# Patient Record
Sex: MALE | Race: BLACK OR AFRICAN AMERICAN | NOT HISPANIC OR LATINO | Employment: OTHER | URBAN - METROPOLITAN AREA
[De-identification: names, ages, dates, MRNs, and addresses within clinical notes are randomized per-mention and may not be internally consistent; named-entity substitution may affect disease eponyms.]

---

## 2017-01-14 ENCOUNTER — HOSPITAL ENCOUNTER (EMERGENCY)
Facility: HOSPITAL | Age: 57
Discharge: HOME | End: 2017-01-14
Attending: EMERGENCY MEDICINE
Payer: MEDICARE

## 2017-01-14 VITALS
DIASTOLIC BLOOD PRESSURE: 74 MMHG | RESPIRATION RATE: 16 BRPM | HEIGHT: 72 IN | OXYGEN SATURATION: 100 % | TEMPERATURE: 99 F | HEART RATE: 77 BPM | WEIGHT: 210 LBS | SYSTOLIC BLOOD PRESSURE: 128 MMHG | BODY MASS INDEX: 28.44 KG/M2

## 2017-01-14 DIAGNOSIS — D64.9 ANEMIA, UNSPECIFIED TYPE: ICD-10-CM

## 2017-01-14 DIAGNOSIS — T78.3XXA ANGIOEDEMA, INITIAL ENCOUNTER: Primary | ICD-10-CM

## 2017-01-14 DIAGNOSIS — M10.9 GOUT: ICD-10-CM

## 2017-01-14 DIAGNOSIS — R70.0 ESR RAISED: ICD-10-CM

## 2017-01-14 DIAGNOSIS — M00.9 SEPTIC ARTHRITIS: ICD-10-CM

## 2017-01-14 DIAGNOSIS — R79.82 ELEVATED C-REACTIVE PROTEIN (CRP): ICD-10-CM

## 2017-01-14 DIAGNOSIS — M1A.0610 CHRONIC GOUT OF RIGHT KNEE, UNSPECIFIED CAUSE: ICD-10-CM

## 2017-01-14 LAB
ALBUMIN SERPL BCP-MCNC: 3.4 G/DL
ALP SERPL-CCNC: 105 U/L
ALT SERPL W/O P-5'-P-CCNC: 46 U/L
ANION GAP SERPL CALC-SCNC: 13 MMOL/L
AST SERPL-CCNC: 56 U/L
BASOPHILS # BLD AUTO: 0.02 K/UL
BASOPHILS NFR BLD: 0.3 %
BILIRUB SERPL-MCNC: 0.5 MG/DL
BUN SERPL-MCNC: 26 MG/DL
CALCIUM SERPL-MCNC: 9.2 MG/DL
CHLORIDE SERPL-SCNC: 101 MMOL/L
CO2 SERPL-SCNC: 20 MMOL/L
CREAT SERPL-MCNC: 1.7 MG/DL
CRP SERPL-MCNC: 156 MG/L
DIFFERENTIAL METHOD: ABNORMAL
EOSINOPHIL # BLD AUTO: 0.1 K/UL
EOSINOPHIL NFR BLD: 0.7 %
ERYTHROCYTE [DISTWIDTH] IN BLOOD BY AUTOMATED COUNT: 14.1 %
ERYTHROCYTE [SEDIMENTATION RATE] IN BLOOD BY WESTERGREN METHOD: 143 MM/HR
EST. GFR  (AFRICAN AMERICAN): 51.7 ML/MIN/1.73 M^2
EST. GFR  (NON AFRICAN AMERICAN): 44.7 ML/MIN/1.73 M^2
GLUCOSE SERPL-MCNC: 90 MG/DL
HCT VFR BLD AUTO: 33.7 %
HGB BLD-MCNC: 11.7 G/DL
LYMPHOCYTES # BLD AUTO: 1.1 K/UL
LYMPHOCYTES NFR BLD: 16.1 %
MAGNESIUM SERPL-MCNC: 1.1 MG/DL
MCH RBC QN AUTO: 37.3 PG
MCHC RBC AUTO-ENTMCNC: 34.7 %
MCV RBC AUTO: 107 FL
MONOCYTES # BLD AUTO: 1.6 K/UL
MONOCYTES NFR BLD: 23.1 %
NEUTROPHILS # BLD AUTO: 4.1 K/UL
NEUTROPHILS NFR BLD: 59.5 %
PHOSPHATE SERPL-MCNC: 2.9 MG/DL
PLATELET # BLD AUTO: 286 K/UL
PMV BLD AUTO: 11.5 FL
POTASSIUM SERPL-SCNC: 3.5 MMOL/L
PROT SERPL-MCNC: 9.5 G/DL
RBC # BLD AUTO: 3.14 M/UL
SODIUM SERPL-SCNC: 134 MMOL/L
WBC # BLD AUTO: 6.84 K/UL

## 2017-01-14 PROCEDURE — 99285 EMERGENCY DEPT VISIT HI MDM: CPT | Mod: ,,, | Performed by: EMERGENCY MEDICINE

## 2017-01-14 PROCEDURE — 99284 EMERGENCY DEPT VISIT MOD MDM: CPT | Mod: 25

## 2017-01-14 PROCEDURE — 83735 ASSAY OF MAGNESIUM: CPT

## 2017-01-14 PROCEDURE — 85651 RBC SED RATE NONAUTOMATED: CPT

## 2017-01-14 PROCEDURE — 25000003 PHARM REV CODE 250: Performed by: EMERGENCY MEDICINE

## 2017-01-14 PROCEDURE — 63600175 PHARM REV CODE 636 W HCPCS: Performed by: EMERGENCY MEDICINE

## 2017-01-14 PROCEDURE — 84100 ASSAY OF PHOSPHORUS: CPT

## 2017-01-14 PROCEDURE — 85025 COMPLETE CBC W/AUTO DIFF WBC: CPT

## 2017-01-14 PROCEDURE — 96375 TX/PRO/DX INJ NEW DRUG ADDON: CPT

## 2017-01-14 PROCEDURE — 96372 THER/PROPH/DIAG INJ SC/IM: CPT

## 2017-01-14 PROCEDURE — 86140 C-REACTIVE PROTEIN: CPT

## 2017-01-14 PROCEDURE — 96374 THER/PROPH/DIAG INJ IV PUSH: CPT

## 2017-01-14 PROCEDURE — 80053 COMPREHEN METABOLIC PANEL: CPT

## 2017-01-14 RX ORDER — MAGNESIUM SULFATE HEPTAHYDRATE 40 MG/ML
2 INJECTION, SOLUTION INTRAVENOUS
Status: COMPLETED | OUTPATIENT
Start: 2017-01-14 | End: 2017-01-14

## 2017-01-14 RX ORDER — DIPHENHYDRAMINE HCL 25 MG
25 CAPSULE ORAL EVERY 4 HOURS PRN
Qty: 20 CAPSULE | Refills: 0 | Status: ON HOLD | COMMUNITY
Start: 2017-01-14 | End: 2018-01-16 | Stop reason: HOSPADM

## 2017-01-14 RX ORDER — DIPHENHYDRAMINE HYDROCHLORIDE 50 MG/ML
50 INJECTION INTRAMUSCULAR; INTRAVENOUS
Status: COMPLETED | OUTPATIENT
Start: 2017-01-14 | End: 2017-01-14

## 2017-01-14 RX ORDER — EPINEPHRINE 0.3 MG/.3ML
0.3 INJECTION SUBCUTANEOUS
Status: COMPLETED | OUTPATIENT
Start: 2017-01-14 | End: 2017-01-14

## 2017-01-14 RX ORDER — FAMOTIDINE 10 MG/ML
20 INJECTION INTRAVENOUS
Status: COMPLETED | OUTPATIENT
Start: 2017-01-14 | End: 2017-01-14

## 2017-01-14 RX ORDER — METHYLPREDNISOLONE SOD SUCC 125 MG
125 VIAL (EA) INJECTION
Status: COMPLETED | OUTPATIENT
Start: 2017-01-14 | End: 2017-01-14

## 2017-01-14 RX ADMIN — DIPHENHYDRAMINE HYDROCHLORIDE 50 MG: 50 INJECTION, SOLUTION INTRAMUSCULAR; INTRAVENOUS at 03:01

## 2017-01-14 RX ADMIN — FAMOTIDINE 20 MG: 10 INJECTION INTRAVENOUS at 01:01

## 2017-01-14 RX ADMIN — METHYLPREDNISOLONE SODIUM SUCCINATE 125 MG: 125 INJECTION, POWDER, FOR SOLUTION INTRAMUSCULAR; INTRAVENOUS at 01:01

## 2017-01-14 RX ADMIN — EPINEPHRINE 0.3 MG: 0.3 INJECTION INTRAMUSCULAR at 01:01

## 2017-01-14 RX ADMIN — MAGNESIUM SULFATE IN WATER 2 G: 40 INJECTION, SOLUTION INTRAVENOUS at 03:01

## 2017-01-14 NOTE — DISCHARGE INSTRUCTIONS
Angioedema  Angioedema (pronounced gk-yeb-o-edema) is a sudden appearance of swollen patches (edema) on the skin or mucous membranes. It most often involves the face, lips, mouth, tongue, back of throat, or vocal cords. It may also occur in other places, such as the arms or legs. A rash may also appear during the first 4 days of this illness.  Symptoms  There are different types of angioedema. Your symptoms will depend on what type of angioedema you have. Swelling and redness may be the main symptoms. Like allergic reactions, angioedema may include:  · Rash, hives, redness, welts, blisters  · Itching, burning, stinging, pain  · Dry, flaky, cracking, or scaly skin  · Swelling of the face, lips, or other parts of the body  More severe symptoms may include:  · Trouble swallowing, or feeling like your throat is closing  · Trouble breathing or wheezing  · Hoarse voice or trouble speaking  · Nausea, vomiting, diarrhea, or stomach cramps  · Feeling faint or lightheaded, rapid heart rate, or low blood pressure  Causes  Angioedema can be triggered by exposure to certain substances. Medical conditions involving the immune systems and certain infections may cause it. In rare cases, angioedema can be hereditary. Sometimes the cause may be very clear. However, it is often hard to find a cause. The most common causes include:  · Foods, such as shrimp, shellfish, peanuts, milk products, gluten, and eggs; also colorings, flavorings, and additives  · Insect bites or stings, from bees, mosquitos, fleas, or ticks  · Medicines, such as ACE inhibitors, penicillin, sulfa drugs, amoxicillin, aspirin, and ibuprofen  · Latex, which may be in gloves, clothes, toys, balloons, and some kinds of tape. People who are allergic to latex may have problems with foods such as bananas, avocados, kiwi, papaya, or chestnuts.  · Stress  · Heat, cold, or sunlight  The most common cause of angioedema is a reaction to a class of medicines called ACE  inhibitors. These are used to treat high blood pressure. ACE inhibitors include captopril, enalapril, and lisinopril. Tell your doctor if you have angioedema symptoms and are taking any of these medicines.  Angioedema may recur. It is important to watch for the earliest signs of this condition (see the list below). Contact your healthcare provider right away if swelling involves the face, mouth, or throat.  Home care  Rest quietly today. Avoid vigorous physical activity.  Medicines: The healthcare provider may prescribe medicines for itching, swelling, or pain. Follow the healthcare providers instructions when taking these medicines.  · Oral diphenhydramine is an antihistamine available without a prescription. Unless a prescription antihistamine was given, diphenhydramine may be used to reduce widespread itching. It may make you sleepy, so be careful using it when going to school, working, or driving. (Note: Do not use diphenhydramine if you have glaucoma or if you are a man who has trouble urinating due to an enlarged prostate.) Loratadine is an antihistamine that may cause less drowsiness.  · Do not use diphenhydramine cream on your skin. Some people can have an allergic reaction to this.  · Calamine lotion or oatmeal baths sometimes help with itching.  · You may use acetaminophen or ibuprofen for pain, unless another pain medicine was prescribed.  · If you were told that your angioedema was caused by a medicine you are taking, you must stop taking it. Ask your healthcare provider for a different one. In the future, advise medical staff that you are allergic to this medicine.  · If medicine was prescribed to treat angioedema (such as steroids or antihistamines), be sure you understand what the medicine is and how to take it. Then, take it as directed.  Follow-up care  Follow up with your healthcare provider, or as advised.  Call 911  Contact emergency services right away if any of these occur:  · Trouble  breathing or swallowing, or wheezing  · Hoarse voice or trouble speaking  · Chest pain  · Confusion  · Extreme drowsiness or trouble awakening  · Fainting or loss of consciousness  · Rapid heart rate  · Vomiting blood, or large amounts of blood in stool  · Seizure  When to seek medical attention  Call your healthcare provider right away if any of the following occur:  · Increase in swelling of the lips, mouth, or tongue  · Severe abdominal pain  © 7504-9206 [employer]. [address] [place] [address], [place] [address]. All rights reserved. This information is not intended as a substitute for professional medical care. Always follow your healthcare professional's instructions.          Gout    Gout or is an inflammation of a joint due to a build-up of gout crystals in the joint fluid. This occurs when there is an excess of uric acid (a normal waste product) in the body. Uric acid builds up in the body when the kidneys are unable to filter enough of it from the blood. This may occur with age. It is also associated with kidney disease. Gout occurs more often in persons with obesity, diabetes, hypertension, or high levels of fats in the blood. It may be run in families. Gout tends to come and go. A flare up of gout is called an attack. Drinking alcohol or eating certain foods (such as shellfish or foods with additives such as high-fructose corn syrup) may increase uric acid levels in the blood and cause a gout attack.  During a gout attack, the affected joint may become a hot, red, swollen and painful. If you have had one attack of gout, you are likely to have another. An attack of gout can be treated with medicine. If these attacks become frequent, a daily medicine may be prescribed to help the kidneys remove uric acid from the body.  Home care  During a gout attack:  · Rest painful joints. If gout affects the joints of your foot or leg, you may want to use crutches for the first few days to keep from bearing  weight on the affected joint.  · When sitting or lying down, raise the painful joint to a level higher than your heart.  · Apply an ice pack (ice cubes in a plastic bag wrapped in a thin towel) over the injured area for 20 minutes every 1-2 hours the first day for pain relief. Continue this 3-4 times a day for swelling and pain.  · Avoid alcohol and foods listed below (see Preventing attacks) during a gout attack. Drink extra fluid to help flush the uric acid through your kidneys.  · If you were prescribed a medication to treat gout, take it as your healthcare provider has instructed. Don't skip doses.  · Take anti-inflammatory medicine as directed.   · If pain medicines have been prescribed, take them exactly as directed.    Preventing attacks  · Minimize or avoid alcohol use. Excess alcohol intake can cause a gout attack.  · Limit these foods and beverages:  ¨ Organ meats, such as kidneys and liver  ¨ Certain seafoods (anchovies, sardines, shrimp, scallops, herring, mackerel)  ¨ Wild game, meat extracts and meat gravies  ¨ Foods and beverages sweetened with high-fructose corn syrup, such as sodas  · Eat a healthy diet including low-fat and nonfat dairy, whole grains, and vegetables.  · If you are overweight, talk to your healthcare provider about a weight reduction plan. Avoid fasting or extreme low calorie diets (less than 900 calories per day). This will increase uric acid levels in the body.  · If you have diabetes or high blood pressure, work with your doctor to manage these conditions.  · Protect the joint from injury. Trauma can trigger a gout attack.  Follow-up care  Follow up with your healthcare provider or as advised.   When to seek medical advice  Call your healthcare provider if you have any of the following:  · Fever over 100.4°F (38.ºC) with worsening joint pain  · Increasing redness around the joint  · Pain developing in another joint  · Repeated vomiting, abdominal pain, or blood in the vomit or  stool (black or red color)  © 1752-7452 [employer], [employer]. [address] [place] [address], [place] [address]. All rights reserved. This information is not intended as a substitute for professional medical care. Always follow your healthcare professional's instructions.

## 2017-01-14 NOTE — ED PROVIDER NOTES
Encounter Date: 1/14/2017    SCRIBE #1 NOTE: I, Sb Keita, am scribing for, and in the presence of,  Dr. Levy. I have scribed the following portions of the note - the Resident attestation. Other sections scribed: X-ray reading.       History     Chief Complaint   Patient presents with    Angioedema     lip swelling; denies current shortness of breath or throat swelling; states this has happened in the past for allergy to Lisinopril and he had to be intubated     Review of patient's allergies indicates:   Allergen Reactions    Lisinopril Swelling     Pt admitted with angioedema 2wks after taking lisinopril.      HPI Comments: 55 yo male presents for eval of upper lip swelling.  Pt's symptoms began at 1000.  He had similar episode approx 1 yr prior 2/2 ACEI angioedema that required intubation.  Pt reports that he took one 25 mg benadryl at home but has had no relief.  Denies any new foods or meds.  Denies any SOB, tongue swelling, drooling or voice change    Pt is also c/o chronic right knee pain with white discharge.  He has been treated for intractable gout and is currently on colchicine.  Chart check revealed that he had tap at that time and it demonstrated gout.  He denies any systemic symptoms but does endorse localized swelling and decreased ROM    Past Medical History   Diagnosis Date    Afib     Ankle fracture, left     Asthma     Gout, unspecified     Hypertension      No past medical history pertinent negatives.  Past Surgical History   Procedure Laterality Date    Ankle surgery       Family History   Problem Relation Age of Onset    Hypertension Father     Stroke Maternal Grandmother     Cataracts Maternal Grandfather     Stroke Maternal Grandfather     Cancer Mother      malignant polyp     Social History   Substance Use Topics    Smoking status: Never Smoker    Smokeless tobacco: Not on file    Alcohol use Yes      Comment: ocassional alcohol     Review of Systems   Constitutional:  Negative for chills, diaphoresis, fatigue and fever.   HENT: Positive for facial swelling. Negative for drooling, sore throat, tinnitus, trouble swallowing and voice change.    Eyes: Negative for pain and visual disturbance.   Respiratory: Negative for cough, shortness of breath and wheezing.    Cardiovascular: Negative for chest pain.   Gastrointestinal: Negative for abdominal pain, nausea and vomiting.   Genitourinary: Negative for dysuria and frequency.   Musculoskeletal: Negative for back pain.   Skin: Positive for wound (small circular wound to medial aspect of right knee). Negative for rash.   Neurological: Negative for dizziness, syncope, facial asymmetry, weakness and headaches.   Hematological: Does not bruise/bleed easily.       Physical Exam   Initial Vitals   BP Pulse Resp Temp SpO2   01/14/17 1154 01/14/17 1154 01/14/17 1154 01/14/17 1154 01/14/17 1154   128/75 110 18 98.9 °F (37.2 °C) 100 %     Physical Exam    Nursing note and vitals reviewed.  Constitutional: He appears well-developed and well-nourished. He is not diaphoretic. No distress.   HENT:   Head: Normocephalic and atraumatic.   Mouth/Throat: No oropharyngeal exudate.   Mild swelling of the upper lip noted  No oropharyngeal or tongue swelling    Eyes: Conjunctivae are normal. Pupils are equal, round, and reactive to light. No scleral icterus.   Neck: Normal range of motion. Neck supple. No tracheal deviation present.   Cardiovascular: Normal rate, regular rhythm and normal heart sounds.   No murmur heard.  Pulmonary/Chest: Breath sounds normal. No respiratory distress. He has no wheezes. He has no rhonchi. He has no rales.   Abdominal: Soft. Bowel sounds are normal. There is no tenderness.   Musculoskeletal:   Right knee -- mild swelling with white non purulent drainage from medial wound.  Minimal erythema and warmth.     Neurological: He is alert and oriented to person, place, and time. No cranial nerve deficit.   Skin: Skin is warm and dry.  No rash noted.         ED Course   Procedures  Labs Reviewed   CBC W/ AUTO DIFFERENTIAL - Abnormal; Notable for the following:        Result Value    RBC 3.14 (*)     Hemoglobin 11.7 (*)     Hematocrit 33.7 (*)      (*)     MCH 37.3 (*)     Mono # 1.6 (*)     Lymph% 16.1 (*)     Mono% 23.1 (*)     All other components within normal limits   COMPREHENSIVE METABOLIC PANEL - Abnormal; Notable for the following:     Sodium 134 (*)     CO2 20 (*)     BUN, Bld 26 (*)     Creatinine 1.7 (*)     Total Protein 9.5 (*)     Albumin 3.4 (*)     AST 56 (*)     ALT 46 (*)     eGFR if  51.7 (*)     eGFR if non  44.7 (*)     All other components within normal limits   MAGNESIUM - Abnormal; Notable for the following:     Magnesium 1.1 (*)     All other components within normal limits   C-REACTIVE PROTEIN - Abnormal; Notable for the following:     .0 (*)     All other components within normal limits   SEDIMENTATION RATE, MANUAL - Abnormal; Notable for the following:     Sed Rate 143 (*)     All other components within normal limits   PHOSPHORUS          X-Rays:   Independently Interpreted Readings:   Other Readings:  Right knee X-ray- Degenerative joint changes without evidence of dislocation and fracture.           APC / Resident Notes:   LAURA MDM    DDx includes but is not limited to: angioedema, anaphylaxis, contact dermatitis, gout, pseudogout septic arthritis, cellulitis, abscess.  A/P:  Pt is a 57 yo male with lip swelling and knee swelling.  Signs and symptoms consistent with angioedema and gout.  Will perform basic labs, esr, crp and XR as well as admin epi, famotidine and steroids.  Dispo pending  labs/imaging and reassessment.  Case Discussed with Dr. BRADLEY SANCHEZ  01/14/2017 1:11 PM      UPDATE  CBC with mild anemia  CMP with elevation of BUN and Cr that is at baseline.  Mild transaminitis noted as well  Mag low--replacing  Phos normal  CRP and ESR are elevated  consistent with chronic inflammation  XR no acute fx and min effusion.  Degenerative changes noted.   Continues to have lip swelling that is neither worsening nor improving.  Will redose benadryl.  Evelia Amaro Tomas SANCHEZ  01/14/2017 2:52 PM    Pt with significant improvement in lip swelling.  Symptoms have almost resolved.  Advised pt to continue benadryl and to return if he experiences any return of symptoms.  Expressed understanding and comfort with plan.  Will d/c.  Evelia Amaro Niviajonivincent SANCHEZ  01/14/2017 5:05 PM           Scribe Attestation:   Scribe #1: I performed the above scribed service and the documentation accurately describes the services I performed. I attest to the accuracy of the note.    Attending Attestation:   Physician Attestation Statement for Resident:  As the supervising MD   Physician Attestation Statement: I have personally seen and examined this patient.   I agree with the above history. -: 56 y.o. Man with hx of HTN and gout presents for evaluation of upper lip swelling and spontaneous drainage of his right knee.   As the supervising MD I agree with the above PE.    As the supervising MD I agree with the above treatment, course, plan, and disposition.  I have reviewed and agree with the residents interpretation of the following: lab data and x-rays.          Physician Attestation for Scribe:  Physician Attestation Statement for Scribe #1: I, Dr. Levy, reviewed documentation, as scribed by Sb Keita in my presence, and it is both accurate and complete.                 ED Course     Clinical Impression:   The primary encounter diagnosis was Angioedema, initial encounter. Diagnoses of Septic arthritis, Chronic gout of right knee, unspecified cause, Anemia, unspecified type, Elevated C-reactive protein (CRP), and ESR raised were also pertinent to this visit.    Disposition:   Disposition: Discharged  Condition: Stable       Evelia Marin MD  Resident  01/14/17 [id number]       Je GALDAMEZ  MD Cam  01/18/17 1388

## 2017-01-14 NOTE — ED AVS SNAPSHOT
[facility]  [address]  [facility] [address]               Michael Johnson Jr.   2017  1:01 PM   ED    Description:  Male : 1960   Department:  [facility]           Your Care was Coordinated By:     Provider Role From To    Je Levy MD Attending Provider 17 1342 --    Evelia Marin MD Resident 17 1303 --    Ailyn Cevallos MD Resident 17 1303 17 1304      Reason for Visit     Angioedema           Diagnoses this Visit        Comments    Angioedema, initial encounter    -  Primary     Septic arthritis         Chronic gout of right knee, unspecified cause         Anemia, unspecified type         Elevated C-reactive protein (CRP)         ESR raised           ED Disposition     None           To Do List           Follow-up Information     [facility]    Specialty:  Emergency Medicine    Why:  As needed, If symptoms worsen    Contact information:    [address]  [facility] [id number] [phone]        Follow up with Keo Engalnd MD. Schedule an appointment as soon as possible for a visit in 1 week.    Specialty:  Internal Medicine    Why:  follow up    Contact information:    [address]  [facility] [address]  [phone]        PURCHASE these Medications (No prescription required)        Start End    diphenhydrAMINE (BENADRYL) 25 mg capsule 2017     Sig - Route: Take 1 each (25 mg total) by mouth every 4 (four) hours as needed (lip swelling for 2 days). - Oral    Class: OTC      [facility]     [facility] -  Assistance  Registered nurses in the [facility] provide clinical advisement, health education, appointment booking, and other advisory services.  Call for this free service at [phone].             Medications           Message regarding Medications     Verify the changes and/or  additions to your medication regime listed below are the same as discussed with your clinician today.  If any of these changes or additions are incorrect, please notify your healthcare provider.        START taking these NEW medications        Refills    diphenhydrAMINE (BENADRYL) 25 mg capsule 0    Sig: Take 1 each (25 mg total) by mouth every 4 (four) hours as needed (lip swelling for 2 days).    Class: OTC    Route: Oral      These medications were administered today        Dose Freq    epinephrine (EPIPEN) 0.3 mg/0.3 mL pen injection 0.3 mg 0.3 mg ED 1 Time    Sig: Inject 0.3 mLs (0.3 mg total) into the muscle ED 1 Time.    Class: Normal    Route: Intramuscular    famotidine (PF) 20 mg/2 mL injection 20 mg 20 mg ED 1 Time    Sig: Inject 2 mLs (20 mg total) into the vein ED 1 Time.    Class: Normal    Route: Intravenous    methylPREDNISolone sodium succinate injection 125 mg 125 mg ED 1 Time    Sig: Inject 125 mg into the vein ED 1 Time.    Class: Normal    Route: Intravenous    diphenhydrAMINE injection 50 mg 50 mg ED 1 Time    Sig: Inject 1 mL (50 mg total) into the vein ED 1 Time.    Class: Normal    Route: Intravenous    magnesium sulfate 2g in water 50mL IVPB (premix) 2 g ED 1 Time    Sig: Inject 50 mLs (2 g total) into the vein ED 1 Time.    Class: Normal    Route: Intravenous           Verify that the below list of medications is an accurate representation of the medications you are currently taking.  If none reported, the list may be blank. If incorrect, please contact your healthcare provider. Carry this list with you in case of emergency.           Current Medications     allopurinol (ZYLOPRIM) 300 MG tablet Take 300 mg by mouth once daily.    aspirin (ECOTRIN) 81 MG EC tablet Take 81 mg by mouth once daily.    colchicine 0.6 mg tablet Take 1 tablet (0.6 mg total) by mouth once daily.    diphenhydrAMINE (BENADRYL) 25 mg capsule Take 1 each (25 mg total) by mouth every 4 (four) hours as needed (lip  swelling for 2 days).    gabapentin (NEURONTIN) 300 MG capsule 300mg tablet three times a day for 3 days.  Then increase to 2 tablets at night, 1 in AM and 1 in afternoon for 3 days.  Then increase to 2 tablets at night and 2 in morning, 1 tablet in afternoon for 3 days.  Then increase to 2 tablets three times a day (1800mg)    hydrochlorothiazide (HYDRODIURIL) 25 MG tablet Take 1 tablet (25 mg total) by mouth once daily.    magnesium 30 mg Tab Take by mouth.    magnesium sulfate 2g in water 50mL IVPB (premix) Inject 50 mLs (2 g total) into the vein ED 1 Time.    metoprolol tartrate (LOPRESSOR) 50 MG tablet Take 50 mg by mouth once daily.    nifedipine 30 MG ORAL TR24 (PROCARDIA-XL) 30 MG (OSM) 24 hr tablet Take 1 tablet (30 mg total) by mouth once daily.           Clinical Reference Information           Your Vitals Were     BP Pulse Temp Resp Height Weight    128/74 (BP Location: Right arm, Patient Position: Sitting, BP Method: Automatic) 77 98.9 °F (37.2 °C) (Oral) 16 6' (1.829 m) 95.3 kg (210 lb)    SpO2 BMI             100% 28.48 kg/m2         Allergies as of 1/14/2017        Reactions    Lisinopril Swelling    Pt admitted with angioedema 2wks after taking lisinopril.       Immunizations Administered on Date of Encounter - 1/14/2017     None      ED Micro, Lab, POCT     Start Ordered       Status Ordering Provider    01/14/17 1313 01/14/17 1313  CBC auto differential  STAT      Final result     01/14/17 1313 01/14/17 1313  Comprehensive metabolic panel  STAT      Final result     01/14/17 1313 01/14/17 1313  Magnesium  STAT      Final result     01/14/17 1313 01/14/17 1313  Phosphorus  STAT      Final result     01/14/17 1313 01/14/17 1313  C-reactive protein  STAT      Final result     01/14/17 1313 01/14/17 1313  Sedimentation rate, manual  STAT      Final result       ED Imaging Orders     Start Ordered       Status Ordering Provider    01/14/17 1313 01/14/17 1313  X-Ray Knee 3 View Right  1 time imaging       Final result         Discharge Instructions         Angioedema  Angioedema (pronounced if-pko-t-edema) is a sudden appearance of swollen patches (edema) on the skin or mucous membranes. It most often involves the face, lips, mouth, tongue, back of throat, or vocal cords. It may also occur in other places, such as the arms or legs. A rash may also appear during the first 4 days of this illness.  Symptoms  There are different types of angioedema. Your symptoms will depend on what type of angioedema you have. Swelling and redness may be the main symptoms. Like allergic reactions, angioedema may include:  · Rash, hives, redness, welts, blisters  · Itching, burning, stinging, pain  · Dry, flaky, cracking, or scaly skin  · Swelling of the face, lips, or other parts of the body  More severe symptoms may include:  · Trouble swallowing, or feeling like your throat is closing  · Trouble breathing or wheezing  · Hoarse voice or trouble speaking  · Nausea, vomiting, diarrhea, or stomach cramps  · Feeling faint or lightheaded, rapid heart rate, or low blood pressure  Causes  Angioedema can be triggered by exposure to certain substances. Medical conditions involving the immune systems and certain infections may cause it. In rare cases, angioedema can be hereditary. Sometimes the cause may be very clear. However, it is often hard to find a cause. The most common causes include:  · Foods, such as shrimp, shellfish, peanuts, milk products, gluten, and eggs; also colorings, flavorings, and additives  · Insect bites or stings, from bees, mosquitos, fleas, or ticks  · Medicines, such as ACE inhibitors, penicillin, sulfa drugs, amoxicillin, aspirin, and ibuprofen  · Latex, which may be in gloves, clothes, toys, balloons, and some kinds of tape. People who are allergic to latex may have problems with foods such as bananas, avocados, kiwi, papaya, or chestnuts.  · Stress  · Heat, cold, or sunlight  The most common cause of angioedema  is a reaction to a class of medicines called ACE inhibitors. These are used to treat high blood pressure. ACE inhibitors include captopril, enalapril, and lisinopril. Tell your doctor if you have angioedema symptoms and are taking any of these medicines.  Angioedema may recur. It is important to watch for the earliest signs of this condition (see the list below). Contact your healthcare provider right away if swelling involves the face, mouth, or throat.  Home care  Rest quietly today. Avoid vigorous physical activity.  Medicines: The healthcare provider may prescribe medicines for itching, swelling, or pain. Follow the healthcare providers instructions when taking these medicines.  · Oral diphenhydramine is an antihistamine available without a prescription. Unless a prescription antihistamine was given, diphenhydramine may be used to reduce widespread itching. It may make you sleepy, so be careful using it when going to school, working, or driving. (Note: Do not use diphenhydramine if you have glaucoma or if you are a man who has trouble urinating due to an enlarged prostate.) Loratadine is an antihistamine that may cause less drowsiness.  · Do not use diphenhydramine cream on your skin. Some people can have an allergic reaction to this.  · Calamine lotion or oatmeal baths sometimes help with itching.  · You may use acetaminophen or ibuprofen for pain, unless another pain medicine was prescribed.  · If you were told that your angioedema was caused by a medicine you are taking, you must stop taking it. Ask your healthcare provider for a different one. In the future, advise medical staff that you are allergic to this medicine.  · If medicine was prescribed to treat angioedema (such as steroids or antihistamines), be sure you understand what the medicine is and how to take it. Then, take it as directed.  Follow-up care  Follow up with your healthcare provider, or as advised.  Call 911  Contact emergency services  right away if any of these occur:  · Trouble breathing or swallowing, or wheezing  · Hoarse voice or trouble speaking  · Chest pain  · Confusion  · Extreme drowsiness or trouble awakening  · Fainting or loss of consciousness  · Rapid heart rate  · Vomiting blood, or large amounts of blood in stool  · Seizure  When to seek medical attention  Call your healthcare provider right away if any of the following occur:  · Increase in swelling of the lips, mouth, or tongue  · Severe abdominal pain  © 1866-9929 [employer]. [address] [place] [address]. All rights reserved. This information is not intended as a substitute for professional medical care. Always follow your healthcare professional's instructions.          Gout    Gout or is an inflammation of a joint due to a build-up of gout crystals in the joint fluid. This occurs when there is an excess of uric acid (a normal waste product) in the body. Uric acid builds up in the body when the kidneys are unable to filter enough of it from the blood. This may occur with age. It is also associated with kidney disease. Gout occurs more often in persons with obesity, diabetes, hypertension, or high levels of fats in the blood. It may be run in families. Gout tends to come and go. A flare up of gout is called an attack. Drinking alcohol or eating certain foods (such as shellfish or foods with additives such as high-fructose corn syrup) may increase uric acid levels in the blood and cause a gout attack.  During a gout attack, the affected joint may become a hot, red, swollen and painful. If you have had one attack of gout, you are likely to have another. An attack of gout can be treated with medicine. If these attacks become frequent, a daily medicine may be prescribed to help the kidneys remove uric acid from the body.  Home care  During a gout attack:  · Rest painful joints. If gout affects the joints of your foot or leg, you may want to use crutches  for the first few days to keep from bearing weight on the affected joint.  · When sitting or lying down, raise the painful joint to a level higher than your heart.  · Apply an ice pack (ice cubes in a plastic bag wrapped in a thin towel) over the injured area for 20 minutes every 1-2 hours the first day for pain relief. Continue this 3-4 times a day for swelling and pain.  · Avoid alcohol and foods listed below (see Preventing attacks) during a gout attack. Drink extra fluid to help flush the uric acid through your kidneys.  · If you were prescribed a medication to treat gout, take it as your healthcare provider has instructed. Don't skip doses.  · Take anti-inflammatory medicine as directed.   · If pain medicines have been prescribed, take them exactly as directed.    Preventing attacks  · Minimize or avoid alcohol use. Excess alcohol intake can cause a gout attack.  · Limit these foods and beverages:  ¨ Organ meats, such as kidneys and liver  ¨ Certain seafoods (anchovies, sardines, shrimp, scallops, herring, mackerel)  ¨ Wild game, meat extracts and meat gravies  ¨ Foods and beverages sweetened with high-fructose corn syrup, such as sodas  · Eat a healthy diet including low-fat and nonfat dairy, whole grains, and vegetables.  · If you are overweight, talk to your healthcare provider about a weight reduction plan. Avoid fasting or extreme low calorie diets (less than 900 calories per day). This will increase uric acid levels in the body.  · If you have diabetes or high blood pressure, work with your doctor to manage these conditions.  · Protect the joint from injury. Trauma can trigger a gout attack.  Follow-up care  Follow up with your healthcare provider or as advised.   When to seek medical advice  Call your healthcare provider if you have any of the following:  · Fever over 100.4°F (38.ºC) with worsening joint pain  · Increasing redness around the joint  · Pain developing in another joint  · Repeated vomiting,  abdominal pain, or blood in the vomit or stool (black or red color)  © 8975-0109 [employer]. [address] [place] [address], [place] [address]. All rights reserved. This information is not intended as a substitute for professional medical care. Always follow your healthcare professional's instructions.          [facility] Sign-Up     Activating your [facility] account is as easy as 1-2-3!     1) Visit [email or web address], select Sign Up Now, enter this activation code and your date of birth, then select Next.  [email or web address]  Expires: 2/28/2017  4:33 PM      2) Create a username and password to use when you visit [facility] in the future and select a security question in case you lose your password and select Next.    3) Enter your e-mail address and click Sign Up!    Additional Information  If you have questions, please e-mail [email or web address] or call [phone] to talk to our [facility] staff. Remember, [facility] is NOT to be used for urgent needs. For medical emergencies, dial 911.          [facility] complies with applicable Federal civil rights laws and does not discriminate on the basis of race, color, national origin, age, disability, or sex.        Language Assistance Services     ATTENTION: Language assistance services are available, free of charge. Please call [phone].      ATENCIÓN: Si habla español, tiene a dupree disposición servicios gratuitos de asistencia lingüística. Llame al [phone].     CHÚ Ý: N?u b?n nói Ti?ng Vi?t, có các d?ch v? h? tr? ngôn ng? mi?n phí dành cho b?n. G?i s? [phone].

## 2017-01-14 NOTE — ED NOTES
Pt in room resting in stretcher.  Lip swelling is down from admission. Pt denies itching in throat or difficulty breathing.  100% sats on monitor.  Spontaneous breathing.  Remains on monitor.  Bed rails up x2.  Family on bedside. No distress. Will continue to monitor.  Call bell in reach.

## 2017-03-15 ENCOUNTER — HOSPITAL ENCOUNTER (OUTPATIENT)
Facility: HOSPITAL | Age: 57
Discharge: HOME | DRG: 312 | End: 2017-03-17
Attending: EMERGENCY MEDICINE | Admitting: INTERNAL MEDICINE
Payer: MEDICARE

## 2017-03-15 DIAGNOSIS — M79.641 RIGHT HAND PAIN: ICD-10-CM

## 2017-03-15 DIAGNOSIS — M25.441 SWELLING OF JOINT OF RIGHT HAND: ICD-10-CM

## 2017-03-15 DIAGNOSIS — Z12.11 SCREEN FOR COLON CANCER: ICD-10-CM

## 2017-03-15 DIAGNOSIS — N18.9 ACUTE ON CHRONIC KIDNEY FAILURE: Chronic | ICD-10-CM

## 2017-03-15 DIAGNOSIS — R56.9 SEIZURE: ICD-10-CM

## 2017-03-15 DIAGNOSIS — T78.3XXS ANGIOEDEMA OF LIPS, SEQUELA: ICD-10-CM

## 2017-03-15 DIAGNOSIS — R79.89 ELEVATED TROPONIN: ICD-10-CM

## 2017-03-15 DIAGNOSIS — M10.9 GOUT, UNSPECIFIED CAUSE, UNSPECIFIED CHRONICITY, UNSPECIFIED SITE: ICD-10-CM

## 2017-03-15 DIAGNOSIS — R55 SYNCOPE, CARDIOGENIC: ICD-10-CM

## 2017-03-15 DIAGNOSIS — K21.9 GASTROESOPHAGEAL REFLUX DISEASE, ESOPHAGITIS PRESENCE NOT SPECIFIED: ICD-10-CM

## 2017-03-15 DIAGNOSIS — I48.0 PAF (PAROXYSMAL ATRIAL FIBRILLATION): ICD-10-CM

## 2017-03-15 DIAGNOSIS — E87.6 HYPOKALEMIA: ICD-10-CM

## 2017-03-15 DIAGNOSIS — E78.5 HYPERLIPIDEMIA, UNSPECIFIED HYPERLIPIDEMIA TYPE: ICD-10-CM

## 2017-03-15 DIAGNOSIS — I10 ESSENTIAL HYPERTENSION: ICD-10-CM

## 2017-03-15 DIAGNOSIS — E83.42 HYPOMAGNESEMIA: ICD-10-CM

## 2017-03-15 DIAGNOSIS — E86.0 DEHYDRATION: ICD-10-CM

## 2017-03-15 DIAGNOSIS — M10.9 GOUT, UNSPECIFIED: ICD-10-CM

## 2017-03-15 DIAGNOSIS — R79.89 LFTS ABNORMAL: ICD-10-CM

## 2017-03-15 DIAGNOSIS — I48.0 PAROXYSMAL ATRIAL FIBRILLATION: ICD-10-CM

## 2017-03-15 DIAGNOSIS — R07.9 CHEST PAIN, UNSPECIFIED TYPE: ICD-10-CM

## 2017-03-15 DIAGNOSIS — M10.9 GOUTY ARTHRITIS: Chronic | ICD-10-CM

## 2017-03-15 DIAGNOSIS — E87.6 ACUTE HYPOKALEMIA: Primary | ICD-10-CM

## 2017-03-15 DIAGNOSIS — N17.9 ACUTE ON CHRONIC KIDNEY FAILURE: Chronic | ICD-10-CM

## 2017-03-15 DIAGNOSIS — S82.892S ANKLE FRACTURE, LEFT, SEQUELA: ICD-10-CM

## 2017-03-15 LAB — POCT GLUCOSE: 113 MG/DL (ref 70–110)

## 2017-03-15 PROCEDURE — 82962 GLUCOSE BLOOD TEST: CPT

## 2017-03-15 PROCEDURE — 99285 EMERGENCY DEPT VISIT HI MDM: CPT | Mod: ,,, | Performed by: EMERGENCY MEDICINE

## 2017-03-15 PROCEDURE — 94761 N-INVAS EAR/PLS OXIMETRY MLT: CPT

## 2017-03-15 PROCEDURE — 99285 EMERGENCY DEPT VISIT HI MDM: CPT | Mod: 25

## 2017-03-15 NOTE — IP AVS SNAPSHOT
[facility]  [address]  [facility] [address]  Phone: [phone]           Patient Discharge Instructions     Our goal is to set you up for success. This packet includes information on your condition, medications, and your home care. It will help you to care for yourself so you don't get sicker and need to go back to the hospital.     Please ask your nurse if you have any questions.        There are many details to remember when preparing to leave the hospital. Here is what you will need to do:    1. Take your medicine. If you are prescribed medications, review your Medication List in the following pages. You may have new medications to  at the pharmacy and others that you'll need to stop taking. Review the instructions for how and when to take your medications. Talk with your doctor or nurses if you are unsure of what to do.     2. Go to your follow-up appointments. Specific follow-up information is listed in the following pages. Your may be contacted by a transition nurse or clinical provider about future appointments. Be sure we have all of the phone numbers to reach you, if needed. Please contact your provider's office if you are unable to make an appointment.     3. Watch for warning signs. Your doctor or nurse will give you detailed warning signs to watch for and when to call for assistance. These instructions may also include educational information about your condition. If you experience any of warning signs to your health, call your doctor.               [facility]  Unless otherwise directed by your provider, please contact [facility], our nurse care line that is available for 24/7 assistance.     [phone] (toll-free)    Registered nurses in the [facility] provide clinical advisement, health education, appointment booking, and other advisory services.                    ** Verify the list of medication(s) below is accurate and up  to date. Carry this with you in case of emergency. If your medications have changed, please notify your healthcare provider.             Medication List      START taking these medications        Additional Info                      magnesium oxide 400 mg tablet   Commonly known as:  MAG-OX   Refills:  0   Dose:  400 mg    Last time this was given:  400 mg on 3/17/2017  8:18 AM   Instructions:  Take 1 tablet (400 mg total) by mouth once daily.     Begin Date    AM    Noon    PM    Bedtime         CONTINUE taking these medications        Additional Info                      allopurinol 300 MG tablet   Commonly known as:  ZYLOPRIM   Refills:  0   Dose:  300 mg    Last time this was given:  300 mg on 3/17/2017  8:19 AM   Instructions:  Take 300 mg by mouth once daily.     Begin Date    AM    Noon    PM    Bedtime       aspirin 81 MG EC tablet   Commonly known as:  ECOTRIN   Refills:  0   Dose:  81 mg    Last time this was given:  81 mg on 3/17/2017  8:18 AM   Instructions:  Take 81 mg by mouth once daily.     Begin Date    AM    Noon    PM    Bedtime       colchicine 0.6 mg tablet   Quantity:  60 tablet   Refills:  3   Dose:  0.6 mg    Last time this was given:  0.6 mg on 3/17/2017  8:18 AM   Instructions:  Take 1 tablet (0.6 mg total) by mouth once daily.     Begin Date    AM    Noon    PM    Bedtime       diltiaZEM 180 MG Cdcr   Commonly known as:  DILACOR XR   Refills:  0   Dose:  180 mg    Instructions:  Take 180 mg by mouth once daily.     Begin Date    AM    Noon    PM    Bedtime       diphenhydrAMINE 25 mg capsule   Commonly known as:  BENADRYL   Quantity:  20 capsule   Refills:  0   Dose:  25 mg    Instructions:  Take 1 each (25 mg total) by mouth every 4 (four) hours as needed (lip swelling for 2 days).     Begin Date    AM    Noon    PM    Bedtime       ibuprofen 200 MG tablet   Commonly known as:  ADVIL,MOTRIN   Refills:  0   Dose:  200 mg    Instructions:  Take 200 mg by mouth every 6 (six) hours as needed  for Pain.     Begin Date    AM    Noon    PM    Bedtime       metoprolol succinate 50 MG 24 hr tablet   Commonly known as:  TOPROL-XL   Refills:  0   Dose:  50 mg    Instructions:  Take 50 mg by mouth once daily.     Begin Date    AM    Noon    PM    Bedtime       tramadol 50 mg tablet   Commonly known as:  ULTRAM   Refills:  0   Dose:  50 mg    Last time this was given:  50 mg on 3/16/2017 10:41 AM   Instructions:  Take 50 mg by mouth 3 (three) times daily as needed for Pain.     Begin Date    AM    Noon    PM    Bedtime            Where to Get Your Medications      You can get these medications from any pharmacy     You don't need a prescription for these medications     magnesium oxide 400 mg tablet                  Please bring to all follow up appointments:    1. A copy of your discharge instructions.  2. All medicines you are currently taking in their original bottles.  3. Identification and insurance card.    Please arrive 15 minutes ahead of scheduled appointment time.    Please call 24 hours in advance if you must reschedule your appointment and/or time.        Follow-up Information     Go to [facility].    Specialty:  Emergency Medicine    Why:  If symptoms worsen    Contact information:    [address]  [facility] [address] [phone]        Go to Your main doctor.    Why:  As needed for general health maintenance.        Follow up with [facility] In 4 weeks.    Specialty:  Cardiology    Why:  cardiogenic syncope, s/p holter    Contact information:    [address]  [facility] [address] [phone]    Additional information:    3rd floor        Discharge Instructions     Future Orders    Diet general     Questions:    Total calories:      Fat restriction, if any:      Protein restriction, if any:      Na restriction, if any:      Fluid restriction:      Additional restrictions:          Discharge Instructions         Discharge  Instructions for Hypokalemia  You have been diagnosed with hypokalemia. This means you have a low level of potassium in your blood. Potassium helps your nerve and muscle cells work as they should. These cells include the cells in your heart. A low level of potassium in the blood can cause serious problems, such as abnormal heart rhythms and even heart attack.  Diet changes  Eat more potassium-rich foods:  · Bananas  · Oranges and orange juice  · Tomatoes, tomato sauce, and tomato juice  · Leafy green vegetables, such as spinach, kale, salad greens, collards, and chard  · Melons (all kinds)  · Pomegranates  · Peas  · Beans  · Potatoes  · Sweet potatoes  · Avocados, including guacamole  · Vegetable juices, such as V8  · Fruit juices  · All nuts and seeds  · Fish, including tuna, halibut, salmon, cod, snapper, marcia, swordfish, and perch  · Milk, including fat-free, low-fat, whole, chocolate, and buttermilk  · Soy milk  Other home care  · Take a potassium supplement as directed by your healthcare provider.  · After strenuous exercise or any activity that causes you to sweat a lot, grab a beverage high in potassium. This includes chocolate milk, coconut water, orange juice, or low-sodium vegetable juices.  · Be sure to eat foods or drink fluids that contain potassium if you are having diarrhea or vomiting.  · Have your potassium levels checked regularly.  · Take all medicines exactly as directed.  · Tell your healthcare provider about all prescription and ove-the-counter medicines you are taking. This includes herbal products.  · Avoid foods that are high in salt. Avoid canned and prepared foods that are high in salt.  Follow-up  · Make a follow-up appointment as directed by our staff.  · Keep all follow-up appointments. Your healthcare provider needs to monitor your condition closely.     When to call your healthcare provider  Call your provider right away or go to the emergency room if you have any of the  following:  · Vomiting  · Fatigue  · Diarrhea  · Rapid, irregular heartbeat  · Shortness of breath  · Chest pain  · Muscle cramps, spasms, or twitching  · Weakness  · Paralysis   Date Last Reviewed: 6/20/2015 © 2000-2016 [employer]. [address] [place] [id number]. All rights reserved. This information is not intended as a substitute for professional medical care. Always follow your healthcare professional's instructions.            Primary Diagnosis     Your primary diagnosis was:  Fainting      Admission Information     Date & Time Provider Department CSN    3/15/2017 11:45 PM David Talbot MD [facility] [id number]      Care Providers     Provider Role Specialty Primary office phone    David Talbot MD Attending Provider Hospitalist [phone]    David Talbot MD Team Attending  Hospitalist [phone]      Your Vitals Were     BP Pulse Temp Resp Height Weight    109/74 (BP Location: Left arm, Patient Position: Lying, BP Method: Automatic) 76 99.2 °F (37.3 °C) (Oral) 18 6' (1.829 m) 95.3 kg (210 lb)    SpO2 BMI             99% 28.48 kg/m2         Recent Lab Values        8/26/2009 2/5/2013 5/10/2014                    10:50 AM  2:52 PM  9:00 AM         A1C 5.3 6.1 5.4                   Allergies as of 3/17/2017        Reactions    Lisinopril Swelling    Pt admitted with angioedema 2wks after taking lisinopril.       Advance Directives     An advance directive is a document which, in the event you are no longer able to make decisions for yourself, tells your healthcare team what kind of treatment you do or do not want to receive, or who you would like to make those decisions for you.  If you do not currently have an advance directive, [facility] encourages you to create one.  For more information call:  [phone]), [phone] ([phone]),  or log on to [email or web address].        Language Assistance Services      ATTENTION: Language assistance services are available, free of charge. Please call [phone].      ATENCIÓN: Si patti crowder, tiene a dupree disposición servicios gratuitos de asistencia lingüística. Llame al [phone].     CHÚ Ý: N?u b?n nói Ti?ng Vi?t, có các d?ch v? h? tr? ngôn ng? mi?n phí dành cho b?n. G?i s? [phone].        Chronic Kindey Disease Education             [facility] Sign-Up     Activating your [facility] account is as easy as 1-2-3!     1) Visit [email or web address], select Sign Up Now, enter this activation code and your date of birth, then select Next.  [email or web address]  Expires: 4/29/2017 11:32 PM      2) Create a username and password to use when you visit [facility] in the future and select a security question in case you lose your password and select Next.    3) Enter your e-mail address and click Sign Up!    Additional Information  If you have questions, please e-mail [email or web address] or call [phone] to talk to our [facility] staff. Remember, [facility] is NOT to be used for urgent needs. For medical emergencies, dial 911.          [facility] complies with applicable Federal civil rights laws and does not discriminate on the basis of race, color, national origin, age, disability, or sex.

## 2017-03-16 PROBLEM — K21.9 GERD (GASTROESOPHAGEAL REFLUX DISEASE): Status: ACTIVE | Noted: 2017-03-16

## 2017-03-16 PROBLEM — E83.42 HYPOMAGNESEMIA: Status: ACTIVE | Noted: 2017-03-16

## 2017-03-16 PROBLEM — R55 SYNCOPE, CARDIOGENIC: Status: ACTIVE | Noted: 2017-03-16

## 2017-03-16 PROBLEM — R56.9 SEIZURE: Status: ACTIVE | Noted: 2017-03-16

## 2017-03-16 PROBLEM — E87.6 ACUTE HYPOKALEMIA: Status: ACTIVE | Noted: 2017-03-16

## 2017-03-16 PROBLEM — R79.89 ELEVATED TROPONIN: Status: ACTIVE | Noted: 2017-03-16

## 2017-03-16 PROBLEM — K27.9 PUD (PEPTIC ULCER DISEASE): Status: ACTIVE | Noted: 2017-03-16

## 2017-03-16 LAB
ALBUMIN SERPL BCP-MCNC: 3.5 G/DL
ALP SERPL-CCNC: 99 U/L
ALT SERPL W/O P-5'-P-CCNC: 117 U/L
ANION GAP SERPL CALC-SCNC: 11 MMOL/L
ANION GAP SERPL CALC-SCNC: 12 MMOL/L
AST SERPL-CCNC: 212 U/L
BASOPHILS # BLD AUTO: 0.01 K/UL
BASOPHILS # BLD AUTO: 0.02 K/UL
BASOPHILS NFR BLD: 0.1 %
BASOPHILS NFR BLD: 0.3 %
BILIRUB SERPL-MCNC: 0.9 MG/DL
BNP SERPL-MCNC: 102 PG/ML
BUN SERPL-MCNC: 15 MG/DL
BUN SERPL-MCNC: 18 MG/DL (ref 6–30)
BUN SERPL-MCNC: 20 MG/DL
CALCIUM SERPL-MCNC: 7.1 MG/DL
CALCIUM SERPL-MCNC: 8.3 MG/DL
CHLORIDE SERPL-SCNC: 100 MMOL/L (ref 95–110)
CHLORIDE SERPL-SCNC: 107 MMOL/L
CHLORIDE SERPL-SCNC: 96 MMOL/L
CO2 SERPL-SCNC: 18 MMOL/L
CO2 SERPL-SCNC: 27 MMOL/L
CREAT SERPL-MCNC: 1 MG/DL
CREAT SERPL-MCNC: 1.1 MG/DL (ref 0.5–1.4)
CREAT SERPL-MCNC: 1.4 MG/DL
DIFFERENTIAL METHOD: ABNORMAL
DIFFERENTIAL METHOD: ABNORMAL
EOSINOPHIL # BLD AUTO: 0.1 K/UL
EOSINOPHIL # BLD AUTO: 0.2 K/UL
EOSINOPHIL NFR BLD: 1 %
EOSINOPHIL NFR BLD: 3 %
ERYTHROCYTE [DISTWIDTH] IN BLOOD BY AUTOMATED COUNT: 15.4 %
ERYTHROCYTE [DISTWIDTH] IN BLOOD BY AUTOMATED COUNT: 15.5 %
EST. GFR  (AFRICAN AMERICAN): >60 ML/MIN/1.73 M^2
EST. GFR  (AFRICAN AMERICAN): >60 ML/MIN/1.73 M^2
EST. GFR  (NON AFRICAN AMERICAN): 56.6 ML/MIN/1.73 M^2
EST. GFR  (NON AFRICAN AMERICAN): >60 ML/MIN/1.73 M^2
GLUCOSE SERPL-MCNC: 73 MG/DL
GLUCOSE SERPL-MCNC: 81 MG/DL (ref 70–110)
GLUCOSE SERPL-MCNC: 88 MG/DL
HCT VFR BLD AUTO: 26 %
HCT VFR BLD AUTO: 33.3 %
HCT VFR BLD CALC: 29 %PCV (ref 36–54)
HGB BLD-MCNC: 11.8 G/DL
HGB BLD-MCNC: 9.2 G/DL
INR PPP: 1
LYMPHOCYTES # BLD AUTO: 0.9 K/UL
LYMPHOCYTES # BLD AUTO: 1 K/UL
LYMPHOCYTES NFR BLD: 11 %
LYMPHOCYTES NFR BLD: 14.6 %
MAGNESIUM SERPL-MCNC: 0.9 MG/DL
MAGNESIUM SERPL-MCNC: 1.4 MG/DL
MAGNESIUM SERPL-MCNC: 4.3 MG/DL
MCH RBC QN AUTO: 37.1 PG
MCH RBC QN AUTO: 37.6 PG
MCHC RBC AUTO-ENTMCNC: 35.4 %
MCHC RBC AUTO-ENTMCNC: 35.4 %
MCV RBC AUTO: 105 FL
MCV RBC AUTO: 106 FL
MONOCYTES # BLD AUTO: 0.8 K/UL
MONOCYTES # BLD AUTO: 0.9 K/UL
MONOCYTES NFR BLD: 14.6 %
MONOCYTES NFR BLD: 9.7 %
NEUTROPHILS # BLD AUTO: 4.2 K/UL
NEUTROPHILS # BLD AUTO: 6.7 K/UL
NEUTROPHILS NFR BLD: 67 %
NEUTROPHILS NFR BLD: 77.9 %
PHOSPHATE SERPL-MCNC: 2 MG/DL
PLATELET # BLD AUTO: 106 K/UL
PLATELET # BLD AUTO: 115 K/UL
PMV BLD AUTO: 11.1 FL
PMV BLD AUTO: 11.2 FL
POC IONIZED CALCIUM: 0.87 MMOL/L (ref 1.06–1.42)
POC TCO2 (MEASURED): 21 MMOL/L (ref 23–29)
POTASSIUM BLD-SCNC: 2.6 MMOL/L (ref 3.5–5.1)
POTASSIUM SERPL-SCNC: 2.7 MMOL/L
POTASSIUM SERPL-SCNC: 3.1 MMOL/L
PROT SERPL-MCNC: 8.5 G/DL
PROTHROMBIN TIME: 11.1 SEC
RBC # BLD AUTO: 2.48 M/UL
RBC # BLD AUTO: 3.14 M/UL
SAMPLE: ABNORMAL
SODIUM BLD-SCNC: 137 MMOL/L (ref 136–145)
SODIUM SERPL-SCNC: 135 MMOL/L
SODIUM SERPL-SCNC: 136 MMOL/L
TROPONIN I SERPL DL<=0.01 NG/ML-MCNC: 0.02 NG/ML
TROPONIN I SERPL DL<=0.01 NG/ML-MCNC: 0.07 NG/ML
TROPONIN I SERPL DL<=0.01 NG/ML-MCNC: 0.09 NG/ML
TROPONIN I SERPL DL<=0.01 NG/ML-MCNC: 0.11 NG/ML
TSH SERPL DL<=0.005 MIU/L-ACNC: 1.34 UIU/ML
WBC # BLD AUTO: 6.3 K/UL
WBC # BLD AUTO: 8.66 K/UL

## 2017-03-16 PROCEDURE — 63600175 PHARM REV CODE 636 W HCPCS: Performed by: STUDENT IN AN ORGANIZED HEALTH CARE EDUCATION/TRAINING PROGRAM

## 2017-03-16 PROCEDURE — 25000003 PHARM REV CODE 250: Performed by: HOSPITALIST

## 2017-03-16 PROCEDURE — 94761 N-INVAS EAR/PLS OXIMETRY MLT: CPT

## 2017-03-16 PROCEDURE — 80053 COMPREHEN METABOLIC PANEL: CPT

## 2017-03-16 PROCEDURE — G0378 HOSPITAL OBSERVATION PER HR: HCPCS

## 2017-03-16 PROCEDURE — 96368 THER/DIAG CONCURRENT INF: CPT

## 2017-03-16 PROCEDURE — 96375 TX/PRO/DX INJ NEW DRUG ADDON: CPT

## 2017-03-16 PROCEDURE — 84100 ASSAY OF PHOSPHORUS: CPT

## 2017-03-16 PROCEDURE — 63600175 PHARM REV CODE 636 W HCPCS: Performed by: HOSPITALIST

## 2017-03-16 PROCEDURE — 96365 THER/PROPH/DIAG IV INF INIT: CPT

## 2017-03-16 PROCEDURE — 11000001 HC ACUTE MED/SURG PRIVATE ROOM

## 2017-03-16 PROCEDURE — 25000003 PHARM REV CODE 250: Performed by: STUDENT IN AN ORGANIZED HEALTH CARE EDUCATION/TRAINING PROGRAM

## 2017-03-16 PROCEDURE — 83735 ASSAY OF MAGNESIUM: CPT | Mod: 91

## 2017-03-16 PROCEDURE — 84484 ASSAY OF TROPONIN QUANT: CPT | Mod: 91

## 2017-03-16 PROCEDURE — 85025 COMPLETE CBC W/AUTO DIFF WBC: CPT | Mod: 91

## 2017-03-16 PROCEDURE — 85610 PROTHROMBIN TIME: CPT

## 2017-03-16 PROCEDURE — 83880 ASSAY OF NATRIURETIC PEPTIDE: CPT

## 2017-03-16 PROCEDURE — 96361 HYDRATE IV INFUSION ADD-ON: CPT

## 2017-03-16 PROCEDURE — 84484 ASSAY OF TROPONIN QUANT: CPT

## 2017-03-16 PROCEDURE — 99223 1ST HOSP IP/OBS HIGH 75: CPT | Mod: ,,, | Performed by: INTERNAL MEDICINE

## 2017-03-16 PROCEDURE — 93010 ELECTROCARDIOGRAM REPORT: CPT | Mod: ,,, | Performed by: INTERNAL MEDICINE

## 2017-03-16 PROCEDURE — 80048 BASIC METABOLIC PNL TOTAL CA: CPT

## 2017-03-16 PROCEDURE — 83735 ASSAY OF MAGNESIUM: CPT

## 2017-03-16 PROCEDURE — 99222 1ST HOSP IP/OBS MODERATE 55: CPT | Mod: ,,, | Performed by: INTERNAL MEDICINE

## 2017-03-16 PROCEDURE — 25000003 PHARM REV CODE 250: Performed by: EMERGENCY MEDICINE

## 2017-03-16 PROCEDURE — 93005 ELECTROCARDIOGRAM TRACING: CPT

## 2017-03-16 PROCEDURE — 36415 COLL VENOUS BLD VENIPUNCTURE: CPT

## 2017-03-16 PROCEDURE — 84443 ASSAY THYROID STIM HORMONE: CPT

## 2017-03-16 PROCEDURE — 96366 THER/PROPH/DIAG IV INF ADDON: CPT | Mod: 59

## 2017-03-16 RX ORDER — POTASSIUM CHLORIDE 20 MEQ/15ML
40 SOLUTION ORAL ONCE
Status: COMPLETED | OUTPATIENT
Start: 2017-03-16 | End: 2017-03-16

## 2017-03-16 RX ORDER — MAGNESIUM SULFATE HEPTAHYDRATE 40 MG/ML
2 INJECTION, SOLUTION INTRAVENOUS
Status: COMPLETED | OUTPATIENT
Start: 2017-03-16 | End: 2017-03-16

## 2017-03-16 RX ORDER — GABAPENTIN 300 MG/1
600 CAPSULE ORAL 3 TIMES DAILY
Status: DISCONTINUED | OUTPATIENT
Start: 2017-03-16 | End: 2017-03-17

## 2017-03-16 RX ORDER — ASPIRIN 81 MG/1
81 TABLET ORAL DAILY
Status: DISCONTINUED | OUTPATIENT
Start: 2017-03-16 | End: 2017-03-17 | Stop reason: HOSPADM

## 2017-03-16 RX ORDER — PANTOPRAZOLE SODIUM 40 MG/1
40 TABLET, DELAYED RELEASE ORAL DAILY
Status: DISCONTINUED | OUTPATIENT
Start: 2017-03-16 | End: 2017-03-17 | Stop reason: HOSPADM

## 2017-03-16 RX ORDER — METOPROLOL TARTRATE 50 MG/1
50 TABLET ORAL DAILY
Status: DISCONTINUED | OUTPATIENT
Start: 2017-03-16 | End: 2017-03-17 | Stop reason: HOSPADM

## 2017-03-16 RX ORDER — TRAMADOL HYDROCHLORIDE 50 MG/1
50 TABLET ORAL 3 TIMES DAILY PRN
Status: ON HOLD | COMMUNITY
End: 2018-01-16 | Stop reason: HOSPADM

## 2017-03-16 RX ORDER — AMOXICILLIN 250 MG
1 CAPSULE ORAL 2 TIMES DAILY
Status: DISCONTINUED | OUTPATIENT
Start: 2017-03-16 | End: 2017-03-17 | Stop reason: HOSPADM

## 2017-03-16 RX ORDER — DILTIAZEM HYDROCHLORIDE 180 MG/1
180 CAPSULE, COATED, EXTENDED RELEASE ORAL DAILY
Status: DISCONTINUED | OUTPATIENT
Start: 2017-03-16 | End: 2017-03-17 | Stop reason: HOSPADM

## 2017-03-16 RX ORDER — COLCHICINE 0.6 MG/1
0.6 TABLET, FILM COATED ORAL DAILY
Status: DISCONTINUED | OUTPATIENT
Start: 2017-03-16 | End: 2017-03-16

## 2017-03-16 RX ORDER — FAMOTIDINE 10 MG/ML
20 INJECTION INTRAVENOUS
Status: COMPLETED | OUTPATIENT
Start: 2017-03-16 | End: 2017-03-16

## 2017-03-16 RX ORDER — DILTIAZEM HYDROCHLORIDE 180 MG/1
180 CAPSULE, EXTENDED RELEASE ORAL DAILY
Status: ON HOLD | COMMUNITY
End: 2018-01-19 | Stop reason: HOSPADM

## 2017-03-16 RX ORDER — ACETAMINOPHEN 325 MG/1
650 TABLET ORAL EVERY 4 HOURS PRN
Status: DISCONTINUED | OUTPATIENT
Start: 2017-03-16 | End: 2017-03-17 | Stop reason: HOSPADM

## 2017-03-16 RX ORDER — POTASSIUM CHLORIDE 20 MEQ/15ML
40 SOLUTION ORAL ONCE
Status: DISCONTINUED | OUTPATIENT
Start: 2017-03-16 | End: 2017-03-16

## 2017-03-16 RX ORDER — TRAMADOL HYDROCHLORIDE 50 MG/1
50 TABLET ORAL ONCE
Status: COMPLETED | OUTPATIENT
Start: 2017-03-16 | End: 2017-03-16

## 2017-03-16 RX ORDER — METOPROLOL SUCCINATE 50 MG/1
50 TABLET, EXTENDED RELEASE ORAL DAILY
Status: ON HOLD | COMMUNITY
End: 2018-01-16 | Stop reason: HOSPADM

## 2017-03-16 RX ORDER — POTASSIUM CHLORIDE 7.45 MG/ML
10 INJECTION INTRAVENOUS
Status: DISCONTINUED | OUTPATIENT
Start: 2017-03-16 | End: 2017-03-16

## 2017-03-16 RX ORDER — IBUPROFEN 200 MG
200 TABLET ORAL EVERY 6 HOURS PRN
Status: ON HOLD | COMMUNITY
End: 2018-01-16 | Stop reason: HOSPADM

## 2017-03-16 RX ORDER — POTASSIUM CHLORIDE 7.45 MG/ML
10 INJECTION INTRAVENOUS ONCE
Status: DISCONTINUED | OUTPATIENT
Start: 2017-03-16 | End: 2017-03-16

## 2017-03-16 RX ORDER — ONDANSETRON 8 MG/1
8 TABLET, ORALLY DISINTEGRATING ORAL EVERY 8 HOURS PRN
Status: DISCONTINUED | OUTPATIENT
Start: 2017-03-16 | End: 2017-03-17 | Stop reason: HOSPADM

## 2017-03-16 RX ORDER — MAGNESIUM SULFATE HEPTAHYDRATE 40 MG/ML
2 INJECTION, SOLUTION INTRAVENOUS
Status: DISCONTINUED | OUTPATIENT
Start: 2017-03-16 | End: 2017-03-16

## 2017-03-16 RX ORDER — POTASSIUM CHLORIDE 7.45 MG/ML
10 INJECTION INTRAVENOUS ONCE
Status: COMPLETED | OUTPATIENT
Start: 2017-03-16 | End: 2017-03-16

## 2017-03-16 RX ORDER — MAGNESIUM SULFATE HEPTAHYDRATE 40 MG/ML
2 INJECTION, SOLUTION INTRAVENOUS ONCE
Status: DISCONTINUED | OUTPATIENT
Start: 2017-03-16 | End: 2017-03-16

## 2017-03-16 RX ORDER — COLCHICINE 0.6 MG/1
0.6 TABLET, FILM COATED ORAL DAILY
Status: DISCONTINUED | OUTPATIENT
Start: 2017-03-17 | End: 2017-03-17 | Stop reason: HOSPADM

## 2017-03-16 RX ORDER — COLCHICINE 0.6 MG/1
0.6 TABLET, FILM COATED ORAL DAILY PRN
Status: DISCONTINUED | OUTPATIENT
Start: 2017-03-16 | End: 2017-03-16

## 2017-03-16 RX ORDER — ONDANSETRON 2 MG/ML
4 INJECTION INTRAMUSCULAR; INTRAVENOUS EVERY 12 HOURS PRN
Status: DISCONTINUED | OUTPATIENT
Start: 2017-03-16 | End: 2017-03-16

## 2017-03-16 RX ORDER — SODIUM CHLORIDE 9 MG/ML
1000 INJECTION, SOLUTION INTRAVENOUS
Status: COMPLETED | OUTPATIENT
Start: 2017-03-16 | End: 2017-03-16

## 2017-03-16 RX ORDER — POTASSIUM CHLORIDE 20 MEQ/15ML
40 SOLUTION ORAL 2 TIMES DAILY
Status: COMPLETED | OUTPATIENT
Start: 2017-03-16 | End: 2017-03-17

## 2017-03-16 RX ORDER — TRAMADOL HYDROCHLORIDE 50 MG/1
50 TABLET ORAL EVERY 6 HOURS PRN
Status: DISCONTINUED | OUTPATIENT
Start: 2017-03-16 | End: 2017-03-16

## 2017-03-16 RX ORDER — ALLOPURINOL 100 MG/1
300 TABLET ORAL DAILY
Status: DISCONTINUED | OUTPATIENT
Start: 2017-03-16 | End: 2017-03-17 | Stop reason: HOSPADM

## 2017-03-16 RX ORDER — ENOXAPARIN SODIUM 100 MG/ML
40 INJECTION SUBCUTANEOUS EVERY 24 HOURS
Status: DISCONTINUED | OUTPATIENT
Start: 2017-03-16 | End: 2017-03-17 | Stop reason: HOSPADM

## 2017-03-16 RX ADMIN — MAGNESIUM SULFATE IN WATER 2 G: 40 INJECTION, SOLUTION INTRAVENOUS at 05:03

## 2017-03-16 RX ADMIN — POTASSIUM CHLORIDE 10 MEQ: 10 INJECTION, SOLUTION INTRAVENOUS at 08:03

## 2017-03-16 RX ADMIN — POTASSIUM CHLORIDE 40 MEQ: 20 SOLUTION ORAL at 08:03

## 2017-03-16 RX ADMIN — MAGNESIUM SULFATE IN WATER 2 G: 40 INJECTION, SOLUTION INTRAVENOUS at 08:03

## 2017-03-16 RX ADMIN — POTASSIUM CHLORIDE 40 MEQ: 20 SOLUTION ORAL at 07:03

## 2017-03-16 RX ADMIN — ENOXAPARIN SODIUM 40 MG: 100 INJECTION SUBCUTANEOUS at 05:03

## 2017-03-16 RX ADMIN — PANTOPRAZOLE SODIUM 40 MG: 40 TABLET, DELAYED RELEASE ORAL at 12:03

## 2017-03-16 RX ADMIN — MAGNESIUM SULFATE IN WATER 2 G: 40 INJECTION, SOLUTION INTRAVENOUS at 09:03

## 2017-03-16 RX ADMIN — STANDARDIZED SENNA CONCENTRATE AND DOCUSATE SODIUM 1 TABLET: 8.6; 5 TABLET, FILM COATED ORAL at 12:03

## 2017-03-16 RX ADMIN — STANDARDIZED SENNA CONCENTRATE AND DOCUSATE SODIUM 1 TABLET: 8.6; 5 TABLET, FILM COATED ORAL at 09:03

## 2017-03-16 RX ADMIN — GABAPENTIN 600 MG: 300 CAPSULE ORAL at 09:03

## 2017-03-16 RX ADMIN — SODIUM CHLORIDE 1000 ML: 0.9 INJECTION, SOLUTION INTRAVENOUS at 01:03

## 2017-03-16 RX ADMIN — POTASSIUM CHLORIDE 10 MEQ: 10 INJECTION, SOLUTION INTRAVENOUS at 12:03

## 2017-03-16 RX ADMIN — ASPIRIN 81 MG: 81 TABLET, COATED ORAL at 11:03

## 2017-03-16 RX ADMIN — DILTIAZEM HYDROCHLORIDE 180 MG: 180 CAPSULE, COATED, EXTENDED RELEASE ORAL at 11:03

## 2017-03-16 RX ADMIN — POTASSIUM CHLORIDE 10 MEQ: 10 INJECTION, SOLUTION INTRAVENOUS at 01:03

## 2017-03-16 RX ADMIN — TRAMADOL HYDROCHLORIDE 50 MG: 50 TABLET, FILM COATED ORAL at 10:03

## 2017-03-16 RX ADMIN — POTASSIUM CHLORIDE 10 MEQ: 10 INJECTION, SOLUTION INTRAVENOUS at 10:03

## 2017-03-16 RX ADMIN — MAGNESIUM SULFATE IN WATER 2 G: 40 INJECTION, SOLUTION INTRAVENOUS at 02:03

## 2017-03-16 RX ADMIN — METOPROLOL TARTRATE 50 MG: 50 TABLET, FILM COATED ORAL at 11:03

## 2017-03-16 RX ADMIN — GABAPENTIN 600 MG: 300 CAPSULE ORAL at 02:03

## 2017-03-16 RX ADMIN — POTASSIUM CHLORIDE 40 MEQ: 20 SOLUTION ORAL at 01:03

## 2017-03-16 RX ADMIN — FAMOTIDINE 20 MG: 10 INJECTION, SOLUTION INTRAVENOUS at 02:03

## 2017-03-16 NOTE — ED NOTES
IM5 paged for patient's continued request for pain medication. Team states they are aware and will put an order in soon.

## 2017-03-16 NOTE — PLAN OF CARE
03/16/17 1510   Discharge Assessment   Assessment Type Discharge Planning Assessment   Confirmed/corrected address and phone number on facesheet? Yes   Assessment information obtained from? Patient;Caregiver   Expected Length of Stay (days) 3   Communicated expected length of stay with patient/caregiver yes   Prior to hospitilization cognitive status: Alert/Oriented   Prior to hospitalization functional status: Assistive Equipment   Current cognitive status: Alert/Oriented   Current Functional Status: Assistive Equipment;Needs Assistance   Arrived From home or self-care   Lives With spouse   Able to Return to Prior Arrangements unable to determine at this time (comments)   Is patient able to care for self after discharge? Unable to determine at this time (comments)   How many people do you have in your home that can help with your care after discharge? 1   Who are your caregiver(s) and their phone number(s)? (Leeanne Johnson, spouse, [phone])   Patient's perception of discharge disposition home or selfcare   Readmission Within The Last 30 Days no previous admission in last 30 days   Patient currently being followed by outpatient case management? No   Patient currently receives home health services? No   Does the patient currently use HME? Yes   Patient currently receives private duty nursing? N/A   Patient currently receives any other outside agency services? No   Equipment Currently Used at Home bedside commode;cane, straight;walker, standard;wheelchair;crutches;raised toilet   Do you have any problems affording any of your prescribed medications? No   Is the patient taking medications as prescribed? yes   Do you have any financial concerns preventing you from receiving the healthcare you need? No   Does the patient have transportation to healthcare appointments? Yes   Transportation Available family or friend will provide   On Dialysis? No   Does the patient receive services at the Coumadin Clinic? No   Are  there any open cases? No   Discharge Plan A Home;Home Health   Discharge Plan B Home;Home with family   Patient/Family In Agreement With Plan yes     Provider Notinsystem      [employer] [address] [place], [place] - [address] [facility] AT [facility]  [address] [facility] [address]  Phone: [phone] Fax: [phone]    [employer] [address] [place], [place] [address] [facility] AT [facility]  [address] [facility] [address]  Phone: [phone] Fax: [phone]      Payor: [employer] MANAGED MEDICARE / Plan: [employer] MEDICARE HMO / Product Type: Capitation /

## 2017-03-16 NOTE — ED NOTES
I acknowledge care of this pt.Pt sitting up in stretcher with wife at bedside. Pt aware waiting for room to become available on admitting floor. Side rails up x 2 bed locked and in low position call light in reach. Pt on continuous cardiac monitor and pulse ox with blood pressure cycling thirty minutes. NAD. Respirations are unlabored and equal. Will continue to monitor

## 2017-03-16 NOTE — ASSESSMENT & PLAN NOTE
- (DX) Syncope 2/2 chronic atrial fibrillation w/ RVR vs Seizure vs Infection    1) Syncope (more likely) in setting of significant hypokalemia and hypomagnesemia        - EMT noted HR of 198 on arrival to scene       - Possible cerebral hypoperfusion 2/2 atrial fibrillation w/ RVR       - Atrial fibrillation rate-controlled with Diltiazem       - 2D echocardiogram to evaluate for structural heart disease       - Consulted EP for evaluation to rule-out cardiology etiologies other than atrial fibrillation       - F/U orthostatic testing    2) Seizure (less likely)       - History of witnessed seizure in Summer, 2016 including convulsions, mouth foaming, and tongue biting       - Witnessed event last night - fell from bed, LOC, mouth foaming, tongue biting but --without-- convulsions, bowel, and bladder incontinence       - Rapidly alert following regaining consciousness and asking questions       - This event does not bear strong resemblance to a seizure but seems more consistent with a cardiogenic etiology    3) Infection (less likely) - Less likely as does not meet SIRS criteria, no constitutional symptoms

## 2017-03-16 NOTE — ED TRIAGE NOTES
Patient presents to ED with c/c of witnessed seizure that took place at home. Seizure was witnessed seizure that took place at home. Wife states that patient was lying in the bed and began shaking followed by fall to the floor. Patient reports that he bit both sides of his tongue and is unsure if he hit his head during his fall. Patient's tongue is swollen and causes patient's speech to be slurred but still able to understand. Patient has an abrasion to his left wrist and denies pain else where, chest pain, SOB, numbness or tingling, and denies weakness at this time.

## 2017-03-16 NOTE — ASSESSMENT & PLAN NOTE
"- See problem "Syncope, cardiogenic"  - F/U EEG  - Consider consult to Neurology to evaluate for seizure  - Consider MRI brain  - Placed on seizure precautions  - Placed on aspiration precautions      "

## 2017-03-16 NOTE — ASSESSMENT & PLAN NOTE
- Pt affirmed heart burn  - Hx of Aspirin and NSAID use  - Avoid NSAID's  - Pantoprazole 40 mg DAILY  - If symptoms do not resolve, consult GI for evaluation

## 2017-03-16 NOTE — ASSESSMENT & PLAN NOTE
- Swelling over PIP 4th digit on right hand - possibly 2/2 to gout flare  - Continue Allopurinol  - Continue Colchicine

## 2017-03-16 NOTE — PHARMACY MED REC
"MedMined Medication Reconciliation  Template    Patient was admitted on 3/15/2017 for Syncope, cardiogenic.      Patient's prior to admission medication regimen was as follows:  Prescriptions Prior to Admission   Medication Sig Dispense Refill Last Dose    allopurinol (ZYLOPRIM) 300 MG tablet Take 300 mg by mouth once daily.       aspirin (ECOTRIN) 81 MG EC tablet Take 81 mg by mouth once daily.       colchicine 0.6 mg tablet Take 1 tablet (0.6 mg total) by mouth once daily. 60 tablet 3     diltiaZEM (DILACOR XR) 180 MG CDCR Take 180 mg by mouth once daily.   3/14/2017    diphenhydrAMINE (BENADRYL) 25 mg capsule Take 1 each (25 mg total) by mouth every 4 (four) hours as needed (lip swelling for 2 days). 20 capsule 0     ibuprofen (ADVIL,MOTRIN) 200 MG tablet Take 200 mg by mouth every 6 (six) hours as needed for Pain.       metoprolol succinate (TOPROL-XL) 50 MG 24 hr tablet Take 50 mg by mouth once daily.       tramadol (ULTRAM) 50 mg tablet Take 50 mg by mouth 3 (three) times daily as needed for Pain.            Please add appropriate    SmartPhrase below:      Admission Medication Reconciliation - Pharmacy Consult Note    The home medication history was taken by janett Geller.  Based on information gathered and subsequent review by the clinical pharmacist, the items below may need attention.     You may go to "Admission" then "Reconcile Home Medications" tabs to review and/or act upon these items. Based on information gathered and subsequent review by the clinical pharmacist, the items below may need attention.    Potentially problematic discrepancies with current MAR  o Patient IS NOT taking the following which was ordered upon admit  o Gabapentin        Please address this information as you see fit.  Feel free to contact us if you have any questions or require assistance.    Tevin Pappas PharmD  [id number]  "

## 2017-03-16 NOTE — SUBJECTIVE & OBJECTIVE
Interval History: Hypokalemia and Hypomagnesemia noted on admission - repleting.  Pt not in distress though requesting pain medication to address pain 2/2 tongue bite.  Plan is to evaluate for syncope vs seizure.      Review of Systems   Constitutional: Negative for chills and fever.   HENT: Positive for sore throat (pt says 2/2 tongue pain) and trouble swallowing (2/2 pain).    Eyes: Negative for visual disturbance.   Respiratory: Negative for cough, shortness of breath and wheezing.    Cardiovascular: Negative for chest pain, palpitations and leg swelling.   Gastrointestinal: Positive for abdominal pain (affirms heart burn) and vomiting (3 days ago). Negative for constipation, diarrhea and nausea.   Genitourinary: Negative for difficulty urinating, dysuria and hematuria.   Musculoskeletal: Positive for myalgias and neck stiffness.   Neurological: Positive for seizures and syncope (episode in 2007). Negative for dizziness, numbness (denies tingling) and headaches.   Psychiatric/Behavioral: Positive for sleep disturbance.     Objective:     Vital Signs (Most Recent):  Temp: 98.8 °F (37.1 °C) (03/16/17 0858)  Pulse: 86 (03/16/17 1031)  Resp: 20 (03/15/17 2326)  BP: 132/85 (03/16/17 1031)  SpO2: 98 % (03/16/17 1031) Vital Signs (24h Range):  Temp:  [98.6 °F (37 °C)-98.8 °F (37.1 °C)] 98.8 °F (37.1 °C)  Pulse:  [] 86  Resp:  [20] 20  SpO2:  [95 %-100 %] 98 %  BP: (128-166)/() 132/85     Weight: 95.3 kg (210 lb)  Body mass index is 28.48 kg/(m^2).  No intake or output data in the 24 hours ending 03/16/17 1113   Physical Exam   Constitutional: He is oriented to person, place, and time. He appears well-developed and well-nourished.   Eyes: Conjunctivae and EOM are normal. Pupils are equal, round, and reactive to light. Right eye exhibits no discharge. Left eye exhibits no discharge.   Neck: Neck supple. No thyromegaly present.   Cardiovascular: Regular rhythm.  Exam reveals no friction rub.    No murmur  heard.  Tachycardia   Pulmonary/Chest: Effort normal and breath sounds normal. No respiratory distress. He has no wheezes.   Abdominal: Soft. Bowel sounds are normal. There is no tenderness. There is no guarding.   Musculoskeletal: Normal range of motion. He exhibits no edema.   Right foot painful, difficult to plantarflex.  Right 4th PIP swollen.   Lymphadenopathy:     He has no cervical adenopathy (no supraclavicular lymphadenopathy).   Neurological: He is alert and oriented to person, place, and time. No cranial nerve deficit.   Pt's right cheek appears to rivera than the left check.   Skin: Skin is warm and dry.   Psychiatric: He has a normal mood and affect.   Vitals reviewed.      Significant Labs:   CBC:   Recent Labs  Lab 03/16/17  0018 03/16/17  0730   WBC 8.66  --    HGB 11.8*  --    HCT 33.3* 29*   *  --      CMP:   Recent Labs  Lab 03/16/17  0018   *   K 2.7*   CL 96   CO2 27   GLU 88   BUN 20   CREATININE 1.4   CALCIUM 8.3*   PROT 8.5*   ALBUMIN 3.5   BILITOT 0.9   ALKPHOS 99   *   *   ANIONGAP 12   EGFRNONAA 56.6*     Troponin:   Recent Labs  Lab 03/16/17  0018 03/16/17  0337 03/16/17  0855   TROPONINI 0.021 0.093* 0.107*       Significant Imaging: CT: I have reviewed all pertinent results/findings within the past 24 hours.  CXR: I have reviewed all pertinent results/findings within the past 24 hours.

## 2017-03-16 NOTE — ED NOTES
Pt informed awaiting room to become available on admitting floor. Pt offers no complaints at this time. Will continue to monitor

## 2017-03-16 NOTE — ASSESSMENT & PLAN NOTE
- Hx of atrial fibrillation with HR to 198 BPM at time of EMT arrival, suggestive of A-Fib with RVR  - More likely demand ischemia 2/2 A-Fib with RVR  - Do not believe this is ACS  - Troponin elevated: 0.068 <- 0.107 <- 0.093 <- 0.021  - Trending troponin Q6 hrs

## 2017-03-16 NOTE — ED PROVIDER NOTES
"Encounter Date: 3/15/2017    SCRIBE #1 NOTE: I, Regis Martínez, am scribing for, and in the presence of,  Dr. Rodriguez. I have scribed the following portions of the note - the EKG reading.       History     Chief Complaint   Patient presents with    Seizures     Pt presents to the ED via EMS from home. EMS reports witnessed seizure at home. EMS reports tachycardia with rate of 198 upon arrival, pt given 100cc NS with  following fluid. Pt reports cardizem use at home for HR control. Pt presents AAOx3.     Tachycardia     Review of patient's allergies indicates:   Allergen Reactions    Lisinopril Swelling     Pt admitted with angioedema 2wks after taking lisinopril.      HPI Comments: History provided by patient and wife, who witnessed the entire event.   57 y/o M presents after seizure activity that wife witnessed. Hx of seizure (summer of 2016), gout, a fib w/ "fast heart rate," HTN. Per wife, Mr. Johnson was sitting at his computer ~1 hr ago, in a well-lit room, when his whole body shook briefly, and he fell onto the floor on his left side. On the floor, he did not have any convulsive activities, but did foam at the mouth and bite his tongue, No b/b incontinence. When patient awoke, wife says he was immediately coherent, asking what happened. Per patient, all he remembers is using the computer, and then waking up on the ground. He does not recall any preceding symptoms prior to LOC: no chest pain/tightness/discomfort, SOB/changes in breathing, no nausea, vomit, lightheadedness, vertigo, vision changes, heat flash, changes in smell or hearing.   Per wife, his previous (and first) only witnessed seizure occurred a few months ago in the summer, during a day that Mr. Johnson had spent outside (walking a lot and having a BBQ). He fell to the ground, did have convulsions on the ground, and also mouth foaming and tongue biting. He was admitted to a hospital and treated; discharged without home medications or follow-up " with neurologist.        Past Medical History:   Diagnosis Date    Afib     Ankle fracture, left     Asthma     Gout, unspecified     Hypertension     Seizure 2016     Past Surgical History:   Procedure Laterality Date    ANKLE SURGERY       Family History   Problem Relation Age of Onset    Hypertension Father     Stroke Maternal Grandmother     Cataracts Maternal Grandfather     Stroke Maternal Grandfather     Cancer Mother      malignant polyp     Social History   Substance Use Topics    Smoking status: Never Smoker    Smokeless tobacco: None    Alcohol use Yes      Comment: ocassional alcohol     Review of Systems   Constitutional: Negative for chills, diaphoresis and fever.   HENT: Negative for rhinorrhea, sinus pressure, sore throat and trouble swallowing.    Eyes: Negative for photophobia, pain and visual disturbance.   Respiratory: Negative for cough and chest tightness.    Cardiovascular: Negative for chest pain and palpitations.   Gastrointestinal: Negative for abdominal pain, blood in stool, constipation, diarrhea, nausea and vomiting.   Genitourinary: Negative for dysuria, frequency and hematuria.   Musculoskeletal: Negative for back pain and neck stiffness.   Skin: Negative for rash and wound.   Neurological: Positive for seizures, syncope and weakness (baseline left leg, 2/2 congenital foot abnormality). Negative for dizziness, facial asymmetry, speech difficulty, light-headedness, numbness and headaches.   All other systems reviewed and are negative.      Physical Exam   Initial Vitals   BP Pulse Resp Temp SpO2   03/15/17 2326 03/15/17 2326 03/15/17 2326 03/15/17 2326 03/15/17 2326   128/80 114 20 98.6 °F (37 °C) 95 %     Physical Exam    Nursing note and vitals reviewed.  Constitutional: He appears well-developed and well-nourished. He is not diaphoretic. No distress.   HENT:   Head: Normocephalic and atraumatic.   Nose: Nose normal.   Mouth/Throat: Oropharynx is clear and moist.    Tongue edematous, left > right, small 3 mm hematoma right anterior tongue   Eyes: Conjunctivae and EOM are normal. Pupils are equal, round, and reactive to light.   Neck: Normal range of motion. Neck supple.   Cardiovascular: Normal heart sounds and intact distal pulses.   No murmur heard.  Pulses:       Dorsalis pedis pulses are 2+ on the right side, and 2+ on the left side.   Bedside cardiac monitoring showed sinus tachy during my evaluation.   Pulmonary/Chest: Breath sounds normal. No respiratory distress. He has no wheezes. He exhibits no tenderness.   Abdominal: Soft. Normal appearance and bowel sounds are normal. He exhibits no distension. There is no tenderness.   Musculoskeletal:   LLE grossly atrophied compared to right, and he cannot move LLE below knee.    Neurological: He is alert and oriented to person, place, and time. He has normal strength. No sensory deficit. He exhibits normal muscle tone.   Skin: Skin is warm and dry. No pallor.   BLLE with skin grossly dry and cracking. No signs of infection (e.g. Cellulitis).          ED Course   Procedures  Labs Reviewed   CBC W/ AUTO DIFFERENTIAL - Abnormal; Notable for the following:        Result Value    RBC 3.14 (*)     Hemoglobin 11.8 (*)     Hematocrit 33.3 (*)      (*)     MCH 37.6 (*)     RDW 15.4 (*)     Platelets 115 (*)     Gran% 77.9 (*)     Lymph% 11.0 (*)     All other components within normal limits    Narrative:     PLEASE REVIEW ORDER START TIME BEFORE MARKING SPECIMEN  COLLECTED.   COMPREHENSIVE METABOLIC PANEL - Abnormal; Notable for the following:     Sodium 135 (*)     Potassium 2.7 (*)     Calcium 8.3 (*)     Total Protein 8.5 (*)      (*)      (*)     eGFR if non  56.6 (*)     All other components within normal limits    Narrative:     PLEASE REVIEW ORDER START TIME BEFORE MARKING SPECIMEN  COLLECTED.   B-TYPE NATRIURETIC PEPTIDE - Abnormal; Notable for the following:      (*)     All other  components within normal limits    Narrative:     PLEASE REVIEW ORDER START TIME BEFORE MARKING SPECIMEN  COLLECTED.   TROPONIN I - Abnormal; Notable for the following:     Troponin I 0.093 (*)     All other components within normal limits    Narrative:     PLEASE REVIEW ORDER START TIME BEFORE MARKING SPECIMEN  COLLECTED.   MAGNESIUM - Abnormal; Notable for the following:     Magnesium 0.9 (*)     All other components within normal limits    Narrative:     PLEASE REVIEW ORDER START TIME BEFORE MARKING SPECIMEN  COLLECTED.  refugio MG order-[id number] per Carolin still 01:44  03/16/2017    MAGNESIUM - Abnormal; Notable for the following:     Magnesium 1.4 (*)     All other components within normal limits   TROPONIN I - Abnormal; Notable for the following:     Troponin I 0.107 (*)     All other components within normal limits   BASIC METABOLIC PANEL - Abnormal; Notable for the following:     Potassium 3.1 (*)     CO2 18 (*)     Calcium 7.1 (*)     All other components within normal limits   MAGNESIUM - Abnormal; Notable for the following:     Magnesium 4.3 (*)     All other components within normal limits   PHOSPHORUS - Abnormal; Notable for the following:     Phosphorus 2.0 (*)     All other components within normal limits   CBC W/ AUTO DIFFERENTIAL - Abnormal; Notable for the following:     RBC 2.48 (*)     Hemoglobin 9.2 (*)     Hematocrit 26.0 (*)      (*)     MCH 37.1 (*)     RDW 15.5 (*)     Platelets 106 (*)     Lymph # 0.9 (*)     Lymph% 14.6 (*)     All other components within normal limits   TROPONIN I - Abnormal; Notable for the following:     Troponin I 0.068 (*)     All other components within normal limits   POCT GLUCOSE - Abnormal; Notable for the following:     POCT Glucose 113 (*)     All other components within normal limits   ISTAT PROCEDURE - Abnormal; Notable for the following:     POC Potassium 2.6 (*)     POC TCO2 (MEASURED) 21 (*)     POC Ionized Calcium 0.87 (*)     POC Hematocrit 29 (*)      All other components within normal limits   PROTIME-INR    Narrative:     PLEASE REVIEW ORDER START TIME BEFORE MARKING SPECIMEN  COLLECTED.   TROPONIN I    Narrative:     PLEASE REVIEW ORDER START TIME BEFORE MARKING SPECIMEN  COLLECTED.   MAGNESIUM   TSH   CALCIUM, IONIZED     EKG Readings: (Independently Interpreted)   Sinus tachycardia at 115. T wave inversions inferiorly and laterally. No significant change from prior EKG done January 2015.           Medical Decision Making:   History:   Old Medical Records: I decided to obtain old medical records.  Initial Assessment:   55 y/o M p/w syncopal event that is unclear as to whether it was of cardiac or CNS (seizure) etiology. Hx seizure (summer 2016), a fib w/ likely RVR. No prolonged convulsions witnessed during this episodes, and it did not sound like he was post-ictal. Accucheck glucose 113. Initial EKG from EMS showed SVT, with abnormal ST pattern. Cardiac monitor in room showed sinus tachy during my evaluation. Repeat EKG showed sinus tachy with flattened T waves when compared with EKG from 2015.   Differential Diagnosis:   Cardiac dysrhythmia (e.g. 2/2 a fib or lyte imbalance), unprovoked seizure, nonconvulsive seizure, orthostasis , vasovagal syndrome.  Independently Interpreted Test(s):   I have ordered and independently interpreted EKG Reading(s) - see prior notes  Clinical Tests:   Lab Tests: Reviewed and Ordered  Radiological Study: Reviewed and Ordered  Medical Tests: Reviewed and Ordered  ED Management:  HO-I UPDATE  Pt asymptomatic in bed and comfortable. Labs and images reviewed. Significant K 2.7.   Will replete with potassium IV and PO. CXR and CT head both not concerning for acute changes that would contribute to the clinical picture. Pt is aware of the situation and his clinical course. Anticipate discharge.   -IVF bolus.   -order Mag level    Daisha Castillo M.D.  [facility] EMERGENCY MEDICINE PGY-1  1:35 AM 03/16/2017    HO-I UPDATE  Mag 0.9. Will  replete with mag sulfate 2g initially. Patient will likely stay in hospital obs vs inpatient. Dr. Rodriguez currently at bedside, educating the patient. Upon re-evaluation, patient states that he has been vomiting for the past 3 days, which may be the inciting event that led to the electrolyte abnormalities.    Daisha Castillo M.D.  [facility] EMERGENCY MEDICINE PGY-1  2:11 AM 03/16/2017    HO-I UPDATE  Patient comfortable in bed. Because of the need for IV magnesium sulfate, he will be admitted inpatient. Dr. Martinez will admit. Patient understands and is amenable to plan.    Daisha Castillo M.D.  [facility] EMERGENCY MEDICINE PGY-1  3:26 AM 03/16/2017              Scribe Attestation:   Scribe #1: I performed the above scribed service and the documentation accurately describes the services I performed. I attest to the accuracy of the note.    Attending Attestation:   Physician Attestation Statement for Resident:  As the supervising MD   Physician Attestation Statement: I have personally seen and examined this patient.   I agree with the above history. -: Brought in by EMS for an episode of syncope where pt became suddenly unresponsive while sitting at the computer.Pt reports feeling very SOB afterwards.  When EMS arrived they found him in a rapid heart rate, approx 200 bpm, that resovled on its own.  Currently pt feels better.   As the supervising MD I agree with the above PE.   -: Full strength throughout.  Lungs clear to auscultation.   As the supervising MD I agree with the above treatment, course, plan, and disposition.   -: History is concerning for possible cardiac dysrhythmia. Pt also had a reported seizure in the past, however today no real described post ictal period.  Labs signficant for low K, Low mag.  Will be admitted for continued monitoring and replacemment.          Physician Attestation for Scribe:  Physician Attestation Statement for Scribe #1: I, Dr. Rodriguez, reviewed documentation, as scribed by Regis Martínez in my  presence, and it is both accurate and complete.                 ED Course     Clinical Impression:   The primary encounter diagnosis was Acute hypokalemia. Diagnoses of Hypomagnesemia, Hypokalemia, Paroxysmal atrial fibrillation, Essential hypertension, Gout, unspecified cause, unspecified chronicity, unspecified site, Seizure, Syncope, cardiogenic, Gastroesophageal reflux disease, esophagitis presence not specified, Elevated troponin, Ankle fracture, left, sequela, Gout, unspecified, Screen for colon cancer, Chest pain, unspecified type, Dehydration, Acute on chronic kidney failure, Gouty arthritis, PAF (paroxysmal atrial fibrillation), LFTs abnormal, Right hand pain, Swelling of joint of right hand, Angioedema of lips, sequela, and Hyperlipidemia, unspecified hyperlipidemia type were also pertinent to this visit.          Daisha Castillo MD  Resident  03/16/17 1015       Macey Calvo MD  03/19/17 1000

## 2017-03-16 NOTE — CONSULTS
Electrophysiology Consult Note    3/16/2017    SUBJECTIVE  Michael Johnson Jr. is a 56 y.o. male. EP is consulted for - history of Atrial fibrillation    The patient has a history significant for HTN and h/o Afib. He was admitted last night for evaluation of seziure episode. He fell from bed, LOC, mouth foaming, tongue biting . Patient was post ictal for some time. This is the second episode of seizure in the last couple of months. When EMS saw him at his house he was very tachycardic at 190s. ( However no documented tele strips in chart) On arrival in ED he was in sinus tachycardia in 110-120s. Patient is now completely lucid denies any palpitations, Chest pain/ SOB before he lost consciousness. He reports intermittent palpitations lasting 5-10 minutes once or twice a month. He was diagnosed with Afib by a physician in [facility] - referred to Cardiology and put on AV faith blocking agents and ASA. Never had a DCCV.   Denies any h/o CAD, MI. Had a remote stress test several years ago that was negative. Had Angioedema for ACE inhibitor requiring mechanical ventilation.    OBJECTIVE  Current Facility-Administered Medications   Medication Dose Route Frequency Provider Last Rate Last Dose    acetaminophen tablet 650 mg  650 mg Oral Q4H PRN Kiesha Rowe MD        allopurinol tablet 300 mg  300 mg Oral Daily Kiesha Rowe MD        aspirin EC tablet 81 mg  81 mg Oral Daily Kiesha Rowe MD        colchicine capsule/tablet 0.6 mg  0.6 mg Oral Daily PRN Stanley Casas MD        diltiaZEM 24 hr capsule 180 mg  180 mg Oral Daily Kiesha Rowe MD        enoxaparin injection 40 mg  40 mg Subcutaneous Daily Adarsh Aedh Christian Barton MD        gabapentin capsule 600 mg  600 mg Oral TID Kiesha Rowe MD        metoprolol tartrate (LOPRESSOR) tablet 50 mg  50 mg Oral Daily Kiesha Rowe MD        ondansetron disintegrating tablet 8 mg  8 mg  Oral Q8H PRN Kiesha Rowe MD        pantoprazole EC tablet 40 mg  40 mg Oral Daily Stanley Casas MD        potassium chloride 10 mEq in 100 mL IVPB  10 mEq Intravenous Q1H Kiesha Rowe  mL/hr at 03/16/17 1045 10 mEq at 03/16/17 1045    senna-docusate 8.6-50 mg per tablet 1 tablet  1 tablet Oral BID Kiesha Rowe MD        tramadol tablet 50 mg  50 mg Oral Q6H PRN Kiesha Rowe MD         Current Outpatient Prescriptions   Medication Sig Dispense Refill    diltiaZEM (DILACOR XR) 180 MG CDCR Take 180 mg by mouth once daily.      allopurinol (ZYLOPRIM) 300 MG tablet Take 300 mg by mouth once daily.      aspirin (ECOTRIN) 81 MG EC tablet Take 81 mg by mouth once daily.      colchicine 0.6 mg tablet Take 1 tablet (0.6 mg total) by mouth once daily. 60 tablet 3    diphenhydrAMINE (BENADRYL) 25 mg capsule Take 1 each (25 mg total) by mouth every 4 (four) hours as needed (lip swelling for 2 days). 20 capsule 0    gabapentin (NEURONTIN) 300 MG capsule 300mg tablet three times a day for 3 days.  Then increase to 2 tablets at night, 1 in AM and 1 in afternoon for 3 days.  Then increase to 2 tablets at night and 2 in morning, 1 tablet in afternoon for 3 days.  Then increase to 2 tablets three times a day (1800mg) 90 capsule 11    hydrochlorothiazide (HYDRODIURIL) 25 MG tablet Take 1 tablet (25 mg total) by mouth once daily. 10 tablet 0    magnesium 30 mg Tab Take by mouth.      metoprolol tartrate (LOPRESSOR) 50 MG tablet Take 50 mg by mouth once daily.      nifedipine 30 MG ORAL TR24 (PROCARDIA-XL) 30 MG (OSM) 24 hr tablet Take 1 tablet (30 mg total) by mouth once daily. 30 tablet 11       Past Medical History:   Diagnosis Date    Afib     Ankle fracture, left     Asthma     Gout, unspecified     Hypertension     Seizure 2016       Past Surgical History:   Procedure Laterality Date    ANKLE SURGERY         Review of patient's allergies  indicates:   Allergen Reactions    Lisinopril Swelling     Pt admitted with angioedema 2wks after taking lisinopril.      Review of Systems   Constitutional: Negative for chills, diaphoresis and fever.   HENT: Negative for rhinorrhea, sinus pressure, sore throat and trouble swallowing.   Eyes: Negative for photophobia, pain and visual disturbance.   Respiratory: Negative for cough and chest tightness.   Cardiovascular: Negative for chest pain and palpitations.   Gastrointestinal: Negative for abdominal pain, blood in stool, constipation, diarrhea, nausea and vomiting.   Genitourinary: Negative for dysuria, frequency and hematuria.   Musculoskeletal: Negative for back pain and neck stiffness.   Skin: Negative for rash and wound.   Neurological: Positive for seizures, LOC and weakness (baseline left leg, 2/2 congenital foot abnormality).   Negative for dizziness, facial asymmetry, speech difficulty, light-headedness, numbness and headaches.   All other systems reviewed and are negative  Family History   Problem Relation Age of Onset    Hypertension Father     Stroke Maternal Grandmother     Cataracts Maternal Grandfather     Stroke Maternal Grandfather     Cancer Mother      malignant polyp       Social History     Social History    Marital status:      Spouse name: N/A    Number of children: N/A    Years of education: N/A     Social History Main Topics    Smoking status: Never Smoker    Smokeless tobacco: None    Alcohol use Yes      Comment: ocassional alcohol    Drug use: No    Sexual activity: Not Asked     Other Topics Concern    None     Social History Narrative       Vitals: BP (!) 139/97  Pulse 88  Temp 98.8 °F (37.1 °C) (Oral)   Resp 20  Wt 95.3 kg (210 lb)  SpO2 100%  BMI 28.48 kg/m2    Physical Exam  Nursing note and vitals reviewed.  Constitutional: He appears well-developed and well-nourished. He is not diaphoretic. No distress.   HENT:   Head: Normocephalic and atraumatic.    Nose: Nose normal.   Mouth/Throat: Oropharynx is clear and moist.   Tongue edematous, left > right, small hematoma right anterior tongue   Eyes: Conjunctivae and EOM are normal. Pupils are equal, round, and reactive to light.   Neck: Normal range of motion. Neck supple.   Cardiovascular: Normal heart sounds and intact distal pulses. No murmur heard.  Pulmonary/Chest: Breath sounds normal. No respiratory distress. He has no wheezes. He exhibits no tenderness.   Abdominal: Soft. Normal appearance and bowel sounds are normal. He exhibits no distension. There is no tenderness.   Musculoskeletal:   LLE grossly atrophied compared to right, and he cannot move LLE below knee.    Neurological: He is alert and oriented to person, place, and time. He has normal strength. No sensory deficit. He exhibits normal muscle tone.   Skin: Skin is warm and dry. No pallor.     Labs  CMP:  Recent Labs  Lab 03/16/17  0018   *   K 2.7*   CO2 27   CL 96   BUN 20   CREATININE 1.4   GLU 88   CALCIUM 8.3*   *   *   ALKPHOS 99   BILITOT 0.9   PROT 8.5*   ALBUMIN 3.5       CBC:  Recent Labs  Lab 03/16/17  0018 03/16/17  0730   WBC 8.66  --    HGB 11.8*  --    HCT 33.3* 29*   *  --    *  --      Coags:  Recent Labs  Lab 03/16/17  0018   INR 1.0     Cardiac markers:  Recent Labs  Lab 03/16/17  0018 03/16/17  0337 03/16/17  0855   TROPONINI 0.021 0.093* 0.107*        Pertinent Tests   TTE( 3/16/17  Pending    10/20/2014 TTE    CONCLUSIONS     1 - Normal left ventricular systolic function (EF 60-65%).     2 - Left ventricular diastolic dysfunction.     3 - Normal right ventricular systolic function .   CT Scan head- no intracranial bleed.    Telemetry  Sinus tachycardia 90-110s    EKG  NSR at 94/mt.   On review of all EKGs in EPIC none showing Afib was found. A mention of Afib in the H/p done by [facility] in 2014 mentions about Afib history.  ?  ASSESSMENT AND PLAN  56 y.o. male with h/o second episode of  seizure with LOC admitted for further evaluation.     Patient currently in NSR. EMS strip showing  is not available in patients chart. In the hospital HR in the /mt range.  Patient had a seizure episode( second in the past few months) with tongue bite and mouth foaming with a post ictal period.  Likely has led to the fall. Post seizure certainly had a adrenergic surge leading to tachycardia. Continue to monitor on telemetry until discharge. He is currently rate controlled on metoprolol/cardizem. 2 D Echo pending. [id number] score of 1 for HTN risk factor. Continue ASA and rate control for now. No indication for oral anticoagulation at this time.     Thank you for the consult.     Discussed with Cardiology Attending: Dr Escobar. His addendum to follow.    Jerson Willis MD  [phone]

## 2017-03-16 NOTE — ASSESSMENT & PLAN NOTE
- Pt affirmed significant vomiting 3 days ago (possible K loss)  - K: 2.7 on [03/16/17] admission  - Given 120 mEq  - F/U K lab at [03/16/17 - 14:00]

## 2017-03-16 NOTE — DISCHARGE INSTRUCTIONS
Discharge Instructions for Hypokalemia  You have been diagnosed with hypokalemia. This means you have a low level of potassium in your blood. Potassium helps your nerve and muscle cells work as they should. These cells include the cells in your heart. A low level of potassium in the blood can cause serious problems, such as abnormal heart rhythms and even heart attack.  Diet changes  Eat more potassium-rich foods:  · Bananas  · Oranges and orange juice  · Tomatoes, tomato sauce, and tomato juice  · Leafy green vegetables, such as spinach, kale, salad greens, collards, and chard  · Melons (all kinds)  · Pomegranates  · Peas  · Beans  · Potatoes  · Sweet potatoes  · Avocados, including guacamole  · Vegetable juices, such as V8  · Fruit juices  · All nuts and seeds  · Fish, including tuna, halibut, salmon, cod, snapper, marcia, swordfish, and perch  · Milk, including fat-free, low-fat, whole, chocolate, and buttermilk  · Soy milk  Other home care  · Take a potassium supplement as directed by your healthcare provider.  · After strenuous exercise or any activity that causes you to sweat a lot, grab a beverage high in potassium. This includes chocolate milk, coconut water, orange juice, or low-sodium vegetable juices.  · Be sure to eat foods or drink fluids that contain potassium if you are having diarrhea or vomiting.  · Have your potassium levels checked regularly.  · Take all medicines exactly as directed.  · Tell your healthcare provider about all prescription and ove-the-counter medicines you are taking. This includes herbal products.  · Avoid foods that are high in salt. Avoid canned and prepared foods that are high in salt.  Follow-up  · Make a follow-up appointment as directed by our staff.  · Keep all follow-up appointments. Your healthcare provider needs to monitor your condition closely.     When to call your healthcare provider  Call your provider right away or go to the emergency room if you have any of  the following:  · Vomiting  · Fatigue  · Diarrhea  · Rapid, irregular heartbeat  · Shortness of breath  · Chest pain  · Muscle cramps, spasms, or twitching  · Weakness  · Paralysis   Date Last Reviewed: 6/20/2015 © 2000-2016 [employer]. [address] [place] [id number]. All rights reserved. This information is not intended as a substitute for professional medical care. Always follow your healthcare professional's instructions.

## 2017-03-17 ENCOUNTER — CLINICAL SUPPORT (OUTPATIENT)
Dept: ELECTROPHYSIOLOGY | Facility: CLINIC | Age: 57
End: 2017-03-17
Payer: MEDICARE

## 2017-03-17 VITALS
HEART RATE: 77 BPM | RESPIRATION RATE: 18 BRPM | BODY MASS INDEX: 28.44 KG/M2 | OXYGEN SATURATION: 98 % | SYSTOLIC BLOOD PRESSURE: 127 MMHG | HEIGHT: 72 IN | DIASTOLIC BLOOD PRESSURE: 77 MMHG | WEIGHT: 210 LBS | TEMPERATURE: 97 F

## 2017-03-17 DIAGNOSIS — I48.0 PAF (PAROXYSMAL ATRIAL FIBRILLATION): Primary | ICD-10-CM

## 2017-03-17 DIAGNOSIS — R55 SYNCOPE, CARDIOGENIC: ICD-10-CM

## 2017-03-17 DIAGNOSIS — I48.91 ATRIAL FIBRILLATION: ICD-10-CM

## 2017-03-17 LAB
ANION GAP SERPL CALC-SCNC: 9 MMOL/L
BUN SERPL-MCNC: 12 MG/DL
CALCIUM SERPL-MCNC: 7.8 MG/DL
CHLORIDE SERPL-SCNC: 106 MMOL/L
CO2 SERPL-SCNC: 21 MMOL/L
CREAT SERPL-MCNC: 1.1 MG/DL
DIASTOLIC DYSFUNCTION: NO
EST. GFR  (AFRICAN AMERICAN): >60 ML/MIN/1.73 M^2
EST. GFR  (NON AFRICAN AMERICAN): >60 ML/MIN/1.73 M^2
ESTIMATED PA SYSTOLIC PRESSURE: 28
GLUCOSE SERPL-MCNC: 97 MG/DL
MAGNESIUM SERPL-MCNC: 2 MG/DL
MITRAL VALVE REGURGITATION: NORMAL
PHOSPHATE SERPL-MCNC: 1.8 MG/DL
POTASSIUM SERPL-SCNC: 3.7 MMOL/L
RETIRED EF AND QEF - SEE NOTES: 68 (ref 55–65)
SODIUM SERPL-SCNC: 136 MMOL/L
TRICUSPID VALVE REGURGITATION: NORMAL
TROPONIN I SERPL DL<=0.01 NG/ML-MCNC: 0.02 NG/ML

## 2017-03-17 PROCEDURE — 93306 TTE W/DOPPLER COMPLETE: CPT | Mod: 26,,, | Performed by: INTERNAL MEDICINE

## 2017-03-17 PROCEDURE — 97530 THERAPEUTIC ACTIVITIES: CPT

## 2017-03-17 PROCEDURE — 99239 HOSP IP/OBS DSCHRG MGMT >30: CPT | Mod: ,,, | Performed by: INTERNAL MEDICINE

## 2017-03-17 PROCEDURE — 36415 COLL VENOUS BLD VENIPUNCTURE: CPT

## 2017-03-17 PROCEDURE — 97116 GAIT TRAINING THERAPY: CPT

## 2017-03-17 PROCEDURE — 80048 BASIC METABOLIC PNL TOTAL CA: CPT

## 2017-03-17 PROCEDURE — 97162 PT EVAL MOD COMPLEX 30 MIN: CPT

## 2017-03-17 PROCEDURE — 84484 ASSAY OF TROPONIN QUANT: CPT

## 2017-03-17 PROCEDURE — 93268 ECG RECORD/REVIEW: CPT | Mod: S$GLB,,, | Performed by: INTERNAL MEDICINE

## 2017-03-17 PROCEDURE — 83735 ASSAY OF MAGNESIUM: CPT

## 2017-03-17 PROCEDURE — 25000003 PHARM REV CODE 250: Performed by: STUDENT IN AN ORGANIZED HEALTH CARE EDUCATION/TRAINING PROGRAM

## 2017-03-17 PROCEDURE — 93306 TTE W/DOPPLER COMPLETE: CPT

## 2017-03-17 PROCEDURE — 25000003 PHARM REV CODE 250: Performed by: HOSPITALIST

## 2017-03-17 PROCEDURE — G0378 HOSPITAL OBSERVATION PER HR: HCPCS

## 2017-03-17 PROCEDURE — 84100 ASSAY OF PHOSPHORUS: CPT

## 2017-03-17 RX ORDER — LANOLIN ALCOHOL/MO/W.PET/CERES
400 CREAM (GRAM) TOPICAL DAILY
Status: DISCONTINUED | OUTPATIENT
Start: 2017-03-17 | End: 2017-03-17 | Stop reason: HOSPADM

## 2017-03-17 RX ORDER — LANOLIN ALCOHOL/MO/W.PET/CERES
400 CREAM (GRAM) TOPICAL DAILY
Refills: 0 | Status: ON HOLD | COMMUNITY
Start: 2017-03-17 | End: 2018-01-16

## 2017-03-17 RX ADMIN — COLCHICINE 0.6 MG: 0.6 TABLET, FILM COATED ORAL at 08:03

## 2017-03-17 RX ADMIN — ALLOPURINOL 300 MG: 100 TABLET ORAL at 08:03

## 2017-03-17 RX ADMIN — GABAPENTIN 600 MG: 300 CAPSULE ORAL at 05:03

## 2017-03-17 RX ADMIN — POTASSIUM CHLORIDE 40 MEQ: 20 SOLUTION ORAL at 12:03

## 2017-03-17 RX ADMIN — METOPROLOL TARTRATE 50 MG: 50 TABLET, FILM COATED ORAL at 08:03

## 2017-03-17 RX ADMIN — PANTOPRAZOLE SODIUM 40 MG: 40 TABLET, DELAYED RELEASE ORAL at 08:03

## 2017-03-17 RX ADMIN — STANDARDIZED SENNA CONCENTRATE AND DOCUSATE SODIUM 1 TABLET: 8.6; 5 TABLET, FILM COATED ORAL at 08:03

## 2017-03-17 RX ADMIN — DILTIAZEM HYDROCHLORIDE 180 MG: 180 CAPSULE, COATED, EXTENDED RELEASE ORAL at 08:03

## 2017-03-17 RX ADMIN — MAGNESIUM OXIDE TAB 400 MG (241.3 MG ELEMENTAL MG) 400 MG: 400 (241.3 MG) TAB at 08:03

## 2017-03-17 RX ADMIN — ASPIRIN 81 MG: 81 TABLET, COATED ORAL at 08:03

## 2017-03-17 NOTE — PLAN OF CARE
Problem: Physical Therapy Goal  Goal: Physical Therapy Goal  Outcome: Outcome(s) achieved Date Met:  03/17/17  Pt being D/David to home, can benefit from OPPT. Samara Hernandez PT 3/17/17

## 2017-03-17 NOTE — H&P
"Please see note incorrectly labeled as "Progress Note" from 3/16, by Dr. Stanley Casas, for full H&P.   "

## 2017-03-17 NOTE — PLAN OF CARE
Problem: Fall Risk (Adult)  Goal: Identify Related Risk Factors and Signs and Symptoms  Related risk factors and signs and symptoms are identified upon initiation of Human Response Clinical Practice Guideline (CPG)   Outcome: Ongoing (interventions implemented as appropriate)  Patient is alert and oriented, vital signs are stable, with patient provided instruction in safety awareness and call light usage. No falls with precautions in effect. No s/s of pressure ulcers, with skin intact. Patient educated in safety awareness w/ pt assisted w/ use of RW. Patient instructed to call for help prior to mobility attempts. with mobility. Pain managed with PRN medications. Fluids encouraged. Will continue to monitor patient.

## 2017-03-17 NOTE — PLAN OF CARE
03/17/17 1446   Final Note   Assessment Type Final Discharge Note   Discharge Disposition Home   Discharge planning education complete? Yes   Hospital Follow Up  Appt(s) scheduled? Yes   Discharge plans and expectations educations in teach back method with documentation complete? Yes   Offered [facility] -- Bedside Delivery? n/a   Discharge/Hospital Encounter Summary to (non[facility]) PCP n/a   Referral to Outpatient Case Management complete? n/a   Referral to / orders for Home Health Complete? n/a   30 day supply of medicines given at discharge, if documented non-compliance / non-adherence? n/a   Any social issues identified prior to discharge? No   Did you assess the readiness or willingness of the family or caregiver to support self management of care? Yes     No future appointments.

## 2017-03-17 NOTE — PT/OT/SLP EVAL
Physical Therapy  Evaluation/D/C Summary    Michael Johnson Jr.   MRN: [id number]   Admitting Diagnosis: Syncope, cardiogenic    PT Received On: 03/17/17  PT Start Time: 1319     PT Stop Time: 1355    PT Total Time (min): 36 min     Moderate complexity eval  Pain  Weakness  Decreased ROM  12 steps to enter  CGA for transfers  SBA for gait  Limited gait distance  Billable Minutes:  Evaluation 10, Gait [id number] and Therapeutic Activity 10    Diagnosis: Syncope, cardiogenic  PT had witnessed seizure at home with fall    Past Medical History:   Diagnosis Date    Afib     Ankle fracture, left     Asthma     Gout, unspecified     Hypertension     Seizure 2016      Past Surgical History:   Procedure Laterality Date    ANKLE SURGERY     Referring physician: Jae  Date referred to PT: 3/17/17  General Precautions: Standard, fall  Orthopedic Precautions: N/A   Braces: N/A       Do you have any cultural, spiritual, Protestant conflicts, given your current situation?: none noted    Patient History:  Lives With: spouse, child(sulaiman), adult (wife who works and 20 yr old son)  Living Arrangements: house (12 steps to enter with 1 rail and elevator)  Home Accessibility: stairs to enter home  Home Layout: Bathroom on 2nd floor, Bedroom on 2nd floor  Number of Stairs to Enter Home: 12  Stair Railings at Home: outside, present on right side  Living Environment Comment: Pt states he has only been able to leave his house 2xs over the last year because he is in too much pain to go up/down steps and the driveway is too steep inclined for him to walk to the elevator. Pt states he has been working on getting an electric w/c but hasn't gotten it yet.   Equipment Currently Used at Home: raised toilet, rollator    Previous Level of Function:  Ambulation Skills: needs device (used rollator for mobility)  Transfer Skills: needs device  ADL Skills: needs device (pt states he has been taking a bath sitting next to the sink because he almost  fell in the shower)    Subjective:  Communicated with nurse prior to session.  Pt states he has only left his house 2xs over the last year due to pain when going up/down steps and then elevator being inaccessible  Patient goals: to be able to go to water PT    Pain Rating: 10/10 (pt states he has 10/10 pain at all times)   Location - Side: Left     Location: ankle  Pain Addressed: Pre-medicate for activity, Reposition  Pain Rating Post-Intervention: 10/10    Objective:   Patient found with: telemetry     Cognitive Exam:  Oriented to: Person, Place, Time and Situation    Follows Commands/attention: Follows multistep  commands  Communication: clear/fluent  Safety awareness/insight to disability: intact    Physical Exam:  Postural examination/scapula alignment: Rounded shoulder, Head forward and Abnormal trunk flexion    Sensation:   Intact  light/touch B LE    Upper Extremity Range of Motion:  Right Upper Extremity: WFL  Left Upper Extremity: WFL    Lower Extremity Range of Motion:  Right Lower Extremity: WFL  Left Lower Extremity: WFL except ankle fused at 90 deg     Lower Extremity Strength:  Right Lower Extremity: Deficits: hip flex 4-/5; knee flex/ext 4/5; ankle DF 4/5  Left Lower Extremity: Deficits: hip flex 4-/5; knee flex/ext 4-/5; ankle NT due to ankle fused     Functional Mobility:  Bed Mobility:  Supine to Sit:  (pt up in bathroom upon PT arrival)    Transfers:  Sit <> Stand Assistance: Contact Guard Assistance, Supervision (1 from commode with grab bar with supervision; 1 from elevated mat with supervision; 1 from bedside chair with CGA due to posterior instability)  Sit <> Stand Assistive Device: Rolling Walker    Gait:   Gait Distance: 50ft x 2 trials with seated rest period in between with flexed trunk, decreased step length, decreased knee flex in L LE, and at slow pace. Pt rested in standing leaning on the walker 1 time each gait trial.  Assistance 1: Stand by Assistance  Gait Assistive Device:  Rolling walker  Gait Deviation(s): decreased julianna, decreased step length, forward lean, decreased toe-to-floor clearance    Stairs:  Pt ascended/descend 4 stair(s) with No Assistive Device with right UE rail support with Contact Guard Assistance.     Balance:   Static Sit: GOOD-: Takes MODERATE challenges from all directions but inconsistently  Dynamic Sit: FAIR+: Maintains balance through MINIMAL excursions of active trunk motion  Static Stand: FAIR: Maintains without assist but unable to take challenges  Dynamic stand: FAIR+: Needs CLOSE SUPERVISION during gait and is able to right self with minor LOB    Therapeutic Activities and Exercises:  PT spoke with pt, MD and  concerning pt's needs upon D/C for therapy and equipment.    AM-PAC 6 CLICK MOBILITY  How much help from another person does this patient currently need?   1 = Unable, Total/Dependent Assistance  2 = A lot, Maximum/Moderate Assistance  3 = A little, Minimum/Contact Guard/Supervision  4 = None, Modified [place]/Independent    Turning over in bed (including adjusting bedclothes, sheets and blankets)?: 4  Sitting down on and standing up from a chair with arms (e.g., wheelchair, bedside commode, etc.): 3  Moving from lying on back to sitting on the side of the bed?: 4  Moving to and from a bed to a chair (including a wheelchair)?: 3  Need to walk in hospital room?: 3  Climbing 3-5 steps with a railing?: 3  Total Score: 20     AM-PAC Raw Score CMS G-Code Modifier Level of Impairment Assistance   6 % Total / Unable   7 - 9 CM 80 - 100% Maximal Assist   10 - 14 CL 60 - 80% Moderate Assist   15 - 19 CK 40 - 60% Moderate Assist   20 - 22 CJ 20 - 40% Minimal Assist   23 CI 1-20% SBA / CGA   24 CH 0% Independent/ Mod I     Patient left seated on the EOB with lunch in front of him with all lines intact, call button in reach and nurse notified.    Assessment:   Michael Johnson Jr. is a 56 y.o. male with a medical diagnosis of Syncope,  cardiogenic and presents with difficulty with functional mobility due to weakness, decreased ROM in L ankle, and pain. Pt being D/David to home.    Rehab identified problem list/impairments: Rehab identified problem list/impairments: weakness, impaired self care skills, impaired functional mobilty, gait instability, decreased lower extremity function, decreased coordination, pain, decreased ROM    Activity tolerance: Good    Discharge recommendations: Discharge Facility/Level Of Care Needs: outpatient PT (pt wants water PT)     Barriers to discharge: Barriers to Discharge: Inaccessible home environment, Decreased caregiver support (12 steps to enter and wife works during the day)    Equipment recommendations: Equipment Needed After Discharge:  (pt is working on getting an electric w/c to be able to enter and leave his home through the elevator)     GOALS: no goals set due to pt being D/David to home  Physical Therapy Goals     Not on file      PLAN:      (Pt being D/David to home)   Plan of Care reviewed with: patient    Samara GALVEZ David, PT  03/17/2017

## 2017-03-17 NOTE — PLAN OF CARE
CM in to see pt this am with pt sitting on side of bed dressing himself.  Pt on oxygen and CR monitor.  Pt states he had Echo done this am pending results.  Pt perceives to be discharged to home with spouse and no needs.  CM will continue to follow.

## 2017-03-17 NOTE — DISCHARGE SUMMARY
DISCHARGE SUMMARY  Hospital Medicine    Team: [facility]    Patient Name: Michael Johnson Jr.  YOB: 1960    Admit Date: 3/15/2017    Discharge Date: 03/17/2017    Discharge Attending Physician: David Talbot MD     Diagnoses:  Active Hospital Problems    Diagnosis  POA    *Syncope, cardiogenic [R55]  Unknown    Acute hypokalemia [E87.6]  Yes    Seizure [R56.9]  Unknown    Hypomagnesemia [E83.42]  Unknown    GERD (gastroesophageal reflux disease) [K21.9]  Unknown    Elevated troponin [R79.89]  Unknown    HTN (hypertension) [I10]  Yes    Gout [M10.9]  Yes    Atrial fibrillation [I48.91]  Yes    Hypokalemia [E87.6]  Yes      Resolved Hospital Problems    Diagnosis Date Resolved POA   No resolved problems to display.       Discharged Condition: admit problems have stabilized      HOSPITAL COURSE:    Initial Presentation:     Michael Johnson is a 57 yo  male with a PMH of seizure (last Summer, 2016), gout, atrial fibrillation, and HTN who presented to the ED following a witnessed syncopal episode on the evening of [03/15/17]. The event was witnessed by his wife, who noted that the patient fell from the bed, lost consciousness, foamed at the mouth, and bit his tongue. He did not, however, experience convulsions, and he did not have bowel or bladder incontinence, or evidence of post-ictal confusion. He regained consciousness minutes later, and was alert and oriented, and asking questions about what had happened. He denied palpitations, chest pain or pressure, SOB, dizziness, diaphoresis or anxiety preceding the event. He did endorse SOB for about 15 minutes following the episode. This admission was preceded by 24h of multiple episodes of vomiting approximately 3 days prior to arrival, and subsequent decreased PO intake 2/2 nausea.     Course of Principle Problem for Admission:    Upon arrival to the ED, the patient was found to have hypokalemia (2.7) and hypomagnesemia  (0.9). Repletion of potassium and magnesium was begun. ECG noted normal sinus rhythm, absence of ST depression/elevation, significant for depressed T-wave amplitude. CT head showed no intracranial process and CXR showed no acute process. Provisional diagnosis was syncope of cardiogenic etiology, in the setting of electrolyte abnormality likely d/t GI losses and poor nutritional intake. His lytes were trended and repleted as needed. He was monitored on telemetry w/o any episodes of arrhythmias noted. He remained stable overnight w/o repeat events. He was discharged home with Holter monitor and cardiology follow-up.     Review Of Systems  Constitutional: no fever or chills, negative for sweats and weight loss  HENT: no headaches/neck pain, congestion, nasal/ear discharge.  Eyes: no photophobia, eye/conjunctival redness/discharge, or changes in vision  Respiratory: no cough or shortness of breath, negative for hemoptysis, stridor and wheezing  Cardiovascular: no chest pain or palpitations, lower extremity edema, near-syncope and orthopnea  Gastrointestinal: no nausea or vomiting, no abdominal pain or change in bowel habits, negative for hematochezia, BRBPR, melena.  Genitourinary: no hematuria or dysuria, negative for frequency and urinary incontinence  Skin: no rashes, bumps, bruises, wounds.  Neurological: no seizures or tremors, negative for focal or unilateral weakness/paralysis  Behavioral/Psych: no auditory or visual hallucinations    Physical Exam   Constitutional: Patient is oriented to person, place, and time. Patient appears well-developed and well-nourished. No distress.   HENT: Normocephalic and atraumatic. Oropharynx is clear and moist. No oropharyngeal exudate. Conjunctivae and EOM are normal. Pupils are equal, round, and reactive to light. No scleral icterus.  Normal range of motion. Neck supple. No tracheal deviation present. No thyromegaly present.   Cardiovascular: Normal rate, regular rhythm, normal  heart sounds and intact distal pulses.  Exam reveals no gallop and no friction rub.  No murmur heard.  Pulmonary/Chest: Effort normal and breath sounds normal. No respiratory distress. Patient has no wheezes, or rales and exhibits no tenderness.   Abdominal: Soft. Bowel sounds are normal. There is no distension. There is no tenderness.   Lymphadenopathy:   Patient has no cervical adenopathy.   Neurological: Patient is alert and oriented to person, place, and time. Patient has normal reflexes. There is no cranial nerve deficit.   Skin: Skin is warm and dry. No rash noted. Patient is not diaphoretic. No erythema. No pallor.   Psychiatric: Patient has a normal mood and affect. Behavior is normal. Thought content normal.     Other Medical Problems Addressed in the Hospital:    Hypokalemia, Hypomagnesemia  Likely in the setting of poor nutritional intake and acute gastrointestinal illness. Patient reported multiple episodes of vomiting in the days preceding his admission.   - K: 2.7 on [03/16/17] admission, Mag .9  - Given K+ 200 mEq, Mg 7g over the course of his stay  - K improved to 3.7, Mag improved to 2 on morning of d/c     Atrial fibrillation  CHADsVASc score of 1, consistent with <.6% risk of thromboembolic/stroke event per year. Low-moderate risk; will resume anti-platelet therapy with ASA.   - Continue Aspirin  - Rate-controlled Diltiazem     HTN (hypertension)  - Continued Diltiazem    Gout  - Swelling over PIP 4th digit on right hand - possibly 2/2 to gout flare  - Continued Allopurinol  - Continued Colchicine     GERD (gastroesophageal reflux disease)  - Counseled to avoid NSAID's  - Pantoprazole 40 mg DAILY     Elevated troponin  Likely 2°/2 demand ischemia in the setting of AF w/ RVR (HR in 190s on arrival to ED). Do not suspect this is ACS at this time, as patient denied chest pain, exertional discomfort, h/o cardiac events, diaphoresis, etc. EKG not consistent with ischemic event.   - Troponin trended x3;  trended ?      CONSULTS: Cardiology    Other Pertinent Lab Findings:  Trended CBC, CMP (as below), trended Magnesium, Phos, Trop (see MR for full results)      Pertinent/Significant Diagnostic Studies:    CT Head w/o con 3/16  Findings:  The brain parenchyma appears normal for age with good corticomedullary differentiation.  There is no evidence of acute infarct, hemorrhage, or mass.  The ventricular system is normal in size.  No mass-effect or midline shift.  There are no abnormal extra-axial fluid collections.    LEFT maxillary antrum is opacified with thickening of the sinus walls suggesting chronic sinusitis. Mild mucosal thickening in the inferior RIGHT maxillary antrum. The remaining paranasal sinuses and mastoid air cells are clear.    The calvarium appears intact.  .    Special Treatments/Procedures: Discharged w/ Holter Monitor    Disposition:  Home       Future Scheduled Appointments:  No future appointments.    Follow-up Plans from This Hospitalization:  Follow-up Information     Go to [facility].    Specialty:  Emergency Medicine    Why:  If symptoms worsen    Contact information:    [address] [facility] [address] [phone]        Go to Your main doctor.    Why:  As needed for general health maintenance.        Follow up with [facility] In 4 weeks.    Specialty:  Cardiology    Why:  cardiogenic syncope, s/p holter    Contact information:    [address] [facility] [address] [phone]    Additional information:    3rd floor            Last CBC/BMP/HgbA1c (if applicable):  Recent Results (from the past 336 hour(s))   CBC with Automated Differential    Collection Time: 03/16/17  2:43 PM   Result Value Ref Range    WBC 6.30 3.90 - 12.70 K/uL    Hemoglobin 9.2 (L) 14.0 - 18.0 g/dL    Hematocrit 26.0 (L) 40.0 - 54.0 %    Platelets 106 (L) 150 - 350 K/uL   CBC auto differential    Collection Time: 03/16/17 12:18 AM   Result Value  Ref Range    WBC 8.66 3.90 - 12.70 K/uL    Hemoglobin 11.8 (L) 14.0 - 18.0 g/dL    Hematocrit 33.3 (L) 40.0 - 54.0 %    Platelets 115 (L) 150 - 350 K/uL     Recent Results (from the past 336 hour(s))   Basic Metabolic Panel (BMP)    Collection Time: 03/17/17  4:46 AM   Result Value Ref Range    Sodium 136 136 - 145 mmol/L    Potassium 3.7 3.5 - 5.1 mmol/L    Chloride 106 95 - 110 mmol/L    CO2 21 (L) 23 - 29 mmol/L    BUN, Bld 12 6 - 20 mg/dL    Creatinine 1.1 0.5 - 1.4 mg/dL    Calcium 7.8 (L) 8.7 - 10.5 mg/dL    Anion Gap 9 8 - 16 mmol/L   Basic metabolic panel    Collection Time: 03/16/17  2:43 PM   Result Value Ref Range    Sodium 136 136 - 145 mmol/L    Potassium 3.1 (L) 3.5 - 5.1 mmol/L    Chloride 107 95 - 110 mmol/L    CO2 18 (L) 23 - 29 mmol/L    BUN, Bld 15 6 - 20 mg/dL    Creatinine 1.0 0.5 - 1.4 mg/dL    Calcium 7.1 (L) 8.7 - 10.5 mg/dL    Anion Gap 11 8 - 16 mmol/L     Lab Results   Component Value Date    HGBA1C 5.4 05/10/2014       Discharge Medication List:     Medication List      START taking these medications          magnesium oxide 400 mg tablet   Commonly known as:  MAG-OX   Take 1 tablet (400 mg total) by mouth once daily.         CONTINUE taking these medications          allopurinol 300 MG tablet   Commonly known as:  ZYLOPRIM       aspirin 81 MG EC tablet   Commonly known as:  ECOTRIN       colchicine 0.6 mg tablet   Take 1 tablet (0.6 mg total) by mouth once daily.       diltiaZEM 180 MG Cdcr   Commonly known as:  DILACOR XR       diphenhydrAMINE 25 mg capsule   Commonly known as:  BENADRYL   Take 1 each (25 mg total) by mouth every 4 (four) hours as needed (lip swelling for 2 days).       ibuprofen 200 MG tablet   Commonly known as:  ADVIL,MOTRIN       metoprolol succinate 50 MG 24 hr tablet   Commonly known as:  TOPROL-XL       tramadol 50 mg tablet   Commonly known as:  ULTRAM            Where to Get Your Medications      You can get these medications from any pharmacy     You don't  need a prescription for these medications     magnesium oxide 400 mg tablet             Patient Instructions:    Discharge Procedure Orders  Ambulatory consult to Physical Therapy   Referral Priority: Routine Referral Type: Physical Medicine   Referral Reason: Specialty Services Required    Requested Specialty: Physical Therapy    Number of Visits Requested: 1      Diet general         Signing Physician:  Kiseha Rowe MD

## 2017-03-17 NOTE — PLAN OF CARE
Problem: Fall Risk (Adult)  Goal: Identify Related Risk Factors and Signs and Symptoms  Related risk factors and signs and symptoms are identified upon initiation of Human Response Clinical Practice Guideline (CPG)   Outcome: Ongoing (interventions implemented as appropriate)  Pt is progressing with plan of care. Frequent rounds made to assess pain and safety. Pt's pain controlled with pain medication ordered at this time. Bed locked and at lowest position. Side rails up x 2. Call light within reach. Will continue to monitor.

## 2018-01-08 ENCOUNTER — HOSPITAL ENCOUNTER (INPATIENT)
Facility: OTHER | Age: 58
LOS: 11 days | Discharge: HOME | DRG: 100 | End: 2018-01-21
Attending: EMERGENCY MEDICINE | Admitting: HOSPITALIST
Payer: MEDICARE

## 2018-01-08 DIAGNOSIS — R26.81 UNSTEADY GAIT: ICD-10-CM

## 2018-01-08 DIAGNOSIS — E78.5 HYPERLIPIDEMIA, UNSPECIFIED HYPERLIPIDEMIA TYPE: ICD-10-CM

## 2018-01-08 DIAGNOSIS — F10.20 ALCOHOLISM: ICD-10-CM

## 2018-01-08 DIAGNOSIS — R78.81 BACTEREMIA DUE TO ENTEROBACTER SPECIES: ICD-10-CM

## 2018-01-08 DIAGNOSIS — E83.42 HYPOMAGNESEMIA: ICD-10-CM

## 2018-01-08 DIAGNOSIS — D53.9 MACROCYTIC ANEMIA: ICD-10-CM

## 2018-01-08 DIAGNOSIS — N17.9 ACUTE RENAL FAILURE SUPERIMPOSED ON CHRONIC KIDNEY DISEASE, UNSPECIFIED CKD STAGE, UNSPECIFIED ACUTE RENAL FAILURE TYPE: Chronic | ICD-10-CM

## 2018-01-08 DIAGNOSIS — A41.50 GRAM NEGATIVE SEPSIS: ICD-10-CM

## 2018-01-08 DIAGNOSIS — G89.29 CHRONIC PAIN OF LEFT ANKLE: ICD-10-CM

## 2018-01-08 DIAGNOSIS — N17.9 ACUTE KIDNEY INJURY: ICD-10-CM

## 2018-01-08 DIAGNOSIS — R06.02 SOB (SHORTNESS OF BREATH) ON EXERTION: ICD-10-CM

## 2018-01-08 DIAGNOSIS — R56.9 SEIZURE: Primary | ICD-10-CM

## 2018-01-08 DIAGNOSIS — F10.10 ALCOHOL ABUSE: ICD-10-CM

## 2018-01-08 DIAGNOSIS — T79.6XXA TRAUMATIC RHABDOMYOLYSIS, INITIAL ENCOUNTER: ICD-10-CM

## 2018-01-08 DIAGNOSIS — N18.9 ACUTE RENAL FAILURE SUPERIMPOSED ON CHRONIC KIDNEY DISEASE, UNSPECIFIED CKD STAGE, UNSPECIFIED ACUTE RENAL FAILURE TYPE: Chronic | ICD-10-CM

## 2018-01-08 DIAGNOSIS — I10 ESSENTIAL HYPERTENSION: ICD-10-CM

## 2018-01-08 DIAGNOSIS — R79.89 TROPONIN LEVEL ELEVATED: ICD-10-CM

## 2018-01-08 DIAGNOSIS — K81.0 CHOLECYSTITIS, ACUTE: ICD-10-CM

## 2018-01-08 DIAGNOSIS — B96.89 BACTEREMIA DUE TO ENTEROBACTER SPECIES: ICD-10-CM

## 2018-01-08 DIAGNOSIS — M25.572 CHRONIC PAIN OF LEFT ANKLE: ICD-10-CM

## 2018-01-08 DIAGNOSIS — M10.9 GOUTY ARTHRITIS: Chronic | ICD-10-CM

## 2018-01-08 DIAGNOSIS — E87.6 HYPOKALEMIA: ICD-10-CM

## 2018-01-08 DIAGNOSIS — N18.30 CKD (CHRONIC KIDNEY DISEASE), STAGE III: ICD-10-CM

## 2018-01-08 LAB
ALBUMIN SERPL BCP-MCNC: 3.4 G/DL
ALP SERPL-CCNC: 70 U/L
ALT SERPL W/O P-5'-P-CCNC: 46 U/L
ANION GAP SERPL CALC-SCNC: 18 MMOL/L
AST SERPL-CCNC: 79 U/L
BASOPHILS # BLD AUTO: 0 K/UL
BASOPHILS NFR BLD: 0 %
BILIRUB SERPL-MCNC: 1.2 MG/DL
BUN SERPL-MCNC: 15 MG/DL
CALCIUM SERPL-MCNC: 8.4 MG/DL
CHLORIDE SERPL-SCNC: 96 MMOL/L
CO2 SERPL-SCNC: 22 MMOL/L
CREAT SERPL-MCNC: 1.6 MG/DL
DIFFERENTIAL METHOD: ABNORMAL
EOSINOPHIL # BLD AUTO: 0 K/UL
EOSINOPHIL NFR BLD: 0 %
ERYTHROCYTE [DISTWIDTH] IN BLOOD BY AUTOMATED COUNT: 14.6 %
EST. GFR  (AFRICAN AMERICAN): 55 ML/MIN/1.73 M^2
EST. GFR  (NON AFRICAN AMERICAN): 48 ML/MIN/1.73 M^2
GLUCOSE SERPL-MCNC: 84 MG/DL
HCT VFR BLD AUTO: 33.7 %
HGB BLD-MCNC: 11.6 G/DL
INR PPP: 0.9
LYMPHOCYTES # BLD AUTO: 0.7 K/UL
LYMPHOCYTES NFR BLD: 6.6 %
MCH RBC QN AUTO: 39.1 PG
MCHC RBC AUTO-ENTMCNC: 34.4 G/DL
MCV RBC AUTO: 114 FL
MONOCYTES # BLD AUTO: 0.7 K/UL
MONOCYTES NFR BLD: 6.2 %
NEUTROPHILS # BLD AUTO: 9.4 K/UL
NEUTROPHILS NFR BLD: 86.9 %
PLATELET # BLD AUTO: 113 K/UL
PMV BLD AUTO: 11.6 FL
POTASSIUM SERPL-SCNC: 2.4 MMOL/L
PROT SERPL-MCNC: 7.9 G/DL
PROTHROMBIN TIME: 10.5 SEC
RBC # BLD AUTO: 2.97 M/UL
SODIUM SERPL-SCNC: 136 MMOL/L
TROPONIN I SERPL DL<=0.01 NG/ML-MCNC: 0.01 NG/ML
WBC # BLD AUTO: 10.81 K/UL

## 2018-01-08 PROCEDURE — 99291 CRITICAL CARE FIRST HOUR: CPT | Mod: 25

## 2018-01-08 PROCEDURE — 96365 THER/PROPH/DIAG IV INF INIT: CPT

## 2018-01-08 PROCEDURE — G0378 HOSPITAL OBSERVATION PER HR: HCPCS

## 2018-01-08 PROCEDURE — 96368 THER/DIAG CONCURRENT INF: CPT

## 2018-01-08 PROCEDURE — 96375 TX/PRO/DX INJ NEW DRUG ADDON: CPT

## 2018-01-08 PROCEDURE — 85610 PROTHROMBIN TIME: CPT

## 2018-01-08 PROCEDURE — 84484 ASSAY OF TROPONIN QUANT: CPT

## 2018-01-08 PROCEDURE — 25000003 PHARM REV CODE 250: Performed by: EMERGENCY MEDICINE

## 2018-01-08 PROCEDURE — 93010 ELECTROCARDIOGRAM REPORT: CPT | Mod: ,,, | Performed by: INTERNAL MEDICINE

## 2018-01-08 PROCEDURE — 96367 TX/PROPH/DG ADDL SEQ IV INF: CPT

## 2018-01-08 PROCEDURE — 85025 COMPLETE CBC W/AUTO DIFF WBC: CPT

## 2018-01-08 PROCEDURE — 80053 COMPREHEN METABOLIC PANEL: CPT

## 2018-01-08 PROCEDURE — 93005 ELECTROCARDIOGRAM TRACING: CPT

## 2018-01-08 PROCEDURE — 11000001 HC ACUTE MED/SURG PRIVATE ROOM

## 2018-01-08 PROCEDURE — 96366 THER/PROPH/DIAG IV INF ADDON: CPT

## 2018-01-08 PROCEDURE — 63600175 PHARM REV CODE 636 W HCPCS: Performed by: EMERGENCY MEDICINE

## 2018-01-08 RX ORDER — GABAPENTIN 300 MG/1
300 CAPSULE ORAL 3 TIMES DAILY
COMMUNITY
End: 2020-07-06 | Stop reason: SDUPTHER

## 2018-01-08 RX ORDER — SODIUM CHLORIDE AND POTASSIUM CHLORIDE 150; 900 MG/100ML; MG/100ML
1000 INJECTION, SOLUTION INTRAVENOUS
Status: COMPLETED | OUTPATIENT
Start: 2018-01-08 | End: 2018-01-08

## 2018-01-08 RX ORDER — POTASSIUM CHLORIDE 20 MEQ/15ML
40 SOLUTION ORAL
Status: COMPLETED | OUTPATIENT
Start: 2018-01-08 | End: 2018-01-08

## 2018-01-08 RX ORDER — METOPROLOL SUCCINATE 50 MG/1
50 TABLET, EXTENDED RELEASE ORAL DAILY
Status: CANCELLED | OUTPATIENT
Start: 2018-01-09

## 2018-01-08 RX ORDER — POTASSIUM CHLORIDE 20 MEQ/1
60 TABLET, EXTENDED RELEASE ORAL
Status: ACTIVE | OUTPATIENT
Start: 2018-01-08 | End: 2018-01-09

## 2018-01-08 RX ORDER — ASPIRIN 325 MG
325 TABLET ORAL
Status: COMPLETED | OUTPATIENT
Start: 2018-01-08 | End: 2018-01-08

## 2018-01-08 RX ORDER — POTASSIUM CHLORIDE 1.5 G/1.58G
20 POWDER, FOR SOLUTION ORAL 4 TIMES DAILY
Status: ON HOLD | COMMUNITY
End: 2018-01-16 | Stop reason: HOSPADM

## 2018-01-08 RX ORDER — LEVETIRACETAM 5 MG/ML
500 INJECTION INTRAVASCULAR
Status: COMPLETED | OUTPATIENT
Start: 2018-01-08 | End: 2018-01-08

## 2018-01-08 RX ORDER — AMLODIPINE BESYLATE 10 MG/1
10 TABLET ORAL DAILY
Status: ON HOLD | COMMUNITY
End: 2018-01-16 | Stop reason: HOSPADM

## 2018-01-08 RX ORDER — CARVEDILOL 25 MG/1
25 TABLET ORAL 2 TIMES DAILY WITH MEALS
Status: ON HOLD | COMMUNITY
End: 2018-01-16

## 2018-01-08 RX ORDER — AMLODIPINE BESYLATE 5 MG/1
10 TABLET ORAL DAILY
Status: CANCELLED | OUTPATIENT
Start: 2018-01-09

## 2018-01-08 RX ORDER — POTASSIUM CHLORIDE 7.45 MG/ML
10 INJECTION INTRAVENOUS
Status: DISPENSED | OUTPATIENT
Start: 2018-01-08 | End: 2018-01-09

## 2018-01-08 RX ADMIN — POTASSIUM CHLORIDE 10 MEQ: 10 INJECTION, SOLUTION INTRAVENOUS at 09:01

## 2018-01-08 RX ADMIN — POTASSIUM CHLORIDE 10 MEQ: 10 INJECTION, SOLUTION INTRAVENOUS at 11:01

## 2018-01-08 RX ADMIN — POTASSIUM CHLORIDE AND SODIUM CHLORIDE 1000 ML: 900; 150 INJECTION, SOLUTION INTRAVENOUS at 09:01

## 2018-01-08 RX ADMIN — POTASSIUM CHLORIDE 40 MEQ: 40 SOLUTION ORAL at 08:01

## 2018-01-08 RX ADMIN — MAGNESIUM SULFATE HEPTAHYDRATE 1 G: 500 INJECTION, SOLUTION INTRAMUSCULAR; INTRAVENOUS at 08:01

## 2018-01-08 RX ADMIN — LORAZEPAM 2 MG: 2 INJECTION INTRAMUSCULAR; INTRAVENOUS at 10:01

## 2018-01-08 RX ADMIN — ASPIRIN 325 MG ORAL TABLET 325 MG: 325 PILL ORAL at 06:01

## 2018-01-08 RX ADMIN — LEVETIRACETAM 500 MG: 5 INJECTION INTRAVENOUS at 11:01

## 2018-01-09 ENCOUNTER — TELEPHONE (OUTPATIENT)
Dept: INTERNAL MEDICINE | Facility: CLINIC | Age: 58
End: 2018-01-09

## 2018-01-09 PROBLEM — R26.81 UNSTEADY GAIT: Status: ACTIVE | Noted: 2018-01-09

## 2018-01-09 PROBLEM — M62.82 RHABDOMYOLYSIS: Status: ACTIVE | Noted: 2018-01-09

## 2018-01-09 PROBLEM — R79.89 TROPONIN LEVEL ELEVATED: Status: ACTIVE | Noted: 2018-01-09

## 2018-01-09 PROBLEM — D53.9 MACROCYTIC ANEMIA: Status: ACTIVE | Noted: 2018-01-09

## 2018-01-09 PROBLEM — F10.10 ALCOHOL ABUSE: Status: ACTIVE | Noted: 2018-01-09

## 2018-01-09 LAB
ALBUMIN SERPL BCP-MCNC: 3.2 G/DL
ALP SERPL-CCNC: 63 U/L
ALT SERPL W/O P-5'-P-CCNC: 44 U/L
AMPHET+METHAMPHET UR QL: NEGATIVE
ANION GAP SERPL CALC-SCNC: 10 MMOL/L
ANION GAP SERPL CALC-SCNC: 2 MMOL/L
ANION GAP SERPL CALC-SCNC: 6 MMOL/L
AST SERPL-CCNC: 94 U/L
BARBITURATES UR QL SCN>200 NG/ML: NEGATIVE
BASOPHILS # BLD AUTO: 0 K/UL
BASOPHILS NFR BLD: 0 %
BENZODIAZ UR QL SCN>200 NG/ML: NEGATIVE
BILIRUB SERPL-MCNC: 1.1 MG/DL
BILIRUB UR QL STRIP: ABNORMAL
BUN SERPL-MCNC: 11 MG/DL
BUN SERPL-MCNC: 15 MG/DL
BUN SERPL-MCNC: 16 MG/DL
BZE UR QL SCN: NEGATIVE
CALCIUM SERPL-MCNC: 4.8 MG/DL
CALCIUM SERPL-MCNC: 8 MG/DL
CALCIUM SERPL-MCNC: 8 MG/DL
CANNABINOIDS UR QL SCN: NEGATIVE
CHLORIDE SERPL-SCNC: 102 MMOL/L
CHLORIDE SERPL-SCNC: 119 MMOL/L
CHLORIDE SERPL-SCNC: 98 MMOL/L
CK MB SERPL-MCNC: 3.4 NG/ML
CK MB SERPL-RTO: 0.1 %
CK SERPL-CCNC: 5149 U/L
CLARITY UR: CLEAR
CO2 SERPL-SCNC: 19 MMOL/L
CO2 SERPL-SCNC: 23 MMOL/L
CO2 SERPL-SCNC: 25 MMOL/L
COLOR UR: YELLOW
CREAT SERPL-MCNC: 0.8 MG/DL
CREAT SERPL-MCNC: 1.4 MG/DL
CREAT SERPL-MCNC: 1.5 MG/DL
CREAT UR-MCNC: 224.7 MG/DL
DIFFERENTIAL METHOD: ABNORMAL
EOSINOPHIL # BLD AUTO: 0 K/UL
EOSINOPHIL NFR BLD: 0.1 %
ERYTHROCYTE [DISTWIDTH] IN BLOOD BY AUTOMATED COUNT: 14.5 %
EST. GFR  (AFRICAN AMERICAN): 60 ML/MIN/1.73 M^2
EST. GFR  (AFRICAN AMERICAN): >60 ML/MIN/1.73 M^2
EST. GFR  (AFRICAN AMERICAN): >60 ML/MIN/1.73 M^2
EST. GFR  (NON AFRICAN AMERICAN): 52 ML/MIN/1.73 M^2
EST. GFR  (NON AFRICAN AMERICAN): 56 ML/MIN/1.73 M^2
EST. GFR  (NON AFRICAN AMERICAN): >60 ML/MIN/1.73 M^2
ESTIMATED AVG GLUCOSE: 74 MG/DL
ETHANOL UR-MCNC: <10 MG/DL
FERRITIN SERPL-MCNC: 1834 NG/ML
FOLATE SERPL-MCNC: 4.6 NG/ML
GLUCOSE SERPL-MCNC: 109 MG/DL
GLUCOSE SERPL-MCNC: 60 MG/DL
GLUCOSE SERPL-MCNC: 80 MG/DL
GLUCOSE UR QL STRIP: NEGATIVE
HBA1C MFR BLD HPLC: 4.2 %
HCT VFR BLD AUTO: 31.3 %
HGB BLD-MCNC: 10.7 G/DL
HGB UR QL STRIP: NEGATIVE
IRON SERPL-MCNC: 40 UG/DL
KETONES UR QL STRIP: ABNORMAL
LEUKOCYTE ESTERASE UR QL STRIP: NEGATIVE
LYMPHOCYTES # BLD AUTO: 1 K/UL
LYMPHOCYTES NFR BLD: 11 %
MAGNESIUM SERPL-MCNC: 0.9 MG/DL
MAGNESIUM SERPL-MCNC: 1.1 MG/DL
MAGNESIUM SERPL-MCNC: 2 MG/DL
MCH RBC QN AUTO: 38.2 PG
MCHC RBC AUTO-ENTMCNC: 34.2 G/DL
MCV RBC AUTO: 112 FL
METHADONE UR QL SCN>300 NG/ML: NEGATIVE
MONOCYTES # BLD AUTO: 0.7 K/UL
MONOCYTES NFR BLD: 8.4 %
NEUTROPHILS # BLD AUTO: 7 K/UL
NEUTROPHILS NFR BLD: 80.2 %
NITRITE UR QL STRIP: NEGATIVE
OPIATES UR QL SCN: NEGATIVE
PCP UR QL SCN>25 NG/ML: NEGATIVE
PH UR STRIP: 7 [PH] (ref 5–8)
PHOSPHATE SERPL-MCNC: 1.2 MG/DL
PHOSPHATE SERPL-MCNC: 1.6 MG/DL
PHOSPHATE SERPL-MCNC: <1 MG/DL
PLATELET # BLD AUTO: 97 K/UL
PMV BLD AUTO: 11.1 FL
POTASSIUM SERPL-SCNC: 2.3 MMOL/L
POTASSIUM SERPL-SCNC: 2.9 MMOL/L
POTASSIUM SERPL-SCNC: 4.9 MMOL/L
PROT SERPL-MCNC: 7.3 G/DL
PROT UR QL STRIP: ABNORMAL
RBC # BLD AUTO: 2.8 M/UL
SATURATED IRON: 15 %
SODIUM SERPL-SCNC: 131 MMOL/L
SODIUM SERPL-SCNC: 133 MMOL/L
SODIUM SERPL-SCNC: 140 MMOL/L
SP GR UR STRIP: 1.01 (ref 1–1.03)
TOTAL IRON BINDING CAPACITY: 259 UG/DL
TOXICOLOGY INFORMATION: NORMAL
TRANSFERRIN SERPL-MCNC: 175 MG/DL
TROPONIN I SERPL DL<=0.01 NG/ML-MCNC: 0.03 NG/ML
TROPONIN I SERPL DL<=0.01 NG/ML-MCNC: 0.03 NG/ML
TROPONIN I SERPL DL<=0.01 NG/ML-MCNC: 0.08 NG/ML
TROPONIN I SERPL DL<=0.01 NG/ML-MCNC: 0.08 NG/ML
TSH SERPL DL<=0.005 MIU/L-ACNC: 1.31 UIU/ML
URN SPEC COLLECT METH UR: ABNORMAL
UROBILINOGEN UR STRIP-ACNC: ABNORMAL EU/DL
VIT B12 SERPL-MCNC: 522 PG/ML
WBC # BLD AUTO: 8.7 K/UL

## 2018-01-09 PROCEDURE — 36415 COLL VENOUS BLD VENIPUNCTURE: CPT

## 2018-01-09 PROCEDURE — 83036 HEMOGLOBIN GLYCOSYLATED A1C: CPT

## 2018-01-09 PROCEDURE — 82728 ASSAY OF FERRITIN: CPT

## 2018-01-09 PROCEDURE — G8988 SELF CARE GOAL STATUS: HCPCS | Mod: CJ

## 2018-01-09 PROCEDURE — 84484 ASSAY OF TROPONIN QUANT: CPT | Mod: 91

## 2018-01-09 PROCEDURE — 80053 COMPREHEN METABOLIC PANEL: CPT

## 2018-01-09 PROCEDURE — G0378 HOSPITAL OBSERVATION PER HR: HCPCS

## 2018-01-09 PROCEDURE — 80048 BASIC METABOLIC PNL TOTAL CA: CPT

## 2018-01-09 PROCEDURE — 63600175 PHARM REV CODE 636 W HCPCS: Performed by: HOSPITALIST

## 2018-01-09 PROCEDURE — 63600175 PHARM REV CODE 636 W HCPCS: Performed by: NURSE PRACTITIONER

## 2018-01-09 PROCEDURE — 83735 ASSAY OF MAGNESIUM: CPT | Mod: 91

## 2018-01-09 PROCEDURE — 25000003 PHARM REV CODE 250: Performed by: HOSPITALIST

## 2018-01-09 PROCEDURE — 84443 ASSAY THYROID STIM HORMONE: CPT

## 2018-01-09 PROCEDURE — 85025 COMPLETE CBC W/AUTO DIFF WBC: CPT

## 2018-01-09 PROCEDURE — 97530 THERAPEUTIC ACTIVITIES: CPT

## 2018-01-09 PROCEDURE — 80307 DRUG TEST PRSMV CHEM ANLYZR: CPT

## 2018-01-09 PROCEDURE — 82607 VITAMIN B-12: CPT

## 2018-01-09 PROCEDURE — G8979 MOBILITY GOAL STATUS: HCPCS | Mod: CJ

## 2018-01-09 PROCEDURE — G8978 MOBILITY CURRENT STATUS: HCPCS | Mod: CL

## 2018-01-09 PROCEDURE — 83540 ASSAY OF IRON: CPT

## 2018-01-09 PROCEDURE — 81003 URINALYSIS AUTO W/O SCOPE: CPT

## 2018-01-09 PROCEDURE — 25000003 PHARM REV CODE 250: Performed by: NURSE PRACTITIONER

## 2018-01-09 PROCEDURE — 11000001 HC ACUTE MED/SURG PRIVATE ROOM

## 2018-01-09 PROCEDURE — 82553 CREATINE MB FRACTION: CPT

## 2018-01-09 PROCEDURE — G8987 SELF CARE CURRENT STATUS: HCPCS | Mod: CK

## 2018-01-09 PROCEDURE — 97165 OT EVAL LOW COMPLEX 30 MIN: CPT

## 2018-01-09 PROCEDURE — 99219 PR INITIAL OBSERVATION CARE,LEVL II: CPT | Mod: ,,, | Performed by: PSYCHIATRY & NEUROLOGY

## 2018-01-09 PROCEDURE — 97161 PT EVAL LOW COMPLEX 20 MIN: CPT

## 2018-01-09 PROCEDURE — 82746 ASSAY OF FOLIC ACID SERUM: CPT

## 2018-01-09 PROCEDURE — 84100 ASSAY OF PHOSPHORUS: CPT

## 2018-01-09 PROCEDURE — 99223 1ST HOSP IP/OBS HIGH 75: CPT | Mod: AI,GC,, | Performed by: HOSPITALIST

## 2018-01-09 RX ORDER — ENOXAPARIN SODIUM 100 MG/ML
40 INJECTION SUBCUTANEOUS EVERY 24 HOURS
Status: DISCONTINUED | OUTPATIENT
Start: 2018-01-09 | End: 2018-01-21 | Stop reason: HOSPADM

## 2018-01-09 RX ORDER — SODIUM,POTASSIUM PHOSPHATES 280-250MG
2 POWDER IN PACKET (EA) ORAL
Status: DISCONTINUED | OUTPATIENT
Start: 2018-01-09 | End: 2018-01-09

## 2018-01-09 RX ORDER — LANOLIN ALCOHOL/MO/W.PET/CERES
400 CREAM (GRAM) TOPICAL DAILY
Status: DISCONTINUED | OUTPATIENT
Start: 2018-01-09 | End: 2018-01-16

## 2018-01-09 RX ORDER — GABAPENTIN 300 MG/1
300 CAPSULE ORAL 3 TIMES DAILY
Status: DISCONTINUED | OUTPATIENT
Start: 2018-01-09 | End: 2018-01-21 | Stop reason: HOSPADM

## 2018-01-09 RX ORDER — ASPIRIN 81 MG/1
81 TABLET ORAL DAILY
Status: DISCONTINUED | OUTPATIENT
Start: 2018-01-09 | End: 2018-01-21 | Stop reason: HOSPADM

## 2018-01-09 RX ORDER — ONDANSETRON 2 MG/ML
4 INJECTION INTRAMUSCULAR; INTRAVENOUS EVERY 8 HOURS PRN
Status: DISCONTINUED | OUTPATIENT
Start: 2018-01-09 | End: 2018-01-21 | Stop reason: HOSPADM

## 2018-01-09 RX ORDER — ACETAMINOPHEN 325 MG/1
650 TABLET ORAL EVERY 4 HOURS PRN
Status: DISCONTINUED | OUTPATIENT
Start: 2018-01-09 | End: 2018-01-21 | Stop reason: HOSPADM

## 2018-01-09 RX ORDER — ALLOPURINOL 300 MG/1
300 TABLET ORAL DAILY
Status: DISCONTINUED | OUTPATIENT
Start: 2018-01-09 | End: 2018-01-21 | Stop reason: HOSPADM

## 2018-01-09 RX ORDER — SODIUM,POTASSIUM PHOSPHATES 280-250MG
1 POWDER IN PACKET (EA) ORAL
Status: DISPENSED | OUTPATIENT
Start: 2018-01-09 | End: 2018-01-10

## 2018-01-09 RX ORDER — DILTIAZEM HYDROCHLORIDE 180 MG/1
180 CAPSULE, COATED, EXTENDED RELEASE ORAL DAILY
Status: DISCONTINUED | OUTPATIENT
Start: 2018-01-09 | End: 2018-01-19

## 2018-01-09 RX ORDER — POTASSIUM CHLORIDE 20 MEQ/15ML
60 SOLUTION ORAL ONCE
Status: COMPLETED | OUTPATIENT
Start: 2018-01-09 | End: 2018-01-09

## 2018-01-09 RX ORDER — CARVEDILOL 12.5 MG/1
25 TABLET ORAL 2 TIMES DAILY WITH MEALS
Status: DISCONTINUED | OUTPATIENT
Start: 2018-01-09 | End: 2018-01-12

## 2018-01-09 RX ORDER — SODIUM CHLORIDE 0.9 % (FLUSH) 0.9 %
5 SYRINGE (ML) INJECTION
Status: DISCONTINUED | OUTPATIENT
Start: 2018-01-09 | End: 2018-01-21 | Stop reason: HOSPADM

## 2018-01-09 RX ORDER — POTASSIUM CHLORIDE 7.45 MG/ML
10 INJECTION INTRAVENOUS
Status: COMPLETED | OUTPATIENT
Start: 2018-01-09 | End: 2018-01-09

## 2018-01-09 RX ORDER — POTASSIUM CHLORIDE 20 MEQ/1
40 TABLET, EXTENDED RELEASE ORAL DAILY
Status: DISCONTINUED | OUTPATIENT
Start: 2018-01-09 | End: 2018-01-16

## 2018-01-09 RX ORDER — CHLORDIAZEPOXIDE HYDROCHLORIDE 25 MG/1
25 CAPSULE, GELATIN COATED ORAL 3 TIMES DAILY
Status: DISCONTINUED | OUTPATIENT
Start: 2018-01-09 | End: 2018-01-10

## 2018-01-09 RX ORDER — SODIUM CHLORIDE 9 MG/ML
INJECTION, SOLUTION INTRAVENOUS CONTINUOUS
Status: DISCONTINUED | OUTPATIENT
Start: 2018-01-09 | End: 2018-01-09

## 2018-01-09 RX ORDER — LEVETIRACETAM 5 MG/ML
500 INJECTION INTRAVASCULAR EVERY 12 HOURS
Status: DISCONTINUED | OUTPATIENT
Start: 2018-01-09 | End: 2018-01-11

## 2018-01-09 RX ORDER — SODIUM CHLORIDE 9 MG/ML
INJECTION, SOLUTION INTRAVENOUS CONTINUOUS
Status: ACTIVE | OUTPATIENT
Start: 2018-01-09 | End: 2018-01-10

## 2018-01-09 RX ADMIN — POTASSIUM & SODIUM PHOSPHATES POWDER PACK 280-160-250 MG 1 PACKET: 280-160-250 PACK at 09:01

## 2018-01-09 RX ADMIN — ENOXAPARIN SODIUM 40 MG: 100 INJECTION SUBCUTANEOUS at 04:01

## 2018-01-09 RX ADMIN — POTASSIUM CHLORIDE 60 MEQ: 40 SOLUTION ORAL at 08:01

## 2018-01-09 RX ADMIN — POTASSIUM & SODIUM PHOSPHATES POWDER PACK 280-160-250 MG 2 PACKET: 280-160-250 PACK at 01:01

## 2018-01-09 RX ADMIN — ASPIRIN 81 MG: 81 TABLET, COATED ORAL at 08:01

## 2018-01-09 RX ADMIN — POTASSIUM CHLORIDE 10 MEQ: 10 INJECTION, SOLUTION INTRAVENOUS at 05:01

## 2018-01-09 RX ADMIN — POTASSIUM CHLORIDE 40 MEQ: 1500 TABLET, EXTENDED RELEASE ORAL at 08:01

## 2018-01-09 RX ADMIN — MAGNESIUM SULFATE HEPTAHYDRATE 3 G: 500 INJECTION, SOLUTION INTRAMUSCULAR; INTRAVENOUS at 09:01

## 2018-01-09 RX ADMIN — POTASSIUM & SODIUM PHOSPHATES POWDER PACK 280-160-250 MG 2 PACKET: 280-160-250 PACK at 04:01

## 2018-01-09 RX ADMIN — ACETAMINOPHEN 650 MG: 325 TABLET ORAL at 11:01

## 2018-01-09 RX ADMIN — ALLOPURINOL 300 MG: 300 TABLET ORAL at 08:01

## 2018-01-09 RX ADMIN — SODIUM CHLORIDE: 0.9 INJECTION, SOLUTION INTRAVENOUS at 05:01

## 2018-01-09 RX ADMIN — CHLORDIAZEPOXIDE HYDROCHLORIDE 25 MG: 25 CAPSULE ORAL at 09:01

## 2018-01-09 RX ADMIN — CARVEDILOL 25 MG: 12.5 TABLET, FILM COATED ORAL at 04:01

## 2018-01-09 RX ADMIN — GABAPENTIN 300 MG: 300 CAPSULE ORAL at 01:01

## 2018-01-09 RX ADMIN — Medication 400 MG: at 08:01

## 2018-01-09 RX ADMIN — SODIUM CHLORIDE: 0.9 INJECTION, SOLUTION INTRAVENOUS at 10:01

## 2018-01-09 RX ADMIN — CHLORDIAZEPOXIDE HYDROCHLORIDE 25 MG: 25 CAPSULE ORAL at 05:01

## 2018-01-09 RX ADMIN — SODIUM CHLORIDE 15 MMOL: 900 INJECTION, SOLUTION INTRAVENOUS at 01:01

## 2018-01-09 RX ADMIN — GABAPENTIN 300 MG: 300 CAPSULE ORAL at 05:01

## 2018-01-09 RX ADMIN — LEVETIRACETAM 500 MG: 5 INJECTION INTRAVENOUS at 08:01

## 2018-01-09 RX ADMIN — CARVEDILOL 25 MG: 12.5 TABLET, FILM COATED ORAL at 08:01

## 2018-01-09 RX ADMIN — CHLORDIAZEPOXIDE HYDROCHLORIDE 25 MG: 25 CAPSULE ORAL at 01:01

## 2018-01-09 RX ADMIN — LEVETIRACETAM 500 MG: 5 INJECTION INTRAVENOUS at 09:01

## 2018-01-09 RX ADMIN — DILTIAZEM HYDROCHLORIDE 180 MG: 180 CAPSULE, COATED, EXTENDED RELEASE ORAL at 08:01

## 2018-01-09 RX ADMIN — GABAPENTIN 300 MG: 300 CAPSULE ORAL at 09:01

## 2018-01-09 RX ADMIN — THIAMINE HYDROCHLORIDE 100 MG: 100 INJECTION, SOLUTION INTRAMUSCULAR; INTRAVENOUS at 09:01

## 2018-01-09 RX ADMIN — SODIUM CHLORIDE: 0.9 INJECTION, SOLUTION INTRAVENOUS at 07:01

## 2018-01-09 RX ADMIN — POTASSIUM CHLORIDE 10 MEQ: 10 INJECTION, SOLUTION INTRAVENOUS at 06:01

## 2018-01-09 RX ADMIN — ONDANSETRON 4 MG: 2 INJECTION INTRAMUSCULAR; INTRAVENOUS at 05:01

## 2018-01-09 NOTE — PLAN OF CARE
[facility] [phone] to inquire about patient's PCP. [facility] staff informed writer patient's PCP is Dr. Zunilda Faulkner; he missed an appointment scheduled for today 1/9/18. Writer to call clinic closer to D/C to arrange follow-up appointment.

## 2018-01-09 NOTE — PT/OT/SLP EVAL
"Physical Therapy Evaluation and treatment    Patient Name:  Michael Johnson Jr.   MRN:  [id number]    Recommendations:     Discharge Recommendations:  home with home health, home health PT, home health OT   Discharge Equipment Recommendations: walker, rolling, shower chair   Barriers to discharge: Inaccessible home and Decreased caregiver support    Assessment:     Michael Johnson Jr. is a 57 y.o. male admitted with a medical diagnosis of Seizure.  He presents with the following impairments/functional limitations:  pain, impaired sensation, impaired functional mobilty, gait instability, impaired balance, decreased lower extremity function, decreased ROM, impaired coordination, impaired cognition .  Pt requiring min to mod a for functional mobility but feel related to recent meds as he did not require that much assist with nursing this am prior to medication.Pt with hx of spastic equinus on L from birth -multiple surgeries with last one 2 years ago.  Since then very limited amb, constant pain in ankles.  Needs therapy to return to PLOF to decrease burden of care.      Rehab Prognosis:  good; patient would benefit from acute skilled PT services to address these deficits and reach maximum level of function.      Recent Surgery: * No surgery found *      Plan:     During this hospitalization, patient to be seen 6 x/week to address the above listed problems via gait training, therapeutic activities, therapeutic exercises  · Plan of Care Expires:  02/08/18   Plan of Care Reviewed with: patient    Subjective     Communicated with nursing prior to session.  Patient found in bed with eyes closed but opens eyes to name upon entry to room,  agreeable to evaluation.      Chief Complaint: ankles-sore, pain. Sore throat after vomiting.  Patient comments/goals: states when he vomited feels like he "stripped my throat".  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  Kept having surgeries to bring my " "foot up and up.  Last surgery didn't work and they apologized to me.  They took out my calf muscle.    "I want to be able to walk to the bathroom by myself"  Pain/Comfort:  Pain Rating 1: 6/10 (6 on L and 4 on R)  Location - Side 1: Bilateral (L>R)  Location - Orientation 1: generalized  Location 1: ankle  Pain Rating Post-Intervention 1: 8/10 (8/10 on L and 6/10 on R)    Patients cultural, spiritual, Jainism conflicts given the current situation: no    Living Environment:  Pt lives with wife and step son in 2 story house.  5 MARCIAL with R HR.  Has 1st floor bed and bath, son stays upstairs.  Has shower stall-used to have SC but "threw it away".   Prior to admission, patients level of function:  Pt reports he is mod I with rollator but doesn't walk far.  Gets up to BSC and to chair to eat by himself.  States he eats most meals in his room(wife brings to him) has been to kitchen , other part of house a couple of weeks ago but "wife has a lot of stuff".  Pt states he went to clinic 2 weeks ago with rollator by himself with clinic bus for  transportation. There is no family to confirm PLOF.  Patient has the following equipment: bedside commode, rollator.  DME owned (not currently used): crutches.  Upon discharge, patient will have assistance from -limited.  Pt states wife works so he is home alone during day.  Son who lives at home is upstairs-doesn't work but pt states he does not come downstairs to check on him or assist him. .    Objective:     Patient found with: blood pressure cuff, pulse ox (continuous), telemetry     General Precautions: Standard, fall, seizure   Orthopedic Precautions:N/A   Braces: N/A     Exams:  · Cognitive Exam:  Patient is oriented to Person, Place and Time-month only, states it is Mon and then gives year as 1980 then 2007 and follows one step commands   · Gross Motor Coordination:  Delay in timing in BLE L>R  · Sensation: impaired in B feet  · Skin Integrity/Edema:  Atrophy of calf muscle " on L noted   · UE rom and strength-see OT eval.   · RLE ROM: Deficits: lacks terminal knee ext and limited ankle -no DF only to neutral.  · RLE Strength: WFL  · LLE ROM: Deficits: lacks terminal knee ext and has ~ 5 degrees of passive ROM in ankle  · LLE Strength: WFL except no active motion in ankle    Functional Mobility:  · Bed Mobility:     · Supine to Sit: moderate assistance  · Sit to Supine: contact guard assistance  · Transfers:     · Sit to Stand:  moderate assistance and from elevated bed height  with rolling walker  · Gait: sidesteps to HOB, pt c/o pain in B ankle.  pt in stooped posture    AM-PAC 6 CLICK MOBILITY  Total Score:11       Therapeutic Activities and Exercises:   PT eval.  assist to EOB.  Sat at EOB and LE ROM ex.  Stood and sidesteps only.  Repositioned back in bed     Patient left HOB elevated with all lines intact, call button in reach and nurse notified.    GOALS:    Physical Therapy Goals        Problem: Physical Therapy Goal    Goal Priority Disciplines Outcome Goal Variances Interventions   Physical Therapy Goal     PT/OT, PT Ongoing (interventions implemented as appropriate)     Description:  Goals to be met by: 18     Patient will increase functional independence with mobility by performin. Supine to sit with SBA  2. Sit to supine with Stand-by Assistance  3. Sit to stand transfer with contact guard Assistance  4. Gait  x 25' feet with Contact Guard Assistance using Rolling Walker.   5. Ascend/descend 5 stair with right Handrails Minimal Assistance using crutch.                       History:     Past Medical History:   Diagnosis Date    Afib     Ankle fracture, left     Asthma     Gout, unspecified     Hypertension     Seizure        Past Surgical History:   Procedure Laterality Date    ANKLE SURGERY         Clinical Decision Making:     History  Co-morbidities and personal factors that may impact the plan of care Examination  Body Structures and Functions,  activity limitations and participation restrictions that may impact the plan of care Clinical Presentation   Decision Making/ Complexity Score   Co-morbidities:   [] Time since onset of injury / illness / exacerbation  [] Status of current condition  []Patient's cognitive status and safety concerns    [] Multiple Medical Problems (see med hx)  Personal Factors:   [] Patient's age  [] Prior Level of function   [] Patient's home situation (environment and family support)  [] Patient's level of motivation  [] Expected progression of patient      HISTORY:(criteria)    [] 33726 - no personal factors/history    [] 89737 - has 1-2 personal factor/comorbidity     [] 63747 - has >3 personal factor/comorbidity     Body Regions:  [] Objective examination findings  [] Head     []  Neck  [] Trunk   [] Upper Extremity  [] Lower Extremity    Body Systems:  [] For communication ability, affect, cognition, language, and learning style: the assessment of the ability to make needs known, consciousness, orientation (person, place, and time), expected emotional /behavioral responses, and learning preferences (eg, learning barriers, education  needs)  [] For the neuromuscular system: a general assessment of gross coordinated movement (eg, balance, gait, locomotion, transfers, and transitions) and motor function  (motor control and motor learning)  [] For the musculoskeletal system: the assessment of gross symmetry, gross range of motion, gross strength, height, and weight  [] For the integumentary system: the assessment of pliability(texture), presence of scar formation, skin color, and skin integrity  [] For cardiovascular/pulmonary system: the assessment of heart rate, respiratory rate, blood pressure, and edema     Activity limitations:    [] Patient's cognitive status and saf ety concerns          [] Status of current condition      [] Weight bearing restriction  [] Cardiopulmunary Restriction    Participation Restrictions:   []  Goals and goal agreement with the patient     [] Rehab potential (prognosis) and probable outcome      Examination of Body System: (criteria)    [] 29233 - addressing 1-2 elements    [] 79447 - addressing a total of 3 or more elements     [] 54968 -  Addressing a total of 4 or more elements         Clinical Presentation: (criteria)  Choose one     On examination of body system using standardized tests and measures patient presents with (CHOOSE ONE) elements from any of the following: body structures and functions, activity limitations, and/or participation restrictions.  Leading to a clinical presentation that is considered (CHOOSE ONE)                              Clinical Decision Making  (Eval Complexity):  Choose One     Time Tracking:     PT Received On: 01/09/18  PT Start Time: 1155     PT Stop Time: 1235  PT Total Time (min): 40 min     Billable Minutes: Evaluation 15 and Therapeutic Activity 25      Nuria Patrick, PT  01/09/2018

## 2018-01-09 NOTE — ED TRIAGE NOTES
Pt reports to ED via [facility] after witnessed seizure that lasted approx 4 min. Wife at bedside reports history of 1 seizure in past few months. When EMS arrived on scene pt was postictal and combative and in SVT with . No rhythm change after 6mg and 12mg of adenosine. Pt converted to Afib with  after 10 mg Cardizem. Dried blood noted to chin, with lac and bruising noted to tongue after biting down during seizure. No incontinence issues. Family and pt also reports PMH of etoh abuse, approx 1/2 gallon vodka/day, last drink x 4 days ago. Admits vomiting yesterday and feeling dehydrated. Pt requesting water, reports very thirsty. Currently c/o nausea, wooziness, and headache. Upon arrival to ED pt in Afib, normal rate. Pt denies chest pain, SOB, fevers, chills, diarrhea. AAO x 3.

## 2018-01-09 NOTE — SUBJECTIVE & OBJECTIVE
Interval History:  Pt new to me.  Feels week, but better.  States LEs are unequal size at baseline.  States had seizure in the past.     Review of Systems   Constitutional: Positive for fatigue. Negative for appetite change and fever.   HENT: Positive for sore throat.         From vomiting, no blood   Respiratory: Negative for cough and shortness of breath.    Cardiovascular: Negative for chest pain and palpitations.   Gastrointestinal: Negative for nausea and vomiting.   Skin: Negative for color change and rash.   Neurological: Negative for dizziness and weakness.   Psychiatric/Behavioral: Negative for agitation and confusion.     Objective:     Vital Signs (Most Recent):  Temp: 100.2 °F (37.9 °C) (01/09/18 0727)  Pulse: 92 (01/09/18 0715)  Resp: (!) 21 (01/09/18 0715)  BP: 134/79 (01/09/18 0715)  SpO2: 100 % (01/09/18 0715) Vital Signs (24h Range):  Temp:  [98.8 °F (37.1 °C)-100.2 °F (37.9 °C)] 100.2 °F (37.9 °C)  Pulse:  [] 92  Resp:  [17-28] 21  SpO2:  [90 %-100 %] 100 %  BP: (113-156)/(67-96) 134/79     Weight: 90.7 kg (200 lb)  Body mass index is 26.39 kg/m².    Intake/Output Summary (Last 24 hours) at 01/09/18 0832  Last data filed at 01/08/18 2336   Gross per 24 hour   Intake              250 ml   Output                0 ml   Net              250 ml      Physical Exam   Constitutional: He is oriented to person, place, and time. He appears well-developed and well-nourished.   HENT:   Head: Normocephalic and atraumatic.   Eyes: Conjunctivae are normal. Pupils are equal, round, and reactive to light.   Neck: Normal range of motion. Neck supple.   Cardiovascular: Normal rate and regular rhythm.    Pulmonary/Chest: Effort normal and breath sounds normal.   Abdominal: Soft.   Musculoskeletal:   - LLE smaller diameter than RLE.     Neurological: He is alert and oriented to person, place, and time.   Skin: Skin is warm and dry.       Significant Labs:   CBC:   Recent Labs  Lab 01/08/18  1840 01/09/18  0604    WBC 10.81 8.70   HGB 11.6* 10.7*   HCT 33.7* 31.3*   * 97*     CMP:   Recent Labs  Lab 01/08/18  1840 01/09/18  0604    133*   K 2.4* 2.9*   CL 96 98   CO2 22* 25   GLU 84 80   BUN 15 15   CREATININE 1.6* 1.4   CALCIUM 8.4* 8.0*   PROT 7.9 7.3   ALBUMIN 3.4* 3.2*   BILITOT 1.2* 1.1*   ALKPHOS 70 63   AST 79* 94*   ALT 46* 44   ANIONGAP 18* 10   EGFRNONAA 48* 56*     Cardiac Markers: No results for input(s): CKMB, MYOGLOBIN, BNP, TROPISTAT in the last 48 hours.    Significant Imaging: I have reviewed all pertinent imaging results/findings within the past 24 hours.   Imaging Results          CT Head Without Contrast (Final result)  Result time 01/08/18 19:42:01    Final result by Ancelmo Irby MD (01/08/18 19:42:01)                 Impression:        No acute intracranial abnormalities.    Sinus disease.      Electronically signed by: ANCELMO IRBY MD  Date:     01/08/18  Time:    19:42              Narrative:    History:     Comparison: March 16, 2017    Technique:    Axial images of the brain were obtained at 5-mm intervals from the skull base to the vertex without the administration of contrast.    Findings:    The brain parenchyma appears normal for age with good corticomedullary differentiation.  There is no evidence of acute infarct, hemorrhage, or mass.  The ventricular system is normal in size.  No mass-effect or midline shift.  There are no abnormal extra-axial fluid collections.      Sinus disease unchanged from prior.  The mastoid air cells are clear.      The calvarium appears intact.  .                             X-Ray Chest AP Portable (Final result)  Result time 01/08/18 19:06:43    Final result by Gilberto Wallace MD (01/08/18 19:06:43)                 Impression:        No detrimental change or radiographic acute intrathoracic process seen on this single view.      Electronically signed by: GILBERTO WALLACE MD, MD  Date:     01/08/18  Time:    19:06              Narrative:     COMPARISON: Chest radiograph 3/16/17    FINDINGS: AP portable upright view of the chest. Monitoring leads overlie the chest. Patient somewhat rotated. Nonspecific elevation of the right hemidiaphragm. No large consolidation or new focal opacity. No large pleural effusion or pneumothorax.  Cardiomediastinal silhouette appears stable without evidence of failure.  No acute osseous process seen.   PA and lateral views can be obtained.

## 2018-01-09 NOTE — PT/OT/SLP EVAL
"Occupational Therapy   Evaluation    Name: Michael Johnson Jr.  MRN: [id number]  Admitting Diagnosis:  Seizure      Recommendations:     Discharge Recommendations: home with home health, home health PT, home health OT  Discharge Equipment Recommendations:  walker, rolling, shower chair  Barriers to discharge:  Decreased caregiver support (wife works, so alone during the day)    History:     Occupational Profile:  Living Environment: Pt reports living with his wife and step-son in 2-story house with x5 MARCIAL and bilateral HR. Pt does have elevator access into the house in the back, however typically uses front staircase. Pt's master bed/bath, kitchen and living room are all located on the first floor, therefore rarely needs to access second floor of his house. Pt has walk-in shower with no DME. Pt has raised toilet in the restroom and typically uses BSC at night.   Previous level of function: Mostly independent with self-care, however does require (A) with donning shoes and socks. Pt furniture walks within the house and uses rollator for community distances   Roles and Routines: mostly at home- enjoys reading and watching TV  Equipment Owned:  bedside commode, raised toilet, rollator  Assistance upon Discharge: limited- wife works during the day, so pt will be alone during the day     Past Medical History:   Diagnosis Date    Afib     Ankle fracture, left     Asthma     Gout, unspecified     Hypertension     Seizure 2016       Past Surgical History:   Procedure Laterality Date    ANKLE SURGERY         Subjective     Chief Complaint: "My ankle is really hurting me today, especially when I put weight on it."  Patient/Family stated goals: to get stronger   Communicated with: RN prior to session.  Pain/Comfort:  · Pain Rating 1: 9/10  · Location - Side 1: Left  · Location - Orientation 1: generalized  · Location 1: ankle  · Pain Addressed 1: Reposition, Distraction  · Pain Rating Post-Intervention 1: 10/10 (with " weightbearing through (L) LE)    Objective:     Patient found with: blood pressure cuff, peripheral IV, pulse ox (continuous), telemetry    General Precautions: Standard, fall, seizure   Orthopedic Precautions:N/A   Braces: N/A     Occupational Performance:    Bed Mobility:    · Patient completed Scooting/Bridging with minimum assistance  · Patient completed Supine to Sit with minimum assistance    Functional Mobility/Transfers:  · Patient completed Sit <> Stand Transfer with moderate assistance  with  no assistive device    · Pt took x4-5 side steps towards HOB using RW with min-mod (A) for dynamic standing balance     Activities of Daily Living:  · UB Dressing: moderate assistance to pull gown across his backside   · LB Dressing: moderate assistance for standing balance while simulating pulling pants over hips    Cognitive/Visual Perceptual:  Cognitive/Psychosocial Skills:     -       Oriented to: Person, Place, Time and Situation   -       Follows Commands/attention:Follows one-step commands  -       Communication: receptive aphasia  -       Safety awareness/insight to disability: intact   -       Mood/Affect/Coping skills/emotional control: Appropriate to situation  Visual/Perceptual:      -Intact    Physical Exam:  Balance:    -       sitting SBA, static and dynamic standing min-mod (A)  Postural examination/scapula alignment:    -       Rounded shoulders  Skin integrity: Visible skin intact  Edema:  None noted  Sensation: -       Intact  Motor Planning:    -       WFL  Upper Extremity Range of Motion:     -       Right Upper Extremity: WFL  -       Left Upper Extremity: WFL except slightly limited shoulder flexion and elbow extension   Upper Extremity Strength:    -       Right Upper Extremity: WFL  -       Left Upper Extremity: WFL    Patient left seated at EOB with all lines intact, call button in reach, RN notified and casemanager present    [facility] 6 Click:  [facility] Total Score: 15    Treatment &  "Education:  Pt educated on OT role and POC. Discussed need for frequent foot checks secondary to reports of N/T in bilateral feet. Pt with increased difficulty secondary to limited forward trunk flexion. Discussed need for wife to complete at least weekly skin checks and assist with thorough cleaning. Also educated pt on proper footwear. Pt prefers to wear flip flops because they are easier for him to don/doff independently.   Education:    Assessment:     Michael Johnson Jr. is a 57 y.o. male with a medical diagnosis of Seizure.  He presents with the following performance deficits: impaired sensation, weakness, impaired self care skills, impaired functional mobilty, gait instability, impaired balance, decreased lower extremity function, decreased upper extremity function, pain, decreased ROM, impaired coordination, impaired cognition. Pt is currently requiring min (A) for bed mobility and mod (A) for functional transfers. Pt required mod (A) for ADLs while seated EOB. Pt with decreased AROM in (L) LE and (L) UE, however this is his baseline from childhood. Recommending HH OT/PT to follow up regarding home safety assessment and to further address stated deficits to maximize pt's return to PLOF. Pt might also benefit from Life Alert since he's alone during the day as well.     Rehab Prognosis:  Good ; patient would benefit from acute skilled OT services to address these deficits and reach maximum level of function.         Clinical Decision Makin.  OT Low:  "Pt evaluation falls under low complexity for evaluation coding due to performance deficits noted in 1-3 areas as stated above and 0 co-morbities affecting current functional status. Data obtained from problem focused assessments. No modifications or assistance was required for completion of evaluation. Only brief occupational profile and history review completed."     Plan:     Patient to be seen 5 x/week to address the above listed problems via " self-care/home management, therapeutic activities, therapeutic exercises  · Plan of Care Expires: 02/08/18  · Plan of Care Reviewed with: patient    This Plan of care has been discussed with the patient who was involved in its development and understands and is in agreement with the identified goals and treatment plan    GOALS:    Occupational Therapy Goals        Problem: Occupational Therapy Goal    Goal Priority Disciplines Outcome Interventions   Occupational Therapy Goal     OT, PT/OT Ongoing (interventions implemented as appropriate)    Description:  Goals to be met by: 2/8/2018     Patient will increase functional independence with ADLs by performing:    UE Dressing with Minimal Assistance.  LE Dressing with Minimal Assistance.  Grooming while standing at sink with Contact Guard Assistance.  Toileting from bedside commode with Minimal Assistance for hygiene and clothing management.   Supine to sit with Stand-by Assistance.  Stand pivot transfers with Minimal Assistance.  Toilet transfer to bedside commode with Minimal Assistance.                      Time Tracking:     OT Date of Treatment: 01/09/18  OT Start Time: 1436  OT Stop Time: 1505  OT Total Time (min): 29 min    Billable Minutes:Evaluation 29    Delmi Conteh OTR/L  1/9/2018

## 2018-01-09 NOTE — ASSESSMENT & PLAN NOTE
Patient had witnessed seizure and subsequently had another in ER.  Believe due to alcohol withdrawal.     Consult Neuro  Ativan for seizure  Keppra 500 BID

## 2018-01-09 NOTE — PLAN OF CARE
"DC PLANNING:    Writer met with patient at bedside to discuss plan of care. Patient reports not drinking daily, but reports binge drinking (3-10 drinks about every 4-5 days). Patient does admit to "getting sick" 3-4 days after not drinking. Patient was offered resources for substance abuse, however, patient stated "I've detoxed on my own. 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    Patient reports being diagnosed with Epilepsy at 9 y/o and was prescribed medication at that time, "I never told anyone about it because I never had any seizures until now". Writer encouraged patient to discuss medical history with MD team and PCP as well. Patient cannot pronounce his PCP's name but calls her "Dr. Mauro" and he sees her at [facility] at the [facility].     Patient reports four achilles tendon surgeries by Dr. Sykes (2005, 2006, 2007, and 2008). Patient denied having any sort of physical therapy or Home Health. Writer would recommend PT/OT eval during this hospitalization if MD agrees.      Patient reports he no longer drives but his wife drives his truck as he is no longer able to work. 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     No needs expressed, CM to follow closely and remain supportive.      01/09/18 1537   Discharge Assessment   Assessment Type Discharge Planning Assessment   Confirmed/corrected address and phone number on facesheet? Yes   Assessment information obtained from? Patient   Prior to hospitilization cognitive status: Alert/Oriented   Prior to hospitalization functional status: Independent;Assistive Equipment   Current cognitive status: Alert/Oriented   Current Functional Status: Independent;Assistive Equipment   Lives With spouse;child(sulaiman), adult   Able to Return to Prior Arrangements yes   Is patient able to care for self after discharge? Yes   Who are your caregiver(s) and their phone number(s)? Leeanne Johnson, spouse, " [phone]   Patient currently being followed by outpatient case management? No   Patient currently receives any other outside agency services? No   Equipment Currently Used at Home 3-in-1 commode;cane, quad;walker, rolling   Do you have any problems affording any of your prescribed medications? No   Is the patient taking medications as prescribed? yes   Does the patient have transportation home? Yes   Transportation Available family or friend will provide;car   Discharge Plan A Home Health   Discharge Plan B Home Health   Patient/Family In Agreement With Plan yes

## 2018-01-09 NOTE — PROGRESS NOTES
"[facility] Hospital Medicine  Progress Note    Patient Name: Michael Johnson Jr.  MRN: [id number]  Patient Class: OP- Observation   Admission Date: 1/8/2018  Length of Stay: 0 days  Attending Physician: Latrell Upton MD  Primary Care Provider: Primary Doctor No        Subjective:     Principal Problem:Seizure    HPI:  This is a 57 y.o. Male with a history of seizure who presents via EMS with complaint of seizure that occurred prior to arrival. Patient was outside sitting down when seizure witnessed by wife and son. Wife reports generalized "full body" seizure last lasted approximately four minutes. After seizure patient was unresponsive. When paramedics arrived patient had a heart rate of 220 bpm en route. Paramedics gave 6 mg and 12 mg of andesine were given without changes. Diltiazem was given and heart rate decreased to 109 bpm. Wife states the patient has been dehydrated for about three days. Patient states he drinks a pint of vodka a day. Patient states he has stopped drinking, and his last drink was four days ago. Patient states a history of seizure two months. Patient was seen at [facility] for outpatient EEG. Wife reports EEG was normal. Patient denies any fever, chills, nausea, vomiting, numbness, weakness, or confusion.    Hospital Course:  No notes on file    Interval History:  Pt new to me.  Feels week, but better.  States LEs are unequal size at baseline.  States had seizure in the past.     Review of Systems   Constitutional: Positive for fatigue. Negative for appetite change and fever.   HENT: Positive for sore throat.         From vomiting, no blood   Respiratory: Negative for cough and shortness of breath.    Cardiovascular: Negative for chest pain and palpitations.   Gastrointestinal: Negative for nausea and vomiting.   Skin: Negative for color change and rash.   Neurological: Negative for dizziness and weakness.   Psychiatric/Behavioral: Negative for agitation and confusion. "     Objective:     Vital Signs (Most Recent):  Temp: 100.2 °F (37.9 °C) (01/09/18 0727)  Pulse: 92 (01/09/18 0715)  Resp: (!) 21 (01/09/18 0715)  BP: 134/79 (01/09/18 0715)  SpO2: 100 % (01/09/18 0715) Vital Signs (24h Range):  Temp:  [98.8 °F (37.1 °C)-100.2 °F (37.9 °C)] 100.2 °F (37.9 °C)  Pulse:  [] 92  Resp:  [17-28] 21  SpO2:  [90 %-100 %] 100 %  BP: (113-156)/(67-96) 134/79     Weight: 90.7 kg (200 lb)  Body mass index is 26.39 kg/m².    Intake/Output Summary (Last 24 hours) at 01/09/18 0832  Last data filed at 01/08/18 2336   Gross per 24 hour   Intake              250 ml   Output                0 ml   Net              250 ml      Physical Exam   Constitutional: He is oriented to person, place, and time. He appears well-developed and well-nourished.   HENT:   Head: Normocephalic and atraumatic.   Eyes: Conjunctivae are normal. Pupils are equal, round, and reactive to light.   Neck: Normal range of motion. Neck supple.   Cardiovascular: Normal rate and regular rhythm.    Pulmonary/Chest: Effort normal and breath sounds normal.   Abdominal: Soft.   Musculoskeletal:   - LLE smaller diameter than RLE.     Neurological: He is alert and oriented to person, place, and time.   Skin: Skin is warm and dry.       Significant Labs:   CBC:   Recent Labs  Lab 01/08/18 1840 01/09/18  0604   WBC 10.81 8.70   HGB 11.6* 10.7*   HCT 33.7* 31.3*   * 97*     CMP:   Recent Labs  Lab 01/08/18 1840 01/09/18  0604    133*   K 2.4* 2.9*   CL 96 98   CO2 22* 25   GLU 84 80   BUN 15 15   CREATININE 1.6* 1.4   CALCIUM 8.4* 8.0*   PROT 7.9 7.3   ALBUMIN 3.4* 3.2*   BILITOT 1.2* 1.1*   ALKPHOS 70 63   AST 79* 94*   ALT 46* 44   ANIONGAP 18* 10   EGFRNONAA 48* 56*     Cardiac Markers: No results for input(s): CKMB, MYOGLOBIN, BNP, TROPISTAT in the last 48 hours.    Significant Imaging: I have reviewed all pertinent imaging results/findings within the past 24 hours.   Imaging Results          CT Head Without Contrast  (Final result)  Result time 01/08/18 19:42:01    Final result by Ancelmo Irby MD (01/08/18 19:42:01)                 Impression:        No acute intracranial abnormalities.    Sinus disease.      Electronically signed by: ANCELMO IRBY MD  Date:     01/08/18  Time:    19:42              Narrative:    History:     Comparison: March 16, 2017    Technique:    Axial images of the brain were obtained at 5-mm intervals from the skull base to the vertex without the administration of contrast.    Findings:    The brain parenchyma appears normal for age with good corticomedullary differentiation.  There is no evidence of acute infarct, hemorrhage, or mass.  The ventricular system is normal in size.  No mass-effect or midline shift.  There are no abnormal extra-axial fluid collections.      Sinus disease unchanged from prior.  The mastoid air cells are clear.      The calvarium appears intact.  .                             X-Ray Chest AP Portable (Final result)  Result time 01/08/18 19:06:43    Final result by Gilberto Wallace MD (01/08/18 19:06:43)                 Impression:        No detrimental change or radiographic acute intrathoracic process seen on this single view.      Electronically signed by: GILBERTO WALLACE MD, MD  Date:     01/08/18  Time:    19:06              Narrative:    COMPARISON: Chest radiograph 3/16/17    FINDINGS: AP portable upright view of the chest. Monitoring leads overlie the chest. Patient somewhat rotated. Nonspecific elevation of the right hemidiaphragm. No large consolidation or new focal opacity. No large pleural effusion or pneumothorax.  Cardiomediastinal silhouette appears stable without evidence of failure.  No acute osseous process seen.   PA and lateral views can be obtained.                            Assessment/Plan:      * Seizure    - Patient had witnessed seizure and subsequently had another in ER.  Believe due to alcohol withdrawal.   - Consult Neuro to evaluate need for any  anticonvulsant.  - Ativan for seizure  - Keppra 500 BID started, unclear if will need.  - On Librium 25 q 8 for now.  - Monitor.          Unsteady gait    - PT OT          Alcohol abuse    - Counseled to cease            Troponin level elevated    - Noted in past   - Check CKs  - Cardiology evaluation.            Macrocytic anemia    - Fe, B-12, folate            Hypomagnesemia    - Likely due to EtOH abuse.  - Monitor and replace electrolytes PRN.             Hyperlipemia    - Lipid Panel pending            Hypokalemia    - Replace PRN.             Acute on chronic kidney failure    - Improving,  - Monitor.          Hypertension    - Coreg, cardizem continued  - Monitor.              VTE Risk Mitigation         Ordered     Place sequential compression device  Until discontinued      01/09/18 0006     Medium Risk of VTE  Once      01/09/18 0006     Place sequential compression device  Until discontinued      01/09/18 0006     Place MIKA hose  Until discontinued      01/09/18 0006     enoxaparin injection 40 mg  Daily     Route:  Subcutaneous        01/09/18 0005              Latrell Upton MD  Department of Hospital Medicine   [facility]

## 2018-01-09 NOTE — SUBJECTIVE & OBJECTIVE
Past Medical History:   Diagnosis Date    Afib     Ankle fracture, left     Asthma     Gout, unspecified     Hypertension     Seizure 2016       Past Surgical History:   Procedure Laterality Date    ANKLE SURGERY         Review of patient's allergies indicates:   Allergen Reactions    Lisinopril Swelling     Pt admitted with angioedema 2wks after taking lisinopril.        Current Neurological Medications: Keppra      No current facility-administered medications on file prior to encounter.      Current Outpatient Prescriptions on File Prior to Encounter   Medication Sig    allopurinol (ZYLOPRIM) 300 MG tablet Take 300 mg by mouth once daily.    aspirin (ECOTRIN) 81 MG EC tablet Take 81 mg by mouth once daily.    diltiaZEM (DILACOR XR) 180 MG CDCR Take 180 mg by mouth once daily.    metoprolol succinate (TOPROL-XL) 50 MG 24 hr tablet Take 50 mg by mouth once daily.    colchicine 0.6 mg tablet Take 1 tablet (0.6 mg total) by mouth once daily.    diphenhydrAMINE (BENADRYL) 25 mg capsule Take 1 each (25 mg total) by mouth every 4 (four) hours as needed (lip swelling for 2 days).    ibuprofen (ADVIL,MOTRIN) 200 MG tablet Take 200 mg by mouth every 6 (six) hours as needed for Pain.    magnesium oxide (MAG-OX) 400 mg tablet Take 1 tablet (400 mg total) by mouth once daily.    tramadol (ULTRAM) 50 mg tablet Take 50 mg by mouth 3 (three) times daily as needed for Pain.     Family History     Problem Relation (Age of Onset)    Cancer Mother    Cataracts Maternal Grandfather    Hypertension Father    Stroke Maternal Grandmother, Maternal Grandfather        Social History Main Topics    Smoking status: Never Smoker    Smokeless tobacco: Not on file    Alcohol use Yes      Comment: ocassional alcohol    Drug use: No    Sexual activity: Not on file     Review of Systems   Constitutional: Positive for fatigue. Negative for appetite change and fever.   HENT: Positive for sore throat.         From vomiting, no  blood   Respiratory: Negative for cough and shortness of breath.    Cardiovascular: Negative for chest pain and palpitations.   Gastrointestinal: Negative for nausea and vomiting.   Skin: Negative for color change and rash.   Neurological: Negative for dizziness and weakness.   Psychiatric/Behavioral: Negative for agitation and confusion.     Objective:     Vital Signs (Most Recent):  Temp: 99.5 °F (37.5 °C) (01/09/18 1510)  Pulse: 80 (01/09/18 1605)  Resp: 19 (01/09/18 1605)  BP: 111/75 (01/09/18 1605)  SpO2: 96 % (01/09/18 1605) Vital Signs (24h Range):  Temp:  [98.8 °F (37.1 °C)-100.2 °F (37.9 °C)] 99.5 °F (37.5 °C)  Pulse:  [] 80  Resp:  [15-31] 19  SpO2:  [90 %-100 %] 96 %  BP: ()/(47-96) 111/75     Weight: 90.7 kg (200 lb)  Body mass index is 26.39 kg/m².    Physical Exam   Constitutional: He appears well-developed and well-nourished.   HENT:   Head: Normocephalic and atraumatic.   Right Ear: Hearing normal.   Left Ear: Hearing normal.   Eyes:   Fundus examination shows sharp disc margins.  Pupils are equal and reactive with EOM being full without nystagmus   Neck: Normal range of motion. Neck supple. Carotid bruit is not present.   Neurological: He is alert. He has normal strength and normal reflexes. He displays no atrophy (No pronator drift noted) and no tremor (no abnormal involuntary movements or tremors noted). No cranial nerve deficit (No facial asymmetry noted with facial movements and sensory exam being normal/symmetrical.  Corneals/gag reflexes normal.  Tongue & palate movements normal.  Hearing unimpaired.  Shoulder shrug was norm) or sensory deficit (Primary sensations are symmetrical). He exhibits normal muscle tone. He displays a negative Romberg sign. Coordination and gait normal. He displays no Babinski's sign on the right side. He displays no Babinski's sign on the left side.   Mental status examination: Patient is fully oriented and able to give an adequate history.  Recall of  recent and past information is good.  Immediate recall is normal.  Attention span and concentration was normal.  Judgment and insight is normal.  Language functions are intact with no evidence of aphasia or dysarthria.  Comprehension is unimpaired.  Affect is appropriate, mood was even.  No thought disorder is noted.   Vitals reviewed.      NEUROLOGICAL EXAMINATION:     MOTOR EXAM     Strength   Strength 5/5 throughout.       Significant Labs: All pertinent lab results from the past 24 hours have been reviewed.    Significant Imaging: I have reviewed all pertinent imaging results/findings within the past 24 hours.

## 2018-01-09 NOTE — HPI
Mr. Johnson is a 57 year old man who presented via EMS after having a seizure.  He was outside sitting down when the seizure was witnessed by his wife and son. His wife described a generalized tonic-clonic seizure that lasted about 4 minutes followed by a post-ictal state.  When paramedics arrived patient had a heart rate of 220 bpm en route. Paramedics gave 6 mg and 12 mg of adenosine without improvement.  His heart rate finally responded to diltiazem with a decrease in his HR to 109.  Patient's wife reported patient normally drinks a pint of vodka/day, but his last drink was 4 days previously.  He developed seizures about 2 months ago.  He did have an outpatient EEG recently, but it was normal.

## 2018-01-09 NOTE — H&P
"[facility] Hospital Medicine  History & Physical    Patient Name: Michael Johnson Jr.  MRN: [id number]  Admission Date: 1/8/2018  Attending Physician: Latrell Upton MD   Primary Care Provider: Primary Doctor No         Patient information was obtained from patient, past medical records and ER records.     Subjective:     Principal Problem:Seizure    Chief Complaint:   Chief Complaint   Patient presents with    seizure     EMS called to scene for witnessed seizure with no history of seizure; upon arrival by EMS pt was postictal and combative in SVT with ; pt given 6 and 12 of adenosine with no rhythm change, cardizem 10mg given with HR change to 109 Afib; PMH of Afib; pt also daily drinker, approx 1/2 gallon vodka/day, stopped drinking 3 days ago per wife        HPI: This is a 57 y.o. Male with a history of seizure who presents via EMS with complaint of seizure that occurred prior to arrival. Patient was outside sitting down when seizure witnessed by wife and son. Wife reports generalized "full body" seizure last lasted approximately four minutes. After seizure patient was unresponsive. When paramedics arrived patient had a heart rate of 220 bpm en route. Paramedics gave 6 mg and 12 mg of andesine were given without changes. Diltiazem was given and heart rate decreased to 109 bpm. Wife states the patient has been dehydrated for about three days. Patient states he drinks a pint of vodka a day. Patient states he has stopped drinking, and his last drink was four days ago. Patient states a history of seizure two months. Patient was seen at [facility] for outpatient EEG. Wife reports EEG was normal. Patient denies any fever, chills, nausea, vomiting, numbness, weakness, or confusion.    Past Medical History:   Diagnosis Date    Afib     Ankle fracture, left     Asthma     Gout, unspecified     Hypertension     Seizure 2016       Past Surgical History:   Procedure Laterality Date    ANKLE " SURGERY         Review of patient's allergies indicates:   Allergen Reactions    Lisinopril Swelling     Pt admitted with angioedema 2wks after taking lisinopril.        No current facility-administered medications on file prior to encounter.      Current Outpatient Prescriptions on File Prior to Encounter   Medication Sig    allopurinol (ZYLOPRIM) 300 MG tablet Take 300 mg by mouth once daily.    aspirin (ECOTRIN) 81 MG EC tablet Take 81 mg by mouth once daily.    diltiaZEM (DILACOR XR) 180 MG CDCR Take 180 mg by mouth once daily.    metoprolol succinate (TOPROL-XL) 50 MG 24 hr tablet Take 50 mg by mouth once daily.    colchicine 0.6 mg tablet Take 1 tablet (0.6 mg total) by mouth once daily.    diphenhydrAMINE (BENADRYL) 25 mg capsule Take 1 each (25 mg total) by mouth every 4 (four) hours as needed (lip swelling for 2 days).    ibuprofen (ADVIL,MOTRIN) 200 MG tablet Take 200 mg by mouth every 6 (six) hours as needed for Pain.    magnesium oxide (MAG-OX) 400 mg tablet Take 1 tablet (400 mg total) by mouth once daily.    tramadol (ULTRAM) 50 mg tablet Take 50 mg by mouth 3 (three) times daily as needed for Pain.     Family History     Problem Relation (Age of Onset)    Cancer Mother    Cataracts Maternal Grandfather    Hypertension Father    Stroke Maternal Grandmother, Maternal Grandfather        Social History Main Topics    Smoking status: Never Smoker    Smokeless tobacco: Not on file    Alcohol use Yes      Comment: ocassional alcohol    Drug use: No    Sexual activity: Not on file     Review of Systems   Unable to perform ROS: Mental status change     Objective:     Vital Signs (Most Recent):  Temp: 99.1 °F (37.3 °C) (01/08/18 1808)  Pulse: 110 (01/08/18 2326)  Resp: 19 (01/08/18 2326)  BP: 130/73 (01/08/18 2326)  SpO2: (!) 94 % (01/08/18 2326) Vital Signs (24h Range):  Temp:  [99.1 °F (37.3 °C)] 99.1 °F (37.3 °C)  Pulse:  [107-114] 110  Resp:  [19-20] 19  SpO2:  [90 %-98 %] 94 %  BP:  (113-145)/(73-91) 130/73     Weight: 90.7 kg (200 lb)  Body mass index is 26.39 kg/m².    Physical Exam   Constitutional: He is oriented to person, place, and time. He appears well-developed and well-nourished.   HENT:   Head: Normocephalic.   Eyes: Conjunctivae are normal.   Neck: Normal range of motion. Neck supple.   Cardiovascular: Regular rhythm, normal heart sounds and intact distal pulses.  Tachycardia present.    Pulses:       Radial pulses are 1+ on the right side, and 1+ on the left side.        Dorsalis pedis pulses are 1+ on the right side, and 1+ on the left side.   Pulmonary/Chest: Effort normal. Tachypnea noted. He has rales in the right lower field and the left lower field.   Abdominal: Soft. Bowel sounds are normal. He exhibits distension. There is no tenderness.   Musculoskeletal: Normal range of motion.   Neurological: He is alert and oriented to person, place, and time.   Skin: Skin is warm and dry.   Psychiatric: He is slowed. He is noncommunicative.           Significant Labs:   CBC:   Recent Labs  Lab 01/08/18  1840   WBC 10.81   HGB 11.6*   HCT 33.7*   *     CMP:   Recent Labs  Lab 01/08/18  1840      K 2.4*   CL 96   CO2 22*   GLU 84   BUN 15   CREATININE 1.6*   CALCIUM 8.4*   PROT 7.9   ALBUMIN 3.4*   BILITOT 1.2*   ALKPHOS 70   AST 79*   ALT 46*   ANIONGAP 18*   EGFRNONAA 48*       Significant Imaging: I have reviewed all pertinent imaging results/findings within the past 24 hours.    Assessment/Plan:     * Seizure    Patient had witnessed seizure and subsequently had another in ER.  Believe due to alcohol withdrawal.     Consult Neuro  Ativan for seizure  Keppra 500 BID          Hypomagnesemia    History of low magnesium. 1g given in ER    Magnesium level in AM  Takes daily 400mg. Will continue          Hyperlipemia    Lipid Panel pending  Monitor          Hypokalemia    K- 2.4. Home dose is 20 meq four times daily    40 meq ordered daily  CMP daily and supplement as  necessary          Acute on chronic kidney failure    Creatinine- 1.6. Baseline- 1-1.8    Hydrate  CMP in AM          Hypertension    Normotensive    Coreg, cardizem continued              VTE Risk Mitigation         Ordered     Place sequential compression device  Until discontinued      01/09/18 0006     Medium Risk of VTE  Once      01/09/18 0006     Place sequential compression device  Until discontinued      01/09/18 0006     Place MIKA hose  Until discontinued      01/09/18 0006     enoxaparin injection 40 mg  Daily     Route:  Subcutaneous        01/09/18 0005             Will Ponce NP  Department of Hospital Medicine   [facility]

## 2018-01-09 NOTE — HPI
This is a 57-year-old male presented to the emergency room yesterday after having had a seizure prior to arrival.  Subsequently he had another generalized seizure that was witnessed.  The seizure was described as generalized shaking with loss of consciousness.  The patient has had a history of prior seizures and had an EEG done in the past that was normal.  The patient also has history of alcohol abuse and was reported that he drinks a pint of vodka a day.  He apparently had stopped drinking about four days ago.  His last seizure was 2 months ago.  He is started on Keppra.  A CT scan of the brain done in the emergency room was unremarkable.

## 2018-01-09 NOTE — ASSESSMENT & PLAN NOTE
History of low magnesium. 1g given in ER    Magnesium level in AM  Takes daily 400mg. Will continue

## 2018-01-09 NOTE — PLAN OF CARE
Problem: Occupational Therapy Goal  Goal: Occupational Therapy Goal  Goals to be met by: 2/8/2018     Patient will increase functional independence with ADLs by performing:    UE Dressing with Minimal Assistance.  LE Dressing with Minimal Assistance.  Grooming while standing at sink with Contact Guard Assistance.  Toileting from bedside commode with Minimal Assistance for hygiene and clothing management.   Supine to sit with Stand-by Assistance.  Stand pivot transfers with Minimal Assistance.  Toilet transfer to bedside commode with Minimal Assistance.    Outcome: Ongoing (interventions implemented as appropriate)  OT eval completed and goals set.     Delmi Conteh, OTR/L  1/9/2018

## 2018-01-09 NOTE — ED NOTES
Pt actively vomiting after receiving medications per MAR. MD aware. IV potassium order to be placed.

## 2018-01-09 NOTE — PLAN OF CARE
Problem: Patient Care Overview  Goal: Plan of Care Review  Outcome: Ongoing (interventions implemented as appropriate)  Pt rested well throughout day.  Becoming more alert as day progressed. Able to ambulate to BSC with walker. x2 BMs.  Electrolyte replacement continued. IVFs at 150 ml/hr. Tolerating PO. T max 100.2. VSS.

## 2018-01-09 NOTE — ASSESSMENT & PLAN NOTE
K- 2.4. Home dose is 20 meq four times daily    40 meq ordered daily  CMP daily and supplement as necessary

## 2018-01-09 NOTE — ED PROVIDER NOTES
"Encounter Date: 1/8/2018    SCRIBE #1 NOTE: I, Ainsley Carlin, am scribing for, and in the presence of, Dr. Cárdenas.       History     Chief Complaint   Patient presents with    seizure     EMS called to scene for witnessed seizure with no history of seizure; upon arrival by EMS pt was postictal and combative in SVT with ; pt given 6 and 12 of adenosine with no rhythm change, cardizem 10mg given with HR change to 109 Afib; PMH of Afib; pt also daily drinker, approx 1/2 gallon vodka/day, stopped drinking 3 days ago per wife     Time seen by provider: 6:16 PM    This is a 57 y.o. Male with a history of seizure who presents via EMS with complaint of seizure that occurred prior to arrival. Patient was outside sitting down when seizure witnessed by wife and son. Wife reports generalized "full body" seizure last lasted approximately four minutes. After seizure patient was unresponsive. When paramedics arrived patient had a heart rate of 220 bpm en route. Paramedics gave 6 mg and 12 mg of andesine were given without changes. Diltiazem was given and heart rate decreased to 109 bpm. Wife states the patient has been dehydrated for about three days. Patient states he drinks a pint of vodka a day. Patient states he has stopped drinking, and his last drink was four days ago. Patient states a history of seizure two months. Patient was seen at [facility] for outpatient EEG. Wife reports EEG was normal. Patient denies any fever, chills, nausea, vomiting, numbness, weakness, or confusion.      The history is provided by the patient, the spouse and the EMS personnel.     Review of patient's allergies indicates:   Allergen Reactions    Lisinopril Swelling     Pt admitted with angioedema 2wks after taking lisinopril.      Past Medical History:   Diagnosis Date    Afib     Ankle fracture, left     Asthma     Gout, unspecified     Hypertension     Seizure 2016     Past Surgical History:   Procedure Laterality Date    ANKLE " SURGERY       Family History   Problem Relation Age of Onset    Hypertension Father     Stroke Maternal Grandmother     Cataracts Maternal Grandfather     Stroke Maternal Grandfather     Cancer Mother      malignant polyp     Social History   Substance Use Topics    Smoking status: Never Smoker    Smokeless tobacco: Not on file    Alcohol use Yes      Comment: ocassional alcohol     Review of Systems   Constitutional: Negative for fever.   HENT: Negative for sore throat.    Respiratory: Negative for shortness of breath.    Cardiovascular: Negative for chest pain.   Gastrointestinal: Negative for nausea and vomiting.   Genitourinary: Negative for dysuria.   Musculoskeletal: Negative for back pain.   Skin: Positive for wound (right knee). Negative for rash.   Neurological: Positive for seizures. Negative for weakness and numbness.   Hematological: Does not bruise/bleed easily.   Psychiatric/Behavioral: Negative for confusion.       Physical Exam     Initial Vitals [01/08/18 1808]   BP Pulse Resp Temp SpO2   115/75 107 -- 99.1 °F (37.3 °C) 98 %      MAP       88.33         Physical Exam    Nursing note and vitals reviewed.  Constitutional: Vital signs are normal. He appears well-developed and well-nourished. No distress.   HENT:   Head: Normocephalic and atraumatic.   Mouth/Throat: Oropharynx is clear and moist.   Eyes: Conjunctivae and EOM are normal. Pupils are equal, round, and reactive to light.   Neck: Normal range of motion. Neck supple.   Cardiovascular: Normal rate, regular rhythm and normal heart sounds. Exam reveals no gallop and no friction rub.    No murmur heard.  Abdominal: Soft. Bowel sounds are normal. There is no tenderness. There is no rebound and no guarding.   Neurological: He is alert and oriented to person, place, and time. He has normal strength and normal reflexes. He displays normal reflexes. No cranial nerve deficit or sensory deficit. He displays a negative Romberg sign.   No focal  neurological deficits.    Skin: Skin is warm and dry. No rash noted. No pallor.   Right knee over patella swollen and non tender with wound on medial aspect with spontaneous bloody discharge (for two months)         ED Course   Critical Care  Date/Time: 1/8/2018 8:28 PM  Performed by: ARCENIO CORLEY.  Authorized by: ARCENIO CORLEY.   Direct patient critical care time: 10 minutes  Additional history critical care time: 10 minutes  Ordering / reviewing critical care time: 5 minutes  Documentation critical care time: 5 minutes  Consult with family critical care time: 5 minutes  Total critical care time (exclusive of procedural time) : 35 minutes        Labs Reviewed   CBC W/ AUTO DIFFERENTIAL - Abnormal; Notable for the following:        Result Value    RBC 2.97 (*)     Hemoglobin 11.6 (*)     Hematocrit 33.7 (*)      (*)     MCH 39.1 (*)     RDW 14.6 (*)     Platelets 113 (*)     Gran # 9.4 (*)     Lymph # 0.7 (*)     Gran% 86.9 (*)     Lymph% 6.6 (*)     All other components within normal limits   COMPREHENSIVE METABOLIC PANEL - Abnormal; Notable for the following:     Potassium 2.4 (*)     CO2 22 (*)     Creatinine 1.6 (*)     Calcium 8.4 (*)     Albumin 3.4 (*)     Total Bilirubin 1.2 (*)     AST 79 (*)     ALT 46 (*)     Anion Gap 18 (*)     eGFR if  55 (*)     eGFR if non  48 (*)     All other components within normal limits    Narrative:     K critical result(s) called and verbal readback obtained from Lizeth Newsome RN, 01/08/2018 19:38   PROTIME-INR   TROPONIN I     EKG Readings: (Independently Interpreted)   Sinus tachycardia at a rate of 107 bpm with occasional. Premature atrial complexes. Normal intervals. Narrow QRS. No ST/T wave abnormalities. Compared to March 16, 2017 shows no significant change.      Imaging Results          CT Head Without Contrast (Final result)  Result time 01/08/18 19:42:01    Final result by Ancelmo Irby MD (01/08/18  19:42:01)                 Impression:        No acute intracranial abnormalities.    Sinus disease.      Electronically signed by: MYLES ROTH MD  Date:     01/08/18  Time:    19:42              Narrative:    History:     Comparison: March 16, 2017    Technique:    Axial images of the brain were obtained at 5-mm intervals from the skull base to the vertex without the administration of contrast.    Findings:    The brain parenchyma appears normal for age with good corticomedullary differentiation.  There is no evidence of acute infarct, hemorrhage, or mass.  The ventricular system is normal in size.  No mass-effect or midline shift.  There are no abnormal extra-axial fluid collections.      Sinus disease unchanged from prior.  The mastoid air cells are clear.      The calvarium appears intact.  .                             X-Ray Chest AP Portable (Final result)  Result time 01/08/18 19:06:43    Final result by Gilberto Wallace MD (01/08/18 19:06:43)                 Impression:        No detrimental change or radiographic acute intrathoracic process seen on this single view.      Electronically signed by: GILBERTO WALLACE MD, MD  Date:     01/08/18  Time:    19:06              Narrative:    COMPARISON: Chest radiograph 3/16/17    FINDINGS: AP portable upright view of the chest. Monitoring leads overlie the chest. Patient somewhat rotated. Nonspecific elevation of the right hemidiaphragm. No large consolidation or new focal opacity. No large pleural effusion or pneumothorax.  Cardiomediastinal silhouette appears stable without evidence of failure.  No acute osseous process seen.   PA and lateral views can be obtained.                              X-Rays:   Independently Interpreted Readings:   Chest X-Ray: Normal heart. Normal lungs. No bony abnormalities.      Medical Decision Making:   Independently Interpreted Test(s):   I have ordered and independently interpreted X-rays - see prior notes.  I have ordered and  independently interpreted EKG Reading(s) - see prior notes  Clinical Tests:   Lab Tests: Ordered and Reviewed  Radiological Study: Ordered and Reviewed  Medical Tests: Ordered and Reviewed  ED Management:  57-year-old male presents with seizure.  Differential would include alcohol withdrawal, hyponatremia, seizure disorder, secondary to cardiac instability due to A. fib with RVR.  We'll get labs, EKG as well as CT of the brain.  We'll observe.  Currently the patient needs no intervention at this time.    Labs reviewed.  Patient has a potassium of 2.4.  Creatinine is 1.6 compared to his baseline of 1.0.  He is remained stable other than being slightly tachycardic.  CT brain is negative.  Patient has been 4 days since his last drink.  Concerned that this seizure was secondary to his alcohol withdrawal.  This along with the electrolyte dyscrasias I do feel the patient needs to be admitted for further evaluation and management.    10:54 PM was called into the patient's room who was having a tonic-clonic seizure.  2 mg of Ativan given.  I spoke with Hospital medicine and we'll upgrade the patient to the ICU.              Attending Attestation:           Physician Attestation for Scribe:  Physician Attestation Statement for Scribe #1: I, Dr. Kaiser, reviewed documentation, as scribed by Ainsley Carlin in my presence, and it is both accurate and complete.                 ED Course      Clinical Impression:     1. Seizure    2. Alcoholism    3. Acute kidney injury    4. Hypokalemia                               Catalino Cárdenas, DO  01/08/18 0478       Catalino Cárdenas, DO  01/08/18 6526

## 2018-01-09 NOTE — SUBJECTIVE & OBJECTIVE
Past Medical History:   Diagnosis Date    Afib     Ankle fracture, left     Asthma     Gout, unspecified     Hypertension     Seizure 2016       Past Surgical History:   Procedure Laterality Date    ANKLE SURGERY         Review of patient's allergies indicates:   Allergen Reactions    Lisinopril Swelling     Pt admitted with angioedema 2wks after taking lisinopril.        No current facility-administered medications on file prior to encounter.      Current Outpatient Prescriptions on File Prior to Encounter   Medication Sig    allopurinol (ZYLOPRIM) 300 MG tablet Take 300 mg by mouth once daily.    aspirin (ECOTRIN) 81 MG EC tablet Take 81 mg by mouth once daily.    diltiaZEM (DILACOR XR) 180 MG CDCR Take 180 mg by mouth once daily.    metoprolol succinate (TOPROL-XL) 50 MG 24 hr tablet Take 50 mg by mouth once daily.    colchicine 0.6 mg tablet Take 1 tablet (0.6 mg total) by mouth once daily.    diphenhydrAMINE (BENADRYL) 25 mg capsule Take 1 each (25 mg total) by mouth every 4 (four) hours as needed (lip swelling for 2 days).    ibuprofen (ADVIL,MOTRIN) 200 MG tablet Take 200 mg by mouth every 6 (six) hours as needed for Pain.    magnesium oxide (MAG-OX) 400 mg tablet Take 1 tablet (400 mg total) by mouth once daily.    tramadol (ULTRAM) 50 mg tablet Take 50 mg by mouth 3 (three) times daily as needed for Pain.     Family History     Problem Relation (Age of Onset)    Cancer Mother    Cataracts Maternal Grandfather    Hypertension Father    Stroke Maternal Grandmother, Maternal Grandfather        Social History Main Topics    Smoking status: Never Smoker    Smokeless tobacco: Not on file    Alcohol use Yes      Comment: ocassional alcohol    Drug use: No    Sexual activity: Not on file     Review of Systems   Unable to perform ROS: Mental status change     Objective:     Vital Signs (Most Recent):  Temp: 99.1 °F (37.3 °C) (01/08/18 1808)  Pulse: 110 (01/08/18 2326)  Resp: 19 (01/08/18  2326)  BP: 130/73 (01/08/18 2326)  SpO2: (!) 94 % (01/08/18 2326) Vital Signs (24h Range):  Temp:  [99.1 °F (37.3 °C)] 99.1 °F (37.3 °C)  Pulse:  [107-114] 110  Resp:  [19-20] 19  SpO2:  [90 %-98 %] 94 %  BP: (113-145)/(73-91) 130/73     Weight: 90.7 kg (200 lb)  Body mass index is 26.39 kg/m².    Physical Exam   Constitutional: He is oriented to person, place, and time. He appears well-developed and well-nourished.   HENT:   Head: Normocephalic.   Eyes: Conjunctivae are normal.   Neck: Normal range of motion. Neck supple.   Cardiovascular: Regular rhythm, normal heart sounds and intact distal pulses.  Tachycardia present.    Pulses:       Radial pulses are 1+ on the right side, and 1+ on the left side.        Dorsalis pedis pulses are 1+ on the right side, and 1+ on the left side.   Pulmonary/Chest: Effort normal. Tachypnea noted. He has rales in the right lower field and the left lower field.   Abdominal: Soft. Bowel sounds are normal. He exhibits distension. There is no tenderness.   Musculoskeletal: Normal range of motion.   Neurological: He is alert and oriented to person, place, and time.   Skin: Skin is warm and dry.   Psychiatric: He is slowed. He is noncommunicative.           Significant Labs:   CBC:   Recent Labs  Lab 01/08/18  1840   WBC 10.81   HGB 11.6*   HCT 33.7*   *     CMP:   Recent Labs  Lab 01/08/18  1840      K 2.4*   CL 96   CO2 22*   GLU 84   BUN 15   CREATININE 1.6*   CALCIUM 8.4*   PROT 7.9   ALBUMIN 3.4*   BILITOT 1.2*   ALKPHOS 70   AST 79*   ALT 46*   ANIONGAP 18*   EGFRNONAA 48*       Significant Imaging: I have reviewed all pertinent imaging results/findings within the past 24 hours.

## 2018-01-09 NOTE — ASSESSMENT & PLAN NOTE
The patient has had multiple seizures in the past and on this admission when he had 2 seizures, one at home and one in the emergency room.      This may be related to alcohol withdrawal however in view of multiple seizures would recommend continuation of Keppra 500 mg twice a day.  Discussed with patient regarding getting into a substance abuse/alcohol abuse program and has continued alcohol abuse reports high risk for seizures.  He is also advised that he should not be driving until he is seizure-free for over 6 months.  Will not repeat his EEG which was was done in the past

## 2018-01-09 NOTE — ED NOTES
Received report at bedside.   Pt is resting in stretcher. Connected to cardiac monitoring, NIBP cuff and pulse ox. In no acute distress.  Will continue to monitor.

## 2018-01-09 NOTE — ED NOTES
Dr. Cárdenas with pt in [facility] for initial assessment. Pt currently calm and cooperative

## 2018-01-09 NOTE — TELEPHONE ENCOUNTER
----- Message from Cristy Fountain sent at 1/9/2018  9:19 AM CST -----  Contact: [facility]  x_  1st Request  _  2nd Request  _  3rd Request    Who: Stevie    Why: Robb MURPHY is requesting a in room consult for pt.. Pt is experiencing seizures..  Pt is in ICU bed #4... Please advise..    What Number to Call Back: [phone]    When to Expect a call back: (Before the end of the day)   -- if call after 3:00 call back will be tomorrow.

## 2018-01-09 NOTE — HOSPITAL COURSE
Patient is now awake and responsive and in no distress.  He only reports soreness in muscles but is otherwise neurologically stable with no tremulousness or evidence of withdrawal syndrome.  He has had no further seizures.

## 2018-01-09 NOTE — PLAN OF CARE
1430-Writer called patient's wife Leeanne [phone] in regards to [employer] card. The last insurance card on file is patient's Medicare Card (Red, White, and Blue card) from 2014. Patient sees a PCP at [facility], however, waiting on return call from wife or copy of current insurance card to clarify PCP.

## 2018-01-09 NOTE — CONSULTS
[facility]  Neurology  Consult Note    Patient Name: Michael Johnson Jr.  MRN: [id number]  Admission Date: 1/8/2018  Hospital Length of Stay: 0 days  Code Status: Full Code   Attending Provider: Latrell Upton MD   Consulting Provider: Kartik Veloz MD  Primary Care Physician: Zunilda Bolden NP  Principal Problem:Seizure    Consults   Subjective:     Chief Complaint:  Seizures       HPI:   This is a 57-year-old male presented to the emergency room yesterday after having had a seizure prior to arrival.  Subsequently he had another generalized seizure that was witnessed.  The seizure was described as generalized shaking with loss of consciousness.  The patient has had a history of prior seizures and had an EEG done in the past that was normal.  The patient also has history of alcohol abuse and was reported that he drinks a pint of vodka a day.  He apparently had stopped drinking about four days ago.  His last seizure was 2 months ago.  He is started on Keppra.  A CT scan of the brain done in the emergency room was unremarkable.     Past Medical History:   Diagnosis Date    Afib     Ankle fracture, left     Asthma     Gout, unspecified     Hypertension     Seizure 2016       Past Surgical History:   Procedure Laterality Date    ANKLE SURGERY         Review of patient's allergies indicates:   Allergen Reactions    Lisinopril Swelling     Pt admitted with angioedema 2wks after taking lisinopril.        Current Neurological Medications: Keppra      No current facility-administered medications on file prior to encounter.      Current Outpatient Prescriptions on File Prior to Encounter   Medication Sig    allopurinol (ZYLOPRIM) 300 MG tablet Take 300 mg by mouth once daily.    aspirin (ECOTRIN) 81 MG EC tablet Take 81 mg by mouth once daily.    diltiaZEM (DILACOR XR) 180 MG CDCR Take 180 mg by mouth once daily.    metoprolol succinate (TOPROL-XL) 50 MG 24 hr tablet Take 50 mg by mouth  once daily.    colchicine 0.6 mg tablet Take 1 tablet (0.6 mg total) by mouth once daily.    diphenhydrAMINE (BENADRYL) 25 mg capsule Take 1 each (25 mg total) by mouth every 4 (four) hours as needed (lip swelling for 2 days).    ibuprofen (ADVIL,MOTRIN) 200 MG tablet Take 200 mg by mouth every 6 (six) hours as needed for Pain.    magnesium oxide (MAG-OX) 400 mg tablet Take 1 tablet (400 mg total) by mouth once daily.    tramadol (ULTRAM) 50 mg tablet Take 50 mg by mouth 3 (three) times daily as needed for Pain.     Family History     Problem Relation (Age of Onset)    Cancer Mother    Cataracts Maternal Grandfather    Hypertension Father    Stroke Maternal Grandmother, Maternal Grandfather        Social History Main Topics    Smoking status: Never Smoker    Smokeless tobacco: Not on file    Alcohol use Yes      Comment: ocassional alcohol    Drug use: No    Sexual activity: Not on file     Review of Systems   Constitutional: Positive for fatigue. Negative for appetite change and fever.   HENT: Positive for sore throat.         From vomiting, no blood   Respiratory: Negative for cough and shortness of breath.    Cardiovascular: Negative for chest pain and palpitations.   Gastrointestinal: Negative for nausea and vomiting.   Skin: Negative for color change and rash.   Neurological: Negative for dizziness and weakness.   Psychiatric/Behavioral: Negative for agitation and confusion.     Objective:     Vital Signs (Most Recent):  Temp: 99.5 °F (37.5 °C) (01/09/18 1510)  Pulse: 80 (01/09/18 1605)  Resp: 19 (01/09/18 1605)  BP: 111/75 (01/09/18 1605)  SpO2: 96 % (01/09/18 1605) Vital Signs (24h Range):  Temp:  [98.8 °F (37.1 °C)-100.2 °F (37.9 °C)] 99.5 °F (37.5 °C)  Pulse:  [] 80  Resp:  [15-31] 19  SpO2:  [90 %-100 %] 96 %  BP: ()/(47-96) 111/75     Weight: 90.7 kg (200 lb)  Body mass index is 26.39 kg/m².    Physical Exam   Constitutional: He appears well-developed and well-nourished.   HENT:    Head: Normocephalic and atraumatic.   Right Ear: Hearing normal.   Left Ear: Hearing normal.   Eyes:   Fundus examination shows sharp disc margins.  Pupils are equal and reactive with EOM being full without nystagmus   Neck: Normal range of motion. Neck supple. Carotid bruit is not present.   Neurological: He is alert. He has normal strength and normal reflexes. He displays no atrophy (No pronator drift noted) and no tremor (no abnormal involuntary movements or tremors noted). No cranial nerve deficit (No facial asymmetry noted with facial movements and sensory exam being normal/symmetrical.  Corneals/gag reflexes normal.  Tongue & palate movements normal.  Hearing unimpaired.  Shoulder shrug was norm) or sensory deficit (Primary sensations are symmetrical). He exhibits normal muscle tone. He displays a negative Romberg sign. Coordination and gait normal. He displays no Babinski's sign on the right side. He displays no Babinski's sign on the left side.   Mental status examination: Patient is fully oriented and able to give an adequate history.  Recall of recent and past information is good.  Immediate recall is normal.  Attention span and concentration was normal.  Judgment and insight is normal.  Language functions are intact with no evidence of aphasia or dysarthria.  Comprehension is unimpaired.  Affect is appropriate, mood was even.  No thought disorder is noted.   Vitals reviewed.      NEUROLOGICAL EXAMINATION:     MOTOR EXAM     Strength   Strength 5/5 throughout.       Significant Labs: All pertinent lab results from the past 24 hours have been reviewed.    Significant Imaging: I have reviewed all pertinent imaging results/findings within the past 24 hours.    Assessment and Plan:     * Seizure    The patient has had multiple seizures in the past and on this admission when he had 2 seizures, one at home and one in the emergency room.      This may be related to alcohol withdrawal however in view of multiple  seizures would recommend continuation of Keppra 500 mg twice a day.  Discussed with patient regarding getting into a substance abuse/alcohol abuse program and has continued alcohol abuse reports high risk for seizures.  He is also advised that he should not be driving until he is seizure-free for over 6 months.  Will not repeat his EEG which was was done in the past            VTE Risk Mitigation         Ordered     Place sequential compression device  Until discontinued      01/09/18 0006     Medium Risk of VTE  Once      01/09/18 0006     Place sequential compression device  Until discontinued      01/09/18 0006     Place MIKA hose  Until discontinued      01/09/18 0006     enoxaparin injection 40 mg  Daily     Route:  Subcutaneous        01/09/18 0005          Thank you for your consult. I will follow-up with patient. Please contact us if you have any additional questions.    Kartik Veloz MD  Neurology  [facility]

## 2018-01-09 NOTE — ASSESSMENT & PLAN NOTE
- Patient had witnessed seizure and subsequently had another in ER.  Believe due to alcohol withdrawal.   - Consult Neuro to evaluate need for any anticonvulsant.  - Ativan for seizure  - Keppra 500 BID started, unclear if will need.  - On Librium 25 q 8 for now.  - Monitor.

## 2018-01-09 NOTE — PLAN OF CARE
Problem: Physical Therapy Goal  Goal: Physical Therapy Goal  Goals to be met by: 18     Patient will increase functional independence with mobility by performin. Supine to sit with SBA  2. Sit to supine with Stand-by Assistance  3. Sit to stand transfer with contact guard Assistance  4. Gait  x 25' feet with Contact Guard Assistance using Rolling Walker.   5. Ascend/descend 5 stair with right Handrails Minimal Assistance using crutch.     Outcome: Ongoing (interventions implemented as appropriate)  PT eval.  Per nursing pt got up to toilet this am with nursing.  No assist to get in and out of bed or assist to stand from bed and only min a from low toilet.  Pt was then given multiple meds including meds for Seizures and ETOH withdrawal.  Pt awakes with therapy but currently requiring mod a for EOB and to stand EOB.  Pt with hx of spastic equinus on L from birth -multiple surgeries with last one 2 years ago.  Since then very limited amb, constant pain in ankles. Need therapy to return to [facility];  Home with   DME:  [facility] and [facility]

## 2018-01-10 LAB
ALBUMIN SERPL BCP-MCNC: 2.8 G/DL
ALP SERPL-CCNC: 65 U/L
ALT SERPL W/O P-5'-P-CCNC: 40 U/L
ANION GAP SERPL CALC-SCNC: 9 MMOL/L
AST SERPL-CCNC: 87 U/L
BASOPHILS # BLD AUTO: 0.01 K/UL
BASOPHILS NFR BLD: 0.1 %
BILIRUB SERPL-MCNC: 0.6 MG/DL
BUN SERPL-MCNC: 16 MG/DL
CALCIUM SERPL-MCNC: 7.5 MG/DL
CHLORIDE SERPL-SCNC: 105 MMOL/L
CHOLEST SERPL-MCNC: 144 MG/DL
CHOLEST/HDLC SERPL: 2.3 {RATIO}
CK SERPL-CCNC: 5093 U/L
CO2 SERPL-SCNC: 22 MMOL/L
CREAT SERPL-MCNC: 1.3 MG/DL
DIFFERENTIAL METHOD: ABNORMAL
EOSINOPHIL # BLD AUTO: 0.1 K/UL
EOSINOPHIL NFR BLD: 0.7 %
ERYTHROCYTE [DISTWIDTH] IN BLOOD BY AUTOMATED COUNT: 14.7 %
EST. GFR  (AFRICAN AMERICAN): >60 ML/MIN/1.73 M^2
EST. GFR  (NON AFRICAN AMERICAN): >60 ML/MIN/1.73 M^2
GLUCOSE SERPL-MCNC: 81 MG/DL
HCT VFR BLD AUTO: 26.8 %
HDLC SERPL-MCNC: 62 MG/DL
HDLC SERPL: 43.1 %
HGB BLD-MCNC: 9.1 G/DL
LDLC SERPL CALC-MCNC: 63.6 MG/DL
LYMPHOCYTES # BLD AUTO: 0.8 K/UL
LYMPHOCYTES NFR BLD: 11.3 %
MAGNESIUM SERPL-MCNC: 1.5 MG/DL
MCH RBC QN AUTO: 39.1 PG
MCHC RBC AUTO-ENTMCNC: 34 G/DL
MCV RBC AUTO: 115 FL
MONOCYTES # BLD AUTO: 1 K/UL
MONOCYTES NFR BLD: 13.2 %
NEUTROPHILS # BLD AUTO: 5.3 K/UL
NEUTROPHILS NFR BLD: 74 %
NONHDLC SERPL-MCNC: 82 MG/DL
PHOSPHATE SERPL-MCNC: 3.1 MG/DL
PLATELET # BLD AUTO: 80 K/UL
PMV BLD AUTO: 12 FL
POTASSIUM SERPL-SCNC: 3.2 MMOL/L
PROT SERPL-MCNC: 6.5 G/DL
RBC # BLD AUTO: 2.33 M/UL
SODIUM SERPL-SCNC: 136 MMOL/L
TRIGL SERPL-MCNC: 92 MG/DL
WBC # BLD AUTO: 7.2 K/UL

## 2018-01-10 PROCEDURE — 11000001 HC ACUTE MED/SURG PRIVATE ROOM

## 2018-01-10 PROCEDURE — 85025 COMPLETE CBC W/AUTO DIFF WBC: CPT

## 2018-01-10 PROCEDURE — 82550 ASSAY OF CK (CPK): CPT

## 2018-01-10 PROCEDURE — 63600175 PHARM REV CODE 636 W HCPCS: Performed by: NURSE PRACTITIONER

## 2018-01-10 PROCEDURE — 97110 THERAPEUTIC EXERCISES: CPT

## 2018-01-10 PROCEDURE — 99232 SBSQ HOSP IP/OBS MODERATE 35: CPT | Mod: ,,, | Performed by: HOSPITALIST

## 2018-01-10 PROCEDURE — 63600175 PHARM REV CODE 636 W HCPCS: Performed by: HOSPITALIST

## 2018-01-10 PROCEDURE — 25000003 PHARM REV CODE 250: Performed by: HOSPITALIST

## 2018-01-10 PROCEDURE — 25000003 PHARM REV CODE 250: Performed by: NURSE PRACTITIONER

## 2018-01-10 PROCEDURE — 80061 LIPID PANEL: CPT

## 2018-01-10 PROCEDURE — 97535 SELF CARE MNGMENT TRAINING: CPT

## 2018-01-10 PROCEDURE — 97530 THERAPEUTIC ACTIVITIES: CPT

## 2018-01-10 PROCEDURE — 84100 ASSAY OF PHOSPHORUS: CPT

## 2018-01-10 PROCEDURE — 80053 COMPREHEN METABOLIC PANEL: CPT

## 2018-01-10 PROCEDURE — 36415 COLL VENOUS BLD VENIPUNCTURE: CPT

## 2018-01-10 PROCEDURE — 83735 ASSAY OF MAGNESIUM: CPT

## 2018-01-10 RX ORDER — MAGNESIUM SULFATE HEPTAHYDRATE 40 MG/ML
2 INJECTION, SOLUTION INTRAVENOUS ONCE
Status: COMPLETED | OUTPATIENT
Start: 2018-01-10 | End: 2018-01-10

## 2018-01-10 RX ORDER — TRAMADOL HYDROCHLORIDE 50 MG/1
50 TABLET ORAL EVERY 6 HOURS PRN
Status: DISCONTINUED | OUTPATIENT
Start: 2018-01-10 | End: 2018-01-21 | Stop reason: HOSPADM

## 2018-01-10 RX ORDER — MAGNESIUM SULFATE HEPTAHYDRATE 40 MG/ML
2 INJECTION, SOLUTION INTRAVENOUS ONCE
Status: DISCONTINUED | OUTPATIENT
Start: 2018-01-10 | End: 2018-01-10

## 2018-01-10 RX ORDER — FOLIC ACID 1 MG/1
1 TABLET ORAL DAILY
Status: DISCONTINUED | OUTPATIENT
Start: 2018-01-10 | End: 2018-01-21 | Stop reason: HOSPADM

## 2018-01-10 RX ORDER — CHLORDIAZEPOXIDE HYDROCHLORIDE 25 MG/1
25 CAPSULE, GELATIN COATED ORAL 4 TIMES DAILY PRN
Status: DISCONTINUED | OUTPATIENT
Start: 2018-01-10 | End: 2018-01-17

## 2018-01-10 RX ORDER — SODIUM CHLORIDE 9 MG/ML
INJECTION, SOLUTION INTRAVENOUS CONTINUOUS
Status: ACTIVE | OUTPATIENT
Start: 2018-01-10 | End: 2018-01-11

## 2018-01-10 RX ADMIN — TRAMADOL HYDROCHLORIDE 50 MG: 50 TABLET, COATED ORAL at 03:01

## 2018-01-10 RX ADMIN — Medication 400 MG: at 08:01

## 2018-01-10 RX ADMIN — CHLORDIAZEPOXIDE HYDROCHLORIDE 25 MG: 25 CAPSULE ORAL at 05:01

## 2018-01-10 RX ADMIN — DILTIAZEM HYDROCHLORIDE 180 MG: 180 CAPSULE, COATED, EXTENDED RELEASE ORAL at 08:01

## 2018-01-10 RX ADMIN — ALLOPURINOL 300 MG: 300 TABLET ORAL at 08:01

## 2018-01-10 RX ADMIN — GABAPENTIN 300 MG: 300 CAPSULE ORAL at 03:01

## 2018-01-10 RX ADMIN — ONDANSETRON 4 MG: 2 INJECTION INTRAMUSCULAR; INTRAVENOUS at 08:01

## 2018-01-10 RX ADMIN — CARVEDILOL 25 MG: 12.5 TABLET, FILM COATED ORAL at 04:01

## 2018-01-10 RX ADMIN — ACETAMINOPHEN 650 MG: 325 TABLET ORAL at 09:01

## 2018-01-10 RX ADMIN — CARVEDILOL 25 MG: 12.5 TABLET, FILM COATED ORAL at 08:01

## 2018-01-10 RX ADMIN — ENOXAPARIN SODIUM 40 MG: 100 INJECTION SUBCUTANEOUS at 04:01

## 2018-01-10 RX ADMIN — POTASSIUM CHLORIDE 40 MEQ: 1500 TABLET, EXTENDED RELEASE ORAL at 08:01

## 2018-01-10 RX ADMIN — POTASSIUM & SODIUM PHOSPHATES POWDER PACK 280-160-250 MG 1 PACKET: 280-160-250 PACK at 06:01

## 2018-01-10 RX ADMIN — SODIUM CHLORIDE: 0.9 INJECTION, SOLUTION INTRAVENOUS at 01:01

## 2018-01-10 RX ADMIN — SODIUM CHLORIDE: 0.9 INJECTION, SOLUTION INTRAVENOUS at 09:01

## 2018-01-10 RX ADMIN — FOLIC ACID 1 MG: 1 TABLET ORAL at 08:01

## 2018-01-10 RX ADMIN — POTASSIUM & SODIUM PHOSPHATES POWDER PACK 280-160-250 MG 1 PACKET: 280-160-250 PACK at 11:01

## 2018-01-10 RX ADMIN — GABAPENTIN 300 MG: 300 CAPSULE ORAL at 09:01

## 2018-01-10 RX ADMIN — THIAMINE HYDROCHLORIDE 100 MG: 100 INJECTION, SOLUTION INTRAMUSCULAR; INTRAVENOUS at 08:01

## 2018-01-10 RX ADMIN — LEVETIRACETAM 500 MG: 5 INJECTION INTRAVENOUS at 08:01

## 2018-01-10 RX ADMIN — SODIUM CHLORIDE: 0.9 INJECTION, SOLUTION INTRAVENOUS at 08:01

## 2018-01-10 RX ADMIN — TRAMADOL HYDROCHLORIDE 50 MG: 50 TABLET, COATED ORAL at 08:01

## 2018-01-10 RX ADMIN — MAGNESIUM SULFATE HEPTAHYDRATE 2 G: 40 INJECTION, SOLUTION INTRAVENOUS at 07:01

## 2018-01-10 RX ADMIN — LEVETIRACETAM 500 MG: 5 INJECTION INTRAVENOUS at 09:01

## 2018-01-10 RX ADMIN — GABAPENTIN 300 MG: 300 CAPSULE ORAL at 05:01

## 2018-01-10 RX ADMIN — ASPIRIN 81 MG: 81 TABLET, COATED ORAL at 08:01

## 2018-01-10 RX ADMIN — SODIUM CHLORIDE: 0.9 INJECTION, SOLUTION INTRAVENOUS at 03:01

## 2018-01-10 NOTE — ASSESSMENT & PLAN NOTE
- Patient had witnessed seizure and subsequently had another in ER.  Believe due to alcohol withdrawal.   - Librium 25 q 8 to PRN.  - Neurology noted:  The patient has had multiple seizures in the past and on this admission when he had 2 seizures, one at home and one in the emergency room.     This may be related to alcohol withdrawal however in view of multiple seizures would recommend continuation of Keppra 500 mg twice a day.  Discussed with patient regarding getting into a substance abuse/alcohol abuse program and has continued alcohol abuse reports high risk for seizures.  He is also advised that he should not be driving until he is seizure-free for over 6 months.  Will not repeat his EEG which was was done in the past  - Emphasized no driving to patient as well as neurology follow up and he expressed understanding.   - Refer to Dr. Andres  - Monitor.

## 2018-01-10 NOTE — CONSULTS
[facility]  Cardiology  Consult Note    Patient Name: Michael Johnson Jr.  MRN: [id number]  Admission Date: 1/8/2018  Hospital Length of Stay: 0 days  Code Status: Full Code   Attending Provider: Javier Iqbal MD   Consulting Provider: Camila Mosher MD  Primary Care Physician: Zunilda Bolden NP  Principal Problem:Seizure    Patient information was obtained from patient, past medical records and ER records.     Inpatient consult to Cardiology  Consult performed by: CAMILA MOSHER  Consult ordered by: JAVIER IQBAL        Subjective:     Chief Complaint:  Seizure.     HPI:     56 yo man with hypertension. He presents after recurrent seizures. He has a mild rise in troponin to 0.1.      Past Medical History:   Diagnosis Date    Afib     Ankle fracture, left     Asthma     Gout, unspecified     Hypertension     Seizure 2016       Past Surgical History:   Procedure Laterality Date    ANKLE SURGERY         Review of patient's allergies indicates:   Allergen Reactions    Lisinopril Swelling     Pt admitted with angioedema 2wks after taking lisinopril.        No current facility-administered medications on file prior to encounter.      Current Outpatient Prescriptions on File Prior to Encounter   Medication Sig    allopurinol (ZYLOPRIM) 300 MG tablet Take 300 mg by mouth once daily.    aspirin (ECOTRIN) 81 MG EC tablet Take 81 mg by mouth once daily.    diltiaZEM (DILACOR XR) 180 MG CDCR Take 180 mg by mouth once daily.    metoprolol succinate (TOPROL-XL) 50 MG 24 hr tablet Take 50 mg by mouth once daily.    colchicine 0.6 mg tablet Take 1 tablet (0.6 mg total) by mouth once daily.    diphenhydrAMINE (BENADRYL) 25 mg capsule Take 1 each (25 mg total) by mouth every 4 (four) hours as needed (lip swelling for 2 days).    ibuprofen (ADVIL,MOTRIN) 200 MG tablet Take 200 mg by mouth every 6 (six) hours as needed for Pain.    magnesium oxide (MAG-OX) 400 mg tablet Take 1 tablet (400 mg  total) by mouth once daily.    tramadol (ULTRAM) 50 mg tablet Take 50 mg by mouth 3 (three) times daily as needed for Pain.     Family History     Problem Relation (Age of Onset)    Cancer Mother    Cataracts Maternal Grandfather    Hypertension Father    Stroke Maternal Grandmother, Maternal Grandfather        Social History Main Topics    Smoking status: Never Smoker    Smokeless tobacco: Not on file    Alcohol use Yes      Comment: ocassional alcohol    Drug use: No    Sexual activity: Not on file     Review of Systems   Constitution: Negative for chills, fever, weakness and malaise/fatigue.   HENT: Negative for nosebleeds.    Eyes: Negative for double vision, vision loss in left eye and vision loss in right eye.   Cardiovascular: Negative for chest pain, claudication, dyspnea on exertion, irregular heartbeat, leg swelling, near-syncope, orthopnea, palpitations, paroxysmal nocturnal dyspnea and syncope.   Respiratory: Negative for cough, hemoptysis, shortness of breath and wheezing.    Endocrine: Negative for cold intolerance and heat intolerance.   Hematologic/Lymphatic: Negative for bleeding problem. Does not bruise/bleed easily.   Skin: Negative for color change and rash.   Musculoskeletal: Positive for joint pain. Negative for back pain, falls, muscle weakness and myalgias. Gout: left foot.   Gastrointestinal: Negative for heartburn, hematemesis, hematochezia, hemorrhoids, jaundice, melena, nausea and vomiting.   Genitourinary: Negative for dysuria and hematuria.   Neurological: Positive for seizures. Negative for dizziness, focal weakness, headaches, light-headedness, loss of balance, numbness and vertigo.   Psychiatric/Behavioral: Negative for altered mental status, depression and memory loss. The patient is not nervous/anxious.    Allergic/Immunologic: Negative for hives and persistent infections.     Objective:     Vital Signs (Most Recent):  Temp: 99.5 °F (37.5 °C) (01/09/18 1510)  Pulse: 90  (01/09/18 1740)  Resp: 20 (01/09/18 1740)  BP: 115/74 (01/09/18 1740)  SpO2: 95 % (01/09/18 1740) Vital Signs (24h Range):  Temp:  [98.8 °F (37.1 °C)-100.2 °F (37.9 °C)] 99.5 °F (37.5 °C)  Pulse:  [] 90  Resp:  [15-31] 20  SpO2:  [90 %-100 %] 95 %  BP: ()/(47-96) 115/74     Weight: 90.7 kg (200 lb)  Body mass index is 26.39 kg/m².    SpO2: 95 %  O2 Device (Oxygen Therapy): room air      Intake/Output Summary (Last 24 hours) at 01/09/18 1822  Last data filed at 01/09/18 1600   Gross per 24 hour   Intake           2947.5 ml   Output              360 ml   Net           2587.5 ml       Lines/Drains/Airways     Peripheral Intravenous Line                 Peripheral IV - Single Lumen 01/08/18 Left Forearm 1 day         Peripheral IV - Single Lumen 01/08/18 2325 Right Hand less than 1 day                Physical Exam   Constitutional: He is oriented to person, place, and time. He appears well-developed and well-nourished.  Non-toxic appearance. No distress.   HENT:   Head: Normocephalic and atraumatic.   Nose: Nose normal.   Eyes: Right eye exhibits no discharge. Left eye exhibits no discharge. Right conjunctiva is not injected. Left conjunctiva is not injected. Right pupil is round. Left pupil is round. Pupils are equal.   Neck: Neck supple. No JVD present. Carotid bruit is not present. No thyromegaly present.   Cardiovascular: Normal rate, regular rhythm, S1 normal and S2 normal.   No extrasystoles are present. PMI is not displaced.  Exam reveals gallop and S4.    No murmur heard.  Pulses:       Radial pulses are 2+ on the right side, and 2+ on the left side.        Femoral pulses are 2+ on the right side, and 2+ on the left side.       Dorsalis pedis pulses are 2+ on the right side, and 2+ on the left side.        Posterior tibial pulses are 2+ on the right side, and 2+ on the left side.   Pulmonary/Chest: Effort normal and breath sounds normal.   Abdominal: Soft. Normal appearance. There is no  hepatosplenomegaly. There is no tenderness.   Musculoskeletal:        Right ankle: He exhibits no swelling, no ecchymosis and no deformity.        Left ankle: He exhibits no swelling, no ecchymosis and no deformity.   Lymphadenopathy:        Head (right side): No submandibular adenopathy present.        Head (left side): No submandibular adenopathy present.     He has no cervical adenopathy.   Neurological: He is alert and oriented to person, place, and time. He is not disoriented. No cranial nerve deficit.   Skin: Skin is warm, dry and intact. No rash noted. He is not diaphoretic. No cyanosis. Nails show no clubbing.   Psychiatric: He has a normal mood and affect. His speech is normal and behavior is normal. Judgment and thought content normal. Cognition and memory are normal.     Current Medications:     allopurinol  300 mg Oral Daily    aspirin  81 mg Oral Daily    carvedilol  25 mg Oral BID WM    chlordiazepoxide  25 mg Oral TID    diltiaZEM  180 mg Oral Daily    enoxaparin  40 mg Subcutaneous Daily    gabapentin  300 mg Oral TID    levetiracetam IVPB  500 mg Intravenous Q12H    magnesium oxide  400 mg Oral Daily    potassium chloride  40 mEq Oral Daily    potassium, sodium phosphates  1 packet Oral QID (AC & HS)    thiamine (VITAMIN B1) IVPB  100 mg Intravenous Daily     Current Laboratory Results:    Recent Results (from the past 24 hour(s))   Protime-INR    Collection Time: 01/08/18  6:40 PM   Result Value Ref Range    Prothrombin Time 10.5 9.0 - 12.5 sec    INR 0.9 0.8 - 1.2   Troponin I    Collection Time: 01/08/18  6:40 PM   Result Value Ref Range    Troponin I 0.008 0.000 - 0.026 ng/mL   CBC auto differential    Collection Time: 01/08/18  6:40 PM   Result Value Ref Range    WBC 10.81 3.90 - 12.70 K/uL    RBC 2.97 (L) 4.60 - 6.20 M/uL    Hemoglobin 11.6 (L) 14.0 - 18.0 g/dL    Hematocrit 33.7 (L) 40.0 - 54.0 %     (H) 82 - 98 fL    MCH 39.1 (H) 27.0 - 31.0 pg    MCHC 34.4 32.0 - 36.0  g/dL    RDW 14.6 (H) 11.5 - 14.5 %    Platelets 113 (L) 150 - 350 K/uL    MPV 11.6 9.2 - 12.9 fL    Gran # 9.4 (H) 1.8 - 7.7 K/uL    Lymph # 0.7 (L) 1.0 - 4.8 K/uL    Mono # 0.7 0.3 - 1.0 K/uL    Eos # 0.0 0.0 - 0.5 K/uL    Baso # 0.00 0.00 - 0.20 K/uL    Gran% 86.9 (H) 38.0 - 73.0 %    Lymph% 6.6 (L) 18.0 - 48.0 %    Mono% 6.2 4.0 - 15.0 %    Eosinophil% 0.0 0.0 - 8.0 %    Basophil% 0.0 0.0 - 1.9 %    Differential Method Automated    Comprehensive metabolic panel    Collection Time: 01/08/18  6:40 PM   Result Value Ref Range    Sodium 136 136 - 145 mmol/L    Potassium 2.4 (LL) 3.5 - 5.1 mmol/L    Chloride 96 95 - 110 mmol/L    CO2 22 (L) 23 - 29 mmol/L    Glucose 84 70 - 110 mg/dL    BUN, Bld 15 6 - 20 mg/dL    Creatinine 1.6 (H) 0.5 - 1.4 mg/dL    Calcium 8.4 (L) 8.7 - 10.5 mg/dL    Total Protein 7.9 6.0 - 8.4 g/dL    Albumin 3.4 (L) 3.5 - 5.2 g/dL    Total Bilirubin 1.2 (H) 0.1 - 1.0 mg/dL    Alkaline Phosphatase 70 55 - 135 U/L    AST 79 (H) 10 - 40 U/L    ALT 46 (H) 10 - 44 U/L    Anion Gap 18 (H) 8 - 16 mmol/L    eGFR if African American 55 (A) >60 mL/min/1.73 m^2    eGFR if non African American 48 (A) >60 mL/min/1.73 m^2   Troponin I    Collection Time: 01/09/18 12:50 AM   Result Value Ref Range    Troponin I 0.082 (H) 0.000 - 0.026 ng/mL   Hemoglobin A1c    Collection Time: 01/09/18  2:08 AM   Result Value Ref Range    Hemoglobin A1C 4.2 4.0 - 5.6 %    Estimated Avg Glucose 74 68 - 131 mg/dL   Ferritin    Collection Time: 01/09/18  6:01 AM   Result Value Ref Range    Ferritin 1,834 (H) 20.0 - 300.0 ng/mL   Iron and TIBC    Collection Time: 01/09/18  6:01 AM   Result Value Ref Range    Iron 40 (L) 45 - 160 ug/dL    Transferrin 175 (L) 200 - 375 mg/dL    TIBC 259 250 - 450 ug/dL    Saturated Iron 15 (L) 20 - 50 %   TSH    Collection Time: 01/09/18  6:01 AM   Result Value Ref Range    TSH 1.308 0.400 - 4.000 uIU/mL   Comprehensive Metabolic Panel (CMP)    Collection Time: 01/09/18  6:04 AM   Result Value Ref  Range    Sodium 133 (L) 136 - 145 mmol/L    Potassium 2.9 (L) 3.5 - 5.1 mmol/L    Chloride 98 95 - 110 mmol/L    CO2 25 23 - 29 mmol/L    Glucose 80 70 - 110 mg/dL    BUN, Bld 15 6 - 20 mg/dL    Creatinine 1.4 0.5 - 1.4 mg/dL    Calcium 8.0 (L) 8.7 - 10.5 mg/dL    Total Protein 7.3 6.0 - 8.4 g/dL    Albumin 3.2 (L) 3.5 - 5.2 g/dL    Total Bilirubin 1.1 (H) 0.1 - 1.0 mg/dL    Alkaline Phosphatase 63 55 - 135 U/L    AST 94 (H) 10 - 40 U/L    ALT 44 10 - 44 U/L    Anion Gap 10 8 - 16 mmol/L    eGFR if African American >60 >60 mL/min/1.73 m^2    eGFR if non African American 56 (A) >60 mL/min/1.73 m^2   Magnesium    Collection Time: 01/09/18  6:04 AM   Result Value Ref Range    Magnesium 1.1 (L) 1.6 - 2.6 mg/dL   Phosphorus    Collection Time: 01/09/18  6:04 AM   Result Value Ref Range    Phosphorus 1.2 (L) 2.7 - 4.5 mg/dL   CBC with Automated Differential    Collection Time: 01/09/18  6:04 AM   Result Value Ref Range    WBC 8.70 3.90 - 12.70 K/uL    RBC 2.80 (L) 4.60 - 6.20 M/uL    Hemoglobin 10.7 (L) 14.0 - 18.0 g/dL    Hematocrit 31.3 (L) 40.0 - 54.0 %     (H) 82 - 98 fL    MCH 38.2 (H) 27.0 - 31.0 pg    MCHC 34.2 32.0 - 36.0 g/dL    RDW 14.5 11.5 - 14.5 %    Platelets 97 (L) 150 - 350 K/uL    MPV 11.1 9.2 - 12.9 fL    Gran # 7.0 1.8 - 7.7 K/uL    Lymph # 1.0 1.0 - 4.8 K/uL    Mono # 0.7 0.3 - 1.0 K/uL    Eos # 0.0 0.0 - 0.5 K/uL    Baso # 0.00 0.00 - 0.20 K/uL    Gran% 80.2 (H) 38.0 - 73.0 %    Lymph% 11.0 (L) 18.0 - 48.0 %    Mono% 8.4 4.0 - 15.0 %    Eosinophil% 0.1 0.0 - 8.0 %    Basophil% 0.0 0.0 - 1.9 %    Differential Method Automated    Troponin I    Collection Time: 01/09/18  6:04 AM   Result Value Ref Range    Troponin I 0.080 (H) 0.000 - 0.026 ng/mL   CK-MB    Collection Time: 01/09/18  6:04 AM   Result Value Ref Range    CPK 5149 (H) 20 - 200 U/L    CPK MB 3.4 0.1 - 6.5 ng/mL    MB% 0.1 0.0 - 5.0 %   Folate    Collection Time: 01/09/18  8:28 AM   Result Value Ref Range    Folate 4.6 4.0 - 24.0 ng/mL    Vitamin B12    Collection Time: 01/09/18  8:28 AM   Result Value Ref Range    Vitamin B-12 522 210 - 950 pg/mL   Urinalysis    Collection Time: 01/09/18  8:51 AM   Result Value Ref Range    Specimen UA Urine, Clean Catch     Color, UA Yellow Yellow, Straw, Sophia    Appearance, UA Clear Clear    pH, UA 7.0 5.0 - 8.0    Specific Gravity, UA 1.015 1.005 - 1.030    Protein, UA Trace (A) Negative    Glucose, UA Negative Negative    Ketones, UA 1+ (A) Negative    Bilirubin (UA) 1+ (A) Negative    Occult Blood UA Negative Negative    Nitrite, UA Negative Negative    Urobilinogen, UA 2.0-3.0 (A) <2.0 EU/dL    Leukocytes, UA Negative Negative   Toxicology screen, urine    Collection Time: 01/09/18  8:51 AM   Result Value Ref Range    Alcohol, Urine <10 <10 mg/dL    Benzodiazepines Negative     Methadone metabolites Negative     Cocaine (Metab.) Negative     Opiate Scrn, Ur Negative     Barbiturate Screen, Ur Negative     Amphetamine Screen, Ur Negative     THC Negative     Phencyclidine Negative     Creatinine, Random Ur 224.7 23.0 - 375.0 mg/dL    Toxicology Information SEE COMMENT    Troponin I    Collection Time: 01/09/18 12:28 PM   Result Value Ref Range    Troponin I 0.031 (H) 0.000 - 0.026 ng/mL   Magnesium    Collection Time: 01/09/18 12:28 PM   Result Value Ref Range    Magnesium 0.9 (LL) 1.6 - 2.6 mg/dL   Phosphorus    Collection Time: 01/09/18 12:28 PM   Result Value Ref Range    Phosphorus <1.0 (LL) 2.7 - 4.5 mg/dL   Basic metabolic panel    Collection Time: 01/09/18 12:28 PM   Result Value Ref Range    Sodium 140 136 - 145 mmol/L    Potassium 2.3 (LL) 3.5 - 5.1 mmol/L    Chloride 119 (H) 95 - 110 mmol/L    CO2 19 (L) 23 - 29 mmol/L    Glucose 60 (L) 70 - 110 mg/dL    BUN, Bld 11 6 - 20 mg/dL    Creatinine 0.8 0.5 - 1.4 mg/dL    Calcium 4.8 (LL) 8.7 - 10.5 mg/dL    Anion Gap 2 (L) 8 - 16 mmol/L    eGFR if African American >60 >60 mL/min/1.73 m^2    eGFR if non African American >60 >60 mL/min/1.73 m^2   Magnesium     Collection Time: 01/09/18  2:16 PM   Result Value Ref Range    Magnesium 2.0 1.6 - 2.6 mg/dL   Phosphorus    Collection Time: 01/09/18  2:16 PM   Result Value Ref Range    Phosphorus 1.6 (L) 2.7 - 4.5 mg/dL   Basic metabolic panel    Collection Time: 01/09/18  2:16 PM   Result Value Ref Range    Sodium 131 (L) 136 - 145 mmol/L    Potassium 4.9 3.5 - 5.1 mmol/L    Chloride 102 95 - 110 mmol/L    CO2 23 23 - 29 mmol/L    Glucose 109 70 - 110 mg/dL    BUN, Bld 16 6 - 20 mg/dL    Creatinine 1.5 (H) 0.5 - 1.4 mg/dL    Calcium 8.0 (L) 8.7 - 10.5 mg/dL    Anion Gap 6 (L) 8 - 16 mmol/L    eGFR if African American 60 >60 mL/min/1.73 m^2    eGFR if non African American 52 (A) >60 mL/min/1.73 m^2     Current Imaging Results:    Imaging Results          CT Head Without Contrast (Final result)  Result time 01/08/18 19:42:01    Final result by Ancelmo Irby MD (01/08/18 19:42:01)                 Impression:        No acute intracranial abnormalities.    Sinus disease.      Electronically signed by: ANCELMO IRBY MD  Date:     01/08/18  Time:    19:42              Narrative:    History:     Comparison: March 16, 2017    Technique:    Axial images of the brain were obtained at 5-mm intervals from the skull base to the vertex without the administration of contrast.    Findings:    The brain parenchyma appears normal for age with good corticomedullary differentiation.  There is no evidence of acute infarct, hemorrhage, or mass.  The ventricular system is normal in size.  No mass-effect or midline shift.  There are no abnormal extra-axial fluid collections.      Sinus disease unchanged from prior.  The mastoid air cells are clear.      The calvarium appears intact.  .                             X-Ray Chest AP Portable (Final result)  Result time 01/08/18 19:06:43    Final result by Fred Wallace MD (01/08/18 19:06:43)                 Impression:        No detrimental change or radiographic acute intrathoracic process seen  on this single view.      Electronically signed by: GILBERTO WAKEFIELD MD, MD  Date:     01/08/18  Time:    19:06              Narrative:    COMPARISON: Chest radiograph 3/16/17    FINDINGS: AP portable upright view of the chest. Monitoring leads overlie the chest. Patient somewhat rotated. Nonspecific elevation of the right hemidiaphragm. No large consolidation or new focal opacity. No large pleural effusion or pneumothorax.  Cardiomediastinal silhouette appears stable without evidence of failure.  No acute osseous process seen.   PA and lateral views can be obtained.                            ECG: NSR. Minor nonspecific ST T wave changes.    Assessment and Plan:     Active Diagnoses:    Diagnosis Date Noted POA    PRINCIPAL PROBLEM:  Seizure [R56.9] 03/16/2017 Yes    Macrocytic anemia [D53.9] 01/09/2018 Yes    Troponin level elevated [R74.8] 01/09/2018 Yes    Alcohol abuse [F10.10] 01/09/2018 Yes    Unsteady gait [R26.81] 01/09/2018 Yes    Rhabdomyolysis [M62.82] 01/09/2018 Yes    Acute kidney injury [N17.9]  Unknown    Alcoholism [F10.20]  Unknown    Hypomagnesemia [E83.42] 03/16/2017 Yes    Acute on chronic kidney failure [N17.9, N18.9] 10/20/2014 Yes     Chronic    Hyperlipidemia [E78.5] 10/20/2014 Yes    Hypokalemia [E87.6] 10/20/2014 Yes    Hypertension [I10] 02/05/2013 Yes      Problems Resolved During this Admission:    Diagnosis Date Noted Date Resolved POA     Assessment and Plan:    1.Troponin Rise   Most certainly due to demand/supply issues.   Would be reasonable to do Stress test as outpatient.   May need to be Regadenoson MPI as difficulty walking due to pain in foot.    VTE Risk Mitigation         Ordered     Place sequential compression device  Until discontinued      01/09/18 0006     Medium Risk of VTE  Once      01/09/18 0006     Place sequential compression device  Until discontinued      01/09/18 0006     Place MIKA hose  Until discontinued      01/09/18 0006     enoxaparin injection 40  mg  Daily     Route:  Subcutaneous        01/09/18 0005          Thank you for your consult. I will sign off. Please contact us if you have any additional questions.    Camila Chavez MD  Cardiology   [facility]

## 2018-01-10 NOTE — NURSING TRANSFER
Nursing Transfer Note      1/10/2018     Transfer To: [id number]    Transfer via wheelchair    Transfer with cardiac monitoring    Transported by RN     Chart send with patient: Yes    Notified: spouse

## 2018-01-10 NOTE — SUBJECTIVE & OBJECTIVE
Interval History: Doing okay.  Has some of his chronic foot pain.       Review of Systems   Constitutional: Positive for fatigue. Negative for appetite change and fever.   Respiratory: Negative for cough and shortness of breath.    Cardiovascular: Negative for chest pain and palpitations.   Gastrointestinal: Negative for nausea and vomiting.   Skin: Negative for color change and rash.   Neurological: Negative for dizziness and weakness.   Psychiatric/Behavioral: Negative for agitation and confusion.     Objective:     Vital Signs (Most Recent):  Temp: 98.7 °F (37.1 °C) (01/10/18 0725)  Pulse: 90 (01/10/18 0700)  Resp: 20 (01/10/18 0700)  BP: 110/70 (01/10/18 0700)  SpO2: 98 % (01/10/18 0700) Vital Signs (24h Range):  Temp:  [97.8 °F (36.6 °C)-99.8 °F (37.7 °C)] 98.7 °F (37.1 °C)  Pulse:  [78-96] 90  Resp:  [15-31] 20  SpO2:  [91 %-100 %] 98 %  BP: ()/(47-87) 110/70     Weight: 90.7 kg (200 lb)  Body mass index is 26.39 kg/m².    Intake/Output Summary (Last 24 hours) at 01/10/18 0807  Last data filed at 01/10/18 0600   Gross per 24 hour   Intake           5077.5 ml   Output              360 ml   Net           4717.5 ml      Physical Exam   Constitutional: He is oriented to person, place, and time. He appears well-developed and well-nourished.   HENT:   Head: Normocephalic and atraumatic.   Eyes: Conjunctivae are normal. Pupils are equal, round, and reactive to light.   Neck: Normal range of motion. Neck supple.   Cardiovascular: Normal rate and regular rhythm.    Pulmonary/Chest: Effort normal and breath sounds normal.   Abdominal: Soft.   Musculoskeletal:   - LLE smaller diameter than RLE.     Neurological: He is alert and oriented to person, place, and time.   Skin: Skin is warm and dry.       Significant Labs:   CBC:   Recent Labs  Lab 01/08/18  1840 01/09/18  0604 01/10/18  0341   WBC 10.81 8.70 7.20   HGB 11.6* 10.7* 9.1*   HCT 33.7* 31.3* 26.8*   * 97* 80*     CMP:   Recent Labs  Lab 01/08/18  6896  01/09/18  0604 01/09/18  1228 01/09/18  1416 01/10/18  0341    133* 140 131* 136   K 2.4* 2.9* 2.3* 4.9 3.2*   CL 96 98 119* 102 105   CO2 22* 25 19* 23 22*   GLU 84 80 60* 109 81   BUN 15 15 11 16 16   CREATININE 1.6* 1.4 0.8 1.5* 1.3   CALCIUM 8.4* 8.0* 4.8* 8.0* 7.5*   PROT 7.9 7.3  --   --  6.5   ALBUMIN 3.4* 3.2*  --   --  2.8*   BILITOT 1.2* 1.1*  --   --  0.6   ALKPHOS 70 63  --   --  65   AST 79* 94*  --   --  87*   ALT 46* 44  --   --  40   ANIONGAP 18* 10 2* 6* 9   EGFRNONAA 48* 56* >60 52* >60     Cardiac Markers: No results for input(s): CKMB, MYOGLOBIN, BNP, TROPISTAT in the last 48 hours.    Significant Imaging: I have reviewed all pertinent imaging results/findings within the past 24 hours.   Imaging Results          CT Head Without Contrast (Final result)  Result time 01/08/18 19:42:01    Final result by Ancelmo Irby MD (01/08/18 19:42:01)                 Impression:        No acute intracranial abnormalities.    Sinus disease.      Electronically signed by: ANCELMO IRBY MD  Date:     01/08/18  Time:    19:42              Narrative:    History:     Comparison: March 16, 2017    Technique:    Axial images of the brain were obtained at 5-mm intervals from the skull base to the vertex without the administration of contrast.    Findings:    The brain parenchyma appears normal for age with good corticomedullary differentiation.  There is no evidence of acute infarct, hemorrhage, or mass.  The ventricular system is normal in size.  No mass-effect or midline shift.  There are no abnormal extra-axial fluid collections.      Sinus disease unchanged from prior.  The mastoid air cells are clear.      The calvarium appears intact.  .                             X-Ray Chest AP Portable (Final result)  Result time 01/08/18 19:06:43    Final result by Fred Wallace MD (01/08/18 19:06:43)                 Impression:        No detrimental change or radiographic acute intrathoracic process seen on this  single view.      Electronically signed by: GILBERTO WAKEFIELD MD, MD  Date:     01/08/18  Time:    19:06              Narrative:    COMPARISON: Chest radiograph 3/16/17    FINDINGS: AP portable upright view of the chest. Monitoring leads overlie the chest. Patient somewhat rotated. Nonspecific elevation of the right hemidiaphragm. No large consolidation or new focal opacity. No large pleural effusion or pneumothorax.  Cardiomediastinal silhouette appears stable without evidence of failure.  No acute osseous process seen.   PA and lateral views can be obtained.

## 2018-01-10 NOTE — PLAN OF CARE
Problem: Physical Therapy Goal  Goal: Physical Therapy Goal  Goals to be met by: 18     Patient will increase functional independence with mobility by performin. Supine to sit with SBA  2. Sit to supine with Stand-by Assistance  3. Sit to stand transfer with contact guard Assistance  4. Gait  x 25' feet with Contact Guard Assistance using Rolling Walker.   5. Ascend/descend 5 stair with right Handrails Minimal Assistance using crutch.      Outcome: Ongoing (interventions implemented as appropriate)  Pt transferred from ICU to room 346.  Found at EOB.  Required mod a to stand with RW and pt unable to amb 2* severe pain in ankles and feet.  Concerned that pt is going to be bedbound when he gets home as his wife works during the day and it is doubtful that wife will be able to provide as much assist as pt requires .  Discussed SNF versus home health  REC;  Pt wants to go home with HH, at this point given functional level PT recommends SNF

## 2018-01-10 NOTE — PLAN OF CARE
Patient arrived from ICU with RN. REYNALDO, no complaints of pain at this time. Urinal and call light within reach. NS @150 to RUE. Family at bedside.

## 2018-01-10 NOTE — PLAN OF CARE
Problem: Occupational Therapy Goal  Goal: Occupational Therapy Goal  Goals to be met by: 2/8/2018     Patient will increase functional independence with ADLs by performing:    UE Dressing with Minimal Assistance.  LE Dressing with Minimal Assistance.  Grooming while standing at sink with Contact Guard Assistance.  Toileting from bedside commode with Minimal Assistance for hygiene and clothing management.   Supine to sit with Stand-by Assistance.  Stand pivot transfers with Minimal Assistance. (MET with RW use 1/10/18)  Toilet transfer to bedside commode with Minimal Assistance.     Outcome: Ongoing (interventions implemented as appropriate)  The patient continues to be limited by bilateral foot pain.  He was Min A supine>sit EOB, Mod A sit>stand, Min A bed>chair transfer with RW (assist for balance and RW placement).  He performed toilet hygiene Min A standing with RW, Supervision G/H (wash hands seated in chair), Total A UBD (due to bilateral IV placement in use) and performed UE exercise during the session.  AMANDA Hughes 1/10/2018

## 2018-01-10 NOTE — PLAN OF CARE
"1605-Writer received call from GARRISON Sims stating patient's wife brought patient's [employer] card. GARRISON Sims requested admit to come receive updated insurance card. Per GARRISON Sims, "someone from admit named Ruthie came up here for it". Spoke to PAOLO Escobar to follow up on insurance card being scanned into Epic for updated and accurate info.      Writer to obtain Home Health choices in network with [employer] and request DME for home.   "

## 2018-01-10 NOTE — PLAN OF CARE
Problem: Patient Care Overview  Goal: Plan of Care Review  Outcome: Ongoing (interventions implemented as appropriate)  Patient had an uneventful night.  Complained of pain to BLE x1.  Tylenol given.  Full relief attained.  BP low (80s/50) when asleep, otherwise VSS.  Bed bath given.  Monitoring continues.

## 2018-01-10 NOTE — PT/OT/SLP PROGRESS
"Physical Therapy Treatment    Patient Name:  Michael Johnson Jr.   MRN:  [id number]    Recommendations:     Discharge Recommendations:   (recommend SNF-pt wants to go home with HH)   Discharge Equipment Recommendations: walker, rolling, shower chair   Barriers to discharge: Decreased caregiver support    Assessment:     Michael Johnson Jr. is a 57 y.o. male admitted with a medical diagnosis of Seizure.  He presents with the following impairments/functional limitations:  pain, weakness, impaired endurance, impaired self care skills, impaired functional mobilty, gait instability, impaired balance, impaired cognition, decreased coordination, decreased lower extremity function, decreased safety awareness, decreased upper extremity function Concerned that pt is going to be bedbound when he gets home as his wife works during the day and it is doubtful that wife will be able to provide as much assist as pt requires . Discussed SNF versus home health but pt wants to go home     Rehab Prognosis:  good; patient would benefit from acute skilled PT services to address these deficits and reach maximum level of function.      Recent Surgery: * No surgery found *      Plan:     During this hospitalization, patient to be seen 6 x/week to address the above listed problems via gait training, therapeutic activities, therapeutic exercises  · Plan of Care Expires:  02/08/18   Plan of Care Reviewed with: patient    Subjective     Communicated with nursing prior to session.  Patient found sitting at EOB upon PT entry to room, agreeable to treatment.  Just arrived from ICU    Chief Complaint: B ankle pain  Patient comments/goals: states he doesn't let his wife help him cause he doesn't want to hurt her.  When discussed SNF versus HH pt stated "thats something to think about.  dont think I could do that though.  I really want to go home"  Pain/Comfort:  · Pain Rating 1: 9/10  · Location - Side 1: Bilateral  · Location - Orientation 1: " generalized  · Location 1: foot  · Pain Addressed 1: Distraction, Reposition, Cessation of Activity, Nurse notified  · Pain Rating Post-Intervention 1: 9/10    Patients cultural, spiritual, Adventism conflicts given the current situation: none reported    Objective:     Patient found with: peripheral IV     General Precautions: Standard, fall, seizure   Orthopedic Precautions:N/A   Braces: N/A     Functional Mobility:  · Bed Mobility: found at EOB, wants to stay in sitting EOB  · Transfers:     · Sit to Stand:  moderate assistance with rolling walker  · Gait: 2 sidesteps only.  Pt unable to amb 2* pain.  Stooped posture-unable to get into full upright posture  · Balance: poor with RW      AM-PAC 6 CLICK MOBILITY  Turning over in bed (including adjusting bedclothes, sheets and blankets)?: 3  Sitting down on and standing up from a chair with arms (e.g., wheelchair, bedside commode, etc.): 2  Moving from lying on back to sitting on the side of the bed?: 2  Moving to and from a bed to a chair (including a wheelchair)?: 1  Need to walk in hospital room?: 1  Climbing 3-5 steps with a railing?: 1  Total Score: 10       Therapeutic Activities and Exercises:   LE ex in sitting   Hip flex  LAQ.    Instructed in supine ex to perform once gets back to bed.  Discussed SNF versus home health.      Patient left left sitting at EOB with all lines intact, call button in reach and nursing notified..    GOALS:    Physical Therapy Goals        Problem: Physical Therapy Goal    Goal Priority Disciplines Outcome Goal Variances Interventions   Physical Therapy Goal     PT/OT, PT Ongoing (interventions implemented as appropriate)     Description:  Goals to be met by: 18     Patient will increase functional independence with mobility by performin. Supine to sit with SBA  2. Sit to supine with Stand-by Assistance  3. Sit to stand transfer with contact guard Assistance  4. Gait  x 25' feet with Contact Guard Assistance using  Rolling Walker.   5. Ascend/descend 5 stair with right Handrails Minimal Assistance using crutch.                       Time Tracking:     PT Received On: 01/10/18  PT Start Time: 1405     PT Stop Time: 1446  PT Total Time (min): 41 min     Billable Minutes: Therapeutic Activity 32 and Therapeutic Exercise 9    Treatment Type: Treatment  PT/PTA: PT           Nuria Patrick, PT  01/10/2018

## 2018-01-10 NOTE — PROGRESS NOTES
"[facility] Hospital Medicine  Progress Note    Patient Name: Michael Johnson Jr.  MRN: [id number]  Patient Class: IP- Inpatient   Admission Date: 1/8/2018  Length of Stay: 0 days  Attending Physician: Latrell Upton MD  Primary Care Provider: Zunilda Bolden NP        Subjective:     Principal Problem:Seizure    HPI:  This is a 57 y.o. Male with a history of seizure who presents via EMS with complaint of seizure that occurred prior to arrival. Patient was outside sitting down when seizure witnessed by wife and son. Wife reports generalized "full body" seizure last lasted approximately four minutes. After seizure patient was unresponsive. When paramedics arrived patient had a heart rate of 220 bpm en route. Paramedics gave 6 mg and 12 mg of andesine were given without changes. Diltiazem was given and heart rate decreased to 109 bpm. Wife states the patient has been dehydrated for about three days. Patient states he drinks a pint of vodka a day. Patient states he has stopped drinking, and his last drink was four days ago. Patient states a history of seizure two months. Patient was seen at [facility] for outpatient EEG. Wife reports EEG was normal. Patient denies any fever, chills, nausea, vomiting, numbness, weakness, or confusion.    Hospital Course:  No notes on file    Interval History: Doing okay.  Has some of his chronic foot pain.       Review of Systems   Constitutional: Positive for fatigue. Negative for appetite change and fever.   Respiratory: Negative for cough and shortness of breath.    Cardiovascular: Negative for chest pain and palpitations.   Gastrointestinal: Negative for nausea and vomiting.   Skin: Negative for color change and rash.   Neurological: Negative for dizziness and weakness.   Psychiatric/Behavioral: Negative for agitation and confusion.     Objective:     Vital Signs (Most Recent):  Temp: 98.7 °F (37.1 °C) (01/10/18 0725)  Pulse: 90 (01/10/18 0700)  Resp: 20 (01/10/18 " 0700)  BP: 110/70 (01/10/18 0700)  SpO2: 98 % (01/10/18 0700) Vital Signs (24h Range):  Temp:  [97.8 °F (36.6 °C)-99.8 °F (37.7 °C)] 98.7 °F (37.1 °C)  Pulse:  [78-96] 90  Resp:  [15-31] 20  SpO2:  [91 %-100 %] 98 %  BP: ()/(47-87) 110/70     Weight: 90.7 kg (200 lb)  Body mass index is 26.39 kg/m².    Intake/Output Summary (Last 24 hours) at 01/10/18 0807  Last data filed at 01/10/18 0600   Gross per 24 hour   Intake           5077.5 ml   Output              360 ml   Net           4717.5 ml      Physical Exam   Constitutional: He is oriented to person, place, and time. He appears well-developed and well-nourished.   HENT:   Head: Normocephalic and atraumatic.   Eyes: Conjunctivae are normal. Pupils are equal, round, and reactive to light.   Neck: Normal range of motion. Neck supple.   Cardiovascular: Normal rate and regular rhythm.    Pulmonary/Chest: Effort normal and breath sounds normal.   Abdominal: Soft.   Musculoskeletal:   - LLE smaller diameter than RLE.     Neurological: He is alert and oriented to person, place, and time.   Skin: Skin is warm and dry.       Significant Labs:   CBC:   Recent Labs  Lab 01/08/18  1840 01/09/18  0604 01/10/18  0341   WBC 10.81 8.70 7.20   HGB 11.6* 10.7* 9.1*   HCT 33.7* 31.3* 26.8*   * 97* 80*     CMP:   Recent Labs  Lab 01/08/18  1840 01/09/18  0604 01/09/18  1228 01/09/18  1416 01/10/18  0341    133* 140 131* 136   K 2.4* 2.9* 2.3* 4.9 3.2*   CL 96 98 119* 102 105   CO2 22* 25 19* 23 22*   GLU 84 80 60* 109 81   BUN 15 15 11 16 16   CREATININE 1.6* 1.4 0.8 1.5* 1.3   CALCIUM 8.4* 8.0* 4.8* 8.0* 7.5*   PROT 7.9 7.3  --   --  6.5   ALBUMIN 3.4* 3.2*  --   --  2.8*   BILITOT 1.2* 1.1*  --   --  0.6   ALKPHOS 70 63  --   --  65   AST 79* 94*  --   --  87*   ALT 46* 44  --   --  40   ANIONGAP 18* 10 2* 6* 9   EGFRNONAA 48* 56* >60 52* >60     Cardiac Markers: No results for input(s): CKMB, MYOGLOBIN, BNP, TROPISTAT in the last 48 hours.    Significant  Imaging: I have reviewed all pertinent imaging results/findings within the past 24 hours.   Imaging Results          CT Head Without Contrast (Final result)  Result time 01/08/18 19:42:01    Final result by Ancelmo Irby MD (01/08/18 19:42:01)                 Impression:        No acute intracranial abnormalities.    Sinus disease.      Electronically signed by: ANCELMO IRBY MD  Date:     01/08/18  Time:    19:42              Narrative:    History:     Comparison: March 16, 2017    Technique:    Axial images of the brain were obtained at 5-mm intervals from the skull base to the vertex without the administration of contrast.    Findings:    The brain parenchyma appears normal for age with good corticomedullary differentiation.  There is no evidence of acute infarct, hemorrhage, or mass.  The ventricular system is normal in size.  No mass-effect or midline shift.  There are no abnormal extra-axial fluid collections.      Sinus disease unchanged from prior.  The mastoid air cells are clear.      The calvarium appears intact.  .                             X-Ray Chest AP Portable (Final result)  Result time 01/08/18 19:06:43    Final result by Gilberto Wallace MD (01/08/18 19:06:43)                 Impression:        No detrimental change or radiographic acute intrathoracic process seen on this single view.      Electronically signed by: GILBERTO WALLACE MD, MD  Date:     01/08/18  Time:    19:06              Narrative:    COMPARISON: Chest radiograph 3/16/17    FINDINGS: AP portable upright view of the chest. Monitoring leads overlie the chest. Patient somewhat rotated. Nonspecific elevation of the right hemidiaphragm. No large consolidation or new focal opacity. No large pleural effusion or pneumothorax.  Cardiomediastinal silhouette appears stable without evidence of failure.  No acute osseous process seen.   PA and lateral views can be obtained.                            Assessment/Plan:      * Seizure    -  Patient had witnessed seizure and subsequently had another in ER.  Believe due to alcohol withdrawal.   - Librium 25 q 8 to PRN.  - Neurology noted:  The patient has had multiple seizures in the past and on this admission when he had 2 seizures, one at home and one in the emergency room.     This may be related to alcohol withdrawal however in view of multiple seizures would recommend continuation of Keppra 500 mg twice a day.  Discussed with patient regarding getting into a substance abuse/alcohol abuse program and has continued alcohol abuse reports high risk for seizures.  He is also advised that he should not be driving until he is seizure-free for over 6 months.  Will not repeat his EEG which was was done in the past  - Emphasized no driving to patient as well as neurology follow up and he expressed understanding.   - Refer to Dr. Andres  - Monitor.          Rhabdomyolysis    - CPK 5149 > pending  - Monitor            Unsteady gait    - PT OT          Alcohol abuse    - Counseled to cease  - Thiamine 100 mg daily            Troponin level elevated    - Noted in past   - Check CKs  - Cardiology evaluation.            Macrocytic anemia    - Folate borderline low.   - Supplement.             Hypomagnesemia    - Likely due to EtOH abuse.  - Monitor and replace electrolytes PRN.             Hyperlipidemia    - Lipid Panel pending            Hypokalemia    - Replace PRN.             Acute on chronic kidney failure    - Improving,  - Monitor.          Hypertension    - Coreg, cardizem continued  - Monitor.              VTE Risk Mitigation         Ordered     Place sequential compression device  Until discontinued      01/09/18 0006     Medium Risk of VTE  Once      01/09/18 0006     Place sequential compression device  Until discontinued      01/09/18 0006     Place MIKA hose  Until discontinued      01/09/18 0006     enoxaparin injection 40 mg  Daily     Route:  Subcutaneous        01/09/18 Helio VALDEZ  MD Won  Department of Hospital Medicine   [facility]

## 2018-01-10 NOTE — PT/OT/SLP PROGRESS
Occupational Therapy   Treatment    Name: Michael Johnson Jr.  MRM: [id number]  Admitting Diagnosis:  Seizure       Recommendations:     Discharge Recommendations: home, home health OT, home health PT  Discharge Equipment Recommendations:  walker, rolling, shower chair  Barriers to discharge:  Decreased caregiver support    Subjective     Communicated with: nurse and patient prior to session.  He was found supine in bed agreeable to OTC treatment  Pain/Comfort:  · Pain Rating 1: 8/10 (supine in bed and in chair at beginning and end of session)  · Location - Side 1: Bilateral  · Location - Orientation 1: generalized  · Location 1: foot  · Pain Addressed 1: Reposition, Cessation of Activity  · Pain Rating Post-Intervention 1: 10/10 (wnen standing and walking)    Objective:     Patient found with: blood pressure cuff, telemetry, peripheral IV, pulse ox (continuous)    General Precautions: Standard, fall, seizure   Orthopedic Precautions:N/A   Braces: N/A     Occupational Performance:    Bed Mobility:    · Min A supine>sit EOB (HOB raised)     Functional Mobility/Transfers:  · Mod A sit>stand/Min A stand>sit with RW  · Min A bed>chair transfer with RW (assist for RW placement and balance)    Activities of Daily Living:  · MI self-feeding (drank water seated in chair)  · Supervision G/H (wash hands after toile ting) seated in chair  · Min A toilet hygiene (soiled bed prior to standing, pt stood with RW, legs against chair with occasional assist for LE balance)  · Total A UBD (don hospital gown as cammie)-extra assist needed due to bilateral IV sites in use    Patient left up in chair with all lines intact, call button in reach and nurse notified    AM PAC 6 Click:  AM PAC Total Score: 16    Treatment & Education:  UE yellow Theraband exercise (10 reps shoulder planes of movement BUE) done seated in chair  Education:    Assessment:     Michael Johnson Jr. is a 57 y.o. male with a medical diagnosis of Seizure.  He presents with   Performance deficits affecting function are weakness, impaired endurance, impaired self care skills, impaired functional mobilty, gait instability, impaired balance, decreased lower extremity function, pain, decreased ROM, decreased upper extremity function.effected performance during the session.    He was Min A supine>sit EOB, Mod A sit>stand, Min A bed>chair transfer with RW (assist for balance and RW placement).  He performed toilet hygiene Min A standing with RW, Supervision G/H (wash hands seated in chair), Total A UBD (due to bilateral IV placement in use) and performed UE exercise during the session.   Continuation of OT treatment is needed to maximize function while in the hospital.     Rehab Prognosis:  good; patient would benefit from acute skilled OT services to address these deficits and reach maximum level of function.       Plan:     Patient to be seen 5 x/week to address the above listed problems via self-care/home management, therapeutic activities, therapeutic exercises  · Plan of Care Expires: 02/08/18  · Plan of Care Reviewed with: patient    This Plan of care has been discussed with the patient who was involved in its development and understands and is in agreement with the identified goals and treatment plan    GOALS:    Occupational Therapy Goals        Problem: Occupational Therapy Goal    Goal Priority Disciplines Outcome Interventions   Occupational Therapy Goal     OT, PT/OT Ongoing (interventions implemented as appropriate)    Description:  Goals to be met by: 2/8/2018     Patient will increase functional independence with ADLs by performing:    UE Dressing with Minimal Assistance.  LE Dressing with Minimal Assistance.  Grooming while standing at sink with Contact Guard Assistance.  Toileting from bedside commode with Minimal Assistance for hygiene and clothing management.   Supine to sit with Stand-by Assistance.  Stand pivot transfers with Minimal Assistance. (MET with RW use  1/10/18)  Toilet transfer to bedside commode with Minimal Assistance.                       Time Tracking:     OT Date of Treatment: 01/10/18  OT Start Time: 1034  OT Stop Time: 1120  OT Total Time (min): 46 min    Billable Minutes:Self Care/Home Management 16'  Therapeutic Activity 16  Therapeutic Exercise 14    AMANDA Hughes  1/10/2018

## 2018-01-11 LAB
ALBUMIN SERPL BCP-MCNC: 2.6 G/DL
ALBUMIN SERPL BCP-MCNC: 2.8 G/DL
ALP SERPL-CCNC: 79 U/L
ALP SERPL-CCNC: 80 U/L
ALT SERPL W/O P-5'-P-CCNC: 46 U/L
ALT SERPL W/O P-5'-P-CCNC: 50 U/L
ANION GAP SERPL CALC-SCNC: 7 MMOL/L
ANION GAP SERPL CALC-SCNC: 7 MMOL/L
AST SERPL-CCNC: 112 U/L
AST SERPL-CCNC: 98 U/L
BASOPHILS # BLD AUTO: 0.01 K/UL
BASOPHILS # BLD AUTO: 0.01 K/UL
BASOPHILS NFR BLD: 0.1 %
BASOPHILS NFR BLD: 0.2 %
BILIRUB SERPL-MCNC: 0.7 MG/DL
BILIRUB SERPL-MCNC: 1.1 MG/DL
BUN SERPL-MCNC: 12 MG/DL
BUN SERPL-MCNC: 9 MG/DL
CALCIUM SERPL-MCNC: 7.2 MG/DL
CALCIUM SERPL-MCNC: 7.5 MG/DL
CHLORIDE SERPL-SCNC: 103 MMOL/L
CHLORIDE SERPL-SCNC: 104 MMOL/L
CK SERPL-CCNC: 4352 U/L
CO2 SERPL-SCNC: 18 MMOL/L
CO2 SERPL-SCNC: 19 MMOL/L
CREAT SERPL-MCNC: 1.1 MG/DL
CREAT SERPL-MCNC: 1.1 MG/DL
DIFFERENTIAL METHOD: ABNORMAL
DIFFERENTIAL METHOD: ABNORMAL
EOSINOPHIL # BLD AUTO: 0 K/UL
EOSINOPHIL # BLD AUTO: 0.1 K/UL
EOSINOPHIL NFR BLD: 0 %
EOSINOPHIL NFR BLD: 0.8 %
ERYTHROCYTE [DISTWIDTH] IN BLOOD BY AUTOMATED COUNT: 14.4 %
ERYTHROCYTE [DISTWIDTH] IN BLOOD BY AUTOMATED COUNT: 14.5 %
EST. GFR  (AFRICAN AMERICAN): >60 ML/MIN/1.73 M^2
EST. GFR  (AFRICAN AMERICAN): >60 ML/MIN/1.73 M^2
EST. GFR  (NON AFRICAN AMERICAN): >60 ML/MIN/1.73 M^2
EST. GFR  (NON AFRICAN AMERICAN): >60 ML/MIN/1.73 M^2
FLUAV AG SPEC QL IA: NEGATIVE
FLUBV AG SPEC QL IA: NEGATIVE
GLUCOSE SERPL-MCNC: 118 MG/DL
GLUCOSE SERPL-MCNC: 94 MG/DL
HCT VFR BLD AUTO: 25.7 %
HCT VFR BLD AUTO: 27.3 %
HGB BLD-MCNC: 8.7 G/DL
HGB BLD-MCNC: 9.3 G/DL
LYMPHOCYTES # BLD AUTO: 0.3 K/UL
LYMPHOCYTES # BLD AUTO: 0.6 K/UL
LYMPHOCYTES NFR BLD: 3.8 %
LYMPHOCYTES NFR BLD: 9.8 %
MAGNESIUM SERPL-MCNC: 1 MG/DL
MAGNESIUM SERPL-MCNC: 1.5 MG/DL
MCH RBC QN AUTO: 38.8 PG
MCH RBC QN AUTO: 38.9 PG
MCHC RBC AUTO-ENTMCNC: 33.9 G/DL
MCHC RBC AUTO-ENTMCNC: 34.1 G/DL
MCV RBC AUTO: 114 FL
MCV RBC AUTO: 115 FL
MONOCYTES # BLD AUTO: 0.9 K/UL
MONOCYTES # BLD AUTO: 0.9 K/UL
MONOCYTES NFR BLD: 11.6 %
MONOCYTES NFR BLD: 13.4 %
NEUTROPHILS # BLD AUTO: 4.9 K/UL
NEUTROPHILS # BLD AUTO: 6.1 K/UL
NEUTROPHILS NFR BLD: 75.8 %
NEUTROPHILS NFR BLD: 84.1 %
PHOSPHATE SERPL-MCNC: 1.3 MG/DL
PHOSPHATE SERPL-MCNC: 2.6 MG/DL
PLATELET # BLD AUTO: 82 K/UL
PLATELET # BLD AUTO: 99 K/UL
PMV BLD AUTO: 11.1 FL
PMV BLD AUTO: 11.6 FL
POTASSIUM SERPL-SCNC: 3.6 MMOL/L
POTASSIUM SERPL-SCNC: 3.9 MMOL/L
PROT SERPL-MCNC: 6 G/DL
PROT SERPL-MCNC: 6.8 G/DL
RBC # BLD AUTO: 2.24 M/UL
RBC # BLD AUTO: 2.39 M/UL
SODIUM SERPL-SCNC: 128 MMOL/L
SODIUM SERPL-SCNC: 130 MMOL/L
SPECIMEN SOURCE: NORMAL
WBC # BLD AUTO: 6.43 K/UL
WBC # BLD AUTO: 7.31 K/UL

## 2018-01-11 PROCEDURE — 99900035 HC TECH TIME PER 15 MIN (STAT)

## 2018-01-11 PROCEDURE — 63600175 PHARM REV CODE 636 W HCPCS: Performed by: HOSPITALIST

## 2018-01-11 PROCEDURE — 63600175 PHARM REV CODE 636 W HCPCS: Performed by: NURSE PRACTITIONER

## 2018-01-11 PROCEDURE — 84100 ASSAY OF PHOSPHORUS: CPT | Mod: 91

## 2018-01-11 PROCEDURE — 25000003 PHARM REV CODE 250: Performed by: NURSE PRACTITIONER

## 2018-01-11 PROCEDURE — 82550 ASSAY OF CK (CPK): CPT

## 2018-01-11 PROCEDURE — 94761 N-INVAS EAR/PLS OXIMETRY MLT: CPT

## 2018-01-11 PROCEDURE — 99232 SBSQ HOSP IP/OBS MODERATE 35: CPT | Mod: ,,, | Performed by: HOSPITALIST

## 2018-01-11 PROCEDURE — 11000001 HC ACUTE MED/SURG PRIVATE ROOM

## 2018-01-11 PROCEDURE — 83735 ASSAY OF MAGNESIUM: CPT | Mod: 91

## 2018-01-11 PROCEDURE — 87186 SC STD MICRODIL/AGAR DIL: CPT

## 2018-01-11 PROCEDURE — 36415 COLL VENOUS BLD VENIPUNCTURE: CPT

## 2018-01-11 PROCEDURE — 87040 BLOOD CULTURE FOR BACTERIA: CPT | Mod: 59

## 2018-01-11 PROCEDURE — 25000003 PHARM REV CODE 250: Performed by: HOSPITALIST

## 2018-01-11 PROCEDURE — 87400 INFLUENZA A/B EACH AG IA: CPT

## 2018-01-11 PROCEDURE — 94799 UNLISTED PULMONARY SVC/PX: CPT

## 2018-01-11 PROCEDURE — 80053 COMPREHEN METABOLIC PANEL: CPT | Mod: 91

## 2018-01-11 PROCEDURE — 85025 COMPLETE CBC W/AUTO DIFF WBC: CPT | Mod: 91

## 2018-01-11 PROCEDURE — 97535 SELF CARE MNGMENT TRAINING: CPT

## 2018-01-11 PROCEDURE — 87077 CULTURE AEROBIC IDENTIFY: CPT | Mod: 59

## 2018-01-11 RX ORDER — SODIUM,POTASSIUM PHOSPHATES 280-250MG
1 POWDER IN PACKET (EA) ORAL
Status: COMPLETED | OUTPATIENT
Start: 2018-01-11 | End: 2018-01-11

## 2018-01-11 RX ORDER — LEVETIRACETAM 500 MG/1
500 TABLET ORAL 2 TIMES DAILY
Status: DISCONTINUED | OUTPATIENT
Start: 2018-01-11 | End: 2018-01-21 | Stop reason: HOSPADM

## 2018-01-11 RX ADMIN — Medication 400 MG: at 10:01

## 2018-01-11 RX ADMIN — POTASSIUM & SODIUM PHOSPHATES POWDER PACK 280-160-250 MG 1 PACKET: 280-160-250 PACK at 05:01

## 2018-01-11 RX ADMIN — POTASSIUM & SODIUM PHOSPHATES POWDER PACK 280-160-250 MG 1 PACKET: 280-160-250 PACK at 12:01

## 2018-01-11 RX ADMIN — LEVETIRACETAM 500 MG: 5 INJECTION INTRAVENOUS at 10:01

## 2018-01-11 RX ADMIN — GABAPENTIN 300 MG: 300 CAPSULE ORAL at 05:01

## 2018-01-11 RX ADMIN — SODIUM CHLORIDE: 0.9 INJECTION, SOLUTION INTRAVENOUS at 05:01

## 2018-01-11 RX ADMIN — FOLIC ACID 1 MG: 1 TABLET ORAL at 10:01

## 2018-01-11 RX ADMIN — THIAMINE HYDROCHLORIDE 100 MG: 100 INJECTION, SOLUTION INTRAMUSCULAR; INTRAVENOUS at 11:01

## 2018-01-11 RX ADMIN — GABAPENTIN 300 MG: 300 CAPSULE ORAL at 09:01

## 2018-01-11 RX ADMIN — LEVETIRACETAM 500 MG: 500 TABLET ORAL at 08:01

## 2018-01-11 RX ADMIN — GABAPENTIN 300 MG: 300 CAPSULE ORAL at 02:01

## 2018-01-11 RX ADMIN — CARVEDILOL 25 MG: 12.5 TABLET, FILM COATED ORAL at 10:01

## 2018-01-11 RX ADMIN — POTASSIUM CHLORIDE 40 MEQ: 1500 TABLET, EXTENDED RELEASE ORAL at 10:01

## 2018-01-11 RX ADMIN — CHLORDIAZEPOXIDE HYDROCHLORIDE 25 MG: 25 CAPSULE ORAL at 10:01

## 2018-01-11 RX ADMIN — ALLOPURINOL 300 MG: 300 TABLET ORAL at 10:01

## 2018-01-11 RX ADMIN — MAGNESIUM SULFATE HEPTAHYDRATE 3 G: 500 INJECTION, SOLUTION INTRAMUSCULAR; INTRAVENOUS at 11:01

## 2018-01-11 RX ADMIN — ASPIRIN 81 MG: 81 TABLET, COATED ORAL at 10:01

## 2018-01-11 RX ADMIN — ACETAMINOPHEN 650 MG: 325 TABLET ORAL at 03:01

## 2018-01-11 RX ADMIN — DILTIAZEM HYDROCHLORIDE 180 MG: 180 CAPSULE, COATED, EXTENDED RELEASE ORAL at 10:01

## 2018-01-11 RX ADMIN — POTASSIUM & SODIUM PHOSPHATES POWDER PACK 280-160-250 MG 1 PACKET: 280-160-250 PACK at 08:01

## 2018-01-11 RX ADMIN — ENOXAPARIN SODIUM 40 MG: 100 INJECTION SUBCUTANEOUS at 04:01

## 2018-01-11 NOTE — SUBJECTIVE & OBJECTIVE
Interval History:   Doing okay.  Discussed no driving 6 months with wife in room.     Review of Systems   Constitutional: Negative for appetite change, fatigue and fever.   Respiratory: Negative for cough and shortness of breath.    Cardiovascular: Negative for chest pain and palpitations.   Gastrointestinal: Negative for nausea and vomiting.   Skin: Negative for color change and rash.   Neurological: Negative for dizziness and weakness.   Psychiatric/Behavioral: Negative for agitation and confusion.     Objective:     Vital Signs (Most Recent):  Temp: 100.3 °F (37.9 °C) (01/11/18 1004)  Pulse: (!) 114 (01/11/18 1100)  Resp: (!) 28 (01/11/18 1004)  BP: 121/74 (01/11/18 1004)  SpO2: (!) 92 % (01/11/18 1004) Vital Signs (24h Range):  Temp:  [98.4 °F (36.9 °C)-100.3 °F (37.9 °C)] 100.3 °F (37.9 °C)  Pulse:  [] 114  Resp:  [18-28] 28  SpO2:  [92 %-99 %] 92 %  BP: ()/(61-80) 121/74     Weight: 90.7 kg (200 lb)  Body mass index is 26.39 kg/m².    Intake/Output Summary (Last 24 hours) at 01/11/18 1148  Last data filed at 01/11/18 0711   Gross per 24 hour   Intake             3825 ml   Output             1350 ml   Net             2475 ml      Physical Exam   Constitutional: He is oriented to person, place, and time. He appears well-developed and well-nourished.   HENT:   Head: Normocephalic and atraumatic.   Eyes: Conjunctivae are normal. Pupils are equal, round, and reactive to light.   Neck: Normal range of motion. Neck supple.   Cardiovascular: Normal rate and regular rhythm.    Pulmonary/Chest: Effort normal and breath sounds normal.   Abdominal: Soft.   Musculoskeletal:   - LLE smaller diameter than RLE.     Neurological: He is alert and oriented to person, place, and time.   Skin: Skin is warm and dry.       Significant Labs:   BMP:   Recent Labs  Lab 01/11/18  0419   GLU 94   *   K 3.6      CO2 19*   BUN 12   CREATININE 1.1   CALCIUM 7.2*   MG 1.0*     CBC:   Recent Labs  Lab 01/10/18  0343  01/11/18  0419   WBC 7.20 6.43   HGB 9.1* 8.7*   HCT 26.8* 25.7*   PLT 80* 82*     Cardiac Markers: No results for input(s): CKMB, MYOGLOBIN, BNP, TROPISTAT in the last 48 hours.  Magnesium:   Recent Labs  Lab 01/09/18  1416 01/10/18  0341 01/11/18  0419   MG 2.0 1.5* 1.0*       Significant Imaging: I have reviewed all pertinent imaging results/findings within the past 24 hours.

## 2018-01-11 NOTE — PT/OT/SLP PROGRESS
"Occupational Therapy   Treatment    Name: Michael Johnson Jr.  MRN: [id number]  Admitting Diagnosis:  Seizure       Recommendations:     Discharge Recommendations: nursing facility, skilled (pt wanting to go home with HH)  Discharge Equipment Recommendations:  walker, rolling, shower chair  Barriers to discharge:  Decreased caregiver support    Subjective     Communicated with: RN prior to session.  Pt reports, "I'm tired and cold."    Pain/Comfort:  · Pain Rating 1: 0/10  · Pain Rating Post-Intervention 1: 0/10    Objective:     Patient found with: peripheral IV, telemetry    General Precautions: Standard, fall, seizure   Orthopedic Precautions:N/A   Braces: N/A     Occupational Performance:    Bed Mobility:    · Patient completed Scooting/Bridging with maximal assistance  · Patient completed Supine to Sit with maximal assistance     Functional Mobility/Transfers:  · Patient completed Sit <> Stand Transfer with moderate assistance  with  no assistive device   · Patient completed Bed <> Chair Transfer using Stand Pivot technique with moderate assistance with no assistive device    Activities of Daily Living:  · Feeding:  minimum assistance for cutting food  · Grooming: minimum assistance to brush teeth and wash face from seated position in bedside chair  · Bathing: moderate assistance pt able to bathe 5/10 body parts- required (A) for buttocks, bilateral feet and thoroughness of trunk  mod (A) standing balance a  · UB Dressing: total assistance Pt with B UE peripheral IV  · LB Dressing: total assistance to doff/don jany socks    Patient left up in chair with all lines intact, call button in reach, RN notified and wife present    [facility] 6 Click:  [facility] Total Score: 14    Treatment & Education:  Pt educated on importance of OOB mobility- initially hesitant because he was cold and tired.     Discussed D/C recs for SNF vs HH secondary to pt's increased caregiver burdens. Wife and pt receptive, however will need time to " discuss.   Education:    Assessment:     Michael Johnson Jr. is a 57 y.o. male with a medical diagnosis of Seizure. Pt is continuing to require mod (A) for sit>stand transfers and dificulty progressing his feet throughout a simple stand-pivot. Pt required increased (A) with his ADLs as well- mostly min-mod (A). Wife present therefore discussed pt's increased caregiver burden and concerns regarding their family's ability to accommodate. At this time, changing pt's D/C recs for SNF vs HH. Pt more open to idea, however will need to be reinforced. Performance deficits affecting function are weakness, impaired endurance, impaired self care skills, impaired functional mobilty, gait instability, impaired balance, impaired cognition, decreased upper extremity function, decreased lower extremity function, decreased safety awareness, abnormal tone, decreased ROM, impaired skin, edema, impaired cardiopulmonary response to activity.      Rehab Prognosis:  Fair+ ; patient would benefit from acute skilled OT services to address these deficits and reach maximum level of function.       Plan:     Patient to be seen 5 x/week to address the above listed problems via self-care/home management, therapeutic activities, therapeutic exercises  · Plan of Care Expires: 02/08/18  · Plan of Care Reviewed with: patient    This Plan of care has been discussed with the patient who was involved in its development and understands and is in agreement with the identified goals and treatment plan    GOALS:    Occupational Therapy Goals        Problem: Occupational Therapy Goal    Goal Priority Disciplines Outcome Interventions   Occupational Therapy Goal     OT, PT/OT Ongoing (interventions implemented as appropriate)    Description:  Goals to be met by: 2/8/2018     Patient will increase functional independence with ADLs by performing:    UE Dressing with Minimal Assistance.  LE Dressing with Minimal Assistance.  Grooming while standing at sink with  Contact Guard Assistance.  Toileting from bedside commode with Minimal Assistance for hygiene and clothing management.   Supine to sit with Stand-by Assistance.  Stand pivot transfers with Minimal Assistance. (MET with RW use 1/10/18)  Toilet transfer to bedside commode with Minimal Assistance.                       Time Tracking:     OT Date of Treatment: 01/11/18  OT Start Time: 1348  OT Stop Time: 1427  OT Total Time (min): 39 min    Billable Minutes:Self Care/Home Management 39    Delmi Conteh OTR/L  1/11/2018

## 2018-01-11 NOTE — PROGRESS NOTES
"[facility] Hospital Medicine  Progress Note    Patient Name: Michael Johnson Jr.  MRN: [id number]  Patient Class: IP- Inpatient   Admission Date: 1/8/2018  Length of Stay: 1 days  Attending Physician: Latrell Upton MD  Primary Care Provider: Zunilda Bolden NP        Subjective:     Principal Problem:Seizure    HPI:  This is a 57 y.o. Male with a history of seizure who presents via EMS with complaint of seizure that occurred prior to arrival. Patient was outside sitting down when seizure witnessed by wife and son. Wife reports generalized "full body" seizure last lasted approximately four minutes. After seizure patient was unresponsive. When paramedics arrived patient had a heart rate of 220 bpm en route. Paramedics gave 6 mg and 12 mg of andesine were given without changes. Diltiazem was given and heart rate decreased to 109 bpm. Wife states the patient has been dehydrated for about three days. Patient states he drinks a pint of vodka a day. Patient states he has stopped drinking, and his last drink was four days ago. Patient states a history of seizure two months. Patient was seen at [facility] for outpatient EEG. Wife reports EEG was normal. Patient denies any fever, chills, nausea, vomiting, numbness, weakness, or confusion.    Hospital Course:  No notes on file    Interval History:   Doing okay.  Discussed no driving 6 months with wife in room.     Review of Systems   Constitutional: Negative for appetite change, fatigue and fever.   Respiratory: Negative for cough and shortness of breath.    Cardiovascular: Negative for chest pain and palpitations.   Gastrointestinal: Negative for nausea and vomiting.   Skin: Negative for color change and rash.   Neurological: Negative for dizziness and weakness.   Psychiatric/Behavioral: Negative for agitation and confusion.     Objective:     Vital Signs (Most Recent):  Temp: 100.3 °F (37.9 °C) (01/11/18 1004)  Pulse: (!) 114 (01/11/18 1100)  Resp: (!) 28 " (01/11/18 1004)  BP: 121/74 (01/11/18 1004)  SpO2: (!) 92 % (01/11/18 1004) Vital Signs (24h Range):  Temp:  [98.4 °F (36.9 °C)-100.3 °F (37.9 °C)] 100.3 °F (37.9 °C)  Pulse:  [] 114  Resp:  [18-28] 28  SpO2:  [92 %-99 %] 92 %  BP: ()/(61-80) 121/74     Weight: 90.7 kg (200 lb)  Body mass index is 26.39 kg/m².    Intake/Output Summary (Last 24 hours) at 01/11/18 1148  Last data filed at 01/11/18 0711   Gross per 24 hour   Intake             3825 ml   Output             1350 ml   Net             2475 ml      Physical Exam   Constitutional: He is oriented to person, place, and time. He appears well-developed and well-nourished.   HENT:   Head: Normocephalic and atraumatic.   Eyes: Conjunctivae are normal. Pupils are equal, round, and reactive to light.   Neck: Normal range of motion. Neck supple.   Cardiovascular: Normal rate and regular rhythm.    Pulmonary/Chest: Effort normal and breath sounds normal.   Abdominal: Soft.   Musculoskeletal:   - LLE smaller diameter than RLE.     Neurological: He is alert and oriented to person, place, and time.   Skin: Skin is warm and dry.       Significant Labs:   BMP:   Recent Labs  Lab 01/11/18  0419   GLU 94   *   K 3.6      CO2 19*   BUN 12   CREATININE 1.1   CALCIUM 7.2*   MG 1.0*     CBC:   Recent Labs  Lab 01/10/18  0341 01/11/18  0419   WBC 7.20 6.43   HGB 9.1* 8.7*   HCT 26.8* 25.7*   PLT 80* 82*     Cardiac Markers: No results for input(s): CKMB, MYOGLOBIN, BNP, TROPISTAT in the last 48 hours.  Magnesium:   Recent Labs  Lab 01/09/18  1416 01/10/18  0341 01/11/18  0419   MG 2.0 1.5* 1.0*       Significant Imaging: I have reviewed all pertinent imaging results/findings within the past 24 hours.       Assessment/Plan:      * Seizure    - Patient had witnessed seizure and subsequently had another in ER.  Believe due to alcohol withdrawal.   - Librium 25 q 8 to PRN.  - Neurology noted:  The patient has had multiple seizures in the past and on this  admission when he had 2 seizures, one at home and one in the emergency room.     This may be related to alcohol withdrawal however in view of multiple seizures would recommend continuation of Keppra 500 mg twice a day.  Discussed with patient regarding getting into a substance abuse/alcohol abuse program and has continued alcohol abuse reports high risk for seizures.  He is also advised that he should not be driving until he is seizure-free for over 6 months.  Will not repeat his EEG which was was done in the past  - Emphasized no driving to patient as well as neurology follow up and he expressed understanding.   - Refer to Dr. Andres  - Monitor.          Rhabdomyolysis    - CPK 5149 > 4352  - Monitor            Unsteady gait    - PT OT          Alcohol abuse    - Counseled to cease  - Thiamine 100 mg daily            Troponin level elevated    - Noted in past   - Cardiology noted:  Troponin Rise              Most certainly due to demand/supply issues.              Would be reasonable to do Stress test as outpatient.              May need to be Regadenoson MPI as difficulty walking due to pain in foot.            Macrocytic anemia    - Folate borderline low.   - Supplement.             Hypomagnesemia    - Likely due to EtOH abuse.  - Monitor and replace electrolytes PRN.             Hyperlipidemia    - Lipid Panel pending            Hypokalemia    - Replace PRN.             Acute on chronic kidney failure    - Improving,  - Monitor.          Hypertension    - Coreg, cardizem continued  - Monitor.              VTE Risk Mitigation         Ordered     Place sequential compression device  Until discontinued      01/09/18 0006     Medium Risk of VTE  Once      01/09/18 0006     Place sequential compression device  Until discontinued      01/09/18 0006     Place MIKA hose  Until discontinued      01/09/18 0006     enoxaparin injection 40 mg  Daily     Route:  Subcutaneous        01/09/18 0005              Latrell Upton,  MD  Department of Hospital Medicine   [facility]

## 2018-01-11 NOTE — PLAN OF CARE
Problem: Occupational Therapy Goal  Goal: Occupational Therapy Goal  Goals to be met by: 2/8/2018     Patient will increase functional independence with ADLs by performing:    UE Dressing with Minimal Assistance.  LE Dressing with Minimal Assistance.  Grooming while standing at sink with Contact Guard Assistance.  Toileting from bedside commode with Minimal Assistance for hygiene and clothing management.   Supine to sit with Stand-by Assistance.  Stand pivot transfers with Minimal Assistance. (MET with RW use 1/10/18)  Toilet transfer to bedside commode with Minimal Assistance.      Outcome: Ongoing (interventions implemented as appropriate)  Pt is making steady progress towards his OT goals. Goals remain appropriate at this time.     Delmi Conteh, OTR/L  1/11/2018

## 2018-01-11 NOTE — NURSING
No significant events overnight. Remains free from fall, injury, and skin breakdown. Voiding via urinal located at the bedside. IV fluids maintained. VSS on RA throughout the night. Tele maintained; all alarms active and audible. Neuro checks intact. TEDs/SCDs in place. Plan of care reviewed with patient and all questions answered. Bed low, locked w/ bed alarm on. Call light within reach. Purposeful rounding performed. Resting comfortably in bed, family at the bedside, other complaints at this time.

## 2018-01-11 NOTE — ASSESSMENT & PLAN NOTE
- Noted in past   - Cardiology noted:  Troponin Rise              Most certainly due to demand/supply issues.              Would be reasonable to do Stress test as outpatient.              May need to be Regadenoson MPI as difficulty walking due to pain in foot.

## 2018-01-11 NOTE — PT/OT/SLP PROGRESS
Physical Therapy      Patient Name:  Michael Johnson Jr.   MRN:  [id number]    Patient not seen today secondary to pt finishing up with OT at this time, and needing to eat lunch (2:20pm). Will follow-up later if time permits.    Hilda Reinoso, PTA

## 2018-01-12 PROBLEM — R50.9 FEVER: Status: ACTIVE | Noted: 2018-01-12

## 2018-01-12 PROBLEM — R78.81 GRAM-NEGATIVE BACTEREMIA: Status: ACTIVE | Noted: 2018-01-12

## 2018-01-12 LAB
ALBUMIN SERPL BCP-MCNC: 2.5 G/DL
ALP SERPL-CCNC: 65 U/L
ALT SERPL W/O P-5'-P-CCNC: 49 U/L
ANION GAP SERPL CALC-SCNC: 8 MMOL/L
AST SERPL-CCNC: 119 U/L
BACTERIA #/AREA URNS HPF: NORMAL /HPF
BASOPHILS # BLD AUTO: 0.01 K/UL
BASOPHILS NFR BLD: 0.1 %
BILIRUB SERPL-MCNC: 0.8 MG/DL
BILIRUB UR QL STRIP: NEGATIVE
BUN SERPL-MCNC: 15 MG/DL
CALCIUM SERPL-MCNC: 7.5 MG/DL
CHLORIDE SERPL-SCNC: 106 MMOL/L
CK SERPL-CCNC: 5339 U/L
CLARITY UR: CLEAR
CO2 SERPL-SCNC: 19 MMOL/L
COLOR UR: YELLOW
CREAT SERPL-MCNC: 1.3 MG/DL
DIFFERENTIAL METHOD: ABNORMAL
EOSINOPHIL # BLD AUTO: 0 K/UL
EOSINOPHIL NFR BLD: 0.5 %
ERYTHROCYTE [DISTWIDTH] IN BLOOD BY AUTOMATED COUNT: 14.4 %
EST. GFR  (AFRICAN AMERICAN): >60 ML/MIN/1.73 M^2
EST. GFR  (NON AFRICAN AMERICAN): >60 ML/MIN/1.73 M^2
GLUCOSE SERPL-MCNC: 98 MG/DL
GLUCOSE UR QL STRIP: NEGATIVE
HCT VFR BLD AUTO: 24.8 %
HGB BLD-MCNC: 8.4 G/DL
HGB UR QL STRIP: ABNORMAL
HYALINE CASTS #/AREA URNS LPF: 0 /LPF
KETONES UR QL STRIP: NEGATIVE
LEUKOCYTE ESTERASE UR QL STRIP: NEGATIVE
LYMPHOCYTES # BLD AUTO: 0.8 K/UL
LYMPHOCYTES NFR BLD: 9.7 %
MAGNESIUM SERPL-MCNC: 1.5 MG/DL
MCH RBC QN AUTO: 38.7 PG
MCHC RBC AUTO-ENTMCNC: 33.9 G/DL
MCV RBC AUTO: 114 FL
MICROSCOPIC COMMENT: NORMAL
MONOCYTES # BLD AUTO: 1.8 K/UL
MONOCYTES NFR BLD: 22.8 %
NEUTROPHILS # BLD AUTO: 5.3 K/UL
NEUTROPHILS NFR BLD: 66.9 %
NITRITE UR QL STRIP: NEGATIVE
PH UR STRIP: 7 [PH] (ref 5–8)
PHOSPHATE SERPL-MCNC: 3 MG/DL
PLATELET # BLD AUTO: 105 K/UL
PMV BLD AUTO: 10.7 FL
POTASSIUM SERPL-SCNC: 3.6 MMOL/L
PROT SERPL-MCNC: 6.1 G/DL
PROT UR QL STRIP: ABNORMAL
RBC # BLD AUTO: 2.17 M/UL
RBC #/AREA URNS HPF: 2 /HPF (ref 0–4)
SODIUM SERPL-SCNC: 133 MMOL/L
SP GR UR STRIP: 1.01 (ref 1–1.03)
URN SPEC COLLECT METH UR: ABNORMAL
UROBILINOGEN UR STRIP-ACNC: ABNORMAL EU/DL
WBC # BLD AUTO: 7.95 K/UL
WBC #/AREA URNS HPF: 0 /HPF (ref 0–5)

## 2018-01-12 PROCEDURE — 85025 COMPLETE CBC W/AUTO DIFF WBC: CPT

## 2018-01-12 PROCEDURE — 80053 COMPREHEN METABOLIC PANEL: CPT

## 2018-01-12 PROCEDURE — 25000003 PHARM REV CODE 250: Performed by: NURSE PRACTITIONER

## 2018-01-12 PROCEDURE — 63600175 PHARM REV CODE 636 W HCPCS: Performed by: NURSE PRACTITIONER

## 2018-01-12 PROCEDURE — 11000001 HC ACUTE MED/SURG PRIVATE ROOM

## 2018-01-12 PROCEDURE — 94640 AIRWAY INHALATION TREATMENT: CPT

## 2018-01-12 PROCEDURE — 25000003 PHARM REV CODE 250: Performed by: PHYSICIAN ASSISTANT

## 2018-01-12 PROCEDURE — 25000003 PHARM REV CODE 250: Performed by: HOSPITALIST

## 2018-01-12 PROCEDURE — 83735 ASSAY OF MAGNESIUM: CPT

## 2018-01-12 PROCEDURE — 36415 COLL VENOUS BLD VENIPUNCTURE: CPT

## 2018-01-12 PROCEDURE — 97530 THERAPEUTIC ACTIVITIES: CPT

## 2018-01-12 PROCEDURE — 87086 URINE CULTURE/COLONY COUNT: CPT

## 2018-01-12 PROCEDURE — 25000242 PHARM REV CODE 250 ALT 637 W/ HCPCS: Performed by: HOSPITALIST

## 2018-01-12 PROCEDURE — 99900035 HC TECH TIME PER 15 MIN (STAT)

## 2018-01-12 PROCEDURE — 94761 N-INVAS EAR/PLS OXIMETRY MLT: CPT

## 2018-01-12 PROCEDURE — 63600175 PHARM REV CODE 636 W HCPCS: Performed by: HOSPITALIST

## 2018-01-12 PROCEDURE — 82550 ASSAY OF CK (CPK): CPT

## 2018-01-12 PROCEDURE — 86038 ANTINUCLEAR ANTIBODIES: CPT

## 2018-01-12 PROCEDURE — 84100 ASSAY OF PHOSPHORUS: CPT

## 2018-01-12 PROCEDURE — 81000 URINALYSIS NONAUTO W/SCOPE: CPT

## 2018-01-12 RX ORDER — SODIUM CHLORIDE 9 MG/ML
INJECTION, SOLUTION INTRAVENOUS CONTINUOUS
Status: ACTIVE | OUTPATIENT
Start: 2018-01-12 | End: 2018-01-13

## 2018-01-12 RX ORDER — FUROSEMIDE 10 MG/ML
20 INJECTION INTRAMUSCULAR; INTRAVENOUS ONCE
Status: DISCONTINUED | OUTPATIENT
Start: 2018-01-12 | End: 2018-01-12

## 2018-01-12 RX ORDER — CARVEDILOL 3.12 MG/1
3.12 TABLET ORAL 2 TIMES DAILY WITH MEALS
Status: DISCONTINUED | OUTPATIENT
Start: 2018-01-13 | End: 2018-01-21 | Stop reason: HOSPADM

## 2018-01-12 RX ORDER — CARVEDILOL 3.12 MG/1
6.25 TABLET ORAL 2 TIMES DAILY WITH MEALS
Status: DISCONTINUED | OUTPATIENT
Start: 2018-01-12 | End: 2018-01-12

## 2018-01-12 RX ORDER — OSELTAMIVIR PHOSPHATE 75 MG/1
75 CAPSULE ORAL 2 TIMES DAILY
Status: DISCONTINUED | OUTPATIENT
Start: 2018-01-12 | End: 2018-01-13

## 2018-01-12 RX ORDER — ALBUTEROL SULFATE 0.83 MG/ML
2.5 SOLUTION RESPIRATORY (INHALATION) EVERY 4 HOURS PRN
Status: DISCONTINUED | OUTPATIENT
Start: 2018-01-12 | End: 2018-01-21 | Stop reason: HOSPADM

## 2018-01-12 RX ORDER — ACETAMINOPHEN 325 MG/1
325 TABLET ORAL ONCE
Status: COMPLETED | OUTPATIENT
Start: 2018-01-12 | End: 2018-01-12

## 2018-01-12 RX ADMIN — ACETAMINOPHEN 650 MG: 325 TABLET ORAL at 07:01

## 2018-01-12 RX ADMIN — ACETAMINOPHEN 650 MG: 325 TABLET ORAL at 09:01

## 2018-01-12 RX ADMIN — LEVETIRACETAM 500 MG: 500 TABLET ORAL at 08:01

## 2018-01-12 RX ADMIN — GABAPENTIN 300 MG: 300 CAPSULE ORAL at 05:01

## 2018-01-12 RX ADMIN — POTASSIUM CHLORIDE 40 MEQ: 1500 TABLET, EXTENDED RELEASE ORAL at 09:01

## 2018-01-12 RX ADMIN — PIPERACILLIN AND TAZOBACTAM 4.5 G: 4; .5 INJECTION, POWDER, FOR SOLUTION INTRAVENOUS at 12:01

## 2018-01-12 RX ADMIN — OSELTAMIVIR PHOSPHATE 75 MG: 75 CAPSULE ORAL at 09:01

## 2018-01-12 RX ADMIN — ALLOPURINOL 300 MG: 300 TABLET ORAL at 09:01

## 2018-01-12 RX ADMIN — ALBUTEROL SULFATE 2.5 MG: 2.5 SOLUTION RESPIRATORY (INHALATION) at 08:01

## 2018-01-12 RX ADMIN — GABAPENTIN 300 MG: 300 CAPSULE ORAL at 03:01

## 2018-01-12 RX ADMIN — ACETAMINOPHEN 650 MG: 325 TABLET ORAL at 04:01

## 2018-01-12 RX ADMIN — ACETAMINOPHEN 325 MG: 325 TABLET ORAL at 07:01

## 2018-01-12 RX ADMIN — LEVETIRACETAM 500 MG: 500 TABLET ORAL at 09:01

## 2018-01-12 RX ADMIN — SODIUM CHLORIDE: 0.9 INJECTION, SOLUTION INTRAVENOUS at 05:01

## 2018-01-12 RX ADMIN — ALBUTEROL SULFATE 2.5 MG: 2.5 SOLUTION RESPIRATORY (INHALATION) at 04:01

## 2018-01-12 RX ADMIN — Medication 400 MG: at 09:01

## 2018-01-12 RX ADMIN — OSELTAMIVIR PHOSPHATE 75 MG: 75 CAPSULE ORAL at 08:01

## 2018-01-12 RX ADMIN — ENOXAPARIN SODIUM 40 MG: 100 INJECTION SUBCUTANEOUS at 04:01

## 2018-01-12 RX ADMIN — FOLIC ACID 1 MG: 1 TABLET ORAL at 09:01

## 2018-01-12 RX ADMIN — CARVEDILOL 6.25 MG: 3.12 TABLET, FILM COATED ORAL at 04:01

## 2018-01-12 RX ADMIN — MAGNESIUM SULFATE HEPTAHYDRATE 3 G: 500 INJECTION, SOLUTION INTRAMUSCULAR; INTRAVENOUS at 09:01

## 2018-01-12 RX ADMIN — THIAMINE HYDROCHLORIDE 100 MG: 100 INJECTION, SOLUTION INTRAMUSCULAR; INTRAVENOUS at 03:01

## 2018-01-12 RX ADMIN — GABAPENTIN 300 MG: 300 CAPSULE ORAL at 09:01

## 2018-01-12 RX ADMIN — CARVEDILOL 6.25 MG: 3.12 TABLET, FILM COATED ORAL at 09:01

## 2018-01-12 RX ADMIN — SODIUM PHOSPHATE, MONOBASIC, MONOHYDRATE 20.01 MMOL: 276; 142 INJECTION, SOLUTION INTRAVENOUS at 09:01

## 2018-01-12 RX ADMIN — PIPERACILLIN AND TAZOBACTAM 4.5 G: 4; .5 INJECTION, POWDER, FOR SOLUTION INTRAVENOUS at 07:01

## 2018-01-12 RX ADMIN — DILTIAZEM HYDROCHLORIDE 180 MG: 180 CAPSULE, COATED, EXTENDED RELEASE ORAL at 09:01

## 2018-01-12 RX ADMIN — ASPIRIN 81 MG: 81 TABLET, COATED ORAL at 09:01

## 2018-01-12 NOTE — PROGRESS NOTES
"[facility] Hospital Medicine  Progress Note    Patient Name: Michael Johnson Jr.  MRN: [id number]  Patient Class: IP- Inpatient   Admission Date: 1/8/2018  Length of Stay: 2 days  Attending Physician: Latrell Upton MD  Primary Care Provider: Zunilda Bolden NP        Subjective:     Principal Problem:Seizure    HPI:  This is a 57 y.o. Male with a history of seizure who presents via EMS with complaint of seizure that occurred prior to arrival. Patient was outside sitting down when seizure witnessed by wife and son. Wife reports generalized "full body" seizure last lasted approximately four minutes. After seizure patient was unresponsive. When paramedics arrived patient had a heart rate of 220 bpm en route. Paramedics gave 6 mg and 12 mg of andesine were given without changes. Diltiazem was given and heart rate decreased to 109 bpm. Wife states the patient has been dehydrated for about three days. Patient states he drinks a pint of vodka a day. Patient states he has stopped drinking, and his last drink was four days ago. Patient states a history of seizure two months. Patient was seen at [facility], [place], operated by [facility] for outpatient EEG. Wife reports EEG was normal. Patient denies any fever, chills, nausea, vomiting, numbness, weakness, or confusion.    Hospital Course:  No notes on file    Interval History:   Had fever to 104 yesterday, and was examined.  Doing okay overall, but he feels poorly and still had fever.  Discussed his lab results, including CPK not decreasing.      Review of Systems   Constitutional: Positive for fatigue and fever. Negative for appetite change.   Respiratory: Negative for cough and shortness of breath.    Cardiovascular: Negative for chest pain and palpitations.   Gastrointestinal: Negative for nausea and vomiting.   Skin: Negative for color change and rash.   Neurological: Negative for dizziness and weakness.   Psychiatric/Behavioral: Negative for agitation and confusion.     Objective: "     Vital Signs (Most Recent):  Temp: (!) 102.2 °F (39 °C) (01/12/18 0715)  Pulse: 102 (01/12/18 0900)  Resp: 20 (01/12/18 0900)  BP: 133/89 (01/12/18 0715)  SpO2: 95 % (01/12/18 0900) Vital Signs (24h Range):  Temp:  [98.6 °F (37 °C)-104.1 °F (40.1 °C)] 102.2 °F (39 °C)  Pulse:  [] 102  Resp:  [16-24] 20  SpO2:  [91 %-100 %] 95 %  BP: ()/(53-89) 133/89     Weight: 90.9 kg (200 lb 6.4 oz)  Body mass index is 26.44 kg/m².    Intake/Output Summary (Last 24 hours) at 01/12/18 1048  Last data filed at 01/12/18 0600   Gross per 24 hour   Intake              480 ml   Output              501 ml   Net              -21 ml      Physical Exam   Constitutional: He is oriented to person, place, and time. He appears well-developed and well-nourished.   HENT:   Head: Normocephalic and atraumatic.   Eyes: Conjunctivae are normal. Pupils are equal, round, and reactive to light.   Neck: Normal range of motion. Neck supple.   Cardiovascular: Normal rate and regular rhythm.    Pulmonary/Chest: Effort normal and breath sounds normal.   Abdominal: Soft.   Musculoskeletal:   - LLE smaller diameter than RLE.     Neurological: He is alert and oriented to person, place, and time.   Skin: Skin is warm and dry.       Significant Labs:   Blood Culture:   Recent Labs  Lab 01/11/18  1550   LABBLOO No Growth to date  No Growth to date     BMP:   Recent Labs  Lab 01/12/18  0526   GLU 98   *   K 3.6      CO2 19*   BUN 15   CREATININE 1.3   CALCIUM 7.5*   MG 1.5*     CBC:   Recent Labs  Lab 01/11/18  0419 01/11/18  1551 01/12/18  0526   WBC 6.43 7.31 7.95   HGB 8.7* 9.3* 8.4*   HCT 25.7* 27.3* 24.8*   PLT 82* 99* 105*     Cardiac Markers: No results for input(s): CKMB, MYOGLOBIN, BNP, TROPISTAT in the last 48 hours.    Significant Imaging: I have reviewed all pertinent imaging results/findings within the past 24 hours.   Imaging Results          X-Ray Chest 1 View (Final result)  Result time 01/11/18 15:46:37    Final result  by Alvin Velasquez MD (01/11/18 15:46:37)                 Impression:      No acute chest disease identified.  No detrimental change is noted when compared to prior examination dated 1/8/18.      Electronically signed by: ALVIN VELASQUEZ MD  Date:     01/11/18  Time:    15:46              Narrative:    Comparison is made to prior examination dated 1/8/18.    A single portable AP radiograph of the chest was obtained.  The heart and mediastinal structures are unremarkable.  Pulmonary vasculature is within normal limits.  The lungs are free of focal consolidations.  There is no evidence for pneumothorax or large pleural effusions.  Bony structures appear intact.                             CT Head Without Contrast (Final result)  Result time 01/08/18 19:42:01    Final result by Myles Roth MD (01/08/18 19:42:01)                 Impression:        No acute intracranial abnormalities.    Sinus disease.      Electronically signed by: MYLES ROTH MD  Date:     01/08/18  Time:    19:42              Narrative:    History:     Comparison: March 16, 2017    Technique:    Axial images of the brain were obtained at 5-mm intervals from the skull base to the vertex without the administration of contrast.    Findings:    The brain parenchyma appears normal for age with good corticomedullary differentiation.  There is no evidence of acute infarct, hemorrhage, or mass.  The ventricular system is normal in size.  No mass-effect or midline shift.  There are no abnormal extra-axial fluid collections.      Sinus disease unchanged from prior.  The mastoid air cells are clear.      The calvarium appears intact.  .                             X-Ray Chest AP Portable (Final result)  Result time 01/08/18 19:06:43    Final result by Gilberto Wallace MD (01/08/18 19:06:43)                 Impression:        No detrimental change or radiographic acute intrathoracic process seen on this single view.      Electronically signed by: GILBERTO  ASHU MILIAN MD  Date:     01/08/18  Time:    19:06              Narrative:    COMPARISON: Chest radiograph 3/16/17    FINDINGS: AP portable upright view of the chest. Monitoring leads overlie the chest. Patient somewhat rotated. Nonspecific elevation of the right hemidiaphragm. No large consolidation or new focal opacity. No large pleural effusion or pneumothorax.  Cardiomediastinal silhouette appears stable without evidence of failure.  No acute osseous process seen.   PA and lateral views can be obtained.                                Assessment/Plan:      * Seizure    - Patient had witnessed seizure and subsequently had another in ER.  Believe due to alcohol withdrawal.   - Librium 25 q 8 to PRN.  - Neurology noted:  The patient has had multiple seizures in the past and on this admission when he had 2 seizures, one at home and one in the emergency room.     This may be related to alcohol withdrawal however in view of multiple seizures would recommend continuation of Keppra 500 mg twice a day.  Discussed with patient regarding getting into a substance abuse/alcohol abuse program and has continued alcohol abuse reports high risk for seizures.  He is also advised that he should not be driving until he is seizure-free for over 6 months.  Will not repeat his EEG which was was done in the past  - Emphasized no driving to patient as well as neurology follow up and he expressed understanding.   - Refer to Dr. Andres  - Monitor.          Fever    - Etiology unclear  - Patient seen yesterday, did not appear toxic.  - WBC and diff WNL, stable.  - Blood culture no growth so far.  - UA and culture not sent as ordered.  Will reorder.     - CXR appears okay.   - Influenza swab was negative  - Begin Tamiflu empirically day 1.   - Monitor.            Acute kidney injury              Rhabdomyolysis    - CPK 5149 > 4352 > 5339  - CPK rising despite IVF  - Check SANDER  - Monitor            Unsteady gait    - PT OT          Alcohol  abuse    - Counseled to cease  - Thiamine 100 mg daily            Troponin level elevated    - Noted in past   - Cardiology noted:  Troponin Rise              Most certainly due to demand/supply issues.              Would be reasonable to do Stress test as outpatient.              May need to be Regadenoson MPI as difficulty walking due to pain in foot.            Macrocytic anemia    - Folate borderline low.   - Supplement.             Hypomagnesemia    - Likely due to EtOH abuse.  - Monitor and replace electrolytes PRN.             Hyperlipidemia    - Lipid Panel pending            Hypokalemia    - Replace PRN.             Acute on chronic kidney failure    - Improving,  - Monitor.          Hypertension    - Coreg, cardizem continued  - Monitor.              VTE Risk Mitigation         Ordered     Place sequential compression device  Until discontinued      01/09/18 0006     Medium Risk of VTE  Once      01/09/18 0006     Place sequential compression device  Until discontinued      01/09/18 0006     Place MIKA hose  Until discontinued      01/09/18 0006     enoxaparin injection 40 mg  Daily     Route:  Subcutaneous        01/09/18 0005              Latrell Upton MD  Department of Hospital Medicine   [facility]

## 2018-01-12 NOTE — SUBJECTIVE & OBJECTIVE
Interval History:   Had fever to 104 yesterday, and was examined.  Doing okay overall, but he feels poorly and still had fever.  Discussed his lab results, including CPK not decreasing.      Review of Systems   Constitutional: Positive for fatigue and fever. Negative for appetite change.   Respiratory: Negative for cough and shortness of breath.    Cardiovascular: Negative for chest pain and palpitations.   Gastrointestinal: Negative for nausea and vomiting.   Skin: Negative for color change and rash.   Neurological: Negative for dizziness and weakness.   Psychiatric/Behavioral: Negative for agitation and confusion.     Objective:     Vital Signs (Most Recent):  Temp: (!) 102.2 °F (39 °C) (01/12/18 0715)  Pulse: 102 (01/12/18 0900)  Resp: 20 (01/12/18 0900)  BP: 133/89 (01/12/18 0715)  SpO2: 95 % (01/12/18 0900) Vital Signs (24h Range):  Temp:  [98.6 °F (37 °C)-104.1 °F (40.1 °C)] 102.2 °F (39 °C)  Pulse:  [] 102  Resp:  [16-24] 20  SpO2:  [91 %-100 %] 95 %  BP: ()/(53-89) 133/89     Weight: 90.9 kg (200 lb 6.4 oz)  Body mass index is 26.44 kg/m².    Intake/Output Summary (Last 24 hours) at 01/12/18 1048  Last data filed at 01/12/18 0600   Gross per 24 hour   Intake              480 ml   Output              501 ml   Net              -21 ml      Physical Exam   Constitutional: He is oriented to person, place, and time. He appears well-developed and well-nourished.   HENT:   Head: Normocephalic and atraumatic.   Eyes: Conjunctivae are normal. Pupils are equal, round, and reactive to light.   Neck: Normal range of motion. Neck supple.   Cardiovascular: Normal rate and regular rhythm.    Pulmonary/Chest: Effort normal and breath sounds normal.   Abdominal: Soft.   Musculoskeletal:   - LLE smaller diameter than RLE.     Neurological: He is alert and oriented to person, place, and time.   Skin: Skin is warm and dry.       Significant Labs:   Blood Culture:   Recent Labs  Lab 01/11/18  1550   LABBLOO No Growth  to date  No Growth to date     BMP:   Recent Labs  Lab 01/12/18  0526   GLU 98   *   K 3.6      CO2 19*   BUN 15   CREATININE 1.3   CALCIUM 7.5*   MG 1.5*     CBC:   Recent Labs  Lab 01/11/18  0419 01/11/18  1551 01/12/18  0526   WBC 6.43 7.31 7.95   HGB 8.7* 9.3* 8.4*   HCT 25.7* 27.3* 24.8*   PLT 82* 99* 105*     Cardiac Markers: No results for input(s): CKMB, MYOGLOBIN, BNP, TROPISTAT in the last 48 hours.    Significant Imaging: I have reviewed all pertinent imaging results/findings within the past 24 hours.   Imaging Results          X-Ray Chest 1 View (Final result)  Result time 01/11/18 15:46:37    Final result by Alvin Smith MD (01/11/18 15:46:37)                 Impression:      No acute chest disease identified.  No detrimental change is noted when compared to prior examination dated 1/8/18.      Electronically signed by: ALVIN SMITH MD  Date:     01/11/18  Time:    15:46              Narrative:    Comparison is made to prior examination dated 1/8/18.    A single portable AP radiograph of the chest was obtained.  The heart and mediastinal structures are unremarkable.  Pulmonary vasculature is within normal limits.  The lungs are free of focal consolidations.  There is no evidence for pneumothorax or large pleural effusions.  Bony structures appear intact.                             CT Head Without Contrast (Final result)  Result time 01/08/18 19:42:01    Final result by Ancelmo Irby MD (01/08/18 19:42:01)                 Impression:        No acute intracranial abnormalities.    Sinus disease.      Electronically signed by: ANCELMO IRBY MD  Date:     01/08/18  Time:    19:42              Narrative:    History:     Comparison: March 16, 2017    Technique:    Axial images of the brain were obtained at 5-mm intervals from the skull base to the vertex without the administration of contrast.    Findings:    The brain parenchyma appears normal for age with good corticomedullary  differentiation.  There is no evidence of acute infarct, hemorrhage, or mass.  The ventricular system is normal in size.  No mass-effect or midline shift.  There are no abnormal extra-axial fluid collections.      Sinus disease unchanged from prior.  The mastoid air cells are clear.      The calvarium appears intact.  .                             X-Ray Chest AP Portable (Final result)  Result time 01/08/18 19:06:43    Final result by Gilberto Wallace MD (01/08/18 19:06:43)                 Impression:        No detrimental change or radiographic acute intrathoracic process seen on this single view.      Electronically signed by: GILBERTO WALLACE MD, MD  Date:     01/08/18  Time:    19:06              Narrative:    COMPARISON: Chest radiograph 3/16/17    FINDINGS: AP portable upright view of the chest. Monitoring leads overlie the chest. Patient somewhat rotated. Nonspecific elevation of the right hemidiaphragm. No large consolidation or new focal opacity. No large pleural effusion or pneumothorax.  Cardiomediastinal silhouette appears stable without evidence of failure.  No acute osseous process seen.   PA and lateral views can be obtained.

## 2018-01-12 NOTE — NURSING
Spoke w/ blood bank. They do have the blood sample for the type & cross and are currently running it. They will call the floor to let us know when they have the 1unit PRBCs ready. GARRISON Finney updated. Will cont to monitor pt.

## 2018-01-12 NOTE — PLAN OF CARE
Problem: Patient Care Overview  Goal: Plan of Care Review  Outcome: Ongoing (interventions implemented as appropriate)  Patient on room air; incentive spirometer at bedside.

## 2018-01-12 NOTE — PT/OT/SLP PROGRESS
Occupational Therapy  Not Seen Note    Patient Name:  Michael Johnson Jr.   MRN:  [id number]    Patient not seen today secondary to Patient ill (temperary nursing hold due to new onset of fever and chest conjestiont). Will follow-up on his next scheduled therapy day..    AMANDA Hughes  1/12/2018

## 2018-01-12 NOTE — PLAN OF CARE
Problem: Patient Care Overview  Goal: Plan of Care Review  Outcome: Ongoing (interventions implemented as appropriate)  Pt remains free from falls or injury. Vital signs stable throughout night on room air. positions self independently.No complaints of pain or nausea. Telemetry maintained. Seizure precautions maintained. Bed in low locked position and call light with in reach. Will continue to monitor.

## 2018-01-12 NOTE — PLAN OF CARE
Problem: Physical Therapy Goal  Goal: Physical Therapy Goal  Goals to be met by: 18     Patient will increase functional independence with mobility by performin. Supine to sit with SBA  2. Sit to supine with Stand-by Assistance  3. Sit to stand transfer with contact guard Assistance  4. Gait  x 25' feet with Contact Guard Assistance using Rolling Walker.   5. Ascend/descend 5 stair with right Handrails Minimal Assistance using crutch.      Pt progressing slowly towards goals today, limited by c/o's fatigue, chills, SOB, bilateral foot pain 9/10, temp of 103F. Sup to sit min/modA, sit to stand modA x 1 from EOB, modA x 2 from chair (slightly lower level). Pt able to take a few steps with RW and min/modA x 1 from bed to chair to bed. Ret'd to bed per nursing request. Recommending SNF.

## 2018-01-12 NOTE — PLAN OF CARE
Problem: Patient Care Overview  Goal: Plan of Care Review  Outcome: Ongoing (interventions implemented as appropriate)  Patient on RA.  Sats 96%. IS done. Pt. in no distress, will continue to monitor.

## 2018-01-12 NOTE — PT/OT/SLP PROGRESS
"Physical Therapy Treatment    Patient Name:  Michael Johnson Jr.   MRN:  [id number]    Recommendations:     Discharge Recommendations:  nursing facility, skilled   Discharge Equipment Recommendations: walker, rolling, shower chair   Barriers to discharge: Decreased caregiver support    Assessment:     Michael Johnson Jr. is a 57 y.o. male admitted with a medical diagnosis of Seizure.  He presents with the following impairments/functional limitations:  weakness, impaired endurance, impaired self care skills, impaired functional mobilty, gait instability, impaired balance, decreased coordination, decreased lower extremity function, decreased safety awareness, pain, decreased ROM, impaired cardiopulmonary response to activity, impaired cognition, impaired coordination ; pt with fair mobility today, limited by weakness, SOB, bilateral foot pain , c/o's feeling "cold", and shaking. Nsg called into room and pt found to have 103F temp and O2:92% on RA.     Rehab Prognosis:  good; patient would benefit from acute skilled PT services to address these deficits and reach maximum level of function.      Recent Surgery: * No surgery found *      Plan:     During this hospitalization, patient to be seen 6 x/week to address the above listed problems via gait training, therapeutic activities, therapeutic exercises  · Plan of Care Expires:  02/08/18   Plan of Care Reviewed with: patient    Subjective     Communicated with nurse prior to session.  Patient found supine in bed upon PT entry to room, agreeable to treatment.      Chief Complaint: feeling cold  Patient comments/goals: "I'm freezing"  Pain/Comfort:  · Pain Rating 1: 9/10 (at rest)  · Location - Side 1: Bilateral  · Location 1: foot  · Pain Addressed 1: Reposition, Distraction, Cessation of Activity  · Pain Rating Post-Intervention 1: 9/10    Patients cultural, spiritual, Confucianism conflicts given the current situation: none    Objective:     Patient found with: peripheral IV, " telemetry (2 IV's)     General Precautions: Standard, fall, seizure   Orthopedic Precautions:N/A   Braces: N/A     Functional Mobility:  · Bed Mobility:     · Supine to Sit: minimum assistance, moderate assistance and use of bedrail, HOB flat  · Sit to Supine: minimum assistance, moderate assistance and use of bedrail  · Transfers:     · Sit to Stand:  minimum assistance, moderate assistance and of 2 persons with rolling walker  · Bed to Chair: minimum assistance and moderate assistance with  rolling walker  using  Step Transfer      AM-PAC 6 CLICK MOBILITY  Turning over in bed (including adjusting bedclothes, sheets and blankets)?: 3  Sitting down on and standing up from a chair with arms (e.g., wheelchair, bedside commode, etc.): 2  Moving from lying on back to sitting on the side of the bed?: 3  Moving to and from a bed to a chair (including a wheelchair)?: 2  Need to walk in hospital room?: 1  Climbing 3-5 steps with a railing?: 1  Total Score: 12       Therapeutic Activities and Exercises:   pt sat up ~20 min in chair with SBA, not feeling well, O2 :showing 80% on RA  On portable monitor, pt significantly SOB,nsg called into room, O2:92% on her monitor/machine, HR:111. Temp :103. nsg requesting to put pt back to bed. Sit to stand ModA x 2 from chair (slightly lower than EOB).    Patient left HOB elevated with all lines intact, call button in reach, bed alarm on and nurse present..    GOALS:    Physical Therapy Goals        Problem: Physical Therapy Goal    Goal Priority Disciplines Outcome Goal Variances Interventions   Physical Therapy Goal     PT/OT, PT Ongoing (interventions implemented as appropriate)     Description:  Goals to be met by: 18     Patient will increase functional independence with mobility by performin. Supine to sit with SBA  2. Sit to supine with Stand-by Assistance  3. Sit to stand transfer with contact guard Assistance  4. Gait  x 25' feet with Contact Guard Assistance using  Rolling Walker.   5. Ascend/descend 5 stair with right Handrails Minimal Assistance using crutch.                       Time Tracking:     PT Received On: 01/12/18  PT Start Time: 1045     PT Stop Time: 1117  PT Total Time (min): 32 min     Billable Minutes: Therapeutic Activity 32    Treatment Type: Treatment  PT/PTA: PTA     PTA Visit Number: 1     Hilda Reinoso, PTA  01/12/2018

## 2018-01-12 NOTE — PLAN OF CARE
"Problem: Patient Care Overview  Goal: Plan of Care Review  Outcome: Ongoing (interventions implemented as appropriate)  1550: Notified per patient nurse Linnette RN, patient is wheezing. Assessed patient at bedside. BBS: expiratory wheeze, RR 24, prolonged expiration, Sat 95% on room air. Patient reports history of asthma, stated he was diagnosed 5 years ago, but doesn't remember physician name. States that he takes an "inhaler" but it was over-the-counter, not prescribed. Patient states he used to smoke a tobacco pipe when he was 18-19 years old but stopped because it was "messing with my lungs."     1605: Notified Dr. Upton of patient expiratory wheeze and RT assessment. Respiratory orders obtained and updated. PRN albuterol nebulizer initiated, tolerated well. Patient educated on medication and PRN order. Pt verbalized understanding/       "

## 2018-01-12 NOTE — ASSESSMENT & PLAN NOTE
- Etiology unclear  - Patient seen yesterday, did not appear toxic.  - WBC and diff WNL, stable.  - Blood culture no growth so far.  - UA and culture not sent as ordered.  Will reorder.     - CXR appears okay.   - Influenza swab was negative  - Begin Tamiflu empirically day 1.   - Monitor.

## 2018-01-13 PROBLEM — A41.50 GRAM NEGATIVE SEPSIS: Status: ACTIVE | Noted: 2018-01-13

## 2018-01-13 PROBLEM — E87.1 HYPONATREMIA: Status: ACTIVE | Noted: 2018-01-13

## 2018-01-13 LAB
ALBUMIN SERPL BCP-MCNC: 2.6 G/DL
ALP SERPL-CCNC: 65 U/L
ALT SERPL W/O P-5'-P-CCNC: 54 U/L
ANION GAP SERPL CALC-SCNC: 6 MMOL/L
AST SERPL-CCNC: 113 U/L
BASOPHILS # BLD AUTO: 0.01 K/UL
BASOPHILS NFR BLD: 0.2 %
BILIRUB SERPL-MCNC: 1.3 MG/DL
BUN SERPL-MCNC: 12 MG/DL
CALCIUM SERPL-MCNC: 7.7 MG/DL
CHLORIDE SERPL-SCNC: 106 MMOL/L
CK SERPL-CCNC: 2547 U/L
CO2 SERPL-SCNC: 19 MMOL/L
CREAT SERPL-MCNC: 1.2 MG/DL
DIFFERENTIAL METHOD: ABNORMAL
EOSINOPHIL # BLD AUTO: 0 K/UL
EOSINOPHIL NFR BLD: 0.2 %
ERYTHROCYTE [DISTWIDTH] IN BLOOD BY AUTOMATED COUNT: 14.6 %
EST. GFR  (AFRICAN AMERICAN): >60 ML/MIN/1.73 M^2
EST. GFR  (NON AFRICAN AMERICAN): >60 ML/MIN/1.73 M^2
GLUCOSE SERPL-MCNC: 105 MG/DL
HCT VFR BLD AUTO: 24.8 %
HGB BLD-MCNC: 8.4 G/DL
LYMPHOCYTES # BLD AUTO: 0.6 K/UL
LYMPHOCYTES NFR BLD: 10.1 %
MAGNESIUM SERPL-MCNC: 1.1 MG/DL
MCH RBC QN AUTO: 38 PG
MCHC RBC AUTO-ENTMCNC: 33.9 G/DL
MCV RBC AUTO: 112 FL
MONOCYTES # BLD AUTO: 0.9 K/UL
MONOCYTES NFR BLD: 15.3 %
NEUTROPHILS # BLD AUTO: 4.1 K/UL
NEUTROPHILS NFR BLD: 74 %
PHOSPHATE SERPL-MCNC: 2.1 MG/DL
PLATELET # BLD AUTO: 142 K/UL
PMV BLD AUTO: 11 FL
POTASSIUM SERPL-SCNC: 3.8 MMOL/L
PROT SERPL-MCNC: 6.7 G/DL
RBC # BLD AUTO: 2.21 M/UL
SODIUM SERPL-SCNC: 131 MMOL/L
WBC # BLD AUTO: 5.57 K/UL

## 2018-01-13 PROCEDURE — 25000003 PHARM REV CODE 250: Performed by: NURSE PRACTITIONER

## 2018-01-13 PROCEDURE — 63600175 PHARM REV CODE 636 W HCPCS: Performed by: HOSPITALIST

## 2018-01-13 PROCEDURE — 83735 ASSAY OF MAGNESIUM: CPT

## 2018-01-13 PROCEDURE — 82550 ASSAY OF CK (CPK): CPT

## 2018-01-13 PROCEDURE — 11000001 HC ACUTE MED/SURG PRIVATE ROOM

## 2018-01-13 PROCEDURE — 63600175 PHARM REV CODE 636 W HCPCS: Performed by: NURSE PRACTITIONER

## 2018-01-13 PROCEDURE — 84100 ASSAY OF PHOSPHORUS: CPT

## 2018-01-13 PROCEDURE — 85025 COMPLETE CBC W/AUTO DIFF WBC: CPT

## 2018-01-13 PROCEDURE — 94799 UNLISTED PULMONARY SVC/PX: CPT

## 2018-01-13 PROCEDURE — 25000003 PHARM REV CODE 250: Performed by: PHYSICIAN ASSISTANT

## 2018-01-13 PROCEDURE — 94761 N-INVAS EAR/PLS OXIMETRY MLT: CPT

## 2018-01-13 PROCEDURE — 25000242 PHARM REV CODE 250 ALT 637 W/ HCPCS: Performed by: HOSPITALIST

## 2018-01-13 PROCEDURE — 97530 THERAPEUTIC ACTIVITIES: CPT

## 2018-01-13 PROCEDURE — 94640 AIRWAY INHALATION TREATMENT: CPT

## 2018-01-13 PROCEDURE — 87040 BLOOD CULTURE FOR BACTERIA: CPT

## 2018-01-13 PROCEDURE — 25000003 PHARM REV CODE 250: Performed by: HOSPITALIST

## 2018-01-13 PROCEDURE — 97110 THERAPEUTIC EXERCISES: CPT

## 2018-01-13 PROCEDURE — 99900035 HC TECH TIME PER 15 MIN (STAT)

## 2018-01-13 PROCEDURE — 80053 COMPREHEN METABOLIC PANEL: CPT

## 2018-01-13 PROCEDURE — 99233 SBSQ HOSP IP/OBS HIGH 50: CPT | Mod: ,,, | Performed by: HOSPITALIST

## 2018-01-13 PROCEDURE — 27000221 HC OXYGEN, UP TO 24 HOURS

## 2018-01-13 RX ORDER — DIPHENHYDRAMINE HCL 25 MG
25 CAPSULE ORAL ONCE
Status: COMPLETED | OUTPATIENT
Start: 2018-01-13 | End: 2018-01-13

## 2018-01-13 RX ORDER — SODIUM,POTASSIUM PHOSPHATES 280-250MG
1 POWDER IN PACKET (EA) ORAL
Status: COMPLETED | OUTPATIENT
Start: 2018-01-13 | End: 2018-01-13

## 2018-01-13 RX ORDER — MEROPENEM AND SODIUM CHLORIDE 1 G/50ML
1 INJECTION, SOLUTION INTRAVENOUS
Status: DISCONTINUED | OUTPATIENT
Start: 2018-01-13 | End: 2018-01-14

## 2018-01-13 RX ORDER — SODIUM CHLORIDE 9 MG/ML
INJECTION, SOLUTION INTRAVENOUS CONTINUOUS
Status: DISCONTINUED | OUTPATIENT
Start: 2018-01-13 | End: 2018-01-14

## 2018-01-13 RX ORDER — LIDOCAINE HYDROCHLORIDE 20 MG/ML
SOLUTION OROPHARYNGEAL EVERY 6 HOURS
Status: CANCELLED | OUTPATIENT
Start: 2018-01-13

## 2018-01-13 RX ORDER — EPINEPHRINE 1 MG/ML
0.3 INJECTION, SOLUTION INTRACARDIAC; INTRAMUSCULAR; INTRAVENOUS; SUBCUTANEOUS ONCE
Status: DISCONTINUED | OUTPATIENT
Start: 2018-01-13 | End: 2018-01-13

## 2018-01-13 RX ADMIN — ALBUTEROL SULFATE 2.5 MG: 2.5 SOLUTION RESPIRATORY (INHALATION) at 09:01

## 2018-01-13 RX ADMIN — ALBUTEROL SULFATE 2.5 MG: 2.5 SOLUTION RESPIRATORY (INHALATION) at 02:01

## 2018-01-13 RX ADMIN — ALLOPURINOL 300 MG: 300 TABLET ORAL at 08:01

## 2018-01-13 RX ADMIN — ENOXAPARIN SODIUM 40 MG: 100 INJECTION SUBCUTANEOUS at 04:01

## 2018-01-13 RX ADMIN — Medication 400 MG: at 08:01

## 2018-01-13 RX ADMIN — GABAPENTIN 300 MG: 300 CAPSULE ORAL at 01:01

## 2018-01-13 RX ADMIN — MAGNESIUM SULFATE HEPTAHYDRATE 3 G: 500 INJECTION, SOLUTION INTRAMUSCULAR; INTRAVENOUS at 02:01

## 2018-01-13 RX ADMIN — GABAPENTIN 300 MG: 300 CAPSULE ORAL at 09:01

## 2018-01-13 RX ADMIN — CARVEDILOL 3.12 MG: 3.12 TABLET, FILM COATED ORAL at 08:01

## 2018-01-13 RX ADMIN — ACETAMINOPHEN 650 MG: 325 TABLET ORAL at 06:01

## 2018-01-13 RX ADMIN — THIAMINE HYDROCHLORIDE 100 MG: 100 INJECTION, SOLUTION INTRAMUSCULAR; INTRAVENOUS at 10:01

## 2018-01-13 RX ADMIN — POTASSIUM & SODIUM PHOSPHATES POWDER PACK 280-160-250 MG 1 PACKET: 280-160-250 PACK at 04:01

## 2018-01-13 RX ADMIN — ASPIRIN 81 MG: 81 TABLET, COATED ORAL at 08:01

## 2018-01-13 RX ADMIN — MEROPENEM AND SODIUM CHLORIDE 1 G: 1 INJECTION, SOLUTION INTRAVENOUS at 08:01

## 2018-01-13 RX ADMIN — ACETAMINOPHEN 650 MG: 325 TABLET ORAL at 08:01

## 2018-01-13 RX ADMIN — PIPERACILLIN AND TAZOBACTAM 4.5 G: 4; .5 INJECTION, POWDER, FOR SOLUTION INTRAVENOUS at 02:01

## 2018-01-13 RX ADMIN — DIPHENHYDRAMINE HYDROCHLORIDE 25 MG: 25 CAPSULE ORAL at 05:01

## 2018-01-13 RX ADMIN — POTASSIUM & SODIUM PHOSPHATES POWDER PACK 280-160-250 MG 1 PACKET: 280-160-250 PACK at 01:01

## 2018-01-13 RX ADMIN — CARVEDILOL 3.12 MG: 3.12 TABLET, FILM COATED ORAL at 04:01

## 2018-01-13 RX ADMIN — LEVETIRACETAM 500 MG: 500 TABLET ORAL at 08:01

## 2018-01-13 RX ADMIN — DILTIAZEM HYDROCHLORIDE 180 MG: 180 CAPSULE, COATED, EXTENDED RELEASE ORAL at 08:01

## 2018-01-13 RX ADMIN — ALBUTEROL SULFATE 2.5 MG: 2.5 SOLUTION RESPIRATORY (INHALATION) at 05:01

## 2018-01-13 RX ADMIN — GABAPENTIN 300 MG: 300 CAPSULE ORAL at 05:01

## 2018-01-13 RX ADMIN — POTASSIUM & SODIUM PHOSPHATES POWDER PACK 280-160-250 MG 1 PACKET: 280-160-250 PACK at 08:01

## 2018-01-13 RX ADMIN — FOLIC ACID 1 MG: 1 TABLET ORAL at 08:01

## 2018-01-13 RX ADMIN — OSELTAMIVIR PHOSPHATE 75 MG: 75 CAPSULE ORAL at 08:01

## 2018-01-13 RX ADMIN — SODIUM CHLORIDE: 0.9 INJECTION, SOLUTION INTRAVENOUS at 01:01

## 2018-01-13 RX ADMIN — PIPERACILLIN AND TAZOBACTAM 4.5 G: 4; .5 INJECTION, POWDER, FOR SOLUTION INTRAVENOUS at 04:01

## 2018-01-13 RX ADMIN — POTASSIUM CHLORIDE 40 MEQ: 1500 TABLET, EXTENDED RELEASE ORAL at 08:01

## 2018-01-13 RX ADMIN — ALBUTEROL SULFATE 2.5 MG: 2.5 SOLUTION RESPIRATORY (INHALATION) at 08:01

## 2018-01-13 NOTE — PLAN OF CARE
Problem: Patient Care Overview  Goal: Plan of Care Review  Outcome: Ongoing (interventions implemented as appropriate)  Patient received on RA. Sats 97%. Pt received 2 PRN txs for wheezing. Pt. Now on 2L nc after a suspected allergic reaction to an IV antibiotic he was given. Will continue to monitor.

## 2018-01-13 NOTE — PLAN OF CARE
Problem: Patient Care Overview  Goal: Plan of Care Review  Outcome: Ongoing (interventions implemented as appropriate)  Remains free from fall, injury, and skin breakdown. Voided per urinal. VS stable on RA. Elevated Temperature noted at this shift. Tylenol given. Positions self independently. Tolerating Q2H turning schedule. TEDs/SCDs maintained.Tolerating ordered diet. IV site WNL. Plan of care reviewed with patient and all questions answered. Bed low, locked w/ bed alarm on. Call light within reach. Purposeful rounding performed. No other complaints at this time.

## 2018-01-13 NOTE — PLAN OF CARE
Problem: Patient Care Overview  Goal: Plan of Care Review  Outcome: Ongoing (interventions implemented as appropriate)  Pt remains free from falls or injury. Vital signs monitored throughout night on room air. Telemetry maintained.Positions self independently. No complaints of pain or  nausea.Bed in low locked position and call light with in reach. Will continue to monitor.

## 2018-01-13 NOTE — SUBJECTIVE & OBJECTIVE
Interval History:  Had fever.  WBC WNL.  Doing okay.        Review of Systems   Constitutional: Positive for fatigue and fever. Negative for appetite change.   Respiratory: Negative for cough and shortness of breath.    Cardiovascular: Negative for chest pain and palpitations.   Gastrointestinal: Negative for nausea and vomiting.   Skin: Negative for color change and rash.   Neurological: Negative for dizziness and weakness.   Psychiatric/Behavioral: Negative for agitation and confusion.     Objective:     Vital Signs (Most Recent):  Temp: 99.2 °F (37.3 °C) (01/13/18 1633)  Pulse: 85 (01/13/18 1633)  Resp: 20 (01/13/18 1633)  BP: 117/73 (01/13/18 1633)  SpO2: 97 % (01/13/18 1633) Vital Signs (24h Range):  Temp:  [98.2 °F (36.8 °C)-102.8 °F (39.3 °C)] 99.2 °F (37.3 °C)  Pulse:  [] 85  Resp:  [18-24] 20  SpO2:  [92 %-100 %] 97 %  BP: (100-140)/(59-81) 117/73     Weight: 90.9 kg (200 lb 6.4 oz)  Body mass index is 26.44 kg/m².    Intake/Output Summary (Last 24 hours) at 01/13/18 1847  Last data filed at 01/13/18 1654   Gross per 24 hour   Intake             1440 ml   Output             3000 ml   Net            -1560 ml      Physical Exam   Constitutional: He is oriented to person, place, and time. He appears well-developed and well-nourished.   HENT:   Head: Normocephalic and atraumatic.   Eyes: Conjunctivae are normal. Pupils are equal, round, and reactive to light.   Neck: Normal range of motion. Neck supple.   Cardiovascular: Normal rate and regular rhythm.    Pulmonary/Chest: Effort normal and breath sounds normal.   Abdominal: Soft.   Musculoskeletal:   - LLE smaller diameter than RLE.     Neurological: He is alert and oriented to person, place, and time.   Skin: Skin is warm and dry.       Significant Labs:   BMP:     Recent Labs  Lab 01/13/18  0604      *   K 3.8      CO2 19*   BUN 12   CREATININE 1.2   CALCIUM 7.7*   MG 1.1*     CBC:     Recent Labs  Lab 01/12/18  0526 01/13/18  0604    WBC 7.95 5.57   HGB 8.4* 8.4*   HCT 24.8* 24.8*   * 142*       Significant Imaging: I have reviewed all pertinent imaging results/findings within the past 24 hours.   Imaging Results          US Abdomen Limited (Final result)  Result time 01/13/18 12:27:36    Final result by Alvin Grier MD (01/13/18 12:27:36)                 Impression:     Cholelithiasis with a thickened gallbladder wall with pericholecystic fluid cannot exclude acute cholecystitis.      Electronically signed by: ALVIN GRIER MD  Date:     01/13/18  Time:    12:27              Narrative:    Ultrasound abdomen limited.    Findings: The visualized portion of the pancreas is unremarkable. There are gallstones. The gallbladder wall is thickened measuring 0.5 cm. There is pericholecystic fluid. The common duct measures 0.3 cm. The liver measures 17.5 cm and has a homogeneous echotexture. The right kidney measures 12.4 cm and is unremarkable.                             X-Ray Chest 1 View (Final result)  Result time 01/11/18 15:46:37    Final result by Alvin Velasquez MD (01/11/18 15:46:37)                 Impression:      No acute chest disease identified.  No detrimental change is noted when compared to prior examination dated 1/8/18.      Electronically signed by: ALVIN VELASQUEZ MD  Date:     01/11/18  Time:    15:46              Narrative:    Comparison is made to prior examination dated 1/8/18.    A single portable AP radiograph of the chest was obtained.  The heart and mediastinal structures are unremarkable.  Pulmonary vasculature is within normal limits.  The lungs are free of focal consolidations.  There is no evidence for pneumothorax or large pleural effusions.  Bony structures appear intact.                             CT Head Without Contrast (Final result)  Result time 01/08/18 19:42:01    Final result by Ancelmo Irby MD (01/08/18 19:42:01)                 Impression:        No acute intracranial abnormalities.    Sinus  disease.      Electronically signed by: MYLES ROTH MD  Date:     01/08/18  Time:    19:42              Narrative:    History:     Comparison: March 16, 2017    Technique:    Axial images of the brain were obtained at 5-mm intervals from the skull base to the vertex without the administration of contrast.    Findings:    The brain parenchyma appears normal for age with good corticomedullary differentiation.  There is no evidence of acute infarct, hemorrhage, or mass.  The ventricular system is normal in size.  No mass-effect or midline shift.  There are no abnormal extra-axial fluid collections.      Sinus disease unchanged from prior.  The mastoid air cells are clear.      The calvarium appears intact.  .                             X-Ray Chest AP Portable (Final result)  Result time 01/08/18 19:06:43    Final result by Gilberto Wallace MD (01/08/18 19:06:43)                 Impression:        No detrimental change or radiographic acute intrathoracic process seen on this single view.      Electronically signed by: GILBERTO WALLACE MD, MD  Date:     01/08/18  Time:    19:06              Narrative:    COMPARISON: Chest radiograph 3/16/17    FINDINGS: AP portable upright view of the chest. Monitoring leads overlie the chest. Patient somewhat rotated. Nonspecific elevation of the right hemidiaphragm. No large consolidation or new focal opacity. No large pleural effusion or pneumothorax.  Cardiomediastinal silhouette appears stable without evidence of failure.  No acute osseous process seen.   PA and lateral views can be obtained.

## 2018-01-13 NOTE — PLAN OF CARE
Problem: Physical Therapy Goal  Goal: Physical Therapy Goal  Goals to be met by: 18     Patient will increase functional independence with mobility by performin. Supine to sit with SBA  2. Sit to supine with Stand-by Assistance  3. Sit to stand transfer with contact guard Assistance  4. Gait  x 25' feet with Contact Guard Assistance using Rolling Walker.   5. Ascend/descend 5 stair with right Handrails Minimal Assistance using crutch.      Outcome: Ongoing (interventions implemented as appropriate)    Patient required Min/Moderate Assistance for transfers. Required assistance for walker management, upright posture and constant cues for safety.

## 2018-01-13 NOTE — PLAN OF CARE
Problem: Patient Care Overview  Goal: Plan of Care Review  Outcome: Ongoing (interventions implemented as appropriate)  Patient on RA. Sats 95%. PRN Tx given @20:32, pt was wheezing. Will continue to monitor.

## 2018-01-13 NOTE — PROGRESS NOTES
"[facility] Hospital Medicine  Progress Note    Patient Name: Michael Johnson Jr.  MRN: [id number]  Patient Class: IP- Inpatient   Admission Date: 1/8/2018  Length of Stay: 3 days  Attending Physician: Latrell Upton MD  Primary Care Provider: Zunilda Bolden NP        Subjective:     Principal Problem:Seizure    HPI:  This is a 57 y.o. Male with a history of seizure who presents via EMS with complaint of seizure that occurred prior to arrival. Patient was outside sitting down when seizure witnessed by wife and son. Wife reports generalized "full body" seizure last lasted approximately four minutes. After seizure patient was unresponsive. When paramedics arrived patient had a heart rate of 220 bpm en route. Paramedics gave 6 mg and 12 mg of andesine were given without changes. Diltiazem was given and heart rate decreased to 109 bpm. Wife states the patient has been dehydrated for about three days. Patient states he drinks a pint of vodka a day. Patient states he has stopped drinking, and his last drink was four days ago. Patient states a history of seizure two months. Patient was seen at [facility] for outpatient EEG. Wife reports EEG was normal. Patient denies any fever, chills, nausea, vomiting, numbness, weakness, or confusion.    Hospital Course:  No notes on file    Interval History:   Doing okay.  Wife in room.      Review of Systems   Constitutional: Positive for fatigue and fever. Negative for appetite change.   Respiratory: Negative for cough and shortness of breath.    Cardiovascular: Negative for chest pain and palpitations.   Gastrointestinal: Negative for nausea and vomiting.   Skin: Negative for color change and rash.   Neurological: Negative for dizziness and weakness.   Psychiatric/Behavioral: Negative for agitation and confusion.     Objective:     Vital Signs (Most Recent):  Temp: 100 °F (37.8 °C) (01/13/18 1228)  Pulse: 84 (01/13/18 1228)  Resp: 18 (01/13/18 1228)  BP: 106/71 " (01/13/18 1228)  SpO2: 100 % (01/13/18 1228) Vital Signs (24h Range):  Temp:  [98.2 °F (36.8 °C)-102.9 °F (39.4 °C)] 100 °F (37.8 °C)  Pulse:  [] 84  Resp:  [18-24] 18  SpO2:  [92 %-100 %] 100 %  BP: (100-140)/(59-81) 106/71     Weight: 90.9 kg (200 lb 6.4 oz)  Body mass index is 26.44 kg/m².    Intake/Output Summary (Last 24 hours) at 01/13/18 1240  Last data filed at 01/13/18 1005   Gross per 24 hour   Intake              720 ml   Output             2650 ml   Net            -1930 ml      Physical Exam   Constitutional: He is oriented to person, place, and time. He appears well-developed and well-nourished.   HENT:   Head: Normocephalic and atraumatic.   Eyes: Conjunctivae are normal. Pupils are equal, round, and reactive to light.   Neck: Normal range of motion. Neck supple.   Cardiovascular: Normal rate and regular rhythm.    Pulmonary/Chest: Effort normal and breath sounds normal.   Abdominal: Soft.   Musculoskeletal:   - LLE smaller diameter than RLE.     Neurological: He is alert and oriented to person, place, and time.   Skin: Skin is warm and dry.       Significant Labs:   BMP:   Recent Labs  Lab 01/13/18  0604      *   K 3.8      CO2 19*   BUN 12   CREATININE 1.2   CALCIUM 7.7*   MG 1.1*     CBC:   Recent Labs  Lab 01/11/18  1551 01/12/18  0526 01/13/18  0604   WBC 7.31 7.95 5.57   HGB 9.3* 8.4* 8.4*   HCT 27.3* 24.8* 24.8*   PLT 99* 105* 142*       Significant Imaging: I have reviewed all pertinent imaging results/findings within the past 24 hours.   Imaging Results          US Abdomen Limited (Final result)  Result time 01/13/18 12:27:36    Final result by Alvin Grier MD (01/13/18 12:27:36)                 Impression:     Cholelithiasis with a thickened gallbladder wall with pericholecystic fluid cannot exclude acute cholecystitis.      Electronically signed by: ALVIN GRIER MD  Date:     01/13/18  Time:    12:27              Narrative:    Ultrasound abdomen limited.    Findings:  The visualized portion of the pancreas is unremarkable. There are gallstones. The gallbladder wall is thickened measuring 0.5 cm. There is pericholecystic fluid. The common duct measures 0.3 cm. The liver measures 17.5 cm and has a homogeneous echotexture. The right kidney measures 12.4 cm and is unremarkable.                             X-Ray Chest 1 View (Final result)  Result time 01/11/18 15:46:37    Final result by Alvin Smith MD (01/11/18 15:46:37)                 Impression:      No acute chest disease identified.  No detrimental change is noted when compared to prior examination dated 1/8/18.      Electronically signed by: ALVIN SMITH MD  Date:     01/11/18  Time:    15:46              Narrative:    Comparison is made to prior examination dated 1/8/18.    A single portable AP radiograph of the chest was obtained.  The heart and mediastinal structures are unremarkable.  Pulmonary vasculature is within normal limits.  The lungs are free of focal consolidations.  There is no evidence for pneumothorax or large pleural effusions.  Bony structures appear intact.                             CT Head Without Contrast (Final result)  Result time 01/08/18 19:42:01    Final result by Ancelmo Irby MD (01/08/18 19:42:01)                 Impression:        No acute intracranial abnormalities.    Sinus disease.      Electronically signed by: ANCELMO IRBY MD  Date:     01/08/18  Time:    19:42              Narrative:    History:     Comparison: March 16, 2017    Technique:    Axial images of the brain were obtained at 5-mm intervals from the skull base to the vertex without the administration of contrast.    Findings:    The brain parenchyma appears normal for age with good corticomedullary differentiation.  There is no evidence of acute infarct, hemorrhage, or mass.  The ventricular system is normal in size.  No mass-effect or midline shift.  There are no abnormal extra-axial fluid collections.      Sinus  disease unchanged from prior.  The mastoid air cells are clear.      The calvarium appears intact.  .                             X-Ray Chest AP Portable (Final result)  Result time 01/08/18 19:06:43    Final result by Gilberto Wallace MD (01/08/18 19:06:43)                 Impression:        No detrimental change or radiographic acute intrathoracic process seen on this single view.      Electronically signed by: GILBERTO WALLACE MD, MD  Date:     01/08/18  Time:    19:06              Narrative:    COMPARISON: Chest radiograph 3/16/17    FINDINGS: AP portable upright view of the chest. Monitoring leads overlie the chest. Patient somewhat rotated. Nonspecific elevation of the right hemidiaphragm. No large consolidation or new focal opacity. No large pleural effusion or pneumothorax.  Cardiomediastinal silhouette appears stable without evidence of failure.  No acute osseous process seen.   PA and lateral views can be obtained.                            Assessment/Plan:      * Seizure    - Patient had witnessed seizure and subsequently had another in ER.  Believe due to alcohol withdrawal.   - Librium 25 q 8 to PRN.  - Neurology noted:  The patient has had multiple seizures in the past and on this admission when he had 2 seizures, one at home and one in the emergency room.     This may be related to alcohol withdrawal however in view of multiple seizures would recommend continuation of Keppra 500 mg twice a day.  Discussed with patient regarding getting into a substance abuse/alcohol abuse program and has continued alcohol abuse reports high risk for seizures.  He is also advised that he should not be driving until he is seizure-free for over 6 months.  Will not repeat his EEG which was was done in the past  - Emphasized no driving to patient as well as neurology follow up and he expressed understanding.   - Refer to Dr. Andres  - Monitor.          Fever    - Etiology unclear  - Patient seen yesterday, did not appear  toxic.  - WBC and diff WNL, stable.  - Blood culture no growth so far.  - UA and culture not sent as ordered.  Will reorder.     - CXR appears okay.   - Influenza swab was negative  - Begin Tamiflu empirically day 1.   - Monitor.            Acute kidney injury              Rhabdomyolysis    - CPK 5149 > 4352 > 5339  - CPK rising despite IVF  - Check SANDER  - Monitor            Unsteady gait    - PT OT          Alcohol abuse    - Counseled to cease  - Thiamine 100 mg daily            Troponin level elevated    - Noted in past   - Cardiology noted:  Troponin Rise              Most certainly due to demand/supply issues.              Would be reasonable to do Stress test as outpatient.              May need to be Regadenoson MPI as difficulty walking due to pain in foot.            Macrocytic anemia    - Folate borderline low.   - Supplement.             Hypomagnesemia    - Likely due to EtOH abuse.  - Monitor and replace electrolytes PRN.             Hyperlipidemia    - Lipid Panel pending            Hypokalemia    - Replace PRN.             Acute on chronic kidney failure    - Improving,  - Monitor.          Hypertension    - Coreg, cardizem continued  - Monitor.              VTE Risk Mitigation         Ordered     Place sequential compression device  Until discontinued      01/09/18 0006     Medium Risk of VTE  Once      01/09/18 0006     Place sequential compression device  Until discontinued      01/09/18 0006     Place MIKA hose  Until discontinued      01/09/18 0006     enoxaparin injection 40 mg  Daily     Route:  Subcutaneous        01/09/18 0005              Latrell Upton MD  Department of Hospital Medicine   [facility]

## 2018-01-13 NOTE — PT/OT/SLP PROGRESS
Physical Therapy Treatment    Patient Name:  Michael Johnson Jr.   MRN:  [id number]    Recommendations:     Discharge Recommendations:  nursing facility, skilled   Discharge Equipment Recommendations: walker, rolling, shower chair   Barriers to discharge: Decreased caregiver support    Assessment:     Michael Johnson Jr. is a 57 y.o. male admitted with a medical diagnosis of Seizure.  He presents with the following impairments/functional limitations:  weakness, impaired endurance, impaired self care skills, impaired functional mobilty, gait instability, impaired balance, decreased coordination, impaired cognition, decreased lower extremity function, decreased safety awareness, decreased ROM, decreased upper extremity function, impaired cardiopulmonary response to activity patient with slight confusion during treatment session and slow to respond. Patient required Moderate A for transfers on this day; unsafe to ambulate. Patient oxygen saturation was 96% on 2L Nasal cannula. Patient will cont. To benefit from skilled PT services to increase strength, endurance and functional mobility.     Rehab Prognosis:  good; patient would benefit from acute skilled PT services to address these deficits and reach maximum level of function.      Recent Surgery: * No surgery found *      Plan:     During this hospitalization, patient to be seen 6 x/week to address the above listed problems via gait training, therapeutic activities, therapeutic exercises  · Plan of Care Expires:  02/08/18   Plan of Care Reviewed with: spouse, patient    Subjective     Communicated with nurse prior to session.  Patient found supine in bed with PCT and wife present  upon PT entry to room, agreeable to treatment.      Chief Complaint: patient had not complaints   Patient comments/goals: none stated   Pain/Comfort:  · Pain Rating 1: 0/10  · Pain Rating Post-Intervention 1: 0/10    Patients cultural, spiritual, Anabaptism conflicts given the current  situation: none    Objective:     Patient found with: peripheral IV, telemetry     General Precautions: Standard, fall, seizure   Orthopedic Precautions:N/A   Braces: N/A     Functional Mobility:  · Roll right with moderate assistance with use of bedrail   · Roll left with moderate assistance with use of bedrail  · Supine to sit with moderate assistance for bilateral LE management and CGA at trunk for support. Patient required verbal cues and increase time to perform task  · Sit to stand from bed with Rolling walker with Moderate A hand placement and elevate hips  · SPT from bed to bedside chair with rolling walker with Min/moderate A walker management, foot placement severe flexed hips required tactile cues for upright posture.       AM-PAC 6 CLICK MOBILITY  Turning over in bed (including adjusting bedclothes, sheets and blankets)?: 3  Sitting down on and standing up from a chair with arms (e.g., wheelchair, bedside commode, etc.): 2  Moving from lying on back to sitting on the side of the bed?: 3  Moving to and from a bed to a chair (including a wheelchair)?: 2  Need to walk in hospital room?: 1  Climbing 3-5 steps with a railing?: 1  Total Score: 12       Therapeutic Activities and Exercises:  Patient performed AP, LAQ, Hip Flexion, Hip Abd/Add X 15 reps AAROM B LE  **noted that patient had red, swelling and complained of pain in left wrist. Notified nurse.     Patient left up in chair with all lines intact, call button in reach, nurse notified and wife  present..    GOALS:    Physical Therapy Goals        Problem: Physical Therapy Goal    Goal Priority Disciplines Outcome Goal Variances Interventions   Physical Therapy Goal     PT/OT, PT Ongoing (interventions implemented as appropriate)     Description:  Goals to be met by: 18     Patient will increase functional independence with mobility by performin. Supine to sit with SBA  2. Sit to supine with Stand-by Assistance  3. Sit to stand transfer with  contact guard Assistance  4. Gait  x 25' feet with Contact Guard Assistance using Rolling Walker.   5. Ascend/descend 5 stair with right Handrails Minimal Assistance using crutch.                       Time Tracking:     PT Received On: 01/13/18  PT Start Time: 0906     PT Stop Time: 0930  PT Total Time (min): 24 min     Billable Minutes: Therapeutic Activity 24    Treatment Type: Treatment  PT/PTA: PTA     PTA Visit Number: 2     Amy Muñoz, MATT  01/13/2018

## 2018-01-13 NOTE — ASSESSMENT & PLAN NOTE
- Etiology unclear  - Patient seen yesterday, did not appear toxic.  - WBC and diff WNL, stable.  - However, Blood culture G-r.  - UA appears okay.     - CXR appears okay.   - Influenza swab was negative  - D/C Tamiflu    - Monitor.

## 2018-01-13 NOTE — PLAN OF CARE
Received call from RN as patient had developed tongue swelling and SOB beginning approx. 30 minutes after initiating dose of vancomycin. Vancomycin held. IV Benadryl administered given concern for allergic reaction. Oxygen applied at 2L via NC. Will continue to monitor patient.

## 2018-01-13 NOTE — PLAN OF CARE
Problem: Patient Care Overview  Goal: Plan of Care Review  Outcome: Ongoing (interventions implemented as appropriate)  Patient on oxygen; prn treatment given at 0920 due to shortness of breath after OT/PT activity. Incentive spirometer at bedside.

## 2018-01-14 PROBLEM — R06.02 SOB (SHORTNESS OF BREATH): Status: ACTIVE | Noted: 2018-01-14

## 2018-01-14 LAB
ALBUMIN SERPL BCP-MCNC: 2.4 G/DL
ALP SERPL-CCNC: 83 U/L
ALT SERPL W/O P-5'-P-CCNC: 56 U/L
ANION GAP SERPL CALC-SCNC: 6 MMOL/L
AST SERPL-CCNC: 95 U/L
BACTERIA BLD CULT: NORMAL
BACTERIA UR CULT: NO GROWTH
BASOPHILS # BLD AUTO: 0.02 K/UL
BASOPHILS NFR BLD: 0.3 %
BILIRUB SERPL-MCNC: 0.6 MG/DL
BUN SERPL-MCNC: 12 MG/DL
CALCIUM SERPL-MCNC: 8.1 MG/DL
CHLORIDE SERPL-SCNC: 108 MMOL/L
CK SERPL-CCNC: 1115 U/L
CO2 SERPL-SCNC: 21 MMOL/L
CREAT SERPL-MCNC: 1.1 MG/DL
DIFFERENTIAL METHOD: ABNORMAL
EOSINOPHIL # BLD AUTO: 0 K/UL
EOSINOPHIL NFR BLD: 0.7 %
ERYTHROCYTE [DISTWIDTH] IN BLOOD BY AUTOMATED COUNT: 15.3 %
EST. GFR  (AFRICAN AMERICAN): >60 ML/MIN/1.73 M^2
EST. GFR  (NON AFRICAN AMERICAN): >60 ML/MIN/1.73 M^2
GLUCOSE SERPL-MCNC: 105 MG/DL
HCT VFR BLD AUTO: 23.9 %
HGB BLD-MCNC: 8.1 G/DL
LYMPHOCYTES # BLD AUTO: 0.9 K/UL
LYMPHOCYTES NFR BLD: 16.2 %
MAGNESIUM SERPL-MCNC: 1.4 MG/DL
MCH RBC QN AUTO: 38.4 PG
MCHC RBC AUTO-ENTMCNC: 33.9 G/DL
MCV RBC AUTO: 113 FL
MONOCYTES # BLD AUTO: 1.3 K/UL
MONOCYTES NFR BLD: 23.4 %
NEUTROPHILS # BLD AUTO: 3.4 K/UL
NEUTROPHILS NFR BLD: 59.4 %
PHOSPHATE SERPL-MCNC: 2.9 MG/DL
PLATELET # BLD AUTO: 203 K/UL
PLATELET BLD QL SMEAR: ABNORMAL
PMV BLD AUTO: 10.8 FL
POTASSIUM SERPL-SCNC: 3.7 MMOL/L
PROT SERPL-MCNC: 6.6 G/DL
RBC # BLD AUTO: 2.11 M/UL
SODIUM SERPL-SCNC: 135 MMOL/L
WBC # BLD AUTO: 5.73 K/UL

## 2018-01-14 PROCEDURE — 63600175 PHARM REV CODE 636 W HCPCS: Performed by: HOSPITALIST

## 2018-01-14 PROCEDURE — 63600175 PHARM REV CODE 636 W HCPCS: Performed by: NURSE PRACTITIONER

## 2018-01-14 PROCEDURE — 25000003 PHARM REV CODE 250: Performed by: PHYSICIAN ASSISTANT

## 2018-01-14 PROCEDURE — 94761 N-INVAS EAR/PLS OXIMETRY MLT: CPT

## 2018-01-14 PROCEDURE — 94640 AIRWAY INHALATION TREATMENT: CPT

## 2018-01-14 PROCEDURE — 11000001 HC ACUTE MED/SURG PRIVATE ROOM

## 2018-01-14 PROCEDURE — 63600175 PHARM REV CODE 636 W HCPCS

## 2018-01-14 PROCEDURE — 80053 COMPREHEN METABOLIC PANEL: CPT

## 2018-01-14 PROCEDURE — 84100 ASSAY OF PHOSPHORUS: CPT

## 2018-01-14 PROCEDURE — 25000242 PHARM REV CODE 250 ALT 637 W/ HCPCS: Performed by: INTERNAL MEDICINE

## 2018-01-14 PROCEDURE — 82550 ASSAY OF CK (CPK): CPT

## 2018-01-14 PROCEDURE — 85025 COMPLETE CBC W/AUTO DIFF WBC: CPT

## 2018-01-14 PROCEDURE — 63600175 PHARM REV CODE 636 W HCPCS: Performed by: INTERNAL MEDICINE

## 2018-01-14 PROCEDURE — 25000003 PHARM REV CODE 250: Performed by: NURSE PRACTITIONER

## 2018-01-14 PROCEDURE — 99233 SBSQ HOSP IP/OBS HIGH 50: CPT | Mod: ,,, | Performed by: INTERNAL MEDICINE

## 2018-01-14 PROCEDURE — 83735 ASSAY OF MAGNESIUM: CPT

## 2018-01-14 PROCEDURE — 25000003 PHARM REV CODE 250: Performed by: HOSPITALIST

## 2018-01-14 PROCEDURE — 36415 COLL VENOUS BLD VENIPUNCTURE: CPT

## 2018-01-14 RX ORDER — IPRATROPIUM BROMIDE AND ALBUTEROL SULFATE 2.5; .5 MG/3ML; MG/3ML
3 SOLUTION RESPIRATORY (INHALATION)
Status: DISCONTINUED | OUTPATIENT
Start: 2018-01-14 | End: 2018-01-21

## 2018-01-14 RX ORDER — CALCIUM CARBONATE 200(500)MG
1000 TABLET,CHEWABLE ORAL ONCE
Status: COMPLETED | OUTPATIENT
Start: 2018-01-14 | End: 2018-01-14

## 2018-01-14 RX ORDER — CIPROFLOXACIN 2 MG/ML
400 INJECTION, SOLUTION INTRAVENOUS
Status: DISCONTINUED | OUTPATIENT
Start: 2018-01-14 | End: 2018-01-17

## 2018-01-14 RX ADMIN — CARVEDILOL 3.12 MG: 3.12 TABLET, FILM COATED ORAL at 05:01

## 2018-01-14 RX ADMIN — MEROPENEM AND SODIUM CHLORIDE 1 G: 1 INJECTION, SOLUTION INTRAVENOUS at 12:01

## 2018-01-14 RX ADMIN — ASPIRIN 81 MG: 81 TABLET, COATED ORAL at 08:01

## 2018-01-14 RX ADMIN — GABAPENTIN 300 MG: 300 CAPSULE ORAL at 01:01

## 2018-01-14 RX ADMIN — GABAPENTIN 300 MG: 300 CAPSULE ORAL at 06:01

## 2018-01-14 RX ADMIN — Medication 400 MG: at 08:01

## 2018-01-14 RX ADMIN — CALCIUM CARBONATE 1000 MG: 500 TABLET, CHEWABLE ORAL at 04:01

## 2018-01-14 RX ADMIN — DILTIAZEM HYDROCHLORIDE 180 MG: 180 CAPSULE, COATED, EXTENDED RELEASE ORAL at 08:01

## 2018-01-14 RX ADMIN — ALLOPURINOL 300 MG: 300 TABLET ORAL at 08:01

## 2018-01-14 RX ADMIN — LEVETIRACETAM 500 MG: 500 TABLET ORAL at 08:01

## 2018-01-14 RX ADMIN — MEROPENEM AND SODIUM CHLORIDE 1 G: 1 INJECTION, SOLUTION INTRAVENOUS at 03:01

## 2018-01-14 RX ADMIN — FOLIC ACID 1 MG: 1 TABLET ORAL at 08:01

## 2018-01-14 RX ADMIN — ENOXAPARIN SODIUM 40 MG: 100 INJECTION SUBCUTANEOUS at 05:01

## 2018-01-14 RX ADMIN — TRAMADOL HYDROCHLORIDE 50 MG: 50 TABLET, COATED ORAL at 12:01

## 2018-01-14 RX ADMIN — IPRATROPIUM BROMIDE AND ALBUTEROL SULFATE 3 ML: .5; 3 SOLUTION RESPIRATORY (INHALATION) at 04:01

## 2018-01-14 RX ADMIN — CARVEDILOL 3.12 MG: 3.12 TABLET, FILM COATED ORAL at 08:01

## 2018-01-14 RX ADMIN — GABAPENTIN 300 MG: 300 CAPSULE ORAL at 09:01

## 2018-01-14 RX ADMIN — IPRATROPIUM BROMIDE AND ALBUTEROL SULFATE 3 ML: .5; 3 SOLUTION RESPIRATORY (INHALATION) at 08:01

## 2018-01-14 RX ADMIN — POTASSIUM CHLORIDE 40 MEQ: 1500 TABLET, EXTENDED RELEASE ORAL at 08:01

## 2018-01-14 RX ADMIN — THIAMINE HYDROCHLORIDE 100 MG: 100 INJECTION, SOLUTION INTRAMUSCULAR; INTRAVENOUS at 08:01

## 2018-01-14 RX ADMIN — CIPROFLOXACIN 400 MG: 2 INJECTION, SOLUTION INTRAVENOUS at 09:01

## 2018-01-14 RX ADMIN — SODIUM CHLORIDE: 0.9 INJECTION, SOLUTION INTRAVENOUS at 03:01

## 2018-01-14 RX ADMIN — LEVETIRACETAM 500 MG: 500 TABLET ORAL at 09:01

## 2018-01-14 NOTE — ASSESSMENT & PLAN NOTE
- Zosyn day 2  - Blood culture growing ENTEROBACTER CLOACAE COMPLEX and KLEBSIELLA OXYTOCA.    - Repeat blood cultures pending.  - Etiology unclear.  UA and CXR appeared okay.   - Consult ID

## 2018-01-14 NOTE — PLAN OF CARE
Problem: Patient Care Overview  Goal: Plan of Care Review  Outcome: Ongoing (interventions implemented as appropriate)  Pt on RA. Sats 94%. No distress noted. PRN tx given tolerated well. IS done with good effort. Will continue to monitor.

## 2018-01-14 NOTE — ASSESSMENT & PLAN NOTE
- CPK 5149 > 4352 > 5339 > 4957   - CPK was rising despite IVF, but good improvement today.   - Check SANDER  - Monitor

## 2018-01-14 NOTE — PLAN OF CARE
Problem: Patient Care Overview  Goal: Plan of Care Review  Outcome: Ongoing (interventions implemented as appropriate)  AAOX4 (2L O2 NC). VSS. Pt denies pain at this time.Pt has been eating adequately throughout shift. Pt voiding per urinal. Treated with IV Abx. Tele maintained; alarms on and audible.Fall and seizure precautions maintained. Abdominal US done this shift. Pt repositions self independently. Purposeful hourly rounding. Pt has call light in reach, side rails up X2, bed in low position, TEDs/SCDs and nonskid socks on. Pt lying in bed in no distress with spouse at the bedside.Pt free of trauma, falls, injury and skin breakdown

## 2018-01-14 NOTE — PROGRESS NOTES
"[facility] Hospital Medicine  Progress Note    Patient Name: Michael Johnson Jr.  MRN: [id number]  Patient Class: IP- Inpatient   Admission Date: 1/8/2018  Length of Stay: 3 days  Attending Physician: Latrell Upton MD  Primary Care Provider: Zunilda Bolden NP        Subjective:     Principal Problem:Seizure    HPI:  This is a 57 y.o. Male with a history of seizure who presents via EMS with complaint of seizure that occurred prior to arrival. Patient was outside sitting down when seizure witnessed by wife and son. Wife reports generalized "full body" seizure last lasted approximately four minutes. After seizure patient was unresponsive. When paramedics arrived patient had a heart rate of 220 bpm en route. Paramedics gave 6 mg and 12 mg of andesine were given without changes. Diltiazem was given and heart rate decreased to 109 bpm. Wife states the patient has been dehydrated for about three days. Patient states he drinks a pint of vodka a day. Patient states he has stopped drinking, and his last drink was four days ago. Patient states a history of seizure two months. Patient was seen at [facility] for outpatient EEG. Wife reports EEG was normal. Patient denies any fever, chills, nausea, vomiting, numbness, weakness, or confusion.    Hospital Course:  No notes on file    Interval History:  Had fever.  WBC WNL.  Doing okay.        Review of Systems   Constitutional: Positive for fatigue and fever. Negative for appetite change.   Respiratory: Negative for cough and shortness of breath.    Cardiovascular: Negative for chest pain and palpitations.   Gastrointestinal: Negative for nausea and vomiting.   Skin: Negative for color change and rash.   Neurological: Negative for dizziness and weakness.   Psychiatric/Behavioral: Negative for agitation and confusion.     Objective:     Vital Signs (Most Recent):  Temp: 99.2 °F (37.3 °C) (01/13/18 1633)  Pulse: 85 (01/13/18 1633)  Resp: 20 (01/13/18 1633)  BP: " 117/73 (01/13/18 1633)  SpO2: 97 % (01/13/18 1633) Vital Signs (24h Range):  Temp:  [98.2 °F (36.8 °C)-102.8 °F (39.3 °C)] 99.2 °F (37.3 °C)  Pulse:  [] 85  Resp:  [18-24] 20  SpO2:  [92 %-100 %] 97 %  BP: (100-140)/(59-81) 117/73     Weight: 90.9 kg (200 lb 6.4 oz)  Body mass index is 26.44 kg/m².    Intake/Output Summary (Last 24 hours) at 01/13/18 1847  Last data filed at 01/13/18 1654   Gross per 24 hour   Intake             1440 ml   Output             3000 ml   Net            -1560 ml      Physical Exam   Constitutional: He is oriented to person, place, and time. He appears well-developed and well-nourished.   HENT:   Head: Normocephalic and atraumatic.   Eyes: Conjunctivae are normal. Pupils are equal, round, and reactive to light.   Neck: Normal range of motion. Neck supple.   Cardiovascular: Normal rate and regular rhythm.    Pulmonary/Chest: Effort normal and breath sounds normal.   Abdominal: Soft.   Musculoskeletal:   - LLE smaller diameter than RLE.     Neurological: He is alert and oriented to person, place, and time.   Skin: Skin is warm and dry.       Significant Labs:   BMP:     Recent Labs  Lab 01/13/18  0604      *   K 3.8      CO2 19*   BUN 12   CREATININE 1.2   CALCIUM 7.7*   MG 1.1*     CBC:     Recent Labs  Lab 01/12/18  0526 01/13/18  0604   WBC 7.95 5.57   HGB 8.4* 8.4*   HCT 24.8* 24.8*   * 142*       Significant Imaging: I have reviewed all pertinent imaging results/findings within the past 24 hours.   Imaging Results          US Abdomen Limited (Final result)  Result time 01/13/18 12:27:36    Final result by Alvin Grier MD (01/13/18 12:27:36)                 Impression:     Cholelithiasis with a thickened gallbladder wall with pericholecystic fluid cannot exclude acute cholecystitis.      Electronically signed by: ALVIN GRIER MD  Date:     01/13/18  Time:    12:27              Narrative:    Ultrasound abdomen limited.    Findings: The visualized portion  of the pancreas is unremarkable. There are gallstones. The gallbladder wall is thickened measuring 0.5 cm. There is pericholecystic fluid. The common duct measures 0.3 cm. The liver measures 17.5 cm and has a homogeneous echotexture. The right kidney measures 12.4 cm and is unremarkable.                             X-Ray Chest 1 View (Final result)  Result time 01/11/18 15:46:37    Final result by Alvin Smith MD (01/11/18 15:46:37)                 Impression:      No acute chest disease identified.  No detrimental change is noted when compared to prior examination dated 1/8/18.      Electronically signed by: ALVIN SMITH MD  Date:     01/11/18  Time:    15:46              Narrative:    Comparison is made to prior examination dated 1/8/18.    A single portable AP radiograph of the chest was obtained.  The heart and mediastinal structures are unremarkable.  Pulmonary vasculature is within normal limits.  The lungs are free of focal consolidations.  There is no evidence for pneumothorax or large pleural effusions.  Bony structures appear intact.                             CT Head Without Contrast (Final result)  Result time 01/08/18 19:42:01    Final result by Ancelmo Irby MD (01/08/18 19:42:01)                 Impression:        No acute intracranial abnormalities.    Sinus disease.      Electronically signed by: ANCELMO IRBY MD  Date:     01/08/18  Time:    19:42              Narrative:    History:     Comparison: March 16, 2017    Technique:    Axial images of the brain were obtained at 5-mm intervals from the skull base to the vertex without the administration of contrast.    Findings:    The brain parenchyma appears normal for age with good corticomedullary differentiation.  There is no evidence of acute infarct, hemorrhage, or mass.  The ventricular system is normal in size.  No mass-effect or midline shift.  There are no abnormal extra-axial fluid collections.      Sinus disease unchanged from  prior.  The mastoid air cells are clear.      The calvarium appears intact.  .                             X-Ray Chest AP Portable (Final result)  Result time 01/08/18 19:06:43    Final result by Gilberto Wallace MD (01/08/18 19:06:43)                 Impression:        No detrimental change or radiographic acute intrathoracic process seen on this single view.      Electronically signed by: GILBERTO WALLACE MD, MD  Date:     01/08/18  Time:    19:06              Narrative:    COMPARISON: Chest radiograph 3/16/17    FINDINGS: AP portable upright view of the chest. Monitoring leads overlie the chest. Patient somewhat rotated. Nonspecific elevation of the right hemidiaphragm. No large consolidation or new focal opacity. No large pleural effusion or pneumothorax.  Cardiomediastinal silhouette appears stable without evidence of failure.  No acute osseous process seen.   PA and lateral views can be obtained.                            Assessment/Plan:      * Seizure    - Patient had witnessed seizure and subsequently had another in ER.  Believe due to alcohol withdrawal.   - Librium 25 q 8 to PRN.  - Neurology noted:  The patient has had multiple seizures in the past and on this admission when he had 2 seizures, one at home and one in the emergency room.     This may be related to alcohol withdrawal however in view of multiple seizures would recommend continuation of Keppra 500 mg twice a day.  Discussed with patient regarding getting into a substance abuse/alcohol abuse program and has continued alcohol abuse reports high risk for seizures.  He is also advised that he should not be driving until he is seizure-free for over 6 months.  Will not repeat his EEG which was was done in the past  - Emphasized no driving to patient as well as neurology follow up and he expressed understanding.   - Refer to Dr. Andres  - Monitor.          Gram negative sepsis    - Zosyn day 2  - Blood culture growing ENTEROBACTER CLOACAE COMPLEX and  KLEBSIELLA OXYTOCA.    - Repeat blood cultures pending.  - Etiology unclear.  UA and CXR appeared okay.   - Consult ID        Gram-negative bacteremia              Fever    - Etiology unclear  - Patient seen yesterday, did not appear toxic.  - WBC and diff WNL, stable.  - However, Blood culture G-r.  - UA appears okay.     - CXR appears okay.   - Influenza swab was negative  - D/C Tamiflu    - Monitor.            Acute kidney injury              Rhabdomyolysis    - CPK 5149 > 4352 > 5339 > 2547   - CPK was rising despite IVF, but good improvement today.   - Check SANDER  - Monitor            Unsteady gait    - PT OT          Alcohol abuse    - Counseled to cease  - Thiamine 100 mg daily            Troponin level elevated    - Noted in past   - Cardiology noted:  Troponin Rise              Most certainly due to demand/supply issues.              Would be reasonable to do Stress test as outpatient.              May need to be Regadenoson MPI as difficulty walking due to pain in foot.            Macrocytic anemia    - Folate borderline low.   - Supplement.             Hypomagnesemia    - Likely due to EtOH abuse.  - Monitor and replace electrolytes PRN.             Hyperlipidemia    - Lipid profile okay.             Hypokalemia    - Replace PRN.             Acute on chronic kidney failure    - Improving,  - Monitor.          Hypertension    - Coreg, cardizem continued  - Monitor.              VTE Risk Mitigation         Ordered     Place sequential compression device  Until discontinued      01/09/18 0006     Medium Risk of VTE  Once      01/09/18 0006     Place sequential compression device  Until discontinued      01/09/18 0006     Place MIKA hose  Until discontinued      01/09/18 0006     enoxaparin injection 40 mg  Daily     Route:  Subcutaneous        01/09/18 0005              Latrell Upton MD  Department of Hospital Medicine   [facility]

## 2018-01-15 PROBLEM — K81.9 CHOLECYSTITIS: Status: ACTIVE | Noted: 2018-01-15

## 2018-01-15 LAB
ALBUMIN SERPL BCP-MCNC: 2.5 G/DL
ALP SERPL-CCNC: 82 U/L
ALT SERPL W/O P-5'-P-CCNC: 51 U/L
ANA SER QL IF: NORMAL
ANION GAP SERPL CALC-SCNC: 8 MMOL/L
ANISOCYTOSIS BLD QL SMEAR: SLIGHT
AORTIC VALVE REGURGITATION: ABNORMAL
AST SERPL-CCNC: 77 U/L
BASOPHILS # BLD AUTO: 0.01 K/UL
BASOPHILS NFR BLD: 0.2 %
BILIRUB SERPL-MCNC: 0.6 MG/DL
BUN SERPL-MCNC: 10 MG/DL
CALCIUM SERPL-MCNC: 8.7 MG/DL
CHLORIDE SERPL-SCNC: 106 MMOL/L
CK SERPL-CCNC: 561 U/L
CO2 SERPL-SCNC: 19 MMOL/L
CREAT SERPL-MCNC: 1 MG/DL
DIASTOLIC DYSFUNCTION: NO
DIFFERENTIAL METHOD: ABNORMAL
EOSINOPHIL # BLD AUTO: 0 K/UL
EOSINOPHIL NFR BLD: 0 %
ERYTHROCYTE [DISTWIDTH] IN BLOOD BY AUTOMATED COUNT: 15.3 %
EST. GFR  (AFRICAN AMERICAN): >60 ML/MIN/1.73 M^2
EST. GFR  (NON AFRICAN AMERICAN): >60 ML/MIN/1.73 M^2
ESTIMATED PA SYSTOLIC PRESSURE: 59.55
GIANT PLATELETS BLD QL SMEAR: PRESENT
GLOBAL PERICARDIAL EFFUSION: ABNORMAL
GLUCOSE SERPL-MCNC: 101 MG/DL
HCT VFR BLD AUTO: 24.2 %
HGB BLD-MCNC: 8.1 G/DL
HYPOCHROMIA BLD QL SMEAR: ABNORMAL
LYMPHOCYTES # BLD AUTO: 1 K/UL
LYMPHOCYTES NFR BLD: 17.8 %
MAGNESIUM SERPL-MCNC: 1.1 MG/DL
MCH RBC QN AUTO: 37.7 PG
MCHC RBC AUTO-ENTMCNC: 33.5 G/DL
MCV RBC AUTO: 113 FL
MONOCYTES # BLD AUTO: 1.3 K/UL
MONOCYTES NFR BLD: 23.2 %
NEUTROPHILS # BLD AUTO: 2.1 K/UL
NEUTROPHILS NFR BLD: 37.3 %
PHOSPHATE SERPL-MCNC: 3.8 MG/DL
PLATELET # BLD AUTO: 247 K/UL
PLATELET BLD QL SMEAR: ABNORMAL
PMV BLD AUTO: 11 FL
POTASSIUM SERPL-SCNC: 4.2 MMOL/L
PROT SERPL-MCNC: 6.6 G/DL
RBC # BLD AUTO: 2.15 M/UL
RETIRED EF AND QEF - SEE NOTES: 60 (ref 55–65)
SODIUM SERPL-SCNC: 133 MMOL/L
TRICUSPID VALVE REGURGITATION: ABNORMAL
WBC # BLD AUTO: 5.73 K/UL
WBC TOXIC VACUOLES BLD QL SMEAR: PRESENT

## 2018-01-15 PROCEDURE — 97110 THERAPEUTIC EXERCISES: CPT

## 2018-01-15 PROCEDURE — 25000003 PHARM REV CODE 250: Performed by: HOSPITALIST

## 2018-01-15 PROCEDURE — 99223 1ST HOSP IP/OBS HIGH 75: CPT | Mod: ,,, | Performed by: INTERNAL MEDICINE

## 2018-01-15 PROCEDURE — 84100 ASSAY OF PHOSPHORUS: CPT

## 2018-01-15 PROCEDURE — 83735 ASSAY OF MAGNESIUM: CPT

## 2018-01-15 PROCEDURE — 11000001 HC ACUTE MED/SURG PRIVATE ROOM

## 2018-01-15 PROCEDURE — 36415 COLL VENOUS BLD VENIPUNCTURE: CPT

## 2018-01-15 PROCEDURE — 63600175 PHARM REV CODE 636 W HCPCS: Performed by: HOSPITALIST

## 2018-01-15 PROCEDURE — 63600175 PHARM REV CODE 636 W HCPCS: Performed by: INTERNAL MEDICINE

## 2018-01-15 PROCEDURE — 85027 COMPLETE CBC AUTOMATED: CPT

## 2018-01-15 PROCEDURE — 97530 THERAPEUTIC ACTIVITIES: CPT

## 2018-01-15 PROCEDURE — 97535 SELF CARE MNGMENT TRAINING: CPT

## 2018-01-15 PROCEDURE — 80053 COMPREHEN METABOLIC PANEL: CPT

## 2018-01-15 PROCEDURE — 85007 BL SMEAR W/DIFF WBC COUNT: CPT

## 2018-01-15 PROCEDURE — 94761 N-INVAS EAR/PLS OXIMETRY MLT: CPT

## 2018-01-15 PROCEDURE — 99232 SBSQ HOSP IP/OBS MODERATE 35: CPT | Mod: ,,, | Performed by: INTERNAL MEDICINE

## 2018-01-15 PROCEDURE — 85025 COMPLETE CBC W/AUTO DIFF WBC: CPT

## 2018-01-15 PROCEDURE — 94640 AIRWAY INHALATION TREATMENT: CPT

## 2018-01-15 PROCEDURE — 82550 ASSAY OF CK (CPK): CPT

## 2018-01-15 PROCEDURE — 63600175 PHARM REV CODE 636 W HCPCS: Performed by: NURSE PRACTITIONER

## 2018-01-15 PROCEDURE — 25000242 PHARM REV CODE 250 ALT 637 W/ HCPCS: Performed by: INTERNAL MEDICINE

## 2018-01-15 PROCEDURE — 93306 TTE W/DOPPLER COMPLETE: CPT

## 2018-01-15 PROCEDURE — 25000003 PHARM REV CODE 250: Performed by: NURSE PRACTITIONER

## 2018-01-15 PROCEDURE — 63600175 PHARM REV CODE 636 W HCPCS

## 2018-01-15 RX ORDER — MAGNESIUM SULFATE HEPTAHYDRATE 40 MG/ML
2 INJECTION, SOLUTION INTRAVENOUS
Status: COMPLETED | OUTPATIENT
Start: 2018-01-15 | End: 2018-01-15

## 2018-01-15 RX ADMIN — FOLIC ACID 1 MG: 1 TABLET ORAL at 08:01

## 2018-01-15 RX ADMIN — GABAPENTIN 300 MG: 300 CAPSULE ORAL at 05:01

## 2018-01-15 RX ADMIN — CIPROFLOXACIN 400 MG: 2 INJECTION, SOLUTION INTRAVENOUS at 09:01

## 2018-01-15 RX ADMIN — THIAMINE HYDROCHLORIDE 100 MG: 100 INJECTION, SOLUTION INTRAMUSCULAR; INTRAVENOUS at 08:01

## 2018-01-15 RX ADMIN — CIPROFLOXACIN 400 MG: 2 INJECTION, SOLUTION INTRAVENOUS at 07:01

## 2018-01-15 RX ADMIN — LEVETIRACETAM 500 MG: 500 TABLET ORAL at 09:01

## 2018-01-15 RX ADMIN — ALLOPURINOL 300 MG: 300 TABLET ORAL at 08:01

## 2018-01-15 RX ADMIN — CARVEDILOL 3.12 MG: 3.12 TABLET, FILM COATED ORAL at 04:01

## 2018-01-15 RX ADMIN — ENOXAPARIN SODIUM 40 MG: 100 INJECTION SUBCUTANEOUS at 04:01

## 2018-01-15 RX ADMIN — MAGNESIUM SULFATE IN WATER 2 G: 40 INJECTION, SOLUTION INTRAVENOUS at 10:01

## 2018-01-15 RX ADMIN — LEVETIRACETAM 500 MG: 500 TABLET ORAL at 08:01

## 2018-01-15 RX ADMIN — MAGNESIUM SULFATE IN WATER 2 G: 40 INJECTION, SOLUTION INTRAVENOUS at 12:01

## 2018-01-15 RX ADMIN — POTASSIUM CHLORIDE 40 MEQ: 1500 TABLET, EXTENDED RELEASE ORAL at 08:01

## 2018-01-15 RX ADMIN — Medication 400 MG: at 08:01

## 2018-01-15 RX ADMIN — IPRATROPIUM BROMIDE AND ALBUTEROL SULFATE 3 ML: .5; 3 SOLUTION RESPIRATORY (INHALATION) at 02:01

## 2018-01-15 RX ADMIN — ASPIRIN 81 MG: 81 TABLET, COATED ORAL at 08:01

## 2018-01-15 RX ADMIN — CARVEDILOL 3.12 MG: 3.12 TABLET, FILM COATED ORAL at 07:01

## 2018-01-15 RX ADMIN — GABAPENTIN 300 MG: 300 CAPSULE ORAL at 09:01

## 2018-01-15 RX ADMIN — GABAPENTIN 300 MG: 300 CAPSULE ORAL at 03:01

## 2018-01-15 RX ADMIN — IPRATROPIUM BROMIDE AND ALBUTEROL SULFATE 3 ML: .5; 3 SOLUTION RESPIRATORY (INHALATION) at 08:01

## 2018-01-15 RX ADMIN — IPRATROPIUM BROMIDE AND ALBUTEROL SULFATE 3 ML: .5; 3 SOLUTION RESPIRATORY (INHALATION) at 07:01

## 2018-01-15 RX ADMIN — DILTIAZEM HYDROCHLORIDE 180 MG: 180 CAPSULE, COATED, EXTENDED RELEASE ORAL at 08:01

## 2018-01-15 NOTE — HPI
56 yo male with history of alcohol use who presents with seizure. Patient admitted on 1/8 after seizure - wife stated that patient had stopped drinking 4 days earlier. Patient had fever on 1/11, blood cultures grew E. Cloacae and K.oxytoca. Placed on Zosyn empirically, now on Cipro. Fever and WBC improved, patient had no associated symptoms. Ultrasound of gallbladder showed cholecystitis.

## 2018-01-15 NOTE — SUBJECTIVE & OBJECTIVE
Past Medical History:   Diagnosis Date    Afib     Ankle fracture, left     Asthma     Gout, unspecified     Hypertension     Seizure 2016       Past Surgical History:   Procedure Laterality Date    ANKLE SURGERY         Review of patient's allergies indicates:   Allergen Reactions    Lisinopril Swelling     Pt admitted with angioedema 2wks after taking lisinopril.        Medications:  Prescriptions Prior to Admission   Medication Sig    allopurinol (ZYLOPRIM) 300 MG tablet Take 300 mg by mouth once daily.    aspirin (ECOTRIN) 81 MG EC tablet Take 81 mg by mouth once daily.    diltiaZEM (DILACOR XR) 180 MG CDCR Take 180 mg by mouth once daily.    metoprolol succinate (TOPROL-XL) 50 MG 24 hr tablet Take 50 mg by mouth once daily.    amLODIPine (NORVASC) 10 MG tablet Take 10 mg by mouth once daily.    carvedilol (COREG) 25 MG tablet Take 25 mg by mouth 2 (two) times daily with meals.    colchicine 0.6 mg tablet Take 1 tablet (0.6 mg total) by mouth once daily.    diphenhydrAMINE (BENADRYL) 25 mg capsule Take 1 each (25 mg total) by mouth every 4 (four) hours as needed (lip swelling for 2 days).    gabapentin (NEURONTIN) 300 MG capsule Take 300 mg by mouth 3 (three) times daily.    ibuprofen (ADVIL,MOTRIN) 200 MG tablet Take 200 mg by mouth every 6 (six) hours as needed for Pain.    magnesium oxide (MAG-OX) 400 mg tablet Take 1 tablet (400 mg total) by mouth once daily.    potassium chloride (KLOR-CON) 20 mEq Pack Take 20 mEq by mouth 4 (four) times daily.    tramadol (ULTRAM) 50 mg tablet Take 50 mg by mouth 3 (three) times daily as needed for Pain.     Antibiotics     Start     Stop Route Frequency Ordered    01/14/18 1930  ciprofloxacin (CIPRO)400mg/200ml D5W IVPB 400 mg      -- IV Every 12 hours (non-standard times) 01/14/18 1822        Antifungals     None        Antivirals     None             There is no immunization history on file for this patient.    Family History     Problem Relation  (Age of Onset)    Cancer Mother    Cataracts Maternal Grandfather    Hypertension Father    Stroke Maternal Grandmother, Maternal Grandfather        Social History     Social History    Marital status:      Spouse name: N/A    Number of children: N/A    Years of education: N/A     Social History Main Topics    Smoking status: Never Smoker    Smokeless tobacco: None    Alcohol use Yes      Comment: ocassional alcohol    Drug use: No    Sexual activity: Not Asked     Other Topics Concern    None     Social History Narrative    None     Review of Systems   Constitutional: Negative.    HENT: Negative.    Respiratory: Negative for cough and shortness of breath.    Cardiovascular: Negative.    Gastrointestinal: Negative for abdominal pain, diarrhea, nausea and vomiting.   Neurological: Negative.    All other systems reviewed and are negative.    Objective:     Vital Signs (Most Recent):  Temp: 98.3 °F (36.8 °C) (01/15/18 1027)  Pulse: 74 (01/15/18 1200)  Resp: 18 (01/15/18 1027)  BP: (!) 133/90 (01/15/18 1027)  SpO2: 100 % (01/15/18 1027) Vital Signs (24h Range):  Temp:  [98.3 °F (36.8 °C)-99.6 °F (37.6 °C)] 98.3 °F (36.8 °C)  Pulse:  [74-88] 74  Resp:  [18-20] 18  SpO2:  [96 %-100 %] 100 %  BP: (113-133)/(69-90) 133/90     Weight: 90.9 kg (200 lb 6.4 oz)  Body mass index is 26.44 kg/m².    Estimated Creatinine Clearance: 94.3 mL/min (based on SCr of 1 mg/dL).    Physical Exam   Constitutional: He is oriented to person, place, and time. He appears well-developed and well-nourished.   HENT:   Head: Normocephalic and atraumatic.   Eyes: Conjunctivae and EOM are normal. Pupils are equal, round, and reactive to light. No scleral icterus.   Cardiovascular: Normal rate, regular rhythm and normal heart sounds.    Pulmonary/Chest: Effort normal and breath sounds normal.   Abdominal: Soft. Bowel sounds are normal. He exhibits no distension. There is no tenderness. There is no guarding.   Musculoskeletal: Normal  range of motion. He exhibits no edema.   Neurological: He is alert and oriented to person, place, and time.   Skin: Skin is warm and dry.   Nursing note and vitals reviewed.      Significant Labs:   Blood Culture:   Recent Labs  Lab 01/11/18  1550 01/13/18  0603 01/13/18  0604   LABBLOO Gram stain aer bottle: Gram negative rods   Gram stain rochelle bottle: Gram negative rods   Results called to and read back by:Linnette Chapman RN 01/12/2018  10:53  ENTEROBACTER CLOACAE COMPLEXFor susceptibility see order # [id number]  KLEBSIELLA OXYTOCAFor susceptibility see order # [id number]  Gram stain aer bottle: Gram negative rods   Results called to and read back by:Linnette Chapman RN 01/12/2018  10:52  Gram stain rochelle bottle: Gram negative rods   Positive results previously called 01/12/2018  ENTEROBACTER CLOACAE COMPLEX  KLEBSIELLA OXYTOCA No Growth to date  No Growth to date  No Growth to date No Growth to date  No Growth to date  No Growth to date     CBC:   Recent Labs  Lab 01/14/18  0521 01/15/18  0423   WBC 5.73 5.73   HGB 8.1* 8.1*   HCT 23.9* 24.2*    247     CMP:   Recent Labs  Lab 01/14/18  0521 01/15/18  0423   * 133*   K 3.7 4.2    106   CO2 21* 19*    101   BUN 12 10   CREATININE 1.1 1.0   CALCIUM 8.1* 8.7   PROT 6.6 6.6   ALBUMIN 2.4* 2.5*   BILITOT 0.6 0.6   ALKPHOS 83 82   AST 95* 77*   ALT 56* 51*   ANIONGAP 6* 8   EGFRNONAA >60 >60       Significant Imaging: CT: I have reviewed all pertinent results/findings within the past 24 hours:  The brain parenchyma appears normal for age with good corticomedullary differentiation.  There is no evidence of acute infarct, hemorrhage, or mass.  The ventricular system is normal in size.  No mass-effect or midline shift.  There are no abnormal extra-axial fluid collections.    U/S: I have reviewed all pertinent results/findings within the past 24 hours:  Cholelithiasis with a thickened gallbladder wall with pericholecystic fluid cannot exclude  acute cholecystitis.

## 2018-01-15 NOTE — PROGRESS NOTES
"[facility] Hospital Medicine  Progress Note    Patient Name: Michael Johnson Jr.  MRN: [id number]  Patient Class: IP- Inpatient   Admission Date: 1/8/2018  Length of Stay: 4 days  Attending Physician: Latrell Upton MD  Primary Care Provider: Zunilda Bolden NP        Subjective:     Principal Problem:Seizure    HPI:  This is a 57 y.o. Male with a history of seizure who presents via EMS with complaint of seizure that occurred prior to arrival. Patient was outside sitting down when seizure witnessed by wife and son. Wife reports generalized "full body" seizure last lasted approximately four minutes. After seizure patient was unresponsive. When paramedics arrived patient had a heart rate of 220 bpm en route. Paramedics gave 6 mg and 12 mg of andesine were given without changes. Diltiazem was given and heart rate decreased to 109 bpm. Wife states the patient has been dehydrated for about three days. Patient states he drinks a pint of vodka a day. Patient states he has stopped drinking, and his last drink was four days ago. Patient states a history of seizure two months. Patient was seen at [facility] for outpatient EEG. Wife reports EEG was normal. Patient denies any fever, chills, nausea, vomiting, numbness, weakness, or confusion.    Hospital Course:  No notes on file    Interval History:  Had fever.  WBC WNL.  Doing okay.        Review of Systems   Constitutional: Positive for fever. Negative for appetite change and fatigue.   Respiratory: Positive for wheezing. Negative for cough and shortness of breath.    Cardiovascular: Negative for chest pain and palpitations.   Gastrointestinal: Negative for nausea and vomiting.   Genitourinary: Negative for dysuria and hematuria.   Musculoskeletal:        Foot pain (chronic)   Skin: Negative for color change and rash.   Neurological: Negative for dizziness and weakness.   Psychiatric/Behavioral: Negative for agitation and confusion.     Objective:     Vital " Signs (Most Recent):  Temp: 99.6 °F (37.6 °C) (01/14/18 1546)  Pulse: 84 (01/14/18 1624)  Resp: 18 (01/14/18 1624)  BP: 113/69 (01/14/18 1546)  SpO2: 96 % (01/14/18 1624) Vital Signs (24h Range):  Temp:  [98.7 °F (37.1 °C)-100.7 °F (38.2 °C)] 99.6 °F (37.6 °C)  Pulse:  [83-94] 84  Resp:  [16-18] 18  SpO2:  [94 %-99 %] 96 %  BP: ()/(62-78) 113/69     Weight: 90.9 kg (200 lb 6.4 oz)  Body mass index is 26.44 kg/m².    Intake/Output Summary (Last 24 hours) at 01/14/18 1813  Last data filed at 01/14/18 1300   Gross per 24 hour   Intake              240 ml   Output             1950 ml   Net            -1710 ml      Physical Exam   Constitutional: He is oriented to person, place, and time. He appears well-developed and well-nourished.   HENT:   Head: Normocephalic and atraumatic.   Eyes: Conjunctivae are normal. Pupils are equal, round, and reactive to light.   Neck: Normal range of motion. Neck supple.   Cardiovascular: Normal rate and regular rhythm.    Pulmonary/Chest: Effort normal. He has wheezes.   Abdominal: Soft.   Musculoskeletal: He exhibits no edema or tenderness.   - LLE smaller diameter than RLE.     Neurological: He is alert and oriented to person, place, and time.   Skin: Skin is warm and dry.       Significant Labs:   BMP:     Recent Labs  Lab 01/14/18  0521      *   K 3.7      CO2 21*   BUN 12   CREATININE 1.1   CALCIUM 8.1*   MG 1.4*     CBC:     Recent Labs  Lab 01/13/18  0604 01/14/18  0521   WBC 5.57 5.73   HGB 8.4* 8.1*   HCT 24.8* 23.9*   * 203       Significant Imaging: I have reviewed all pertinent imaging results/findings within the past 24 hours.   Imaging Results          X-Ray Chest 1 View (Final result)  Result time 01/14/18 15:17:32    Final result by Alvin Grier MD (01/14/18 15:17:32)                 Impression:     As above.      Electronically signed by: ALVIN GRIER MD  Date:     01/14/18  Time:    15:17              Narrative:    Chest one  view.    Findings: The lungs are clear. There is no pneumothorax or pleural fluid. The cardiac silhouette is not enlarged. The osseous structures demonstrate mild degenerative change.                             US Abdomen Limited (Final result)  Result time 01/13/18 12:27:36    Final result by Alvin Grier MD (01/13/18 12:27:36)                 Impression:     Cholelithiasis with a thickened gallbladder wall with pericholecystic fluid cannot exclude acute cholecystitis.      Electronically signed by: ALVIN GRIER MD  Date:     01/13/18  Time:    12:27              Narrative:    Ultrasound abdomen limited.    Findings: The visualized portion of the pancreas is unremarkable. There are gallstones. The gallbladder wall is thickened measuring 0.5 cm. There is pericholecystic fluid. The common duct measures 0.3 cm. The liver measures 17.5 cm and has a homogeneous echotexture. The right kidney measures 12.4 cm and is unremarkable.                             X-Ray Chest 1 View (Final result)  Result time 01/11/18 15:46:37    Final result by Alvin Velasquez MD (01/11/18 15:46:37)                 Impression:      No acute chest disease identified.  No detrimental change is noted when compared to prior examination dated 1/8/18.      Electronically signed by: ALVIN VELASQUEZ MD  Date:     01/11/18  Time:    15:46              Narrative:    Comparison is made to prior examination dated 1/8/18.    A single portable AP radiograph of the chest was obtained.  The heart and mediastinal structures are unremarkable.  Pulmonary vasculature is within normal limits.  The lungs are free of focal consolidations.  There is no evidence for pneumothorax or large pleural effusions.  Bony structures appear intact.                             CT Head Without Contrast (Final result)  Result time 01/08/18 19:42:01    Final result by Ancelmo Irby MD (01/08/18 19:42:01)                 Impression:        No acute intracranial  abnormalities.    Sinus disease.      Electronically signed by: MYLES ROTH MD  Date:     01/08/18  Time:    19:42              Narrative:    History:     Comparison: March 16, 2017    Technique:    Axial images of the brain were obtained at 5-mm intervals from the skull base to the vertex without the administration of contrast.    Findings:    The brain parenchyma appears normal for age with good corticomedullary differentiation.  There is no evidence of acute infarct, hemorrhage, or mass.  The ventricular system is normal in size.  No mass-effect or midline shift.  There are no abnormal extra-axial fluid collections.      Sinus disease unchanged from prior.  The mastoid air cells are clear.      The calvarium appears intact.  .                             X-Ray Chest AP Portable (Final result)  Result time 01/08/18 19:06:43    Final result by Gilberto Wallace MD (01/08/18 19:06:43)                 Impression:        No detrimental change or radiographic acute intrathoracic process seen on this single view.      Electronically signed by: GILBERTO WALLACE MD, MD  Date:     01/08/18  Time:    19:06              Narrative:    COMPARISON: Chest radiograph 3/16/17    FINDINGS: AP portable upright view of the chest. Monitoring leads overlie the chest. Patient somewhat rotated. Nonspecific elevation of the right hemidiaphragm. No large consolidation or new focal opacity. No large pleural effusion or pneumothorax.  Cardiomediastinal silhouette appears stable without evidence of failure.  No acute osseous process seen.   PA and lateral views can be obtained.                            Assessment/Plan:      * Seizure    - Patient had witnessed seizure and subsequently had another in ER.  Believe due to alcohol withdrawal.   - Librium 25 q 8 to PRN.  - Neurology noted:  The patient has had multiple seizures in the past and on this admission when he had 2 seizures, one at home and one in the emergency room.     This may be  related to alcohol withdrawal however in view of multiple seizures would recommend continuation of Keppra 500 mg twice a day.  Discussed with patient regarding getting into a substance abuse/alcohol abuse program and has continued alcohol abuse reports high risk for seizures.  He is also advised that he should not be driving until he is seizure-free for over 6 months.  Will not repeat his EEG which was was done in the past  - Emphasized no driving to patient as well as neurology follow up and he expressed understanding.   - Refer to Dr. Andres  - Monitor.          Gram negative sepsis    - Zosyn day 2  - Blood culture growing ENTEROBACTER CLOACAE COMPLEX and KLEBSIELLA OXYTOCA.    - Repeat blood cultures pending.  - Etiology unclear.  UA and CXR appeared okay.   - Consult ID        Gram-negative bacteremia              Fever    - Etiology unclear  - Patient does not appear toxic.  - WBC and diff WNL, stable.  - However, Blood culture with klebsiella and enterobacter  - discussed with ID who recommended meropenem, will discuss with them about seizure risk in this patient although seizures were due to etoh withdrawal and likely not an issue going forward  - both sensitive to cipro, will consider transitioning to cipro  - UA appears okay.     - CXR appears okay.   - Influenza swab was negative  - D/C Tamiflu    - Monitor.            Acute kidney injury              Rhabdomyolysis    - CPK 5149 > 4352 > 5339 > 2547 > 1100  - CPK was rising despite IVF, but good improvement today.   - Check SANDER  - Monitor            Unsteady gait    - PT OT          Alcohol abuse    - Counseled to cease  - Thiamine 100 mg daily            Troponin level elevated    - Noted in past   - Cardiology noted:  Troponin Rise              Most certainly due to demand/supply issues.              Would be reasonable to do Stress test as outpatient.              May need to be Regadenoson MPI as difficulty walking due to pain in foot.             Macrocytic anemia    - Folate borderline low.   - Supplement.             Hypomagnesemia    - Likely due to EtOH abuse.  - Monitor and replace electrolytes PRN.             LFTs abnormal    Likely 2/2 long h/o etoh abuse          Hyperlipidemia    - Lipid profile okay.             Hypokalemia    - Replace PRN.             Acute on chronic kidney failure    - Improving,  - Monitor.          Hypertension    - Coreg, cardizem continued  - Monitor.              VTE Risk Mitigation         Ordered     Place sequential compression device  Until discontinued      01/09/18 0006     Medium Risk of VTE  Once      01/09/18 0006     Place sequential compression device  Until discontinued      01/09/18 0006     Place MIKA hose  Until discontinued      01/09/18 0006     enoxaparin injection 40 mg  Daily     Route:  Subcutaneous        01/09/18 0005              Monika Simmons MD  Department of Hospital Medicine   [facility]

## 2018-01-15 NOTE — SUBJECTIVE & OBJECTIVE
Interval History:  Pt has no complaints today.  Initially unwilling to go to SNF and was planning on home health but was convinced by therapist as he can not stand without help and his wife works during the day.      Review of Systems   Constitutional: Negative for appetite change, fatigue and fever.   Respiratory: Negative for cough, shortness of breath and wheezing.    Cardiovascular: Negative for chest pain and palpitations.   Gastrointestinal: Negative for nausea and vomiting.   Genitourinary: Negative for dysuria and hematuria.   Musculoskeletal:        Foot pain (chronic)   Skin: Negative for color change and rash.   Neurological: Negative for dizziness and weakness.   Psychiatric/Behavioral: Negative for agitation and confusion.     Objective:     Vital Signs (Most Recent):  Temp: 98.3 °F (36.8 °C) (01/15/18 1027)  Pulse: 74 (01/15/18 1431)  Resp: 18 (01/15/18 1431)  BP: (!) 133/90 (01/15/18 1027)  SpO2: 100 % (01/15/18 1027) Vital Signs (24h Range):  Temp:  [98.3 °F (36.8 °C)-99.6 °F (37.6 °C)] 98.3 °F (36.8 °C)  Pulse:  [74-88] 74  Resp:  [18-20] 18  SpO2:  [96 %-100 %] 100 %  BP: (113-133)/(69-90) 133/90     Weight: 90.9 kg (200 lb 6.4 oz)  Body mass index is 26.44 kg/m².    Intake/Output Summary (Last 24 hours) at 01/15/18 1522  Last data filed at 01/15/18 1100   Gross per 24 hour   Intake             2120 ml   Output             2750 ml   Net             -630 ml      Physical Exam   Constitutional: He is oriented to person, place, and time. He appears well-developed and well-nourished.   HENT:   Head: Normocephalic and atraumatic.   Eyes: Conjunctivae are normal. Pupils are equal, round, and reactive to light.   Neck: Normal range of motion. Neck supple.   Cardiovascular: Normal rate and regular rhythm.    Pulmonary/Chest: Effort normal. He has no wheezes.   Abdominal: Soft. He exhibits no distension. There is no tenderness. There is no rebound and no guarding.   Musculoskeletal: He exhibits no edema or  tenderness.   - LLE smaller diameter than RLE.     Neurological: He is alert and oriented to person, place, and time.   Skin: Skin is warm and dry.       Significant Labs:   BMP:     Recent Labs  Lab 01/15/18  0423      *   K 4.2      CO2 19*   BUN 10   CREATININE 1.0   CALCIUM 8.7   MG 1.1*     CBC:     Recent Labs  Lab 01/14/18  0521 01/15/18  0423   WBC 5.73 5.73   HGB 8.1* 8.1*   HCT 23.9* 24.2*    247       Significant Imaging: I have reviewed all pertinent imaging results/findings within the past 24 hours.   Imaging Results          X-Ray Chest 1 View (Final result)  Result time 01/14/18 15:17:32    Final result by Alvin Grier MD (01/14/18 15:17:32)                 Impression:     As above.      Electronically signed by: ALVIN GRIER MD  Date:     01/14/18  Time:    15:17              Narrative:    Chest one view.    Findings: The lungs are clear. There is no pneumothorax or pleural fluid. The cardiac silhouette is not enlarged. The osseous structures demonstrate mild degenerative change.                             US Abdomen Limited (Final result)  Result time 01/13/18 12:27:36    Final result by Alvin Grier MD (01/13/18 12:27:36)                 Impression:     Cholelithiasis with a thickened gallbladder wall with pericholecystic fluid cannot exclude acute cholecystitis.      Electronically signed by: ALVIN GRIER MD  Date:     01/13/18  Time:    12:27              Narrative:    Ultrasound abdomen limited.    Findings: The visualized portion of the pancreas is unremarkable. There are gallstones. The gallbladder wall is thickened measuring 0.5 cm. There is pericholecystic fluid. The common duct measures 0.3 cm. The liver measures 17.5 cm and has a homogeneous echotexture. The right kidney measures 12.4 cm and is unremarkable.                             X-Ray Chest 1 View (Final result)  Result time 01/11/18 15:46:37    Final result by Alvin Smith MD (01/11/18 15:46:37)                  Impression:      No acute chest disease identified.  No detrimental change is noted when compared to prior examination dated 1/8/18.      Electronically signed by: KENNY VELASQUEZ MD  Date:     01/11/18  Time:    15:46              Narrative:    Comparison is made to prior examination dated 1/8/18.    A single portable AP radiograph of the chest was obtained.  The heart and mediastinal structures are unremarkable.  Pulmonary vasculature is within normal limits.  The lungs are free of focal consolidations.  There is no evidence for pneumothorax or large pleural effusions.  Bony structures appear intact.                             CT Head Without Contrast (Final result)  Result time 01/08/18 19:42:01    Final result by Myles Roth MD (01/08/18 19:42:01)                 Impression:        No acute intracranial abnormalities.    Sinus disease.      Electronically signed by: MYLES ROTH MD  Date:     01/08/18  Time:    19:42              Narrative:    History:     Comparison: March 16, 2017    Technique:    Axial images of the brain were obtained at 5-mm intervals from the skull base to the vertex without the administration of contrast.    Findings:    The brain parenchyma appears normal for age with good corticomedullary differentiation.  There is no evidence of acute infarct, hemorrhage, or mass.  The ventricular system is normal in size.  No mass-effect or midline shift.  There are no abnormal extra-axial fluid collections.      Sinus disease unchanged from prior.  The mastoid air cells are clear.      The calvarium appears intact.  .                             X-Ray Chest AP Portable (Final result)  Result time 01/08/18 19:06:43    Final result by Gilberto Wakefield MD (01/08/18 19:06:43)                 Impression:        No detrimental change or radiographic acute intrathoracic process seen on this single view.      Electronically signed by: GILBERTO WAKEFIELD MD, MD  Date:      01/08/18  Time:    19:06              Narrative:    COMPARISON: Chest radiograph 3/16/17    FINDINGS: AP portable upright view of the chest. Monitoring leads overlie the chest. Patient somewhat rotated. Nonspecific elevation of the right hemidiaphragm. No large consolidation or new focal opacity. No large pleural effusion or pneumothorax.  Cardiomediastinal silhouette appears stable without evidence of failure.  No acute osseous process seen.   PA and lateral views can be obtained.

## 2018-01-15 NOTE — SUBJECTIVE & OBJECTIVE
Interval History:  Had fever.  WBC WNL.  Doing okay.        Review of Systems   Constitutional: Positive for fever. Negative for appetite change and fatigue.   Respiratory: Positive for wheezing. Negative for cough and shortness of breath.    Cardiovascular: Negative for chest pain and palpitations.   Gastrointestinal: Negative for nausea and vomiting.   Genitourinary: Negative for dysuria and hematuria.   Musculoskeletal:        Foot pain (chronic)   Skin: Negative for color change and rash.   Neurological: Negative for dizziness and weakness.   Psychiatric/Behavioral: Negative for agitation and confusion.     Objective:     Vital Signs (Most Recent):  Temp: 99.6 °F (37.6 °C) (01/14/18 1546)  Pulse: 84 (01/14/18 1624)  Resp: 18 (01/14/18 1624)  BP: 113/69 (01/14/18 1546)  SpO2: 96 % (01/14/18 1624) Vital Signs (24h Range):  Temp:  [98.7 °F (37.1 °C)-100.7 °F (38.2 °C)] 99.6 °F (37.6 °C)  Pulse:  [83-94] 84  Resp:  [16-18] 18  SpO2:  [94 %-99 %] 96 %  BP: ()/(62-78) 113/69     Weight: 90.9 kg (200 lb 6.4 oz)  Body mass index is 26.44 kg/m².    Intake/Output Summary (Last 24 hours) at 01/14/18 1813  Last data filed at 01/14/18 1300   Gross per 24 hour   Intake              240 ml   Output             1950 ml   Net            -1710 ml      Physical Exam   Constitutional: He is oriented to person, place, and time. He appears well-developed and well-nourished.   HENT:   Head: Normocephalic and atraumatic.   Eyes: Conjunctivae are normal. Pupils are equal, round, and reactive to light.   Neck: Normal range of motion. Neck supple.   Cardiovascular: Normal rate and regular rhythm.    Pulmonary/Chest: Effort normal. He has wheezes.   Abdominal: Soft.   Musculoskeletal: He exhibits no edema or tenderness.   - LLE smaller diameter than RLE.     Neurological: He is alert and oriented to person, place, and time.   Skin: Skin is warm and dry.       Significant Labs:   BMP:     Recent Labs  Lab 01/14/18  0521      NA  135*   K 3.7      CO2 21*   BUN 12   CREATININE 1.1   CALCIUM 8.1*   MG 1.4*     CBC:     Recent Labs  Lab 01/13/18  0604 01/14/18  0521   WBC 5.57 5.73   HGB 8.4* 8.1*   HCT 24.8* 23.9*   * 203       Significant Imaging: I have reviewed all pertinent imaging results/findings within the past 24 hours.   Imaging Results          X-Ray Chest 1 View (Final result)  Result time 01/14/18 15:17:32    Final result by Alvin Grier MD (01/14/18 15:17:32)                 Impression:     As above.      Electronically signed by: ALVIN GRIER MD  Date:     01/14/18  Time:    15:17              Narrative:    Chest one view.    Findings: The lungs are clear. There is no pneumothorax or pleural fluid. The cardiac silhouette is not enlarged. The osseous structures demonstrate mild degenerative change.                             US Abdomen Limited (Final result)  Result time 01/13/18 12:27:36    Final result by Alvin Grier MD (01/13/18 12:27:36)                 Impression:     Cholelithiasis with a thickened gallbladder wall with pericholecystic fluid cannot exclude acute cholecystitis.      Electronically signed by: ALVIN GRIER MD  Date:     01/13/18  Time:    12:27              Narrative:    Ultrasound abdomen limited.    Findings: The visualized portion of the pancreas is unremarkable. There are gallstones. The gallbladder wall is thickened measuring 0.5 cm. There is pericholecystic fluid. The common duct measures 0.3 cm. The liver measures 17.5 cm and has a homogeneous echotexture. The right kidney measures 12.4 cm and is unremarkable.                             X-Ray Chest 1 View (Final result)  Result time 01/11/18 15:46:37    Final result by Alvin Smith MD (01/11/18 15:46:37)                 Impression:      No acute chest disease identified.  No detrimental change is noted when compared to prior examination dated 1/8/18.      Electronically signed by: ALVIN SMITH MD  Date:     01/11/18  Time:    15:46               Narrative:    Comparison is made to prior examination dated 1/8/18.    A single portable AP radiograph of the chest was obtained.  The heart and mediastinal structures are unremarkable.  Pulmonary vasculature is within normal limits.  The lungs are free of focal consolidations.  There is no evidence for pneumothorax or large pleural effusions.  Bony structures appear intact.                             CT Head Without Contrast (Final result)  Result time 01/08/18 19:42:01    Final result by Ancelmo Irby MD (01/08/18 19:42:01)                 Impression:        No acute intracranial abnormalities.    Sinus disease.      Electronically signed by: ANCELMO IRBY MD  Date:     01/08/18  Time:    19:42              Narrative:    History:     Comparison: March 16, 2017    Technique:    Axial images of the brain were obtained at 5-mm intervals from the skull base to the vertex without the administration of contrast.    Findings:    The brain parenchyma appears normal for age with good corticomedullary differentiation.  There is no evidence of acute infarct, hemorrhage, or mass.  The ventricular system is normal in size.  No mass-effect or midline shift.  There are no abnormal extra-axial fluid collections.      Sinus disease unchanged from prior.  The mastoid air cells are clear.      The calvarium appears intact.  .                             X-Ray Chest AP Portable (Final result)  Result time 01/08/18 19:06:43    Final result by Gilberto Wallace MD (01/08/18 19:06:43)                 Impression:        No detrimental change or radiographic acute intrathoracic process seen on this single view.      Electronically signed by: GILBERTO WALLACE MD, MD  Date:     01/08/18  Time:    19:06              Narrative:    COMPARISON: Chest radiograph 3/16/17    FINDINGS: AP portable upright view of the chest. Monitoring leads overlie the chest. Patient somewhat rotated. Nonspecific elevation of the right hemidiaphragm.  No large consolidation or new focal opacity. No large pleural effusion or pneumothorax.  Cardiomediastinal silhouette appears stable without evidence of failure.  No acute osseous process seen.   PA and lateral views can be obtained.

## 2018-01-15 NOTE — ASSESSMENT & PLAN NOTE
No longer septic. Discussed cholecystectomy with patient - would be better to occur when infection is controlled. All subsequent blood cultures are sterile. Continue oral Cipro - discharge patient with 2 weeks of oral Cipro.

## 2018-01-15 NOTE — ASSESSMENT & PLAN NOTE
- Blood culture growing ENTEROBACTER CLOACAE COMPLEX and KLEBSIELLA OXYTOCA.    - Repeat blood cultures 1/13 no growth.  WBC WNL, afebrile.   - Etiology likely gallbladder as US shows some thickening of wall but pt asymptomatic   - Will need to follow up with surgery for possible cholecystectomy   - Continue cipro  - ID following  - echo pending

## 2018-01-15 NOTE — PLAN OF CARE
Problem: Patient Care Overview  Goal: Plan of Care Review  Outcome: Ongoing (interventions implemented as appropriate)  AAOX4. VSS. Pt denies pain at this time.Pt has been eating adequately throughout shift. Pt voiding per urinal. Treated with IV Abx. Tele maintained; alarms on and audible.Fall and seizure precautions maintained. Pt encouraged to drink more water throughout shift.  Pt repositions self independently. Purposeful hourly rounding. Pt has call light in reach, side rails up X2, bed in low position, TEDs/SCDs and nonskid socks on. Pt lying in bed in no distress with spouse at the bedside.Pt free of trauma, falls, injury and skin breakdown

## 2018-01-15 NOTE — ASSESSMENT & PLAN NOTE
- CPK 5149 > 4352 > 5339 > 2547 > 1100  - CPK was rising despite IVF, but good improvement today.   - Check SANDER  - Monitor

## 2018-01-15 NOTE — CONSULTS
[facility]  Infectious Disease  Consult Note    Patient Name: Michael Johnson Jr.  MRN: [id number]  Admission Date: 1/8/2018  Hospital Length of Stay: 5 days  Attending Physician: Javier Iqbal MD  Primary Care Provider: Zunilda Bolden NP     Isolation Status: No active isolations    Patient information was obtained from patient, past medical records and ER records.      Inpatient consult to Infectious Diseases  Consult performed by: KAITLIN RODRIGUEZ  Consult ordered by: JAVIER IQBAL        Assessment/Plan:     Cholecystitis    Treat with oral cipro 500 mg PO BID for at least 2 weeks. Follow up as outpatient. Will need surgery to remove GB as outpatient.        Gram negative sepsis    No longer septic. Discussed cholecystectomy with patient - would be better to occur when infection is controlled. All subsequent blood cultures are sterile. Continue oral Cipro - discharge patient with 2 weeks of oral Cipro.        Fever    Most likely from cholecystitis. Asymptomatic. Tolerating oral medications. Continue oral Cipro for now.            Thank you for your consult. I will follow-up with patient. Please contact us if you have any additional questions.    Kaitlin Rodriguez MD  Infectious Disease  [facility]    Subjective:     Principal Problem: Seizure    HPI: 56 yo male with history of alcohol use who presents with seizure. Patient admitted on 1/8 after seizure - wife stated that patient had stopped drinking 4 days earlier. Patient had fever on 1/11, blood cultures grew E. Cloacae and K.oxytoca. Placed on Zosyn empirically, now on Cipro. Fever and WBC improved, patient had no associated symptoms. Ultrasound of gallbladder showed cholecystitis.    Past Medical History:   Diagnosis Date    Afib     Ankle fracture, left     Asthma     Gout, unspecified     Hypertension     Seizure 2016       Past Surgical History:   Procedure Laterality Date    ANKLE SURGERY         Review  of patient's allergies indicates:   Allergen Reactions    Lisinopril Swelling     Pt admitted with angioedema 2wks after taking lisinopril.        Medications:  Prescriptions Prior to Admission   Medication Sig    allopurinol (ZYLOPRIM) 300 MG tablet Take 300 mg by mouth once daily.    aspirin (ECOTRIN) 81 MG EC tablet Take 81 mg by mouth once daily.    diltiaZEM (DILACOR XR) 180 MG CDCR Take 180 mg by mouth once daily.    metoprolol succinate (TOPROL-XL) 50 MG 24 hr tablet Take 50 mg by mouth once daily.    amLODIPine (NORVASC) 10 MG tablet Take 10 mg by mouth once daily.    carvedilol (COREG) 25 MG tablet Take 25 mg by mouth 2 (two) times daily with meals.    colchicine 0.6 mg tablet Take 1 tablet (0.6 mg total) by mouth once daily.    diphenhydrAMINE (BENADRYL) 25 mg capsule Take 1 each (25 mg total) by mouth every 4 (four) hours as needed (lip swelling for 2 days).    gabapentin (NEURONTIN) 300 MG capsule Take 300 mg by mouth 3 (three) times daily.    ibuprofen (ADVIL,MOTRIN) 200 MG tablet Take 200 mg by mouth every 6 (six) hours as needed for Pain.    magnesium oxide (MAG-OX) 400 mg tablet Take 1 tablet (400 mg total) by mouth once daily.    potassium chloride (KLOR-CON) 20 mEq Pack Take 20 mEq by mouth 4 (four) times daily.    tramadol (ULTRAM) 50 mg tablet Take 50 mg by mouth 3 (three) times daily as needed for Pain.     Antibiotics     Start     Stop Route Frequency Ordered    01/14/18 1930  ciprofloxacin (CIPRO)400mg/200ml D5W IVPB 400 mg      -- IV Every 12 hours (non-standard times) 01/14/18 1822        Antifungals     None        Antivirals     None             There is no immunization history on file for this patient.    Family History     Problem Relation (Age of Onset)    Cancer Mother    Cataracts Maternal Grandfather    Hypertension Father    Stroke Maternal Grandmother, Maternal Grandfather        Social History     Social History    Marital status:      Spouse name: N/A     Number of children: N/A    Years of education: N/A     Social History Main Topics    Smoking status: Never Smoker    Smokeless tobacco: None    Alcohol use Yes      Comment: ocassional alcohol    Drug use: No    Sexual activity: Not Asked     Other Topics Concern    None     Social History Narrative    None     Review of Systems   Constitutional: Negative.    HENT: Negative.    Respiratory: Negative for cough and shortness of breath.    Cardiovascular: Negative.    Gastrointestinal: Negative for abdominal pain, diarrhea, nausea and vomiting.   Neurological: Negative.    All other systems reviewed and are negative.    Objective:     Vital Signs (Most Recent):  Temp: 98.3 °F (36.8 °C) (01/15/18 1027)  Pulse: 74 (01/15/18 1200)  Resp: 18 (01/15/18 1027)  BP: (!) 133/90 (01/15/18 1027)  SpO2: 100 % (01/15/18 1027) Vital Signs (24h Range):  Temp:  [98.3 °F (36.8 °C)-99.6 °F (37.6 °C)] 98.3 °F (36.8 °C)  Pulse:  [74-88] 74  Resp:  [18-20] 18  SpO2:  [96 %-100 %] 100 %  BP: (113-133)/(69-90) 133/90     Weight: 90.9 kg (200 lb 6.4 oz)  Body mass index is 26.44 kg/m².    Estimated Creatinine Clearance: 94.3 mL/min (based on SCr of 1 mg/dL).    Physical Exam   Constitutional: He is oriented to person, place, and time. He appears well-developed and well-nourished.   HENT:   Head: Normocephalic and atraumatic.   Eyes: Conjunctivae and EOM are normal. Pupils are equal, round, and reactive to light. No scleral icterus.   Cardiovascular: Normal rate, regular rhythm and normal heart sounds.    Pulmonary/Chest: Effort normal and breath sounds normal.   Abdominal: Soft. Bowel sounds are normal. He exhibits no distension. There is no tenderness. There is no guarding.   Musculoskeletal: Normal range of motion. He exhibits no edema.   Neurological: He is alert and oriented to person, place, and time.   Skin: Skin is warm and dry.   Nursing note and vitals reviewed.      Significant Labs:   Blood Culture:   Recent Labs  Lab  01/11/18  1550 01/13/18  0603 01/13/18  0604   LABBLOO Gram stain aer bottle: Gram negative rods   Gram stain rochelle bottle: Gram negative rods   Results called to and read back by:Linnette Chapman RN 01/12/2018  10:53  ENTEROBACTER CLOACAE COMPLEXFor susceptibility see order # [id number]  KLEBSIELLA OXYTOCAFor susceptibility see order # [id number]  Gram stain aer bottle: Gram negative rods   Results called to and read back by:Linnette Chapman RN 01/12/2018  10:52  Gram stain rochelle bottle: Gram negative rods   Positive results previously called 01/12/2018  ENTEROBACTER CLOACAE COMPLEX  KLEBSIELLA OXYTOCA No Growth to date  No Growth to date  No Growth to date No Growth to date  No Growth to date  No Growth to date     CBC:   Recent Labs  Lab 01/14/18  0521 01/15/18  0423   WBC 5.73 5.73   HGB 8.1* 8.1*   HCT 23.9* 24.2*    247     CMP:   Recent Labs  Lab 01/14/18  0521 01/15/18  0423   * 133*   K 3.7 4.2    106   CO2 21* 19*    101   BUN 12 10   CREATININE 1.1 1.0   CALCIUM 8.1* 8.7   PROT 6.6 6.6   ALBUMIN 2.4* 2.5*   BILITOT 0.6 0.6   ALKPHOS 83 82   AST 95* 77*   ALT 56* 51*   ANIONGAP 6* 8   EGFRNONAA >60 >60       Significant Imaging: CT: I have reviewed all pertinent results/findings within the past 24 hours:  The brain parenchyma appears normal for age with good corticomedullary differentiation.  There is no evidence of acute infarct, hemorrhage, or mass.  The ventricular system is normal in size.  No mass-effect or midline shift.  There are no abnormal extra-axial fluid collections.    U/S: I have reviewed all pertinent results/findings within the past 24 hours:  Cholelithiasis with a thickened gallbladder wall with pericholecystic fluid cannot exclude acute cholecystitis.

## 2018-01-15 NOTE — ASSESSMENT & PLAN NOTE
- CPK 5149 > 4352 > 5339 > 2547 > 1100 > 500  - CPK was rising despite IVF, but good improvement today.   - Likely 2/2 seizures    - Monitor

## 2018-01-15 NOTE — ASSESSMENT & PLAN NOTE
Most likely from cholecystitis. Asymptomatic. Tolerating oral medications. Continue oral Cipro for now.

## 2018-01-15 NOTE — PROGRESS NOTES
Pt received on RA with adequate saturation. Treatments given and tolerated well. No changes made. Will continue to monitor.

## 2018-01-15 NOTE — PLAN OF CARE
Problem: Physical Therapy Goal  Goal: Physical Therapy Goal  Goals to be met by: 18     Patient will increase functional independence with mobility by performin. Supine to sit with SBA  2. Sit to supine with Stand-by Assistance  3. Sit to stand transfer with contact guard Assistance  4. Gait  x 25' feet with Contact Guard Assistance using Rolling Walker.   5. Ascend/descend 5 stair with right Handrails Minimal Assistance using crutch.      Pt progressing towards goals today, sit to stand with RW and min/modA, amb'd with RW and min.A ~15' (w/c follow for safety). Pt motivated in tx session today, wife present, recommend cont PT in SNF.

## 2018-01-15 NOTE — PLAN OF CARE
Problem: Patient Care Overview  Goal: Plan of Care Review  Outcome: Ongoing (interventions implemented as appropriate)  Plan of care reviewed with pt. and all concerns addressed. NAD noted at this time. Denies any cp or sob. Resp even and unlabored. Incentive spirometer encouraged while awake. VSS, afebrile. Denies N/V. AAO x 4. PPP jany. SCD's remain in place. Denies any pain. Urinal with in reach. Remains free from injury. Safety precautions remain in place. Call light in reach. Will continue to monitor.

## 2018-01-15 NOTE — PLAN OF CARE
1344-Writer met with patient and wife to discuss SNF vs. Home Health. Wife Leeanne Johnson [phone] informed writer patient changed his mind over the weekend about D/C with Home Health and would like to pursue SNF options. Wife presented the following options:  1) [facility]  2) [facility]  3) [facility]  4) [facility]    Referrals sent via [facility].

## 2018-01-15 NOTE — ASSESSMENT & PLAN NOTE
Treat with oral cipro 500 mg PO BID for at least 2 weeks. Follow up as outpatient. Will need surgery to remove GB as outpatient.

## 2018-01-15 NOTE — PLAN OF CARE
Problem: Occupational Therapy Goal  Goal: Occupational Therapy Goal  Goals to be met by: 2/8/2018     Patient will increase functional independence with ADLs by performing:    UE Dressing with Minimal Assistance.  LE Dressing with Minimal Assistance.  Grooming while standing at sink with Contact Guard Assistance. -MET 1/15/18  *REVISED G/H standing at sink with SBA  Toileting from bedside commode with Minimal Assistance for hygiene and clothing management.   Supine to sit with Stand-by Assistance.  Stand pivot transfers with Minimal Assistance. (MET with RW use 1/10/18)  Toilet transfer to bedside commode with Minimal Assistance.      Outcome: Ongoing (interventions implemented as appropriate)  Pt is making steady progress towards his OT goals. Goals remain appropriate at this time.     Delmi Conteh, OTR/L  1/15/2018

## 2018-01-15 NOTE — PT/OT/SLP PROGRESS
Physical Therapy Treatment    Patient Name:  Michael Johnson Jr.   MRN:  [id number]    Recommendations:     Discharge Recommendations:  nursing facility, skilled   Discharge Equipment Recommendations: shower chair, walker, rolling   Barriers to discharge: Decreased caregiver support    Assessment:     Michael Johnson Jr. is a 57 y.o. male admitted with a medical diagnosis of Seizure.  He presents with the following impairments/functional limitations:  weakness, impaired endurance, impaired self care skills, impaired functional mobilty, gait instability, impaired balance, decreased upper extremity function, decreased lower extremity function, pain, decreased safety awareness, decreased coordination, decreased ROM ; pt with improved mobility today, dec assistance for sit to stand act's, increased standing tolerance noted, able to take steps today, though needs min.A for balance. Pt with more motivation in tx session and reports he wants to get better. Stated the only reason he was spending so much time in bed at home was because he didn't want to fall.    Rehab Prognosis:  good; patient would benefit from acute skilled PT services to address these deficits and reach maximum level of function.      Recent Surgery: * No surgery found *      Plan:     During this hospitalization, patient to be seen 6 x/week to address the above listed problems via gait training, therapeutic activities, therapeutic exercises  · Plan of Care Expires:  02/08/18   Plan of Care Reviewed with: patient, spouse    Subjective     Communicated with nurse prior to session.  Patient found sitting up in recliner chair, wife present, upon PT entry to room, agreeable to treatment.      Chief Complaint: some L sided pain, though did not rate, appeared to be mild/moderate from facial expressions.  Patient comments/goals: Pt agreeable to going to a facility to continue therapy, wife as well.    Pain/Comfort:  · Pain Rating 1:  (pt did not rate )  · Location -  Side 1: Left  · Location - Orientation 1: lateral  · Location 1: flank  · Pain Addressed 1: Reposition, Distraction    Patients cultural, spiritual, [employer]ology conflicts given the current situation: none    Objective:     Patient found with: peripheral IV, telemetry     General Precautions: Standard, fall, seizure   Orthopedic Precautions:N/A   Braces: N/A     Functional Mobility:  · Transfers:     · Sit to Stand:  minimum assistance and moderate assistance with rolling walker      AM-PAC 6 CLICK MOBILITY  Turning over in bed (including adjusting bedclothes, sheets and blankets)?: 3  Sitting down on and standing up from a chair with arms (e.g., wheelchair, bedside commode, etc.): 2  Moving from lying on back to sitting on the side of the bed?: 3  Moving to and from a bed to a chair (including a wheelchair)?: 2  Need to walk in hospital room?: 2  Climbing 3-5 steps with a railing?: 1  Total Score: 13       Therapeutic Activities and Exercises:   perf'd seated LE ex's of AP's (as able, pt with limited ROM in ankles 2/2 previous surgeries), hip flex, LAQ's x 10 ea.   Worked on sit to stand x 3 trials from chair with RW and min/modA, wt shifting act's and marching in place with RW and Caron.     Patient left up in chair with all lines intact, call button in reach, nurse notified and wife present..    GOALS:    Physical Therapy Goals        Problem: Physical Therapy Goal    Goal Priority Disciplines Outcome Goal Variances Interventions   Physical Therapy Goal     PT/OT, PT Ongoing (interventions implemented as appropriate)     Description:  Goals to be met by: 18     Patient will increase functional independence with mobility by performin. Supine to sit with SBA  2. Sit to supine with Stand-by Assistance  3. Sit to stand transfer with contact guard Assistance  4. Gait  x 25' feet with Contact Guard Assistance using Rolling Walker.   5. Ascend/descend 5 stair with right Handrails Minimal Assistance using  crutch.                       Time Tracking:     PT Received On: 01/15/18  PT Start Time: 1109     PT Stop Time: 1154  PT Total Time (min): 45 min     Billable Minutes: Therapeutic Activity 30 and Therapeutic Exercise 15    Treatment Type: Treatment  PT/PTA: PTA     PTA Visit Number: 3     Hilda Reinoso, PTA  01/15/2018   ret'd @[id number]  OT present and assisting with w/c follow  Pt sit to stand min.A, amb'd with RW and Caron ~15'. Left sitting up with OT for tx.  Hilda Reinoso, PTA  1/15/2018

## 2018-01-15 NOTE — PLAN OF CARE
Problem: Patient Care Overview  Goal: Plan of Care Review  Outcome: Ongoing (interventions implemented as appropriate)  Patient tolerating treatments well. No apparent signs of respiratory distress. Will continue to monitor and assess

## 2018-01-15 NOTE — PROGRESS NOTES
"[facility] Hospital Medicine  Progress Note    Patient Name: Michael Johnson Jr.  MRN: [id number]  Patient Class: IP- Inpatient   Admission Date: 1/8/2018  Length of Stay: 5 days  Attending Physician: Latrell Upton MD  Primary Care Provider: Zunilda Bolden NP        Subjective:     Principal Problem:Seizure    HPI:  This is a 57 y.o. Male with a history of seizure who presents via EMS with complaint of seizure that occurred prior to arrival. Patient was outside sitting down when seizure witnessed by wife and son. Wife reports generalized "full body" seizure last lasted approximately four minutes. After seizure patient was unresponsive. When paramedics arrived patient had a heart rate of 220 bpm en route. Paramedics gave 6 mg and 12 mg of andesine were given without changes. Diltiazem was given and heart rate decreased to 109 bpm. Wife states the patient has been dehydrated for about three days. Patient states he drinks a pint of vodka a day. Patient states he has stopped drinking, and his last drink was four days ago. Patient states a history of seizure two months. Patient was seen at [facility] for outpatient EEG. Wife reports EEG was normal. Patient denies any fever, chills, nausea, vomiting, numbness, weakness, or confusion.    Hospital Course:  No notes on file    Interval History:  Pt has no complaints today.  Initially unwilling to go to SNF and was planning on home health but was convinced by therapist as he can not stand without help and his wife works during the day.      Review of Systems   Constitutional: Negative for appetite change, fatigue and fever.   Respiratory: Negative for cough, shortness of breath and wheezing.    Cardiovascular: Negative for chest pain and palpitations.   Gastrointestinal: Negative for nausea and vomiting.   Genitourinary: Negative for dysuria and hematuria.   Musculoskeletal:        Foot pain (chronic)   Skin: Negative for color change and rash. "   Neurological: Negative for dizziness and weakness.   Psychiatric/Behavioral: Negative for agitation and confusion.     Objective:     Vital Signs (Most Recent):  Temp: 98.3 °F (36.8 °C) (01/15/18 1027)  Pulse: 74 (01/15/18 1431)  Resp: 18 (01/15/18 1431)  BP: (!) 133/90 (01/15/18 1027)  SpO2: 100 % (01/15/18 1027) Vital Signs (24h Range):  Temp:  [98.3 °F (36.8 °C)-99.6 °F (37.6 °C)] 98.3 °F (36.8 °C)  Pulse:  [74-88] 74  Resp:  [18-20] 18  SpO2:  [96 %-100 %] 100 %  BP: (113-133)/(69-90) 133/90     Weight: 90.9 kg (200 lb 6.4 oz)  Body mass index is 26.44 kg/m².    Intake/Output Summary (Last 24 hours) at 01/15/18 1522  Last data filed at 01/15/18 1100   Gross per 24 hour   Intake             2120 ml   Output             2750 ml   Net             -630 ml      Physical Exam   Constitutional: He is oriented to person, place, and time. He appears well-developed and well-nourished.   HENT:   Head: Normocephalic and atraumatic.   Eyes: Conjunctivae are normal. Pupils are equal, round, and reactive to light.   Neck: Normal range of motion. Neck supple.   Cardiovascular: Normal rate and regular rhythm.    Pulmonary/Chest: Effort normal. He has no wheezes.   Abdominal: Soft. He exhibits no distension. There is no tenderness. There is no rebound and no guarding.   Musculoskeletal: He exhibits no edema or tenderness.   - LLE smaller diameter than RLE.     Neurological: He is alert and oriented to person, place, and time.   Skin: Skin is warm and dry.       Significant Labs:   BMP:     Recent Labs  Lab 01/15/18  0423      *   K 4.2      CO2 19*   BUN 10   CREATININE 1.0   CALCIUM 8.7   MG 1.1*     CBC:     Recent Labs  Lab 01/14/18  0521 01/15/18  0423   WBC 5.73 5.73   HGB 8.1* 8.1*   HCT 23.9* 24.2*    247       Significant Imaging: I have reviewed all pertinent imaging results/findings within the past 24 hours.   Imaging Results          X-Ray Chest 1 View (Final result)  Result time 01/14/18  15:17:32    Final result by Alvin Grier MD (01/14/18 15:17:32)                 Impression:     As above.      Electronically signed by: ALVIN GRIER MD  Date:     01/14/18  Time:    15:17              Narrative:    Chest one view.    Findings: The lungs are clear. There is no pneumothorax or pleural fluid. The cardiac silhouette is not enlarged. The osseous structures demonstrate mild degenerative change.                             US Abdomen Limited (Final result)  Result time 01/13/18 12:27:36    Final result by Alvin Grier MD (01/13/18 12:27:36)                 Impression:     Cholelithiasis with a thickened gallbladder wall with pericholecystic fluid cannot exclude acute cholecystitis.      Electronically signed by: ALVIN GRIER MD  Date:     01/13/18  Time:    12:27              Narrative:    Ultrasound abdomen limited.    Findings: The visualized portion of the pancreas is unremarkable. There are gallstones. The gallbladder wall is thickened measuring 0.5 cm. There is pericholecystic fluid. The common duct measures 0.3 cm. The liver measures 17.5 cm and has a homogeneous echotexture. The right kidney measures 12.4 cm and is unremarkable.                             X-Ray Chest 1 View (Final result)  Result time 01/11/18 15:46:37    Final result by Alvin Smith MD (01/11/18 15:46:37)                 Impression:      No acute chest disease identified.  No detrimental change is noted when compared to prior examination dated 1/8/18.      Electronically signed by: ALVIN SMITH MD  Date:     01/11/18  Time:    15:46              Narrative:    Comparison is made to prior examination dated 1/8/18.    A single portable AP radiograph of the chest was obtained.  The heart and mediastinal structures are unremarkable.  Pulmonary vasculature is within normal limits.  The lungs are free of focal consolidations.  There is no evidence for pneumothorax or large pleural effusions.  Bony structures appear intact.                              CT Head Without Contrast (Final result)  Result time 01/08/18 19:42:01    Final result by Ancelmo Irby MD (01/08/18 19:42:01)                 Impression:        No acute intracranial abnormalities.    Sinus disease.      Electronically signed by: ANCELMO IRBY MD  Date:     01/08/18  Time:    19:42              Narrative:    History:     Comparison: March 16, 2017    Technique:    Axial images of the brain were obtained at 5-mm intervals from the skull base to the vertex without the administration of contrast.    Findings:    The brain parenchyma appears normal for age with good corticomedullary differentiation.  There is no evidence of acute infarct, hemorrhage, or mass.  The ventricular system is normal in size.  No mass-effect or midline shift.  There are no abnormal extra-axial fluid collections.      Sinus disease unchanged from prior.  The mastoid air cells are clear.      The calvarium appears intact.  .                             X-Ray Chest AP Portable (Final result)  Result time 01/08/18 19:06:43    Final result by Gilberto Wallace MD (01/08/18 19:06:43)                 Impression:        No detrimental change or radiographic acute intrathoracic process seen on this single view.      Electronically signed by: GILBERTO WALLACE MD, MD  Date:     01/08/18  Time:    19:06              Narrative:    COMPARISON: Chest radiograph 3/16/17    FINDINGS: AP portable upright view of the chest. Monitoring leads overlie the chest. Patient somewhat rotated. Nonspecific elevation of the right hemidiaphragm. No large consolidation or new focal opacity. No large pleural effusion or pneumothorax.  Cardiomediastinal silhouette appears stable without evidence of failure.  No acute osseous process seen.   PA and lateral views can be obtained.                            Assessment/Plan:      * Seizure    - Patient had witnessed seizure and subsequently had another in ER.  Believe due to alcohol  withdrawal.   - Librium 25 q 8 to PRN.  - Neurology noted:  The patient has had multiple seizures in the past and on this admission when he had 2 seizures, one at home and one in the emergency room.     This may be related to alcohol withdrawal however in view of multiple seizures would recommend continuation of Keppra 500 mg twice a day.  Discussed with patient regarding getting into a substance abuse/alcohol abuse program and has continued alcohol abuse reports high risk for seizures.  He is also advised that he should not be driving until he is seizure-free for over 6 months.  Will not repeat his EEG which was was done in the past  - Emphasized no driving to patient as well as neurology follow up and he expressed understanding.   - Refer to Dr. Andres  - Monitor.          Cholecystitis    See above        SOB (shortness of breath)    Pt reports SOB and wheezing on exertion  Echo pending  Duo nebs while awake          Gram negative sepsis    - Blood culture growing ENTEROBACTER CLOACAE COMPLEX and KLEBSIELLA OXYTOCA.    - Repeat blood cultures 1/13 no growth.  WBC WNL, afebrile.   - Etiology likely gallbladder as US shows some thickening of wall but pt asymptomatic   - Will need to follow up with surgery for possible cholecystectomy   - Continue cipro  - ID following  - echo pending        Gram-negative bacteremia              Fever    - Etiology unclear  - Patient does not appear toxic.  - WBC and diff WNL, stable.  - However, Blood culture with klebsiella and enterobacter  - discussed with ID who recommended meropenem, will discuss with them about seizure risk in this patient although seizures were due to etoh withdrawal and likely not an issue going forward  - both sensitive to cipro, will consider transitioning to cipro  - UA appears okay.     - CXR appears okay.   - Influenza swab was negative  - D/C Tamiflu    - Monitor.            Acute kidney injury              Rhabdomyolysis    - CPK 5149 > 4352 > 5339  > 2547 > 1100 > 500  - CPK was rising despite IVF, but good improvement today.   - Likely 2/2 seizures    - Monitor            Unsteady gait    - PT OT  - initially refusing SNF but now willing          Alcohol abuse    - Counseled to cease  - Thiamine 100 mg daily            Troponin level elevated    - Noted in past   - Cardiology noted:  Troponin Rise              Most certainly due to demand/supply issues.              Would be reasonable to do Stress test as outpatient.              May need to be Regadenoson MPI as difficulty walking due to pain in foot.            Macrocytic anemia    - Folate borderline low.   - Supplement.             Hypomagnesemia    - Likely due to EtOH abuse.  - Monitor and replace electrolytes PRN.             LFTs abnormal    Likely 2/2 long h/o etoh abuse          Hyperlipidemia    - Lipid profile okay.             Hypokalemia    - Replace PRN.             Acute on chronic kidney failure    - Improving, creatinine normal  - Monitor.          Hypertension    - Coreg, cardizem continued  - Monitor.              VTE Risk Mitigation         Ordered     Place sequential compression device  Until discontinued      01/09/18 0006     Medium Risk of VTE  Once      01/09/18 0006     Place sequential compression device  Until discontinued      01/09/18 0006     Place MIKA hose  Until discontinued      01/09/18 0006     enoxaparin injection 40 mg  Daily     Route:  Subcutaneous        01/09/18 0005              Monika Simmons MD  Department of Hospital Medicine   [facility]

## 2018-01-15 NOTE — PT/OT/SLP PROGRESS
"Occupational Therapy  Partial co-Treatment with PTA    Name: Michael Johnson Jr.  MRN: [id number]  Admitting Diagnosis:  Seizure       Recommendations:     Discharge Recommendations: nursing facility, skilled  Discharge Equipment Recommendations:  walker, rolling, shower chair  Barriers to discharge:  Decreased caregiver support    Subjective     Communicated with: RN prior to session.  Pt reports, "I'm motivated to get better. I would like to go to a skilled facility before I head home."    Pain/Comfort:  · Pain Rating 1: 9/10  · Location - Side 1: Bilateral  · Location - Orientation 1: generalized  · Location 1: foot  · Pain Addressed 1: Reposition, Distraction  · Pain Rating Post-Intervention 1: 8/10    Objective:     Patient found with: peripheral IV, telemetry    General Precautions: Standard, fall, seizure   Orthopedic Precautions:N/A   Braces: N/A     Occupational Performance:    Bed Mobility:    · Pt already UIC upon arrival     Functional Mobility/Transfers:  · Patient completed Sit <> Stand Transfer with minimum assistance  with  rolling walker x3 from bedside chair   · Pt ambulated house-hold distance in prep for G/H in bathroom using RW with min (A) x1 and close chair follow x1     Activities of Daily Living:  · Grooming: contact guard assistance standing at sink to complete oral care and face washing    Patient left up in chair with all lines intact, call button in reach and RN notified and wife present    [facility] 6 Click:  [facility] Total Score: 14    Treatment & Education:  Discussed OT POC and goals. Pt reports being highly motivated for return to PLOF. Discussed continued D/C recs for SNF which pt and his wife are very receptive to at this time.   Education:    Assessment:     Michael Johnson Jr. is a 57 y.o. male with a medical diagnosis of Seizure. Pt is making good progress towards his OT goals. Noted improvements in pt's functional transfers, functional mobility and G/H this date. Pt does continue to c/o " of significant pain in jany feet, however improved standing tolerance during G/H routine. Performance deficits affecting function are weakness, impaired endurance, impaired self care skills, impaired functional mobilty, gait instability, impaired balance, decreased upper extremity function, decreased lower extremity function, decreased safety awareness, pain, decreased ROM, edema, impaired skin, abnormal tone. Feel that pt would continue to benefit from SNF to maximize his return to PLOF. Pt is motivated to get better and receptive to SNF in order to accomplish his therapy goals.      Rehab Prognosis:  Good ; patient would benefit from acute skilled OT services to address these deficits and reach maximum level of function.       Plan:     Patient to be seen 5 x/week to address the above listed problems via self-care/home management, therapeutic activities, therapeutic exercises  · Plan of Care Expires: 02/08/18  · Plan of Care Reviewed with: patient, spouse    This Plan of care has been discussed with the patient who was involved in its development and understands and is in agreement with the identified goals and treatment plan    GOALS:    Occupational Therapy Goals        Problem: Occupational Therapy Goal    Goal Priority Disciplines Outcome Interventions   Occupational Therapy Goal     OT, PT/OT Ongoing (interventions implemented as appropriate)    Description:  Goals to be met by: 2/8/2018     Patient will increase functional independence with ADLs by performing:    UE Dressing with Minimal Assistance.  LE Dressing with Minimal Assistance.  Grooming while standing at sink with Contact Guard Assistance. -MET 1/15/18  *REVISED G/H standing at sink with SBA  Toileting from bedside commode with Minimal Assistance for hygiene and clothing management.   Supine to sit with Stand-by Assistance.  Stand pivot transfers with Minimal Assistance. (MET with RW use 1/10/18)  Toilet transfer to bedside commode with Minimal  Assistance.                        Time Tracking:     OT Date of Treatment: 01/15/18  OT Start Time: 1315  OT Stop Time: 1345  OT Total Time (min): 30 min    Billable Minutes:Self Care/Home Management 20  Therapeutic Activity 10    SHIRLENE Moseley/DENNIS  1/15/2018

## 2018-01-15 NOTE — ASSESSMENT & PLAN NOTE
- Etiology unclear  - Patient does not appear toxic.  - WBC and diff WNL, stable.  - However, Blood culture with klebsiella and enterobacter  - discussed with ID who recommended meropenem, will discuss with them about seizure risk in this patient although seizures were due to etoh withdrawal and likely not an issue going forward  - both sensitive to cipro, will consider transitioning to cipro  - UA appears okay.     - CXR appears okay.   - Influenza swab was negative  - D/C Tamiflu    - Monitor.

## 2018-01-16 PROBLEM — M62.82 RHABDOMYOLYSIS: Status: RESOLVED | Noted: 2018-01-09 | Resolved: 2018-01-16

## 2018-01-16 PROBLEM — E87.1 HYPONATREMIA: Status: RESOLVED | Noted: 2018-01-13 | Resolved: 2018-01-16

## 2018-01-16 LAB
ALBUMIN SERPL BCP-MCNC: 2.7 G/DL
ALP SERPL-CCNC: 106 U/L
ALT SERPL W/O P-5'-P-CCNC: 47 U/L
ANION GAP SERPL CALC-SCNC: 10 MMOL/L
AST SERPL-CCNC: 52 U/L
BASOPHILS # BLD AUTO: 0.02 K/UL
BASOPHILS NFR BLD: 0.3 %
BILIRUB SERPL-MCNC: 0.6 MG/DL
BUN SERPL-MCNC: 10 MG/DL
CALCIUM SERPL-MCNC: 9.1 MG/DL
CHLORIDE SERPL-SCNC: 102 MMOL/L
CO2 SERPL-SCNC: 22 MMOL/L
CREAT SERPL-MCNC: 1.1 MG/DL
DIFFERENTIAL METHOD: ABNORMAL
EOSINOPHIL # BLD AUTO: 0.1 K/UL
EOSINOPHIL NFR BLD: 1.4 %
ERYTHROCYTE [DISTWIDTH] IN BLOOD BY AUTOMATED COUNT: 15.3 %
EST. GFR  (AFRICAN AMERICAN): >60 ML/MIN/1.73 M^2
EST. GFR  (NON AFRICAN AMERICAN): >60 ML/MIN/1.73 M^2
GLUCOSE SERPL-MCNC: 103 MG/DL
HCT VFR BLD AUTO: 25.8 %
HGB BLD-MCNC: 8.7 G/DL
LYMPHOCYTES # BLD AUTO: 1.2 K/UL
LYMPHOCYTES NFR BLD: 19.3 %
MAGNESIUM SERPL-MCNC: 1.3 MG/DL
MCH RBC QN AUTO: 38 PG
MCHC RBC AUTO-ENTMCNC: 33.7 G/DL
MCV RBC AUTO: 113 FL
MONOCYTES # BLD AUTO: 1.2 K/UL
MONOCYTES NFR BLD: 18.5 %
NEUTROPHILS # BLD AUTO: 3.9 K/UL
NEUTROPHILS NFR BLD: 60.2 %
PHOSPHATE SERPL-MCNC: 4.7 MG/DL
PLATELET # BLD AUTO: 333 K/UL
PMV BLD AUTO: 10.2 FL
POTASSIUM SERPL-SCNC: 4.4 MMOL/L
PROT SERPL-MCNC: 7 G/DL
RBC # BLD AUTO: 2.29 M/UL
SODIUM SERPL-SCNC: 134 MMOL/L
WBC # BLD AUTO: 6.44 K/UL

## 2018-01-16 PROCEDURE — 97530 THERAPEUTIC ACTIVITIES: CPT

## 2018-01-16 PROCEDURE — 25000003 PHARM REV CODE 250: Performed by: HOSPITALIST

## 2018-01-16 PROCEDURE — 80053 COMPREHEN METABOLIC PANEL: CPT

## 2018-01-16 PROCEDURE — 11000001 HC ACUTE MED/SURG PRIVATE ROOM

## 2018-01-16 PROCEDURE — 99233 SBSQ HOSP IP/OBS HIGH 50: CPT | Mod: ,,, | Performed by: HOSPITALIST

## 2018-01-16 PROCEDURE — 25000242 PHARM REV CODE 250 ALT 637 W/ HCPCS: Performed by: INTERNAL MEDICINE

## 2018-01-16 PROCEDURE — 85025 COMPLETE CBC W/AUTO DIFF WBC: CPT

## 2018-01-16 PROCEDURE — 94799 UNLISTED PULMONARY SVC/PX: CPT

## 2018-01-16 PROCEDURE — 97110 THERAPEUTIC EXERCISES: CPT

## 2018-01-16 PROCEDURE — 63600175 PHARM REV CODE 636 W HCPCS: Performed by: NURSE PRACTITIONER

## 2018-01-16 PROCEDURE — 25000003 PHARM REV CODE 250: Performed by: NURSE PRACTITIONER

## 2018-01-16 PROCEDURE — 94640 AIRWAY INHALATION TREATMENT: CPT

## 2018-01-16 PROCEDURE — 63600175 PHARM REV CODE 636 W HCPCS: Performed by: INTERNAL MEDICINE

## 2018-01-16 PROCEDURE — 63600175 PHARM REV CODE 636 W HCPCS: Performed by: HOSPITALIST

## 2018-01-16 PROCEDURE — 99900035 HC TECH TIME PER 15 MIN (STAT)

## 2018-01-16 PROCEDURE — 97116 GAIT TRAINING THERAPY: CPT

## 2018-01-16 PROCEDURE — 84100 ASSAY OF PHOSPHORUS: CPT

## 2018-01-16 PROCEDURE — 83735 ASSAY OF MAGNESIUM: CPT

## 2018-01-16 PROCEDURE — 36415 COLL VENOUS BLD VENIPUNCTURE: CPT

## 2018-01-16 RX ORDER — ACETAMINOPHEN 325 MG/1
650 TABLET ORAL EVERY 4 HOURS PRN
Refills: 0 | COMMUNITY
Start: 2018-01-16 | End: 2018-01-21 | Stop reason: HOSPADM

## 2018-01-16 RX ORDER — LEVETIRACETAM 500 MG/1
500 TABLET ORAL 2 TIMES DAILY
Qty: 60 TABLET | Refills: 11
Start: 2018-01-16 | End: 2018-01-21

## 2018-01-16 RX ORDER — LANOLIN ALCOHOL/MO/W.PET/CERES
100 CREAM (GRAM) TOPICAL DAILY
Status: DISCONTINUED | OUTPATIENT
Start: 2018-01-16 | End: 2018-01-21 | Stop reason: HOSPADM

## 2018-01-16 RX ORDER — LANOLIN ALCOHOL/MO/W.PET/CERES
800 CREAM (GRAM) TOPICAL 2 TIMES DAILY
Status: DISCONTINUED | OUTPATIENT
Start: 2018-01-16 | End: 2018-01-21 | Stop reason: HOSPADM

## 2018-01-16 RX ORDER — LANOLIN ALCOHOL/MO/W.PET/CERES
100 CREAM (GRAM) TOPICAL DAILY
COMMUNITY
Start: 2018-01-16 | End: 2018-01-21 | Stop reason: HOSPADM

## 2018-01-16 RX ORDER — FOLIC ACID 1 MG/1
1 TABLET ORAL DAILY
Refills: 0
Start: 2018-01-17 | End: 2018-01-21 | Stop reason: HOSPADM

## 2018-01-16 RX ORDER — CARVEDILOL 3.12 MG/1
3.12 TABLET ORAL 2 TIMES DAILY WITH MEALS
Start: 2018-01-16 | End: 2018-01-21 | Stop reason: HOSPADM

## 2018-01-16 RX ORDER — LANOLIN ALCOHOL/MO/W.PET/CERES
400 CREAM (GRAM) TOPICAL 2 TIMES DAILY
Refills: 0
Start: 2018-01-16 | End: 2018-01-21 | Stop reason: HOSPADM

## 2018-01-16 RX ORDER — IPRATROPIUM BROMIDE AND ALBUTEROL SULFATE 2.5; .5 MG/3ML; MG/3ML
3 SOLUTION RESPIRATORY (INHALATION) EVERY 4 HOURS PRN
Qty: 1 BOX | Refills: 0
Start: 2018-01-16 | End: 2018-01-21 | Stop reason: HOSPADM

## 2018-01-16 RX ADMIN — Medication 400 MG: at 08:01

## 2018-01-16 RX ADMIN — FOLIC ACID 1 MG: 1 TABLET ORAL at 08:01

## 2018-01-16 RX ADMIN — ENOXAPARIN SODIUM 40 MG: 100 INJECTION SUBCUTANEOUS at 06:01

## 2018-01-16 RX ADMIN — LEVETIRACETAM 500 MG: 500 TABLET ORAL at 08:01

## 2018-01-16 RX ADMIN — GABAPENTIN 300 MG: 300 CAPSULE ORAL at 09:01

## 2018-01-16 RX ADMIN — THIAMINE HYDROCHLORIDE 100 MG: 100 INJECTION, SOLUTION INTRAMUSCULAR; INTRAVENOUS at 09:01

## 2018-01-16 RX ADMIN — GABAPENTIN 300 MG: 300 CAPSULE ORAL at 03:01

## 2018-01-16 RX ADMIN — IPRATROPIUM BROMIDE AND ALBUTEROL SULFATE 3 ML: .5; 3 SOLUTION RESPIRATORY (INHALATION) at 02:01

## 2018-01-16 RX ADMIN — DILTIAZEM HYDROCHLORIDE 180 MG: 180 CAPSULE, COATED, EXTENDED RELEASE ORAL at 08:01

## 2018-01-16 RX ADMIN — ALLOPURINOL 300 MG: 300 TABLET ORAL at 08:01

## 2018-01-16 RX ADMIN — CARVEDILOL 3.12 MG: 3.12 TABLET, FILM COATED ORAL at 06:01

## 2018-01-16 RX ADMIN — CIPROFLOXACIN 400 MG: 2 INJECTION, SOLUTION INTRAVENOUS at 08:01

## 2018-01-16 RX ADMIN — CARVEDILOL 3.12 MG: 3.12 TABLET, FILM COATED ORAL at 08:01

## 2018-01-16 RX ADMIN — IPRATROPIUM BROMIDE AND ALBUTEROL SULFATE 3 ML: .5; 3 SOLUTION RESPIRATORY (INHALATION) at 08:01

## 2018-01-16 RX ADMIN — MAGNESIUM SULFATE HEPTAHYDRATE 3 G: 500 INJECTION, SOLUTION INTRAMUSCULAR; INTRAVENOUS at 03:01

## 2018-01-16 RX ADMIN — Medication 100 MG: at 03:01

## 2018-01-16 RX ADMIN — GABAPENTIN 300 MG: 300 CAPSULE ORAL at 06:01

## 2018-01-16 RX ADMIN — Medication 800 MG: at 09:01

## 2018-01-16 RX ADMIN — CIPROFLOXACIN 400 MG: 2 INJECTION, SOLUTION INTRAVENOUS at 06:01

## 2018-01-16 RX ADMIN — ASPIRIN 81 MG: 81 TABLET, COATED ORAL at 08:01

## 2018-01-16 RX ADMIN — POTASSIUM CHLORIDE 40 MEQ: 1500 TABLET, EXTENDED RELEASE ORAL at 08:01

## 2018-01-16 NOTE — PT/OT/SLP PROGRESS
Occupational Therapy   Treatment    Name: Michael Johnson Jr.  MRN: [id number]  Admitting Diagnosis:  Seizure       Recommendations:     Discharge Recommendations: nursing facility, skilled  Discharge Equipment Recommendations:  shower chair, walker, rolling  Barriers to discharge:  Decreased caregiver support    Subjective     Communicated with: RN prior to session.  Pain/Comfort:  · Pain Rating 1: 9/10  · Location - Side 1: Left  · Location 1: foot  · Pain Addressed 1: Distraction, Cessation of Activity  · Pain Rating Post-Intervention 1: 9/10    Objective:     Patient found with: peripheral IV    General Precautions: Standard, fall   Orthopedic Precautions:N/A   Braces: N/A     Occupational Performance:    Bed Mobility:    · Patient completed Supine to Sit with stand by assistance     Functional Mobility/Transfers:  · Patient completed Sit <> Stand Transfer with stand by assistance  with  rolling walker     Activities of Daily Living:  · Grooming: contact guard assistance simulated, standing at sink Pt reports he performed self-care earlier this date in bathroom and used BSC over toilet to have BM.    Patient left up in chair with all lines intact and RN notified    [facility] 6 Click:  [facility] Total Score: 20    Treatment & Education:  OT POC, discharge recommendations, importance of sitting UI    Pt performed 10 reps of BUE therex in all available planes of motion including elbow flexion/extension, shoulder flexion/adduction, shoulder shrugs/rolls, chest press and tricep extension. Pt performed therex seated in chair.  Education:    Assessment:     Michael Johnson Jr. is a 57 y.o. male with a medical diagnosis of Seizure.  He presents with improved performance in functional mobility this date. Pt continues to c/o foot pain, mostly L foot, but tolerated ambulation to and from bathroom fairly. Good tolerance for BUE therex. Performance deficits affecting function are weakness, impaired endurance, impaired self care skills,  gait instability, pain.  Pt making good progress towards goals and will continue to benefit from OT services. Recommend further IP therapy due to pt is mostly home alone during the day as his wife works.    Rehab Prognosis:  good; patient would benefit from acute skilled OT services to address these deficits and reach maximum level of function.       Plan:     Patient to be seen 5 x/week to address the above listed problems via self-care/home management, therapeutic activities, therapeutic exercises  · Plan of Care Expires: 02/08/18  · Plan of Care Reviewed with: patient    This Plan of care has been discussed with the patient who was involved in its development and understands and is in agreement with the identified goals and treatment plan    GOALS:    Occupational Therapy Goals        Problem: Occupational Therapy Goal    Goal Priority Disciplines Outcome Interventions   Occupational Therapy Goal     OT, PT/OT Ongoing (interventions implemented as appropriate)    Description:  Goals to be met by: 2/8/2018     Patient will increase functional independence with ADLs by performing:    UE Dressing with Minimal Assistance.  LE Dressing with Minimal Assistance.  Grooming while standing at sink with Contact Guard Assistance. -MET 1/15/18  *REVISED G/H standing at sink with SBA  Toileting from bedside commode with Minimal Assistance for hygiene and clothing management.   Supine to sit with Stand-by Assistance.  Stand pivot transfers with Minimal Assistance. (MET with RW use 1/10/18)  Toilet transfer to bedside commode with Minimal Assistance.                        Time Tracking:     OT Date of Treatment: 01/16/18  OT Start Time: 1315  OT Stop Time: 1340  OT Total Time (min): 25 min    Billable Minutes:Therapeutic Activity 15  Therapeutic Exercise 10    AMANDA Bradshaw  1/16/2018

## 2018-01-16 NOTE — PLAN OF CARE
Problem: Physical Therapy Goal  Goal: Physical Therapy Goal  Goals to be met by: 18     Patient will increase functional independence with mobility by performin. Supine to sit with SBA MET 18  2. Sit to supine with Stand-by Assistance   3. Sit to stand transfer with contact guard Assistance  4. Gait  x 25' feet with Contact Guard Assistance using Rolling Walker.  MET 18  5. Ascend/descend 5 stair with right Handrails Minimal Assistance using crutch.      Outcome: Ongoing (interventions implemented as appropriate)    Patient increase with gait distance and required decrease assistance with mobility.

## 2018-01-16 NOTE — ASSESSMENT & PLAN NOTE
- On multiple oral meds as outpatient, including duplicates - now on low dose beta blocker and diltiazem.  - BP low normal.

## 2018-01-16 NOTE — PLAN OF CARE
Discharge Planning:  Patient admitted on 1-8-18  LOS- day 8  Chart reviewed  Care plan discussed  Discussed care plan with treatment team  Discussed care plan with the attending Dr Skinner  Mr Johnson is new to: Dr Skinner and myself    First day with his care plan  Current dispo - to an accepting [employer] medicare based SNF   Case management to follow  Consults following are: case mgt, PT, OT, inf disease, cardiology and neurology

## 2018-01-16 NOTE — PLAN OF CARE
[facility]     Department of Hospital Medicine     [address] [place] [address]     [phone] after hours  [phone] fax       NURSING HOME ORDERS    01/19/2018    Admit to Nursing Home:    Skilled Bed                                                  Diagnoses:  Active Hospital Problems    Diagnosis  POA    *Seizure [R56.9]  Yes    Cholecystitis [K81.9]  Yes    Gram negative sepsis [A41.50]  Yes    Essential hypertension [I10]  Yes    Macrocytic anemia [D53.9]  Yes    Alcoholism [F10.20]  Yes    Hypomagnesemia [E83.42]  Yes      Resolved Hospital Problems    Diagnosis Date Resolved POA    Hyponatremia [E87.1] 01/16/2018 Yes    Rhabdomyolysis [M62.82] 01/16/2018 Yes    Acute kidney injury [N17.9] 01/16/2018 Yes    Hypokalemia [E87.6] 01/16/2018 Yes       Allergies:  Review of patient's allergies indicates:   Allergen Reactions    Lisinopril Swelling     Pt admitted with angioedema 2wks after taking lisinopril.        Vitals:       Every shift (Skilled Nursing patients)    Diet: Regular    Activities:  As tolerated.  Ambulate with walker and assistance.    LABS:  BMP and magnesium level twice weekly     Nursing Precautions:     - Fall precautions per nursing home protocol   - Seizure precaution per [facility] protocol   - Decubitus precautions:        -  for positioning   - Pressure reducing foam mattress   - Turn patient every two hours. Use wedge pillows to anchor patient    CONSULTS:      Physical Therapy to evaluate and treat     Occupational Therapy to evaluate and treat     Nutrition to evaluate and recommend diet      Medications: Discontinue all previous medication orders, if any. See new list below.   Michael Johnson Jr.   Home Medication Instructions GLENDA:[id number]    Printed on:01/19/18 0919   Medication Information                      acetaminophen (TYLENOL) 325 MG tablet  Take 2 tablets (650 mg total) by mouth every 4  (four) hours as needed.             albuterol-ipratropium 2.5mg-0.5mg/3mL (DUO-NEB) 0.5 mg-3 mg(2.5 mg base)/3 mL nebulizer solution  Take 3 mLs by nebulization every 4 (four) hours as needed for Wheezing. Rescue             allopurinol (ZYLOPRIM) 300 MG tablet  Take 300 mg by mouth once daily.             aspirin (ECOTRIN) 81 MG EC tablet  Take 81 mg by mouth once daily.             carvedilol (COREG) 3.125 MG tablet  Take 1 tablet (3.125 mg total) by mouth 2 (two) times daily with meals.             ciprofloxacin HCl (CIPRO) 500 MG tablet  Take 1 tablet (500 mg total) by mouth every 12 (twelve) hours through 1/31/2018.             diltiaZEM (CARDIZEM CD) 120 MG Cp24  Take 1 capsule (120 mg total) by mouth once daily.             folic acid (FOLVITE) 1 MG tablet  Take 1 tablet (1 mg total) by mouth once daily.             gabapentin (NEURONTIN) 300 MG capsule  Take 300 mg by mouth 3 (three) times daily.             levETIRAcetam (KEPPRA) 500 MG Tab  Take 1 tablet (500 mg total) by mouth 2 (two) times daily.             magnesium oxide (MAG-OX) 400 mg tablet  Take 1 tablet (400 mg total) by mouth 2 (two) times daily.             thiamine 100 MG tablet  Take 1 tablet (100 mg total) by mouth once daily.               _________________________________  Jessica Redman MD  01/19/2018

## 2018-01-16 NOTE — ASSESSMENT & PLAN NOTE
- Blood cultures grew Enterobacter cloacae and Klebsiella oxytoca.    - Repeat blood cultures 1/13 no growth.  WBC normal and patient afebrile.   - Etiology likely gallbladder as U/S shows some thickening of wall but pt asymptomatic   - Will need to follow up with surgery for possible cholecystectomy   - Continue cipro for now  - ID following

## 2018-01-16 NOTE — PROGRESS NOTES
"[facility] Hospital Medicine  Progress Note    Patient Name: Michael Johnson Jr.  MRN: [id number]  Patient Class: IP- Inpatient   Admission Date: 1/8/2018  Length of Stay: 6 days  Attending Physician: Jessica Skinner MD  Primary Care Provider: Zunilda Bolden NP        Subjective:     Principal Problem:Seizure    HPI:  This is a 57 y.o. Male with a history of seizure who presents via EMS with complaint of seizure that occurred prior to arrival. Patient was outside sitting down when seizure witnessed by wife and son. Wife reports generalized "full body" seizure last lasted approximately four minutes. After seizure patient was unresponsive. When paramedics arrived patient had a heart rate of 220 bpm en route. Paramedics gave 6 mg and 12 mg of andesine were given without changes. Diltiazem was given and heart rate decreased to 109 bpm. Wife states the patient has been dehydrated for about three days. Patient states he drinks a pint of vodka a day. Patient states he has stopped drinking, and his last drink was four days ago. Patient states a history of seizure two months. Patient was seen at [facility] for outpatient EEG. Wife reports EEG was normal. Patient denies any fever, chills, nausea, vomiting, numbness, weakness, or confusion.    Hospital Course:  Patient was found to have Enterococcus and Klebsiella in blood culture, and repeat cultures were negative.  ID was consulted to follow.  Source of positive cultures was thought likely to be from the gallbladder, and patient will need surgical follow up.  2D echo showed pulmonary hypertension with PA pressure estimated to be 60 but with normal LVEF and no diastolic dysfunction.    Interval History:  Patient seen during therapy.  He was able to walk about 25' with the therapist.  He reports severe pain in his left foot/ankle.    Review of Systems   Constitutional: Negative for chills and fever.   Respiratory: Negative for cough and shortness of " breath.    Cardiovascular: Negative for chest pain and palpitations.     Objective:     Vital Signs (Most Recent):  Temp: 98.7 °F (37.1 °C) (01/16/18 1204)  Pulse: 89 (01/16/18 1204)  Resp: 18 (01/16/18 1204)  BP: 119/75 (01/16/18 1204)  SpO2: 97 % (01/16/18 1204) Vital Signs (24h Range):  Temp:  [98.4 °F (36.9 °C)-99 °F (37.2 °C)] 98.7 °F (37.1 °C)  Pulse:  [70-89] 89  Resp:  [18-20] 18  SpO2:  [96 %-99 %] 97 %  BP: (119-136)/(75-96) 119/75     Weight: 90.9 kg (200 lb 6.4 oz)  Body mass index is 26.44 kg/m².    Intake/Output Summary (Last 24 hours) at 01/16/18 1400  Last data filed at 01/16/18 0336   Gross per 24 hour   Intake             1300 ml   Output             2725 ml   Net            -1425 ml      Physical Exam   Constitutional: He is oriented to person, place, and time. He appears well-developed and well-nourished.   HENT:   Head: Normocephalic.   Eyes: Conjunctivae are normal. Pupils are equal, round, and reactive to light.   Neck: Neck supple. No thyromegaly present.   Cardiovascular: Normal rate, regular rhythm, normal heart sounds and intact distal pulses.  Exam reveals no gallop and no friction rub.    No murmur heard.  Pulmonary/Chest: Effort normal and breath sounds normal.   Abdominal: Soft. Bowel sounds are normal. He exhibits no distension. There is no tenderness.   Musculoskeletal: Normal range of motion. He exhibits deformity. He exhibits no edema.   Left leg smaller than the right.   Lymphadenopathy:     He has no cervical adenopathy.   Neurological: He is alert and oriented to person, place, and time.   Strength equal and symmetric   Skin: Skin is warm and dry. No rash noted.   Psychiatric: He has a normal mood and affect. His behavior is normal. Thought content normal.       Significant Labs: All pertinent labs within the past 24 hours have been reviewed.    Significant Imaging: I have reviewed all pertinent imaging results/findings within the past 24 hours.    Assessment/Plan:      *  Seizure    - Patient had witnessed seizure and subsequently had another in ER.  Believe due to alcohol withdrawal.   - Now on Librium as needed.  - Neurology recommended continuing Keppra twice daily and substance abuse treatment.          Cholecystitis    See above        Gram negative sepsis    - Blood cultures grew Enterobacter cloacae and Klebsiella oxytoca.    - Repeat blood cultures 1/13 no growth.  WBC normal and patient afebrile.   - Etiology likely gallbladder as U/S shows some thickening of wall but pt asymptomatic   - Will need to follow up with surgery for possible cholecystectomy   - Continue cipro for now  - ID following          Essential hypertension    - On multiple oral meds as outpatient, including duplicates - now on low dose beta blocker and diltiazem.  - BP low normal.          Alcoholism    - As above - alcohol abuse treatment recommended as outpatient.                    Macrocytic anemia    - Expect this is due to alcohol abuse  - Continue folic acid supplement.        Hypomagnesemia    - Likely due to EtOH abuse.  - Continue IV and oral replacement.         VTE Risk Mitigation         Ordered     Medium Risk of VTE  Once      01/09/18 0006     enoxaparin injection 40 mg  Daily     Route:  Subcutaneous        01/09/18 0005              Jesscia Redman MD  Department of Hospital Medicine   [facility]

## 2018-01-16 NOTE — PLAN OF CARE
Problem: Patient Care Overview  Goal: Plan of Care Review  Pt currently on RA  . Pt in no distress at this time. Sats  98 %. Will continue to monitor.

## 2018-01-16 NOTE — PLAN OF CARE
01/16/18 1412   Discharge Reassessment   Assessment Type Discharge Planning Reassessment   Provided patient/caregiver education on the expected discharge date and the discharge plan Yes   Do you have any problems affording any of your prescribed medications? No   Discharge Plan A Skilled Nursing Facility   Discharge Plan B Home Health   Patient choice form signed by patient/caregiver N/A   Can the patient answer the patient profile reliably? Yes, cognitively intact   How does the patient rate their overall health at the present time? Fair   Describe the patient's ability to walk at the present time. Minor restrictions or changes   How often would a person be available to care for the patient? Often   Number of comorbid conditions (as recorded on the chart) Two   During the past month, has the patient often been bothered by feeling down, depressed or hopeless? No   During the past month, has the patient often been bothered by little interest or pleasure in doing things? No

## 2018-01-16 NOTE — SUBJECTIVE & OBJECTIVE
Interval History:  Patient seen during therapy.  He was able to walk about 25' with the therapist.  He reports severe pain in his left foot/ankle.    Review of Systems   Constitutional: Negative for chills and fever.   Respiratory: Negative for cough and shortness of breath.    Cardiovascular: Negative for chest pain and palpitations.     Objective:     Vital Signs (Most Recent):  Temp: 98.7 °F (37.1 °C) (01/16/18 1204)  Pulse: 89 (01/16/18 1204)  Resp: 18 (01/16/18 1204)  BP: 119/75 (01/16/18 1204)  SpO2: 97 % (01/16/18 1204) Vital Signs (24h Range):  Temp:  [98.4 °F (36.9 °C)-99 °F (37.2 °C)] 98.7 °F (37.1 °C)  Pulse:  [70-89] 89  Resp:  [18-20] 18  SpO2:  [96 %-99 %] 97 %  BP: (119-136)/(75-96) 119/75     Weight: 90.9 kg (200 lb 6.4 oz)  Body mass index is 26.44 kg/m².    Intake/Output Summary (Last 24 hours) at 01/16/18 1400  Last data filed at 01/16/18 0336   Gross per 24 hour   Intake             1300 ml   Output             2725 ml   Net            -1425 ml      Physical Exam   Constitutional: He is oriented to person, place, and time. He appears well-developed and well-nourished.   HENT:   Head: Normocephalic.   Eyes: Conjunctivae are normal. Pupils are equal, round, and reactive to light.   Neck: Neck supple. No thyromegaly present.   Cardiovascular: Normal rate, regular rhythm, normal heart sounds and intact distal pulses.  Exam reveals no gallop and no friction rub.    No murmur heard.  Pulmonary/Chest: Effort normal and breath sounds normal.   Abdominal: Soft. Bowel sounds are normal. He exhibits no distension. There is no tenderness.   Musculoskeletal: Normal range of motion. He exhibits deformity. He exhibits no edema.   Left leg smaller than the right.   Lymphadenopathy:     He has no cervical adenopathy.   Neurological: He is alert and oriented to person, place, and time.   Strength equal and symmetric   Skin: Skin is warm and dry. No rash noted.   Psychiatric: He has a normal mood and affect. His  behavior is normal. Thought content normal.       Significant Labs: All pertinent labs within the past 24 hours have been reviewed.    Significant Imaging: I have reviewed all pertinent imaging results/findings within the past 24 hours.

## 2018-01-16 NOTE — HOSPITAL COURSE
Patient was found to have Enterococcus and Klebsiella in blood cultures on 1/11.  Both organisms were sensitive to ciprofloxacin, and he was started on Cipro.  Repeat cultures were negative.  ID was consulted to follow.  The source of positive cultures was thought to be from the gallbladder, and patient will need surgical follow up after the infection is fully treated with 2 weeks of outpatient oral ciprofloxacin.      Patient was treated for alcohol withdrawal with Librium while he was here, and he was no longer requiring it at discharge.  His last drink was nearly 2 weeks ago.  He was seen by a neurologist while here who recommended he continue on Keppra for seizure prevention as he will have a low threshold for further seizures.  Outpatient alcohol rehab is also recommended, although patient believes he will be able to continue to abstain on his own without help.  We had extensive discussions regarding this and he will continue to be followed at [facility] when outpatient to have further evaluation.    2D echo showed pulmonary hypertension with PA pressure estimated to be 60 but with normal LVEF and no diastolic dysfunction.  His BP was quite low while here and many of his medicines were discontinued.  He was seen by the cardiologist, who felt the mild troponin I elevation was due to demand/supply issues.  He recommended an outpatient stress test in the future.  He should be seen by his PCP after discharge to have his blood pressure evaluated.    Patient was evaluated by PT and OT, and initially their recommendation was for the patient to complete a course of therapy in SNF.  He was able to walk >200 feet by 1/20 however, and he was discharged home with a recommendation for outpatient PT.  In addition the physical therapist recommended he be seen by an orthotics specialist for rocker-bottom shoes due to his foot problems.

## 2018-01-16 NOTE — PLAN OF CARE
Problem: Patient Care Overview  Goal: Plan of Care Review  Outcome: Ongoing (interventions implemented as appropriate)  Pt in no distress on Ra, tx given and tolerated well. Will continue to monitor.

## 2018-01-16 NOTE — PLAN OF CARE
Problem: Physical Therapy Goal  Goal: Physical Therapy Goal  Goals to be met by: 18     Patient will increase functional independence with mobility by performin. Supine to sit with SBA MET 18  2. Sit to supine with Stand-by Assistance   3. Sit to stand transfer with contact guard Assistance  4. Gait  x 25' feet with Contact Guard Assistance using Rolling Walker.   5. Ascend/descend 5 stair with right Handrails Minimal Assistance using crutch.      Outcome: Ongoing (interventions implemented as appropriate)    Patient increase with gait distance and required decrease assistance for functional mobility.

## 2018-01-16 NOTE — ASSESSMENT & PLAN NOTE
- Patient had witnessed seizure and subsequently had another in ER.  Believe due to alcohol withdrawal.   - Now on Librium as needed.  - Neurology recommended continuing Keppra twice daily and substance abuse treatment.

## 2018-01-16 NOTE — PT/OT/SLP PROGRESS
Physical Therapy Treatment    Patient Name:  Michael Johnson Jr.   MRN:  [id number]    Recommendations:     Discharge Recommendations:  nursing facility, skilled   Discharge Equipment Recommendations: walker, rolling, shower chair   Barriers to discharge: Inaccessible home    Assessment:     Michael Johnson Jr. is a 57 y.o. male admitted with a medical diagnosis of Seizure.  He presents with the following impairments/functional limitations:  weakness, impaired endurance, impaired self care skills, impaired functional mobilty, gait instability, decreased lower extremity function patient was able to ambulate on this day and required decrease assistance with functional mobility. Patient was highly motivated and will cont. To benefit from skilled PT services to increase strength, endurance and functional mobility.     Rehab Prognosis:  good; patient would benefit from acute skilled PT services to address these deficits and reach maximum level of function.      Recent Surgery: * No surgery found *      Plan:     During this hospitalization, patient to be seen 6 x/week to address the above listed problems via gait training, therapeutic activities, therapeutic exercises  · Plan of Care Expires:  02/08/18   Plan of Care Reviewed with: patient    Subjective     Communicated with nurse prior to session.  Patient found  upon PT entry to room, agreeable to treatment.      Chief Complaint: patient complained of pain in foot  Patient comments/goals: Patient reported I want to start moving  Pain/Comfort:  · Pain Rating 1:  (patient complained of left foot pain )    Patients cultural, spiritual, Voodoo conflicts given the current situation: none    Objective:     Patient found with: peripheral IV, telemetry     General Precautions: Standard, fall, seizure   Orthopedic Precautions:N/A   Braces: N/A     Functional Mobility:  · Sit to supine with SBA HOB elevated with use of hand rail   · Sit to stand from bed, from bedside chair X 3  trials with Rolling walker with CGA/Min A verbal cues for hand placement  · Stand to sit onto bedside chair with rolling walker with CGA verbal cues for safety  · Patient gait trained 26 feet, 18 feet, 10 feet with rolling walker with CGA for safety verbal cues to stay inside walker. Patient demo forward flex trunk, decrease step length, slow julianna. Followed with bedside chair for safety and required seated rest breaks.   SPT from bedside chair to toilet in bathroom with RW with CGA for safety     AM-PAC 6 CLICK MOBILITY  Turning over in bed (including adjusting bedclothes, sheets and blankets)?: 3  Sitting down on and standing up from a chair with arms (e.g., wheelchair, bedside commode, etc.): 3  Moving from lying on back to sitting on the side of the bed?: 3  Moving to and from a bed to a chair (including a wheelchair)?: 3  Need to walk in hospital room?: 3  Climbing 3-5 steps with a railing?: 2  Total Score: 17       Patient left up in bedside commode in bathroom with all lines intact, call button in reach and nurse notified..    GOALS:    Physical Therapy Goals        Problem: Physical Therapy Goal    Goal Priority Disciplines Outcome Goal Variances Interventions   Physical Therapy Goal     PT/OT, PT Ongoing (interventions implemented as appropriate)     Description:  Goals to be met by: 18     Patient will increase functional independence with mobility by performin. Supine to sit with SBA MET 18  2. Sit to supine with Stand-by Assistance   3. Sit to stand transfer with contact guard Assistance  4. Gait  x 25' feet with Contact Guard Assistance using Rolling Walker.  MET 18  5. Ascend/descend 5 stair with right Handrails Minimal Assistance using crutch.                        Time Tracking:     PT Received On: 18  PT Start Time: 1003     PT Stop Time: 1041  PT Total Time (min): 38 min     Billable Minutes: Gait Training 23 and Therapeutic Activity 15    Treatment Type:  Treatment  PT/PTA: PTA     PTA Visit Number: 4     Amy Muñoz, PTA  01/16/2018

## 2018-01-16 NOTE — PLAN OF CARE
Problem: Occupational Therapy Goal  Goal: Occupational Therapy Goal  Goals to be met by: 2/8/2018     Patient will increase functional independence with ADLs by performing:    UE Dressing with Minimal Assistance.  LE Dressing with Minimal Assistance.  Grooming while standing at sink with Contact Guard Assistance. -MET 1/15/18  *REVISED G/H standing at sink with SBA  Toileting from bedside commode with Minimal Assistance for hygiene and clothing management.   Supine to sit with Stand-by Assistance.  Stand pivot transfers with Minimal Assistance. (MET with RW use 1/10/18)  Toilet transfer to bedside commode with Minimal Assistance.       Outcome: Ongoing (interventions implemented as appropriate)    Pt making good progress towards goals. Good tolerance for ambulation to bathroom and BUE therex seated in chair. Recommend SNF as pt is home alone during the day while wife works.

## 2018-01-16 NOTE — PLAN OF CARE
Progress Note     Patient Name: Michael Johnson Jr.  MRN: [id number]  Patient Class: IP- Inpatient     Admission Date: 1/8/2018  Length of Stay: 6 days  Attending Physician: Jessica Skinner MD  Primary Care Provider: Zunilda Bolden NP     Discharge Planning:  Chart reviewed  Care plan discussed  Discussed care plan with treatment team  Discussed care plan with the attending Dr Skinner  Mr Johnson is new to: Dr Skinner and myself    First day with his care plan  Current dispo - to an accepting [employer] medicare based SNF   Case management to follow  Consults following are: case mgt, PT, OT, inf disease, cardiology and neurology    SNF packets shared locally   [employer] completed  Await acceptance   Mr Johnson is aware of the plan and he is in agreement

## 2018-01-16 NOTE — PLAN OF CARE
Problem: Fall Risk (Adult)  Goal: Absence of Falls  Patient will demonstrate the desired outcomes by discharge/transition of care.   Outcome: Ongoing (interventions implemented as appropriate)  Patient has remained free of seizure-like activity so far. No problems with alcohol withdrawal noted. Patient has remained in bed during this shift; turned and repositioned q2h; ambulates using walker during the day.

## 2018-01-17 PROBLEM — A41.50 GRAM NEGATIVE SEPSIS: Status: RESOLVED | Noted: 2018-01-13 | Resolved: 2018-01-17

## 2018-01-17 PROBLEM — R78.81 BACTEREMIA DUE TO ENTEROBACTER SPECIES: Status: ACTIVE | Noted: 2018-01-17

## 2018-01-17 PROBLEM — K81.9 CHOLECYSTITIS: Status: RESOLVED | Noted: 2018-01-15 | Resolved: 2018-01-17

## 2018-01-17 PROBLEM — D69.6 THROMBOCYTOPENIA: Status: ACTIVE | Noted: 2018-01-17

## 2018-01-17 PROBLEM — K81.0 CHOLECYSTITIS, ACUTE: Status: ACTIVE | Noted: 2018-01-17

## 2018-01-17 PROBLEM — B96.89 BACTEREMIA DUE TO ENTEROBACTER SPECIES: Status: ACTIVE | Noted: 2018-01-17

## 2018-01-17 PROBLEM — R78.81 GRAM-NEGATIVE BACTEREMIA: Status: ACTIVE | Noted: 2018-01-17

## 2018-01-17 PROBLEM — K81.0 CHOLECYSTITIS, ACUTE: Status: RESOLVED | Noted: 2018-01-17 | Resolved: 2018-01-17

## 2018-01-17 PROBLEM — N18.30 CKD (CHRONIC KIDNEY DISEASE), STAGE III: Status: ACTIVE | Noted: 2018-01-17

## 2018-01-17 LAB
ALBUMIN SERPL BCP-MCNC: 3 G/DL
ALP SERPL-CCNC: 124 U/L
ALT SERPL W/O P-5'-P-CCNC: 41 U/L
ANION GAP SERPL CALC-SCNC: 9 MMOL/L
AST SERPL-CCNC: 38 U/L
BASOPHILS # BLD AUTO: 0.03 K/UL
BASOPHILS NFR BLD: 0.4 %
BILIRUB SERPL-MCNC: 0.6 MG/DL
BUN SERPL-MCNC: 14 MG/DL
CALCIUM SERPL-MCNC: 9.2 MG/DL
CHLORIDE SERPL-SCNC: 100 MMOL/L
CO2 SERPL-SCNC: 24 MMOL/L
CREAT SERPL-MCNC: 1.3 MG/DL
DIFFERENTIAL METHOD: ABNORMAL
EOSINOPHIL # BLD AUTO: 0.2 K/UL
EOSINOPHIL NFR BLD: 2.8 %
ERYTHROCYTE [DISTWIDTH] IN BLOOD BY AUTOMATED COUNT: 15.1 %
EST. GFR  (AFRICAN AMERICAN): >60 ML/MIN/1.73 M^2
EST. GFR  (NON AFRICAN AMERICAN): >60 ML/MIN/1.73 M^2
GLUCOSE SERPL-MCNC: 110 MG/DL
HCT VFR BLD AUTO: 28.8 %
HGB BLD-MCNC: 9.5 G/DL
LYMPHOCYTES # BLD AUTO: 1.4 K/UL
LYMPHOCYTES NFR BLD: 18 %
MAGNESIUM SERPL-MCNC: 1.5 MG/DL
MCH RBC QN AUTO: 37.8 PG
MCHC RBC AUTO-ENTMCNC: 33 G/DL
MCV RBC AUTO: 115 FL
MONOCYTES # BLD AUTO: 1 K/UL
MONOCYTES NFR BLD: 12.8 %
NEUTROPHILS # BLD AUTO: 5.2 K/UL
NEUTROPHILS NFR BLD: 65.2 %
PHOSPHATE SERPL-MCNC: 3.3 MG/DL
PLATELET # BLD AUTO: 415 K/UL
PMV BLD AUTO: 10.2 FL
POTASSIUM SERPL-SCNC: 4.5 MMOL/L
PROT SERPL-MCNC: 7.7 G/DL
RBC # BLD AUTO: 2.51 M/UL
SODIUM SERPL-SCNC: 133 MMOL/L
WBC # BLD AUTO: 7.94 K/UL

## 2018-01-17 PROCEDURE — 99232 SBSQ HOSP IP/OBS MODERATE 35: CPT | Mod: ,,, | Performed by: INTERNAL MEDICINE

## 2018-01-17 PROCEDURE — 36415 COLL VENOUS BLD VENIPUNCTURE: CPT

## 2018-01-17 PROCEDURE — 11000001 HC ACUTE MED/SURG PRIVATE ROOM

## 2018-01-17 PROCEDURE — 63600175 PHARM REV CODE 636 W HCPCS: Performed by: NURSE PRACTITIONER

## 2018-01-17 PROCEDURE — 25000003 PHARM REV CODE 250: Performed by: NURSE PRACTITIONER

## 2018-01-17 PROCEDURE — 97116 GAIT TRAINING THERAPY: CPT

## 2018-01-17 PROCEDURE — 80053 COMPREHEN METABOLIC PANEL: CPT

## 2018-01-17 PROCEDURE — 94640 AIRWAY INHALATION TREATMENT: CPT

## 2018-01-17 PROCEDURE — 99233 SBSQ HOSP IP/OBS HIGH 50: CPT | Mod: ,,, | Performed by: HOSPITALIST

## 2018-01-17 PROCEDURE — 94761 N-INVAS EAR/PLS OXIMETRY MLT: CPT

## 2018-01-17 PROCEDURE — 25000003 PHARM REV CODE 250: Performed by: HOSPITALIST

## 2018-01-17 PROCEDURE — 94799 UNLISTED PULMONARY SVC/PX: CPT

## 2018-01-17 PROCEDURE — 25000242 PHARM REV CODE 250 ALT 637 W/ HCPCS: Performed by: INTERNAL MEDICINE

## 2018-01-17 PROCEDURE — 83735 ASSAY OF MAGNESIUM: CPT

## 2018-01-17 PROCEDURE — 85025 COMPLETE CBC W/AUTO DIFF WBC: CPT

## 2018-01-17 PROCEDURE — 84100 ASSAY OF PHOSPHORUS: CPT

## 2018-01-17 PROCEDURE — 63600175 PHARM REV CODE 636 W HCPCS: Performed by: INTERNAL MEDICINE

## 2018-01-17 PROCEDURE — 97535 SELF CARE MNGMENT TRAINING: CPT

## 2018-01-17 RX ORDER — CIPROFLOXACIN 500 MG/1
500 TABLET ORAL EVERY 12 HOURS
Status: DISCONTINUED | OUTPATIENT
Start: 2018-01-17 | End: 2018-01-21 | Stop reason: HOSPADM

## 2018-01-17 RX ORDER — CIPROFLOXACIN 500 MG/1
500 TABLET ORAL EVERY 12 HOURS
Qty: 28 TABLET | Refills: 0
Start: 2018-01-17 | End: 2018-01-21

## 2018-01-17 RX ADMIN — IPRATROPIUM BROMIDE AND ALBUTEROL SULFATE 3 ML: .5; 3 SOLUTION RESPIRATORY (INHALATION) at 01:01

## 2018-01-17 RX ADMIN — GABAPENTIN 300 MG: 300 CAPSULE ORAL at 03:01

## 2018-01-17 RX ADMIN — IPRATROPIUM BROMIDE AND ALBUTEROL SULFATE 3 ML: .5; 3 SOLUTION RESPIRATORY (INHALATION) at 07:01

## 2018-01-17 RX ADMIN — CARVEDILOL 3.12 MG: 3.12 TABLET, FILM COATED ORAL at 04:01

## 2018-01-17 RX ADMIN — GABAPENTIN 300 MG: 300 CAPSULE ORAL at 09:01

## 2018-01-17 RX ADMIN — CARVEDILOL 3.12 MG: 3.12 TABLET, FILM COATED ORAL at 08:01

## 2018-01-17 RX ADMIN — CIPROFLOXACIN 400 MG: 2 INJECTION, SOLUTION INTRAVENOUS at 07:01

## 2018-01-17 RX ADMIN — FOLIC ACID 1 MG: 1 TABLET ORAL at 08:01

## 2018-01-17 RX ADMIN — ALLOPURINOL 300 MG: 300 TABLET ORAL at 08:01

## 2018-01-17 RX ADMIN — LEVETIRACETAM 500 MG: 500 TABLET ORAL at 09:01

## 2018-01-17 RX ADMIN — GABAPENTIN 300 MG: 300 CAPSULE ORAL at 06:01

## 2018-01-17 RX ADMIN — Medication 100 MG: at 08:01

## 2018-01-17 RX ADMIN — ASPIRIN 81 MG: 81 TABLET, COATED ORAL at 08:01

## 2018-01-17 RX ADMIN — DILTIAZEM HYDROCHLORIDE 180 MG: 180 CAPSULE, COATED, EXTENDED RELEASE ORAL at 08:01

## 2018-01-17 RX ADMIN — Medication 800 MG: at 09:01

## 2018-01-17 RX ADMIN — CIPROFLOXACIN 500 MG: 500 TABLET, FILM COATED ORAL at 09:01

## 2018-01-17 RX ADMIN — ENOXAPARIN SODIUM 40 MG: 100 INJECTION SUBCUTANEOUS at 04:01

## 2018-01-17 RX ADMIN — LEVETIRACETAM 500 MG: 500 TABLET ORAL at 08:01

## 2018-01-17 RX ADMIN — Medication 800 MG: at 08:01

## 2018-01-17 NOTE — PLAN OF CARE
Problem: Patient Care Overview  Goal: Plan of Care Review  Outcome: Ongoing (interventions implemented as appropriate)  Patient has remained free of seizure-like activity so far. No problems with alcohol withdrawal noted. Patient has remained in bed during this shift after getting up from his chair at 7pm; turned and repositioned q2h; ambulates using walker during the day.

## 2018-01-17 NOTE — PT/OT/SLP PROGRESS
"Occupational Therapy   Treatment    Name: Michael Johnson Jr.  MRN: [id number]  Admitting Diagnosis:  Seizure       Recommendations:     Discharge Recommendations: nursing facility, skilled  Discharge Equipment Recommendations:  shower chair, walker, rolling  Barriers to discharge:  Decreased caregiver support    Subjective     Communicated with: RN prior to session.  Pt reports, "I need to make this quick, I really can't stand for much longer."    Pain/Comfort:  · Pain Rating 1: 8/10  · Location - Side 1: Left  · Location - Orientation 1: lateral  · Location 1: abdomen  · Pain Addressed 1: Reposition, Distraction  · Pain Rating Post-Intervention 1: 8/10    Objective:     Patient found with: peripheral IV    General Precautions: Standard, fall   Orthopedic Precautions:N/A   Braces: N/A     Occupational Performance:    Bed Mobility:    · Patient completed Scooting/Bridging with stand by assistance using bed features  · Patient completed Supine to Sit with stand by assistance     Functional Mobility/Transfers:  · Patient completed Sit <> Stand Transfer with stand by assistance  with  rolling walker   · Patient completed Bed <> Chair Transfer using Stand Pivot technique with contact guard assistance with rolling walker   · Pt ambulated house-hold distance using RW with CGA and close chair follow from EOB to sink within restroom.     Activities of Daily Living:  · Grooming: stand by assistance to wash face and brush teeth standing at sink  · Bathing: moderate assistance pt required (A) with margarito-area for thoroughness, bilateral lower BLE and bilateral feet   · UB Dressing: minimum assistance incidental (A) to don clean gown  · LB Dressing: maximum assistance- pt able to doff (R) and (L) sock; required (A) to don bilateral socks and slip-on shoes    Patient left up in chair with all lines intact, call button in reach and RN notified    [facility] 6 Click:  AMPA Total Score: 18    Treatment & Education:  Pt and wife educated on " proper footwear- pt with preference for Crocs. Discussed more supportive footwear like tennis shoes.     Discussed ongoing recommendations for SNF. Pt and wife very receptive.   Education:    Assessment:     Michael Johnson Jr. is a 57 y.o. male with a medical diagnosis of Seizure. Pt is continuing to make steady progress towards his OT goals. Performance deficits affecting function are weakness, impaired endurance, impaired self care skills, impaired functional mobilty, gait instability, impaired balance, decreased lower extremity function, decreased upper extremity function, decreased safety awareness, pain, decreased ROM, impaired skin. Feel that pt remains appropriate for acute OT services and SNF upon D/C. Pt will be home alone during the day and is currently not back to his baseline regarding functional mobility and ADLs.     Rehab Prognosis:  Good; patient would benefit from acute skilled OT services to address these deficits and reach maximum level of function.       Plan:     Patient to be seen 5 x/week to address the above listed problems via self-care/home management, therapeutic activities, therapeutic exercises  · Plan of Care Expires: 02/08/18  · Plan of Care Reviewed with: patient, significant other    This Plan of care has been discussed with the patient who was involved in its development and understands and is in agreement with the identified goals and treatment plan    GOALS:    Occupational Therapy Goals        Problem: Occupational Therapy Goal    Goal Priority Disciplines Outcome Interventions   Occupational Therapy Goal     OT, PT/OT Ongoing (interventions implemented as appropriate)    Description:  Goals to be met by: 2/8/2018     Patient will increase functional independence with ADLs by performing:    UE Dressing with Minimal Assistance.  LE Dressing with Minimal Assistance.  Grooming while standing at sink with Contact Guard Assistance. -MET 1/15/18  *REVISED G/H standing at sink with  SBA  Toileting from bedside commode with Minimal Assistance for hygiene and clothing management.   Supine to sit with Stand-by Assistance.  Stand pivot transfers with Minimal Assistance. (MET with RW use 1/10/18)  Toilet transfer to bedside commode with Minimal Assistance.                        Time Tracking:     OT Date of Treatment: 01/17/18  OT Start Time: 1107  OT Stop Time: 1143  OT Total Time (min): 36 min    Billable Minutes:Self Care/Home Management 36    Delmi Conteh OTR/L  1/17/2018

## 2018-01-17 NOTE — PLAN OF CARE
Problem: Occupational Therapy Goal  Goal: Occupational Therapy Goal  Goals to be met by: 2/8/2018     Patient will increase functional independence with ADLs by performing:    UE Dressing with Minimal Assistance.  LE Dressing with Minimal Assistance.  Grooming while standing at sink with Contact Guard Assistance. -MET 1/15/18  *REVISED G/H standing at sink with SBA  Toileting from bedside commode with Minimal Assistance for hygiene and clothing management.   Supine to sit with Stand-by Assistance.  Stand pivot transfers with Minimal Assistance. (MET with RW use 1/10/18)  Toilet transfer to bedside commode with Minimal Assistance.       Outcome: Ongoing (interventions implemented as appropriate)  Pt is making steady progress towards his OT goals. Goals remain appropriate at this time.     Delmi Conteh, OTR/L  1/17/2018

## 2018-01-17 NOTE — PLAN OF CARE
Problem: Patient Care Overview  Goal: Plan of Care Review  Outcome: Ongoing (interventions implemented as appropriate)  Pt on RA. Sats 95%. No distress noted. Aerosol tx tolerated well. IS done with good effort. Will continue to monitor.

## 2018-01-17 NOTE — PT/OT/SLP PROGRESS
"Physical Therapy Treatment    Patient Name:  Michael Johnson Jr.   MRN:  [id number]    Recommendations:     Discharge Recommendations:  nursing facility, skilled   Discharge Equipment Recommendations: walker, rolling, shower chair   Barriers to discharge: Decreased caregiver support    Assessment:     Michael Johnson Jr. is a 57 y.o. male admitted with a medical diagnosis of Seizure.  He presents with the following impairments/functional limitations:  weakness, impaired endurance, impaired functional mobilty, impaired self care skills, gait instability, impaired balance, impaired cognition, decreased lower extremity function, pain, decreased ROM, decreased safety awareness, decreased upper extremity function, decreased coordination ; pt with fair mobiity today, limited by L foot pain , poor forward flexed posture.    Rehab Prognosis:  good; patient would benefit from acute skilled PT services to address these deficits and reach maximum level of function.      Recent Surgery: * No surgery found *      Plan:     During this hospitalization, patient to be seen 6 x/week to address the above listed problems via gait training, therapeutic activities, therapeutic exercises  · Plan of Care Expires:  02/08/18   Plan of Care Reviewed with: patient, significant other    Subjective     Communicated with nurse prior to session.  Patient found supine in bed upon PT entry to room, agreeable to treatment.      Chief Complaint: L foot and side pain  Patient comments/goals: "If I didn't have pain, I could probably stand up better".    Pain/Comfort:  · Pain Rating 1: 9/10  · Location - Side 1: Left  · Location - Orientation 1: generalized  · Location 1: foot  · Pain Addressed 1: Reposition, Distraction  · Pain Rating Post-Intervention 1: 9/10  · Pain Rating 2: 8/10  · Location - Side 2: Left  · Location - Orientation 2: generalized  · Location 2: flank  · Pain Addressed 2: Reposition, Distraction  · Pain Rating Post-Intervention 2: " 8/10    Patients cultural, spiritual, Lutheran conflicts given the current situation: none noted    Objective:     Patient found with: peripheral IV     General Precautions: Standard, fall, seizure   Orthopedic Precautions:N/A   Braces: N/A     Functional Mobility:  · Bed Mobility:     · Supine to Sit: minimum assistance and use of bedrail  · Transfers:     · Sit to Stand:  minimum assistance and from EOB with rolling walker  · Gait: amb'd 35' with RW and CGA/min.A      AM-PAC 6 CLICK MOBILITY  Turning over in bed (including adjusting bedclothes, sheets and blankets)?: 3  Sitting down on and standing up from a chair with arms (e.g., wheelchair, bedside commode, etc.): 3  Moving from lying on back to sitting on the side of the bed?: 3  Moving to and from a bed to a chair (including a wheelchair)?: 3  Need to walk in hospital room?: 3  Climbing 3-5 steps with a railing?: 2  Total Score: 17       Therapeutic Activities and Exercises:   pt perf'd seated LE ex's of LAQ's and hip flex x 10 ea.    Patient left up in chair with all lines intact, call button in reach, nurse notified and SO present..    GOALS:    Physical Therapy Goals        Problem: Physical Therapy Goal    Goal Priority Disciplines Outcome Goal Variances Interventions   Physical Therapy Goal     PT/OT, PT Ongoing (interventions implemented as appropriate)     Description:  Goals to be met by: 18     Patient will increase functional independence with mobility by performin. Supine to sit with SBA MET 18  2. Sit to supine with Stand-by Assistance   3. Sit to stand transfer with contact guard Assistance  4. Gait  x 25' feet with Contact Guard Assistance using Rolling Walker.  MET 18  5. Ascend/descend 5 stair with right Handrails Minimal Assistance using crutch.                        Time Tracking:     PT Received On: 18  PT Start Time: 171     PT Stop Time: 1730  PT Total Time (min): 14 min     Billable Minutes: Gait Training  14    Treatment Type: Treatment  PT/PTA: PTA     PTA Visit Number: 5     Hilda Reinoso, PTA  01/17/2018

## 2018-01-17 NOTE — PLAN OF CARE
Problem: Physical Therapy Goal  Goal: Physical Therapy Goal  Goals to be met by: 18     Patient will increase functional independence with mobility by performin. Supine to sit with SBA MET 18  2. Sit to supine with Stand-by Assistance   3. Sit to stand transfer with contact guard Assistance  4. Gait  x 25' feet with Contact Guard Assistance using Rolling Walker.  MET 18  5. Ascend/descend 5 stair with right Handrails Minimal Assistance using crutch.       Pt progressing towards goals, sup to sit with min.A and use of bedrail, sit to stand with RW and min.A, amb'd 35' with RW and CGA/min.A. Recommend SNF.

## 2018-01-17 NOTE — PLAN OF CARE
Progress Note     Patient Name: Michael Johnson Jr.  MRN: [id number]  Patient Class: IP- Inpatient     Admission Date: 1/8/2018  Length of Stay: 9 days  Attending Physician: Jessica Skinner MD  Primary Care Provider: Zunilda Bolden NP    Discharge Planning:  Chart reviewed  Care plan discussed  Discussed care plan with treatment team  Discussed care plan with the attending Dr Skinner  Mr Johnson is in agreement with SNF (rehab to home)  [facility]  [facility] - accepted  Consults following are: case mgt, PT, OT, inf disease, cardiology and neurology

## 2018-01-18 LAB
ALBUMIN SERPL BCP-MCNC: 3 G/DL
ALP SERPL-CCNC: 119 U/L
ALT SERPL W/O P-5'-P-CCNC: 36 U/L
ANION GAP SERPL CALC-SCNC: 9 MMOL/L
AST SERPL-CCNC: 41 U/L
BACTERIA BLD CULT: NORMAL
BACTERIA BLD CULT: NORMAL
BASOPHILS # BLD AUTO: 0.02 K/UL
BASOPHILS NFR BLD: 0.3 %
BILIRUB SERPL-MCNC: 0.5 MG/DL
BUN SERPL-MCNC: 15 MG/DL
CALCIUM SERPL-MCNC: 9.3 MG/DL
CHLORIDE SERPL-SCNC: 103 MMOL/L
CO2 SERPL-SCNC: 23 MMOL/L
CREAT SERPL-MCNC: 1.3 MG/DL
DIFFERENTIAL METHOD: ABNORMAL
EOSINOPHIL # BLD AUTO: 0.3 K/UL
EOSINOPHIL NFR BLD: 3.3 %
ERYTHROCYTE [DISTWIDTH] IN BLOOD BY AUTOMATED COUNT: 15 %
EST. GFR  (AFRICAN AMERICAN): >60 ML/MIN/1.73 M^2
EST. GFR  (NON AFRICAN AMERICAN): >60 ML/MIN/1.73 M^2
GLUCOSE SERPL-MCNC: 113 MG/DL
HCT VFR BLD AUTO: 27.6 %
HGB BLD-MCNC: 9.3 G/DL
LYMPHOCYTES # BLD AUTO: 1.8 K/UL
LYMPHOCYTES NFR BLD: 22.5 %
MAGNESIUM SERPL-MCNC: 1.4 MG/DL
MCH RBC QN AUTO: 38.6 PG
MCHC RBC AUTO-ENTMCNC: 33.7 G/DL
MCV RBC AUTO: 115 FL
MONOCYTES # BLD AUTO: 1.3 K/UL
MONOCYTES NFR BLD: 16.6 %
NEUTROPHILS # BLD AUTO: 4.4 K/UL
NEUTROPHILS NFR BLD: 56 %
PHOSPHATE SERPL-MCNC: 3.7 MG/DL
PLATELET # BLD AUTO: 460 K/UL
PMV BLD AUTO: 9.8 FL
POTASSIUM SERPL-SCNC: 5 MMOL/L
PROT SERPL-MCNC: 7.9 G/DL
RBC # BLD AUTO: 2.41 M/UL
SODIUM SERPL-SCNC: 135 MMOL/L
WBC # BLD AUTO: 7.79 K/UL

## 2018-01-18 PROCEDURE — 94761 N-INVAS EAR/PLS OXIMETRY MLT: CPT

## 2018-01-18 PROCEDURE — 25000003 PHARM REV CODE 250: Performed by: HOSPITALIST

## 2018-01-18 PROCEDURE — 94640 AIRWAY INHALATION TREATMENT: CPT

## 2018-01-18 PROCEDURE — 97530 THERAPEUTIC ACTIVITIES: CPT

## 2018-01-18 PROCEDURE — 97535 SELF CARE MNGMENT TRAINING: CPT

## 2018-01-18 PROCEDURE — 36415 COLL VENOUS BLD VENIPUNCTURE: CPT

## 2018-01-18 PROCEDURE — 84100 ASSAY OF PHOSPHORUS: CPT

## 2018-01-18 PROCEDURE — 99900035 HC TECH TIME PER 15 MIN (STAT)

## 2018-01-18 PROCEDURE — 97116 GAIT TRAINING THERAPY: CPT

## 2018-01-18 PROCEDURE — 25000242 PHARM REV CODE 250 ALT 637 W/ HCPCS: Performed by: INTERNAL MEDICINE

## 2018-01-18 PROCEDURE — 63600175 PHARM REV CODE 636 W HCPCS: Performed by: NURSE PRACTITIONER

## 2018-01-18 PROCEDURE — 63600175 PHARM REV CODE 636 W HCPCS: Performed by: HOSPITALIST

## 2018-01-18 PROCEDURE — 86580 TB INTRADERMAL TEST: CPT | Performed by: HOSPITALIST

## 2018-01-18 PROCEDURE — 25000003 PHARM REV CODE 250: Performed by: NURSE PRACTITIONER

## 2018-01-18 PROCEDURE — 11000001 HC ACUTE MED/SURG PRIVATE ROOM

## 2018-01-18 PROCEDURE — 83735 ASSAY OF MAGNESIUM: CPT

## 2018-01-18 PROCEDURE — 85025 COMPLETE CBC W/AUTO DIFF WBC: CPT

## 2018-01-18 PROCEDURE — 97110 THERAPEUTIC EXERCISES: CPT

## 2018-01-18 PROCEDURE — 80053 COMPREHEN METABOLIC PANEL: CPT

## 2018-01-18 PROCEDURE — 99233 SBSQ HOSP IP/OBS HIGH 50: CPT | Mod: ,,, | Performed by: HOSPITALIST

## 2018-01-18 RX ADMIN — GABAPENTIN 300 MG: 300 CAPSULE ORAL at 04:01

## 2018-01-18 RX ADMIN — ALLOPURINOL 300 MG: 300 TABLET ORAL at 08:01

## 2018-01-18 RX ADMIN — Medication 100 MG: at 08:01

## 2018-01-18 RX ADMIN — LEVETIRACETAM 500 MG: 500 TABLET ORAL at 08:01

## 2018-01-18 RX ADMIN — Medication 800 MG: at 08:01

## 2018-01-18 RX ADMIN — CARVEDILOL 3.12 MG: 3.12 TABLET, FILM COATED ORAL at 08:01

## 2018-01-18 RX ADMIN — IPRATROPIUM BROMIDE AND ALBUTEROL SULFATE 3 ML: .5; 3 SOLUTION RESPIRATORY (INHALATION) at 08:01

## 2018-01-18 RX ADMIN — GABAPENTIN 300 MG: 300 CAPSULE ORAL at 02:01

## 2018-01-18 RX ADMIN — DILTIAZEM HYDROCHLORIDE 180 MG: 180 CAPSULE, COATED, EXTENDED RELEASE ORAL at 08:01

## 2018-01-18 RX ADMIN — CIPROFLOXACIN 500 MG: 500 TABLET, FILM COATED ORAL at 08:01

## 2018-01-18 RX ADMIN — IPRATROPIUM BROMIDE AND ALBUTEROL SULFATE 3 ML: .5; 3 SOLUTION RESPIRATORY (INHALATION) at 07:01

## 2018-01-18 RX ADMIN — TUBERCULIN PURIFIED PROTEIN DERIVATIVE 5 UNITS: 5 INJECTION INTRADERMAL at 02:01

## 2018-01-18 RX ADMIN — ASPIRIN 81 MG: 81 TABLET, COATED ORAL at 08:01

## 2018-01-18 RX ADMIN — FOLIC ACID 1 MG: 1 TABLET ORAL at 08:01

## 2018-01-18 RX ADMIN — ENOXAPARIN SODIUM 40 MG: 100 INJECTION SUBCUTANEOUS at 05:01

## 2018-01-18 RX ADMIN — CARVEDILOL 3.12 MG: 3.12 TABLET, FILM COATED ORAL at 05:01

## 2018-01-18 RX ADMIN — GABAPENTIN 300 MG: 300 CAPSULE ORAL at 08:01

## 2018-01-18 NOTE — PLAN OF CARE
Problem: Patient Care Overview  Goal: Plan of Care Review  Outcome: Ongoing (interventions implemented as appropriate)  Rx tolerated well, chest seems clear.

## 2018-01-18 NOTE — PLAN OF CARE
Problem: Occupational Therapy Goal  Goal: Occupational Therapy Goal  Goals to be met by: 2/8/2018     Patient will increase functional independence with ADLs by performing:    UE Dressing with Minimal Assistance.  LE Dressing with Minimal Assistance.  Grooming while standing at sink with Contact Guard Assistance. -MET 1/15/18  *REVISED G/H standing at sink with SBA  Toileting from bedside commode with Minimal Assistance for hygiene and clothing management.   Supine to sit with Stand-by Assistance.  Stand pivot transfers with Minimal Assistance. (MET with RW use 1/10/18)  Toilet transfer to bedside commode with Minimal Assistance.-goal met 1/18/2018       Outcome: Ongoing (interventions implemented as appropriate)  Pt. Met 1 goal today.  Making progress. Needs SNF. Continue with OT POC.

## 2018-01-18 NOTE — PROGRESS NOTES
[facility] Hospital Medicine  Progress Note    Patient Name: Michael Johnson Jr.  MRN: [id number]  Patient Class: IP- Inpatient   Admission Date: 1/8/2018  Length of Stay: 8 days  Attending Physician: Jessica Skinner MD  Primary Care Provider: Zunilda Bolden NP        Subjective:     Principal Problem:Seizure    HPI:  Mr. Johnson is a 57 year old man who presented via EMS after having a seizure.  He was outside sitting down when the seizure was witnessed by his wife and son. His wife described a generalized tonic-clonic seizure that lasted about 4 minutes followed by a post-ictal state.  When paramedics arrived patient had a heart rate of 220 bpm en route. Paramedics gave 6 mg and 12 mg of adenosine without improvement.  His heart rate finally responded to diltiazem with a decrease in his HR to 109.  Patient's wife reported patient normally drinks a pint of vodka/day, but his last drink was 4 days previously.  He developed seizures about 2 months ago.  He did have an outpatient EEG recently, but it was normal.    Hospital Course:  Patient was found to have Enterococcus and Klebsiella in blood cultures on 1/11.  Both organisms were sensitive to ciprofloxacin, and he was started on Cipro.  Repeat cultures were negative.  ID was consulted to follow.  The source of positive cultures was thought to be from the gallbladder, and patient will need surgical follow up after the infection is fully treated with 2 weeks of outpatient oral ciprofloxacin.      Patient was treated for alcohol withdrawal with Librium while he was here, and he was no longer requiring it at discharge.  His last drink was nearly 2 weeks ago.  He was seen by a neurologist while here who recommended he continue on Keppra for seizure prevention as he will have a low threshold for further seizures.  Outpatient alcohol rehab is also recommended, although patient believes he will be able to continue to abstain on his own without  help.  We had extensive discussions regarding this and he will continue to be followed at [facility] when outpatient to have further evaluation.    2D echo showed pulmonary hypertension with PA pressure estimated to be 60 but with normal LVEF and no diastolic dysfunction.  He was seen by the cardiologist, who felt the mild troponin I elevation was due to demand/supply issues.  He recommended an outpatient stress test in the future.         Interval History:  No complaints.  We discussed the possibility of outpatient alcohol rehab and he was not particularly receptive, felt he could quit drinking without any problem.    Review of Systems   Constitutional: Negative for chills and fever.   Respiratory: Negative for cough and shortness of breath.    Cardiovascular: Negative for chest pain and palpitations.     Objective:     Vital Signs (Most Recent):  Temp: 98.5 °F (36.9 °C) (01/18/18 1126)  Pulse: 97 (01/18/18 1126)  Resp: 18 (01/18/18 1126)  BP: 91/61 (01/18/18 1126)  SpO2: 98 % (01/18/18 1126) Vital Signs (24h Range):  Temp:  [98.2 °F (36.8 °C)-99.4 °F (37.4 °C)] 98.5 °F (36.9 °C)  Pulse:  [77-97] 97  Resp:  [16-20] 18  SpO2:  [95 %-99 %] 98 %  BP: ()/(59-76) 91/61     Weight: 90.9 kg (200 lb 6.4 oz)  Body mass index is 26.44 kg/m².    Intake/Output Summary (Last 24 hours) at 01/18/18 1635  Last data filed at 01/18/18 0500   Gross per 24 hour   Intake              120 ml   Output             1560 ml   Net            -1440 ml      Physical Exam   Constitutional: He is oriented to person, place, and time. He appears well-developed and well-nourished.   HENT:   Head: Normocephalic.   Eyes: Conjunctivae are normal. Pupils are equal, round, and reactive to light.   Neck: Neck supple. No thyromegaly present.   Cardiovascular: Normal rate, regular rhythm, normal heart sounds and intact distal pulses.  Exam reveals no gallop and no friction rub.    No murmur heard.  Pulmonary/Chest: Effort normal and breath sounds  normal.   Abdominal: Soft. Bowel sounds are normal. He exhibits no distension. There is no tenderness.   Musculoskeletal: Normal range of motion. He exhibits deformity. He exhibits no edema.   Left leg smaller than the right.   Lymphadenopathy:     He has no cervical adenopathy.   Neurological: He is alert and oriented to person, place, and time.   Strength equal and symmetric   Skin: Skin is warm and dry. No rash noted.   Psychiatric: He has a normal mood and affect. His behavior is normal. Thought content normal.       Significant Labs: All pertinent labs within the past 24 hours have been reviewed.    Significant Imaging: I have reviewed all pertinent imaging results/findings within the past 24 hours.    Assessment/Plan:      * Seizure    - Patient had witnessed seizure and subsequently had another in ER.  Believe due to alcohol withdrawal.   - OK to discontinue Librium  - Neurology recommended continuing Keppra twice daily and substance abuse treatment.          CKD (chronic kidney disease), stage III              Cholecystitis    See above        Essential hypertension    - On multiple oral meds as outpatient, including duplicates - now on low dose beta blocker and diltiazem.  - BP low normal.          Alcoholism    - As above - alcohol abuse treatment recommended as outpatient.          Troponin level elevated    - Noted in past   - Cardiology noted:  Troponin Rise              Most certainly due to demand/supply issues.              Would be reasonable to do Stress test as outpatient.              May need to be Regadenoson MPI as difficulty walking due to pain in foot.            Macrocytic anemia    - Expect this is due to alcohol abuse  - Continue folic acid supplement.        Hypomagnesemia    - Likely due to EtOH abuse.  - Continue IV and oral replacement as needed - much improved          Hyperlipidemia    - Lipid profile okay.               VTE Risk Mitigation         Ordered     Medium Risk of VTE   Once      01/09/18 0006     enoxaparin injection 40 mg  Daily     Route:  Subcutaneous        01/09/18 0005              Jessica Redman MD  Department of Hospital Medicine   [facility]

## 2018-01-18 NOTE — PROGRESS NOTES
[facility]  Infectious Disease  Progress Note    Patient Name: Michael Johnson Jr.  MRN: [id number]  Admission Date: 1/8/2018  Length of Stay: 7 days  Attending Physician: Jessica Skinner MD  Primary Care Provider: Zunilda Bolden NP    Isolation Status: No active isolations  Assessment/Plan:      Gram-negative bacteremia    Treat with oral Cipro, as above.        Bacteremia due to Enterobacter species    Treat with oral Cipro, as above.        Cholecystitis    Treat with oral cipro 500 mg PO BID for at least 2 weeks. Follow up as outpatient. Will need surgery to remove GB as outpatient.            Anticipated Disposition: Home with oral Cipro for 2 weeks.    Thank you for your consult. I will sign off. Please contact us if you have any additional questions.    Mohit Rodriguez MD  Infectious Disease  [facility]    Subjective:     Principal Problem:Seizure    HPI: 58 yo male with history of alcohol use who presents with seizure. Patient admitted on 1/8 after seizure - wife stated that patient had stopped drinking 4 days earlier. Patient had fever on 1/11, blood cultures grew E. Cloacae and K.oxytoca. Placed on Zosyn empirically, now on Cipro. Fever and WBC improved, patient had no associated symptoms. Ultrasound of gallbladder showed cholecystitis.  Interval History: Doing well with therapy. No RUQ pain or fever.    Review of Systems   Constitutional: Negative.    HENT: Negative.    Gastrointestinal: Negative.    Genitourinary: Negative.    All other systems reviewed and are negative.    Objective:     Vital Signs (Most Recent):  Temp: 98.2 °F (36.8 °C) (01/17/18 1634)  Pulse: 88 (01/17/18 1634)  Resp: 16 (01/17/18 1634)  BP: 120/80 (01/17/18 1634)  SpO2: 97 % (01/17/18 1634) Vital Signs (24h Range):  Temp:  [97.7 °F (36.5 °C)-99.6 °F (37.6 °C)] 98.2 °F (36.8 °C)  Pulse:  [80-93] 88  Resp:  [15-20] 16  SpO2:  [94 %-99 %] 97 %  BP: (102-121)/(62-85) 120/80     Weight: 90.9  kg (200 lb 6.4 oz)  Body mass index is 26.44 kg/m².    Estimated Creatinine Clearance: 72.6 mL/min (based on SCr of 1.3 mg/dL).    Physical Exam   Constitutional: He is oriented to person, place, and time. He appears well-developed and well-nourished.   HENT:   Head: Normocephalic and atraumatic.   Eyes: Conjunctivae and EOM are normal. Pupils are equal, round, and reactive to light. No scleral icterus.   Cardiovascular: Normal rate, regular rhythm and normal heart sounds.    Pulmonary/Chest: Effort normal and breath sounds normal.   Abdominal: Soft. Bowel sounds are normal. He exhibits no distension. There is no tenderness. There is no guarding.   Musculoskeletal: Normal range of motion. He exhibits no edema.   Neurological: He is alert and oriented to person, place, and time.   Skin: Skin is warm and dry.   Nursing note and vitals reviewed.      Significant Labs:   Blood Culture:   Recent Labs  Lab 01/11/18  1550 01/13/18  0603 01/13/18  0604   LABBLOO Gram stain aer bottle: Gram negative rods   Gram stain rochelle bottle: Gram negative rods   Results called to and read back by:Linnette Chapman RN 01/12/2018  10:53  ENTEROBACTER CLOACAE COMPLEXFor susceptibility see order # [id number]  KLEBSIELLA OXYTOCAFor susceptibility see order # [id number]  Gram stain aer bottle: Gram negative rods   Results called to and read back by:Linnette Chapman RN 01/12/2018  10:52  Gram stain rochelle bottle: Gram negative rods   Positive results previously called 01/12/2018  ENTEROBACTER CLOACAE COMPLEX  KLEBSIELLA OXYTOCA No Growth to date  No Growth to date  No Growth to date  No Growth to date  No Growth to date No Growth to date  No Growth to date  No Growth to date  No Growth to date  No Growth to date       Significant Imaging: None

## 2018-01-18 NOTE — NURSING
Pt voiding without difficulty to urinal, tolerating diet well, up with walker, wife at bedside, able to make needs known; no needs at this time.      Bed in low locked position, call light within reach.  Will continue to monitor.

## 2018-01-18 NOTE — SUBJECTIVE & OBJECTIVE
Interval History:  No new complaints.  Has been participating with therapy and doing well.      Review of Systems   Constitutional: Negative for chills and fever.   Respiratory: Negative for cough and shortness of breath.    Cardiovascular: Negative for chest pain and palpitations.     Objective:     Vital Signs (Most Recent):  Temp: 98.2 °F (36.8 °C) (01/17/18 1634)  Pulse: 88 (01/17/18 1634)  Resp: 16 (01/17/18 1634)  BP: 120/80 (01/17/18 1634)  SpO2: 97 % (01/17/18 1634) Vital Signs (24h Range):  Temp:  [97.7 °F (36.5 °C)-99.6 °F (37.6 °C)] 98.2 °F (36.8 °C)  Pulse:  [80-93] 88  Resp:  [15-20] 16  SpO2:  [94 %-99 %] 97 %  BP: (102-121)/(62-85) 120/80     Weight: 90.9 kg (200 lb 6.4 oz)  Body mass index is 26.44 kg/m².    Intake/Output Summary (Last 24 hours) at 01/17/18 1816  Last data filed at 01/17/18 1400   Gross per 24 hour   Intake              800 ml   Output             2400 ml   Net            -1600 ml      Physical Exam   Constitutional: He is oriented to person, place, and time. He appears well-developed and well-nourished.   HENT:   Head: Normocephalic.   Eyes: Conjunctivae are normal. Pupils are equal, round, and reactive to light.   Neck: Neck supple. No thyromegaly present.   Cardiovascular: Normal rate, regular rhythm, normal heart sounds and intact distal pulses.  Exam reveals no gallop and no friction rub.    No murmur heard.  Pulmonary/Chest: Effort normal and breath sounds normal.   Abdominal: Soft. Bowel sounds are normal. He exhibits no distension. There is no tenderness.   Musculoskeletal: Normal range of motion. He exhibits deformity. He exhibits no edema.   Left leg smaller than the right.   Lymphadenopathy:     He has no cervical adenopathy.   Neurological: He is alert and oriented to person, place, and time.   Strength equal and symmetric   Skin: Skin is warm and dry. No rash noted.   Psychiatric: He has a normal mood and affect. His behavior is normal. Thought content normal.        Significant Labs: All pertinent labs within the past 24 hours have been reviewed.    Significant Imaging: I have reviewed all pertinent imaging results/findings within the past 24 hours.

## 2018-01-18 NOTE — PLAN OF CARE
Problem: Patient Care Overview  Goal: Plan of Care Review  Outcome: Ongoing (interventions implemented as appropriate)  Patient has remained free of seizure-like activity so far. No problems with alcohol withdrawal noted. Patient has remained in bed during this shift after getting up from his chair at 7pm; turned and repositioned q2h; ambulates using walker during the day. Afebrile; taking Cipro PO for infection.

## 2018-01-18 NOTE — PT/OT/SLP PROGRESS
Physical Therapy Treatment    Patient Name:  Michael Johnson Jr.   MRN:  [id number]    Recommendations:     Discharge Recommendations:  nursing facility, skilled   Discharge Equipment Recommendations: walker, rolling   Barriers to discharge: None    Assessment:     Michael Johnson Jr. is a 57 y.o. male admitted with a medical diagnosis of Seizure.  He presents with the following impairments/functional limitations:  weakness, impaired endurance, impaired functional mobilty, gait instability, impaired balance, decreased lower extremity function, abnormal tone, decreased coordination, pain, impaired fine motor.  Good participation and progress in therapy.  Anticipate transfer to SNF today.     Rehab Prognosis:  excellent; patient would benefit from acute skilled PT services to address these deficits and reach maximum level of function.      Recent Surgery: * No surgery found *      Plan:     During this hospitalization, patient to be seen 6 x/week to address the above listed problems via gait training, therapeutic activities, therapeutic exercises  · Plan of Care Expires:  02/08/18   Plan of Care Reviewed with: patient, spouse    Subjective     Communicated with patient and patient prior to session.  Patient found in bed supine upon PT entry to room, agreeable to treatment.      Chief Complaint: L rib pain  Patient comments/goals: feeling better hoping to go to rehab  Pain/Comfort:  · Pain Rating 1: 5/10  · Location - Side 1: Left  · Location - Orientation 1: generalized  · Location 1: foot  · Pain Addressed 1: Pre-medicate for activity, Distraction  · Pain Rating Post-Intervention 1: 9/10 (pain increased with amb)  · Pain Rating 2: 6/10  · Location - Side 2: Left  · Location - Orientation 2: generalized  · Location 2: rib(s) (reports bruised ribs from fall)  · Pain Addressed 2: Pre-medicate for activity, Distraction  · Pain Rating Post-Intervention 2: 8/10    Patients cultural, spiritual, Islam conflicts given the  current situation: none mentioned    Objective:     Patient found with: peripheral IV     General Precautions: Standard, fall, seizure   Orthopedic Precautions:N/A   Braces: N/A     Functional Mobility:  · Bed Mobility:     · Supine to Sit: stand by assistance  · Transfers:     · Sit to Stand:  contact guard assistance with rolling walker  · Gait: amb 65 ' and 30' with RW CGA anterior lean and c/o L foor pain  · Balance: fair standing dynamic balance  · Stairs:  Pt ascended/descended 4 stair(s) with No Assistive Device with right handrail with Contact Guard Assistance.       AM-PAC 6 CLICK MOBILITY  Turning over in bed (including adjusting bedclothes, sheets and blankets)?: 3  Sitting down on and standing up from a chair with arms (e.g., wheelchair, bedside commode, etc.): 3  Moving from lying on back to sitting on the side of the bed?: 3  Moving to and from a bed to a chair (including a wheelchair)?: 3  Need to walk in hospital room?: 3  Climbing 3-5 steps with a railing?: 3  Total Score: 18       Therapeutic Activities and Exercises:   in  chair: B slr x 15; QS x 20; GS x 10    Patient left up in chair with all lines intact, call button in reach, nurse notified and wife present..    GOALS:    Physical Therapy Goals        Problem: Physical Therapy Goal    Goal Priority Disciplines Outcome Goal Variances Interventions   Physical Therapy Goal     PT/OT, PT Ongoing (interventions implemented as appropriate)     Description:  Goals to be met by: 18     Patient will increase functional independence with mobility by performin. Supine to sit mod I  2. Sit to supine mod I   3. Sit to stand transfer mod I  4. Gait  x 150' feet with Contact Guard Assistance using Rolling Walker.    5. Ascend/descend 5 stair with right Handrails mod I.    NEW GOALS from 18    Patient will increase functional independence with mobility by performin. Supine to sit with SBA MET 18  2. Sit to supine with Stand-by  Assistance   3. Sit to stand transfer with contact guard Assistance  4. Gait  x 25' feet with Contact Guard Assistance using Rolling Walker.  MET 1/16/18  5. Ascend/descend 5 stair with right Handrails Minimal Assistance using crutch.                               Time Tracking:     PT Received On: 01/18/18  PT Start Time: 0956     PT Stop Time: 1034  PT Total Time (min): 38 min     Billable Minutes: Gait Training 15, Therapeutic Activity 10 and Therapeutic Exercise 15    Treatment Type: Treatment  PT/PTA: PT     PTA Visit Number: 5     Joshua Bianchi, PT  01/18/2018

## 2018-01-18 NOTE — PT/OT/SLP PROGRESS
Occupational Therapy   Treatment    Name: Michael Johnson Jr.  MRN: [id number]  Admitting Diagnosis:  Seizure       Recommendations:     Discharge Recommendations: nursing facility, skilled  Discharge Equipment Recommendations:  walker, rolling  Barriers to discharge:  Decreased caregiver support    Subjective     Communicated with: nurse prior to session.  Pain/Comfort:  · Pain Rating 1: 5/10  · Location - Side 1: Left  · Location - Orientation 1: generalized  · Location 1: ankle  · Pain Addressed 1: Pre-medicate for activity, Distraction  · Pain Rating Post-Intervention 1: 5/10    Objective:     Patient found with: bed alarm    General Precautions: Standard, fall, seizure   Orthopedic Precautions:N/A   Braces: N/A     Occupational Performance:    Bed Mobility:    · Patient completed Supine to Sit with stand by assistance  · Patient completed Sit to Supine with stand by assistance     Functional Mobility/Transfers:  · Patient completed Sit <> Stand Transfer with stand by assistance  with  rolling walker   · Patient completed Bed <> Chair Transfer using Step Transfer technique with stand by assistance with rolling walker  · Patient completed Toilet Transfer Step Transfer technique with stand by assistance with  bedside commode and rolling walker-1st trial CGA; 2nd trial SBA    Activities of Daily Living:  · Grooming: contact guard assistance standing for hand hygiene only 2/2 LE fatigue  · LB Dressing: minimum assistance socks after instructed pt to setup socks with cuff then anchor on great toe 2/2 limited reach to R foot needed min assist; SBA L foot with tech    Patient left HOB elevated with all lines intact, call button in reach, bed alarm on and nurse notified    AMPAC 6 Click:  AMPAC Total Score: 18    Treatment & Education:  Push up ex from BS chair CGA for hip/knee alignment 5 reps x 2 sets with increased effort only cleared minimally cleared buttocks from seat    Education:    Assessment:     Michael Johnson   is a 57 y.o. male with a medical diagnosis of Seizure.  He presents with Pt. Met 1 goal today.  Making progress. Needs SNF. Continue with OT POC.  Performance deficits affecting function are weakness, impaired functional mobilty, impaired balance, decreased lower extremity function, decreased safety awareness, pain, gait instability, impaired self care skills, impaired endurance, decreased coordination.      Rehab Prognosis:  Good; patient would benefit from acute skilled OT services to address these deficits and reach maximum level of function.       Plan:     Patient to be seen 5 x/week to address the above listed problems via self-care/home management, therapeutic activities, therapeutic exercises  · Plan of Care Expires: 01/08/18  · Plan of Care Reviewed with: parent    This Plan of care has been discussed with the patient who was involved in its development and understands and is in agreement with the identified goals and treatment plan    GOALS:    Occupational Therapy Goals        Problem: Occupational Therapy Goal    Goal Priority Disciplines Outcome Interventions   Occupational Therapy Goal     OT, PT/OT Ongoing (interventions implemented as appropriate)    Description:  Goals to be met by: 2/8/2018     Patient will increase functional independence with ADLs by performing:    UE Dressing with Minimal Assistance.  LE Dressing with Minimal Assistance.  Grooming while standing at sink with Contact Guard Assistance. -MET 1/15/18  *REVISED G/H standing at sink with SBA  Toileting from bedside commode with Minimal Assistance for hygiene and clothing management.   Supine to sit with Stand-by Assistance.  Stand pivot transfers with Minimal Assistance. (MET with RW use 1/10/18)  Toilet transfer to bedside commode with Minimal Assistance.-goal met 1/18/2018                         Time Tracking:     OT Date of Treatment: 01/18/18  OT Start Time: 1415  OT Stop Time: 1449  OT Total Time (min): 34 min    Billable  Minutes:Self Care/Home Management 10  Therapeutic Activity 24  Total Time 34    Vida Bansal OT  1/18/2018

## 2018-01-18 NOTE — SUBJECTIVE & OBJECTIVE
Interval History:  No complaints.  We discussed the possibility of outpatient alcohol rehab and he was not particularly receptive, felt he could quit drinking without any problem.    Review of Systems   Constitutional: Negative for chills and fever.   Respiratory: Negative for cough and shortness of breath.    Cardiovascular: Negative for chest pain and palpitations.     Objective:     Vital Signs (Most Recent):  Temp: 98.5 °F (36.9 °C) (01/18/18 1126)  Pulse: 97 (01/18/18 1126)  Resp: 18 (01/18/18 1126)  BP: 91/61 (01/18/18 1126)  SpO2: 98 % (01/18/18 1126) Vital Signs (24h Range):  Temp:  [98.2 °F (36.8 °C)-99.4 °F (37.4 °C)] 98.5 °F (36.9 °C)  Pulse:  [77-97] 97  Resp:  [16-20] 18  SpO2:  [95 %-99 %] 98 %  BP: ()/(59-76) 91/61     Weight: 90.9 kg (200 lb 6.4 oz)  Body mass index is 26.44 kg/m².    Intake/Output Summary (Last 24 hours) at 01/18/18 1635  Last data filed at 01/18/18 0500   Gross per 24 hour   Intake              120 ml   Output             1560 ml   Net            -1440 ml      Physical Exam   Constitutional: He is oriented to person, place, and time. He appears well-developed and well-nourished.   HENT:   Head: Normocephalic.   Eyes: Conjunctivae are normal. Pupils are equal, round, and reactive to light.   Neck: Neck supple. No thyromegaly present.   Cardiovascular: Normal rate, regular rhythm, normal heart sounds and intact distal pulses.  Exam reveals no gallop and no friction rub.    No murmur heard.  Pulmonary/Chest: Effort normal and breath sounds normal.   Abdominal: Soft. Bowel sounds are normal. He exhibits no distension. There is no tenderness.   Musculoskeletal: Normal range of motion. He exhibits deformity. He exhibits no edema.   Left leg smaller than the right.   Lymphadenopathy:     He has no cervical adenopathy.   Neurological: He is alert and oriented to person, place, and time.   Strength equal and symmetric   Skin: Skin is warm and dry. No rash noted.   Psychiatric: He has  a normal mood and affect. His behavior is normal. Thought content normal.       Significant Labs: All pertinent labs within the past 24 hours have been reviewed.    Significant Imaging: I have reviewed all pertinent imaging results/findings within the past 24 hours.

## 2018-01-18 NOTE — PLAN OF CARE
Problem: Physical Therapy Goal  Goal: Physical Therapy Goal  Goals to be met by: 18     Patient will increase functional independence with mobility by performin. Supine to sit mod I  2. Sit to supine mod I   3. Sit to stand transfer mod I  4. Gait  x 150' feet with Contact Guard Assistance using Rolling Walker.    5. Ascend/descend 5 stair with right Handrails mod I.    NEW GOALS from 18    Patient will increase functional independence with mobility by performin. Supine to sit with SBA MET 18  2. Sit to supine with Stand-by Assistance   3. Sit to stand transfer with contact guard Assistance  4. Gait  x 25' feet with Contact Guard Assistance using Rolling Walker.  MET 18  5. Ascend/descend 5 stair with right Handrails Minimal Assistance using crutch.             -    Comments: Pt with good participation and progress today.  Anticipate transfer to SNF today.

## 2018-01-18 NOTE — SUBJECTIVE & OBJECTIVE
Interval History: Doing well with therapy. No RUQ pain or fever.    Review of Systems   Constitutional: Negative.    HENT: Negative.    Gastrointestinal: Negative.    Genitourinary: Negative.    All other systems reviewed and are negative.    Objective:     Vital Signs (Most Recent):  Temp: 98.2 °F (36.8 °C) (01/17/18 1634)  Pulse: 88 (01/17/18 1634)  Resp: 16 (01/17/18 1634)  BP: 120/80 (01/17/18 1634)  SpO2: 97 % (01/17/18 1634) Vital Signs (24h Range):  Temp:  [97.7 °F (36.5 °C)-99.6 °F (37.6 °C)] 98.2 °F (36.8 °C)  Pulse:  [80-93] 88  Resp:  [15-20] 16  SpO2:  [94 %-99 %] 97 %  BP: (102-121)/(62-85) 120/80     Weight: 90.9 kg (200 lb 6.4 oz)  Body mass index is 26.44 kg/m².    Estimated Creatinine Clearance: 72.6 mL/min (based on SCr of 1.3 mg/dL).    Physical Exam   Constitutional: He is oriented to person, place, and time. He appears well-developed and well-nourished.   HENT:   Head: Normocephalic and atraumatic.   Eyes: Conjunctivae and EOM are normal. Pupils are equal, round, and reactive to light. No scleral icterus.   Cardiovascular: Normal rate, regular rhythm and normal heart sounds.    Pulmonary/Chest: Effort normal and breath sounds normal.   Abdominal: Soft. Bowel sounds are normal. He exhibits no distension. There is no tenderness. There is no guarding.   Musculoskeletal: Normal range of motion. He exhibits no edema.   Neurological: He is alert and oriented to person, place, and time.   Skin: Skin is warm and dry.   Nursing note and vitals reviewed.      Significant Labs:   Blood Culture:   Recent Labs  Lab 01/11/18  1550 01/13/18  0603 01/13/18  0604   LABBLOO Gram stain aer bottle: Gram negative rods   Gram stain rochelle bottle: Gram negative rods   Results called to and read back by:Linnette Chapman RN 01/12/2018  10:53  ENTEROBACTER CLOACAE COMPLEXFor susceptibility see order # [id number]  KLEBSIELLA OXYTOCAFor susceptibility see order # [id number]  Gram stain aer bottle: Gram negative rods    Results called to and read back by:Linnette Chapman RN 01/12/2018  10:52  Gram stain rochelle bottle: Gram negative rods   Positive results previously called 01/12/2018  ENTEROBACTER CLOACAE COMPLEX  KLEBSIELLA OXYTOCA No Growth to date  No Growth to date  No Growth to date  No Growth to date  No Growth to date No Growth to date  No Growth to date  No Growth to date  No Growth to date  No Growth to date       Significant Imaging: None

## 2018-01-18 NOTE — ASSESSMENT & PLAN NOTE
- Patient had witnessed seizure and subsequently had another in ER.  Believe due to alcohol withdrawal.   - OK to discontinue Librium  - Neurology recommended continuing Keppra twice daily and substance abuse treatment.

## 2018-01-18 NOTE — PROGRESS NOTES
[facility] Hospital Medicine  Progress Note    Patient Name: Michael Johnson Jr.  MRN: [id number]  Patient Class: IP- Inpatient   Admission Date: 1/8/2018  Length of Stay: 7 days  Attending Physician: Jessica Skinner MD  Primary Care Provider: Zunilda Bolden NP        Subjective:     Principal Problem:Seizure    HPI:  Mr. Johnson is a 57 year old man who presented via EMS after having a seizure.  He was outside sitting down when the seizure was witnessed by his wife and son. His wife described a generalized tonic-clonic seizure that lasted about 4 minutes followed by a post-ictal state.  When paramedics arrived patient had a heart rate of 220 bpm en route. Paramedics gave 6 mg and 12 mg of adenosine without improvement.  His heart rate finally responded to diltiazem with a decrease in his HR to 109.  Patient's wife reported patient normally drinks a pint of vodka/day, but his last drink was 4 days previously.  He developed seizures about 2 months ago.  He did have an outpatient EEG recently, but it was normal.    Hospital Course:  Patient was found to have Enterococcus and Klebsiella in blood cultures on 1/11.  Both organisms were sensitive to ciprofloxacin, and he was started on Cipro.  Repeat cultures were negative.  ID was consulted to follow.  The source of positive cultures was thought to be from the gallbladder, and patient will need surgical follow up after the infection is fully treated with 2 weeks of outpatient oral ciprofloxacin.      Patient was treated for alcohol withdrawal with Librium while he was here, and he was no longer requiring it at discharge.      2D echo showed pulmonary hypertension with PA pressure estimated to be 60 but with normal LVEF and no diastolic dysfunction.    Interval History:  No new complaints.  Has been participating with therapy and doing well.      Review of Systems   Constitutional: Negative for chills and fever.   Respiratory: Negative for cough  and shortness of breath.    Cardiovascular: Negative for chest pain and palpitations.     Objective:     Vital Signs (Most Recent):  Temp: 98.2 °F (36.8 °C) (01/17/18 1634)  Pulse: 88 (01/17/18 1634)  Resp: 16 (01/17/18 1634)  BP: 120/80 (01/17/18 1634)  SpO2: 97 % (01/17/18 1634) Vital Signs (24h Range):  Temp:  [97.7 °F (36.5 °C)-99.6 °F (37.6 °C)] 98.2 °F (36.8 °C)  Pulse:  [80-93] 88  Resp:  [15-20] 16  SpO2:  [94 %-99 %] 97 %  BP: (102-121)/(62-85) 120/80     Weight: 90.9 kg (200 lb 6.4 oz)  Body mass index is 26.44 kg/m².    Intake/Output Summary (Last 24 hours) at 01/17/18 1816  Last data filed at 01/17/18 1400   Gross per 24 hour   Intake              800 ml   Output             2400 ml   Net            -1600 ml      Physical Exam   Constitutional: He is oriented to person, place, and time. He appears well-developed and well-nourished.   HENT:   Head: Normocephalic.   Eyes: Conjunctivae are normal. Pupils are equal, round, and reactive to light.   Neck: Neck supple. No thyromegaly present.   Cardiovascular: Normal rate, regular rhythm, normal heart sounds and intact distal pulses.  Exam reveals no gallop and no friction rub.    No murmur heard.  Pulmonary/Chest: Effort normal and breath sounds normal.   Abdominal: Soft. Bowel sounds are normal. He exhibits no distension. There is no tenderness.   Musculoskeletal: Normal range of motion. He exhibits deformity. He exhibits no edema.   Left leg smaller than the right.   Lymphadenopathy:     He has no cervical adenopathy.   Neurological: He is alert and oriented to person, place, and time.   Strength equal and symmetric   Skin: Skin is warm and dry. No rash noted.   Psychiatric: He has a normal mood and affect. His behavior is normal. Thought content normal.       Significant Labs: All pertinent labs within the past 24 hours have been reviewed.    Significant Imaging: I have reviewed all pertinent imaging results/findings within the past 24  hours.    Assessment/Plan:      * Seizure    - Patient had witnessed seizure and subsequently had another in ER.  Believe due to alcohol withdrawal.   - OK to discontinue Librium  - Neurology recommended continuing Keppra twice daily and substance abuse treatment.          Cholecystitis, acute    - Cholecystitis with bacteremia (Klebsiella and Enterobacter), both sensitive to Cipro  - 2 weeks oral Cipro on discharge  - Follow up with surgery after treatment for cholecystectomy.          CKD (chronic kidney disease), stage III              Essential hypertension    - On multiple oral meds as outpatient, including duplicates - now on low dose beta blocker and diltiazem.  - BP low normal.          Alcoholism    - As above - alcohol abuse treatment recommended as outpatient.          Troponin level elevated    - Noted in past   - Cardiology noted:  Troponin Rise              Most certainly due to demand/supply issues.              Would be reasonable to do Stress test as outpatient.              May need to be Regadenoson MPI as difficulty walking due to pain in foot.            Macrocytic anemia    - Expect this is due to alcohol abuse  - Continue folic acid supplement.        Hypomagnesemia    - Likely due to EtOH abuse.  - Continue IV and oral replacement as needed - much improved          Hyperlipidemia    - Lipid profile okay.               VTE Risk Mitigation         Ordered     Medium Risk of VTE  Once      01/09/18 0006     enoxaparin injection 40 mg  Daily     Route:  Subcutaneous        01/09/18 0005              Jessica Redman MD  Department of Hospital Medicine   [facility]

## 2018-01-18 NOTE — ASSESSMENT & PLAN NOTE
- Cholecystitis with bacteremia (Klebsiella and Enterobacter), both sensitive to Cipro  - 2 weeks oral Cipro on discharge  - Follow up with surgery after treatment for cholecystectomy.

## 2018-01-18 NOTE — PROGRESS NOTES
Pt received on RA;SPO2 97%. AM treatment was given. Pt refused 1400 treatment. No changes made. Will continue to monitor.

## 2018-01-19 LAB
ALBUMIN SERPL BCP-MCNC: 2.9 G/DL
ALP SERPL-CCNC: 112 U/L
ALT SERPL W/O P-5'-P-CCNC: 31 U/L
ANION GAP SERPL CALC-SCNC: 10 MMOL/L
AST SERPL-CCNC: 31 U/L
BASOPHILS # BLD AUTO: 0.04 K/UL
BASOPHILS NFR BLD: 0.5 %
BILIRUB SERPL-MCNC: 0.5 MG/DL
BUN SERPL-MCNC: 18 MG/DL
CALCIUM SERPL-MCNC: 9.8 MG/DL
CHLORIDE SERPL-SCNC: 101 MMOL/L
CO2 SERPL-SCNC: 22 MMOL/L
CREAT SERPL-MCNC: 1.3 MG/DL
DIFFERENTIAL METHOD: ABNORMAL
EOSINOPHIL # BLD AUTO: 0.3 K/UL
EOSINOPHIL NFR BLD: 3 %
ERYTHROCYTE [DISTWIDTH] IN BLOOD BY AUTOMATED COUNT: 15.1 %
EST. GFR  (AFRICAN AMERICAN): >60 ML/MIN/1.73 M^2
EST. GFR  (NON AFRICAN AMERICAN): >60 ML/MIN/1.73 M^2
GLUCOSE SERPL-MCNC: 110 MG/DL
HCT VFR BLD AUTO: 28.3 %
HGB BLD-MCNC: 9.3 G/DL
LYMPHOCYTES # BLD AUTO: 1.9 K/UL
LYMPHOCYTES NFR BLD: 23 %
MAGNESIUM SERPL-MCNC: 1.6 MG/DL
MCH RBC QN AUTO: 37.8 PG
MCHC RBC AUTO-ENTMCNC: 32.9 G/DL
MCV RBC AUTO: 115 FL
MONOCYTES # BLD AUTO: 1.4 K/UL
MONOCYTES NFR BLD: 16.9 %
NEUTROPHILS # BLD AUTO: 4.5 K/UL
NEUTROPHILS NFR BLD: 54.7 %
PHOSPHATE SERPL-MCNC: 4.6 MG/DL
PLATELET # BLD AUTO: 547 K/UL
PMV BLD AUTO: 10.2 FL
POTASSIUM SERPL-SCNC: 4.8 MMOL/L
PROT SERPL-MCNC: 8 G/DL
RBC # BLD AUTO: 2.46 M/UL
SODIUM SERPL-SCNC: 133 MMOL/L
WBC # BLD AUTO: 8.22 K/UL

## 2018-01-19 PROCEDURE — 97116 GAIT TRAINING THERAPY: CPT

## 2018-01-19 PROCEDURE — 84100 ASSAY OF PHOSPHORUS: CPT

## 2018-01-19 PROCEDURE — 94761 N-INVAS EAR/PLS OXIMETRY MLT: CPT

## 2018-01-19 PROCEDURE — 36415 COLL VENOUS BLD VENIPUNCTURE: CPT

## 2018-01-19 PROCEDURE — 94799 UNLISTED PULMONARY SVC/PX: CPT

## 2018-01-19 PROCEDURE — 25000003 PHARM REV CODE 250: Performed by: HOSPITALIST

## 2018-01-19 PROCEDURE — 97535 SELF CARE MNGMENT TRAINING: CPT

## 2018-01-19 PROCEDURE — 11000001 HC ACUTE MED/SURG PRIVATE ROOM

## 2018-01-19 PROCEDURE — 99239 HOSP IP/OBS DSCHRG MGMT >30: CPT | Mod: ,,, | Performed by: HOSPITALIST

## 2018-01-19 PROCEDURE — 85025 COMPLETE CBC W/AUTO DIFF WBC: CPT

## 2018-01-19 PROCEDURE — 99900035 HC TECH TIME PER 15 MIN (STAT)

## 2018-01-19 PROCEDURE — 83735 ASSAY OF MAGNESIUM: CPT

## 2018-01-19 PROCEDURE — 80053 COMPREHEN METABOLIC PANEL: CPT

## 2018-01-19 PROCEDURE — 94640 AIRWAY INHALATION TREATMENT: CPT

## 2018-01-19 PROCEDURE — 25000242 PHARM REV CODE 250 ALT 637 W/ HCPCS: Performed by: INTERNAL MEDICINE

## 2018-01-19 PROCEDURE — 25000003 PHARM REV CODE 250: Performed by: NURSE PRACTITIONER

## 2018-01-19 PROCEDURE — 63600175 PHARM REV CODE 636 W HCPCS: Performed by: NURSE PRACTITIONER

## 2018-01-19 RX ORDER — DILTIAZEM HYDROCHLORIDE 120 MG/1
120 CAPSULE, COATED, EXTENDED RELEASE ORAL DAILY
Qty: 30 CAPSULE | Refills: 11
Start: 2018-01-20 | End: 2018-01-21 | Stop reason: HOSPADM

## 2018-01-19 RX ORDER — DILTIAZEM HYDROCHLORIDE 120 MG/1
120 CAPSULE, COATED, EXTENDED RELEASE ORAL DAILY
Status: DISCONTINUED | OUTPATIENT
Start: 2018-01-20 | End: 2018-01-21 | Stop reason: HOSPADM

## 2018-01-19 RX ADMIN — CIPROFLOXACIN 500 MG: 500 TABLET, FILM COATED ORAL at 09:01

## 2018-01-19 RX ADMIN — ENOXAPARIN SODIUM 40 MG: 100 INJECTION SUBCUTANEOUS at 04:01

## 2018-01-19 RX ADMIN — Medication 100 MG: at 09:01

## 2018-01-19 RX ADMIN — LEVETIRACETAM 500 MG: 500 TABLET ORAL at 08:01

## 2018-01-19 RX ADMIN — GABAPENTIN 300 MG: 300 CAPSULE ORAL at 09:01

## 2018-01-19 RX ADMIN — CARVEDILOL 3.12 MG: 3.12 TABLET, FILM COATED ORAL at 08:01

## 2018-01-19 RX ADMIN — CIPROFLOXACIN 500 MG: 500 TABLET, FILM COATED ORAL at 08:01

## 2018-01-19 RX ADMIN — IPRATROPIUM BROMIDE AND ALBUTEROL SULFATE 3 ML: .5; 3 SOLUTION RESPIRATORY (INHALATION) at 12:01

## 2018-01-19 RX ADMIN — Medication 800 MG: at 08:01

## 2018-01-19 RX ADMIN — FOLIC ACID 1 MG: 1 TABLET ORAL at 08:01

## 2018-01-19 RX ADMIN — ALLOPURINOL 300 MG: 300 TABLET ORAL at 08:01

## 2018-01-19 RX ADMIN — GABAPENTIN 300 MG: 300 CAPSULE ORAL at 03:01

## 2018-01-19 RX ADMIN — CARVEDILOL 3.12 MG: 3.12 TABLET, FILM COATED ORAL at 04:01

## 2018-01-19 RX ADMIN — IPRATROPIUM BROMIDE AND ALBUTEROL SULFATE 3 ML: .5; 3 SOLUTION RESPIRATORY (INHALATION) at 07:01

## 2018-01-19 RX ADMIN — IPRATROPIUM BROMIDE AND ALBUTEROL SULFATE 3 ML: .5; 3 SOLUTION RESPIRATORY (INHALATION) at 08:01

## 2018-01-19 RX ADMIN — Medication 800 MG: at 09:01

## 2018-01-19 RX ADMIN — GABAPENTIN 300 MG: 300 CAPSULE ORAL at 06:01

## 2018-01-19 RX ADMIN — DILTIAZEM HYDROCHLORIDE 180 MG: 180 CAPSULE, COATED, EXTENDED RELEASE ORAL at 08:01

## 2018-01-19 RX ADMIN — LEVETIRACETAM 500 MG: 500 TABLET ORAL at 09:01

## 2018-01-19 RX ADMIN — ASPIRIN 81 MG: 81 TABLET, COATED ORAL at 09:01

## 2018-01-19 NOTE — PLAN OF CARE
Progress Note     Patient Name: Michael Johnson Jr.  MRN: [id number]  Patient Class: IP- Inpatient     Admission Date: 1/8/2018  Length of Stay: 10 days  Attending Physician: Jessica Skinner MD  Primary Care Provider: Zunilda Bolden NP     Discharge Planning:  Chart reviewed  Care plan discussed  Discussed care plan with treatment team  Discussed care plan with the attending Dr Skinner  Mr Johnson is in agreement with [facility]-accepted today  Await [employer] authorization and spouse to complete consents  Anticpate transfer tomorrow (friday 1-19-18)

## 2018-01-19 NOTE — PLAN OF CARE
Problem: Patient Care Overview  Goal: Plan of Care Review  Outcome: Ongoing (interventions implemented as appropriate)  Patient on RA. Sats 98%. Tx given. Pt. in no distress, will continue to monitor.

## 2018-01-19 NOTE — PT/OT/SLP PROGRESS
Physical Therapy Treatment    Patient Name:  Michael Johnson Jr.   MRN:  [id number]    Recommendations:     Discharge Recommendations:  nursing facility, skilled   Discharge Equipment Recommendations: walker, rolling   Barriers to discharge: None    Assessment:     Michael Johnson Jr. is a 57 y.o. male admitted with a medical diagnosis of Seizure.  He presents with the following impairments/functional limitations:  weakness, impaired endurance, impaired sensation, impaired balance, gait instability, pain, impaired functional mobilty, decreased lower extremity function .  Good progress in therapy.  Pt limited by L ankle pain.    Rehab Prognosis:  excellent; patient would benefit from acute skilled PT services to address these deficits and reach maximum level of function.      Recent Surgery: * No surgery found *      Plan:     During this hospitalization, patient to be seen 6 x/week to address the above listed problems via gait training, therapeutic activities, therapeutic exercises  · Plan of Care Expires:  02/08/18   Plan of Care Reviewed with: patient    Subjective     Communicated with patient and wife prior to session.  Patient found in bed supine upon PT entry to room, agreeable to treatment.      Chief Complaint: L ankle pain  Patient comments/goals: feeling better  Pain/Comfort:  · Pain Rating 1: 6/10  · Location - Side 1: Left  · Pain Addressed 1: Pre-medicate for activity, Distraction, Cessation of Activity  · Pain Rating Post-Intervention 1: 10/10  · Pain Rating 2: 5/10  · Location - Side 2: Left  · Location - Orientation 2: generalized  · Location 2: rib(s)  · Pain Addressed 2: Pre-medicate for activity, Distraction  · Pain Rating Post-Intervention 2: 7/10    Patients cultural, spiritual, Mosque conflicts given the current situation: no    Objective:     Patient found with: peripheral IV     General Precautions: Standard, fall, seizure   Orthopedic Precautions:N/A   Braces: N/A     Functional  Mobility:  · Bed Mobility:     · Supine to Sit: supervision  · Transfers:     · Sit to Stand:  supervision with rolling walker  · Gait: amb 100' x 2 with RW and supervision  · Balance: good standing dynamic balance  · Stairs:  Pt ascended/descended 5 stair(s) with No Assistive Device with left handrail with Supervision or Set-up Assistance.       AM-PAC 6 CLICK MOBILITY  Turning over in bed (including adjusting bedclothes, sheets and blankets)?: 4  Sitting down on and standing up from a chair with arms (e.g., wheelchair, bedside commode, etc.): 4  Moving from lying on back to sitting on the side of the bed?: 4  Moving to and from a bed to a chair (including a wheelchair)?: 3  Need to walk in hospital room?: 3  Climbing 3-5 steps with a railing?: 3  Total Score: 21       Patient left supine with all lines intact, call button in reach, bed alarm on, nurse notified and wife present..    GOALS:    Physical Therapy Goals        Problem: Physical Therapy Goal    Goal Priority Disciplines Outcome Goal Variances Interventions   Physical Therapy Goal     PT/OT, PT Ongoing (interventions implemented as appropriate)     Description:  Goals to be met by: 18     Patient will increase functional independence with mobility by performin. Supine to sit mod I  2. Sit to supine mod I   3. Sit to stand transfer mod I  4. Gait  x 150' feet with Contact Guard Assistance using Rolling Walker.    5. Ascend/descend 5 stair with right Handrails mod I.    NEW GOALS from 18    Patient will increase functional independence with mobility by performin. Supine to sit with SBA MET 18  2. Sit to supine with Stand-by Assistance   3. Sit to stand transfer with contact guard Assistance  4. Gait  x 25' feet with Contact Guard Assistance using Rolling Walker.  MET 18  5. Ascend/descend 5 stair with right Handrails Minimal Assistance using crutch.                               Time Tracking:     PT Received On:  01/19/18  PT Start Time: 1633     PT Stop Time: 1653  PT Total Time (min): 20 min     Billable Minutes: Gait Training 20    Treatment Type: Treatment  PT/PTA: PT     PTA Visit Number: 5     Joshua Bianchi PT  01/19/2018

## 2018-01-19 NOTE — PLAN OF CARE
01/18/18 1941   Discharge Reassessment   Assessment Type Discharge Planning Reassessment   Provided patient/caregiver education on the expected discharge date and the discharge plan Yes   Do you have any problems affording any of your prescribed medications? No   Discharge Plan A Skilled Nursing Facility   Patient choice form signed by patient/caregiver N/A   Can the patient answer the patient profile reliably? Yes, cognitively intact   How does the patient rate their overall health at the present time? Fair   Describe the patient's ability to walk at the present time. Minor restrictions or changes   How often would a person be available to care for the patient? Often   Number of comorbid conditions (as recorded on the chart) Two   During the past month, has the patient often been bothered by feeling down, depressed or hopeless? No   During the past month, has the patient often been bothered by little interest or pleasure in doing things? No

## 2018-01-19 NOTE — PLAN OF CARE
Problem: Physical Therapy Goal  Goal: Physical Therapy Goal  Goals to be met by: 18     Patient will increase functional independence with mobility by performin. Supine to sit mod I  2. Sit to supine mod I   3. Sit to stand transfer mod I  4. Gait  x 150' feet with Contact Guard Assistance using Rolling Walker.    5. Ascend/descend 5 stair with right Handrails mod I.    NEW GOALS from 18    Patient will increase functional independence with mobility by performin. Supine to sit with SBA MET 18  2. Sit to supine with Stand-by Assistance   3. Sit to stand transfer with contact guard Assistance  4. Gait  x 25' feet with Contact Guard Assistance using Rolling Walker.  MET 18  5. Ascend/descend 5 stair with right Handrails Minimal Assistance using crutch.              -    Comments: Good progress in therapy.

## 2018-01-19 NOTE — PLAN OF CARE
Attn: TEAM   DC PLANNING     [facility]# [id number]  WORKING ON DC PLAN BACK [facility] TODAY      REP @ [facility] # [phone] - LEFT VOICEMAIL PENDING CALL BACK---   PENDING AUTH PER [employer] MEDICARE INSURANCE FOR FINAL [facility]  APPROVAL     Gaby Garcia RN  Case management 1/19/20182:10 PM  # [phone] (FAX) [phone]     01/19/18 1410   Discharge Assessment   Assessment Type Discharge Planning Reassessment

## 2018-01-19 NOTE — PLAN OF CARE
Problem: Patient Care Overview  Goal: Plan of Care Review  Outcome: Ongoing (interventions implemented as appropriate)  Pt on RA with adequate saturations;aerosol tx given tolerated well.

## 2018-01-19 NOTE — PLAN OF CARE
Problem: Occupational Therapy Goal  Goal: Occupational Therapy Goal  Goals to be met by: 2/8/2018     Patient will increase functional independence with ADLs by performing:    UE Dressing with Minimal Assistance.(MET 1/18/18)  LE Dressing with Minimal Assistance. (MET 1/18/18)  Grooming while standing at sink with Contact Guard Assistance. -MET 1/15/18  *REVISED G/H standing at sink with SBA (MET 1/18/18)  Toileting from bedside commode with Minimal Assistance for hygiene and clothing management.   Supine to sit with Stand-by Assistance.  Stand pivot transfers with Minimal Assistance. (MET with RW use 1/10/18)  Toilet transfer to bedside commode with Minimal Assistance.-goal met 1/18/2018  Complete 4 sequential self-care tasks without a rest break.        Outcome: Ongoing (interventions implemented as appropriate)  He continues to be limited by Left trunk and Left foot pain.  He was Supervision for bed mobility, SBA with RW, Supervision G/H standing and sink and Supervision UBD/LBD during the session.  If SNF placement is not approved by Monday then discharge home with Home Health PT/OT or Out-Pt therapy if there is transportation.   AMANDA Hughes 1/19/2018

## 2018-01-19 NOTE — PT/OT/SLP PROGRESS
Occupational Therapy   Treatment    Name: Michael Johnson Jr.  MRN: [id number]  Admitting Diagnosis:  Seizure       Recommendations:     Discharge Recommendations: nursing facility, skilled  Discharge Equipment Recommendations:  walker, rolling  Barriers to discharge:  Decreased caregiver support    Subjective     Communicated with: patient prior to session.  He was found supine in bed agreeable to OT treatment.  Pain/Comfort:  · Pain Rating 1: 6/10  · Location - Side 1: Left  · Location - Orientation 1: generalized  · Location 1: ankle (left side of trunk)  · Pain Addressed 1: Pre-medicate for activity, Distraction, Cessation of Activity  · Pain Rating Post-Intervention 1: 8/10    Objective:     Patient found with: peripheral IV    General Precautions: Standard, fall, seizure   Orthopedic Precautions:N/A   Braces: N/A     Occupational Performance:    Bed Mobility:    · Supervision supine>sit EOB     Functional Mobility/Transfers:  · Supervision sit><stand with RW  · Supervision chair transfer with RW    Activities of Daily Living:  · Supervision G/H (brush teeth, wash face and hands) standing at sink with RW  · Supervision UBD (don hospital gown as robe) seating EOB  · Supervision LBD (doff/don socks) sitting EOB    Patient left up in chair with all lines intact, call button in reach and nurse notified    [facility] 6 Click:  [facility] Total Score: 19    Treatment & Education:  RW safety with standing ALS  Education:    Assessment:     Michael Johnson Jr. is a 57 y.o. male with a medical diagnosis of Seizure.  He presents with Performance deficits affecting function are weakness, impaired endurance, impaired self care skills, impaired sensation, gait instability, impaired balance, impaired functional mobilty, decreased lower extremity function, decreased safety awareness, decreased ROM, pain.effected performance during the session.  Continuation of OT treatment is needed to maximize function while in the hospital.     Rehab  Prognosis:  good; patient would benefit from acute skilled OT services to address these deficits and reach maximum level of function.       Plan:     Patient to be seen 5 x/week to address the above listed problems via self-care/home management, therapeutic activities, therapeutic exercises  · Plan of Care Expires: 02/08/18  · Plan of Care Reviewed with: patient    This Plan of care has been discussed with the patient who was involved in its development and understands and is in agreement with the identified goals and treatment plan    GOALS:    Occupational Therapy Goals        Problem: Occupational Therapy Goal    Goal Priority Disciplines Outcome Interventions   Occupational Therapy Goal     OT, PT/OT Ongoing (interventions implemented as appropriate)    Description:  Goals to be met by: 2/8/2018     Patient will increase functional independence with ADLs by performing:    UE Dressing with Minimal Assistance.(MET 1/18/18)  LE Dressing with Minimal Assistance. (MET 1/18/18)  Grooming while standing at sink with Contact Guard Assistance. -MET 1/15/18  *REVISED G/H standing at sink with SBA (MET 1/18/18)  Toileting from bedside commode with Minimal Assistance for hygiene and clothing management.   Supine to sit with Stand-by Assistance.  Stand pivot transfers with Minimal Assistance. (MET with RW use 1/10/18)  Toilet transfer to bedside commode with Minimal Assistance.-goal met 1/18/2018  Complete 4 sequential self-care tasks without a rest break.                          Time Tracking:     OT Date of Treatment: 01/19/18  OT Start Time: 1302  OT Stop Time: 1340  OT Total Time (min): 38 min    Billable Minutes:Self Care/Home Management 38    AMANDA Hughes  1/19/2018

## 2018-01-20 LAB
ALBUMIN SERPL BCP-MCNC: 2.9 G/DL
ALP SERPL-CCNC: 122 U/L
ALT SERPL W/O P-5'-P-CCNC: 28 U/L
ANION GAP SERPL CALC-SCNC: 11 MMOL/L
AST SERPL-CCNC: 30 U/L
BASOPHILS # BLD AUTO: 0.06 K/UL
BASOPHILS NFR BLD: 0.6 %
BILIRUB SERPL-MCNC: 0.3 MG/DL
BUN SERPL-MCNC: 20 MG/DL
CALCIUM SERPL-MCNC: 9.3 MG/DL
CHLORIDE SERPL-SCNC: 102 MMOL/L
CO2 SERPL-SCNC: 22 MMOL/L
CREAT SERPL-MCNC: 1.5 MG/DL
DIFFERENTIAL METHOD: ABNORMAL
EOSINOPHIL # BLD AUTO: 0.2 K/UL
EOSINOPHIL NFR BLD: 2.4 %
ERYTHROCYTE [DISTWIDTH] IN BLOOD BY AUTOMATED COUNT: 14.9 %
EST. GFR  (AFRICAN AMERICAN): 60 ML/MIN/1.73 M^2
EST. GFR  (NON AFRICAN AMERICAN): 52 ML/MIN/1.73 M^2
GLUCOSE SERPL-MCNC: 126 MG/DL
HCT VFR BLD AUTO: 26.9 %
HGB BLD-MCNC: 8.7 G/DL
LYMPHOCYTES # BLD AUTO: 2 K/UL
LYMPHOCYTES NFR BLD: 21.5 %
MAGNESIUM SERPL-MCNC: 1.6 MG/DL
MCH RBC QN AUTO: 37.2 PG
MCHC RBC AUTO-ENTMCNC: 32.3 G/DL
MCV RBC AUTO: 115 FL
MONOCYTES # BLD AUTO: 1.6 K/UL
MONOCYTES NFR BLD: 17 %
NEUTROPHILS # BLD AUTO: 5.3 K/UL
NEUTROPHILS NFR BLD: 56.8 %
PHOSPHATE SERPL-MCNC: 3.7 MG/DL
PLATELET # BLD AUTO: 563 K/UL
PMV BLD AUTO: 10.1 FL
POTASSIUM SERPL-SCNC: 4.4 MMOL/L
PROT SERPL-MCNC: 7.8 G/DL
RBC # BLD AUTO: 2.34 M/UL
SODIUM SERPL-SCNC: 135 MMOL/L
TB INDURATION 48 - 72 HR READ: 0 MM
WBC # BLD AUTO: 9.35 K/UL

## 2018-01-20 PROCEDURE — 83735 ASSAY OF MAGNESIUM: CPT

## 2018-01-20 PROCEDURE — 99900035 HC TECH TIME PER 15 MIN (STAT)

## 2018-01-20 PROCEDURE — 25000003 PHARM REV CODE 250: Performed by: NURSE PRACTITIONER

## 2018-01-20 PROCEDURE — 94799 UNLISTED PULMONARY SVC/PX: CPT

## 2018-01-20 PROCEDURE — 25000242 PHARM REV CODE 250 ALT 637 W/ HCPCS: Performed by: INTERNAL MEDICINE

## 2018-01-20 PROCEDURE — 25000242 PHARM REV CODE 250 ALT 637 W/ HCPCS: Performed by: HOSPITALIST

## 2018-01-20 PROCEDURE — 25000003 PHARM REV CODE 250: Performed by: HOSPITALIST

## 2018-01-20 PROCEDURE — 97116 GAIT TRAINING THERAPY: CPT

## 2018-01-20 PROCEDURE — 84100 ASSAY OF PHOSPHORUS: CPT

## 2018-01-20 PROCEDURE — 97530 THERAPEUTIC ACTIVITIES: CPT

## 2018-01-20 PROCEDURE — 11000001 HC ACUTE MED/SURG PRIVATE ROOM

## 2018-01-20 PROCEDURE — 85025 COMPLETE CBC W/AUTO DIFF WBC: CPT

## 2018-01-20 PROCEDURE — 94640 AIRWAY INHALATION TREATMENT: CPT

## 2018-01-20 PROCEDURE — 80053 COMPREHEN METABOLIC PANEL: CPT

## 2018-01-20 PROCEDURE — 36415 COLL VENOUS BLD VENIPUNCTURE: CPT

## 2018-01-20 PROCEDURE — 99232 SBSQ HOSP IP/OBS MODERATE 35: CPT | Mod: ,,, | Performed by: HOSPITALIST

## 2018-01-20 PROCEDURE — 63600175 PHARM REV CODE 636 W HCPCS: Performed by: NURSE PRACTITIONER

## 2018-01-20 RX ADMIN — CIPROFLOXACIN 500 MG: 500 TABLET, FILM COATED ORAL at 09:01

## 2018-01-20 RX ADMIN — CIPROFLOXACIN 500 MG: 500 TABLET, FILM COATED ORAL at 08:01

## 2018-01-20 RX ADMIN — IPRATROPIUM BROMIDE AND ALBUTEROL SULFATE 3 ML: .5; 3 SOLUTION RESPIRATORY (INHALATION) at 07:01

## 2018-01-20 RX ADMIN — IPRATROPIUM BROMIDE AND ALBUTEROL SULFATE 3 ML: .5; 3 SOLUTION RESPIRATORY (INHALATION) at 08:01

## 2018-01-20 RX ADMIN — CARVEDILOL 3.12 MG: 3.12 TABLET, FILM COATED ORAL at 08:01

## 2018-01-20 RX ADMIN — LEVETIRACETAM 500 MG: 500 TABLET ORAL at 08:01

## 2018-01-20 RX ADMIN — LEVETIRACETAM 500 MG: 500 TABLET ORAL at 09:01

## 2018-01-20 RX ADMIN — Medication 100 MG: at 08:01

## 2018-01-20 RX ADMIN — CARVEDILOL 3.12 MG: 3.12 TABLET, FILM COATED ORAL at 04:01

## 2018-01-20 RX ADMIN — Medication 800 MG: at 09:01

## 2018-01-20 RX ADMIN — ENOXAPARIN SODIUM 40 MG: 100 INJECTION SUBCUTANEOUS at 04:01

## 2018-01-20 RX ADMIN — IPRATROPIUM BROMIDE AND ALBUTEROL SULFATE 3 ML: .5; 3 SOLUTION RESPIRATORY (INHALATION) at 01:01

## 2018-01-20 RX ADMIN — FOLIC ACID 1 MG: 1 TABLET ORAL at 08:01

## 2018-01-20 RX ADMIN — DILTIAZEM HYDROCHLORIDE 120 MG: 120 CAPSULE, COATED, EXTENDED RELEASE ORAL at 08:01

## 2018-01-20 RX ADMIN — GABAPENTIN 300 MG: 300 CAPSULE ORAL at 09:01

## 2018-01-20 RX ADMIN — GABAPENTIN 300 MG: 300 CAPSULE ORAL at 01:01

## 2018-01-20 RX ADMIN — ASPIRIN 81 MG: 81 TABLET, COATED ORAL at 08:01

## 2018-01-20 RX ADMIN — ALLOPURINOL 300 MG: 300 TABLET ORAL at 08:01

## 2018-01-20 RX ADMIN — GABAPENTIN 300 MG: 300 CAPSULE ORAL at 05:01

## 2018-01-20 RX ADMIN — Medication 800 MG: at 08:01

## 2018-01-20 NOTE — NURSING
"C/o "itching" over arms/back. No rash noted. Lotion applied by wife. Instructed to notify nurse further itching.  "

## 2018-01-20 NOTE — PLAN OF CARE
Problem: Patient Care Overview  Goal: Plan of Care Review  Plan of care reviewed c pt today. Pt up with physical therapy and tolerating activity  c improvement. Continues to have weakness and  lightheadedness occasionally but states he feels steadier on feet today. Reinforced safety precautions. Pt voiding continent in urinal. Alert and oriented today. Had Bm. Denies pain. Dr Skinner here to see pt. Spouse at bedside.

## 2018-01-20 NOTE — PT/OT/SLP PROGRESS
"Physical Therapy Treatment    Patient Name:  Michael Johnson Jr.   MRN:  [id number]    Recommendations:     Discharge Recommendations:  nursing facility, skilled   Discharge Equipment Recommendations: walker, rolling   Barriers to discharge: Inaccessible home    Assessment:     Michael Johnson Jr. is a 57 y.o. male admitted with a medical diagnosis of Seizure.  He presents with the following impairments/functional limitations:  weakness, impaired endurance, impaired functional mobilty, gait instability, decreased lower extremity function, decreased ROM patient tolerated treatment session fairly well and increase with gait distance. Patient still presents with decrease endurance and strength. Patient would benefit from skilled PT services to increase strength, endurance and functional mobility.     Rehab Prognosis:  good; patient would benefit from acute skilled PT services to address these deficits and reach maximum level of function.      Recent Surgery: * No surgery found *      Plan:     During this hospitalization, patient to be seen 6 x/week to address the above listed problems via gait training, therapeutic activities, therapeutic exercises  · Plan of Care Expires:  02/08/18   Plan of Care Reviewed with: patient    Subjective     Communicated with nurse prior to session.  Patient found seated in bedside chair  upon PT entry to room, agreeable to treatment.      Chief Complaint: patient complained of slight pain in left foot   Patient comments/goals: patient stated " I want to keep walking and get stronger"   Pain/Comfort:  · Pain Rating 1: 2/10  · Location - Side 1: Left  · Location - Orientation 1: generalized  · Location 1: ankle  · Pain Addressed 1: Distraction, Cessation of Activity, Pre-medicate for activity  · Pain Rating Post-Intervention 1: 0/10    Patients cultural, spiritual, Sabianism conflicts given the current situation: no    Objective:     Patient found with: peripheral IV     General Precautions: " Standard, fall, seizure   Orthopedic Precautions:N/A   Braces: N/A     Functional Mobility:  · Sit to stand from bedside chair with rolling walker with SBA for safety.  · Stand to sit onto bedside chair with Rolling walker with SBA verbal cues for hand placement and control descent.   · Patient gait trained 100 feet X 2 trials with rolling walker with CGA for safety patient demo decrease step length, forward flex trunk, decrease clearance of bilateral feet. Patient required tactile cues for upright posture and one seated rest break.       AM-PAC 6 CLICK MOBILITY  Turning over in bed (including adjusting bedclothes, sheets and blankets)?: 4  Sitting down on and standing up from a chair with arms (e.g., wheelchair, bedside commode, etc.): 3  Moving from lying on back to sitting on the side of the bed?: 4  Moving to and from a bed to a chair (including a wheelchair)?: 3  Need to walk in hospital room?: 3  Climbing 3-5 steps with a railing?: 3  Total Score: 20       Therapeutic Activities and Exercises:  Ascend/descend 4 steps with R HR with CGA patient is wearing crocks which are slipping off of patient and very unsafe.     Patient left up in chair with all lines intact, call button in reach, nurse notified and patient  present..    GOALS:    Physical Therapy Goals        Problem: Physical Therapy Goal    Goal Priority Disciplines Outcome Goal Variances Interventions   Physical Therapy Goal     PT/OT, PT Ongoing (interventions implemented as appropriate)     Description:  Goals to be met by: 18     Patient will increase functional independence with mobility by performin. Supine to sit mod I  2. Sit to supine mod I   3. Sit to stand transfer mod I  4. Gait  x 150' feet with Contact Guard Assistance using Rolling Walker.    5. Ascend/descend 5 stair with right Handrails mod I.    NEW GOALS from 18    Patient will increase functional independence with mobility by performin. Supine to sit with SBA  MET 1/16/18  2. Sit to supine with Stand-by Assistance   3. Sit to stand transfer with contact guard Assistance  4. Gait  x 25' feet with Contact Guard Assistance using Rolling Walker.  MET 1/16/18  5. Ascend/descend 5 stair with right Handrails Minimal Assistance using crutch.                               Time Tracking:     PT Received On: 01/20/18  PT Start Time: 1330     PT Stop Time: 1410  PT Total Time (min): 40 min     Billable Minutes: Gait Training 15 and Therapeutic Activity 25    Treatment Type: Treatment  PT/PTA: PTA     PTA Visit Number: 1     Amy Muñoz, PTA  01/20/2018

## 2018-01-20 NOTE — PLAN OF CARE
Problem: Patient Care Overview  Goal: Plan of Care Review  Outcome: Ongoing (interventions implemented as appropriate)  Slept well. bp 97/52 this am; temp 98.8. Voiding clear yellow urine. C/o right wrist pain. No further c/o of itching.

## 2018-01-20 NOTE — PLAN OF CARE
Problem: Physical Therapy Goal  Goal: Physical Therapy Goal  Goals to be met by: 18     Patient will increase functional independence with mobility by performin. Supine to sit mod I  2. Sit to supine mod I   3. Sit to stand transfer mod I  4. Gait  x 150' feet with Contact Guard Assistance using Rolling Walker.    5. Ascend/descend 5 stair with right Handrails mod I.    NEW GOALS from 18    Patient will increase functional independence with mobility by performin. Supine to sit with SBA MET 18  2. Sit to supine with Stand-by Assistance   3. Sit to stand transfer with contact guard Assistance  4. Gait  x 25' feet with Contact Guard Assistance using Rolling Walker.  MET 18  5. Ascend/descend 5 stair with right Handrails Minimal Assistance using crutch.              Outcome: Ongoing (interventions implemented as appropriate)    Patient was highly motivated and participated well

## 2018-01-20 NOTE — SUBJECTIVE & OBJECTIVE
Interval History:  Doing well, waiting to hear from [employer].  Participating in PT.  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    Review of Systems   Constitutional: Negative for chills and fever.   Respiratory: Negative for cough and shortness of breath.    Cardiovascular: Negative for chest pain and palpitations.   Musculoskeletal: Positive for gait problem.        Right foot pain.     Objective:     Vital Signs (Most Recent):  Temp: 98.2 °F (36.8 °C) (01/20/18 1312)  Pulse: 94 (01/20/18 1352)  Resp: 18 (01/20/18 1352)  BP: (!) 101/56 (01/20/18 1312)  SpO2: 100 % (01/20/18 1312) Vital Signs (24h Range):  Temp:  [98 °F (36.7 °C)-99.6 °F (37.6 °C)] 98.2 °F (36.8 °C)  Pulse:  [] 94  Resp:  [18-22] 18  SpO2:  [95 %-100 %] 100 %  BP: ()/(52-70) 101/56     Weight: 90.9 kg (200 lb 6.4 oz)  Body mass index is 26.44 kg/m².    Intake/Output Summary (Last 24 hours) at 01/20/18 1520  Last data filed at 01/20/18 0800   Gross per 24 hour   Intake                0 ml   Output              750 ml   Net             -750 ml      Physical Exam   Constitutional: He is oriented to person, place, and time. He appears well-developed and well-nourished.   HENT:   Head: Normocephalic.   Eyes: Conjunctivae are normal. Pupils are equal, round, and reactive to light.   Neck: Neck supple. No thyromegaly present.   Cardiovascular: Normal rate, regular rhythm, normal heart sounds and intact distal pulses.  Exam reveals no gallop and no friction rub.    No murmur heard.  Pulmonary/Chest: Effort normal and breath sounds normal.   Abdominal: Soft. Bowel sounds are normal. He exhibits no distension. There is no tenderness.   Musculoskeletal: Normal range of motion. He exhibits deformity. He exhibits no edema.   Left leg smaller than the right.   Lymphadenopathy:     He has no cervical adenopathy.   Neurological: He is alert and oriented to person, place, and time.   Strength equal and symmetric   Skin: Skin is warm and dry. No  rash noted.   Psychiatric: He has a normal mood and affect. His behavior is normal. Thought content normal.       Significant Labs: All pertinent labs within the past 24 hours have been reviewed.    Significant Imaging: I have reviewed all pertinent imaging results/findings within the past 24 hours.

## 2018-01-20 NOTE — PROGRESS NOTES
[facility] Hospital Medicine  Progress Note    Patient Name: Michael Johnson Jr.  MRN: [id number]  Patient Class: IP- Inpatient   Admission Date: 1/8/2018  Length of Stay: 10 days  Attending Physician: Jessica Skinner MD  Primary Care Provider: Zunilda Bolden NP        Subjective:     Principal Problem:Seizure    HPI:  Mr. Johnson is a 57 year old man who presented via EMS after having a seizure.  He was outside sitting down when the seizure was witnessed by his wife and son. His wife described a generalized tonic-clonic seizure that lasted about 4 minutes followed by a post-ictal state.  When paramedics arrived patient had a heart rate of 220 bpm en route. Paramedics gave 6 mg and 12 mg of adenosine without improvement.  His heart rate finally responded to diltiazem with a decrease in his HR to 109.  Patient's wife reported patient normally drinks a pint of vodka/day, but his last drink was 4 days previously.  He developed seizures about 2 months ago.  He did have an outpatient EEG recently, but it was normal.    Hospital Course:  Patient was found to have Enterococcus and Klebsiella in blood cultures on 1/11.  Both organisms were sensitive to ciprofloxacin, and he was started on Cipro.  Repeat cultures were negative.  ID was consulted to follow.  The source of positive cultures was thought to be from the gallbladder, and patient will need surgical follow up after the infection is fully treated with 2 weeks of outpatient oral ciprofloxacin.      Patient was treated for alcohol withdrawal with Librium while he was here, and he was no longer requiring it at discharge.  His last drink was nearly 2 weeks ago.  He was seen by a neurologist while here who recommended he continue on Keppra for seizure prevention as he will have a low threshold for further seizures.  Outpatient alcohol rehab is also recommended, although patient believes he will be able to continue to abstain on his own without  help.  We had extensive discussions regarding this and he will continue to be followed at [facility] when outpatient to have further evaluation.    2D echo showed pulmonary hypertension with PA pressure estimated to be 60 but with normal LVEF and no diastolic dysfunction.  He was seen by the cardiologist, who felt the mild troponin I elevation was due to demand/supply issues.  He recommended an outpatient stress test in the future.         Interval History:  Doing well, waiting to hear from [employer].  Participating in PT.  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    Review of Systems   Constitutional: Negative for chills and fever.   Respiratory: Negative for cough and shortness of breath.    Cardiovascular: Negative for chest pain and palpitations.   Musculoskeletal: Positive for gait problem.        Right foot pain.     Objective:     Vital Signs (Most Recent):  Temp: 98.2 °F (36.8 °C) (01/20/18 1312)  Pulse: 94 (01/20/18 1352)  Resp: 18 (01/20/18 1352)  BP: (!) 101/56 (01/20/18 1312)  SpO2: 100 % (01/20/18 1312) Vital Signs (24h Range):  Temp:  [98 °F (36.7 °C)-99.6 °F (37.6 °C)] 98.2 °F (36.8 °C)  Pulse:  [] 94  Resp:  [18-22] 18  SpO2:  [95 %-100 %] 100 %  BP: ()/(52-70) 101/56     Weight: 90.9 kg (200 lb 6.4 oz)  Body mass index is 26.44 kg/m².    Intake/Output Summary (Last 24 hours) at 01/20/18 1520  Last data filed at 01/20/18 0800   Gross per 24 hour   Intake                0 ml   Output              750 ml   Net             -750 ml      Physical Exam   Constitutional: He is oriented to person, place, and time. He appears well-developed and well-nourished.   HENT:   Head: Normocephalic.   Eyes: Conjunctivae are normal. Pupils are equal, round, and reactive to light.   Neck: Neck supple. No thyromegaly present.   Cardiovascular: Normal rate, regular rhythm, normal heart sounds and intact distal pulses.  Exam reveals no gallop and no friction rub.    No murmur heard.  Pulmonary/Chest: Effort  normal and breath sounds normal.   Abdominal: Soft. Bowel sounds are normal. He exhibits no distension. There is no tenderness.   Musculoskeletal: Normal range of motion. He exhibits deformity. He exhibits no edema.   Left leg smaller than the right.   Lymphadenopathy:     He has no cervical adenopathy.   Neurological: He is alert and oriented to person, place, and time.   Strength equal and symmetric   Skin: Skin is warm and dry. No rash noted.   Psychiatric: He has a normal mood and affect. His behavior is normal. Thought content normal.       Significant Labs: All pertinent labs within the past 24 hours have been reviewed.    Significant Imaging: I have reviewed all pertinent imaging results/findings within the past 24 hours.    Assessment/Plan:      * Seizure    - Patient had witnessed seizure and subsequently had another in ER.  Believe due to alcohol withdrawal.   - OK to discontinue Librium  - Neurology recommended continuing Keppra twice daily and substance abuse treatment.          CKD (chronic kidney disease), stage III              Cholecystitis    See above        Essential hypertension    - On multiple oral meds as outpatient, including duplicates - now on low dose beta blocker and diltiazem.  - BP low normal.          Alcoholism    - As above - alcohol abuse treatment recommended as outpatient.          Troponin level elevated    - Noted in past   - Cardiology noted:  Troponin Rise              Most certainly due to demand/supply issues.              Would be reasonable to do Stress test as outpatient.              May need to be Regadenoson MPI as difficulty walking due to pain in foot.             VTE Risk Mitigation         Ordered     Medium Risk of VTE  Once      01/09/18 0006     enoxaparin injection 40 mg  Daily     Route:  Subcutaneous        01/09/18 0005              Jessica Redman MD  Department of Hospital Medicine   [facility]

## 2018-01-21 VITALS
WEIGHT: 200.38 LBS | BODY MASS INDEX: 26.56 KG/M2 | HEIGHT: 73 IN | RESPIRATION RATE: 18 BRPM | OXYGEN SATURATION: 98 % | DIASTOLIC BLOOD PRESSURE: 71 MMHG | TEMPERATURE: 98 F | SYSTOLIC BLOOD PRESSURE: 105 MMHG | HEART RATE: 87 BPM

## 2018-01-21 LAB
ALBUMIN SERPL BCP-MCNC: 2.8 G/DL
ALP SERPL-CCNC: 128 U/L
ALT SERPL W/O P-5'-P-CCNC: 25 U/L
ANION GAP SERPL CALC-SCNC: 10 MMOL/L
AST SERPL-CCNC: 25 U/L
BASOPHILS # BLD AUTO: 0.03 K/UL
BASOPHILS NFR BLD: 0.3 %
BILIRUB SERPL-MCNC: 0.3 MG/DL
BUN SERPL-MCNC: 20 MG/DL
CALCIUM SERPL-MCNC: 8.9 MG/DL
CHLORIDE SERPL-SCNC: 104 MMOL/L
CO2 SERPL-SCNC: 20 MMOL/L
CREAT SERPL-MCNC: 1.4 MG/DL
DIFFERENTIAL METHOD: ABNORMAL
EOSINOPHIL # BLD AUTO: 0.2 K/UL
EOSINOPHIL NFR BLD: 2 %
ERYTHROCYTE [DISTWIDTH] IN BLOOD BY AUTOMATED COUNT: 15 %
EST. GFR  (AFRICAN AMERICAN): >60 ML/MIN/1.73 M^2
EST. GFR  (NON AFRICAN AMERICAN): 56 ML/MIN/1.73 M^2
GLUCOSE SERPL-MCNC: 125 MG/DL
HCT VFR BLD AUTO: 26.6 %
HGB BLD-MCNC: 8.8 G/DL
LYMPHOCYTES # BLD AUTO: 2 K/UL
LYMPHOCYTES NFR BLD: 21.8 %
MAGNESIUM SERPL-MCNC: 1.6 MG/DL
MCH RBC QN AUTO: 37.8 PG
MCHC RBC AUTO-ENTMCNC: 33.1 G/DL
MCV RBC AUTO: 114 FL
MONOCYTES # BLD AUTO: 1.2 K/UL
MONOCYTES NFR BLD: 13.7 %
NEUTROPHILS # BLD AUTO: 5.4 K/UL
NEUTROPHILS NFR BLD: 60.5 %
PHOSPHATE SERPL-MCNC: 3.5 MG/DL
PLATELET # BLD AUTO: 526 K/UL
PMV BLD AUTO: 9.7 FL
POTASSIUM SERPL-SCNC: 4.6 MMOL/L
PROT SERPL-MCNC: 7.5 G/DL
RBC # BLD AUTO: 2.33 M/UL
SODIUM SERPL-SCNC: 134 MMOL/L
WBC # BLD AUTO: 8.96 K/UL

## 2018-01-21 PROCEDURE — 80053 COMPREHEN METABOLIC PANEL: CPT

## 2018-01-21 PROCEDURE — 25000003 PHARM REV CODE 250: Performed by: HOSPITALIST

## 2018-01-21 PROCEDURE — 84100 ASSAY OF PHOSPHORUS: CPT

## 2018-01-21 PROCEDURE — 25000003 PHARM REV CODE 250: Performed by: NURSE PRACTITIONER

## 2018-01-21 PROCEDURE — 94640 AIRWAY INHALATION TREATMENT: CPT

## 2018-01-21 PROCEDURE — 36415 COLL VENOUS BLD VENIPUNCTURE: CPT

## 2018-01-21 PROCEDURE — 83735 ASSAY OF MAGNESIUM: CPT

## 2018-01-21 PROCEDURE — 85025 COMPLETE CBC W/AUTO DIFF WBC: CPT

## 2018-01-21 PROCEDURE — 25000242 PHARM REV CODE 250 ALT 637 W/ HCPCS: Performed by: HOSPITALIST

## 2018-01-21 PROCEDURE — 99239 HOSP IP/OBS DSCHRG MGMT >30: CPT | Mod: ,,, | Performed by: HOSPITALIST

## 2018-01-21 RX ORDER — CIPROFLOXACIN 500 MG/1
500 TABLET ORAL EVERY 12 HOURS
Qty: 14 TABLET | Refills: 0 | Status: SHIPPED | OUTPATIENT
Start: 2018-01-21 | End: 2018-01-28

## 2018-01-21 RX ORDER — LEVETIRACETAM 500 MG/1
500 TABLET ORAL 2 TIMES DAILY
Qty: 60 TABLET | Refills: 3 | Status: SHIPPED | OUTPATIENT
Start: 2018-01-21 | End: 2019-04-02

## 2018-01-21 RX ORDER — METOPROLOL SUCCINATE 25 MG/1
25 TABLET, EXTENDED RELEASE ORAL DAILY
Qty: 30 TABLET | Refills: 3 | Status: ON HOLD | OUTPATIENT
Start: 2018-01-21 | End: 2019-04-04 | Stop reason: HOSPADM

## 2018-01-21 RX ADMIN — ASPIRIN 81 MG: 81 TABLET, COATED ORAL at 09:01

## 2018-01-21 RX ADMIN — Medication 100 MG: at 09:01

## 2018-01-21 RX ADMIN — CIPROFLOXACIN 500 MG: 500 TABLET, FILM COATED ORAL at 09:01

## 2018-01-21 RX ADMIN — CARVEDILOL 3.12 MG: 3.12 TABLET, FILM COATED ORAL at 09:01

## 2018-01-21 RX ADMIN — FOLIC ACID 1 MG: 1 TABLET ORAL at 09:01

## 2018-01-21 RX ADMIN — LEVETIRACETAM 500 MG: 500 TABLET ORAL at 09:01

## 2018-01-21 RX ADMIN — ALLOPURINOL 300 MG: 300 TABLET ORAL at 09:01

## 2018-01-21 RX ADMIN — IPRATROPIUM BROMIDE AND ALBUTEROL SULFATE 3 ML: .5; 3 SOLUTION RESPIRATORY (INHALATION) at 07:01

## 2018-01-21 RX ADMIN — GABAPENTIN 300 MG: 300 CAPSULE ORAL at 05:01

## 2018-01-21 RX ADMIN — DILTIAZEM HYDROCHLORIDE 120 MG: 120 CAPSULE, COATED, EXTENDED RELEASE ORAL at 09:01

## 2018-01-21 RX ADMIN — Medication 800 MG: at 09:01

## 2018-01-21 NOTE — PLAN OF CARE
Problem: Patient Care Overview  Goal: Plan of Care Review  Outcome: Outcome(s) achieved Date Met: 01/21/18  VU of DC instructions--paperwork passed & explained, scripts called to pharm per MD.  IV removed w/ cath tip intact, WNL. To be DCd home w/ family-- will be escorted downstairs via  transport team once dressed, ready & ride arrives. VSS on RA and afebrile. Tolerating ordered diet. Free from falls, injury, or skin breakdown this hospital admission.

## 2018-01-21 NOTE — PLAN OF CARE
Discharge planner met with Dr. Benjamin Re: discharge plans. Per MD, patient and spouse refused home health services at the time of discharge, as patient no longer meets SNF admitting criterion. Patient provided with the list of meetings for alcoholic anonymous of the [place] and surrounding areas. Patient and spouse express words of gratefulness for the information.

## 2018-01-21 NOTE — PLAN OF CARE
Problem: Patient Care Overview  Goal: Plan of Care Review  Outcome: Ongoing (interventions implemented as appropriate)  Received pt on RA; no distress noted. Tolerated tx well; will cont to monitor

## 2018-01-21 NOTE — PLAN OF CARE
Problem: Patient Care Overview  Goal: Plan of Care Review  Outcome: Ongoing (interventions implemented as appropriate)  Patient wants treatments PRN.  Left note for doctor.  Will continue to monitor.

## 2018-01-21 NOTE — PLAN OF CARE
Problem: Fall Risk (Adult)  Intervention: Safety Precautions  Pt remains free from falls. Vitals were stable throughout the night on room air. Positions self independently. No complaints of pain or nausea. Bed in low position and call light within reach. Will continue to monitor.

## 2018-01-21 NOTE — PROGRESS NOTES
Physical Therapy Discharge Summary    Name: Michael Johnson Jr.  MRN: [id number]   Principal Problem: Seizure     Patient Discharged from acute Physical Therapy on 18.  Please refer to prior PT noted date on 18 for functional status.     Assessment:     Patient appropriate for care in another setting.    Objective:     GOALS:    Physical Therapy Goals        Problem: Physical Therapy Goal    Goal Priority Disciplines Outcome Goal Variances Interventions   Physical Therapy Goal     PT/OT, PT Ongoing (interventions implemented as appropriate)     Description:  Goals to be met by: 18     Patient will increase functional independence with mobility by performin. Supine to sit mod I  2. Sit to supine mod I   3. Sit to stand transfer mod I  4. Gait  x 150' feet with Contact Guard Assistance using Rolling Walker.    5. Ascend/descend 5 stair with right Handrails mod I.    NEW GOALS from 18    Patient will increase functional independence with mobility by performin. Supine to sit with SBA MET 18  2. Sit to supine with Stand-by Assistance   3. Sit to stand transfer with contact guard Assistance  4. Gait  x 25' feet with Contact Guard Assistance using Rolling Walker.  MET 18  5. Ascend/descend 5 stair with right Handrails Minimal Assistance using crutch.                               Reasons for Discontinuation of Therapy Services  Transfer to alternate level of care.      Plan:     Patient Discharged to: recommended SNF pt d/c home and refused HH .    Joshua Bianchi, PT  2018

## 2018-01-22 NOTE — DISCHARGE SUMMARY
[facility] Hospital Medicine  Discharge Summary      Patient Name: Michael Johnson Jr.  MRN: [id number]  Admission Date: 1/8/2018  Hospital Length of Stay: 11 days  Discharge Date and Time: 1/21/2018  1:15 PM  Attending Physician: No att. providers found   Discharging Provider: Jessica Redman MD  Primary Care Provider: Zunilda Bolden NP      HPI:   Mr. Johnson is a 57 year old man who presented via EMS after having a seizure.  He was outside sitting down when the seizure was witnessed by his wife and son. His wife described a generalized tonic-clonic seizure that lasted about 4 minutes followed by a post-ictal state.  When paramedics arrived patient had a heart rate of 220 bpm en route. Paramedics gave 6 mg and 12 mg of adenosine without improvement.  His heart rate finally responded to diltiazem with a decrease in his HR to 109.  Patient's wife reported patient normally drinks a pint of vodka/day, but his last drink was 4 days previously.  He developed seizures about 2 months ago.  He did have an outpatient EEG recently, but it was normal.          Hospital Course:   Patient was found to have Enterococcus and Klebsiella in blood cultures on 1/11.  Both organisms were sensitive to ciprofloxacin, and he was started on Cipro.  Repeat cultures were negative.  ID was consulted to follow.  The source of positive cultures was thought to be from the gallbladder, and patient will need surgical follow up after the infection is fully treated with 2 weeks of outpatient oral ciprofloxacin.      Patient was treated for alcohol withdrawal with Librium while he was here, and he was no longer requiring it at discharge.  His last drink was nearly 2 weeks ago.  He was seen by a neurologist while here who recommended he continue on Keppra for seizure prevention as he will have a low threshold for further seizures.  Outpatient alcohol rehab is also recommended, although patient believes he will be able to  continue to abstain on his own without help.  We had extensive discussions regarding this and he will continue to be followed at [facility] when outpatient to have further evaluation.    2D echo showed pulmonary hypertension with PA pressure estimated to be 60 but with normal LVEF and no diastolic dysfunction.  His BP was quite low while here and many of his medicines were discontinued.  He was seen by the cardiologist, who felt the mild troponin I elevation was due to demand/supply issues.  He recommended an outpatient stress test in the future.  He should be seen by his PCP after discharge to have his blood pressure evaluated.    Patient was evaluated by PT and OT, and initially their recommendation was for the patient to complete a course of therapy in SNF.  He was able to walk >200 feet by 1/20 however, and he was discharged home with a recommendation for outpatient PT.  In addition the physical therapist recommended he be seen by an orthotics specialist for rocker-bottom shoes due to his foot problems.        Consults:   Consults         Status Ordering Provider     Inpatient consult to Cardiology  Once     Provider:  Camila Chavez MD    Completed JAVIER IQBAL     Inpatient consult to Infectious Diseases  Once     Provider:  Je Guerin MD    Completed JAVIER IQBAL     Inpatient consult to SNF  Once     Provider:  (Not yet assigned)    Completed JANUSZ ONEAL     Inpatient consult to Social Work/Case Management  Once     Provider:  (Not yet assigned)    Completed JAVIER IQBAL            Final Active Diagnoses:    Diagnosis Date Noted POA    PRINCIPAL PROBLEM:  Seizure [R56.9] 03/16/2017 Yes    Alcohol abuse [F10.10]  Yes    CKD (chronic kidney disease), stage III [N18.3] 01/17/2018 Yes    Thrombocytopenia [D69.6] 01/17/2018 Yes    Bacteremia due to Enterobacter species [R78.81, B96.89] 01/17/2018 Yes    Gram-negative bacteremia [R78.81] 01/17/2018 Yes    Cholecystitis [K81.9] 01/15/2018 Yes     Essential hypertension [I10]  Yes    Macrocytic anemia [D53.9] 01/09/2018 Yes    Alcoholism [F10.20]  Yes    Hypomagnesemia [E83.42] 03/16/2017 Yes      Problems Resolved During this Admission:    Diagnosis Date Noted Date Resolved POA    Gram negative sepsis [A41.50] 01/13/2018 01/17/2018 Yes    Cholecystitis, acute [K81.0] 01/17/2018 01/17/2018 Yes    Hyponatremia [E87.1] 01/13/2018 01/16/2018 Yes    Rhabdomyolysis [M62.82] 01/09/2018 01/16/2018 Yes    Acute kidney injury [N17.9]  01/16/2018 Yes    Hypokalemia [E87.6] 10/20/2014 01/16/2018 Yes       Discharged Condition: stable    Disposition: Home or Self Care    Follow Up:  Follow-up Information     Kartik Veloz MD.    Specialty:  Neurology  Why:  Follow up for the next available appointment  Contact information:  [address]  [address]  [facility] [id number] [phone]             Zunilda Bolden NP.    Specialty:  Urgent Care  Why:  Follow up in 1-2 weeks  Contact information:  [address] [facility] [id number] [phone]             Joshua Singh Jr, MD.    Specialties:  Vascular Surgery, General Surgery  Why:  Follow up in 2-3 weeks to be evaluated for cholecystectomy  Contact information:  [address]\[address]  [address]  [facility] [id number] [phone]             Call [email or web address].    Contact information:  [employer] 7 DAYS A WEEK / 365 DAYS A YEAR  HOTLINE: [phone]  OFFICE: [phone]                 Patient Instructions:     Ambulatory Referral to Physical/Occupational Therapy   Referral Priority: Routine Referral Type: Physical Medicine   Referral Reason: Specialty Services Required    Requested Specialty: Physical Therapy    Number of Visits Requested: 1      Diet Adult Regular     Diet Adult Regular     Activity as tolerated     Activity as tolerated          Medications:  Reconciled Home Medications:   Discharge Medication List as of 1/21/2018 12:30 PM      CONTINUE  these medications which have CHANGED    Details   ciprofloxacin HCl (CIPRO) 500 MG tablet Take 1 tablet (500 mg total) by mouth every 12 (twelve) hours., Starting Sun 1/21/2018, Until Sun 1/28/2018, Normal      levETIRAcetam (KEPPRA) 500 MG Tab Take 1 tablet (500 mg total) by mouth 2 (two) times daily., Starting Sun 1/21/2018, Until Mon 1/21/2019, Normal      metoprolol succinate (TOPROL-XL) 25 MG 24 hr tablet Take 1 tablet (25 mg total) by mouth once daily., Starting Sun 1/21/2018, Until Mon 1/21/2019, Normal         CONTINUE these medications which have NOT CHANGED    Details   allopurinol (ZYLOPRIM) 300 MG tablet Take 300 mg by mouth once daily., Until Discontinued, Historical Med      aspirin (ECOTRIN) 81 MG EC tablet Take 81 mg by mouth once daily., Until Discontinued, Historical Med      gabapentin (NEURONTIN) 300 MG capsule Take 300 mg by mouth 3 (three) times daily., Historical Med         STOP taking these medications       diltiaZEM (DILACOR XR) 180 MG CDCR Comments:   Reason for Stopping:         acetaminophen (TYLENOL) 325 MG tablet Comments:   Reason for Stopping:         albuterol-ipratropium 2.5mg-0.5mg/3mL (DUO-NEB) 0.5 mg-3 mg(2.5 mg base)/3 mL nebulizer solution Comments:   Reason for Stopping:         amLODIPine (NORVASC) 10 MG tablet Comments:   Reason for Stopping:         carvedilol (COREG) 3.125 MG tablet Comments:   Reason for Stopping:         colchicine 0.6 mg tablet Comments:   Reason for Stopping:         diltiaZEM (CARDIZEM CD) 120 MG Cp24 Comments:   Reason for Stopping:         diphenhydrAMINE (BENADRYL) 25 mg capsule Comments:   Reason for Stopping:         folic acid (FOLVITE) 1 MG tablet Comments:   Reason for Stopping:         ibuprofen (ADVIL,MOTRIN) 200 MG tablet Comments:   Reason for Stopping:         magnesium oxide (MAG-OX) 400 mg tablet Comments:   Reason for Stopping:         potassium chloride (KLOR-CON) 20 mEq Pack Comments:   Reason for Stopping:         thiamine 100 MG  tablet Comments:   Reason for Stopping:         tramadol (ULTRAM) 50 mg tablet Comments:   Reason for Stopping:                 Time spent on the discharge of patient: >30 minutes  Patient was seen and examined on the date of discharge and determined to be suitable for discharge.         Jessica Redman MD  Department of Hospital Medicine  [facility]

## 2018-01-22 NOTE — PT/OT/SLP DISCHARGE
Occupational Therapy Discharge Summary    Michael Johnson Jr.  MRN: [id number]   Principal Problem: Seizure      Patient Discharged from acute Occupational Therapy on 1/21/2018.  Please refer to prior OT note dated 1/19/2018 for functional status.    Assessment:      Goals partially met.    Objective:     GOALS:    Occupational Therapy Goals        Problem: Occupational Therapy Goal    Goal Priority Disciplines Outcome Interventions   Occupational Therapy Goal     OT, PT/OT Ongoing (interventions implemented as appropriate)    Description:  Goals to be met by: 2/8/2018     Patient will increase functional independence with ADLs by performing:    UE Dressing with Minimal Assistance.(MET 1/18/18)  LE Dressing with Minimal Assistance. (MET 1/18/18)  Grooming while standing at sink with Contact Guard Assistance. -MET 1/15/18  *REVISED G/H standing at sink with SBA (MET 1/18/18)  Toileting from bedside commode with Minimal Assistance for hygiene and clothing management.   Supine to sit with Stand-by Assistance.  Stand pivot transfers with Minimal Assistance. (MET with RW use 1/10/18)  Toilet transfer to bedside commode with Minimal Assistance.-goal met 1/18/2018  Complete 4 sequential self-care tasks without a rest break.                          Reasons for Discontinuation of Therapy Services  Transfer to alternate level of care.      Plan:     Patient Discharged to: SNF recommended, however pt denied. CM offered HH therapy, however pt and wife refusing. Pt discharged home with family support.    Delmi Conteh, OTR/L  1/22/2018

## 2018-02-19 DIAGNOSIS — K82.9 GALLBLADDER DISEASE: Primary | ICD-10-CM

## 2018-02-27 ENCOUNTER — HOSPITAL ENCOUNTER (OUTPATIENT)
Dept: RADIOLOGY | Facility: OTHER | Age: 58
Discharge: HOME | End: 2018-02-27
Attending: SPECIALIST
Payer: MEDICARE

## 2018-02-27 DIAGNOSIS — K82.9 GALLBLADDER DISEASE: ICD-10-CM

## 2018-02-27 PROCEDURE — A9537 TC99M MEBROFENIN: HCPCS

## 2018-02-27 PROCEDURE — 78227 HEPATOBIL SYST IMAGE W/DRUG: CPT | Mod: 26,,, | Performed by: RADIOLOGY

## 2018-03-04 ENCOUNTER — NURSE TRIAGE (OUTPATIENT)
Dept: ADMINISTRATIVE | Facility: CLINIC | Age: 58
End: 2018-03-04

## 2018-03-04 NOTE — TELEPHONE ENCOUNTER
Caller inquiring about test results for 2/27. Informed him that I would send a message to the provider's office for when the office reopens tomorrow. Please contact at [phone]. Instructed to call back with any additional problems and/or concerns and he verbalized understanding.     Reason for Disposition   [1] Caller requesting NON-URGENT health information AND [2] PCP's office is the best resource    Protocols used: ST INFORMATION ONLY CALL-A-AH

## 2018-03-20 ENCOUNTER — HOSPITAL ENCOUNTER (OUTPATIENT)
Dept: PREADMISSION TESTING | Facility: OTHER | Age: 58
Discharge: HOME | End: 2018-03-20
Attending: SPECIALIST
Payer: MEDICARE

## 2018-03-20 ENCOUNTER — ANESTHESIA EVENT (OUTPATIENT)
Dept: SURGERY | Facility: OTHER | Age: 58
End: 2018-03-20
Payer: MEDICARE

## 2018-03-20 VITALS
DIASTOLIC BLOOD PRESSURE: 74 MMHG | HEIGHT: 72 IN | WEIGHT: 230 LBS | SYSTOLIC BLOOD PRESSURE: 113 MMHG | HEART RATE: 97 BPM | OXYGEN SATURATION: 96 % | TEMPERATURE: 99 F | BODY MASS INDEX: 31.15 KG/M2

## 2018-03-20 DIAGNOSIS — I48.91 ATRIAL FIBRILLATION: ICD-10-CM

## 2018-03-20 LAB
ANION GAP SERPL CALC-SCNC: 13 MMOL/L
BASOPHILS # BLD AUTO: 0.01 K/UL
BASOPHILS NFR BLD: 0.1 %
BUN SERPL-MCNC: 17 MG/DL
CALCIUM SERPL-MCNC: 9.7 MG/DL
CHLORIDE SERPL-SCNC: 98 MMOL/L
CO2 SERPL-SCNC: 26 MMOL/L
CREAT SERPL-MCNC: 1.2 MG/DL
DIFFERENTIAL METHOD: ABNORMAL
EOSINOPHIL # BLD AUTO: 0 K/UL
EOSINOPHIL NFR BLD: 0.4 %
ERYTHROCYTE [DISTWIDTH] IN BLOOD BY AUTOMATED COUNT: 17.1 %
EST. GFR  (AFRICAN AMERICAN): >60 ML/MIN/1.73 M^2
EST. GFR  (NON AFRICAN AMERICAN): >60 ML/MIN/1.73 M^2
GLUCOSE SERPL-MCNC: 103 MG/DL
HCT VFR BLD AUTO: 30.6 %
HGB BLD-MCNC: 10.1 G/DL
LYMPHOCYTES # BLD AUTO: 1.7 K/UL
LYMPHOCYTES NFR BLD: 18.2 %
MCH RBC QN AUTO: 34.7 PG
MCHC RBC AUTO-ENTMCNC: 33 G/DL
MCV RBC AUTO: 105 FL
MONOCYTES # BLD AUTO: 1.1 K/UL
MONOCYTES NFR BLD: 11.7 %
NEUTROPHILS # BLD AUTO: 6.6 K/UL
NEUTROPHILS NFR BLD: 69.3 %
PLATELET # BLD AUTO: 257 K/UL
PMV BLD AUTO: 11.2 FL
POTASSIUM SERPL-SCNC: 3.1 MMOL/L
RBC # BLD AUTO: 2.91 M/UL
SODIUM SERPL-SCNC: 137 MMOL/L
WBC # BLD AUTO: 9.5 K/UL

## 2018-03-20 PROCEDURE — 85025 COMPLETE CBC W/AUTO DIFF WBC: CPT

## 2018-03-20 PROCEDURE — 80048 BASIC METABOLIC PNL TOTAL CA: CPT

## 2018-03-20 PROCEDURE — 93005 ELECTROCARDIOGRAM TRACING: CPT

## 2018-03-20 PROCEDURE — 36415 COLL VENOUS BLD VENIPUNCTURE: CPT

## 2018-03-20 PROCEDURE — 93010 ELECTROCARDIOGRAM REPORT: CPT | Mod: ,,, | Performed by: INTERNAL MEDICINE

## 2018-03-20 RX ORDER — CARVEDILOL 25 MG/1
25 TABLET ORAL 2 TIMES DAILY
COMMUNITY
End: 2019-04-02

## 2018-03-20 RX ORDER — POTASSIUM CHLORIDE 1.5 G/1.58G
20 POWDER, FOR SOLUTION ORAL 2 TIMES DAILY
Status: ON HOLD | COMMUNITY
End: 2019-12-02 | Stop reason: SDUPTHER

## 2018-03-20 RX ORDER — MAGNESIUM 250 MG
1 TABLET ORAL DAILY
Status: ON HOLD | COMMUNITY
End: 2019-12-02 | Stop reason: SDUPTHER

## 2018-03-20 RX ORDER — TRAMADOL HYDROCHLORIDE 50 MG/1
50 TABLET ORAL EVERY 6 HOURS PRN
Status: ON HOLD | COMMUNITY
End: 2019-04-04 | Stop reason: HOSPADM

## 2018-03-20 RX ORDER — HYDROCODONE BITARTRATE AND ACETAMINOPHEN 5; 325 MG/1; MG/1
1 TABLET ORAL EVERY 6 HOURS PRN
Status: ON HOLD | COMMUNITY
End: 2019-12-02 | Stop reason: HOSPADM

## 2018-03-20 RX ORDER — MELOXICAM 7.5 MG/1
7.5 TABLET ORAL DAILY
Status: ON HOLD | COMMUNITY
End: 2019-04-04 | Stop reason: HOSPADM

## 2018-03-20 RX ORDER — TRAZODONE HYDROCHLORIDE 50 MG/1
50 TABLET ORAL NIGHTLY
Status: ON HOLD | COMMUNITY
End: 2019-04-04 | Stop reason: HOSPADM

## 2018-03-20 RX ORDER — SODIUM CHLORIDE, SODIUM LACTATE, POTASSIUM CHLORIDE, CALCIUM CHLORIDE 600; 310; 30; 20 MG/100ML; MG/100ML; MG/100ML; MG/100ML
INJECTION, SOLUTION INTRAVENOUS CONTINUOUS
Status: CANCELLED | OUTPATIENT
Start: 2018-03-20

## 2018-03-20 RX ORDER — FERROUS SULFATE 325(65) MG
325 TABLET, DELAYED RELEASE (ENTERIC COATED) ORAL DAILY
Status: ON HOLD | COMMUNITY
End: 2019-12-02 | Stop reason: SDUPTHER

## 2018-03-20 RX ORDER — FAMOTIDINE 20 MG/1
20 TABLET, FILM COATED ORAL
Status: CANCELLED | OUTPATIENT
Start: 2018-03-20 | End: 2018-03-20

## 2018-03-20 RX ORDER — DILTIAZEM HYDROCHLORIDE 180 MG/1
180 CAPSULE, EXTENDED RELEASE ORAL DAILY
Status: ON HOLD | COMMUNITY
End: 2019-12-02 | Stop reason: HOSPADM

## 2018-03-20 RX ORDER — ERGOCALCIFEROL 1.25 MG/1
50000 CAPSULE ORAL
COMMUNITY
End: 2020-07-06

## 2018-03-20 RX ORDER — COLCHICINE 0.6 MG/1
0.6 TABLET ORAL DAILY
Status: ON HOLD | COMMUNITY
End: 2019-04-04 | Stop reason: HOSPADM

## 2018-03-20 RX ORDER — AMLODIPINE BESYLATE 10 MG/1
10 TABLET ORAL DAILY
COMMUNITY
End: 2019-04-02

## 2018-03-20 RX ORDER — PREGABALIN 75 MG/1
150 CAPSULE ORAL
Status: ACTIVE | OUTPATIENT
Start: 2018-03-20 | End: 2018-03-20

## 2018-03-20 RX ORDER — DICLOFENAC SODIUM 75 MG/1
75 TABLET, DELAYED RELEASE ORAL
Status: ON HOLD | COMMUNITY
End: 2019-04-04 | Stop reason: HOSPADM

## 2018-03-20 NOTE — ANESTHESIA PREPROCEDURE EVALUATION
03/20/2018  Michael Johnson Jr. is a 57 y.o., male.    Anesthesia Evaluation    I have reviewed the Patient Summary Reports.    I have reviewed the Nursing Notes.   I have reviewed the Medications.     Review of Systems  Anesthesia Hx:  No problems with previous Anesthesia  Denies Family Hx of Anesthesia complications.   Denies Personal Hx of Anesthesia complications.   Social:  Non-Smoker, Alcohol Use ETOH abuse--detoxed in [facility] in January of this year.  Internist's office reports patient seeks narcotics which patient not given by them.   Hematology/Oncology:     Oncology Normal    -- Anemia:   Cardiovascular:   Hypertension Dysrhythmias atrial fibrillation Paroxysmal atrial fibrillation for years   Pulmonary:   Asthma asymptomatic No attack for years.    Renal/:   Chronic Renal Disease (Recent renal insuffciency, since normalized)    Hepatic/GI:   GERD    Musculoskeletal:  Musculoskeletal Normal    Neurological:   Seizures Last seizure [place] in January placed on Keppra. Some years before    Spastic L arm   Endocrine:  Endocrine Normal        Physical Exam  General:  Well nourished    Airway/Jaw/Neck:  Airway Findings: Mouth Opening: Normal Tongue: Normal  General Airway Assessment: Adult  Mallampati: II  TM Distance: Normal, at least 6 cm         Dental:  Dental Findings:              Anesthesia Plan  Type of Anesthesia, risks & benefits discussed:  Anesthesia Type:  general  Patient's Preference:   Intra-op Monitoring Plan: standard ASA monitors  Intra-op Monitoring Plan Comments:   Post Op Pain Control Plan: per primary service following discharge from PACU  Post Op Pain Control Plan Comments:   Induction:    Beta Blocker:         Informed Consent: Patient understands risks and agrees with Anesthesia plan.  Questions answered. Anesthesia consent signed with patient.  ASA Score: 3     Day of  Surgery Review of History & Physical:    H&P update referred to the surgeon.     Anesthesia Plan Notes:   CONCLUSIONS     1 - Concentric remodeling.     2 - Normal left ventricular systolic function (EF 60-65%).     3 - No wall motion abnormalities.     4 - Pulmonary hypertension. The estimated PA systolic pressure is 60 mmHg.    Addendum(LKR): labs in epic, HCT 30.6, K+ 3.1  Will have pt contact PCP for increase K dose, repeat day of surgery    Day of surgery update: K 4.4        Ready For Surgery From Anesthesia Perspective.

## 2018-03-20 NOTE — DISCHARGE INSTRUCTIONS
PRE-ADMIT TESTING -  [phone]    [address]  [facility]          Your surgery has been scheduled at [facility]. We are pleased to have the opportunity to serve you. For Further Information please call [phone].    On the day of surgery please report to the Information Desk on the 1st floor.    · CONTACT YOUR PHYSICIAN'S OFFICE THE DAY PRIOR TO YOUR SURGERY TO OBTAIN YOUR ARRIVAL TIME.     · The evening before surgery do not eat anything after 9 p.m. ( this includes hard candy, chewing gum and mints).  You may only have GATORADE, POWERADE AND WATER  from 9 p.m. until you leave your home.   DO NOT DRINK ANY LIQUIDS ON THE WAY TO THE HOSPITAL.      SPECIAL MEDICATION INSTRUCTIONS: TAKE medications checked off by the Anesthesiologist on your Medication List.    Angiogram Patients: Take medications as instructed by your physician, including aspirin.     Surgery Patients:    If you take ASPIRIN - Your PHYSICIAN/SURGEON will need to inform you IF/OR when you need to stop taking aspirin prior to your surgery.     Do Not take any medications containing IBUPROFEN.  Do Not Wear any make-up or dark nail polish   (especially eye make-up) to surgery. If you come to surgery with makeup on you will be required to remove the makeup or nail polish.    Do not shave your surgical area at least 5 days prior to your surgery. The surgical prep will be performed at the hospital according to Infection Control regulations.    Leave all valuables at home.   Do Not wear any jewelry or watches, including any metal in body piercings.  Contact Lens must be removed before surgery. Either do not wear the contact lens or bring a case and solution for storage.  Please bring a container for eyeglasses or dentures as required.  Bring any paperwork your physician has provided, such as consent forms,  history and physicals, doctor's orders, etc.   Bring comfortable clothes that are loose fitting to wear upon  discharge. Take into consideration the type of surgery being performed.  Maintain your diet as advised per your physician the day prior to surgery.      Adequate rest the night before surgery is advised.   Park in the Parking lot behind the hospital or in the [facility] across the street from the parking lot. Parking is complimentary.  If you will be discharged the same day as your procedure, please arrange for a responsible adult to drive you home or to accompany you if traveling by taxi.   YOU WILL NOT BE PERMITTED TO DRIVE OR TO LEAVE THE HOSPITAL ALONE AFTER SURGERY.   It is strongly recommended that you arrange for someone to remain with you for the first 24 hrs following your surgery.       Thank you for your cooperation.  The Staff of [facility].        Bathing Instructions                                                                 Please shower the evening before and morning of your procedure with    ANTIBACTERIAL SOAP. ( DIAL, etc )  Concentrate on the surgical area   for at least 3 minutes and rinse completely. Dry off as usual.   Do not use any deodorant, powder, body lotions, perfume, after shave or    cologne.

## 2018-03-21 NOTE — PRE ADMISSION SCREENING
"Lab reported to Dr. Mckeon. Potassium ordered morning of surgery.  Patient contacted and states he ran out of his potassium medication "some time ago".  Patient's Primary Provider, Zunilda Bolden NP called to inform and request refill for patient as soon as possible.  Request, labs and med list faxed to Primary Care Office.  "

## 2018-03-22 NOTE — PRE ADMISSION SCREENING
Phoned patient and informed that I spoke with Zunilda Bolden NP and she will call in refill for potassium.  Patient instructed to obtain and start taking as soon as possible.  Spoke with Ruthie in Dr. Singh's office to inform lab result and report from PCP office that patient is not compliant with meds or appts.  PCP also reports patient has Etoh and narcotic requesting issues with their office.

## 2018-03-27 ENCOUNTER — NURSE TRIAGE (OUTPATIENT)
Dept: ADMINISTRATIVE | Facility: CLINIC | Age: 58
End: 2018-03-27

## 2018-03-28 ENCOUNTER — HOSPITAL ENCOUNTER (OUTPATIENT)
Dept: PREADMISSION TESTING | Facility: OTHER | Age: 58
Discharge: HOME | End: 2018-03-28
Attending: SPECIALIST
Payer: MEDICARE

## 2018-03-28 DIAGNOSIS — E87.6 HYPOKALEMIA: ICD-10-CM

## 2018-03-28 LAB — POTASSIUM SERPL-SCNC: 4.4 MMOL/L

## 2018-03-28 PROCEDURE — 84132 ASSAY OF SERUM POTASSIUM: CPT

## 2018-03-28 PROCEDURE — 36415 COLL VENOUS BLD VENIPUNCTURE: CPT

## 2018-03-29 ENCOUNTER — ANESTHESIA (OUTPATIENT)
Dept: SURGERY | Facility: OTHER | Age: 58
End: 2018-03-29
Payer: MEDICARE

## 2018-03-29 ENCOUNTER — HOSPITAL ENCOUNTER (OUTPATIENT)
Facility: OTHER | Age: 58
Discharge: HOME | End: 2018-03-29
Attending: SPECIALIST | Admitting: SPECIALIST
Payer: MEDICARE

## 2018-03-29 VITALS
HEART RATE: 70 BPM | DIASTOLIC BLOOD PRESSURE: 69 MMHG | RESPIRATION RATE: 18 BRPM | BODY MASS INDEX: 31.15 KG/M2 | TEMPERATURE: 99 F | WEIGHT: 230 LBS | OXYGEN SATURATION: 98 % | SYSTOLIC BLOOD PRESSURE: 115 MMHG | HEIGHT: 72 IN

## 2018-03-29 DIAGNOSIS — K82.9 DISEASE OF GALLBLADDER: ICD-10-CM

## 2018-03-29 DIAGNOSIS — E87.6 HYPOKALEMIA: Primary | ICD-10-CM

## 2018-03-29 PROCEDURE — 25000003 PHARM REV CODE 250: Performed by: SPECIALIST

## 2018-03-29 PROCEDURE — 37000008 HC ANESTHESIA 1ST 15 MINUTES: Performed by: SPECIALIST

## 2018-03-29 PROCEDURE — 25000003 PHARM REV CODE 250: Performed by: NURSE ANESTHETIST, CERTIFIED REGISTERED

## 2018-03-29 PROCEDURE — 25000003 PHARM REV CODE 250: Performed by: ANESTHESIOLOGY

## 2018-03-29 PROCEDURE — 36000709 HC OR TIME LEV III EA ADD 15 MIN: Performed by: SPECIALIST

## 2018-03-29 PROCEDURE — 63600175 PHARM REV CODE 636 W HCPCS: Performed by: ANESTHESIOLOGY

## 2018-03-29 PROCEDURE — 37000009 HC ANESTHESIA EA ADD 15 MINS: Performed by: SPECIALIST

## 2018-03-29 PROCEDURE — 71000039 HC RECOVERY, EACH ADD'L HOUR: Performed by: SPECIALIST

## 2018-03-29 PROCEDURE — 88304 TISSUE EXAM BY PATHOLOGIST: CPT | Mod: 26,,, | Performed by: PATHOLOGY

## 2018-03-29 PROCEDURE — 63600175 PHARM REV CODE 636 W HCPCS: Performed by: NURSE ANESTHETIST, CERTIFIED REGISTERED

## 2018-03-29 PROCEDURE — 71000033 HC RECOVERY, INTIAL HOUR: Performed by: SPECIALIST

## 2018-03-29 PROCEDURE — 71000015 HC POSTOP RECOV 1ST HR: Performed by: SPECIALIST

## 2018-03-29 PROCEDURE — C1729 CATH, DRAINAGE: HCPCS | Performed by: SPECIALIST

## 2018-03-29 PROCEDURE — 88304 TISSUE EXAM BY PATHOLOGIST: CPT | Performed by: PATHOLOGY

## 2018-03-29 PROCEDURE — 27201423 OPTIME MED/SURG SUP & DEVICES STERILE SUPPLY: Performed by: SPECIALIST

## 2018-03-29 PROCEDURE — 71000016 HC POSTOP RECOV ADDL HR: Performed by: SPECIALIST

## 2018-03-29 PROCEDURE — 36000708 HC OR TIME LEV III 1ST 15 MIN: Performed by: SPECIALIST

## 2018-03-29 PROCEDURE — 51798 US URINE CAPACITY MEASURE: CPT | Performed by: SPECIALIST

## 2018-03-29 RX ORDER — HYDROMORPHONE HYDROCHLORIDE 2 MG/ML
0.4 INJECTION, SOLUTION INTRAMUSCULAR; INTRAVENOUS; SUBCUTANEOUS EVERY 5 MIN PRN
Status: DISCONTINUED | OUTPATIENT
Start: 2018-03-29 | End: 2018-03-29 | Stop reason: HOSPADM

## 2018-03-29 RX ORDER — FENTANYL CITRATE 50 UG/ML
25 INJECTION, SOLUTION INTRAMUSCULAR; INTRAVENOUS EVERY 5 MIN PRN
Status: COMPLETED | OUTPATIENT
Start: 2018-03-29 | End: 2018-03-29

## 2018-03-29 RX ORDER — METOPROLOL SUCCINATE 25 MG/1
25 TABLET, EXTENDED RELEASE ORAL ONCE
Status: COMPLETED | OUTPATIENT
Start: 2018-03-29 | End: 2018-03-29

## 2018-03-29 RX ORDER — ROCURONIUM BROMIDE 10 MG/ML
INJECTION, SOLUTION INTRAVENOUS
Status: DISCONTINUED | OUTPATIENT
Start: 2018-03-29 | End: 2018-03-29

## 2018-03-29 RX ORDER — FENTANYL CITRATE 50 UG/ML
INJECTION, SOLUTION INTRAMUSCULAR; INTRAVENOUS
Status: DISCONTINUED | OUTPATIENT
Start: 2018-03-29 | End: 2018-03-29

## 2018-03-29 RX ORDER — BUPIVACAINE HCL/EPINEPHRINE 0.25-.0005
VIAL (ML) INJECTION
Status: DISCONTINUED | OUTPATIENT
Start: 2018-03-29 | End: 2018-03-29 | Stop reason: HOSPADM

## 2018-03-29 RX ORDER — ONDANSETRON 8 MG/1
8 TABLET, ORALLY DISINTEGRATING ORAL EVERY 8 HOURS PRN
Status: DISCONTINUED | OUTPATIENT
Start: 2018-03-29 | End: 2018-03-29 | Stop reason: HOSPADM

## 2018-03-29 RX ORDER — ONDANSETRON 2 MG/ML
INJECTION INTRAMUSCULAR; INTRAVENOUS
Status: DISCONTINUED | OUTPATIENT
Start: 2018-03-29 | End: 2018-03-29

## 2018-03-29 RX ORDER — CEFAZOLIN SODIUM 1 G/3ML
2 INJECTION, POWDER, FOR SOLUTION INTRAMUSCULAR; INTRAVENOUS
Status: DISCONTINUED | OUTPATIENT
Start: 2018-03-29 | End: 2018-03-29 | Stop reason: HOSPADM

## 2018-03-29 RX ORDER — RAMELTEON 8 MG/1
8 TABLET ORAL NIGHTLY PRN
Status: DISCONTINUED | OUTPATIENT
Start: 2018-03-29 | End: 2018-03-29 | Stop reason: HOSPADM

## 2018-03-29 RX ORDER — DIPHENHYDRAMINE HYDROCHLORIDE 50 MG/ML
12.5 INJECTION INTRAMUSCULAR; INTRAVENOUS EVERY 30 MIN PRN
Status: DISCONTINUED | OUTPATIENT
Start: 2018-03-29 | End: 2018-03-29 | Stop reason: HOSPADM

## 2018-03-29 RX ORDER — DIPHENHYDRAMINE HYDROCHLORIDE 50 MG/ML
25 INJECTION INTRAMUSCULAR; INTRAVENOUS EVERY 4 HOURS PRN
Status: DISCONTINUED | OUTPATIENT
Start: 2018-03-29 | End: 2018-03-29 | Stop reason: HOSPADM

## 2018-03-29 RX ORDER — GLYCOPYRROLATE 0.2 MG/ML
INJECTION INTRAMUSCULAR; INTRAVENOUS
Status: DISCONTINUED | OUTPATIENT
Start: 2018-03-29 | End: 2018-03-29

## 2018-03-29 RX ORDER — ACETAMINOPHEN 10 MG/ML
INJECTION, SOLUTION INTRAVENOUS
Status: DISCONTINUED | OUTPATIENT
Start: 2018-03-29 | End: 2018-03-29

## 2018-03-29 RX ORDER — LIDOCAINE HCL/PF 100 MG/5ML
SYRINGE (ML) INTRAVENOUS
Status: DISCONTINUED | OUTPATIENT
Start: 2018-03-29 | End: 2018-03-29

## 2018-03-29 RX ORDER — HYDROCODONE BITARTRATE AND ACETAMINOPHEN 10; 325 MG/1; MG/1
1 TABLET ORAL EVERY 4 HOURS PRN
Status: DISCONTINUED | OUTPATIENT
Start: 2018-03-29 | End: 2018-03-29 | Stop reason: HOSPADM

## 2018-03-29 RX ORDER — ACETAMINOPHEN 325 MG/1
650 TABLET ORAL EVERY 8 HOURS PRN
Status: DISCONTINUED | OUTPATIENT
Start: 2018-03-29 | End: 2018-03-29 | Stop reason: HOSPADM

## 2018-03-29 RX ORDER — OXYCODONE HYDROCHLORIDE 5 MG/1
5 TABLET ORAL
Status: DISCONTINUED | OUTPATIENT
Start: 2018-03-29 | End: 2018-03-29 | Stop reason: HOSPADM

## 2018-03-29 RX ORDER — SODIUM CHLORIDE, SODIUM LACTATE, POTASSIUM CHLORIDE, CALCIUM CHLORIDE 600; 310; 30; 20 MG/100ML; MG/100ML; MG/100ML; MG/100ML
INJECTION, SOLUTION INTRAVENOUS CONTINUOUS
Status: DISCONTINUED | OUTPATIENT
Start: 2018-03-29 | End: 2018-03-29 | Stop reason: HOSPADM

## 2018-03-29 RX ORDER — DEXAMETHASONE SODIUM PHOSPHATE 4 MG/ML
INJECTION, SOLUTION INTRA-ARTICULAR; INTRALESIONAL; INTRAMUSCULAR; INTRAVENOUS; SOFT TISSUE
Status: DISCONTINUED | OUTPATIENT
Start: 2018-03-29 | End: 2018-03-29

## 2018-03-29 RX ORDER — HYDROCODONE BITARTRATE AND ACETAMINOPHEN 5; 325 MG/1; MG/1
1 TABLET ORAL EVERY 4 HOURS PRN
Status: DISCONTINUED | OUTPATIENT
Start: 2018-03-29 | End: 2018-03-29 | Stop reason: HOSPADM

## 2018-03-29 RX ORDER — SODIUM CHLORIDE 0.9 % (FLUSH) 0.9 %
3 SYRINGE (ML) INJECTION EVERY 8 HOURS
Status: DISCONTINUED | OUTPATIENT
Start: 2018-03-29 | End: 2018-03-29 | Stop reason: HOSPADM

## 2018-03-29 RX ORDER — PROPOFOL 10 MG/ML
VIAL (ML) INTRAVENOUS
Status: DISCONTINUED | OUTPATIENT
Start: 2018-03-29 | End: 2018-03-29

## 2018-03-29 RX ORDER — ONDANSETRON 2 MG/ML
4 INJECTION INTRAMUSCULAR; INTRAVENOUS EVERY 12 HOURS PRN
Status: DISCONTINUED | OUTPATIENT
Start: 2018-03-29 | End: 2018-03-29 | Stop reason: HOSPADM

## 2018-03-29 RX ORDER — ONDANSETRON 2 MG/ML
4 INJECTION INTRAMUSCULAR; INTRAVENOUS DAILY PRN
Status: DISCONTINUED | OUTPATIENT
Start: 2018-03-29 | End: 2018-03-29 | Stop reason: HOSPADM

## 2018-03-29 RX ORDER — HYDROCODONE BITARTRATE AND ACETAMINOPHEN 10; 325 MG/1; MG/1
1 TABLET ORAL EVERY 4 HOURS PRN
Qty: 40 TABLET | Refills: 0 | Status: ON HOLD | OUTPATIENT
Start: 2018-03-29 | End: 2019-04-04 | Stop reason: HOSPADM

## 2018-03-29 RX ORDER — MEPERIDINE HYDROCHLORIDE 50 MG/ML
12.5 INJECTION INTRAMUSCULAR; INTRAVENOUS; SUBCUTANEOUS ONCE AS NEEDED
Status: DISCONTINUED | OUTPATIENT
Start: 2018-03-29 | End: 2018-03-29 | Stop reason: HOSPADM

## 2018-03-29 RX ORDER — SODIUM CHLORIDE 0.9 % (FLUSH) 0.9 %
3 SYRINGE (ML) INJECTION
Status: DISCONTINUED | OUTPATIENT
Start: 2018-03-29 | End: 2018-03-29 | Stop reason: HOSPADM

## 2018-03-29 RX ORDER — MIDAZOLAM HYDROCHLORIDE 1 MG/ML
INJECTION INTRAMUSCULAR; INTRAVENOUS
Status: DISCONTINUED | OUTPATIENT
Start: 2018-03-29 | End: 2018-03-29

## 2018-03-29 RX ORDER — NEOSTIGMINE METHYLSULFATE 1 MG/ML
INJECTION, SOLUTION INTRAVENOUS
Status: DISCONTINUED | OUTPATIENT
Start: 2018-03-29 | End: 2018-03-29

## 2018-03-29 RX ORDER — FAMOTIDINE 20 MG/1
20 TABLET, FILM COATED ORAL
Status: COMPLETED | OUTPATIENT
Start: 2018-03-29 | End: 2018-03-29

## 2018-03-29 RX ADMIN — SODIUM CHLORIDE, SODIUM LACTATE, POTASSIUM CHLORIDE, AND CALCIUM CHLORIDE: 600; 310; 30; 20 INJECTION, SOLUTION INTRAVENOUS at 08:03

## 2018-03-29 RX ADMIN — METOPROLOL SUCCINATE 25 MG: 25 TABLET, EXTENDED RELEASE ORAL at 07:03

## 2018-03-29 RX ADMIN — ONDANSETRON 4 MG: 2 INJECTION INTRAMUSCULAR; INTRAVENOUS at 09:03

## 2018-03-29 RX ADMIN — FENTANYL CITRATE 25 MCG: 50 INJECTION INTRAMUSCULAR; INTRAVENOUS at 10:03

## 2018-03-29 RX ADMIN — HYDROMORPHONE HYDROCHLORIDE 0.4 MG: 2 INJECTION INTRAMUSCULAR; INTRAVENOUS; SUBCUTANEOUS at 11:03

## 2018-03-29 RX ADMIN — ROCURONIUM BROMIDE 40 MG: 10 INJECTION, SOLUTION INTRAVENOUS at 08:03

## 2018-03-29 RX ADMIN — FENTANYL CITRATE 100 MCG: 50 INJECTION, SOLUTION INTRAMUSCULAR; INTRAVENOUS at 08:03

## 2018-03-29 RX ADMIN — HYDROMORPHONE HYDROCHLORIDE 0.4 MG: 2 INJECTION INTRAMUSCULAR; INTRAVENOUS; SUBCUTANEOUS at 10:03

## 2018-03-29 RX ADMIN — DEXAMETHASONE SODIUM PHOSPHATE 8 MG: 4 INJECTION, SOLUTION INTRAMUSCULAR; INTRAVENOUS at 09:03

## 2018-03-29 RX ADMIN — Medication 15 MG: at 09:03

## 2018-03-29 RX ADMIN — GLYCOPYRROLATE 0.8 MG: 0.2 INJECTION, SOLUTION INTRAMUSCULAR; INTRAVENOUS at 09:03

## 2018-03-29 RX ADMIN — OXYCODONE HYDROCHLORIDE 5 MG: 5 TABLET ORAL at 10:03

## 2018-03-29 RX ADMIN — LIDOCAINE HYDROCHLORIDE 100 MG: 20 INJECTION, SOLUTION INTRAVENOUS at 08:03

## 2018-03-29 RX ADMIN — GLYCOPYRROLATE 0.2 MG: 0.2 INJECTION, SOLUTION INTRAMUSCULAR; INTRAVENOUS at 09:03

## 2018-03-29 RX ADMIN — CARBOXYMETHYLCELLULOSE SODIUM 2 DROP: 2.5 SOLUTION/ DROPS OPHTHALMIC at 08:03

## 2018-03-29 RX ADMIN — PROPOFOL 200 MG: 10 INJECTION, EMULSION INTRAVENOUS at 08:03

## 2018-03-29 RX ADMIN — FAMOTIDINE 20 MG: 20 TABLET, FILM COATED ORAL at 07:03

## 2018-03-29 RX ADMIN — ACETAMINOPHEN 1000 MG: 10 INJECTION, SOLUTION INTRAVENOUS at 09:03

## 2018-03-29 RX ADMIN — NEOSTIGMINE METHYLSULFATE 5 MG: 1 INJECTION INTRAVENOUS at 09:03

## 2018-03-29 RX ADMIN — MIDAZOLAM HYDROCHLORIDE 2 MG: 1 INJECTION, SOLUTION INTRAMUSCULAR; INTRAVENOUS at 08:03

## 2018-03-29 NOTE — OR NURSING
Notified dr Singh unable to void after several attempts. Bladder scanned with approx 770 cc remaining. Stated to insert franco cath and to call Monday to return to have franco removed.

## 2018-03-29 NOTE — PLAN OF CARE
Michael Johnson Jr. has met all discharge criteria from Phase II. Vital Signs are stable, ambulating  without difficulty.Pain is now under control and tolerable for the pt. Pain score 4 at this time.  Discharge instructions given, patient verbalized understanding. Discharged from facility via wheelchair in stable condition.

## 2018-03-29 NOTE — BRIEF OP NOTE
[facility]  Brief Operative Note     SUMMARY     Surgery Date: 3/29/2018     Surgeon(s) and Role:     * Joshua Singh Jr., MD - Primary    Assisting Surgeon: None    Pre-op Diagnosis:  Disorder of gallbladder [K82.9]    Post-op Diagnosis:  Post-Op Diagnosis Codes:     * Disorder of gallbladder [K82.9]    Procedure(s) (LRB):  CHOLECYSTECTOMY-LAPAROSCOPIC (N/A)    Anesthesia: General    Description of the findings of the procedure: chronic cholecystitis    Findings/Key Components: above    Estimated Blood Loss:50  Specimens:   Specimen (12h ago through future)    Start     Ordered    03/29/18 0935  Specimen to Pathology - Surgery  Once     Comments:  1/ Gallbladder      03/29/18 0934          Discharge Note    SUMMARY     Admit Date: 3/29/2018    Discharge Date and Time:  03/29/2018 10:14 AM    Hospital Course (synopsis of major diagnoses, care, treatment, and services provided during the course of the hospital stay): benign     Final Diagnosis: Post-Op Diagnosis Codes:     * Disorder of gallbladder [K82.9]    Disposition: Home or Self Care    Follow Up/Patient Instructions:     Medications:  Reconciled Home Medications:      Medication List      CHANGE how you take these medications    * hydrocodone-acetaminophen 5-325mg 5-325 mg per tablet  Commonly known as:  NORCO  What changed:  Another medication with the same name was added. Make sure you understand how and when to take each.     * hydrocodone-acetaminophen 10-325mg  mg Tab  Commonly known as:  NORCO  Take 1 tablet by mouth every 4 (four) hours as needed for Pain.  What changed:  You were already taking a medication with the same name, and this prescription was added. Make sure you understand how and when to take each.     metoprolol succinate 25 MG 24 hr tablet  Commonly known as:  TOPROL-XL  Take 1 tablet (25 mg total) by mouth once daily.  What changed:  when to take this        * This list has 2 medication(s) that are the same as  other medications prescribed for you. Read the directions carefully, and ask your doctor or other care provider to review them with you.            CONTINUE taking these medications    allopurinol 300 MG tablet  Commonly known as:  ZYLOPRIM     amLODIPine 10 MG tablet  Commonly known as:  NORVASC     aspirin 81 MG EC tablet  Commonly known as:  ECOTRIN     carvedilol 25 MG tablet  Commonly known as:  COREG     colchicine 0.6 mg tablet     diclofenac 75 MG EC tablet  Commonly known as:  VOLTAREN     diltiaZEM 180 MG Cs24  Commonly known as:  TIAZAC     ferrous sulfate 325 (65 FE) MG EC tablet     gabapentin 300 MG capsule  Commonly known as:  NEURONTIN     levETIRAcetam 500 MG Tab  Commonly known as:  KEPPRA  Take 1 tablet (500 mg total) by mouth 2 (two) times daily.     magnesium 250 mg Tab     meloxicam 7.5 MG tablet  Commonly known as:  MOBIC     potassium chloride 20 mEq Pack  Commonly known as:  KLOR-CON     traMADol 50 mg tablet  Commonly known as:  ULTRAM     traZODone 50 MG tablet  Commonly known as:  DESYREL     VITAMIN D2 50,000 unit Cap  Generic drug:  ergocalciferol           Where to Get Your Medications      You can get these medications from any pharmacy    Bring a paper prescription for each of these medications  · hydrocodone-acetaminophen 10-325mg  mg Tab         Discharge Procedure Orders  Diet general     Activity as tolerated     Call MD for:  persistent nausea and vomiting     Call MD for:  severe uncontrolled pain     Call MD for:  redness, tenderness, or signs of infection (pain, swelling, redness, odor or green/yellow discharge around incision site)     Lifting restrictions   Order Comments: 20 Lbs     Wound care routine (specify)   Order Comments: Wound care routine: leave steri strips intact  May shower

## 2018-03-29 NOTE — TRANSFER OF CARE
Anesthesia Transfer of Care Note    Patient: Michael Johnson Jr.    Procedure(s) Performed: Procedure(s) (LRB):  CHOLECYSTECTOMY-LAPAROSCOPIC (N/A)    Patient location: PACU    Anesthesia Type: general    Transport from OR: Transported from OR on 2-3 L/min O2 by NC with adequate spontaneous ventilation    Post pain: adequate analgesia    Post assessment: no apparent anesthetic complications    Post vital signs: stable    Level of consciousness: awake, alert and oriented    Nausea/Vomiting: no nausea/vomiting    Complications: none    Transfer of care protocol was followed      Last vitals:   Visit Vitals  /88 (BP Location: Left arm, Patient Position: Sitting)   Pulse 84   Temp 37.1 °C (98.7 °F) (Oral)   Resp 16   Ht 6' (1.829 m)   Wt 104.3 kg (230 lb)   SpO2 100%   BMI 31.19 kg/m²

## 2018-03-29 NOTE — DISCHARGE INSTRUCTIONS
Anesthesia: After Your Surgery  Youve just had surgery. During surgery, you received medication called anesthesia to keep you comfortable and pain-free. After surgery, you may experience some pain or nausea. This is common. Here are some tips for feeling better and recovering after surgery.    Going home  Your doctor or nurse will show you how to take care of yourself when you go home. He or she will also answer your questions. Have an adult family member or friend drive you home. For the first 24 hours after your surgery:  · Do not drive or use heavy equipment.  · Do not make important decisions or sign legal documents.  · Avoid alcohol.  · Have someone stay with you, if needed. He or she can watch for problems and help keep you safe.  Be sure to keep all follow-up appointments with your doctor. And rest after your procedure for as long as your doctor tells you to.    Coping with pain  If you have pain after surgery, pain medication will help you feel better. Take it as directed, before pain becomes severe. Also, ask your doctor or pharmacist about other ways to control pain, such as with heat, ice, and relaxation. And follow any other instructions your surgeon or nurse gives you.    Tips for taking pain medication  To get the best relief possible, remember these points:  · Pain medications can upset your stomach. Taking them with a little food may help.  · Most pain relievers taken by mouth need at least 20 to 30 minutes to take effect.  · Taking medication on a schedule can help you remember to take it. Try to time your medication so that you can take it before beginning an activity, such as dressing, walking, or sitting down for dinner.  · Constipation is a common side effect of pain medications. Contact your doctor before taking any medications like laxatives or stool softeners to help relieve constipation. Also ask about any dietary restrictions, because drinking lots of fluids and eating foods like fruits  and vegetables that are high in fiber can also help. Remember, dont take laxatives unless your surgeon has prescribed them.  · Mixing alcohol and pain medication can cause dizziness and slow your breathing. It can even be fatal. Dont drink alcohol while taking pain medication.  · Pain medication can slow your reflexes. Dont drive or operate machinery while taking pain medication.  If your health care provider tells you to take acetaminophen to help relieve your pain, ask him or her how much you are supposed to take each day. (Acetaminophen is the generic name for Tylenol and other brand-name pain relievers.) Acetaminophen or other pain relievers may interact with your prescription medicines or other over-the-counter (OTC) drugs. Some prescription medications contain acetaminophen along with other active ingredients. Using both prescription and OTC acetaminophen for pain can cause you to overdose. The FDA recommends that you read the labels on your OTC medications carefully. This will help you to clearly understand the list of active ingredients, dosing instructions, and any warnings. It may also help you avoid taking too much acetaminophen. If you have questions or don't understand the information, ask your pharmacist or health care provider to explain it to you before you take the OTC medication.    Managing nausea  Some people have an upset stomach after surgery. This is often due to anesthesia, pain, pain medications, or the stress of surgery. The following tips will help you manage nausea and get good nutrition as you recover. If you were on a special diet before surgery, ask your doctor if you should follow it during recovery. These tips may help:  · Dont push yourself to eat. Your body will tell you when to eat and how much.  · Start off with clear liquids and soup. They are easier to digest.  · Progress to semi-solid foods (mashed potatoes, applesauce, and gelatin) as you feel ready.  · Slowly move to  solid foods. Dont eat fatty, rich, or spicy foods at first.  · Dont force yourself to have three large meals a day. Instead, eat smaller amounts more often.  · Take pain medications with a small amount of solid food, such as crackers or toast to avoid nausea.      Call your surgeon if  · You still have pain an hour after taking medication (it may not be strong enough).  · You feel too sleepy, dizzy, or groggy (medication may be too strong).  · You have side effects like nausea, vomiting, or skin changes (rash, itching, or hives).   © 2603-5365 [employer]. [address] [place] [address], [place] [address]. All rights reserved. This information is not intended as a substitute for professional medical care. Always follow your healthcare professional's instructions.    Follow any other instructions given per Dr. Singh!!!!!      Discharge Instructions for Laparoscopic Cholecystectomy  You have had a procedure known as a laparoscopic cholecystectomy. A laparoscopic cholecystectomy is a procedure to remove your gallbladder. People who have this procedure usually recover more quickly and have less pain than with open gallbladder surgery (called open cholecystectomy). Many surgeons recommend a low-fat diet, avoiding fried food in particular, for the first month after surgery.   You can live a full and healthy life without your gallbladder. This includes eating the foods and doing the things you enjoyed before your gallbladder problems started.  Home care  Recommendations for home care include the following:   · Ask someone to drive you to your appointments for the next 3 days. Dont drive until you are no longer taking pain medicine and are able to step on the brake pedal without hesitation.   · Wash the skin around your incision daily with mild soap and water. It's OK to shower the day after your surgery.  · Eat your regular diet. It is wise to stay away from rich, greasy, or spicy food for a few  days.  · Remember, it takes at least 1 week for you to get most of your strength and energy back.  · Make an office visit to talk to your healthcare provider if the following symptoms dont go away within a week after your surgery:  ¨ Fatigue  ¨ Pain around the incision  ¨ Diarrhea or constipation  ¨ Loss of appetite     When to call your healthcare provider  Call your healthcare provider immediately if you have any of the following:  · Yellowing of your eyes or skin (jaundice)  · Chills  · Fever of 100.4°F (38.0°C) or higher, or as directed by your healthcare provider   · Redness, swelling, increasing pain, pus, or a foul smell at the incision site  · Dark or rust-colored urine  · Stool that is laurence-colored or light in color instead of brown  · Increasing belly pain  · Rectal bleeding  · Leg swelling or shortness of breath   Date Last Reviewed: 7/1/2016  © 5589-5537 [employer]. [address] [place] [address]. All rights reserved. This information is not intended as a substitute for professional medical care. Always follow your healthcare professional's instructions.        Salas Catheter Care    A Salas catheter is a rubber tube that is placed through the urethra (opening where urine comes out) and into the bladder. This helps drain urine from the bladder. There is a small balloon on the end of the tube that is inflated after insertion. This keeps the catheter from sliding out of the bladder.  A Salas catheter is used to treat urinary retention (unable to pass urine). It is also used when there is incontinence (loss of bladder control).  Home care  · Finish taking any prescribed antibiotic even if you are feeling better before then.  · It is important to keep bacteria from getting into the collection bag. Do not disconnect the catheter from the collection bag.  · Use a leg band to secure the drainage tube, so it does not pull on the catheter. Drain the collection bag when it becomes full  using the drain spout at the bottom of the bag.  · Do not try to pull or remove your catheter. This will injure your urethra. It must be removed by your healthcare provider or nurse.  Follow-up care  Follow up with your healthcare provider as advised for repeat urine testing and catheter removal or replacement.  When to seek medical advice  Call your healthcare provider right away if any of these occur:  · Fever of 100.4ºF (38ºC) or higher, or as directed by your healthcare provider  · Bladder pain or fullness  · Abdominal swelling, nausea or vomiting, or back pain  · Blood or urine leakage around the catheter  · Bloody urine coming from the catheter (if a new symptom)  · Catheter falls out  · Catheter stops draining for 6 hours  · Weakness, dizziness, or fainting  Date Last Reviewed: 10/1/2016  © 0800-9314 [employer]. [address] [place] [address], [place] [address]. All rights reserved. This information is not intended as a substitute for professional medical care. Always follow your healthcare professional's instructions.

## 2018-03-29 NOTE — ANESTHESIA POSTPROCEDURE EVALUATION
Anesthesia Post Evaluation    Patient: Michael Johnson Jr.    Procedure(s) Performed: Procedure(s) (LRB):  CHOLECYSTECTOMY-LAPAROSCOPIC (N/A)    Final Anesthesia Type: general  Patient location during evaluation: PACU  Patient participation: Yes- Able to Participate  Level of consciousness: awake and alert  Post-procedure vital signs: reviewed and stable  Pain management: adequate  Airway patency: patent  PONV status at discharge: No PONV  Anesthetic complications: no      Cardiovascular status: blood pressure returned to baseline  Respiratory status: unassisted  Hydration status: euvolemic  Follow-up not needed.        Visit Vitals  /86   Pulse 75   Temp 36.8 °C (98.3 °F) (Oral)   Resp 16   Ht 6' (1.829 m)   Wt 104.3 kg (230 lb)   SpO2 (!) 92%   BMI 31.19 kg/m²       Pain/Ladonna Score: Pain Assessment Performed: Yes (3/29/2018  9:57 AM)  Presence of Pain: complains of pain/discomfort (3/29/2018 10:27 AM)  Pain Rating Prior to Med Admin: 6 (3/29/2018 10:56 AM)  Pain Rating Post Med Admin: 6 (3/29/2018 10:34 AM)  Ladonna Score: 9 (3/29/2018 10:27 AM)

## 2018-03-29 NOTE — INTERVAL H&P NOTE
The patient has been examined and the H&P has been reviewed:    I concur with the findings and no changes have occurred since H&P was written.              Active Hospital Problems    Diagnosis  POA    Disease of gallbladder [K82.9]  Yes      Resolved Hospital Problems    Diagnosis Date Resolved POA   No resolved problems to display.

## 2018-04-02 NOTE — OP NOTE
DATE OF PROCEDURE:  03/29/2018    PREOPERATIVE DIAGNOSIS:  Symptomatic gallbladder disease.    POSTOPERATIVE DIAGNOSIS:  Symptomatic gallbladder disease.    SURGEON:  Joshua Singh M.D.    ESTIMATED BLOOD LOSS:  100 mL.    PROCEDURE IN DETAIL:  The patient was brought to the Operating Room, placed in   supine position.  The abdomen was prepped and draped in sterile fashion.  A   small incision was made in the left upper quadrant.  A Veress needle inserted   and pneumoperitoneum achieved.  A 5-mm trocar inserted in the right upper   quadrant, in the midclavicular line.  Under direct observation, 2 additional   5-mm trocars were inserted, one in the anterior axillary line and the other in   the upper midline.  Under direct observation, a 10-mm trocar was inserted   approximately 4 cm above the umbilicus in the midline.  Gallbladder visualized,   seemed to be chronically inflamed.  Using blunt dissection, adhesions to the   gallbladder were taken down, exposing the cystic artery and duct.  These were   doubly clamped proximally then transected.  Then using electrocautery Bovie, the   gallbladder was removed from the liver bed.  Under direct observation using an   EndoCatch, the gallbladder removed through the periumbilical port.  Peritoneal   cavity irrigated and inspected.  The pneumoperitoneum released.  The fascia of   the umbilicus closed with 0 Vicryl, the skin with running 4-0 Monocryl.  The   patient tolerated the procedure well and left the Operating Room in good   condition.      TAL/IN  dd: 03/29/2018 10:16:33 (CDT)  td: 03/29/2018 15:59:37 (CDT)  Doc ID   #[id number]  Job ID #[id number]    CC:

## 2019-01-15 NOTE — PHYSICIAN QUERY
"PT Name: Michael Johnson Jr.  MR #: [id number]     Physician Query Form - Documentation Clarification      CDS: Melly Edmond RN, CCDS       Contact information: [email or web address]    This form is a permanent document in the medical record.     Query Date: January 17, 2018    By submitting this query, we are merely seeking further clarification of documentation. Please utilize your independent clinical judgment when addressing the question(s) below.    The Medical record reflects the following:    Supporting Clinical Findings Location in Medical Record     Cholecystitis    Cholelithiasis with a thickened gallbladder wall with pericholecystic fluid cannot exclude acute cholecystitis.   1/16-PN    1/13-US Abdomen     Gram negative sepsis  - Blood cultures grew Enterobacter cloacae and Klebsiella oxytoca.    - Repeat blood cultures 1/13 no growth.  WBC normal and patient afebrile.   - Etiology likely gallbladder as U/S shows some thickening of wall but pt asymptomatic   - Will need to follow up with surgery for possible cholecystectomy    1/1-PN                                                                       Doctor, Please specify diagnosis or diagnoses associated with above clinical findings.    Please clarify "Acuity" of Cholecystitis.    Provider Use Only     [ x  ] Cholelithiasis with Acute Cholecystitis     [   ] Cholelithiasis with Chronic Cholecystitis     [   ] Other clarification (please specify)__________________                                                                                                          [   ] Clinically undetermined            "
PT Name: Michael Johnson Jr.  MR #: [id number]     Physician Query Form - Documentation Clarification      CDS: Melly Edmond RN, CCDS       Contact information: [email or web address]    This form is a permanent document in the medical record.     Query Date: January 17, 2018    By submitting this query, we are merely seeking further clarification of documentation. Please utilize your independent clinical judgment when addressing the question(s) below.    The Medical record reflects the following:    Supporting Clinical Findings Location in Medical Record     Acute on chronic kidney failure  Creatinine- 1.6. Baseline- 1-1.8     1/9-H&P   Acute on chronic kidney failure  - Improving,  - Monitor.    GFR=55, Cr=1.6, BUN=15  GFR=>60, Cr=1.1, BUN=10 1/10, 1/11-Hospital Medicine PN's      1/8-Labs  1/16-Labs                                                                          Doctor, Please specify diagnosis or diagnoses associated with above clinical findings.    Please clarify the diagnosis of Chronic Kidney Failure.    Provider Use Only      [x  ] Chronic Kidney Disease (CKD) (please specify stage below)       Stage Description  eGFR (mL/min)   [  ]     I Slight kidney damage with normal or increased filtration 90+   [  ]     II Mildly reduced kidney function 60-89   [ x ]     III Moderately reduced kidney function 30-59     [  ] Chronic Kidney Failure Ruled Out    [  ] Other clarification (please specify)_________________________                                                                                                         [  ] Clinically undetermined            
PT Name: Michael Johnson Jr.  MR #: [id number]     Physician Query Form - Documentation Clarification      CDS: Melly Edmond RN, CCDS       Contact information: [email or web address]    This form is a permanent document in the medical record.     Query Date: January 17, 2018    By submitting this query, we are merely seeking further clarification of documentation. Please utilize your independent clinical judgment when addressing the question(s) below.    The Medical record reflects the following:    Supporting Clinical Findings Location in Medical Record   Seizure-Believe due to alcohol withdrawal    Platelets=113, 97, 80, 82, 99, 105, 142 1/9-H&P    1/8 to 1/13-Labs     folic acid tablet 1 mg    Route: Oral  Frequency: Daily  Start: 01/10/18 0915      1/10-MAR                                                                            Doctor, Please specify diagnosis or diagnoses associated with above clinical findings.    Provider Use Only     [  x ] Thrombocytopenia     [   ] Other dx (plese specify)___________________________                                                                                                          [   ] Clinically undetermined            
PT Name: Michael Johnson Jr.  MR #: [id number]     Physician Query Form - Diagnosis Clarification      CDS: Melly Edmond RN, CCDS       Contact information: [email or web address]    This form is a permanent document in the medical record.     Query Date: January 19, 2018    By submitting this query, we are merely seeking further clarification of documentation.  Please utilize your independent clinical judgment when addressing the question(s) below.     The medical record contains the following:      Findings Supporting Clinical Information Location in Medical Record   Sepsis Gram negative sepsis  Gram-negative bacteremia    Gram negative sepsis-No longer septic.  Fever-Most likely from cholecystitis. Asymptomatic. Tolerating oral medications. Continue oral Cipro for now.    Bacteremia due to Enterobacter species  Treat with oral Cipro       Patient was found to have Enterococcus and Klebsiella in blood cultures on 1/11.  Both organisms were sensitive to ciprofloxacin, and he was started on Cipro.  Repeat cultures were negative.  ID was consulted to follow.  The source of positive cultures was thought to be from the gallbladder, and patient will need surgical follow up after the infection is fully treated with 2 weeks of outpatient oral ciprofloxacin.      Temp=104.1, NH=344, RR=24 1/12-PN  1/12-PN    1/15-ID Consult        1/17-ID PN      1/18-PN              1/11-Flowsheet     Please clarify if the ____Sepsis____ diagnosis has been:    [  ] Ruled In  [ x ] Ruled In, Now Resolved  [  ] Ruled Out  [  ] Clinically undetermined  [  ] Other/Clarification of findings (please specify)_______________________________    Please document in your progress notes daily for the duration of treatment, until resolved, and include in your discharge summary.                                                                                
Detail Level: Simple
Detail Level: Detailed

## 2019-03-07 ENCOUNTER — TELEPHONE (OUTPATIENT)
Dept: INTERNAL MEDICINE | Facility: CLINIC | Age: 59
End: 2019-03-07

## 2019-03-07 NOTE — TELEPHONE ENCOUNTER
Pt is calling wanting a electric wheel chair , claims he is unable to leave his  House do to his ankle  pain snice he was a child  , he is having issues with his wife   And his primary care dr at [facility] will no longer answer his message , he thinks because he has missed so many appointment due him not having transportation ,   I explained  We need to se him before I can get him his request , if he is having  Palpations are unable to get someone to help and feed  he may want to call his pcp at this time and send some kind of home care or go to the emergency room   At this time I can not help him

## 2019-03-07 NOTE — TELEPHONE ENCOUNTER
----- Message from Yaquelin Velázquez sent at 3/7/2019  1:36 PM CST -----  Contact: PT  Manuel Blanco,     This is not a hospital medicine patient.  Patient was last discharged by hospital medicine at [facility] in January 2018.  Yaquelin Hanley  ----- Message -----  From: Giovana Frank LPN  Sent: 3/7/2019   1:29 PM  To: Selena        ----- Message -----  From: Chinyere Carlin  Sent: 3/7/2019   1:14 PM  To: Zaire Esquivel Staff    Regarding PT future appointment..     Ankle issues extending to whole left leg   PT thinks he's going to start having issues with the control of left leg  PT heart races when barely doing moving  PT wants a wheel chair because he's stuck in his bed all day and practically immobile    After Clar[facility] PT was approved for disability   PT also has an evelator installed in home for wheel chair capability      Callback: [phone]

## 2019-04-02 ENCOUNTER — HOSPITAL ENCOUNTER (OUTPATIENT)
Facility: OTHER | Age: 59
Discharge: HOME | DRG: 603 | End: 2019-04-04
Attending: EMERGENCY MEDICINE | Admitting: EMERGENCY MEDICINE
Payer: MEDICARE

## 2019-04-02 DIAGNOSIS — S82.892S CLOSED FRACTURE OF LEFT ANKLE, SEQUELA: ICD-10-CM

## 2019-04-02 DIAGNOSIS — F10.930 ALCOHOL WITHDRAWAL SYNDROME WITHOUT COMPLICATION: ICD-10-CM

## 2019-04-02 DIAGNOSIS — R26.81 GAIT INSTABILITY: ICD-10-CM

## 2019-04-02 DIAGNOSIS — R11.2 NAUSEA AND VOMITING, INTRACTABILITY OF VOMITING NOT SPECIFIED, UNSPECIFIED VOMITING TYPE: ICD-10-CM

## 2019-04-02 DIAGNOSIS — L03.115 CELLULITIS OF RIGHT KNEE: Primary | ICD-10-CM

## 2019-04-02 PROBLEM — E87.6 HYPOKALEMIA: Status: ACTIVE | Noted: 2019-04-02

## 2019-04-02 LAB
ALBUMIN SERPL BCP-MCNC: 3.5 G/DL (ref 3.5–5.2)
ALP SERPL-CCNC: 81 U/L (ref 55–135)
ALT SERPL W/O P-5'-P-CCNC: 12 U/L (ref 10–44)
ANION GAP SERPL CALC-SCNC: 16 MMOL/L (ref 8–16)
AST SERPL-CCNC: 31 U/L (ref 10–40)
BASOPHILS # BLD AUTO: 0.03 K/UL (ref 0–0.2)
BASOPHILS NFR BLD: 0.4 % (ref 0–1.9)
BILIRUB SERPL-MCNC: 0.7 MG/DL (ref 0.1–1)
BUN SERPL-MCNC: 9 MG/DL (ref 6–20)
CALCIUM SERPL-MCNC: 8.4 MG/DL (ref 8.7–10.5)
CHLORIDE SERPL-SCNC: 108 MMOL/L (ref 95–110)
CO2 SERPL-SCNC: 21 MMOL/L (ref 23–29)
CREAT SERPL-MCNC: 1 MG/DL (ref 0.5–1.4)
CRP SERPL-MCNC: 13.2 MG/L (ref 0–8.2)
DIFFERENTIAL METHOD: ABNORMAL
EOSINOPHIL # BLD AUTO: 0 K/UL (ref 0–0.5)
EOSINOPHIL NFR BLD: 0.3 % (ref 0–8)
ERYTHROCYTE [DISTWIDTH] IN BLOOD BY AUTOMATED COUNT: 18.6 % (ref 11.5–14.5)
ERYTHROCYTE [SEDIMENTATION RATE] IN BLOOD: 90 MM/HR (ref 0–10)
EST. GFR  (AFRICAN AMERICAN): >60 ML/MIN/1.73 M^2
EST. GFR  (NON AFRICAN AMERICAN): >60 ML/MIN/1.73 M^2
ETHANOL SERPL-MCNC: 197 MG/DL
FERRITIN SERPL-MCNC: 811 NG/ML (ref 20–300)
GLUCOSE SERPL-MCNC: 90 MG/DL (ref 70–110)
HCT VFR BLD AUTO: 29.2 % (ref 40–54)
HGB BLD-MCNC: 9.7 G/DL (ref 14–18)
IRON SERPL-MCNC: 223 UG/DL (ref 45–160)
LACTATE SERPL-SCNC: 1.2 MMOL/L (ref 0.5–2.2)
LACTATE SERPL-SCNC: 5 MMOL/L (ref 0.5–2.2)
LYMPHOCYTES # BLD AUTO: 1.9 K/UL (ref 1–4.8)
LYMPHOCYTES NFR BLD: 24.3 % (ref 18–48)
MAGNESIUM SERPL-MCNC: 1 MG/DL (ref 1.6–2.6)
MCH RBC QN AUTO: 36.5 PG (ref 27–31)
MCHC RBC AUTO-ENTMCNC: 33.2 G/DL (ref 32–36)
MCV RBC AUTO: 110 FL (ref 82–98)
MONOCYTES # BLD AUTO: 0.5 K/UL (ref 0.3–1)
MONOCYTES NFR BLD: 6.8 % (ref 4–15)
NEUTROPHILS # BLD AUTO: 5.3 K/UL (ref 1.8–7.7)
NEUTROPHILS NFR BLD: 67.8 % (ref 38–73)
PLATELET # BLD AUTO: 273 K/UL (ref 150–350)
PMV BLD AUTO: 9.5 FL (ref 9.2–12.9)
POTASSIUM SERPL-SCNC: 3.4 MMOL/L (ref 3.5–5.1)
PROCALCITONIN SERPL IA-MCNC: 0.03 NG/ML
PROT SERPL-MCNC: 8.8 G/DL (ref 6–8.4)
RBC # BLD AUTO: 2.66 M/UL (ref 4.6–6.2)
SATURATED IRON: 80 % (ref 20–50)
SODIUM SERPL-SCNC: 145 MMOL/L (ref 136–145)
TOTAL IRON BINDING CAPACITY: 280 UG/DL (ref 250–450)
TRANSFERRIN SERPL-MCNC: 189 MG/DL (ref 200–375)
WBC # BLD AUTO: 7.83 K/UL (ref 3.9–12.7)

## 2019-04-02 PROCEDURE — 83605 ASSAY OF LACTIC ACID: CPT

## 2019-04-02 PROCEDURE — G0378 HOSPITAL OBSERVATION PER HR: HCPCS

## 2019-04-02 PROCEDURE — 25000003 PHARM REV CODE 250: Performed by: EMERGENCY MEDICINE

## 2019-04-02 PROCEDURE — 82728 ASSAY OF FERRITIN: CPT

## 2019-04-02 PROCEDURE — 36415 COLL VENOUS BLD VENIPUNCTURE: CPT

## 2019-04-02 PROCEDURE — 99285 EMERGENCY DEPT VISIT HI MDM: CPT | Mod: 25

## 2019-04-02 PROCEDURE — 85025 COMPLETE CBC W/AUTO DIFF WBC: CPT

## 2019-04-02 PROCEDURE — 63600175 PHARM REV CODE 636 W HCPCS: Performed by: EMERGENCY MEDICINE

## 2019-04-02 PROCEDURE — 80320 DRUG SCREEN QUANTALCOHOLS: CPT

## 2019-04-02 PROCEDURE — 94761 N-INVAS EAR/PLS OXIMETRY MLT: CPT

## 2019-04-02 PROCEDURE — 83540 ASSAY OF IRON: CPT

## 2019-04-02 PROCEDURE — 96376 TX/PRO/DX INJ SAME DRUG ADON: CPT | Mod: 59

## 2019-04-02 PROCEDURE — 99223 1ST HOSP IP/OBS HIGH 75: CPT | Mod: ,,, | Performed by: PHYSICIAN ASSISTANT

## 2019-04-02 PROCEDURE — S0077 INJECTION, CLINDAMYCIN PHOSP: HCPCS | Performed by: EMERGENCY MEDICINE

## 2019-04-02 PROCEDURE — S0077 INJECTION, CLINDAMYCIN PHOSP: HCPCS | Performed by: PHYSICIAN ASSISTANT

## 2019-04-02 PROCEDURE — 99223 PR INITIAL HOSPITAL CARE,LEVL III: ICD-10-PCS | Mod: ,,, | Performed by: PHYSICIAN ASSISTANT

## 2019-04-02 PROCEDURE — 85651 RBC SED RATE NONAUTOMATED: CPT

## 2019-04-02 PROCEDURE — 25500020 PHARM REV CODE 255: Performed by: EMERGENCY MEDICINE

## 2019-04-02 PROCEDURE — 63600175 PHARM REV CODE 636 W HCPCS: Performed by: PHYSICIAN ASSISTANT

## 2019-04-02 PROCEDURE — 25000003 PHARM REV CODE 250: Performed by: PHYSICIAN ASSISTANT

## 2019-04-02 PROCEDURE — 11000001 HC ACUTE MED/SURG PRIVATE ROOM

## 2019-04-02 PROCEDURE — 96365 THER/PROPH/DIAG IV INF INIT: CPT | Mod: 59

## 2019-04-02 PROCEDURE — 83735 ASSAY OF MAGNESIUM: CPT

## 2019-04-02 PROCEDURE — 96361 HYDRATE IV INFUSION ADD-ON: CPT

## 2019-04-02 PROCEDURE — 86140 C-REACTIVE PROTEIN: CPT

## 2019-04-02 PROCEDURE — 84145 PROCALCITONIN (PCT): CPT

## 2019-04-02 PROCEDURE — 83605 ASSAY OF LACTIC ACID: CPT | Mod: 91

## 2019-04-02 PROCEDURE — 96375 TX/PRO/DX INJ NEW DRUG ADDON: CPT

## 2019-04-02 PROCEDURE — 80053 COMPREHEN METABOLIC PANEL: CPT

## 2019-04-02 RX ORDER — AMOXICILLIN 250 MG
1 CAPSULE ORAL 2 TIMES DAILY PRN
Status: DISCONTINUED | OUTPATIENT
Start: 2019-04-02 | End: 2019-04-04 | Stop reason: HOSPADM

## 2019-04-02 RX ORDER — FERROUS SULFATE 325(65) MG
325 TABLET, DELAYED RELEASE (ENTERIC COATED) ORAL DAILY
Status: DISCONTINUED | OUTPATIENT
Start: 2019-04-03 | End: 2019-04-03

## 2019-04-02 RX ORDER — METOPROLOL SUCCINATE 50 MG/1
50 TABLET, EXTENDED RELEASE ORAL
Status: COMPLETED | OUTPATIENT
Start: 2019-04-02 | End: 2019-04-02

## 2019-04-02 RX ORDER — IBUPROFEN 600 MG/1
600 TABLET ORAL 3 TIMES DAILY
Status: ON HOLD | COMMUNITY
End: 2019-04-04 | Stop reason: HOSPADM

## 2019-04-02 RX ORDER — THIAMINE HCL 100 MG
100 TABLET ORAL DAILY
Status: DISCONTINUED | OUTPATIENT
Start: 2019-04-03 | End: 2019-04-04 | Stop reason: HOSPADM

## 2019-04-02 RX ORDER — CHLORDIAZEPOXIDE HYDROCHLORIDE 25 MG/1
25 CAPSULE, GELATIN COATED ORAL 3 TIMES DAILY
Status: DISCONTINUED | OUTPATIENT
Start: 2019-04-02 | End: 2019-04-03

## 2019-04-02 RX ORDER — ASPIRIN 81 MG/1
81 TABLET ORAL DAILY
Status: DISCONTINUED | OUTPATIENT
Start: 2019-04-03 | End: 2019-04-04 | Stop reason: HOSPADM

## 2019-04-02 RX ORDER — CLINDAMYCIN PHOSPHATE 600 MG/50ML
600 INJECTION, SOLUTION INTRAVENOUS
Status: COMPLETED | OUTPATIENT
Start: 2019-04-02 | End: 2019-04-02

## 2019-04-02 RX ORDER — METOPROLOL SUCCINATE 50 MG/1
50 TABLET, EXTENDED RELEASE ORAL DAILY
Status: DISCONTINUED | OUTPATIENT
Start: 2019-04-03 | End: 2019-04-04 | Stop reason: HOSPADM

## 2019-04-02 RX ORDER — ACETAMINOPHEN 325 MG/1
650 TABLET ORAL EVERY 8 HOURS PRN
Status: DISCONTINUED | OUTPATIENT
Start: 2019-04-02 | End: 2019-04-04 | Stop reason: HOSPADM

## 2019-04-02 RX ORDER — CHLORDIAZEPOXIDE HYDROCHLORIDE 25 MG/1
50 CAPSULE, GELATIN COATED ORAL
Status: COMPLETED | OUTPATIENT
Start: 2019-04-02 | End: 2019-04-02

## 2019-04-02 RX ORDER — MAGNESIUM SULFATE HEPTAHYDRATE 40 MG/ML
2 INJECTION, SOLUTION INTRAVENOUS
Status: COMPLETED | OUTPATIENT
Start: 2019-04-02 | End: 2019-04-02

## 2019-04-02 RX ORDER — FOLIC ACID 1 MG/1
1 TABLET ORAL DAILY
Status: DISCONTINUED | OUTPATIENT
Start: 2019-04-03 | End: 2019-04-04 | Stop reason: HOSPADM

## 2019-04-02 RX ORDER — SODIUM CHLORIDE 0.9 % (FLUSH) 0.9 %
10 SYRINGE (ML) INJECTION
Status: DISCONTINUED | OUTPATIENT
Start: 2019-04-02 | End: 2019-04-04 | Stop reason: HOSPADM

## 2019-04-02 RX ORDER — MAGNESIUM SULFATE HEPTAHYDRATE 40 MG/ML
2 INJECTION, SOLUTION INTRAVENOUS ONCE
Status: COMPLETED | OUTPATIENT
Start: 2019-04-02 | End: 2019-04-02

## 2019-04-02 RX ORDER — DILTIAZEM HYDROCHLORIDE 180 MG/1
180 CAPSULE, COATED, EXTENDED RELEASE ORAL DAILY
Status: DISCONTINUED | OUTPATIENT
Start: 2019-04-03 | End: 2019-04-04 | Stop reason: HOSPADM

## 2019-04-02 RX ORDER — LEVETIRACETAM 500 MG/1
500 TABLET ORAL 2 TIMES DAILY
Status: DISCONTINUED | OUTPATIENT
Start: 2019-04-02 | End: 2019-04-04 | Stop reason: HOSPADM

## 2019-04-02 RX ORDER — ONDANSETRON 2 MG/ML
4 INJECTION INTRAMUSCULAR; INTRAVENOUS
Status: COMPLETED | OUTPATIENT
Start: 2019-04-02 | End: 2019-04-02

## 2019-04-02 RX ORDER — HYDROCODONE BITARTRATE AND ACETAMINOPHEN 5; 325 MG/1; MG/1
1 TABLET ORAL EVERY 6 HOURS PRN
Status: DISCONTINUED | OUTPATIENT
Start: 2019-04-02 | End: 2019-04-04 | Stop reason: HOSPADM

## 2019-04-02 RX ORDER — ALLOPURINOL 300 MG/1
300 TABLET ORAL DAILY
Status: DISCONTINUED | OUTPATIENT
Start: 2019-04-03 | End: 2019-04-04 | Stop reason: HOSPADM

## 2019-04-02 RX ORDER — CLINDAMYCIN PHOSPHATE 600 MG/50ML
600 INJECTION, SOLUTION INTRAVENOUS
Status: DISCONTINUED | OUTPATIENT
Start: 2019-04-02 | End: 2019-04-04 | Stop reason: HOSPADM

## 2019-04-02 RX ORDER — HEPARIN SODIUM 5000 [USP'U]/ML
5000 INJECTION, SOLUTION INTRAVENOUS; SUBCUTANEOUS EVERY 8 HOURS
Status: DISCONTINUED | OUTPATIENT
Start: 2019-04-02 | End: 2019-04-04 | Stop reason: HOSPADM

## 2019-04-02 RX ORDER — ONDANSETRON 2 MG/ML
4 INJECTION INTRAMUSCULAR; INTRAVENOUS EVERY 8 HOURS PRN
Status: DISCONTINUED | OUTPATIENT
Start: 2019-04-02 | End: 2019-04-04 | Stop reason: HOSPADM

## 2019-04-02 RX ORDER — GABAPENTIN 300 MG/1
300 CAPSULE ORAL 3 TIMES DAILY
Status: DISCONTINUED | OUTPATIENT
Start: 2019-04-02 | End: 2019-04-04 | Stop reason: HOSPADM

## 2019-04-02 RX ORDER — SODIUM CHLORIDE 450 MG/100ML
INJECTION, SOLUTION INTRAVENOUS CONTINUOUS
Status: DISCONTINUED | OUTPATIENT
Start: 2019-04-02 | End: 2019-04-04

## 2019-04-02 RX ORDER — QUETIAPINE FUMARATE 50 MG/1
50 TABLET, FILM COATED ORAL NIGHTLY
Status: ON HOLD | COMMUNITY
End: 2019-04-04 | Stop reason: HOSPADM

## 2019-04-02 RX ORDER — POTASSIUM CHLORIDE 7.45 MG/ML
10 INJECTION INTRAVENOUS ONCE
Status: COMPLETED | OUTPATIENT
Start: 2019-04-02 | End: 2019-04-02

## 2019-04-02 RX ORDER — LANOLIN ALCOHOL/MO/W.PET/CERES
100 CREAM (GRAM) TOPICAL DAILY
Status: DISCONTINUED | OUTPATIENT
Start: 2019-04-03 | End: 2019-04-02

## 2019-04-02 RX ADMIN — GABAPENTIN 300 MG: 300 CAPSULE ORAL at 09:04

## 2019-04-02 RX ADMIN — SODIUM CHLORIDE 1000 ML: 0.9 INJECTION, SOLUTION INTRAVENOUS at 01:04

## 2019-04-02 RX ADMIN — CHLORDIAZEPOXIDE HYDROCHLORIDE 25 MG: 25 CAPSULE ORAL at 09:04

## 2019-04-02 RX ADMIN — LEVETIRACETAM 500 MG: 500 TABLET, FILM COATED ORAL at 09:04

## 2019-04-02 RX ADMIN — HEPARIN SODIUM 5000 UNITS: 5000 INJECTION, SOLUTION INTRAVENOUS; SUBCUTANEOUS at 11:04

## 2019-04-02 RX ADMIN — MAGNESIUM SULFATE IN WATER 2 G: 40 INJECTION, SOLUTION INTRAVENOUS at 02:04

## 2019-04-02 RX ADMIN — MAGNESIUM SULFATE IN WATER 2 G: 40 INJECTION, SOLUTION INTRAVENOUS at 09:04

## 2019-04-02 RX ADMIN — THIAMINE HYDROCHLORIDE: 100 INJECTION, SOLUTION INTRAMUSCULAR; INTRAVENOUS at 04:04

## 2019-04-02 RX ADMIN — CHLORDIAZEPOXIDE HYDROCHLORIDE 50 MG: 25 CAPSULE ORAL at 02:04

## 2019-04-02 RX ADMIN — POTASSIUM CHLORIDE 10 MEQ: 10 INJECTION, SOLUTION INTRAVENOUS at 05:04

## 2019-04-02 RX ADMIN — CLINDAMYCIN IN 5 PERCENT DEXTROSE 600 MG: 12 INJECTION, SOLUTION INTRAVENOUS at 11:04

## 2019-04-02 RX ADMIN — LORAZEPAM 1 MG: 2 INJECTION INTRAMUSCULAR; INTRAVENOUS at 01:04

## 2019-04-02 RX ADMIN — METOPROLOL SUCCINATE 50 MG: 50 TABLET, EXTENDED RELEASE ORAL at 01:04

## 2019-04-02 RX ADMIN — SODIUM CHLORIDE 1000 ML: 0.9 INJECTION, SOLUTION INTRAVENOUS at 10:04

## 2019-04-02 RX ADMIN — CLINDAMYCIN IN 5 PERCENT DEXTROSE 600 MG: 12 INJECTION, SOLUTION INTRAVENOUS at 02:04

## 2019-04-02 RX ADMIN — ONDANSETRON 4 MG: 2 INJECTION INTRAMUSCULAR; INTRAVENOUS at 01:04

## 2019-04-02 RX ADMIN — IOHEXOL 75 ML: 350 INJECTION, SOLUTION INTRAVENOUS at 11:04

## 2019-04-02 RX ADMIN — ONDANSETRON 4 MG: 2 INJECTION INTRAMUSCULAR; INTRAVENOUS at 12:04

## 2019-04-02 NOTE — ED TRIAGE NOTES
Pt. Arrives to ED via personal vehicle accompanied w/ wife. Pt. 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 Pt. C/o recurring wound and abscess to right knee that first appeared about a month ago. Pt. Reported he assumed it was his gout crystals forming but the wound persisted and became an abscess that opened with purulent yellow and white drainage. Warm to touch. Pain w/ palp on direct wound during culture swab. Pt. Speaking clearly in full sentences. Pt. Transfers w/ full assist from w/c to bed. Pt. Denies N/V/D/ fever/ changes in bladder/ CP/ or SOB.     Pt. PMH of cardiac problems, cardiac monitor applied, cont pulse ox, and BP cuff. Safety precautions initiated.

## 2019-04-02 NOTE — ASSESSMENT & PLAN NOTE
- no drainage noted, no fever or WBC, FROM  - CT No evidence for acute fracture, bone destruction, or abnormal periosteal reaction. - ED d/w ortho nothing acute, no effusion  - Clinda started  - monitor

## 2019-04-02 NOTE — PLAN OF CARE
"D/c Plan:  Assessed Mr Johnson in the ER  He states that he has battling [employer] "for months" to acquire a W/C.  Not sure of the accuracy, but we will attempt to assist here in the ER  Rodri also follows Mr Johnson in the community       04/02/19 1050   Discharge Assessment   Assessment Type Discharge Planning Assessment   Confirmed/corrected address and phone number on facesheet? Yes   Assessment information obtained from? Patient;Caregiver;Medical Record   Communicated expected length of stay with patient/caregiver yes   Prior to hospitilization cognitive status: Alert/Oriented   Prior to hospitalization functional status: Partially Dependent   Current cognitive status: Alert/Oriented   Current Functional Status: Partially Dependent   Lives With spouse   Able to Return to Prior Arrangements yes   Is patient able to care for self after discharge? Yes   Do you have any problems affording any of your prescribed medications? No   Is the patient taking medications as prescribed? yes   Does the patient have transportation home? Yes   Transportation Anticipated family or friend will provide   DME Needed Upon Discharge  wheelchair   Patient/Family in Agreement with Plan yes     "

## 2019-04-02 NOTE — ED NOTES
"Attempting to request transport for pt. Unable to do so as "Rimma Rodriguez" is viewing chart. RN called many numbers and transferred to rimma rodriguez without the ability to contact her. Message left to please exit chart. Pt. Transport delayed.   "

## 2019-04-02 NOTE — ED NOTES
Pt. In stretcher without S/Sx of acute distress, respirations equal/ unlabored, skin warm and dry, speaking clearly in full sentences. Updated on plan of care, will continue to monitor. Safety precautions continued.

## 2019-04-02 NOTE — ED NOTES
Hourly rounding complete- Pt. In stretcher continued N/V, pt. Instructed to not attempt to drink fluids as he continues to vomit. Pt. Expresses understanding, respirations equal/ unlabored, skin warm and dry, speaking clearly in full sentences. Wife at bedside. Updated on plan of care, will continue to monitor. Safety precautions continued.

## 2019-04-02 NOTE — ED PROVIDER NOTES
"Encounter Date: 4/2/2019    SCRIBE #1 NOTE: I, Anthony Pelaez, am scribing for, and in the presence of, Dr. Lopez.       History     Chief Complaint   Patient presents with    Wound Infection     Pt sent to ED for r/o osteomylitis of the right knee per [facility].     Seen by provider: 9:15 AM    Patient is a 58 y.o. male with a history of HTN who was referred to the ED by his PCP at [facility] with concern for osteomyelitis of the right knee. Patient reports his symptoms initially began several months ago when he experienced a "gout flare" affecting the right knee, which he has had several times in the past. He reports the knee was swollen and red with mild pain, when a small skin break appeared about 6 months ago. He reports persistent pain, redness and swelling with progressive development of a wound on the knee. He reports new onset of discharge from the wound about one week ago. He states he pressed around the wound and "a whole lot of pus" drained with persistent oozing for the last week since. He applied a dressing to the wound a few days ago and reports the wound became malodorous about two days ago. He reports the knee is pain free at rest and is only painful to the touch, however his pain is rated 8/10 in severity when the knee is touched. He denies fever or chills. He reports a history of gout, which normally affects his knees, most often affecting the right knee. He is prescribed metoprolol for HTN, however he did not take this today. He states he has been out of allopurinol for "quite a while." He has no additional complaints at this time.    The history is provided by the patient and the spouse.     Review of patient's allergies indicates:   Allergen Reactions    Lisinopril Swelling     Pt admitted with angioedema 2wks after taking lisinopril.      Past Medical History:   Diagnosis Date    Afib     Alcohol abuse with other alcohol-induced disorder 04/02/2019    Anemia 04/02/2019    unspecified " deficiency    Ankle fracture, left     Aortic atherosclerosis 04/02/2019    Arthritis     Asthma     Cardiomyopathy 04/02/2019    Chronic kidney disease (CKD), stage III (moderate) 04/02/2019    Depression     Erectile dysfunction 04/02/2019    Gout, arthropathy 04/02/2019    Gout, unspecified     Hypertension     Hypertensive chronic kidney disease 04/02/2019    Hypomagnesemia 04/02/2019    Noncompliance 04/02/2019    Peripheral neuropathy 04/02/2019    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    Secondary hyperparathyroidism, renal 04/02/2019    Seizure 2016    Spastic hemiplegia affecting left nondominant side 04/02/2019     Past Surgical History:   Procedure Laterality Date    ANKLE SURGERY      x6    CHOLECYSTECTOMY-LAPAROSCOPIC N/A 3/29/2018    Performed by Joshua Singh Jr., [facility] at [facility] [place]    COLONOSCOPY N/A 5/26/2014    Performed by Rikki Gilbert MD at [facility] ([facility])     Family History   Problem Relation Age of Onset    Hypertension Father     Stroke Maternal Grandmother     Cataracts Maternal Grandfather     Stroke Maternal Grandfather     Cancer Mother         malignant polyp     Social History     Tobacco Use    Smoking status: Never Smoker    Smokeless tobacco: Never Used   Substance Use Topics    Alcohol use: Yes     Alcohol/week: 1.8 - 2.4 oz     Types: 3 - 4 Shots of liquor per week     Comment: daily    Drug use: No     Review of Systems   Constitutional: Negative for chills and fever.   HENT: Negative for congestion.    Respiratory: Negative for shortness of breath.    Cardiovascular: Negative for chest pain.   Gastrointestinal: Negative for abdominal pain, nausea and vomiting.   Genitourinary: Negative for dysuria.   Musculoskeletal: Positive for arthralgias (right knee) and joint swelling (right knee). Negative for back pain and neck pain.   Skin: Positive for color change (right knee) and wound (right knee). Negative for rash.   Neurological: Negative for weakness, numbness  and headaches.   Psychiatric/Behavioral: Negative for confusion.       Physical Exam     Initial Vitals [04/02/19 0903]   BP Pulse Resp Temp SpO2   (!) 168/101 (!) 112 18 99 °F (37.2 °C) 98 %      MAP       --         Physical Exam    Nursing note and vitals reviewed.  Constitutional: He appears well-developed and well-nourished.   HENT:   Head: Normocephalic and atraumatic.   Eyes: Conjunctivae and EOM are normal. Pupils are equal, round, and reactive to light.   Neck: Normal range of motion. Neck supple.   Cardiovascular: Regular rhythm, normal heart sounds, intact distal pulses and normal pulses. Tachycardia present.    No murmur heard.  Pulmonary/Chest: Breath sounds normal. No respiratory distress. He has no wheezes. He has no rhonchi. He has no rales.   Abdominal: Soft. There is no tenderness. There is no rebound and no guarding.   Musculoskeletal: He exhibits tenderness (mild tenderness over right patella).   Right knee has full painless ROM.   Neurological: He is alert and oriented to person, place, and time.   Skin: Skin is warm and dry. Capillary refill takes less than 2 seconds. There is erythema.   Right knee has a 4 cm in diameter circular ulcer over the right patella with mild surrounding erythema and tenderness. No drainage, fluctuance, or joint effusion.          ED Course   Procedures  Labs Reviewed   COMPREHENSIVE METABOLIC PANEL - Abnormal; Notable for the following components:       Result Value    Potassium 3.4 (*)     CO2 21 (*)     Calcium 8.4 (*)     Total Protein 8.8 (*)     All other components within normal limits   CBC W/ AUTO DIFFERENTIAL - Abnormal; Notable for the following components:    RBC 2.66 (*)     Hemoglobin 9.7 (*)     Hematocrit 29.2 (*)      (*)     MCH 36.5 (*)     RDW 18.6 (*)     All other components within normal limits   SEDIMENTATION RATE - Abnormal; Notable for the following components:    Sed Rate 90 (*)     All other components within normal limits    C-REACTIVE PROTEIN - Abnormal; Notable for the following components:    CRP 13.2 (*)     All other components within normal limits   MAGNESIUM - Abnormal; Notable for the following components:    Magnesium 1.0 (*)     All other components within normal limits   ALCOHOL,MEDICAL (ETHANOL) - Abnormal; Notable for the following components:    Alcohol, Medical, Serum 197 (*)     All other components within normal limits   MAGNESIUM   ALCOHOL,MEDICAL (ETHANOL)          Imaging Results          CT Knee With Contrast Right (Final result)  Result time 04/02/19 13:19:03    Final result by Alvin Smith MD (04/02/19 13:19:03)                 Impression:      No evidence for acute fracture, bone destruction, or abnormal periosteal reaction.    Small joint effusion with small air-fluid level within the medial compartment of the right femorotibial joint.  Septic arthritis cannot be completely excluded.    Fine calcifications within the soft tissues anterior to the patella likely representing gout/calcific bursitis involving the prepatellar bursa.      Electronically signed by: Alvin Smith MD  Date:    04/02/2019  Time:    13:19             Narrative:    EXAMINATION:  CT KNEE WITH CONTRAST RIGHT    CLINICAL HISTORY:  Knee erythema, swelling, cellulitis suspected;    TECHNIQUE:  Contiguous 1.25 mm axial images were obtained through the right knee during the intravenous administration of 75 mL of Omnipaque 350 contrast.  Coronal and sagittal reconstructions of the right knee were obtained.    COMPARISON:  None    FINDINGS:  The bones are intact.  There is no evidence for acute fracture or bone destruction.  There is no evidence for dislocation.  There are degenerative changes with spurring of the tibial spines with osteophytes at the femorotibial and patellofemoral joints.  There is moderate narrowing of the lateral compartment of the femorotibial joint.  There is a small joint effusion present.  There is a small amount of  air within the medial compartment of the femorotibial joint and there appears to be a small air-fluid level.  In this patient with history of ulceration over the right patella, septic arthritis cannot be excluded.  There are calcific densities identified in the region of the prepatellar bursa likely related to gout/calcific bursitis.                                 Medical Decision Making:   Initial Assessment:       58-year-old male with history of HTN sent from [facility] over concern for right knee infection and possible cellulitis.  Patient is nonambulatory at baseline, is due to left spastic hemiparesis and left ankle/foot issues since childhood requiring multiple surgeries.  He also has severe gout exacerbations, usually in right knee, and developed chronic ulcer after last exacerbation about 6 months ago.  Since then he states ulcers muscle growing in size and now has some redness around it in the past month, with purulent drainage a few weeks ago that has persisted since then.  No systemic symptoms such as fevers, and he has full right knee painless ROM, he only has pain with direct palpation. No drainable abscess on exam, he has mild erythema surrounding ulcer of skin over patella.  No underlying fluctuance, and no large joint effusion to suggest septic arthritis.  Likely cellulitis over right knee from nonhealing ulcer over patella.  Patient is tachycardic on arrival to 110 to 120s; he states he has not eat much in the past 3 days, so possible component of dehydration.  He is afebrile with normal BP so sepsis less likely; will consider alcohol withdrawal given his history of previous admission for alcohol withdrawal seizure.      Basic labs with normal WBC, chronic anemia; CT with contrast knee done that shows no evidence of bony destruction or fracture to suggest septic arthritis.  CT does show small joint effusion with small air-fluid level; cannot rule out septic arthritis.  Patient's exam is not  clinically consistent with septic arthritis, and inflammatory markers slightly elevated but not as high as they would be typical of septic arthritis.  Discussed with Ortho on-call Dr. Barcenas, who stated that septic arthritis extremely unlikely patient has full painless ROM, which he does now.  Patient did have 2 episodes of vomiting while in ED, and heart rate remained in 100-120 despite IVF and home dose metoprolol.  Patient initially said that he had had no alcohol intake for 3 weeks, but his wife stated that he drank some yesterday.  He does have some tremors typical of alcohol withdrawal, but no AMS or other concerning exam findings. He was treated with 1 mg Ativan IV empirically for alcohol withdrawal with improvement in heart rate from 120 to 100s.  Alcohol level resulted at 197, which implies he likely drank alcohol earlier today, and that his baseline alcohol level is likely very high.  CMP otherwise with hypomag and K 3.4.   Given episodes of vomiting persistent after 2 doses Zofran, concerned that patient may not be able to tolerate p.o. antibiotics for right knee cellulitis or benzos/alcohol for his impending alcohol withdrawal.  He was given IV clindamycin, and admitted to hospital for further monitoring of withdrawal and nausea.      Clinical Tests:   Lab Tests: Ordered and Reviewed  Radiological Study: Reviewed and Ordered            Scribe Attestation:   Scribe #1: I performed the above scribed service and the documentation accurately describes the services I performed. I attest to the accuracy of the note.    Attending Attestation:           Physician Attestation for Scribe:  Physician Attestation Statement for Scribe #1: I, Dr. Lopez, reviewed documentation, as scribed by Anthony Pelaez in my presence, and it is both accurate and complete.                    Clinical Impression:     1. Gait instability    2. Closed fracture of left ankle, sequela    3. Cellulitis of right knee    4. Alcohol  withdrawal syndrome without complication    5. Nausea and vomiting, intractability of vomiting not specified, unspecified vomiting type                                Jose Lopez MD  04/02/19 3817

## 2019-04-02 NOTE — PLAN OF CARE
[facility]    HOME HEALTH ORDERS  FACE TO FACE ENCOUNTER    Patient Name: Michael Johnson Jr.  YOB: 1960    PCP: Zunilda Bolden NP   PCP Address: [address] [facility] / [place] [address]  PCP Phone Number: [phone]  PCP Fax: [phone]    Encounter Date: 04/02/2019    Admit to Home Health    Diagnoses:  There are no hospital problems to display for this patient.      No future appointments.        I have seen and examined this patient face to face today. My clinical findings that support the need for the home health skilled services and home bound status are the following:  Weakness/numbness causing balance and gait disturbance due to Coronary Heart Disease and Weakness/Debility making it taxing to leave home.  Requiring assistive device to leave home due to unsteady gait caused by  Coronary Artery Disease and Weakness/Debility.    Allergies:  Review of patient's allergies indicates:   Allergen Reactions    Lisinopril Swelling     Pt admitted with angioedema 2wks after taking lisinopril.        Diet: cardiac diet    Activities: activity as tolerated    Nursing:   SN to complete comprehensive assessment including routine vital signs. Instruct on disease process and s/s of complications to report to MD. Review/verify medication list sent home with the patient at time of discharge  and instruct patient/caregiver as needed. Frequency may be adjusted depending on start of care date.    Notify MD if SBP > 160 or < 90; DBP > 90 or < 50; HR > 120 or < 50; Temp > 101;    CONSULTS:    Physical Therapy to evaluate and treat. Evaluate for home safety and equipment needs; Establish/upgrade home exercise program. Perform / instruct on therapeutic exercises, gait training, transfer training, and Range of Motion.  Occupational Therapy to evaluate and treat. Evaluate home environment for safety and equipment needs. Perform/Instruct on transfers, ADL training, ROM, and  therapeutic exercises.   to evaluate for community resources/long-range planning.  Aide to provide assistance with personal care, ADLs, and vital signs.    WOUND CARE ORDERS  { WOUND CARE ORDERS:41362}      Medications: Review discharge medications with patient and family and provide education.      Current Discharge Medication List      CONTINUE these medications which have NOT CHANGED    Details   aspirin (ECOTRIN) 81 MG EC tablet Take 81 mg by mouth once daily.      ferrous sulfate 325 (65 FE) MG EC tablet Take 325 mg by mouth once daily.      gabapentin (NEURONTIN) 300 MG capsule Take 300 mg by mouth 3 (three) times daily.      metoprolol succinate (TOPROL-XL) 25 MG 24 hr tablet Take 1 tablet (25 mg total) by mouth once daily.  Qty: 30 tablet, Refills: 3      allopurinol (ZYLOPRIM) 300 MG tablet Take 300 mg by mouth once daily.      colchicine 0.6 mg tablet Take 0.6 mg by mouth once daily.      diclofenac (VOLTAREN) 75 MG EC tablet Take 75 mg by mouth as needed.      diltiaZEM (TIAZAC) 180 MG Cs24 Take 180 mg by mouth once daily.      ergocalciferol (VITAMIN D2) 50,000 unit Cap Take 50,000 Units by mouth every 7 days.      hydrocodone-acetaminophen 10-325mg (NORCO)  mg Tab Take 1 tablet by mouth every 4 (four) hours as needed for Pain.  Qty: 40 tablet, Refills: 0      hydrocodone-acetaminophen 5-325mg (NORCO) 5-325 mg per tablet Take 1 tablet by mouth every 6 (six) hours as needed for Pain.      ibuprofen (ADVIL,MOTRIN) 600 MG tablet Take 600 mg by mouth 3 (three) times daily.      levETIRAcetam (KEPPRA) 500 MG Tab Take 1 tablet (500 mg total) by mouth 2 (two) times daily.  Qty: 60 tablet, Refills: 3      magnesium 250 mg Tab Take 1 tablet by mouth once daily.      meloxicam (MOBIC) 7.5 MG tablet Take 7.5 mg by mouth once daily.      potassium chloride (KLOR-CON) 20 mEq Pack Take 20 mEq by mouth 2 (two) times daily.      QUEtiapine (SEROQUEL) 50 MG tablet Take 50 mg by mouth every evening.       ranitidine (ZANTAC) 150 MG tablet Take 150 mg by mouth 2 (two) times daily.      traMADol (ULTRAM) 50 mg tablet Take 50 mg by mouth every 6 (six) hours as needed for Pain.      traZODone (DESYREL) 50 MG tablet Take 50 mg by mouth every evening.             I certify that this patient is confined to his home and needs intermittent skilled nursing care, physical therapy and occupational therapy.

## 2019-04-02 NOTE — ED NOTES
Turned pt to visualize back to check for skin breakdown. No skin breakdown noted. Rounding completed on pt. Pt updated on plan of care. Pt continues to ask for water, educated patient to hold off on water until he no longer feels nauseated. Pt verbalized understanding. Call light within reach. HR remains tachycardia but improved.

## 2019-04-02 NOTE — HPI
"59 yo male with a PMHx of seizures (withdrawal), gout, atrial fibrillation, alcohol abuse, CKDIII, anemia, HTN, bedbound at baseline referred to the ED by his PCP at [facility] with concern for osteomyelitis of the right knee. Patient reports his symptoms initially began several months ago when he experienced a "gout flare" affecting the right knee that was swollen and red, when a small skin break appeared about 6 months ago. He reports persistent pain, redness and swelling with progressive development of a wound on the knee. He reports new onset of "pus" discharge from the wound about one week ago. He applied a dressing to the wound a few days ago and reports the wound became malodorous about two days ago. He reports the knee is pain free at rest and is only painful to the touch. Denies fever, chills. Patient states last drink 3 weeks ago; however, wife states yesterday. Also states he is "out" of his medications. Patient poor historian.       ED eval; patient afebrile, no WBC, CT Knee No evidence for acute fracture, bone destruction, or abnormal periosteal reaction. Patient tachycardic, N/V, alcohol 197, showing signs of alcohol withdrawal  Admit to OBS    "

## 2019-04-02 NOTE — ED NOTES
Hourly rounding complete- Pt. In stretcher, continued N/V, pt. Instructed multiple times to not attempt to drink fluids as he continues to vomit. Pt. Expresses understanding, yet still continues to drink lemonade his wife brought. N/V persist. respirations equal/ unlabored, skin warm and dry, speaking clearly in full sentences. Tremors are noticeably worse. Wife at bedside. Updated on plan of care, will continue to monitor. Safety precautions continued.

## 2019-04-02 NOTE — NURSING
Pt in bed resting in NAD. Pt c/o pain to L ankle (chronic) and to R knee occasionally. Pt is receiving IVF (banana bag) and has received electrolyte replacement. Critical labs have been reported to PA. 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 Pt is able to void in urinal. Wound to R knee is dry and open to air, no drainage noted at this time. Pt will be receiving antibiotics. Pt is on telemetry, no ectopy seen at this time. CIWA protocol to be performed Q4H- meds for ETOH w/drawl are scheduled. Will continue to monitor pt.

## 2019-04-02 NOTE — SUBJECTIVE & OBJECTIVE
Past Medical History:   Diagnosis Date    Afib     Alcohol abuse with other alcohol-induced disorder 04/02/2019    Anemia 04/02/2019    unspecified deficiency    Ankle fracture, left     Aortic atherosclerosis 04/02/2019    Arthritis     Asthma     Cardiomyopathy 04/02/2019    Chronic kidney disease (CKD), stage III (moderate) 04/02/2019    Depression     Erectile dysfunction 04/02/2019    Gout, arthropathy 04/02/2019    Gout, unspecified     Hypertension     Hypertensive chronic kidney disease 04/02/2019    Hypomagnesemia 04/02/2019    Noncompliance 04/02/2019    Peripheral neuropathy 04/02/2019    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    Secondary hyperparathyroidism, renal 04/02/2019    Seizure 2016    Spastic hemiplegia affecting left nondominant side 04/02/2019       Past Surgical History:   Procedure Laterality Date    ANKLE SURGERY      x6    CHOLECYSTECTOMY-LAPAROSCOPIC N/A 3/29/2018    Performed by Joshua Singh Jr., MD at [facility], [place], operated by [facility] OR    COLONOSCOPY N/A 5/26/2014    Performed by Rikki Gilbert MD at [facility] (4TH FLR)       Review of patient's allergies indicates:   Allergen Reactions    Lisinopril Swelling     Pt admitted with angioedema 2wks after taking lisinopril.        No current facility-administered medications on file prior to encounter.      Current Outpatient Medications on File Prior to Encounter   Medication Sig    aspirin (ECOTRIN) 81 MG EC tablet Take 81 mg by mouth once daily.    ferrous sulfate 325 (65 FE) MG EC tablet Take 325 mg by mouth once daily.    gabapentin (NEURONTIN) 300 MG capsule Take 300 mg by mouth 3 (three) times daily.    metoprolol succinate (TOPROL-XL) 25 MG 24 hr tablet Take 1 tablet (25 mg total) by mouth once daily. (Patient taking differently: Take 25 mg by mouth 2 (two) times daily. )    allopurinol (ZYLOPRIM) 300 MG tablet Take 300 mg by mouth once daily.    colchicine 0.6 mg tablet Take 0.6 mg by mouth once daily.    diclofenac (VOLTAREN) 75 MG EC  tablet Take 75 mg by mouth as needed.    diltiaZEM (TIAZAC) 180 MG Cs24 Take 180 mg by mouth once daily.    ergocalciferol (VITAMIN D2) 50,000 unit Cap Take 50,000 Units by mouth every 7 days.    hydrocodone-acetaminophen 10-325mg (NORCO)  mg Tab Take 1 tablet by mouth every 4 (four) hours as needed for Pain.    hydrocodone-acetaminophen 5-325mg (NORCO) 5-325 mg per tablet Take 1 tablet by mouth every 6 (six) hours as needed for Pain.    ibuprofen (ADVIL,MOTRIN) 600 MG tablet Take 600 mg by mouth 3 (three) times daily.    levETIRAcetam (KEPPRA) 500 MG Tab Take 1 tablet (500 mg total) by mouth 2 (two) times daily.    magnesium 250 mg Tab Take 1 tablet by mouth once daily.    meloxicam (MOBIC) 7.5 MG tablet Take 7.5 mg by mouth once daily.    potassium chloride (KLOR-CON) 20 mEq Pack Take 20 mEq by mouth 2 (two) times daily.    QUEtiapine (SEROQUEL) 50 MG tablet Take 50 mg by mouth every evening.    ranitidine (ZANTAC) 150 MG tablet Take 150 mg by mouth 2 (two) times daily.    traMADol (ULTRAM) 50 mg tablet Take 50 mg by mouth every 6 (six) hours as needed for Pain.    traZODone (DESYREL) 50 MG tablet Take 50 mg by mouth every evening.    [DISCONTINUED] amLODIPine (NORVASC) 10 MG tablet Take 10 mg by mouth once daily.    [DISCONTINUED] carvedilol (COREG) 25 MG tablet Take 25 mg by mouth 2 (two) times daily.     Family History     Problem Relation (Age of Onset)    Cancer Mother    Cataracts Maternal Grandfather    Hypertension Father    Stroke Maternal Grandmother, Maternal Grandfather        Tobacco Use    Smoking status: Never Smoker    Smokeless tobacco: Never Used   Substance and Sexual Activity    Alcohol use: Yes     Alcohol/week: 1.8 - 2.4 oz     Types: 3 - 4 Shots of liquor per week     Comment: daily    Drug use: No    Sexual activity: Not on file     Review of Systems   Constitutional: Negative for activity change, appetite change, fatigue and fever.   HENT: Negative for congestion,  rhinorrhea and sore throat.    Eyes: Negative.    Respiratory: Negative for cough and shortness of breath.    Cardiovascular: Negative for chest pain, palpitations and leg swelling.   Gastrointestinal: Positive for nausea and vomiting. Negative for abdominal distention, abdominal pain and constipation.   Genitourinary: Negative for difficulty urinating and frequency.   Musculoskeletal: Positive for arthralgias.        Right Knee pain   Skin: Positive for wound.   Neurological: Negative for seizures, syncope, light-headedness and headaches.   Psychiatric/Behavioral: Negative for agitation.     Objective:     Vital Signs (Most Recent):  Temp: 98.1 °F (36.7 °C) (04/02/19 1603)  Pulse: 100 (04/02/19 1544)  Resp: 19 (04/02/19 0941)  BP: (!) 143/99 (04/02/19 1544)  SpO2: 97 % (04/02/19 1544) Vital Signs (24h Range):  Temp:  [98.1 °F (36.7 °C)-99 °F (37.2 °C)] 98.1 °F (36.7 °C)  Pulse:  [] 100  Resp:  [18-19] 19  SpO2:  [95 %-99 %] 97 %  BP: (143-194)/() 143/99     Weight: 86.2 kg (190 lb)  Body mass index is 25.77 kg/m².    Physical Exam   Constitutional: He is oriented to person, place, and time. He appears well-developed and well-nourished. No distress.   HENT:   Head: Normocephalic and atraumatic.   Mouth/Throat: Oropharynx is clear and moist.   Eyes: Conjunctivae are normal. Right eye exhibits no discharge. Left eye exhibits no discharge. No scleral icterus.   Neck: Normal range of motion. Neck supple.   Cardiovascular:   tachycardic   Pulmonary/Chest: Effort normal and breath sounds normal. No respiratory distress. He has no wheezes.   Abdominal: Soft. Bowel sounds are normal. He exhibits no distension. There is no tenderness. There is no rebound and no guarding.   Musculoskeletal: He exhibits tenderness (right knee TTP). He exhibits no edema.   Neurological: He is alert and oriented to person, place, and time.   Skin: Skin is warm and dry. Capillary refill takes less than 2 seconds. He is not  diaphoretic.   Right knee has a 4 cm in diameter circular ulcer over the right patella with mild surrounding erythema and tenderness. No drainage, fluctuance, or joint effusion.    Nursing note and vitals reviewed.          Significant Labs:   CBC:   Recent Labs   Lab 04/02/19  0955   WBC 7.83   HGB 9.7*   HCT 29.2*        CMP:   Recent Labs   Lab 04/02/19  0955      K 3.4*      CO2 21*   GLU 90   BUN 9   CREATININE 1.0   CALCIUM 8.4*   PROT 8.8*   ALBUMIN 3.5   BILITOT 0.7   ALKPHOS 81   AST 31   ALT 12   ANIONGAP 16   EGFRNONAA >60     Magnesium:   Recent Labs   Lab 04/02/19  0955   MG 1.0*     All pertinent labs within the past 24 hours have been reviewed.    Significant Imaging: I have reviewed all pertinent imaging results/findings within the past 24 hours.   Narrative     EXAMINATION:  CT KNEE WITH CONTRAST RIGHT    CLINICAL HISTORY:  Knee erythema, swelling, cellulitis suspected;    TECHNIQUE:  Contiguous 1.25 mm axial images were obtained through the right knee during the intravenous administration of 75 mL of Omnipaque 350 contrast.  Coronal and sagittal reconstructions of the right knee were obtained.    COMPARISON:  None    FINDINGS:  The bones are intact.  There is no evidence for acute fracture or bone destruction.  There is no evidence for dislocation.  There are degenerative changes with spurring of the tibial spines with osteophytes at the femorotibial and patellofemoral joints.  There is moderate narrowing of the lateral compartment of the femorotibial joint.  There is a small joint effusion present.  There is a small amount of air within the medial compartment of the femorotibial joint and there appears to be a small air-fluid level.  In this patient with history of ulceration over the right patella, septic arthritis cannot be excluded.  There are calcific densities identified in the region of the prepatellar bursa likely related to gout/calcific bursitis.      Impression        No evidence for acute fracture, bone destruction, or abnormal periosteal reaction.    Small joint effusion with small air-fluid level within the medial compartment of the right femorotibial joint.  Septic arthritis cannot be completely excluded.    Fine calcifications within the soft tissues anterior to the patella likely representing gout/calcific bursitis involving the prepatellar bursa.      Electronically signed by: Alvin Smith MD  Date: 04/02/2019  Time: 13:19

## 2019-04-02 NOTE — H&P
"[facility] Hospital Medicine  History & Physical    Patient Name: Michael Johnson Jr.  MRN: [id number]  Admission Date: 4/2/2019  Attending Physician: Joshua Powell MD   Primary Care Provider: Zunilda Bolden NP         Patient information was obtained from patient, relative(s), past medical records and ER records.     Subjective:     Principal Problem:Cellulitis of right knee    Chief Complaint:   Chief Complaint   Patient presents with    Wound Infection     Pt sent to ED for r/o osteomylitis of the right knee per [facility].        HPI: 59 yo male with a PMHx of seizures (withdrawal), gout, atrial fibrillation, alcohol abuse, CKDIII, anemia, HTN, bedbound at baseline referred to the ED by his PCP at [facility] with concern for osteomyelitis of the right knee. Patient reports his symptoms initially began several months ago when he experienced a "gout flare" affecting the right knee that was swollen and red, when a small skin break appeared about 6 months ago. He reports persistent pain, redness and swelling with progressive development of a wound on the knee. He reports new onset of "pus" discharge from the wound about one week ago. He applied a dressing to the wound a few days ago and reports the wound became malodorous about two days ago. He reports the knee is pain free at rest and is only painful to the touch. Denies fever, chills. Patient states last drink 3 weeks ago; however, wife states yesterday. Also states he is "out" of his medications. Patient poor historian.       ED eval; patient afebrile, no WBC, CT Knee No evidence for acute fracture, bone destruction, or abnormal periosteal reaction. Patient tachycardic, N/V, alcohol 197, showing signs of alcohol withdrawal  Admit to OBS      Past Medical History:   Diagnosis Date    Afib     Alcohol abuse with other alcohol-induced disorder 04/02/2019    Anemia 04/02/2019    unspecified deficiency    Ankle fracture, left     Aortic " atherosclerosis 04/02/2019    Arthritis     Asthma     Cardiomyopathy 04/02/2019    Chronic kidney disease (CKD), stage III (moderate) 04/02/2019    Depression     Erectile dysfunction 04/02/2019    Gout, arthropathy 04/02/2019    Gout, unspecified     Hypertension     Hypertensive chronic kidney disease 04/02/2019    Hypomagnesemia 04/02/2019    Noncompliance 04/02/2019    Peripheral neuropathy 04/02/2019    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    Secondary hyperparathyroidism, renal 04/02/2019    Seizure 2016    Spastic hemiplegia affecting left nondominant side 04/02/2019       Past Surgical History:   Procedure Laterality Date    ANKLE SURGERY      x6    CHOLECYSTECTOMY-LAPAROSCOPIC N/A 3/29/2018    Performed by Joshua [facility] Jr[facility] at [facility] OR    COLONOSCOPY N/A 5/26/2014    Performed by Rikki Gilbert MD at [facility] ([facility])       Review of patient's allergies indicates:   Allergen Reactions    Lisinopril Swelling     Pt admitted with angioedema 2wks after taking lisinopril.        No current facility-administered medications on file prior to encounter.      Current Outpatient Medications on File Prior to Encounter   Medication Sig    aspirin (ECOTRIN) 81 MG EC tablet Take 81 mg by mouth once daily.    ferrous sulfate 325 (65 FE) MG EC tablet Take 325 mg by mouth once daily.    gabapentin (NEURONTIN) 300 MG capsule Take 300 mg by mouth 3 (three) times daily.    metoprolol succinate (TOPROL-XL) 25 MG 24 hr tablet Take 1 tablet (25 mg total) by mouth once daily. (Patient taking differently: Take 25 mg by mouth 2 (two) times daily. )    allopurinol (ZYLOPRIM) 300 MG tablet Take 300 mg by mouth once daily.    colchicine 0.6 mg tablet Take 0.6 mg by mouth once daily.    diclofenac (VOLTAREN) 75 MG EC tablet Take 75 mg by mouth as needed.    diltiaZEM (TIAZAC) 180 MG Cs24 Take 180 mg by mouth once daily.    ergocalciferol (VITAMIN D2) 50,000 unit Cap Take 50,000 Units by mouth every 7 days.     hydrocodone-acetaminophen 10-325mg (NORCO)  mg Tab Take 1 tablet by mouth every 4 (four) hours as needed for Pain.    hydrocodone-acetaminophen 5-325mg (NORCO) 5-325 mg per tablet Take 1 tablet by mouth every 6 (six) hours as needed for Pain.    ibuprofen (ADVIL,MOTRIN) 600 MG tablet Take 600 mg by mouth 3 (three) times daily.    levETIRAcetam (KEPPRA) 500 MG Tab Take 1 tablet (500 mg total) by mouth 2 (two) times daily.    magnesium 250 mg Tab Take 1 tablet by mouth once daily.    meloxicam (MOBIC) 7.5 MG tablet Take 7.5 mg by mouth once daily.    potassium chloride (KLOR-CON) 20 mEq Pack Take 20 mEq by mouth 2 (two) times daily.    QUEtiapine (SEROQUEL) 50 MG tablet Take 50 mg by mouth every evening.    ranitidine (ZANTAC) 150 MG tablet Take 150 mg by mouth 2 (two) times daily.    traMADol (ULTRAM) 50 mg tablet Take 50 mg by mouth every 6 (six) hours as needed for Pain.    traZODone (DESYREL) 50 MG tablet Take 50 mg by mouth every evening.    [DISCONTINUED] amLODIPine (NORVASC) 10 MG tablet Take 10 mg by mouth once daily.    [DISCONTINUED] carvedilol (COREG) 25 MG tablet Take 25 mg by mouth 2 (two) times daily.     Family History     Problem Relation (Age of Onset)    Cancer Mother    Cataracts Maternal Grandfather    Hypertension Father    Stroke Maternal Grandmother, Maternal Grandfather        Tobacco Use    Smoking status: Never Smoker    Smokeless tobacco: Never Used   Substance and Sexual Activity    Alcohol use: Yes     Alcohol/week: 1.8 - 2.4 oz     Types: 3 - 4 Shots of liquor per week     Comment: daily    Drug use: No    Sexual activity: Not on file     Review of Systems   Constitutional: Negative for activity change, appetite change, fatigue and fever.   HENT: Negative for congestion, rhinorrhea and sore throat.    Eyes: Negative.    Respiratory: Negative for cough and shortness of breath.    Cardiovascular: Negative for chest pain, palpitations and leg swelling.    Gastrointestinal: Positive for nausea and vomiting. Negative for abdominal distention, abdominal pain and constipation.   Genitourinary: Negative for difficulty urinating and frequency.   Musculoskeletal: Positive for arthralgias.        Right Knee pain   Skin: Positive for wound.   Neurological: Negative for seizures, syncope, light-headedness and headaches.   Psychiatric/Behavioral: Negative for agitation.     Objective:     Vital Signs (Most Recent):  Temp: 98.1 °F (36.7 °C) (04/02/19 1603)  Pulse: 100 (04/02/19 1544)  Resp: 19 (04/02/19 0941)  BP: (!) 143/99 (04/02/19 1544)  SpO2: 97 % (04/02/19 1544) Vital Signs (24h Range):  Temp:  [98.1 °F (36.7 °C)-99 °F (37.2 °C)] 98.1 °F (36.7 °C)  Pulse:  [] 100  Resp:  [18-19] 19  SpO2:  [95 %-99 %] 97 %  BP: (143-194)/() 143/99     Weight: 86.2 kg (190 lb)  Body mass index is 25.77 kg/m².    Physical Exam   Constitutional: He is oriented to person, place, and time. He appears well-developed and well-nourished. No distress.   HENT:   Head: Normocephalic and atraumatic.   Mouth/Throat: Oropharynx is clear and moist.   Eyes: Conjunctivae are normal. Right eye exhibits no discharge. Left eye exhibits no discharge. No scleral icterus.   Neck: Normal range of motion. Neck supple.   Cardiovascular:   tachycardic   Pulmonary/Chest: Effort normal and breath sounds normal. No respiratory distress. He has no wheezes.   Abdominal: Soft. Bowel sounds are normal. He exhibits no distension. There is no tenderness. There is no rebound and no guarding.   Musculoskeletal: He exhibits tenderness (right knee TTP). He exhibits no edema.   Neurological: He is alert and oriented to person, place, and time.   Skin: Skin is warm and dry. Capillary refill takes less than 2 seconds. He is not diaphoretic.   Right knee has a 4 cm in diameter circular ulcer over the right patella with mild surrounding erythema and tenderness. No drainage, fluctuance, or joint effusion.    Nursing note  and vitals reviewed.          Significant Labs:   CBC:   Recent Labs   Lab 04/02/19  0955   WBC 7.83   HGB 9.7*   HCT 29.2*        CMP:   Recent Labs   Lab 04/02/19  0955      K 3.4*      CO2 21*   GLU 90   BUN 9   CREATININE 1.0   CALCIUM 8.4*   PROT 8.8*   ALBUMIN 3.5   BILITOT 0.7   ALKPHOS 81   AST 31   ALT 12   ANIONGAP 16   EGFRNONAA >60     Magnesium:   Recent Labs   Lab 04/02/19  0955   MG 1.0*     All pertinent labs within the past 24 hours have been reviewed.    Significant Imaging: I have reviewed all pertinent imaging results/findings within the past 24 hours.   Narrative     EXAMINATION:  CT KNEE WITH CONTRAST RIGHT    CLINICAL HISTORY:  Knee erythema, swelling, cellulitis suspected;    TECHNIQUE:  Contiguous 1.25 mm axial images were obtained through the right knee during the intravenous administration of 75 mL of Omnipaque 350 contrast.  Coronal and sagittal reconstructions of the right knee were obtained.    COMPARISON:  None    FINDINGS:  The bones are intact.  There is no evidence for acute fracture or bone destruction.  There is no evidence for dislocation.  There are degenerative changes with spurring of the tibial spines with osteophytes at the femorotibial and patellofemoral joints.  There is moderate narrowing of the lateral compartment of the femorotibial joint.  There is a small joint effusion present.  There is a small amount of air within the medial compartment of the femorotibial joint and there appears to be a small air-fluid level.  In this patient with history of ulceration over the right patella, septic arthritis cannot be excluded.  There are calcific densities identified in the region of the prepatellar bursa likely related to gout/calcific bursitis.      Impression       No evidence for acute fracture, bone destruction, or abnormal periosteal reaction.    Small joint effusion with small air-fluid level within the medial compartment of the right femorotibial  joint.  Septic arthritis cannot be completely excluded.    Fine calcifications within the soft tissues anterior to the patella likely representing gout/calcific bursitis involving the prepatellar bursa.      Electronically signed by: Alvin Smith MD  Date: 04/02/2019  Time: 13:19            Assessment/Plan:     * Cellulitis of right knee  - no drainage noted, no fever or WBC, FROM  - CT No evidence for acute fracture, bone destruction, or abnormal periosteal reaction. - ED d/w ortho nothing acute, no effusion  - Clinda started  - monitor      Hypokalemia  - replace      Alcohol abuse  - alcohol 197  - librium for WD  - monitor CIWA  - banana bag  - thiamine, folic acid      CKD (chronic kidney disease), stage III  - appears stable   - renally dose meds  - avoid nephrotoxins    Essential hypertension  - elevated on admission  - resume home meds, diltiazem 180 mg and metoprolol 50 mg  - monitor      Macrocytic anemia  - chronic anemia likely secondary to alcoholism, poor nutrition  - check B12, folate      Hypomagnesemia  - suspect  d/t alcoholism  - replace and monitor      VTE Risk Mitigation (From admission, onward)        Ordered     heparin (porcine) injection 5,000 Units  Every 8 hours      04/02/19 1611     Place sequential compression device  Until discontinued      04/02/19 1611     IP VTE HIGH RISK PATIENT  Once      04/02/19 1611             Sarah Chapman PA-C  Department of Hospital Medicine   [facility]

## 2019-04-02 NOTE — ED NOTES
RN entered room after cardiac monitor was reading tachycardia with rate of 142. Pt had loose bowel movement and 2 episodes of vomiting. Pt states he became nauseated after given the contrast dye during CT. Pt's bed and gown cleaned of soiled linen. Pt admits continued nausea. Pt remains on cardiac monitor and placed back in bed. Dr. Lopez notified of pt status. Pt instructed to not drink or eat anything. Pt easily redirected but repeats questions and continuously asks if he can drink anything. Wife remains at bedside.

## 2019-04-02 NOTE — ED NOTES
Hourly rounding complete- Pt. In stretcher without S/Sx of acute distress, respirations equal/ unlabored, skin warm and dry, speaking clearly in full sentences. IV fluid nearly complete. Updated on plan of care, will continue to monitor. Safety precautions continued.

## 2019-04-02 NOTE — PROGRESS NOTES
Transferred from ER to OBS- via stretcher by escort . Patient transferred from stretcher to bed independently. . Patient id verified, fall and allergy band in place. Patient AAOx4. Patient oriented to room and call bell system. Patient able to voice use of call bell system. Patient call bell placed within reach of patient. Assessed patient and vitals, respirations even and unlabored. Patient assessed for pain using pain scale located in patient room. Monitor patient for facial grimacing and or any guarding. Will provide patient with PRN pain medication as needed. HOB elevated to 45 degrees for lung expansion. Bed locked and in lowest possible position, condition stable, will continue to monitor patient. lissette Prater

## 2019-04-02 NOTE — PLAN OF CARE
"DME - delivered 04/02/2019 Room ED-9 [id number]  One - 20" Wheel Chair   One - 20" Cushion  One - Reg Leg Rest  One - Velcro Seat Belt [facility]  Case Management  [phone]     "

## 2019-04-03 PROBLEM — R50.9 FEVER: Status: ACTIVE | Noted: 2019-04-03

## 2019-04-03 LAB
ANION GAP SERPL CALC-SCNC: 11 MMOL/L (ref 8–16)
BILIRUB UR QL STRIP: NEGATIVE
BUN SERPL-MCNC: 9 MG/DL (ref 6–20)
CALCIUM SERPL-MCNC: 7.8 MG/DL (ref 8.7–10.5)
CHLORIDE SERPL-SCNC: 101 MMOL/L (ref 95–110)
CLARITY UR: CLEAR
CO2 SERPL-SCNC: 24 MMOL/L (ref 23–29)
COLOR UR: YELLOW
CREAT SERPL-MCNC: 0.9 MG/DL (ref 0.5–1.4)
ERYTHROCYTE [DISTWIDTH] IN BLOOD BY AUTOMATED COUNT: 18.1 % (ref 11.5–14.5)
EST. GFR  (AFRICAN AMERICAN): >60 ML/MIN/1.73 M^2
EST. GFR  (NON AFRICAN AMERICAN): >60 ML/MIN/1.73 M^2
FOLATE SERPL-MCNC: 9.6 NG/ML (ref 4–24)
GLUCOSE SERPL-MCNC: 77 MG/DL (ref 70–110)
GLUCOSE UR QL STRIP: NEGATIVE
HCT VFR BLD AUTO: 25.7 % (ref 40–54)
HGB BLD-MCNC: 8.6 G/DL (ref 14–18)
HGB UR QL STRIP: ABNORMAL
KETONES UR QL STRIP: NEGATIVE
LEUKOCYTE ESTERASE UR QL STRIP: NEGATIVE
MAGNESIUM SERPL-MCNC: 1.1 MG/DL (ref 1.6–2.6)
MCH RBC QN AUTO: 36 PG (ref 27–31)
MCHC RBC AUTO-ENTMCNC: 33.5 G/DL (ref 32–36)
MCV RBC AUTO: 108 FL (ref 82–98)
NITRITE UR QL STRIP: NEGATIVE
PH UR STRIP: 7 [PH] (ref 5–8)
PHOSPHATE SERPL-MCNC: 2 MG/DL (ref 2.7–4.5)
PLATELET # BLD AUTO: 180 K/UL (ref 150–350)
PMV BLD AUTO: 9.4 FL (ref 9.2–12.9)
POTASSIUM SERPL-SCNC: 3.4 MMOL/L (ref 3.5–5.1)
PROT UR QL STRIP: NEGATIVE
RBC # BLD AUTO: 2.39 M/UL (ref 4.6–6.2)
SODIUM SERPL-SCNC: 136 MMOL/L (ref 136–145)
SP GR UR STRIP: <=1.005 (ref 1–1.03)
URATE SERPL-MCNC: 9.9 MG/DL (ref 3.4–7)
URN SPEC COLLECT METH UR: ABNORMAL
UROBILINOGEN UR STRIP-ACNC: NEGATIVE EU/DL
VIT B12 SERPL-MCNC: 463 PG/ML (ref 210–950)
WBC # BLD AUTO: 6.04 K/UL (ref 3.9–12.7)

## 2019-04-03 PROCEDURE — 63600175 PHARM REV CODE 636 W HCPCS: Performed by: HOSPITALIST

## 2019-04-03 PROCEDURE — 87040 BLOOD CULTURE FOR BACTERIA: CPT | Mod: 59

## 2019-04-03 PROCEDURE — 83735 ASSAY OF MAGNESIUM: CPT

## 2019-04-03 PROCEDURE — 82746 ASSAY OF FOLIC ACID SERUM: CPT

## 2019-04-03 PROCEDURE — 36415 COLL VENOUS BLD VENIPUNCTURE: CPT

## 2019-04-03 PROCEDURE — 25000003 PHARM REV CODE 250: Performed by: PHYSICIAN ASSISTANT

## 2019-04-03 PROCEDURE — 11000001 HC ACUTE MED/SURG PRIVATE ROOM

## 2019-04-03 PROCEDURE — 80048 BASIC METABOLIC PNL TOTAL CA: CPT

## 2019-04-03 PROCEDURE — 82607 VITAMIN B-12: CPT

## 2019-04-03 PROCEDURE — 63600175 PHARM REV CODE 636 W HCPCS: Performed by: PHYSICIAN ASSISTANT

## 2019-04-03 PROCEDURE — 99233 PR SUBSEQUENT HOSPITAL CARE,LEVL III: ICD-10-PCS | Mod: ,,, | Performed by: PHYSICIAN ASSISTANT

## 2019-04-03 PROCEDURE — S0077 INJECTION, CLINDAMYCIN PHOSP: HCPCS | Performed by: PHYSICIAN ASSISTANT

## 2019-04-03 PROCEDURE — 94761 N-INVAS EAR/PLS OXIMETRY MLT: CPT

## 2019-04-03 PROCEDURE — G0378 HOSPITAL OBSERVATION PER HR: HCPCS

## 2019-04-03 PROCEDURE — 81003 URINALYSIS AUTO W/O SCOPE: CPT

## 2019-04-03 PROCEDURE — 99233 SBSQ HOSP IP/OBS HIGH 50: CPT | Mod: ,,, | Performed by: PHYSICIAN ASSISTANT

## 2019-04-03 PROCEDURE — 84100 ASSAY OF PHOSPHORUS: CPT

## 2019-04-03 PROCEDURE — 85027 COMPLETE CBC AUTOMATED: CPT

## 2019-04-03 PROCEDURE — 25000003 PHARM REV CODE 250: Performed by: HOSPITALIST

## 2019-04-03 PROCEDURE — 84550 ASSAY OF BLOOD/URIC ACID: CPT

## 2019-04-03 RX ORDER — MAGNESIUM SULFATE HEPTAHYDRATE 40 MG/ML
2 INJECTION, SOLUTION INTRAVENOUS ONCE
Status: COMPLETED | OUTPATIENT
Start: 2019-04-03 | End: 2019-04-03

## 2019-04-03 RX ORDER — PREDNISONE 20 MG/1
40 TABLET ORAL ONCE
Status: COMPLETED | OUTPATIENT
Start: 2019-04-03 | End: 2019-04-03

## 2019-04-03 RX ORDER — MAGNESIUM SULFATE 1 G/100ML
2 INJECTION INTRAVENOUS ONCE
Status: COMPLETED | OUTPATIENT
Start: 2019-04-03 | End: 2019-04-03

## 2019-04-03 RX ORDER — POTASSIUM CHLORIDE 20 MEQ/1
40 TABLET, EXTENDED RELEASE ORAL ONCE
Status: COMPLETED | OUTPATIENT
Start: 2019-04-03 | End: 2019-04-03

## 2019-04-03 RX ORDER — COLCHICINE 0.6 MG/1
0.6 TABLET, FILM COATED ORAL ONCE
Status: COMPLETED | OUTPATIENT
Start: 2019-04-03 | End: 2019-04-03

## 2019-04-03 RX ORDER — CHLORDIAZEPOXIDE HYDROCHLORIDE 25 MG/1
25 CAPSULE, GELATIN COATED ORAL 2 TIMES DAILY
Status: DISCONTINUED | OUTPATIENT
Start: 2019-04-03 | End: 2019-04-04 | Stop reason: HOSPADM

## 2019-04-03 RX ORDER — SODIUM,POTASSIUM PHOSPHATES 280-250MG
1 POWDER IN PACKET (EA) ORAL
Status: DISCONTINUED | OUTPATIENT
Start: 2019-04-03 | End: 2019-04-04

## 2019-04-03 RX ADMIN — MAGNESIUM SULFATE IN DEXTROSE 2 G: 10 INJECTION, SOLUTION INTRAVENOUS at 08:04

## 2019-04-03 RX ADMIN — GABAPENTIN 300 MG: 300 CAPSULE ORAL at 08:04

## 2019-04-03 RX ADMIN — CHLORDIAZEPOXIDE HYDROCHLORIDE 25 MG: 25 CAPSULE ORAL at 08:04

## 2019-04-03 RX ADMIN — POTASSIUM & SODIUM PHOSPHATES POWDER PACK 280-160-250 MG 1 PACKET: 280-160-250 PACK at 09:04

## 2019-04-03 RX ADMIN — SODIUM CHLORIDE: 0.45 INJECTION, SOLUTION INTRAVENOUS at 10:04

## 2019-04-03 RX ADMIN — SODIUM CHLORIDE: 0.45 INJECTION, SOLUTION INTRAVENOUS at 09:04

## 2019-04-03 RX ADMIN — HYDROCODONE BITARTRATE AND ACETAMINOPHEN 1 TABLET: 5; 325 TABLET ORAL at 01:04

## 2019-04-03 RX ADMIN — METOPROLOL SUCCINATE 50 MG: 50 TABLET, EXTENDED RELEASE ORAL at 08:04

## 2019-04-03 RX ADMIN — GABAPENTIN 300 MG: 300 CAPSULE ORAL at 09:04

## 2019-04-03 RX ADMIN — ALLOPURINOL 300 MG: 300 TABLET ORAL at 08:04

## 2019-04-03 RX ADMIN — CLINDAMYCIN IN 5 PERCENT DEXTROSE 600 MG: 12 INJECTION, SOLUTION INTRAVENOUS at 01:04

## 2019-04-03 RX ADMIN — CLINDAMYCIN IN 5 PERCENT DEXTROSE 600 MG: 12 INJECTION, SOLUTION INTRAVENOUS at 09:04

## 2019-04-03 RX ADMIN — Medication 100 MG: at 08:04

## 2019-04-03 RX ADMIN — POTASSIUM & SODIUM PHOSPHATES POWDER PACK 280-160-250 MG 1 PACKET: 280-160-250 PACK at 05:04

## 2019-04-03 RX ADMIN — COLCHICINE 0.6 MG: 0.6 TABLET, FILM COATED ORAL at 08:04

## 2019-04-03 RX ADMIN — DILTIAZEM HYDROCHLORIDE 180 MG: 180 CAPSULE, COATED, EXTENDED RELEASE ORAL at 08:04

## 2019-04-03 RX ADMIN — HEPARIN SODIUM 5000 UNITS: 5000 INJECTION, SOLUTION INTRAVENOUS; SUBCUTANEOUS at 01:04

## 2019-04-03 RX ADMIN — MAGNESIUM SULFATE IN WATER 2 G: 40 INJECTION, SOLUTION INTRAVENOUS at 02:04

## 2019-04-03 RX ADMIN — ASPIRIN 81 MG: 81 TABLET, COATED ORAL at 08:04

## 2019-04-03 RX ADMIN — GABAPENTIN 300 MG: 300 CAPSULE ORAL at 02:04

## 2019-04-03 RX ADMIN — PREDNISONE 40 MG: 20 TABLET ORAL at 08:04

## 2019-04-03 RX ADMIN — FOLIC ACID 1 MG: 1 TABLET ORAL at 08:04

## 2019-04-03 RX ADMIN — LEVETIRACETAM 500 MG: 500 TABLET, FILM COATED ORAL at 08:04

## 2019-04-03 RX ADMIN — HYDROCODONE BITARTRATE AND ACETAMINOPHEN 1 TABLET: 5; 325 TABLET ORAL at 05:04

## 2019-04-03 RX ADMIN — HEPARIN SODIUM 5000 UNITS: 5000 INJECTION, SOLUTION INTRAVENOUS; SUBCUTANEOUS at 09:04

## 2019-04-03 RX ADMIN — CLINDAMYCIN IN 5 PERCENT DEXTROSE 600 MG: 12 INJECTION, SOLUTION INTRAVENOUS at 05:04

## 2019-04-03 RX ADMIN — LEVETIRACETAM 500 MG: 500 TABLET, FILM COATED ORAL at 09:04

## 2019-04-03 RX ADMIN — CHLORDIAZEPOXIDE HYDROCHLORIDE 25 MG: 25 CAPSULE ORAL at 09:04

## 2019-04-03 RX ADMIN — POTASSIUM CHLORIDE 40 MEQ: 1500 TABLET, EXTENDED RELEASE ORAL at 08:04

## 2019-04-03 RX ADMIN — HEPARIN SODIUM 5000 UNITS: 5000 INJECTION, SOLUTION INTRAVENOUS; SUBCUTANEOUS at 05:04

## 2019-04-03 NOTE — PLAN OF CARE
Problem: Adult Inpatient Plan of Care  Goal: Plan of Care Review  Outcome: Ongoing (interventions implemented as appropriate)     04/03/19 6395   Plan of Care Review   Plan of Care Reviewed With patient   Progress improving   Pt resting peacefully. No signs of adverse reactions to ABT therapy. Replaced electrolytes,labs monitored, bed bound but freely moves extremities. Pt remained free of falls. [facility] protocol in place. Wife at bedside and active in care.Will continue to monitor.

## 2019-04-03 NOTE — PLAN OF CARE
"Samara Britton, [facility] DME, authorized delivery of 20" Wheelchair to pt room.  Information added to AVS.     Spoke with NIMA Jarvis at [facility].  Pt assigned to [facility].  Referral sent in [facility], information added to AVS.   "

## 2019-04-03 NOTE — CONSULTS
5:10 PM Consulted for Abscess to the Right Knee  Cleansed wound bed with wound cleanser  Measuring  1cm x 2cm x 0.5cm with area of Undermining 0.25cm at 5 o'clock   Wound edges are closed at time of assessment.     Applied medi honey w cover dressing    Wound care orders with dressing change per SPEEDY Chapman, PAC

## 2019-04-03 NOTE — ASSESSMENT & PLAN NOTE
- patient non-compliant with allopurinol  - dose colchicine  - no NSAIDs with CKD, dose prednisone  -   Lab Results   Component Value Date    URICACID 9.9 (H) 04/03/2019

## 2019-04-03 NOTE — SUBJECTIVE & OBJECTIVE
Interval History: less tremulous, febrile    Review of Systems   Constitutional: Positive for fever. Negative for activity change, appetite change and fatigue.   HENT: Negative for congestion, rhinorrhea and sore throat.    Eyes: Negative.    Respiratory: Negative for cough and shortness of breath.    Cardiovascular: Negative for chest pain, palpitations and leg swelling.   Gastrointestinal: Negative for abdominal distention, abdominal pain, constipation, nausea and vomiting.   Genitourinary: Negative for difficulty urinating and frequency.   Musculoskeletal: Positive for arthralgias.        Right Knee pain   Skin: Positive for wound.   Neurological: Negative for seizures, syncope, light-headedness and headaches.   Psychiatric/Behavioral: Negative for agitation.     Objective:     Vital Signs (Most Recent):  Temp: (!) 100.7 °F (38.2 °C) (04/03/19 1125)  Pulse: 96 (04/03/19 1200)  Resp: 18 (04/03/19 1125)  BP: 119/79 (04/03/19 1125)  SpO2: 96 % (04/03/19 1125) Vital Signs (24h Range):  Temp:  [98 °F (36.7 °C)-100.7 °F (38.2 °C)] 100.7 °F (38.2 °C)  Pulse:  [] 96  Resp:  [15-18] 18  SpO2:  [93 %-100 %] 96 %  BP: (119-161)/() 119/79     Weight: 90.7 kg (200 lb)  Body mass index is 27.12 kg/m².    Intake/Output Summary (Last 24 hours) at 4/3/2019 1401  Last data filed at 4/3/2019 1100  Gross per 24 hour   Intake 1730 ml   Output 2550 ml   Net -820 ml      Physical Exam   Constitutional: He is oriented to person, place, and time. He appears well-developed and well-nourished. No distress.   HENT:   Head: Normocephalic and atraumatic.   Mouth/Throat: Oropharynx is clear and moist.   Eyes: Conjunctivae are normal. Right eye exhibits no discharge. Left eye exhibits no discharge. No scleral icterus.   Neck: Normal range of motion. Neck supple.   Cardiovascular:   tachycardic   Pulmonary/Chest: Effort normal and breath sounds normal. No respiratory distress. He has no wheezes.   Abdominal: Soft. Bowel sounds are  normal. He exhibits no distension. There is no tenderness. There is no rebound and no guarding.   Musculoskeletal: He exhibits tenderness (right knee TTP). He exhibits no edema.   Neurological: He is alert and oriented to person, place, and time.   Skin: Skin is warm and dry. Capillary refill takes less than 2 seconds. He is not diaphoretic.   Right knee has a 4 cm in diameter circular ulcer over the right patella with mild surrounding erythema and tenderness. No drainage, fluctuance, or joint effusion.    Nursing note and vitals reviewed.      Significant Labs:   CBC:   Recent Labs   Lab 04/02/19  0955 04/03/19  0658   WBC 7.83 6.04   HGB 9.7* 8.6*   HCT 29.2* 25.7*    180     CMP:   Recent Labs   Lab 04/02/19  0955 04/03/19  0351    136   K 3.4* 3.4*    101   CO2 21* 24   GLU 90 77   BUN 9 9   CREATININE 1.0 0.9   CALCIUM 8.4* 7.8*   PROT 8.8*  --    ALBUMIN 3.5  --    BILITOT 0.7  --    ALKPHOS 81  --    AST 31  --    ALT 12  --    ANIONGAP 16 11   EGFRNONAA >60 >60     Magnesium:   Recent Labs   Lab 04/02/19 0955 04/03/19  0351   MG 1.0* 1.1*     All pertinent labs within the past 24 hours have been reviewed.    Significant Imaging: I have reviewed all pertinent imaging results/findings within the past 24 hours.   Imaging Results          CT Knee With Contrast Right (Final result)  Result time 04/02/19 13:19:03    Final result by Alvin Smith MD (04/02/19 13:19:03)                 Impression:      No evidence for acute fracture, bone destruction, or abnormal periosteal reaction.    Small joint effusion with small air-fluid level within the medial compartment of the right femorotibial joint.  Septic arthritis cannot be completely excluded.    Fine calcifications within the soft tissues anterior to the patella likely representing gout/calcific bursitis involving the prepatellar bursa.      Electronically signed by: Alvin Smith MD  Date:    04/02/2019  Time:    13:19             Narrative:     EXAMINATION:  CT KNEE WITH CONTRAST RIGHT    CLINICAL HISTORY:  Knee erythema, swelling, cellulitis suspected;    TECHNIQUE:  Contiguous 1.25 mm axial images were obtained through the right knee during the intravenous administration of 75 mL of Omnipaque 350 contrast.  Coronal and sagittal reconstructions of the right knee were obtained.    COMPARISON:  None    FINDINGS:  The bones are intact.  There is no evidence for acute fracture or bone destruction.  There is no evidence for dislocation.  There are degenerative changes with spurring of the tibial spines with osteophytes at the femorotibial and patellofemoral joints.  There is moderate narrowing of the lateral compartment of the femorotibial joint.  There is a small joint effusion present.  There is a small amount of air within the medial compartment of the femorotibial joint and there appears to be a small air-fluid level.  In this patient with history of ulceration over the right patella, septic arthritis cannot be excluded.  There are calcific densities identified in the region of the prepatellar bursa likely related to gout/calcific bursitis.

## 2019-04-03 NOTE — PT/OT/SLP PROGRESS
Physical Therapy      Patient Name:  Michael Johnson Jr.   MRN:  [id number]    Patient not seen today secondary to (pt with wound care nurse). Will follow-up in am.    Nuria Patrick, PT

## 2019-04-03 NOTE — ASSESSMENT & PLAN NOTE
- chronic anemia likely secondary to alcoholism, poor nutrition  - [facility]      Lab Results   Component Value Date    [id number] 04/03/2019     Lab Results   Component Value Date    FOLATE 9.6 04/03/2019

## 2019-04-03 NOTE — ASSESSMENT & PLAN NOTE
- alcohol 197  - librium for WD, TID, change to BID  - monitor CIWA 13 >2  - s/p banana bag  - supplement thiamine, folic acid

## 2019-04-03 NOTE — ASSESSMENT & PLAN NOTE
- low grade with no leukocytosis, unclear etiology  - normal procalcitonin on admission, lactic acid normalized  - check BC, UA, CXR  - monitor

## 2019-04-03 NOTE — ASSESSMENT & PLAN NOTE
- elevated on admission, now reasonably stable  - resume home meds, diltiazem 180 mg and metoprolol 50 mg  - monitor

## 2019-04-03 NOTE — PLAN OF CARE
Problem: Adult Inpatient Plan of Care  Goal: Plan of Care Review  Outcome: Ongoing (interventions implemented as appropriate)  Bed in low and locked position and able to reposition per self and up with assist.  Remains free of injury during shift.

## 2019-04-03 NOTE — ASSESSMENT & PLAN NOTE
- no drainage noted, no fever or WBC, FROM, lactic acid normalized, procalcitonin normal  - CT No evidence for acute fracture, bone destruction, or abnormal periosteal reaction. - ED d/w ortho nothing acute, no effusion  - cont Clinda  - wound care consulted  - monitor

## 2019-04-03 NOTE — PROGRESS NOTES
"[facility] Hospital Medicine  Progress Note    Patient Name: Michael Johnson Jr.  MRN: [id number]  Patient Class: IP- Inpatient   Admission Date: 4/2/2019  Length of Stay: 0 days  Attending Physician: Joshua Powell MD  Primary Care Provider: Zunilda Bolden NP        Subjective:     Principal Problem:Cellulitis of right knee    HPI:  59 yo male with a PMHx of seizures (withdrawal), gout, atrial fibrillation, alcohol abuse, CKDIII, anemia, HTN, bedbound at baseline referred to the ED by his PCP at [facility] with concern for osteomyelitis of the right knee. Patient reports his symptoms initially began several months ago when he experienced a "gout flare" affecting the right knee that was swollen and red, when a small skin break appeared about 6 months ago. He reports persistent pain, redness and swelling with progressive development of a wound on the knee. He reports new onset of "pus" discharge from the wound about one week ago. He applied a dressing to the wound a few days ago and reports the wound became malodorous about two days ago. He reports the knee is pain free at rest and is only painful to the touch. Denies fever, chills. Patient states last drink 3 weeks ago; however, wife states yesterday. Also states he is "out" of his medications. Patient poor historian.       ED eval; patient afebrile, no WBC, CT Knee No evidence for acute fracture, bone destruction, or abnormal periosteal reaction. Patient tachycardic, N/V, alcohol 197, showing signs of alcohol withdrawal  Admit to OBS      Hospital Course:  59 y/o male admitted with cellulitis right knee, alcohol withdrawal and electrolyte imbalance. CT right knee No evidence for acute fracture, bone destruction, or abnormal periosteal reaction. - ED d/w ortho nothing acute, no effusion. Patient placed on IV Clindamycin, IVF's, chlordiazeoxide for withdrawal, electrolytes monitored and replaced. Course notable for low grade fever        Interval " History: less tremulous, febrile    Review of Systems   Constitutional: Positive for fever. Negative for activity change, appetite change and fatigue.   HENT: Negative for congestion, rhinorrhea and sore throat.    Eyes: Negative.    Respiratory: Negative for cough and shortness of breath.    Cardiovascular: Negative for chest pain, palpitations and leg swelling.   Gastrointestinal: Negative for abdominal distention, abdominal pain, constipation, nausea and vomiting.   Genitourinary: Negative for difficulty urinating and frequency.   Musculoskeletal: Positive for arthralgias.        Right Knee pain   Skin: Positive for wound.   Neurological: Negative for seizures, syncope, light-headedness and headaches.   Psychiatric/Behavioral: Negative for agitation.     Objective:     Vital Signs (Most Recent):  Temp: (!) 100.7 °F (38.2 °C) (04/03/19 1125)  Pulse: 96 (04/03/19 1200)  Resp: 18 (04/03/19 1125)  BP: 119/79 (04/03/19 1125)  SpO2: 96 % (04/03/19 1125) Vital Signs (24h Range):  Temp:  [98 °F (36.7 °C)-100.7 °F (38.2 °C)] 100.7 °F (38.2 °C)  Pulse:  [] 96  Resp:  [15-18] 18  SpO2:  [93 %-100 %] 96 %  BP: (119-161)/() 119/79     Weight: 90.7 kg (200 lb)  Body mass index is 27.12 kg/m².    Intake/Output Summary (Last 24 hours) at 4/3/2019 1401  Last data filed at 4/3/2019 1100  Gross per 24 hour   Intake 1730 ml   Output 2550 ml   Net -820 ml      Physical Exam   Constitutional: He is oriented to person, place, and time. He appears well-developed and well-nourished. No distress.   HENT:   Head: Normocephalic and atraumatic.   Mouth/Throat: Oropharynx is clear and moist.   Eyes: Conjunctivae are normal. Right eye exhibits no discharge. Left eye exhibits no discharge. No scleral icterus.   Neck: Normal range of motion. Neck supple.   Cardiovascular:   tachycardic   Pulmonary/Chest: Effort normal and breath sounds normal. No respiratory distress. He has no wheezes.   Abdominal: Soft. Bowel sounds are normal. He  exhibits no distension. There is no tenderness. There is no rebound and no guarding.   Musculoskeletal: He exhibits tenderness (right knee TTP). He exhibits no edema.   Neurological: He is alert and oriented to person, place, and time.   Skin: Skin is warm and dry. Capillary refill takes less than 2 seconds. He is not diaphoretic.   Right knee has a 4 cm in diameter circular ulcer over the right patella with mild surrounding erythema and tenderness. No drainage, fluctuance, or joint effusion.    Nursing note and vitals reviewed.      Significant Labs:   CBC:   Recent Labs   Lab 04/02/19  0955 04/03/19  0658   WBC 7.83 6.04   HGB 9.7* 8.6*   HCT 29.2* 25.7*    180     CMP:   Recent Labs   Lab 04/02/19  0955 04/03/19  0351    136   K 3.4* 3.4*    101   CO2 21* 24   GLU 90 77   BUN 9 9   CREATININE 1.0 0.9   CALCIUM 8.4* 7.8*   PROT 8.8*  --    ALBUMIN 3.5  --    BILITOT 0.7  --    ALKPHOS 81  --    AST 31  --    ALT 12  --    ANIONGAP 16 11   EGFRNONAA >60 >60     Magnesium:   Recent Labs   Lab 04/02/19  0955 04/03/19  0351   MG 1.0* 1.1*     All pertinent labs within the past 24 hours have been reviewed.    Significant Imaging: I have reviewed all pertinent imaging results/findings within the past 24 hours.   Imaging Results          CT Knee With Contrast Right (Final result)  Result time 04/02/19 13:19:03    Final result by Alvin Smith MD (04/02/19 13:19:03)                 Impression:      No evidence for acute fracture, bone destruction, or abnormal periosteal reaction.    Small joint effusion with small air-fluid level within the medial compartment of the right femorotibial joint.  Septic arthritis cannot be completely excluded.    Fine calcifications within the soft tissues anterior to the patella likely representing gout/calcific bursitis involving the prepatellar bursa.      Electronically signed by: Alvin Smith MD  Date:    04/02/2019  Time:    13:19             Narrative:     EXAMINATION:  CT KNEE WITH CONTRAST RIGHT    CLINICAL HISTORY:  Knee erythema, swelling, cellulitis suspected;    TECHNIQUE:  Contiguous 1.25 mm axial images were obtained through the right knee during the intravenous administration of 75 mL of Omnipaque 350 contrast.  Coronal and sagittal reconstructions of the right knee were obtained.    COMPARISON:  None    FINDINGS:  The bones are intact.  There is no evidence for acute fracture or bone destruction.  There is no evidence for dislocation.  There are degenerative changes with spurring of the tibial spines with osteophytes at the femorotibial and patellofemoral joints.  There is moderate narrowing of the lateral compartment of the femorotibial joint.  There is a small joint effusion present.  There is a small amount of air within the medial compartment of the femorotibial joint and there appears to be a small air-fluid level.  In this patient with history of ulceration over the right patella, septic arthritis cannot be excluded.  There are calcific densities identified in the region of the prepatellar bursa likely related to gout/calcific bursitis.                                  Assessment/Plan:      * Cellulitis of right knee  - no drainage noted, no fever or WBC, FROM, lactic acid normalized, procalcitonin normal  - CT No evidence for acute fracture, bone destruction, or abnormal periosteal reaction. - ED d/w ortho nothing acute, no effusion  - cont Clinda  - wound care consulted  - monitor      Fever  - low grade with no leukocytosis, unclear etiology  - normal procalcitonin on admission, lactic acid normalized  - check BC, UA, CXR  - monitor      Hypokalemia  - replace and monitor      Alcohol abuse  - alcohol 197  - librium for WD, TID, change to BID  - monitor CIWA 13 >2  - s/p banana bag  - supplement thiamine, folic acid      CKD (chronic kidney disease), stage III  - appears stable   - renally dose meds  - avoid nephrotoxins    Essential hypertension  -  elevated on admission, now reasonably stable  - resume home meds, diltiazem 180 mg and metoprolol 50 mg  - monitor      Macrocytic anemia  - chronic anemia likely secondary to alcoholism, poor nutrition  - stable, cont to montor      Lab Results   Component Value Date    [id number] 463 04/03/2019     Lab Results   Component Value Date    FOLATE 9.6 04/03/2019           Hypomagnesemia  - suspected  d/t alcoholism  - replace and monitor      Gout flare  - patient non-compliant with allopurinol  - dose colchicine  - no NSAIDs with CKD, dose prednisone  -   Lab Results   Component Value Date    URICACID 9.9 (H) 04/03/2019           VTE Risk Mitigation (From admission, onward)        Ordered     heparin (porcine) injection 5,000 Units  Every 8 hours      04/02/19 1611     Place sequential compression device  Until discontinued      04/02/19 1611     IP VTE HIGH RISK PATIENT  Once      04/02/19 1611              Sarah Chapman PA-C  Department of Hospital Medicine   [facility]

## 2019-04-03 NOTE — PLAN OF CARE
Problem: Adult Inpatient Plan of Care  Goal: Plan of Care Review  Outcome: Ongoing (interventions implemented as appropriate)  Throughout this shift pt has been A&Ox4, bed bound and resting. Cellulitis of right knee has remained open to air with no drainage. Pt's tremors have been steadily improving since this AM. Pt complained of 8/10 pain in ankles - pain medication was administered. Pt's wife has remained at bedside. He is eating and drinking regularly. Clindamycin is infusing along with IV fluids. Pt is being transferred to  center as inpatient. Pt is resting comfortably with no other complaints at this time.

## 2019-04-04 VITALS
DIASTOLIC BLOOD PRESSURE: 60 MMHG | RESPIRATION RATE: 18 BRPM | WEIGHT: 200 LBS | SYSTOLIC BLOOD PRESSURE: 102 MMHG | OXYGEN SATURATION: 100 % | BODY MASS INDEX: 27.09 KG/M2 | HEIGHT: 72 IN | HEART RATE: 75 BPM | TEMPERATURE: 97 F

## 2019-04-04 PROBLEM — R53.81 DEBILITY: Status: ACTIVE | Noted: 2019-04-04

## 2019-04-04 LAB
ANION GAP SERPL CALC-SCNC: 8 MMOL/L (ref 8–16)
BASOPHILS # BLD AUTO: 0 K/UL (ref 0–0.2)
BASOPHILS NFR BLD: 0 % (ref 0–1.9)
BUN SERPL-MCNC: 18 MG/DL (ref 6–20)
CALCIUM SERPL-MCNC: 8 MG/DL (ref 8.7–10.5)
CHLORIDE SERPL-SCNC: 96 MMOL/L (ref 95–110)
CO2 SERPL-SCNC: 28 MMOL/L (ref 23–29)
CREAT SERPL-MCNC: 1.3 MG/DL (ref 0.5–1.4)
DIFFERENTIAL METHOD: ABNORMAL
EOSINOPHIL # BLD AUTO: 0 K/UL (ref 0–0.5)
EOSINOPHIL NFR BLD: 0.2 % (ref 0–8)
ERYTHROCYTE [DISTWIDTH] IN BLOOD BY AUTOMATED COUNT: 18 % (ref 11.5–14.5)
EST. GFR  (AFRICAN AMERICAN): >60 ML/MIN/1.73 M^2
EST. GFR  (NON AFRICAN AMERICAN): >60 ML/MIN/1.73 M^2
GLUCOSE SERPL-MCNC: 123 MG/DL (ref 70–110)
HCT VFR BLD AUTO: 25.8 % (ref 40–54)
HGB BLD-MCNC: 8.7 G/DL (ref 14–18)
LYMPHOCYTES # BLD AUTO: 0.8 K/UL (ref 1–4.8)
LYMPHOCYTES NFR BLD: 7.3 % (ref 18–48)
MAGNESIUM SERPL-MCNC: 1.6 MG/DL (ref 1.6–2.6)
MCH RBC QN AUTO: 35.7 PG (ref 27–31)
MCHC RBC AUTO-ENTMCNC: 33.7 G/DL (ref 32–36)
MCV RBC AUTO: 106 FL (ref 82–98)
MONOCYTES # BLD AUTO: 0.7 K/UL (ref 0.3–1)
MONOCYTES NFR BLD: 6.3 % (ref 4–15)
NEUTROPHILS # BLD AUTO: 8.8 K/UL (ref 1.8–7.7)
NEUTROPHILS NFR BLD: 86 % (ref 38–73)
PLATELET # BLD AUTO: 197 K/UL (ref 150–350)
PMV BLD AUTO: 10.4 FL (ref 9.2–12.9)
POTASSIUM SERPL-SCNC: 3.3 MMOL/L (ref 3.5–5.1)
RBC # BLD AUTO: 2.44 M/UL (ref 4.6–6.2)
SODIUM SERPL-SCNC: 132 MMOL/L (ref 136–145)
URATE SERPL-MCNC: 8.6 MG/DL (ref 3.4–7)
WBC # BLD AUTO: 10.28 K/UL (ref 3.9–12.7)

## 2019-04-04 PROCEDURE — 97165 OT EVAL LOW COMPLEX 30 MIN: CPT

## 2019-04-04 PROCEDURE — G0378 HOSPITAL OBSERVATION PER HR: HCPCS

## 2019-04-04 PROCEDURE — 36415 COLL VENOUS BLD VENIPUNCTURE: CPT

## 2019-04-04 PROCEDURE — 99239 HOSP IP/OBS DSCHRG MGMT >30: CPT | Mod: ,,, | Performed by: HOSPITALIST

## 2019-04-04 PROCEDURE — 63600175 PHARM REV CODE 636 W HCPCS: Performed by: PHYSICIAN ASSISTANT

## 2019-04-04 PROCEDURE — 97542 WHEELCHAIR MNGMENT TRAINING: CPT

## 2019-04-04 PROCEDURE — 25000003 PHARM REV CODE 250: Performed by: PHYSICIAN ASSISTANT

## 2019-04-04 PROCEDURE — 84550 ASSAY OF BLOOD/URIC ACID: CPT

## 2019-04-04 PROCEDURE — 97530 THERAPEUTIC ACTIVITIES: CPT

## 2019-04-04 PROCEDURE — 85025 COMPLETE CBC W/AUTO DIFF WBC: CPT

## 2019-04-04 PROCEDURE — 99239 PR HOSPITAL DISCHARGE DAY,>30 MIN: ICD-10-PCS | Mod: ,,, | Performed by: HOSPITALIST

## 2019-04-04 PROCEDURE — 25000003 PHARM REV CODE 250: Performed by: HOSPITALIST

## 2019-04-04 PROCEDURE — S0077 INJECTION, CLINDAMYCIN PHOSP: HCPCS | Performed by: PHYSICIAN ASSISTANT

## 2019-04-04 PROCEDURE — 83735 ASSAY OF MAGNESIUM: CPT

## 2019-04-04 PROCEDURE — 80048 BASIC METABOLIC PNL TOTAL CA: CPT

## 2019-04-04 PROCEDURE — 97161 PT EVAL LOW COMPLEX 20 MIN: CPT

## 2019-04-04 RX ORDER — CLINDAMYCIN HYDROCHLORIDE 300 MG/1
600 CAPSULE ORAL EVERY 8 HOURS
Qty: 48 CAPSULE | Refills: 0 | Status: SHIPPED | OUTPATIENT
Start: 2019-04-04 | End: 2019-04-12

## 2019-04-04 RX ORDER — POTASSIUM CHLORIDE 20 MEQ/1
40 TABLET, EXTENDED RELEASE ORAL ONCE
Status: DISCONTINUED | OUTPATIENT
Start: 2019-04-04 | End: 2019-04-04 | Stop reason: HOSPADM

## 2019-04-04 RX ORDER — POTASSIUM CHLORIDE 20 MEQ/1
40 TABLET, EXTENDED RELEASE ORAL ONCE
Status: COMPLETED | OUTPATIENT
Start: 2019-04-04 | End: 2019-04-04

## 2019-04-04 RX ADMIN — GABAPENTIN 300 MG: 300 CAPSULE ORAL at 09:04

## 2019-04-04 RX ADMIN — METOPROLOL SUCCINATE 50 MG: 50 TABLET, EXTENDED RELEASE ORAL at 09:04

## 2019-04-04 RX ADMIN — ALLOPURINOL 300 MG: 300 TABLET ORAL at 09:04

## 2019-04-04 RX ADMIN — POTASSIUM & SODIUM PHOSPHATES POWDER PACK 280-160-250 MG 1 PACKET: 280-160-250 PACK at 05:04

## 2019-04-04 RX ADMIN — FOLIC ACID 1 MG: 1 TABLET ORAL at 09:04

## 2019-04-04 RX ADMIN — POTASSIUM CHLORIDE 40 MEQ: 1500 TABLET, EXTENDED RELEASE ORAL at 09:04

## 2019-04-04 RX ADMIN — HEPARIN SODIUM 5000 UNITS: 5000 INJECTION, SOLUTION INTRAVENOUS; SUBCUTANEOUS at 05:04

## 2019-04-04 RX ADMIN — Medication 100 MG: at 09:04

## 2019-04-04 RX ADMIN — ASPIRIN 81 MG: 81 TABLET, COATED ORAL at 09:04

## 2019-04-04 RX ADMIN — DILTIAZEM HYDROCHLORIDE 180 MG: 180 CAPSULE, COATED, EXTENDED RELEASE ORAL at 09:04

## 2019-04-04 RX ADMIN — LEVETIRACETAM 500 MG: 500 TABLET, FILM COATED ORAL at 09:04

## 2019-04-04 RX ADMIN — CLINDAMYCIN IN 5 PERCENT DEXTROSE 600 MG: 12 INJECTION, SOLUTION INTRAVENOUS at 05:04

## 2019-04-04 RX ADMIN — CHLORDIAZEPOXIDE HYDROCHLORIDE 25 MG: 25 CAPSULE ORAL at 09:04

## 2019-04-04 NOTE — PT/OT/SLP EVAL
Occupational Therapy   Evaluation & Treatment    Name: Michael Johnson Jr.  MRN: [id number]  Admitting Diagnosis:  Cellulitis of right knee      Recommendations:     Discharge Recommendations: home with home health, home health PT, home health OT  Discharge Equipment Recommendations:  wheelchair, manual(drop arm commode, slide board, grab bars )  Barriers to discharge:  None    Assessment:     Michael Johnson Jr. is a 58 y.o. male with a medical diagnosis of Cellulitis of right knee.  He presents with severe pain in both feet. Performance deficits affecting function: weakness, gait instability, impaired balance, pain, impaired self care skills, impaired functional mobilty, impaired endurance, decreased lower extremity function, decreased upper extremity function, decreased ROM, edema, decreased coordination.  Pt demonstrates strength and ROM in (B) UE needed for ADLs that is WFL; however, slightly decreased in RUE 2* past bone spur.  Pt required Max A to perform stand pivot transfer to and from wheelchair, but with slide board able to perform task with CGA.  Pt and wife demonstrated understanding of how to perform transfer safely and effectively.  PTA pt reports requiring assist with LB dressing, toileting, and bathing.  He was having trouble ambulating at home and stated he would like a wheelchair to help him be able to mobilize easier.  Pt would benefit from skilled OT services to address problems listed below and increase independence with ADLs.  Home health is recommended upon d/c from acute care to further address deficits and help pt improve overall functional independence.         Rehab Prognosis: Fair; patient would benefit from acute skilled OT services to address these deficits and reach maximum level of function.       Plan:     Patient to be seen 3 x/week to address the above listed problems via self-care/home management, therapeutic activities, therapeutic exercises  · Plan of Care Expires:  5/4/2019  · Plan  "of Care Reviewed with: patient, spouse    Subjective     Chief Complaint: Pain in both feet  Patient/Family Comments/goals: "be able to maneuver wheelchair and do some walking exercises    Occupational Profile:  Living Environment: Pt lives with wife in 2SH, 8STE; HR present on both sides (pt states he walks up sideways holding onto rail with both hands).  Bedroom and bathroom are on first floor.  Bathroom has walk-in shower.  BSC is placed right next to bed.  Pt states house has very narrow hallways and it can be difficult for him to navigate.    Previous level of function: Pt required assist for bathing, toileting (from BSC), and LB dressing.  He was able to perform UB dressing, feeding, and grooming independently.  Pt reports using a SPC and RW within home, but leg was giving out.  Pt has minimal movement in L foot and ankle; he reports ankle is surgically fused.  He has had many recent falls and says the last 8-9 times he tried to get to the bathroom he fell.  He would like a wheelchair for community mobility.     Roles and Routines: , does not drive, retired   Equipment Used at Home:  walker, rolling, crutches, forearm, cane, quad  Assistance upon Discharge: Wife able to provide some assist, but cannot full provide full physical assist.  Per pt wife works two jobs and he is home alone majority of time.  Wife states she helps him quite a bit at home, but is unable to provide full physical assist.    Pain/Comfort:  · Pain Rating 1: 9/10  · Location - Side 1: Left  · Location - Orientation 1: generalized  · Location 1: foot  · Pain Addressed 1: Reposition, Distraction  · Pain Rating Post-Intervention 1: 9/10    Patients cultural, spiritual, [employer] conflicts given the current situation: no    Objective:     Communicated with: RN prior to session.  Patient found HOB elevated with SCD, peripheral IV upon OT entry to room.  Wife present during session.    General Precautions: Standard, " fall, seizure   Orthopedic Precautions:N/A   Braces: N/A     Occupational Performance:    Bed Mobility:    · Patient completed Scooting/Bridging with stand by assistance  · Patient completed Supine to Sit with stand by assistance  · Patient completed Sit to Supine with stand by assistance    Functional Mobility/Transfers:  · Stand pivot from bed <> wheelchair and wheelchair <> bed:  Max A  · Stand pivot from wheelchair <> bed and bed <> wheelchair:  CGA with slideboard.  Pt practiced with assist from PT/OT and then with PT/OT and wife  · Functional Mobility: Pt unable to take steps at this time.    Activities of Daily Living:  · Lower Body Dressing: total assistance for donning socks while seated at EOB.   · Upper body dressing:  Minimum assistance for donning gown on backside like robe while seated at EOB    Cognitive/Visual Perceptual:  Cognitive/Psychosocial Skills:    -       Oriented to: Person, Place, Time and Situation   -       Follows Commands/attention:Follows two-step commands  -       Communication: clear/fluent  -       Memory: No Deficits noted  -       Safety awareness/insight to disability: intact   -       Mood/Affect/Coping skills/emotional control: Appropriate to situation    Physical Exam:  Postural examination/scapula alignment:    -       No postural abnormalities identified  Skin integrity: Visible skin intact  Edema:  None noted  Sensation: -       Intact  Motor Planning: -       WFL  Dominant hand: right  Upper Extremity Range of Motion:  WFL for (B) UE  Upper Extremity Strength: WFL for (B) UE   Strength: 4/5 both hands  Fine Motor Coordination: Slightly impaired; pt reports having difficulty holding a fork. Reports being able to hold a toothbrush and open doors.  Gross motor coordination: WFL  Balance:  Sitting- SBA  Vision: Intact  Other:  Nodules noted on knuckles of both hands; pt reports from gout    AMPA 6 Click ADL:  AMPA Total Score: 12    Treatment & Education:  *Pt educated  on:   -role of OT  -importance of wearing supportive shoes with examples provided  -how to perform slide board transfer  *Pt practiced multiple stand pivot transfers from bed <> wheelchair with and without slide board.  Wife educated on how to assist pt with slide board transfer  *POC reviewed with pt  Education:    Patient left HOB elevated with call button in reach and wife present    GOALS:   Multidisciplinary Problems     Occupational Therapy Goals        Problem: Occupational Therapy Goal    Goal Priority Disciplines Outcome Interventions   Occupational Therapy Goal     OT, PT/OT     Description:  Goals to be met by: 4/11/2019    Patient will increase functional independence with ADLs by performing:    UE Dressing with Minimal Assistance.  LE Dressing with Moderate Assistance.  Grooming while seated with Stand-by Assistance.  Toileting from bedside commode with Stand-by Assistance for hygiene and clothing management.   Toilet transfer to bedside commode with contact guard assistance.                      History:     Past Medical History:   Diagnosis Date    Afib     Alcohol abuse with other alcohol-induced disorder 04/02/2019    Anemia 04/02/2019    unspecified deficiency    Ankle fracture, left     Aortic atherosclerosis 04/02/2019    Arthritis     Asthma     Cardiomyopathy 04/02/2019    Chronic kidney disease (CKD), stage III (moderate) 04/02/2019    Depression     Erectile dysfunction 04/02/2019    Gout, arthropathy 04/02/2019    Gout, unspecified     Hypertension     Hypertensive chronic kidney disease 04/02/2019    Hypomagnesemia 04/02/2019    Noncompliance 04/02/2019    Peripheral neuropathy 04/02/2019    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    Secondary hyperparathyroidism, renal 04/02/2019    Seizure 2016    Spastic hemiplegia affecting left nondominant side 04/02/2019       Past Surgical History:   Procedure Laterality Date    ANKLE SURGERY      x6    CHOLECYSTECTOMY-LAPAROSCOPIC N/A  3/29/2018    Performed by Joshua [facility] Jr[facility] at [facility]    COLONOSCOPY N/A 5/26/2014    Performed by Rikki Gilbert MD at [facility]O (4TH FLR)       Time Tracking:     OT Date of Treatment: 04/04/19  OT Start Time: 0841  OT Stop Time: 0927  OT Total Time (min): 46 min    Billable Minutes:Evaluation 38  Therapeutic Activity 8   *Completed with PT    AMANDA Tinsley  4/4/2019

## 2019-04-04 NOTE — PT/OT/SLP DISCHARGE
Occupational Therapy Discharge Summary    Michael Johnson Jr.  MRN: [id number]   Principal Problem: Cellulitis of right knee      Patient Discharged from acute Occupational Therapy on 4/4/2019  Please refer to prior OT note dated 4/4/2019 for functional status.    Assessment:      Patient appropriate for care in another setting.    Objective:     GOALS:   Multidisciplinary Problems     Occupational Therapy Goals        Problem: Occupational Therapy Goal    Goal Priority Disciplines Outcome Interventions   Occupational Therapy Goal     OT, PT/OT     Description:  Goals to be met by: 4/11/2019    Patient will increase functional independence with ADLs by performing:    UE Dressing with Minimal Assistance.  LE Dressing with Moderate Assistance.  Grooming while seated with Stand-by Assistance.  Toileting from bedside commode with Stand-by Assistance for hygiene and clothing management.   Toilet transfer to bedside commode with contact guard assistance.                      Reasons for Discontinuation of Therapy Services  Transfer to alternate level of care.      Plan:     Patient Discharged to: Home with Home Health Service    AMANDA Tinsley  4/4/2019

## 2019-04-04 NOTE — ASSESSMENT & PLAN NOTE
-Mr. Johnson was admitted to inpatient status  -He had one fever during his stay, but no leukocytosis or lactic acidosis.  Procalcitonin was normal.  -The cellulitis quickly resolved.  No drainage was noted and there was no evidence of abscess or involvement of his joint.  -CT No evidence for acute fracture, bone destruction, or abnormal periosteal reaction. - ED d/w ortho nothing acute, no effusion  -cont oral clindamycin to complete treatment at home.  -wound care consulted and will continue with routine topical wound care with skilled nurse at home.

## 2019-04-04 NOTE — PLAN OF CARE
Problem: Skin Injury Risk Increased  Goal: Skin Health and Integrity    Intervention: Optimize Skin Protection     04/04/19 0701   Prevent Additional Skin Injury   Head of Bed (HOB) HOB at 45 degrees   Pressure Reduction Techniques frequent weight shift encouraged         Problem: Fall Injury Risk  Goal: Absence of Fall and Fall-Related Injury    Intervention: Identify and Manage Contributors to Fall Injury Risk     04/04/19 0701   Manage Acute Allergic Reaction   Medication Review/Management medications reviewed   Identify and Manage Contributors to Fall Injury Risk   Self-Care Promotion independence encouraged         Problem: Adult Inpatient Plan of Care  Goal: Absence of Hospital-Acquired Illness or Injury    Intervention: Identify and Manage Fall Risk     04/04/19 0701   Optimize Balance and Safe Activity   Safety Promotion/Fall Prevention assistive device/personal item within reach;Fall Risk reviewed with patient/family;medications reviewed;nonskid shoes/socks when out of bed

## 2019-04-04 NOTE — PT/OT/SLP DISCHARGE
Physical Therapy Discharge Summary    Name: Michael Johnson Jr.  MRN: [id number]   Principal Problem: Cellulitis of right knee     Patient Discharged from acute Physical Therapy on 19  Please refer to prior PT noted date on 19 for functional status.     Assessment:     Patient has not met goals.    Objective:     GOALS:   Multidisciplinary Problems     Physical Therapy Goals     Not on file          Multidisciplinary Problems (Resolved)        Problem: Physical Therapy Goal    Goal Priority Disciplines Outcome Goal Variances Interventions   Physical Therapy Goal   (Resolved)     PT, PT/OT Outcome(s) achieved     Description:  Goals to be met by: 19    Patient will increase functional independence with mobility by performin. Transfer bed<>w/c with slideboard requiring SBA and no verbal cues for positioning of and safety with slideboard  2. Wheelchair propulsion > 50 ft in straight path on even surface with SBA and appropriate technique with no verbal cues .   3. Ambulate with RW 20' with Min A on even surface to simulate ambulating to bathroom at home  4. PT to assess stair negotiation                       Reasons for Discontinuation of Therapy Services  Transfer to alternate level of care.      Plan:     Patient Discharged to: Home with Home Health Service.    Courtney Garcia, PT  2019

## 2019-04-04 NOTE — ASSESSMENT & PLAN NOTE
-low grade with no leukocytosis.  Due to cellulitis or gout flare.  Has resolved.  -Continue allopurinol and antibiotics at discharge.

## 2019-04-04 NOTE — NURSING
Discharge instructions given to patient, pharmacy delivered medications to the patient's bedside.  Instructions given.  Patient verbalized understanding of instructions for wound care and medication dosage/times.

## 2019-04-04 NOTE — DISCHARGE SUMMARY
"[facility] Hospital Medicine  Discharge Summary      Patient Name: Michael Johnson Jr.  MRN: [id number]  Admission Date: 4/2/2019  Hospital Length of Stay: 1 days  Discharge Date and Time:  04/04/2019 11:35 AM  Attending Physician: Noni Powell MD   Discharging Provider: Noni Powell MD  Primary Care Provider: Zunilda Bolden NP      HPI:   59 yo male with a PMHx of seizures (withdrawal), gout, atrial fibrillation, alcohol abuse, CKDIII, anemia, HTN, bedbound at baseline referred to the ED by his PCP at [facility] with concern for osteomyelitis of the right knee. Patient reports his symptoms initially began several months ago when he experienced a "gout flare" affecting the right knee that was swollen and red, when a small skin break appeared about 6 months ago. He reports persistent pain, redness and swelling with progressive development of a wound on the knee. He reports new onset of "pus" discharge from the wound about one week ago. He applied a dressing to the wound a few days ago and reports the wound became malodorous about two days ago. He reports the knee is pain free at rest and is only painful to the touch. Denies fever, chills. Patient states last drink 3 weeks ago; however, wife states yesterday. Also states he is "out" of his medications. Patient poor historian.       ED eval; patient afebrile, no WBC, CT Knee No evidence for acute fracture, bone destruction, or abnormal periosteal reaction. Patient tachycardic, N/V, alcohol 197, showing signs of alcohol withdrawal    Consults:   Consults (From admission, onward)        Status Ordering Provider     Inpatient consult to Social Work  Once     Provider:  (Not yet assigned)    Acknowledged NONI POWELL        Hospital Course By Problem:     * Cellulitis of right knee  -Mr. Johnson was admitted to inpatient status  -He had one fever during his stay, but no leukocytosis or lactic acidosis.  Procalcitonin was normal.  -The cellulitis " quickly resolved.  No drainage was noted and there was no evidence of abscess or involvement of his joint.  -CT No evidence for acute fracture, bone destruction, or abnormal periosteal reaction. - ED d/w ortho nothing acute, no effusion  -cont oral clindamycin to complete treatment at home.  -wound care consulted and will continue with routine topical wound care with skilled nurse at home.    Gout flare  -patient non-compliant with allopurinol at home.  -Uric acid was elevated at 9.9.  He was treated with colchicine and prednisone  -Will resume allopurinol at discharge      Alcohol abuse  -On admit alcohol level [id number]  -He had modest evidence of alcohol withdrawal.  -He was monitored via a  [facility] protocol and received librium, thiamine and folic acid.  -He has no further evidence of alcohol withdrawal.  -He has been counseled to abstain from alcohol and has been given information on alcoholica anonymous.    Debility  -He has chronic debility that is very significant  -Denied SNF previously as he is not thought to be able to improve  -I offered group home NH placement which patient and family declined  -Will order home health for pt/ot/sn/sw/aid.      Fever  -low grade with no leukocytosis.  Due to cellulitis or gout flare.  Has resolved.  -Continue allopurinol and antibiotics at discharge.    Hypokalemia  -replaced while in house.  Follow up with pcp for repeat lab check.    CKD (chronic kidney disease), stage III  -appears stable       Essential hypertension  -continue home diltiazem 180 mg and metoprolol 50 mg      Macrocytic anemia  -chronic anemia likely secondary to alcoholism, poor nutrition  -Not frankly deficient of iron, folate or b12  -stable    Hypomagnesemia  -suspected  d/t alcoholism  -Continue home supplementation        Final Active Diagnoses:    Diagnosis Date Noted POA    PRINCIPAL PROBLEM:  Cellulitis of right knee [L03.115] 04/02/2019 Yes    Gout flare [M10.9]  Yes    Alcohol abuse [F10.10]   "Yes    Debility [R53.81] 04/04/2019 Yes    Fever [R50.9] 04/03/2019 Yes    Hypokalemia [E87.6] 04/02/2019 Yes    CKD (chronic kidney disease), stage III [N18.3] 01/17/2018 Yes    Essential hypertension [I10]  Yes    Macrocytic anemia [D53.9] 01/09/2018 Yes    Hypomagnesemia [E83.42] 03/16/2017 Yes      Problems Resolved During this Admission:       Discharged Condition: good    Disposition: Home or Self Care    Follow Up:  Follow-up Information     [facility]    Specialty:  DME Provider  Why:  With questions regarding medical equipment - wheelchair  Contact information:  [address]  [facility] [address]  [phone]             [facility]    Specialties:  Home Health Services, Physical Therapy, Occupational Therapy  Why:  they will call you to schedule first appointment for home health  Contact information:  [address]  [facility] [address]  [phone]                 Patient Instructions:      WHEELCHAIR FOR HOME USE     Order Specific Question Answer Comments   Hours in W/C per day: 8    Type of Wheelchair: Standard    Size(Width): 20"    Leg Support: STD footrests    Lap Belt: Velcro    Cushion: Basic    Justification for cushion: Prevent pressure ulcers    Height: 6' (1.829 m)    Weight: 86.2 kg (190 lb)    Length of need (1-99 months): 99    Please check all that apply: Caregiver is capable and willing to operate wheelchair safely.    Please check all that apply: The patient requires the use of a w/c for activities of daily living within the Home.    Please check all that apply: Patient mobility limitations cannot be sufficiently resolved by the use of other ambulatory therapies.      Diet Cardiac     Notify your health care provider if you experience any of the following:  increased confusion or weakness     Notify your health care provider if you experience any of the following:  persistent dizziness, light-headedness, or visual " disturbances     Notify your health care provider if you experience any of the following:  worsening rash     Notify your health care provider if you experience any of the following:  severe persistent headache     Notify your health care provider if you experience any of the following:  difficulty breathing or increased cough     Notify your health care provider if you experience any of the following:  severe uncontrolled pain     Notify your health care provider if you experience any of the following:  persistent nausea and vomiting or diarrhea     Notify your health care provider if you experience any of the following:  temperature >100.4     Activity as tolerated   Order Comments: Ambulation only with assistance and devices.       Significant Diagnostic Studies: Labs:   BMP:   Recent Labs   Lab 04/03/19  0351 04/04/19 0416   GLU 77 123*    132*   K 3.4* 3.3*    96   CO2 24 28   BUN 9 18   CREATININE 0.9 1.3   CALCIUM 7.8* 8.0*   MG 1.1* 1.6   , CMP   Recent Labs   Lab 04/03/19  0351 04/04/19 0416    132*   K 3.4* 3.3*    96   CO2 24 28   GLU 77 123*   BUN 9 18   CREATININE 0.9 1.3   CALCIUM 7.8* 8.0*   ANIONGAP 11 8   ESTGFRAFRICA >60 >60   EGFRNONAA >60 >60    and CBC   Recent Labs   Lab 04/03/19  0658 04/04/19 0416   WBC 6.04 10.28   HGB 8.6* 8.7*   HCT 25.7* 25.8*    197       Pending Diagnostic Studies:     None         Medications:  Reconciled Home Medications:      Medication List      START taking these medications    clindamycin 300 MG capsule  Commonly known as:  CLEOCIN  Take 2 capsules (600 mg total) by mouth every 8 (eight) hours. for 8 days        CHANGE how you take these medications    HYDROcodone-acetaminophen 5-325 mg per tablet  Commonly known as:  NORCO  Take 1 tablet by mouth every 6 (six) hours as needed for Pain.  What changed:  Another medication with the same name was removed. Continue taking this medication, and follow the directions you see here.      metoprolol succinate 50 MG 24 hr tablet  Commonly known as:  TOPROL-XL  TAKE ONE TABLET BY MOUTH ONCE DAILY  What changed:    · medication strength  · how much to take  · when to take this        CONTINUE taking these medications    allopurinol 300 MG tablet  Commonly known as:  ZYLOPRIM  Take 300 mg by mouth once daily.     aspirin 81 MG EC tablet  Commonly known as:  ECOTRIN  Take 81 mg by mouth once daily.     diltiaZEM 180 MG Cs24  Commonly known as:  TIAZAC  Take 180 mg by mouth once daily.     ferrous sulfate 325 (65 FE) MG EC tablet  Take 325 mg by mouth once daily.     gabapentin 300 MG capsule  Commonly known as:  NEURONTIN  Take 300 mg by mouth 3 (three) times daily.     magnesium 250 mg Tab  Take 1 tablet by mouth once daily.     potassium chloride 20 mEq Pack  Commonly known as:  KLOR-CON  Take 20 mEq by mouth 2 (two) times daily.     ranitidine 150 MG tablet  Commonly known as:  ZANTAC  Take 150 mg by mouth 2 (two) times daily.     VITAMIN D2 50,000 unit Cap  Generic drug:  ergocalciferol  Take 50,000 Units by mouth every 7 days.        STOP taking these medications    colchicine 0.6 mg tablet  Commonly known as:  COLCRYS     diclofenac 75 MG EC tablet  Commonly known as:  VOLTAREN     ibuprofen 600 MG tablet  Commonly known as:  ADVIL,MOTRIN     meloxicam 7.5 MG tablet  Commonly known as:  MOBIC     QUEtiapine 50 MG tablet  Commonly known as:  SEROQUEL     traMADol 50 mg tablet  Commonly known as:  ULTRAM     traZODone 50 MG tablet  Commonly known as:  DESYREL        ASK your doctor about these medications    levETIRAcetam 500 MG Tab  Commonly known as:  KEPPRA  Take 1 tablet (500 mg total) by mouth 2 (two) times daily.  Ask about: Should I take this medication?            Indwelling Lines/Drains at time of discharge:   Lines/Drains/Airways          None          Time spent on the discharge of patient: 35 minutes  Patient was seen and examined on the date of discharge and determined to be suitable  for discharge.         Joshua Powell MD  Department of Hospital Medicine  [facility]

## 2019-04-04 NOTE — ASSESSMENT & PLAN NOTE
-On admit alcohol level [id number]  -He had modest evidence of alcohol withdrawal.  -He was monitored via a  [facility] protocol and received librium, thiamine and folic acid.  -He has no further evidence of alcohol withdrawal.  -He has been counseled to abstain from alcohol and has been given information on alcoholica anonymous.

## 2019-04-04 NOTE — ASSESSMENT & PLAN NOTE
-chronic anemia likely secondary to alcoholism, poor nutrition  -Not frankly deficient of iron, folate or b12  -stable

## 2019-04-04 NOTE — PT/OT/SLP EVAL
Physical Therapy Evaluation & Treat  Co-treat    Patient Name:  Michael Johnson Jr.   MRN:  [id number]    Recommendations:     Discharge Recommendations:  home with home health, home health PT, home health OT   Discharge Equipment Recommendations: wheelchair, manual, other (see comments)(slide board and grab bars; w/c delivered yesterday)   Barriers to discharge: 6 steps to enter home    Assessment:     Michael Johnson Jr. is a 58 y.o. male admitted with a medical diagnosis of Cellulitis of right knee.  He presents with the following impairments/functional limitations:  weakness, gait instability, impaired balance, pain, impaired self care skills, impaired functional mobilty. These impairments inhibit the patient from independently and safely participating in desired functional tasks such as transfers, ambulating within home, and community navigation.     PT evaluation complete; goals established. Patient tolerated session fairly well with an abundance of questions for PT/OT. Due to patient's severely limited motion in BLE as well as gross weakness in L side, patient's placed at a high risk of falling with ambulation. Patient requires significant assistance with transfer from bed to chair without slideboard. After PT educated patient on how to safety use slideboard, patient required less manual assistance but noted moderate verbal cues necessary for safety. Patient's wife/caregiver educated on safety when assisting patient to wheelchair. PT with concerns regarding patient's ability to negotiate steps in order to enter home despite patient reporting good safety with stairs. Would like to further assess. Patient would benefit from HH upon discharge in order to optimize functional mobility outcomes and maximize patient safety.    Rehab Prognosis: Good; patient would benefit from acute skilled PT services to address these deficits and reach maximum level of function.    Recent Surgery: * No surgery found *      Plan:  "    During this hospitalization, patient to be seen 3 x/week to address the identified rehab impairments via gait training, therapeutic activities, therapeutic exercises, neuromuscular re-education, wheelchair management/training and progress toward the following goals:    · Plan of Care Expires:  19    Subjective     Chief Complaint: Pain and inability to move ankles 2/2 fusion   Patient/Family Comments/goals: "Do you have shoes that I could use that are more supportive?" "Do you have one of those things to help me put my socks and shoes on?" "They brought my w/c yesterday but my wife took it." "I go up my stairs sideways using that one handrail, I can do it." Per patient, "No one helps me during the day." Per wife, "I am with patient in the mornings and then later in the day." "I need a w/c to go to appointments but I missed the last 8 or 9"    Pain/Comfort:  · Pain Rating 1: 9/10  · Location - Side 1: Left  · Location - Orientation 1: generalized  · Location 1: ankle  · Pain Addressed 1: Reposition, Distraction  · Pain Rating Post-Intervention 1: 9/10    Patients cultural, spiritual, [employer]ian conflicts given the current situation: no    Living Environment:  · Lives with wife in 2 story home with 8 steps to enter, SHR  · Bedroom and bathroom on first floor  · Has RW, cane, and crutches but uses RW and cane within home  · Wants w/c for community travel  · Per patient, he is home alone with no one to help. Per wife, she is able to provide help most of day.  · Equipment used at home: crutches, cane, quad, walker, rolling.    Objective:     Communicated with RN prior to session.  Patient found supine with SCD, peripheral IV  upon PT entry to room.    General Precautions: Standard, fall, seizure   Orthopedic Precautions:N/A   Braces: N/A     Exams:  Cognition:   Patient is oriented to name, , location and situation.  Pt follows approximately 50% of multi-step commands.    Mood: Pleasant and cooperative. "   Musculoskeletal:  Posture:    -       Rounded shoulders  -       Forward head  LE ROM/Strength: Gross weakness on L side 2/2 previous Hx of stroke    Left  Muscle group Right   4-/5 Hip flexion 4+/5   4-/5 Knee extension 4+/5   Not able to assess 2/2 unable to put ankle through full ROM Ankle dorsiflexion Not able to assess 2/2 limited ROM     Neuromuscular:  Sensation: Intact to light touch bilateral LEs. C/o some paresthesias on anterior L shank compared to R  Tone/Reflexes: No impairments identified with functional mobility. No formal testing performed.   Balance: Sitting balance: SBA  Visual-vestibular: No impairments identified with functional mobility. No formal testing performed.  Integument: MD assessed wound on R kne during session; wound covered and no visible drainage for remainder of PT sessoion  Cardiopulmonary:  Edema: Minimal noted on RLE  Vital signs:   BP: 119/88 mm Hg  Heart rate 81 bpm    Functional Mobility:  · Bed Mobility:   Increased time required   · Supine to Sit: stand by assistance  · Sit to Supine: stand by assistance  · Transfers:     · Bed <> Chair: contact guard assistance and maximal assistance with  no AD and slide board  using  Squat Pivot and Slide Board  · First attempt: squat pivot bed <> w/c with removable arm - MAX A  · PT demonstrated prior  · Max verbal cues for hand placement for safe execution  · Max A at trunk 2/2 weakness; would have lost balance posteriorly but PT prevented fall by providing additional support at trunk  · Second attempt: slideboard t/f bed <> chair - CGA  · Pt demonstrated safe transfer including placement of slideboard and BUE hand placement  · Patient required max verbal cues for safety to prevent hand injury 2/2 slideboard  · With verbal cues, patient able to t/f with CGA  · Balance:   · Sba for sitting balance    Therapeutic Activities and Exercises:  · Discussed d/c options  vs SNF with patient, wife and MD; prefers   Pt performed supine  therapeutic exercises for strengthening and to promote circulation, pressure relief, and functional ROM with verbal, visual and tactile cues for correct performance including:   Bilateral glute sets x 10 reps x 2 sets  bilateral heel slides x 10 reps x 2 sets  · Encouraged patient to perform said exercises 2x/day until seen by  PT  · Educated wife on guarding patient during w/c transfer  · Discussed speaking with CM regarding purchasing slide-board  · Discussed supportive shoes options with patient     AM-PAC 6 CLICK MOBILITY  Total Score:17     Patient left supine with all lines intact, call button in reach, RN notified and family present.    GOALS:   Multidisciplinary Problems     Physical Therapy Goals     Not on file          Multidisciplinary Problems (Resolved)        Problem: Physical Therapy Goal    Goal Priority Disciplines Outcome Goal Variances Interventions   Physical Therapy Goal   (Resolved)     PT, PT/OT Outcome(s) achieved     Description:  Goals to be met by: 19    Patient will increase functional independence with mobility by performin. Transfer bed<>w/c with slideboard requiring SBA and no verbal cues for positioning of and safety with slideboard  2. Wheelchair propulsion > 50 ft in straight path on even surface with SBA and appropriate technique with no verbal cues .   3. Ambulate with RW 20' with Min A on even surface to simulate ambulating to bathroom at home  4. PT to assess stair negotiation                       History:     Past Medical History:   Diagnosis Date    Afib     Alcohol abuse with other alcohol-induced disorder 2019    Anemia 2019    unspecified deficiency    Ankle fracture, left     Aortic atherosclerosis 2019    Arthritis     Asthma     Cardiomyopathy 2019    Chronic kidney disease (CKD), stage III (moderate) 2019    Depression     Erectile dysfunction 2019    Gout, arthropathy 2019    Gout, unspecified      Hypertension     Hypertensive chronic kidney disease 04/02/2019    Hypomagnesemia 04/02/2019    Noncompliance 04/02/2019    Peripheral neuropathy 04/02/2019    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    Secondary hyperparathyroidism, renal 04/02/2019    Seizure 2016    Spastic hemiplegia affecting left nondominant side 04/02/2019       Past Surgical History:   Procedure Laterality Date    ANKLE SURGERY      x6    CHOLECYSTECTOMY-LAPAROSCOPIC N/A 3/29/2018    Performed [facility] Joshua Singh Jr[facility]    COLONOSCOPY N/A 5/26/2014    Performed by Rikki Gilbert MD at [facility]O (4TH FLR)       Time Tracking:     PT Received On: 04/04/19  PT Start Time: 0840     PT Stop Time: 0928  PT Total Time (min): 48 min   Co-treat: OT 8 mins   MD present and communicating with patient: 5 mins     Billable Minutes: Evaluation 15 and Therapeutic Activity 20-- total Time: 35 mins       Courtney Garcia, PT  04/04/2019

## 2019-04-04 NOTE — PLAN OF CARE
"Wound Care follow up for wound to right knee. Patient reported this wound is related to a "gout flare up" that began about 6 months ago. Picture taken. Assisted by Charge Nurse, RNVanna. Patient sitting at bedside, with legs dangling, dressed for discharge. Pictures taken.    Right Knee - Removed dressing which had mostly MediHoney covering the gauze. Wound bed measured 1.1 cm X 2.1 cm X 0.2 cm. Wound base red with yellow slough. Scant amount of clear drainage noted during dressing change. Wound edges uneven. Joycelyn-wound intact. Patient reported while at home he was able to express "a lot" of drainage from the wound. Patient's supplies and belongings were packed for home, therefore opted to cover wound with AquaCel AG foam and an island dressing. The foam will absorb the drainage and the AG is an antimicrobial. Patient will be seeing his PCP tomorrow.    Allison Hernandez Rn, wound and Ostomy          "

## 2019-04-04 NOTE — PLAN OF CARE
[facility]  [address] [address]  [phone]         HOME  HEALTH ORDERS        Admit to Home Health    Diagnoses:  Active Hospital Problems    Diagnosis  POA    *Cellulitis of right knee [L03.115]  Yes    Fever [R50.9]  Yes    Hypokalemia [E87.6]  Yes    Alcohol abuse [F10.10]  Yes    CKD (chronic kidney disease), stage III [N18.3]  Yes    Essential hypertension [I10]  Yes    Macrocytic anemia [D53.9]  Yes    Hypomagnesemia [E83.42]  Yes    Gout flare [M10.9]  Yes      Resolved Hospital Problems   No resolved problems to display.       Patient is homebound due to: Pt requires home health services due to taxing effort to leave the home as a result of weakness/debility from Cellulitis of right knee    Face to face services were provided on 4/4/19    Allergies:  Review of patient's allergies indicates:   Allergen Reactions    Lisinopril Swelling     Pt admitted with angioedema 2wks after taking lisinopril.        Diet: cardiac    Activity: as tolerated.  Ambulate with assistance and assistive devices only    Nursing:   SN to complete comprehensive assessment including routine vital signs. Instruct on disease process and s/s of complications to report to MD. Review/verify medication list sent home with the patient at time of discharge  and instruct patient/caregiver as needed. Frequency may be adjusted depending on start of care date.    Notify MD if SBP > 160 or < 90; DBP > 90 or < 50; HR > 120 or < 50; Temp > 101; Other:       CONSULTS:      Physical Therapy to evaluate and treat. Evaluate for home safety and equipment needs; Establish/upgrade home exercise program. Perform / instruct on therapeutic exercises, gait training, transfer training, and Range of Motion.    Occupational Therapy to evaluate and treat. Evaluate home environment for safety and equipment needs. Perform/Instruct on transfers, ADL training, ROM, and therapeutic exercises.     to  evaluate for community resources/long-range planning.     Aide to provide assistance with personal care, ADLs, and vital signs    Wound Care: Clean wound of right knee with wound cleanser.  Apply medi-honey with cover and dress daily.    Medications:    Michael Johnson Jr.   Home Medication Instructions GLENDA:[id number]    Printed on:04/04/19 2947   Medication Information                      allopurinol (ZYLOPRIM) 300 MG tablet  Take 300 mg by mouth once daily.             aspirin (ECOTRIN) 81 MG EC tablet  Take 81 mg by mouth once daily.             clindamycin (CLEOCIN) 300 MG capsule  Take 2 capsules (600 mg total) by mouth every 8 (eight) hours. for 8 days             diltiaZEM (TIAZAC) 180 MG Cs24  Take 180 mg by mouth once daily.             ergocalciferol (VITAMIN D2) 50,000 unit Cap  Take 50,000 Units by mouth every 7 days.             ferrous sulfate 325 (65 FE) MG EC tablet  Take 325 mg by mouth once daily.             gabapentin (NEURONTIN) 300 MG capsule  Take 300 mg by mouth 3 (three) times daily.             hydrocodone-acetaminophen 5-325mg (NORCO) 5-325 mg per tablet  Take 1 tablet by mouth every 6 (six) hours as needed for Pain.             magnesium 250 mg Tab  Take 1 tablet by mouth once daily.             metoprolol succinate (TOPROL-XL) 50 MG 24 hr tablet  TAKE ONE TABLET BY MOUTH ONCE DAILY             potassium chloride (KLOR-CON) 20 mEq Pack  Take 20 mEq by mouth 2 (two) times daily.             ranitidine (ZANTAC) 150 MG tablet  Take 150 mg by mouth 2 (two) times daily.                   _________________________________  Joshua Powell MD      4/4/2019

## 2019-04-04 NOTE — PLAN OF CARE
Problem: Physical Therapy Goal  Goal: Physical Therapy Goal  Goals to be met by: 19    Patient will increase functional independence with mobility by performin. Transfer bed<>w/c with slideboard requiring SBA and no verbal cues for positioning of and safety with slideboard  2. Wheelchair propulsion > 50 ft in straight path on even surface with SBA and appropriate technique with no verbal cues .   3. Ambulate with RW 20' with Min A on even surface to simulate ambulating to bathroom at home  4. PT to assess stair negotiation     Outcome: Ongoing (interventions implemented as appropriate)  PT evaluation complete; goals established. Patient tolerated session fairly well with an abundance of questions for PT/OT. Due to patient's severely limited motion in BLE as well as gross weakness in L side, patient's placed at a high risk of falling with ambulation. Patient requires significant assistance with transfer from bed to chair without slideboard. After PT educated patient on how to safety use slideboard, patient required less manual assistance but noted moderate verbal cues necessary for safety. Patient's wife/caregiver educated on safety when assisting patient to wheelchair. PT with concerns regarding patient's ability to negotiate steps in order to enter home despite patient reporting good safety with stairs. Would like to further assess. Patient would benefit from HH upon discharge in order to optimize functional mobility outcomes and maximize patient safety.

## 2019-04-04 NOTE — ASSESSMENT & PLAN NOTE
-patient non-compliant with allopurinol at home.  -Uric acid was elevated at 9.9.  He was treated with colchicine and prednisone  -Will resume allopurinol at discharge

## 2019-04-04 NOTE — ASSESSMENT & PLAN NOTE
-He has chronic debility that is very significant  -Denied SNF previously as he is not thought to be able to improve  -I offered assisted NH placement which patient and family declined  -Will order home health for pt/ot/sn/sw/aid.

## 2019-04-04 NOTE — PLAN OF CARE
Problem: Occupational Therapy Goal  Goal: Occupational Therapy Goal  Goals to be met by: 4/11/2019    Patient will increase functional independence with ADLs by performing:    UE Dressing with Minimal Assistance.  LE Dressing with Moderate Assistance.  Grooming while seated with Stand-by Assistance.  Toileting from bedside commode with Stand-by Assistance for hygiene and clothing management.   Toilet transfer to bedside commode with contact guard assistance.      OT evaluation complete and POC established.  Home health and use of slide board is recommended upon d/c from acute care to further address deficits and help pt improve overall functional independence.     AMANDA Tinsley  4/4/2019

## 2019-04-08 ENCOUNTER — TELEPHONE (OUTPATIENT)
Dept: ADMINISTRATIVE | Facility: CLINIC | Age: 59
End: 2019-04-08

## 2019-04-08 NOTE — TELEPHONE ENCOUNTER
Home Health SOC 04/05/2019 - 06/03/2019 with [facility] ([place]) - Dr. Joshua Powell. PT services.

## 2019-04-09 LAB
BACTERIA BLD CULT: NORMAL
BACTERIA BLD CULT: NORMAL

## 2019-04-27 ENCOUNTER — HOSPITAL ENCOUNTER (OUTPATIENT)
Facility: OTHER | Age: 59
Discharge: HOME | End: 2019-04-28
Attending: EMERGENCY MEDICINE | Admitting: HOSPITALIST
Payer: MEDICARE

## 2019-04-27 DIAGNOSIS — I48.0 PAF (PAROXYSMAL ATRIAL FIBRILLATION): ICD-10-CM

## 2019-04-27 DIAGNOSIS — I10 ESSENTIAL HYPERTENSION: ICD-10-CM

## 2019-04-27 DIAGNOSIS — F10.10 ALCOHOL ABUSE: ICD-10-CM

## 2019-04-27 DIAGNOSIS — Z51.89 AFTER-TREATMENT: ICD-10-CM

## 2019-04-27 DIAGNOSIS — R11.10 VOMITING: ICD-10-CM

## 2019-04-27 DIAGNOSIS — E87.6 HYPOKALEMIA: ICD-10-CM

## 2019-04-27 DIAGNOSIS — G89.29 CHRONIC PAIN OF LEFT ANKLE: ICD-10-CM

## 2019-04-27 DIAGNOSIS — E87.6 ACUTE HYPOKALEMIA: Primary | ICD-10-CM

## 2019-04-27 DIAGNOSIS — M25.572 CHRONIC PAIN OF LEFT ANKLE: ICD-10-CM

## 2019-04-27 DIAGNOSIS — R79.89 ELEVATED LACTIC ACID LEVEL: ICD-10-CM

## 2019-04-27 DIAGNOSIS — E83.51 HYPOCALCEMIA: ICD-10-CM

## 2019-04-27 DIAGNOSIS — K21.9 GASTROESOPHAGEAL REFLUX DISEASE WITHOUT ESOPHAGITIS: ICD-10-CM

## 2019-04-27 DIAGNOSIS — R00.0 INCREASED HEART RATE: ICD-10-CM

## 2019-04-27 DIAGNOSIS — N18.30 CKD (CHRONIC KIDNEY DISEASE), STAGE III: ICD-10-CM

## 2019-04-27 DIAGNOSIS — R00.0 TACHYCARDIA: ICD-10-CM

## 2019-04-27 DIAGNOSIS — E86.0 DEHYDRATION: ICD-10-CM

## 2019-04-27 DIAGNOSIS — R53.81 DEBILITY: ICD-10-CM

## 2019-04-27 DIAGNOSIS — I48.91 ATRIAL FIBRILLATION WITH RVR: ICD-10-CM

## 2019-04-27 DIAGNOSIS — E87.29 ALCOHOLIC KETOACIDOSIS: ICD-10-CM

## 2019-04-27 DIAGNOSIS — E83.42 HYPOMAGNESEMIA: ICD-10-CM

## 2019-04-27 LAB
ALBUMIN SERPL BCP-MCNC: 2.8 G/DL (ref 3.5–5.2)
ALBUMIN SERPL BCP-MCNC: 3.6 G/DL (ref 3.5–5.2)
ALP SERPL-CCNC: 64 U/L (ref 55–135)
ALP SERPL-CCNC: 74 U/L (ref 55–135)
ALT SERPL W/O P-5'-P-CCNC: 37 U/L (ref 10–44)
ALT SERPL W/O P-5'-P-CCNC: 43 U/L (ref 10–44)
AMMONIA PLAS-SCNC: 47 UMOL/L (ref 10–50)
AMPHET+METHAMPHET UR QL: NEGATIVE
ANION GAP SERPL CALC-SCNC: 12 MMOL/L (ref 8–16)
ANION GAP SERPL CALC-SCNC: 18 MMOL/L (ref 8–16)
AST SERPL-CCNC: 115 U/L (ref 10–40)
AST SERPL-CCNC: 89 U/L (ref 10–40)
BACTERIA #/AREA URNS HPF: ABNORMAL /HPF
BARBITURATES UR QL SCN>200 NG/ML: NEGATIVE
BASOPHILS # BLD AUTO: 0.01 K/UL (ref 0–0.2)
BASOPHILS NFR BLD: 0.1 % (ref 0–1.9)
BENZODIAZ UR QL SCN>200 NG/ML: NORMAL
BILIRUB SERPL-MCNC: 0.8 MG/DL (ref 0.1–1)
BILIRUB SERPL-MCNC: 1.5 MG/DL (ref 0.1–1)
BILIRUB UR QL STRIP: ABNORMAL
BNP SERPL-MCNC: 73 PG/ML (ref 0–99)
BUN SERPL-MCNC: 18 MG/DL (ref 6–20)
BUN SERPL-MCNC: 19 MG/DL (ref 6–20)
BZE UR QL SCN: NEGATIVE
CALCIUM SERPL-MCNC: 8.1 MG/DL (ref 8.7–10.5)
CALCIUM SERPL-MCNC: 9.2 MG/DL (ref 8.7–10.5)
CANNABINOIDS UR QL SCN: NEGATIVE
CHLORIDE SERPL-SCNC: 100 MMOL/L (ref 95–110)
CHLORIDE SERPL-SCNC: 95 MMOL/L (ref 95–110)
CLARITY UR: CLEAR
CO2 SERPL-SCNC: 23 MMOL/L (ref 23–29)
CO2 SERPL-SCNC: 23 MMOL/L (ref 23–29)
COLOR UR: ABNORMAL
CREAT SERPL-MCNC: 1.2 MG/DL (ref 0.5–1.4)
CREAT SERPL-MCNC: 1.4 MG/DL (ref 0.5–1.4)
CREAT UR-MCNC: 248.1 MG/DL (ref 23–375)
DIFFERENTIAL METHOD: ABNORMAL
EOSINOPHIL # BLD AUTO: 0 K/UL (ref 0–0.5)
EOSINOPHIL NFR BLD: 0 % (ref 0–8)
ERYTHROCYTE [DISTWIDTH] IN BLOOD BY AUTOMATED COUNT: 15.9 % (ref 11.5–14.5)
EST. GFR  (AFRICAN AMERICAN): >60 ML/MIN/1.73 M^2
EST. GFR  (AFRICAN AMERICAN): >60 ML/MIN/1.73 M^2
EST. GFR  (NON AFRICAN AMERICAN): 56 ML/MIN/1.73 M^2
EST. GFR  (NON AFRICAN AMERICAN): >60 ML/MIN/1.73 M^2
ETHANOL SERPL-MCNC: <10 MG/DL
GLUCOSE SERPL-MCNC: 118 MG/DL (ref 70–110)
GLUCOSE SERPL-MCNC: 91 MG/DL (ref 70–110)
GLUCOSE UR QL STRIP: NEGATIVE
HCT VFR BLD AUTO: 34.7 % (ref 40–54)
HGB BLD-MCNC: 11.8 G/DL (ref 14–18)
HGB UR QL STRIP: NEGATIVE
HYALINE CASTS #/AREA URNS LPF: 1 /LPF
INR PPP: 1 (ref 0.8–1.2)
KETONES UR QL STRIP: ABNORMAL
LACTATE SERPL-SCNC: 1.2 MMOL/L (ref 0.5–2.2)
LACTATE SERPL-SCNC: 3.1 MMOL/L (ref 0.5–2.2)
LEUKOCYTE ESTERASE UR QL STRIP: NEGATIVE
LIPASE SERPL-CCNC: 36 U/L (ref 4–60)
LYMPHOCYTES # BLD AUTO: 1.4 K/UL (ref 1–4.8)
LYMPHOCYTES NFR BLD: 15.3 % (ref 18–48)
MCH RBC QN AUTO: 37.3 PG (ref 27–31)
MCHC RBC AUTO-ENTMCNC: 34 G/DL (ref 32–36)
MCV RBC AUTO: 110 FL (ref 82–98)
METHADONE UR QL SCN>300 NG/ML: NEGATIVE
MICROSCOPIC COMMENT: ABNORMAL
MONOCYTES # BLD AUTO: 0.6 K/UL (ref 0.3–1)
MONOCYTES NFR BLD: 6.7 % (ref 4–15)
NEUTROPHILS # BLD AUTO: 7.2 K/UL (ref 1.8–7.7)
NEUTROPHILS NFR BLD: 77.7 % (ref 38–73)
NITRITE UR QL STRIP: NEGATIVE
OPIATES UR QL SCN: NEGATIVE
OSMOLALITY SERPL: 288 MOSM/KG (ref 280–300)
PCP UR QL SCN>25 NG/ML: NEGATIVE
PH UR STRIP: 6 [PH] (ref 5–8)
PLATELET # BLD AUTO: 141 K/UL (ref 150–350)
PMV BLD AUTO: 11.1 FL (ref 9.2–12.9)
POCT GLUCOSE: 95 MG/DL (ref 70–110)
POTASSIUM SERPL-SCNC: 2.9 MMOL/L (ref 3.5–5.1)
POTASSIUM SERPL-SCNC: 3 MMOL/L (ref 3.5–5.1)
PROT SERPL-MCNC: 7.8 G/DL (ref 6–8.4)
PROT SERPL-MCNC: 9.3 G/DL (ref 6–8.4)
PROT UR QL STRIP: ABNORMAL
PROTHROMBIN TIME: 10.8 SEC (ref 9–12.5)
RBC # BLD AUTO: 3.16 M/UL (ref 4.6–6.2)
RBC #/AREA URNS HPF: 2 /HPF (ref 0–4)
SODIUM SERPL-SCNC: 135 MMOL/L (ref 136–145)
SODIUM SERPL-SCNC: 136 MMOL/L (ref 136–145)
SP GR UR STRIP: 1.01 (ref 1–1.03)
TOXICOLOGY INFORMATION: NORMAL
TROPONIN I SERPL DL<=0.01 NG/ML-MCNC: 0.01 NG/ML (ref 0–0.03)
TSH SERPL DL<=0.005 MIU/L-ACNC: 2.35 UIU/ML (ref 0.4–4)
URN SPEC COLLECT METH UR: ABNORMAL
UROBILINOGEN UR STRIP-ACNC: ABNORMAL EU/DL
WBC # BLD AUTO: 9.21 K/UL (ref 3.9–12.7)
WBC #/AREA URNS HPF: 5 /HPF (ref 0–5)

## 2019-04-27 PROCEDURE — 84484 ASSAY OF TROPONIN QUANT: CPT

## 2019-04-27 PROCEDURE — 83690 ASSAY OF LIPASE: CPT

## 2019-04-27 PROCEDURE — 96375 TX/PRO/DX INJ NEW DRUG ADDON: CPT

## 2019-04-27 PROCEDURE — 94761 N-INVAS EAR/PLS OXIMETRY MLT: CPT

## 2019-04-27 PROCEDURE — 99291 CRITICAL CARE FIRST HOUR: CPT | Mod: 25

## 2019-04-27 PROCEDURE — 83880 ASSAY OF NATRIURETIC PEPTIDE: CPT

## 2019-04-27 PROCEDURE — 93005 ELECTROCARDIOGRAM TRACING: CPT

## 2019-04-27 PROCEDURE — 80320 DRUG SCREEN QUANTALCOHOLS: CPT

## 2019-04-27 PROCEDURE — 36415 COLL VENOUS BLD VENIPUNCTURE: CPT

## 2019-04-27 PROCEDURE — 80307 DRUG TEST PRSMV CHEM ANLYZR: CPT

## 2019-04-27 PROCEDURE — 99220 PR INITIAL OBSERVATION CARE,LEVL III: ICD-10-PCS | Mod: ,,, | Performed by: NURSE PRACTITIONER

## 2019-04-27 PROCEDURE — 99292 CRITICAL CARE ADDL 30 MIN: CPT | Mod: 25

## 2019-04-27 PROCEDURE — 83605 ASSAY OF LACTIC ACID: CPT

## 2019-04-27 PROCEDURE — 80053 COMPREHEN METABOLIC PANEL: CPT | Mod: 91

## 2019-04-27 PROCEDURE — 96361 HYDRATE IV INFUSION ADD-ON: CPT

## 2019-04-27 PROCEDURE — 82962 GLUCOSE BLOOD TEST: CPT

## 2019-04-27 PROCEDURE — G0378 HOSPITAL OBSERVATION PER HR: HCPCS

## 2019-04-27 PROCEDURE — 80053 COMPREHEN METABOLIC PANEL: CPT

## 2019-04-27 PROCEDURE — 85025 COMPLETE CBC W/AUTO DIFF WBC: CPT

## 2019-04-27 PROCEDURE — 84443 ASSAY THYROID STIM HORMONE: CPT

## 2019-04-27 PROCEDURE — 93010 EKG 12-LEAD: ICD-10-PCS | Mod: 76,,, | Performed by: INTERNAL MEDICINE

## 2019-04-27 PROCEDURE — 93010 ELECTROCARDIOGRAM REPORT: CPT | Mod: ,,, | Performed by: INTERNAL MEDICINE

## 2019-04-27 PROCEDURE — 83605 ASSAY OF LACTIC ACID: CPT | Mod: 91

## 2019-04-27 PROCEDURE — 25000003 PHARM REV CODE 250: Performed by: EMERGENCY MEDICINE

## 2019-04-27 PROCEDURE — 83930 ASSAY OF BLOOD OSMOLALITY: CPT

## 2019-04-27 PROCEDURE — 96365 THER/PROPH/DIAG IV INF INIT: CPT

## 2019-04-27 PROCEDURE — 63600175 PHARM REV CODE 636 W HCPCS: Performed by: NURSE PRACTITIONER

## 2019-04-27 PROCEDURE — 82140 ASSAY OF AMMONIA: CPT

## 2019-04-27 PROCEDURE — 25000003 PHARM REV CODE 250: Performed by: NURSE PRACTITIONER

## 2019-04-27 PROCEDURE — 81000 URINALYSIS NONAUTO W/SCOPE: CPT | Mod: 59

## 2019-04-27 PROCEDURE — 63600175 PHARM REV CODE 636 W HCPCS: Performed by: EMERGENCY MEDICINE

## 2019-04-27 PROCEDURE — 85610 PROTHROMBIN TIME: CPT

## 2019-04-27 PROCEDURE — 99220 PR INITIAL OBSERVATION CARE,LEVL III: CPT | Mod: ,,, | Performed by: NURSE PRACTITIONER

## 2019-04-27 RX ORDER — LANOLIN ALCOHOL/MO/W.PET/CERES
100 CREAM (GRAM) TOPICAL DAILY
Status: DISCONTINUED | OUTPATIENT
Start: 2019-04-27 | End: 2019-04-28 | Stop reason: HOSPADM

## 2019-04-27 RX ORDER — SODIUM CHLORIDE 0.9 % (FLUSH) 0.9 %
10 SYRINGE (ML) INJECTION
Status: DISCONTINUED | OUTPATIENT
Start: 2019-04-27 | End: 2019-04-27

## 2019-04-27 RX ORDER — DIAZEPAM 5 MG/1
10 TABLET ORAL EVERY 6 HOURS PRN
Status: DISCONTINUED | OUTPATIENT
Start: 2019-04-27 | End: 2019-04-28 | Stop reason: HOSPADM

## 2019-04-27 RX ORDER — LEVETIRACETAM 500 MG/1
500 TABLET ORAL 2 TIMES DAILY
Status: ON HOLD | COMMUNITY
End: 2019-12-02 | Stop reason: HOSPADM

## 2019-04-27 RX ORDER — SODIUM CHLORIDE 9 MG/ML
INJECTION, SOLUTION INTRAVENOUS ONCE
Status: DISCONTINUED | OUTPATIENT
Start: 2019-04-27 | End: 2019-04-27

## 2019-04-27 RX ORDER — METOPROLOL TARTRATE 1 MG/ML
5 INJECTION, SOLUTION INTRAVENOUS
Status: COMPLETED | OUTPATIENT
Start: 2019-04-27 | End: 2019-04-27

## 2019-04-27 RX ORDER — IBUPROFEN 800 MG/1
800 TABLET ORAL 3 TIMES DAILY
Status: ON HOLD | COMMUNITY
End: 2019-12-02 | Stop reason: HOSPADM

## 2019-04-27 RX ORDER — SODIUM CHLORIDE 0.9 % (FLUSH) 0.9 %
10 SYRINGE (ML) INJECTION
Status: DISCONTINUED | OUTPATIENT
Start: 2019-04-27 | End: 2019-04-28 | Stop reason: HOSPADM

## 2019-04-27 RX ORDER — FOLIC ACID 1 MG/1
1 TABLET ORAL DAILY
Status: DISCONTINUED | OUTPATIENT
Start: 2019-04-27 | End: 2019-04-28 | Stop reason: HOSPADM

## 2019-04-27 RX ORDER — ONDANSETRON 2 MG/ML
4 INJECTION INTRAMUSCULAR; INTRAVENOUS
Status: COMPLETED | OUTPATIENT
Start: 2019-04-27 | End: 2019-04-27

## 2019-04-27 RX ORDER — ONDANSETRON 8 MG/1
8 TABLET, ORALLY DISINTEGRATING ORAL EVERY 8 HOURS PRN
Status: DISCONTINUED | OUTPATIENT
Start: 2019-04-27 | End: 2019-04-28 | Stop reason: HOSPADM

## 2019-04-27 RX ORDER — SODIUM CHLORIDE AND POTASSIUM CHLORIDE 300; 900 MG/100ML; MG/100ML
INJECTION, SOLUTION INTRAVENOUS CONTINUOUS
Status: DISCONTINUED | OUTPATIENT
Start: 2019-04-27 | End: 2019-04-28

## 2019-04-27 RX ADMIN — SODIUM CHLORIDE 1000 ML: 0.9 INJECTION, SOLUTION INTRAVENOUS at 08:04

## 2019-04-27 RX ADMIN — SODIUM CHLORIDE 1000 ML: 0.9 INJECTION, SOLUTION INTRAVENOUS at 10:04

## 2019-04-27 RX ADMIN — ONDANSETRON 4 MG: 2 SOLUTION INTRAMUSCULAR; INTRAVENOUS at 09:04

## 2019-04-27 RX ADMIN — PROMETHAZINE HYDROCHLORIDE 12.5 MG: 25 INJECTION INTRAMUSCULAR; INTRAVENOUS at 10:04

## 2019-04-27 RX ADMIN — FOLIC ACID 1 MG: 1 TABLET ORAL at 02:04

## 2019-04-27 RX ADMIN — POTASSIUM BICARBONATE 50 MEQ: 977.5 TABLET, EFFERVESCENT ORAL at 10:04

## 2019-04-27 RX ADMIN — METOPROLOL TARTRATE 5 MG: 1 INJECTION, SOLUTION INTRAVENOUS at 09:04

## 2019-04-27 RX ADMIN — SODIUM CHLORIDE AND POTASSIUM CHLORIDE 100 ML/HR: 9; 2.98 INJECTION, SOLUTION INTRAVENOUS at 09:04

## 2019-04-27 RX ADMIN — Medication 100 MG: at 02:04

## 2019-04-27 RX ADMIN — THERA TABS 1 TABLET: TAB at 02:04

## 2019-04-27 NOTE — ASSESSMENT & PLAN NOTE
-On admit alcohol level was negative   - [facility] protocol in place and will start thiamine and folic acid.  - chronic problem and will continue to counsele to abstain from alcohol  - case manamgnent/social work involvement

## 2019-04-27 NOTE — ED PROVIDER NOTES
Encounter Date: 4/27/2019    SCRIBE #1 NOTE: IJoseline, am scribing for, and in the presence of, Dr. Menezes.       History     Chief Complaint   Patient presents with    Nausea     pt with nausea and vomiting x 3 days.      Time seen by provider: 8:31 AM    This is a 58 y.o. male, with history of HTN, Afib, cardiomyopathy, and secondary hyperparathyroidism, who presents to the ED via EMS with complaint of nausea and vomiting for the past three days. Patient reports decreased appetite for the past three days. Patient states symptoms happened before, but denies lasting this long. Patient admits he stopped drinking two weeks ago and experienced similar symptoms when he was in withdrawal. Patient states he was fine after until onset of symptoms three days ago. Patient states he takes metoprolol for heart arrhythmias. Patient reports pain to his left foot. Patient denies abdominal pain. Patient denies use of tobacco or illicit drugs.    The history is provided by the patient.     Review of patient's allergies indicates:   Allergen Reactions    Lisinopril Swelling     Pt admitted with angioedema 2wks after taking lisinopril.      Past Medical History:   Diagnosis Date    Afib     Alcohol abuse with other alcohol-induced disorder 04/02/2019    Anemia 04/02/2019    unspecified deficiency    Ankle fracture, left     Aortic atherosclerosis 04/02/2019    Arthritis     Asthma     Cardiomyopathy 04/02/2019    Chronic kidney disease (CKD), stage III (moderate) 04/02/2019    Depression     Erectile dysfunction 04/02/2019    Gout, arthropathy 04/02/2019    Gout, unspecified     Hypertension     Hypertensive chronic kidney disease 04/02/2019    Hypomagnesemia 04/02/2019    Noncompliance 04/02/2019    Peripheral neuropathy 04/02/2019    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    Secondary hyperparathyroidism, renal 04/02/2019    Seizure 2016    Spastic hemiplegia affecting left nondominant side 04/02/2019     Past  Surgical History:   Procedure Laterality Date    ANKLE SURGERY      x6    CHOLECYSTECTOMY-LAPAROSCOPIC N/A 3/29/2018    Performed by [facility] Jr[facility] at [facility]    COLONOSCOPY N/A 5/26/2014    Performed by Rikki Gilbert MD at [facility] ([facility])     Family History   Problem Relation Age of Onset    Hypertension Father     Stroke Maternal Grandmother     Cataracts Maternal Grandfather     Stroke Maternal Grandfather     Cancer Mother         malignant polyp     Social History     Tobacco Use    Smoking status: Never Smoker    Smokeless tobacco: Never Used   Substance Use Topics    Alcohol use: Not Currently     Alcohol/week: 1.8 - 2.4 oz     Types: 3 - 4 Shots of liquor per week     Comment: daily    Drug use: No     Review of Systems   Constitutional: Positive for appetite change. Negative for fever.   HENT: Negative for sore throat.    Respiratory: Negative for shortness of breath.    Cardiovascular: Negative for chest pain.   Gastrointestinal: Positive for nausea and vomiting. Negative for abdominal pain.   Genitourinary: Negative for dysuria.   Musculoskeletal: Negative for back pain.        Positive for left foot pain.   Skin: Negative for rash.   Neurological: Negative for weakness.   Hematological: Does not bruise/bleed easily.       Physical Exam     Initial Vitals [04/27/19 0829]   BP Pulse Resp Temp SpO2   (!) 134/94 (!) 132 20 98.3 °F (36.8 °C) 100 %      MAP       --         Physical Exam    Nursing note and vitals reviewed.  Constitutional: He appears well-developed and well-nourished. He is not diaphoretic. No distress.   HENT:   Head: Normocephalic and atraumatic.   Eyes: Conjunctivae and EOM are normal. No scleral icterus.   Neck: Normal range of motion. Neck supple.   Cardiovascular: Normal rate, regular rhythm and normal heart sounds. Exam reveals no gallop and no friction rub.    No murmur heard.  Pulmonary/Chest: Breath sounds normal. No respiratory distress. He has no  wheezes. He has no rhonchi. He has no rales.   Abdominal: Soft. Bowel sounds are normal. He exhibits no distension. There is no tenderness. There is no rebound and no guarding.   Musculoskeletal: Normal range of motion. He exhibits no edema or tenderness.   Lymphadenopathy:     He has no cervical adenopathy.   Neurological: He is alert and oriented to person, place, and time.   Skin: Skin is warm and dry. No rash noted. No erythema. No pallor.   Psychiatric: He has a normal mood and affect. His behavior is normal. Judgment and thought content normal.         ED Course   Critical Care  Date/Time: 4/27/2019 6:27 PM  Performed by: Earnest Carroll MD  Authorized by: Earnest Carroll MD   Direct patient critical care time: 45 minutes  Additional history critical care time: 10 minutes  Ordering / reviewing critical care time: 12 minutes  Documentation critical care time: 10 minutes  Consulting other physicians critical care time: 5 minutes  Total critical care time (exclusive of procedural time) : 82 minutes  Critical care time was exclusive of separately billable procedures and treating other patients and teaching time.  Critical care was necessary to treat or prevent imminent or life-threatening deterioration of the following conditions: Atrial fibrillation with rapid ventricular response.  Critical care was time spent personally by me on the following activities: blood draw for specimens, development of treatment plan with patient or surrogate, discussions with consultants, interpretation of cardiac output measurements, evaluation of patient's response to treatment, examination of patient, obtaining history from patient or surrogate, ordering and performing treatments and interventions, ordering and review of laboratory studies, ordering and review of radiographic studies, pulse oximetry, re-evaluation of patient's condition and review of old charts.        Labs Reviewed   CBC W/ AUTO DIFFERENTIAL - Abnormal;  Notable for the following components:       Result Value    RBC 3.16 (*)     Hemoglobin 11.8 (*)     Hematocrit 34.7 (*)      (*)     MCH 37.3 (*)     RDW 15.9 (*)     Platelets 141 (*)     Gran% 77.7 (*)     Lymph% 15.3 (*)     All other components within normal limits   COMPREHENSIVE METABOLIC PANEL - Abnormal; Notable for the following components:    Potassium 3.0 (*)     Total Protein 9.3 (*)     Total Bilirubin 1.5 (*)     AST 89 (*)     Anion Gap 18 (*)     eGFR if non  56 (*)     All other components within normal limits   URINALYSIS - Abnormal; Notable for the following components:    Protein, UA 1+ (*)     Ketones, UA 1+ (*)     Bilirubin (UA) 2+ (*)     Urobilinogen, UA 2.0-3.0 (*)     All other components within normal limits   LACTIC ACID, PLASMA - Abnormal; Notable for the following components:    Lactate (Lactic Acid) 3.1 (*)     All other components within normal limits   URINALYSIS MICROSCOPIC - Abnormal; Notable for the following components:    Bacteria, UA Few (*)     All other components within normal limits   PROTIME-INR   ALCOHOL,MEDICAL (ETHANOL)   DRUG SCREEN PANEL, URINE EMERGENCY   LIPASE   AMMONIA   TROPONIN I   B-TYPE NATRIURETIC PEPTIDE   OSMOLALITY, SERUM   TSH   TSH   POCT GLUCOSE   POCT GLUCOSE MONITORING CONTINUOUS     EKG Readings: (Independently Interpreted)   Initial Reading: No STEMI.   Sinus tachycardia. Rate of 120 bpm.   Other EKG Interpretations: Repeat EKG 9:01 AM: Sinus tachycardia. Rate of 206 bpm.     ECG Results          EKG 12-lead (Final result)  Result time 04/27/19 17:21:34    Final result by Interface, Lab In [facility] (04/27/19 17:21:34)                 Narrative:    Test Reason : R00.0,    Vent. Rate : 174 BPM     Atrial Rate : 187 BPM     P-R Int : 000 ms          QRS Dur : 080 ms      QT Int : 268 ms       P-R-T Axes : 052 037 242 degrees     QTc Int : 456 ms    Sinus tachycardia with Fusion complexes  Marked ST abnormality, possible inferior  subendocardial injury  Marked ST abnormality, possible anterior subendocardial injury  Abnormal ECG    Confirmed by Camila Chavez MD ([id number]) on 4/27/2019 5:21:27 PM    Referred By: LAYLA   SELF           Confirmed By:Camila Chavez MD                             EKG 12-lead (Final result)  Result time 04/27/19 17:21:12    Final result by Interface, Lab In [facility] (04/27/19 17:21:12)                 Narrative:    Test Reason : Z51.89,    Vent. Rate : 206 BPM     Atrial Rate : 219 BPM     P-R Int : 120 ms          QRS Dur : 074 ms      QT Int : 220 ms       P-R-T Axes : 248 052 240 degrees     QTc Int : 407 ms    Supraventricular tachycardia  Marked ST abnormality, possible inferior subendocardial injury  Marked ST abnormality, possible anteroseptal subendocardial injury  Abnormal ECG    Confirmed by Camila Chavez MD ([id number]) on 4/27/2019 5:21:02 PM    Referred By: LAYLA   SELF           Confirmed By:Camila Chavez MD                             EKG 12-lead (Final result)  Result time 04/27/19 17:18:34    Final result by Interface, Lab In [facility] (04/27/19 17:18:34)                 Narrative:    Test Reason : R11.10,    Vent. Rate : 120 BPM     Atrial Rate : 120 BPM     P-R Int : 136 ms          QRS Dur : 080 ms      QT Int : 342 ms       P-R-T Axes : 057 045 042 degrees     QTc Int : 483 ms    Sinus tachycardia with occasional Premature atrial complexes  Otherwise normal ECG    Confirmed by Camila Chavez MD ([id number]) on 4/27/2019 5:18:25 PM    Referred By: LAYLA   SELF           Confirmed By:Camila Chavez MD                            Imaging Results          X-Ray Chest AP Portable (Final result)  Result time 04/27/19 10:20:36    Final result by Rach Solorzano MD (04/27/19 10:20:36)                 Impression:      No detrimental change since April 3, 2019.      Electronically signed by: Rach Solorzano MD  Date:    04/27/2019  Time:    10:20             Narrative:    EXAMINATION:  XR CHEST AP  PORTABLE    CLINICAL HISTORY:  Tachycardia, unspecified    TECHNIQUE:  Single frontal view of the chest was performed.    COMPARISON:  April 3, 2019    FINDINGS:  The lungs are clear, with normal appearance of pulmonary vasculature and no large pleural effusion or pneumothorax.    The cardiac silhouette is normal in size. The hilar and mediastinal contours are unremarkable.    Visualized osseous structures are stable.                              X-Rays:   Independently Interpreted Readings:   Chest X-Ray: No acute findings.     Medical Decision Making:   Independently Interpreted Test(s):   I have ordered and independently interpreted X-rays - see prior notes.  I have ordered and independently interpreted EKG Reading(s) - see prior notes  Clinical Tests:   Lab Tests: Ordered and Reviewed  Radiological Study: Ordered and Reviewed  Medical Tests: Ordered and Reviewed  ED Management:  10:00 AM: Patient given 5 mg of metoprolol IV with a heart rate of 174 bpm. Five minutes after IV, heart rate decreased 92 bpm.    10:40 AM: Discussed and consulted with Crystal Lizarraga, of case management for possible admission.    11:24 AM: Discussed case with Dr. Powell, [facility] medicine. Will admit to Dr. Powell.              Scribe Attestation:   Scribe #1: I performed the above scribed service and the documentation accurately describes the services I performed. I attest to the accuracy of the note.    Attending Attestation:           Physician Attestation for Scribe:  Physician Attestation Statement for Scribe #1: I, Dr. Menezes, reviewed documentation, as scribed by Joseline Rodriguez in my presence, and it is both accurate and complete.         Attending ED Notes:   Emergent evaluation a 58-year-old male with complaint of nausea and vomiting over the past 3 days.  On further evaluation patient states he stopped drinking alcohol approximately 2 weeks ago and had similar symptoms then which resolved.  Patient is afebrile,  nontoxic-appearing stable vital signs. Urinary analysis reveals a protein and bilirubin.  Drug screen is positive for benzodiazepines.  Sodium is 136, potassium is 3.0, total bili 1.5 and AST is 89.  Anion gap of 18.  Serum Osmolality is within normal limits.  BNP is 73.  Lactic acid is 3.1.  During the course of evaluation and treatment patient and went into atrial fibrillation with rapid ventricular response and had a heart rate of 205.  Patient is administered 5 mg of Lopressor IV and repeat EKG reveals normal sinus rhythm with a heart rate of 95.  The patient has no acute findings on chest x-ray.  The patient is extensively counseled on his diagnosis and treatment including all diagnostic, laboratory and physical exam findings.  The patient is admitted in stable condition.             Clinical Impression:     1. Alcoholic ketoacidosis    2. Vomiting    3. Increased heart rate    4. After-treatment    5. Tachycardia    6. Dehydration    7. Hypokalemia    8. Elevated lactic acid level    9. Atrial fibrillation with RVR                                   Earnest Carroll MD  04/27/19 [id number]

## 2019-04-27 NOTE — ED NOTES
Completed hourly rounding on pt. Pt lying semi-Fowlers in bed, AAOx4, GCS 15, calm, cooperative, responding appropriately to questions, speaking in full sentences, respirations even and unlabored with equal bilateral chest expansion, NAD noted. Addressed pt comfort, positioning and restroom needs. Pt denies need to use the bathroom at this time. Pt remains connected to Zoll defib pads, cardiac, continuous pulse ox and BP monitoring. Bed locked and in low position, side rails x 2, personal items and call light in reach. WCTM. Pt and wife at bedside updated on POC, verbalized understanding and all questions answered at this time. WCTM.

## 2019-04-27 NOTE — HPI
"Michael Johnson is a 58 year old male with medical history of alcohol abuse, history of seizures (related to alcohol withdrawal), gout, paroxysmal atrial fibrillation, chronic kidney disease stage III, anemia and hypertension.  The patient presented to the [facility] with a three day history of nausea with persistent vomiting and generalized malaise.   The patient has a long history of alcohol abuse, but states he has not had a drink in approximately three weeks.  The patient states he felt this way after his last drink three weeks ago, but those symptoms had subsided.  He reports decreased appetite during the past three days, but denied fever and chills or abdominal pain.  He denies sick contacts.  He states the only pain he has experienced in from his chronic left foot pain.  He is bed bound related to his foot and states he has been working with physical therapy to try to regain his ability to stand.  Patient denies use of tobacco or illicit drugs. nontoxic-appearing stable vital signs. Urinary analysis reveals a protein and bilirubin.  Initial work up in the ED revealed mild anion gap 18 and lactic acid 3.1 with total bili 1.5 and AST 89 . He was found to have hypokalemia K+ 3.0 and the patient states he did not complete oral potassium supplementation as he began to vomit.  He states he was given "something for vomiting" in his IV that caused his heart rate to accelerate. Per ED note, the patient went into atrial fibrillation with rapid ventricular response with heart rate of 205.  He was treated with  5 mg of Lopressor IV and converted to sinus rhythm on repeat EKG.  The patient has no acute findings on chest x-ray.  He will be admitted under Observation for further evaluation and management.   "

## 2019-04-27 NOTE — PLAN OF CARE
Problem: Adult Inpatient Plan of Care  Goal: Plan of Care Review  Outcome: Ongoing (interventions implemented as appropriate)  Pt was AAOx4, NAD noted. Pt denied pain, SOB. Vitals monitored and stable. Pt did not have any vomiting during shift. Pt tolerated full liquid meal well, no adverse reactions noted. Safety measures implemented, bed lowered and locked, bed rails upx2, call light within reach, will continue to monitor.

## 2019-04-27 NOTE — ASSESSMENT & PLAN NOTE
- per ED note, patient with atrial fibrillation with RVR, converted to NSR after beta blocker administration  - continue telemetry  - now Stable monitor

## 2019-04-27 NOTE — ASSESSMENT & PLAN NOTE
-He has chronic debility that is very significant  -Denied SNF previously as he is not thought to be able to improve  -I offered [facility] NH placement which patient and family declined  -Will order home health for pt/ot/sn/sw/aid.

## 2019-04-27 NOTE — ED NOTES
Pt's HR jumped from 100s to 200s suddenly, pt remained AAOx4, calm, cooperative, responding appropriately to questions, speaking in full sentences, respirations even and unlabored, skin warm and dry. Pt c/o lightheadedness but denied CP or SOB. MD notified and EKG obtained.

## 2019-04-27 NOTE — ED TRIAGE NOTES
Pt presents to ED with c/o N/V x 3 days. Pt reports he has been unable to hold anything down for 3 days including water. Reports feeling lightheaded today liek he was going to pass out. Denies abdominal pain, diarrhea, blood in vomit or stool, fever, chills, CP, SOB or any other s/s. Pt reports he stopped drinking EtOH 2 weeks ago, felt bad for several days then felt better until 3 days ago. Denies AVH, tingling of extremities, tremors. Pt semi-Fowlers in stretcher, AAOx4, calm, cooperative, responding appropriately to questions, speaking in full sentences, respirations even and unlabored, skin warm and dry. Pt dry heaving in emesis bag.

## 2019-04-27 NOTE — SUBJECTIVE & OBJECTIVE
Past Medical History:   Diagnosis Date    Afib     Alcohol abuse with other alcohol-induced disorder 04/02/2019    Anemia 04/02/2019    unspecified deficiency    Ankle fracture, left     Aortic atherosclerosis 04/02/2019    Arthritis     Asthma     Cardiomyopathy 04/02/2019    Chronic kidney disease (CKD), stage III (moderate) 04/02/2019    Depression     Erectile dysfunction 04/02/2019    Gout, arthropathy 04/02/2019    Gout, unspecified     Hypertension     Hypertensive chronic kidney disease 04/02/2019    Hypomagnesemia 04/02/2019    Noncompliance 04/02/2019    Peripheral neuropathy 04/02/2019    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    Secondary hyperparathyroidism, renal 04/02/2019    Seizure 2016    Spastic hemiplegia affecting left nondominant side 04/02/2019       Past Surgical History:   Procedure Laterality Date    ANKLE SURGERY      x6    CHOLECYSTECTOMY-LAPAROSCOPIC N/A 3/29/2018    Performed by Joshua [facility] at [facility] OR    COLONOSCOPY N/A 5/26/2014    Performed by Rikki Gilbert MD at [facility] (4TH FLR)       Review of patient's allergies indicates:   Allergen Reactions    Lisinopril Swelling     Pt admitted with angioedema 2wks after taking lisinopril.        No current facility-administered medications on file prior to encounter.      Current Outpatient Medications on File Prior to Encounter   Medication Sig    allopurinol (ZYLOPRIM) 300 MG tablet Take 300 mg by mouth once daily.    aspirin (ECOTRIN) 81 MG EC tablet Take 81 mg by mouth once daily.    ergocalciferol (VITAMIN D2) 50,000 unit Cap Take 50,000 Units by mouth every 7 days.    ibuprofen (ADVIL,MOTRIN) 800 MG tablet Take 800 mg by mouth 3 (three) times daily.    magnesium 250 mg Tab Take 1 tablet by mouth once daily.    metoprolol succinate (TOPROL-XL) 50 MG 24 hr tablet TAKE ONE TABLET BY MOUTH ONCE DAILY    potassium chloride (KLOR-CON) 20 mEq Pack Take 20 mEq by mouth 2 (two) times daily.    diltiaZEM (TIAZAC)  180 MG Cs24 Take 180 mg by mouth once daily.    ferrous sulfate 325 (65 FE) MG EC tablet Take 325 mg by mouth once daily.    gabapentin (NEURONTIN) 300 MG capsule Take 300 mg by mouth 3 (three) times daily.    hydrocodone-acetaminophen 5-325mg (NORCO) 5-325 mg per tablet Take 1 tablet by mouth every 6 (six) hours as needed for Pain.    levETIRAcetam (KEPPRA) 500 MG Tab Take 500 mg by mouth 2 (two) times daily.    [DISCONTINUED] ranitidine (ZANTAC) 150 MG tablet Take 150 mg by mouth 2 (two) times daily.     Family History     Problem Relation (Age of Onset)    Cancer Mother    Cataracts Maternal Grandfather    Hypertension Father    Stroke Maternal Grandmother, Maternal Grandfather        Tobacco Use    Smoking status: Never Smoker    Smokeless tobacco: Never Used   Substance and Sexual Activity    Alcohol use: Not Currently     Alcohol/week: 1.8 - 2.4 oz     Types: 3 - 4 Shots of liquor per week     Comment: daily    Drug use: No    Sexual activity: Not on file     Review of Systems   Constitutional: Negative for activity change, appetite change, fatigue and fever.   HENT: Negative for congestion, rhinorrhea and sore throat.    Eyes: Negative.    Respiratory: Negative for cough and shortness of breath.    Cardiovascular: Negative for chest pain, palpitations and leg swelling.   Gastrointestinal: Positive for nausea (somewhat improved). Negative for abdominal distention, abdominal pain, constipation and vomiting (resolved).   Endocrine: Negative.    Genitourinary: Negative for difficulty urinating, frequency and urgency.   Musculoskeletal: Positive for arthralgias (chronic right foot pain with deformity) and gait problem (states is bed bound).        Right Knee pain   Skin: Negative.    Neurological: Negative for seizures, weakness and headaches.   Hematological: Does not bruise/bleed easily.   Psychiatric/Behavioral: Negative.      Objective:     Vital Signs (Most Recent):  Temp: 99.8 °F (37.7 °C)  (04/27/19 1242)  Pulse: 89 (04/27/19 1600)  Resp: 18 (04/27/19 1242)  BP: 127/77 (04/27/19 1242)  SpO2: (!) 94 % (04/27/19 1242) Vital Signs (24h Range):  Temp:  [98.3 °F (36.8 °C)-99.8 °F (37.7 °C)] 99.8 °F (37.7 °C)  Pulse:  [] 89  Resp:  [18-26] 18  SpO2:  [85 %-100 %] 94 %  BP: (112-155)/(63-94) 127/77     Weight: 89.6 kg (197 lb 8.5 oz)  Body mass index is 26.79 kg/m².    Physical Exam   Constitutional: He is oriented to person, place, and time. He appears well-developed and well-nourished.   HENT:   Head: Normocephalic.   Eyes: Pupils are equal, round, and reactive to light. Conjunctivae are normal.   Neck: Neck supple. No thyromegaly present.   Cardiovascular: Normal rate, regular rhythm, normal heart sounds and intact distal pulses. Exam reveals no gallop and no friction rub.   No murmur heard.  Pulmonary/Chest: Effort normal and breath sounds normal.   Abdominal: Soft. Bowel sounds are normal. He exhibits no distension. There is no tenderness.   Musculoskeletal: Normal range of motion. He exhibits deformity (left foot with congenital deformity). He exhibits no edema.   Left leg smaller than the right.   Lymphadenopathy:     He has no cervical adenopathy.   Neurological: He is alert and oriented to person, place, and time.   Strength equal and symmetric   Skin: Skin is warm and dry. No rash noted.   Psychiatric: He has a normal mood and affect. His behavior is normal. Thought content normal.         CRANIAL NERVES     CN III, IV, VI   Pupils are equal, round, and reactive to light.       Significant Labs:   CBC:   Recent Labs   Lab 04/27/19  0847   WBC 9.21   HGB 11.8*   HCT 34.7*   *     CMP:   Recent Labs   Lab 04/27/19  0847      K 3.0*   CL 95   CO2 23   GLU 91   BUN 18   CREATININE 1.4   CALCIUM 9.2   PROT 9.3*   ALBUMIN 3.6   BILITOT 1.5*   ALKPHOS 74   AST 89*   ALT 37   ANIONGAP 18*   EGFRNONAA 56*     Lipase:   Recent Labs   Lab 04/27/19  0847   LIPASE 36     Urine Studies:    Recent Labs   Lab 04/27/19  0949   COLORU Sophia   APPEARANCEUA Clear   PHUR 6.0   SPECGRAV 1.010   PROTEINUA 1+*   GLUCUA Negative   KETONESU 1+*   BILIRUBINUA 2+*   OCCULTUA Negative   NITRITE Negative   UROBILINOGEN 2.0-3.0*   LEUKOCYTESUR Negative   RBCUA 2   WBCUA 5   BACTERIA Few*   HYALINECASTS 1     All pertinent labs within the past 24 hours have been reviewed.    Significant Imaging: I have reviewed all pertinent imaging results/findings within the past 24 hours.

## 2019-04-27 NOTE — ED NOTES
"Pt reports he has not been taking his seizure medication "ijn a few months because I haven't had a seizure in 2 years". Will notify MD.  "

## 2019-04-27 NOTE — H&P
"[facility] Hospital Medicine  History & Physical    Patient Name: Michael Johnson Jr.  MRN: [id number]  Admission Date: 4/27/2019  Attending Physician: Joshua Powell MD   Primary Care Provider: Zunilda Bolden NP         Patient information was obtained from patient, past medical records and ER records.     Subjective:     Principal Problem:Alcoholic ketoacidosis    Chief Complaint:   Chief Complaint   Patient presents with    Nausea     pt with nausea and vomiting x 3 days.         HPI: Michael Johnson is a 58 year old male with medical history of alcohol abuse, history of seizures (related to alcohol withdrawal), gout, paroxysmal atrial fibrillation, chronic kidney disease stage III, anemia and hypertension.  The patient presented to the [facility] with a three day history of nausea with persistent vomiting and generalized malaise.   The patient has a long history of alcohol abuse, but states he has not had a drink in approximately three weeks.  The patient states he felt this way after his last drink three weeks ago, but those symptoms had subsided.  He reports decreased appetite during the past three days, but denied fever and chills or abdominal pain.  He denies sick contacts.  He states the only pain he has experienced in from his chronic left foot pain.  He is bed bound related to his foot and states he has been working with physical therapy to try to regain his ability to stand.  Patient denies use of tobacco or illicit drugs. nontoxic-appearing stable vital signs. Urinary analysis reveals a protein and bilirubin.  Initial work up in the ED revealed mild anion gap 18 and lactic acid 3.1 with total bili 1.5 and AST 89 .  He was found to have hypokalemia K+ 3.0 and the patient states he did not complete oral potassium supplementation as he began to vomit.  He states he was given "something for vomiting" in his IV that caused his heart rate to accelerate. Per ED " note, the patient went into atrial fibrillation with rapid ventricular response with heart rate of 205.   He was treated with  5 mg of Lopressor IV and converted to sinus rhythm on repeat EKG.  The patient has no acute findings on chest x-ray.   He will be admitted under Observation for further evaluation and management.               Past Medical History:   Diagnosis Date    Afib     Alcohol abuse with other alcohol-induced disorder 04/02/2019    Anemia 04/02/2019    unspecified deficiency    Ankle fracture, left     Aortic atherosclerosis 04/02/2019    Arthritis     Asthma     Cardiomyopathy 04/02/2019    Chronic kidney disease (CKD), stage III (moderate) 04/02/2019    Depression     Erectile dysfunction 04/02/2019    Gout, arthropathy 04/02/2019    Gout, unspecified     Hypertension     Hypertensive chronic kidney disease 04/02/2019    Hypomagnesemia 04/02/2019    Noncompliance 04/02/2019    Peripheral neuropathy 04/02/2019    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    Secondary hyperparathyroidism, renal 04/02/2019    Seizure 2016    Spastic hemiplegia affecting left nondominant side 04/02/2019       Past Surgical History:   Procedure Laterality Date    ANKLE SURGERY      x6    CHOLECYSTECTOMY-LAPAROSCOPIC N/A 3/29/2018    Performed by [facility] at [facility] OR    COLONOSCOPY N/A 5/26/2014    Performed by Rikki Gilbert MD at [facility] ([address] [place] [address])       Review of patient's allergies indicates:   Allergen Reactions    Lisinopril Swelling     Pt admitted with angioedema 2wks after taking lisinopril.        No current facility-administered medications on file prior to encounter.      Current Outpatient Medications on File Prior to Encounter   Medication Sig    allopurinol (ZYLOPRIM) 300 MG tablet Take 300 mg by mouth once daily.    aspirin (ECOTRIN) 81 MG EC tablet Take 81 mg by mouth once daily.    ergocalciferol (VITAMIN D2) 50,000 unit Cap Take 50,000 Units by mouth every 7 days.     ibuprofen (ADVIL,MOTRIN) 800 MG tablet Take 800 mg by mouth 3 (three) times daily.    magnesium 250 mg Tab Take 1 tablet by mouth once daily.    metoprolol succinate (TOPROL-XL) 50 MG 24 hr tablet TAKE ONE TABLET BY MOUTH ONCE DAILY    potassium chloride (KLOR-CON) 20 mEq Pack Take 20 mEq by mouth 2 (two) times daily.    diltiaZEM (TIAZAC) 180 MG Cs24 Take 180 mg by mouth once daily.    ferrous sulfate 325 (65 FE) MG EC tablet Take 325 mg by mouth once daily.    gabapentin (NEURONTIN) 300 MG capsule Take 300 mg by mouth 3 (three) times daily.    hydrocodone-acetaminophen 5-325mg (NORCO) 5-325 mg per tablet Take 1 tablet by mouth every 6 (six) hours as needed for Pain.    levETIRAcetam (KEPPRA) 500 MG Tab Take 500 mg by mouth 2 (two) times daily.    [DISCONTINUED] ranitidine (ZANTAC) 150 MG tablet Take 150 mg by mouth 2 (two) times daily.     Family History     Problem Relation (Age of Onset)    Cancer Mother    Cataracts Maternal Grandfather    Hypertension Father    Stroke Maternal Grandmother, Maternal Grandfather        Tobacco Use    Smoking status: Never Smoker    Smokeless tobacco: Never Used   Substance and Sexual Activity    Alcohol use: Not Currently     Alcohol/week: 1.8 - 2.4 oz     Types: 3 - 4 Shots of liquor per week     Comment: daily    Drug use: No    Sexual activity: Not on file     Review of Systems   Constitutional: Negative for activity change, appetite change, fatigue and fever.   HENT: Negative for congestion, rhinorrhea and sore throat.    Eyes: Negative.    Respiratory: Negative for cough and shortness of breath.    Cardiovascular: Negative for chest pain, palpitations and leg swelling.   Gastrointestinal: Positive for nausea (somewhat improved). Negative for abdominal distention, abdominal pain, constipation and vomiting (resolved).   Endocrine: Negative.    Genitourinary: Negative for difficulty urinating, frequency and urgency.   Musculoskeletal: Positive for arthralgias  (chronic right foot pain with deformity) and gait problem (states is bed bound).        Right Knee pain   Skin: Negative.    Neurological: Negative for seizures, weakness and headaches.   Hematological: Does not bruise/bleed easily.   Psychiatric/Behavioral: Negative.      Objective:     Vital Signs (Most Recent):  Temp: 99.8 °F (37.7 °C) (04/27/19 1242)  Pulse: 89 (04/27/19 1600)  Resp: 18 (04/27/19 1242)  BP: 127/77 (04/27/19 1242)  SpO2: (!) 94 % (04/27/19 1242) Vital Signs (24h Range):  Temp:  [98.3 °F (36.8 °C)-99.8 °F (37.7 °C)] 99.8 °F (37.7 °C)  Pulse:  [] 89  Resp:  [18-26] 18  SpO2:  [85 %-100 %] 94 %  BP: (112-155)/(63-94) 127/77     Weight: 89.6 kg (197 lb 8.5 oz)  Body mass index is 26.79 kg/m².    Physical Exam   Constitutional: He is oriented to person, place, and time. He appears well-developed and well-nourished.   HENT:   Head: Normocephalic.   Eyes: Pupils are equal, round, and reactive to light. Conjunctivae are normal.   Neck: Neck supple. No thyromegaly present.   Cardiovascular: Normal rate, regular rhythm, normal heart sounds and intact distal pulses. Exam reveals no gallop and no friction rub.   No murmur heard.  Pulmonary/Chest: Effort normal and breath sounds normal.   Abdominal: Soft. Bowel sounds are normal. He exhibits no distension. There is no tenderness.   Musculoskeletal: Normal range of motion. He exhibits deformity (left foot with congenital deformity). He exhibits no edema.   Left leg smaller than the right.   Lymphadenopathy:     He has no cervical adenopathy.   Neurological: He is alert and oriented to person, place, and time.   Strength equal and symmetric   Skin: Skin is warm and dry. No rash noted.   Psychiatric: He has a normal mood and affect. His behavior is normal. Thought content normal.         CRANIAL NERVES     CN III, IV, VI   Pupils are equal, round, and reactive to light.       Significant Labs:   CBC:   Recent Labs   Lab 04/27/19  0847   WBC 9.21   HGB  11.8*   HCT 34.7*   *     CMP:   Recent Labs   Lab 04/27/19  0847      K 3.0*   CL 95   CO2 23   GLU 91   BUN 18   CREATININE 1.4   CALCIUM 9.2   PROT 9.3*   ALBUMIN 3.6   BILITOT 1.5*   ALKPHOS 74   AST 89*   ALT 37   ANIONGAP 18*   EGFRNONAA 56*     Lipase:   Recent Labs   Lab 04/27/19  0847   LIPASE 36     Urine Studies:   Recent Labs   Lab 04/27/19  0949   COLORU Sophia   APPEARANCEUA Clear   PHUR 6.0   SPECGRAV 1.010   PROTEINUA 1+*   GLUCUA Negative   KETONESU 1+*   BILIRUBINUA 2+*   OCCULTUA Negative   NITRITE Negative   UROBILINOGEN 2.0-3.0*   LEUKOCYTESUR Negative   RBCUA 2   WBCUA 5   BACTERIA Few*   HYALINECASTS 1     All pertinent labs within the past 24 hours have been reviewed.    Significant Imaging: I have reviewed all pertinent imaging results/findings within the past 24 hours.    Assessment/Plan:     * Alcoholic ketoacidosis  Elevated lactic acid  Anion mild anion gap  IV fluid hydration  Monitor with repeat cmp        Alcohol abuse  -On admit alcohol level was negative   - CIWA protocol in place and will start thiamine and folic acid.  - chronic problem and will continue to counsele to abstain from alcohol  - case manamgnent/social work involvement        GERD (gastroesophageal reflux disease)  - chronic EtOH abuse  - Hx of Aspirin and NSAID use  - Pantoprazole 40 mg DAILY         Acute hypokalemia  Replaced in ED  Monitor with repeat cmp      Essential hypertension  -continue home diltiazem 180 mg and metoprolol 50 mg    CKD (chronic kidney disease), stage III  -appears stable    PAF (paroxysmal atrial fibrillation)  - per ED note, patient with atrial fibrillation with RVR, converted to NSR after beta blocker administration  - continue telemetry  - now Stable monitor      Debility  -He has chronic debility that is very significant  -Denied SNF previously as he is not thought to be able to improve  -I offered halfway NH placement which patient and family declined  -Will order home  [facility] for pt/ot/sn/sw/aid.      Pain in left ankle  Congenital left foot deformity  PT/OT consult      VTE Risk Mitigation (From admission, onward)        Ordered     Place sequential compression device  Until discontinued      04/27/19 1237     IP VTE HIGH RISK PATIENT  Once      04/27/19 1237     Reason for no Mechanical VTE Prophylaxis  Once      04/27/19 1237             Michelle Lockett NP  Department of Hospital Medicine   [facility]

## 2019-04-27 NOTE — ED NOTES
Pt aware of need for urine specimen, urinal at bedside. Bed locked and in lowest position, side rails x 2, call light in reach. Pt remains Pt connected to Zoll defib pads, cardiac, continuous pulse ox and BP monitoring.

## 2019-04-28 VITALS
OXYGEN SATURATION: 99 % | WEIGHT: 197.56 LBS | BODY MASS INDEX: 26.76 KG/M2 | TEMPERATURE: 99 F | RESPIRATION RATE: 18 BRPM | DIASTOLIC BLOOD PRESSURE: 77 MMHG | HEART RATE: 102 BPM | SYSTOLIC BLOOD PRESSURE: 128 MMHG | HEIGHT: 72 IN

## 2019-04-28 PROBLEM — E83.51 HYPOCALCEMIA: Status: ACTIVE | Noted: 2019-04-28

## 2019-04-28 LAB
ALBUMIN SERPL BCP-MCNC: 2.8 G/DL (ref 3.5–5.2)
ALP SERPL-CCNC: 75 U/L (ref 55–135)
ALT SERPL W/O P-5'-P-CCNC: 68 U/L (ref 10–44)
ANION GAP SERPL CALC-SCNC: 10 MMOL/L (ref 8–16)
ANION GAP SERPL CALC-SCNC: 10 MMOL/L (ref 8–16)
AST SERPL-CCNC: 203 U/L (ref 10–40)
BASOPHILS # BLD AUTO: 0.01 K/UL (ref 0–0.2)
BASOPHILS NFR BLD: 0.2 % (ref 0–1.9)
BILIRUB SERPL-MCNC: 0.7 MG/DL (ref 0.1–1)
BUN SERPL-MCNC: 17 MG/DL (ref 6–20)
BUN SERPL-MCNC: 18 MG/DL (ref 6–20)
CALCIUM SERPL-MCNC: 7.9 MG/DL (ref 8.7–10.5)
CALCIUM SERPL-MCNC: 7.9 MG/DL (ref 8.7–10.5)
CHLORIDE SERPL-SCNC: 100 MMOL/L (ref 95–110)
CHLORIDE SERPL-SCNC: 101 MMOL/L (ref 95–110)
CO2 SERPL-SCNC: 25 MMOL/L (ref 23–29)
CO2 SERPL-SCNC: 26 MMOL/L (ref 23–29)
CREAT SERPL-MCNC: 1.1 MG/DL (ref 0.5–1.4)
CREAT SERPL-MCNC: 1.2 MG/DL (ref 0.5–1.4)
DIFFERENTIAL METHOD: ABNORMAL
EOSINOPHIL # BLD AUTO: 0 K/UL (ref 0–0.5)
EOSINOPHIL NFR BLD: 0.6 % (ref 0–8)
ERYTHROCYTE [DISTWIDTH] IN BLOOD BY AUTOMATED COUNT: 16 % (ref 11.5–14.5)
EST. GFR  (AFRICAN AMERICAN): >60 ML/MIN/1.73 M^2
EST. GFR  (AFRICAN AMERICAN): >60 ML/MIN/1.73 M^2
EST. GFR  (NON AFRICAN AMERICAN): >60 ML/MIN/1.73 M^2
EST. GFR  (NON AFRICAN AMERICAN): >60 ML/MIN/1.73 M^2
GLUCOSE SERPL-MCNC: 113 MG/DL (ref 70–110)
GLUCOSE SERPL-MCNC: 81 MG/DL (ref 70–110)
HCT VFR BLD AUTO: 28.8 % (ref 40–54)
HGB BLD-MCNC: 9.6 G/DL (ref 14–18)
LYMPHOCYTES # BLD AUTO: 1.1 K/UL (ref 1–4.8)
LYMPHOCYTES NFR BLD: 17 % (ref 18–48)
MAGNESIUM SERPL-MCNC: 0.8 MG/DL (ref 1.6–2.6)
MCH RBC QN AUTO: 37.2 PG (ref 27–31)
MCHC RBC AUTO-ENTMCNC: 33.3 G/DL (ref 32–36)
MCV RBC AUTO: 112 FL (ref 82–98)
MONOCYTES # BLD AUTO: 0.5 K/UL (ref 0.3–1)
MONOCYTES NFR BLD: 7.4 % (ref 4–15)
NEUTROPHILS # BLD AUTO: 4.9 K/UL (ref 1.8–7.7)
NEUTROPHILS NFR BLD: 74.5 % (ref 38–73)
PLATELET # BLD AUTO: 109 K/UL (ref 150–350)
PMV BLD AUTO: 11.1 FL (ref 9.2–12.9)
POTASSIUM SERPL-SCNC: 2.9 MMOL/L (ref 3.5–5.1)
POTASSIUM SERPL-SCNC: 3.5 MMOL/L (ref 3.5–5.1)
PROT SERPL-MCNC: 7.6 G/DL (ref 6–8.4)
RBC # BLD AUTO: 2.58 M/UL (ref 4.6–6.2)
SODIUM SERPL-SCNC: 136 MMOL/L (ref 136–145)
SODIUM SERPL-SCNC: 136 MMOL/L (ref 136–145)
WBC # BLD AUTO: 6.58 K/UL (ref 3.9–12.7)

## 2019-04-28 PROCEDURE — 97162 PT EVAL MOD COMPLEX 30 MIN: CPT

## 2019-04-28 PROCEDURE — G0378 HOSPITAL OBSERVATION PER HR: HCPCS

## 2019-04-28 PROCEDURE — 36415 COLL VENOUS BLD VENIPUNCTURE: CPT

## 2019-04-28 PROCEDURE — 80053 COMPREHEN METABOLIC PANEL: CPT

## 2019-04-28 PROCEDURE — 99217 PR OBSERVATION CARE DISCHARGE: CPT | Mod: ,,, | Performed by: NURSE PRACTITIONER

## 2019-04-28 PROCEDURE — 99217 PR OBSERVATION CARE DISCHARGE: ICD-10-PCS | Mod: ,,, | Performed by: NURSE PRACTITIONER

## 2019-04-28 PROCEDURE — 97530 THERAPEUTIC ACTIVITIES: CPT

## 2019-04-28 PROCEDURE — 25000003 PHARM REV CODE 250: Performed by: NURSE PRACTITIONER

## 2019-04-28 PROCEDURE — 94761 N-INVAS EAR/PLS OXIMETRY MLT: CPT

## 2019-04-28 PROCEDURE — 85025 COMPLETE CBC W/AUTO DIFF WBC: CPT

## 2019-04-28 PROCEDURE — 63600175 PHARM REV CODE 636 W HCPCS: Performed by: NURSE PRACTITIONER

## 2019-04-28 PROCEDURE — 97166 OT EVAL MOD COMPLEX 45 MIN: CPT

## 2019-04-28 PROCEDURE — 80048 BASIC METABOLIC PNL TOTAL CA: CPT

## 2019-04-28 PROCEDURE — C9113 INJ PANTOPRAZOLE SODIUM, VIA: HCPCS | Performed by: NURSE PRACTITIONER

## 2019-04-28 PROCEDURE — 83735 ASSAY OF MAGNESIUM: CPT

## 2019-04-28 RX ORDER — DILTIAZEM HYDROCHLORIDE 180 MG/1
180 CAPSULE, COATED, EXTENDED RELEASE ORAL DAILY
Status: DISCONTINUED | OUTPATIENT
Start: 2019-04-28 | End: 2019-04-28 | Stop reason: HOSPADM

## 2019-04-28 RX ORDER — PANTOPRAZOLE SODIUM 40 MG/10ML
40 INJECTION, POWDER, LYOPHILIZED, FOR SOLUTION INTRAVENOUS DAILY
Status: DISCONTINUED | OUTPATIENT
Start: 2019-04-28 | End: 2019-04-28

## 2019-04-28 RX ORDER — MAGNESIUM SULFATE HEPTAHYDRATE 40 MG/ML
2 INJECTION, SOLUTION INTRAVENOUS ONCE
Status: COMPLETED | OUTPATIENT
Start: 2019-04-28 | End: 2019-04-28

## 2019-04-28 RX ORDER — LANOLIN ALCOHOL/MO/W.PET/CERES
400 CREAM (GRAM) TOPICAL ONCE
Status: COMPLETED | OUTPATIENT
Start: 2019-04-28 | End: 2019-04-28

## 2019-04-28 RX ORDER — ERGOCALCIFEROL 1.25 MG/1
50000 CAPSULE ORAL
Status: DISCONTINUED | OUTPATIENT
Start: 2019-04-28 | End: 2019-04-28 | Stop reason: HOSPADM

## 2019-04-28 RX ORDER — ALLOPURINOL 300 MG/1
300 TABLET ORAL DAILY
Status: DISCONTINUED | OUTPATIENT
Start: 2019-04-28 | End: 2019-04-28 | Stop reason: HOSPADM

## 2019-04-28 RX ORDER — GABAPENTIN 300 MG/1
300 CAPSULE ORAL 3 TIMES DAILY
Status: DISCONTINUED | OUTPATIENT
Start: 2019-04-28 | End: 2019-04-28 | Stop reason: HOSPADM

## 2019-04-28 RX ORDER — LEVETIRACETAM 500 MG/1
500 TABLET ORAL 2 TIMES DAILY
Status: DISCONTINUED | OUTPATIENT
Start: 2019-04-28 | End: 2019-04-28 | Stop reason: HOSPADM

## 2019-04-28 RX ORDER — METOPROLOL SUCCINATE 50 MG/1
50 TABLET, EXTENDED RELEASE ORAL DAILY
Status: DISCONTINUED | OUTPATIENT
Start: 2019-04-28 | End: 2019-04-28 | Stop reason: HOSPADM

## 2019-04-28 RX ORDER — ASPIRIN 81 MG/1
81 TABLET ORAL DAILY
Status: DISCONTINUED | OUTPATIENT
Start: 2019-04-28 | End: 2019-04-28 | Stop reason: HOSPADM

## 2019-04-28 RX ORDER — PANTOPRAZOLE SODIUM 40 MG/10ML
40 INJECTION, POWDER, LYOPHILIZED, FOR SOLUTION INTRAVENOUS DAILY
Status: DISCONTINUED | OUTPATIENT
Start: 2019-04-28 | End: 2019-04-28 | Stop reason: HOSPADM

## 2019-04-28 RX ADMIN — Medication 400 MG: at 02:04

## 2019-04-28 RX ADMIN — DILTIAZEM HYDROCHLORIDE 180 MG: 180 CAPSULE, COATED, EXTENDED RELEASE ORAL at 12:04

## 2019-04-28 RX ADMIN — PANTOPRAZOLE SODIUM 40 MG: 40 INJECTION, POWDER, FOR SOLUTION INTRAVENOUS at 10:04

## 2019-04-28 RX ADMIN — POTASSIUM BICARBONATE 50 MEQ: 977.5 TABLET, EFFERVESCENT ORAL at 07:04

## 2019-04-28 RX ADMIN — SODIUM CHLORIDE AND POTASSIUM CHLORIDE 100 ML/HR: 9; 2.98 INJECTION, SOLUTION INTRAVENOUS at 07:04

## 2019-04-28 RX ADMIN — GABAPENTIN 300 MG: 300 CAPSULE ORAL at 02:04

## 2019-04-28 RX ADMIN — THERA TABS 1 TABLET: TAB at 09:04

## 2019-04-28 RX ADMIN — ERGOCALCIFEROL 50000 UNITS: 1.25 CAPSULE ORAL at 12:04

## 2019-04-28 RX ADMIN — Medication 100 MG: at 09:04

## 2019-04-28 RX ADMIN — METOPROLOL SUCCINATE 50 MG: 50 TABLET, EXTENDED RELEASE ORAL at 10:04

## 2019-04-28 RX ADMIN — ALLOPURINOL 300 MG: 300 TABLET ORAL at 12:04

## 2019-04-28 RX ADMIN — ASPIRIN 81 MG: 81 TABLET, COATED ORAL at 12:04

## 2019-04-28 RX ADMIN — MAGNESIUM SULFATE IN WATER 2 G: 40 INJECTION, SOLUTION INTRAVENOUS at 03:04

## 2019-04-28 RX ADMIN — FOLIC ACID 1 MG: 1 TABLET ORAL at 09:04

## 2019-04-28 NOTE — PLAN OF CARE
Problem: Physical Therapy Goal  Goal: Physical Therapy Goal  Goals to be met by: 19    Patient will increase functional independence with mobility by performin. Sit<>stand with modA with RW.  2. Supine<>sit with mod(I) without use of hospital bed features.  3. Bed<>chair/commode transfer with slide board transfer.  4. Independent with supine HEP for LE.    Patient evaluated by PT and goals established. Patient performed stand step transfer with maxA x2 people with RW chair (with non-movable armrests)>bed and sit>supine with CGA and use of hospital bed features. PT will continue to follow and progress as tolerated. Recommendation for d/c to home with  PT. Please see progress note for detailed plan of care and recommendations.

## 2019-04-28 NOTE — ASSESSMENT & PLAN NOTE
- replaced in ED, but patient reports vomiting immediately after administration  - suspect related to GI losses after nausea/vomiting and poor oral intake  - repeat K+ 2.9 and started NS with 40 mEq K+ without change with morning labs  - potassium bicarb 50 mEq oral once and tolerated well  - Magnesium 1.6 on admit; repeat K+ and magnesium pending  - will replace electrolytes as needed and will continue to monitor closely  - continue telemetry

## 2019-04-28 NOTE — PLAN OF CARE
[facility] HOME HEALTH ORDERS  FACE TO FACE ENCOUNTER    Patient Name: Michael Johnson Jr.  YOB: 1960    PCP: Zunilda Bolden NP   PCP Address: [address] [facility] / [place] [address]  PCP Phone Number: [phone]  PCP Fax: [phone]    Encounter Date: 04/28/2019    Admit to Home Health    Diagnoses:  Active Hospital Problems    Diagnosis  POA    *Acute hypokalemia [E87.6]  Yes     Priority: 4     Alcoholic ketoacidosis [E87.2]  Yes     Priority: 1 - High    Alcohol abuse [F10.10]  Yes     Priority: 2     GERD (gastroesophageal reflux disease) [K21.9]  Yes     Priority: 3     Essential hypertension [I10]  Yes     Priority: 5     CKD (chronic kidney disease), stage III [N18.3]  Yes     Priority: 6     PAF (paroxysmal atrial fibrillation) [I48.0]  Yes     Priority: 7     Debility [R53.81]  Yes     Priority: 8     Pain in left ankle [M25.572]  Yes     Priority: 9     Hypocalcemia [E83.51]  Yes     Priority: 10     Hypomagnesemia [E83.42]  Yes      Resolved Hospital Problems    Diagnosis Date Resolved POA    Elevated lactic acid level [R79.89] 04/28/2019 Yes       No future appointments.        I have seen and examined this patient face to face today. My clinical findings that support the need for the home health skilled services and home bound status are the following:  Weakness/numbness causing balance and gait disturbance due to Weakness/Debility making it taxing to leave home.  Requiring assistive device to leave home due to unsteady gait caused by  Weakness/Debility.    Allergies:  Review of patient's allergies indicates:   Allergen Reactions    Lisinopril Swelling     Pt admitted with angioedema 2wks after taking lisinopril.        Diet: cardiac diet    Activities: activity as tolerated    Nursing:   SN to complete comprehensive assessment including routine vital signs. Instruct on disease process and s/s of complications to report to MD.  Review/verify medication list sent home with the patient at time of discharge  and instruct patient/caregiver as needed. Frequency may be adjusted depending on start of care date.    Notify MD if SBP > 160 or < 90; DBP > 90 or < 50; HR > 120 or < 50; Temp > 101       CONSULTS:    Physical Therapy to evaluate and treat. Evaluate for home safety and equipment needs; Establish/upgrade home exercise program. Perform / instruct on therapeutic exercises, gait training, transfer training, and Range of Motion.  Occupational Therapy to evaluate and treat. Evaluate home environment for safety and equipment needs. Perform/Instruct on transfers, ADL training, ROM, and therapeutic exercises.   to evaluate for community resources/long-range planning.  Aide to provide assistance with personal care, ADLs, and vital signs.    MISCELLANEOUS CARE:  Routine Skin for Bedridden Patients: Instruct patient/caregiver to apply moisture barrier cream to all skin folds and wet areas in perineal area daily and after baths and all bowel movements.     Medications: Review discharge medications with patient and family and provide education.      Current Discharge Medication List      CONTINUE these medications which have NOT CHANGED    Details   allopurinol (ZYLOPRIM) 300 MG tablet Take 300 mg by mouth once daily.      aspirin (ECOTRIN) 81 MG EC tablet Take 81 mg by mouth once daily.      ergocalciferol (VITAMIN D2) 50,000 unit Cap Take 50,000 Units by mouth every 7 days.      ibuprofen (ADVIL,MOTRIN) 800 MG tablet Take 800 mg by mouth 3 (three) times daily.      magnesium 250 mg Tab Take 1 tablet by mouth once daily.      metoprolol succinate (TOPROL-XL) 50 MG 24 hr tablet TAKE ONE TABLET BY MOUTH ONCE DAILY  Qty: 90 tablet, Refills: 0      potassium chloride (KLOR-CON) 20 mEq Pack Take 20 mEq by mouth 2 (two) times daily.      diltiaZEM (TIAZAC) 180 MG Cs24 Take 180 mg by mouth once daily.      ferrous sulfate 325 (65 FE) MG EC  tablet Take 325 mg by mouth once daily.      gabapentin (NEURONTIN) 300 MG capsule Take 300 mg by mouth 3 (three) times daily.      hydrocodone-acetaminophen 5-325mg (NORCO) 5-325 mg per tablet Take 1 tablet by mouth every 6 (six) hours as needed for Pain.      levETIRAcetam (KEPPRA) 500 MG Tab Take 500 mg by mouth 2 (two) times daily.             I certify that this patient is confined to his home and needs intermittent skilled nursing care, physical therapy and occupational therapy and social work for long range planning.

## 2019-04-28 NOTE — ASSESSMENT & PLAN NOTE
-On admit alcohol level was negative; - reports no alcohol in approximately 2 weeks  - CIWA protocol in place and will start thiamine and folic acid.  - chronic problem and will continue to  to abstain from alcohol  - case manamgnent/social work involvement    - elevated AST//68 with morning labs; will monitor  - History of alcohol withdrawal induced seizure; will continue  keppra 500 mg twice daily for seizure prophylaxis

## 2019-04-28 NOTE — PT/OT/SLP EVAL
Physical Therapy Evaluation and Treatment    Patient Name:  Michael Johnson Jr.   MRN:  [id number]    Recommendations:     Discharge Recommendations:  home health PT, home health OT   Discharge Equipment Recommendations: walker, rolling   Barriers to discharge: Decreased caregiver support    Assessment:     Michael Johnson Jr. is a 58 y.o. male admitted with a medical diagnosis of Acute hypokalemia. Pmhx significant for left congenital foot deformity (s/p ankle fusion years prior), Etoh abuse with hx of seizures related to withdrawal, gout, paroxysmal a-fib, CKD-III, anemia, and HTN.  He presents with the following impairments/functional limitations:  weakness, impaired sensation, impaired self care skills, impaired functional mobilty, gait instability, decreased lower extremity function, decreased upper extremity function, pain, decreased ROM, impaired joint extensibility, decreased coordination.    Patient evaluated by PT and goals established. Presents to therapy as pleasant gentleman concerned about how he will return to bed 2/2 increased pain. Patient performed stand step transfer with maxA x2 people with RW chair (with non-movable armrests)>bed and sit>supine with CGA and use of hospital bed features. At home, he reports mostly performing slide transfers to commode or W/C, but has difficultly performing propulsion 2/2 increased pain in BLE and impaired strength of RUE from injured rotator cuff. Recommendation for d/c to home with HH PT to maximize independence with bed mobility and transfers to commode and W/C.    Rehab Prognosis: Fair; patient would benefit from acute skilled PT services to address these deficits and reach maximum level of function.    Recent Surgery: * No surgery found *      Plan:     During this hospitalization, patient to be seen 5 x/week to address the identified rehab impairments via therapeutic activities, therapeutic exercises, wheelchair management/training and progress toward the  following goals:    · Plan of Care Expires:  19    Subjective     Chief Complaint: Pain in BLE (knees and ankle/feet (plantar surface of feet).  Patient/Family Comments/goals: To get back into bed and get a power chair to be able to get outside and to his drs. appointments   Pain/Comfort:  · Pain Rating 1: 810  · Location - Side 1: Bilateral  · Location 1: foot(and knees)  · Pain Addressed 1: Pre-medicate for activity, Reposition, Distraction, Cessation of Activity  · Pain Rating Post-Intervention 1: 9/10    Patients cultural, spiritual, Adventist conflicts given the current situation: no    Living Environment:  Patient resides with his wife in a 2 story house with elevator access. He has a walk-in shower and uses a bedside commode.    Prior to admission, patients level of function was difficult to ascertain 2/2 patient with conflicting statements regarding independence. At beginning of session he reports being W/C dependent since surgery 15-20 years PTA and towards end of session states he had been ambulating with rollator 2 months PTA. He also reports being unable to use his W/C in the house 2/2 the bed and furniture being too large but also reports that he performs slide transfers from his bed to his commode or W/C. He does require assistance for ADLs and IADLs. Equipment used at home: wheelchair, rollator, bedside commode.  DME owned (not currently used): single point cane and crutches.  Upon discharge, patient will have assistance from his wife, but she works 2 jobs and he is home alone for parts of the day.    Objective:     Communicated with GARRISON Pereyra prior to session.  Patient found up in chair with telemetry, peripheral IV  upon PT entry to room. Patient agreeable to evaluation.    General Precautions: Standard, (fall risk)   Orthopedic Precautions:N/A   Braces: N/A     Patient donned yellow socks, shoes, and gait belt for OOB mobility.    Exams:  · Cognition:   · Patient is person, , time, place,  situation.  · Pt follows approximately 100% of one and two step commands.    · Mood: Pleasant and cooperative.   · Musculoskeletal:  · Posture:    · Rounded shoulders  · Forward head  · In standing, maintains hip flexion with anterior trunk lean  · LE ROM/Strength:   · R ROM: WFL  · L ROM:   · Limited knee flexion and extension (increased stiffness in hamstrings)  · Previous ankle fusion, minimal DF/PF  · R Strength:   · Hip flexion: 4/5  · Knee extension: 4/5  · Dorsiflexion: 4/5   · L Strength:   · Hip flexion: 3+/5  · Knee extension: 3+/5  · Dorsiflexion: NT 2/2 ankle fusion and increased sensitivity to touch  · Neuromuscular:  · Sensation: Intact to light touch bilateral LEs.   · Increased sensitivity to light touch and pressure noted in BLE with more sensitivity noted to L foot and shin with pt crying out with light touch   · Tone/Reflexes: No impairments identified with functional mobility. No formal testing performed.   · Coordination:  · Impaired, decreased strength and slow performance of gross motor movements  · Balance:   · Sitting: Fair, unable to reach down towards feet  · Standing: Poor, requires maxA x2 people with BUE support on RW with decreased hip extension contributing to posterior weight shift  · Visual-vestibular: No impairments identified with functional mobility. No formal testing performed.  · Integument: Visible skin intact  · Cardiopulmonary:  · Edema: None noted      Functional Mobility:  · Bed Mobility:     · Bridging to HOB: stand by assistance, use of hospital bed features  · Sit to Supine: contact guard assistance, use of hospital bed features  · Transfers:     · Sit to Stand:  maximal assistance and of 2 persons with rolling walker  · VCs for scooting to edge of chair and positioning of RLE to facilitate anterior tibial translation   · Pt maintains LLE in some extension with abduction to limit WB  · Unable to achieve full upright position with hips maintaining flexion with anterior  trunk and noted posterior weight shift  · Bed to Chair: maximal assistance and of 2 persons with  rolling walker  using  Step Transfer  · Same as sit>stand  · Slow steps with limited excursion      Therapeutic Activities and Exercises:   Sit<>stand, stand step t/f, sit>supine    PT educated patient re: PT plan of care, role of PT, safety with OOB mobility with need to call for staff assistance, use of RW, transfer technique, discharge disposition.  Pt verbalize understanding.      AM-PAC 6 CLICK MOBILITY  Total Score:11     Patient left HOB elevated with all lines intact, call button in reach and RN Hafsa notified.    GOALS:   Multidisciplinary Problems     Physical Therapy Goals        Problem: Physical Therapy Goal    Goal Priority Disciplines Outcome Goal Variances Interventions   Physical Therapy Goal     PT, PT/OT      Description:  Goals to be met by: 19    Patient will increase functional independence with mobility by performin. Sit<>stand with modA with RW.  2. Supine<>sit with mod(I) without use of hospital bed features.  3. Bed<>chair/commode transfer with slide board transfer.  4. Independent with supine HEP for LE.                      History:     Past Medical History:   Diagnosis Date    Afib     Alcohol abuse with other alcohol-induced disorder 2019    Anemia 2019    unspecified deficiency    Ankle fracture, left     Aortic atherosclerosis 2019    Arthritis     Asthma     Cardiomyopathy 2019    Chronic kidney disease (CKD), stage III (moderate) 2019    Depression     Erectile dysfunction 2019    Gout, arthropathy 2019    Gout, unspecified     Hypertension     Hypertensive chronic kidney disease 2019    Hypomagnesemia 2019    Noncompliance 2019    Peripheral neuropathy 2019    Preglaucoma 2019    Secondary hyperparathyroidism, renal 2019    Seizure 2016    Spastic hemiplegia affecting left  nondominant side 04/02/2019       Past Surgical History:   Procedure Laterality Date    ANKLE SURGERY      x6    CHOLECYSTECTOMY-LAPAROSCOPIC N/A 3/29/2018    [facility]    COLONOSCOPY N/A 5/26/2014    Performed by Rikki Gilbert [facility] at [facility] ([facility])       Time Tracking:     PT Received On: 04/28/19  PT Start Time: 1335     PT Stop Time: 1402  PT Total Time (min): 27 min     Billable Minutes: Evaluation 15 and Therapeutic Activity 12      Ruthie Brandt, PT  04/28/2019

## 2019-04-28 NOTE — PT/OT/SLP EVAL
Occupational Therapy   Evaluation/Treatment    Name: Michael Johnson Jr.  MRN: [id number]  Admitting Diagnosis:  Acute hypokalemia      Recommendations:     Discharge Recommendations: home health OT  Discharge Equipment Recommendations:  (hip kit with short shoe horn)  Barriers to discharge:  Decreased caregiver support    Assessment:     Michael Johnson Jr. is a 58 y.o. male with a medical diagnosis of Acute hypokalemia.  He presents with long term debility complicated by chronic pain and loss of functional mobility at this hospital admission.  Performance deficits affecting function: impaired functional mobilty, impaired endurance, impaired self care skills, decreased lower extremity function, decreased upper extremity function, impaired fine motor, pain, decreased safety awareness, decreased ROM, edema, decreased coordination.      Rehab Prognosis: Good; patient would benefit from acute skilled OT services to address these deficits and reach maximum level of function.       Plan:     Patient to be seen 3 x/week to address the above listed problems via self-care/home management, therapeutic activities, therapeutic exercises  · Plan of Care Expires: 05/28/19  · Plan of Care Reviewed with: patient    Subjective     Chief Complaint: Left foot pain  Patient/Family Comments/goals: regain the ability to walk    Occupational Profile:  Living Environment: lives with his wife and son in a 2 story house with 1 MARCIAL at entry (elevator access to second floor, patient's bed/bath first floor).    Previous level of function: bed bound, occasionally transfers to W/C with wife's assist, Set-up self-care (assist bed bath and LBD)  Roles and Routines: TV, reading  Equipment Used at Home:  cane, straight, wheelchair, crutches, rollator, bedside commode, raised toilet  Assistance upon Discharge: pt is home alone day hours (in bed), his wife works 2 jobs     Pain/Comfort:  Pain Rating 1: 8/10  Location - Side 1: Left  Location 1:  foot(generalized gout joint pain)  Pain Addressed 1: Pre-medicate for activity, Reposition, Cessation of Activity, Nurse notified  Pain Rating Post-Intervention 1: 8/10    Patients cultural, spiritual, Rastafarian conflicts given the current situation: no    Objective:     Communicated with: patient and his nurse prior to session.  Patient found right sidelying with telemetry, peripheral IV upon OT entry to room.  He was agreeable to the OT evaluation.    General Precautions: Standard, fall   Orthopedic Precautions:N/A   Braces: N/A     Occupational Performance:    Bed Mobility: (moves slowly with poor quality of movement)   · MI supine>sit EOB  · MI sit>supine    Functional Mobility/Transfers:  · Mod A squat-pivot transfer bed>chair (marked increase in LE pain and instability with transfer)    Activities of Daily Living:  · MI self-feeding bed level  · Set-up G/H (brush teeth, mouth wash, wash face and hands) sitting EOB  · MI UBD (don hospital gown as robe) sitting EOB)  · Mod A LBD (Total A doff/don socks/SBA don slip-on shoes seated EOB/ Min A doff/don pajama bottoms                     bed level)    Cognitive/Visual Perceptual:  Cognitive/Psychosocial Skills:     -       Oriented to: Person, Place, Time and Situation   -       Follows Commands/attention:Follows two-step commands  -       Communication: clear/fluent  -       Memory: No Deficits noted  -       Safety awareness/insight to disability: impaired   -       Mood/Affect/Coping skills/emotional control: Appropriate to situation  Visual/Perceptual:      -Intact     Physical Exam:  Postural examination/scapula alignment:    -       Rounded shoulders  -       Forward head  -       Posterior pelvic tilt  -       Abnormal trunk flexion  -       Scoliosis  Skin integrity: Visible skin intact  Edema:  generalized joint swelling from gout  Sensation:    -       Intact Right hand, impaired Left hand to light touch  Muscle Tone: grossly WNL trunk and BUE (a lot of  muscle atrophy with limited ROM from postural fixing or                        immobility  Motor Planning:    -       WFL  Dominant hand:    -       Right  UE ROM: Shoulder Flexion/ABD RUE-90* and LUE 70*  UE Strength and Coordination: RUE 4/5, little active shoulder flexion used with reach                                                    LUE: 3+/5 with uncoordinated reach-used as assist for bilateral tasks  Hand Function: Right Hand:  strength fair, dexterity WFL                            Left Hand:  strength good, dexterity poor with intrinsic muscle atrophy  Gross motor coordination: Fair static sitting balance EOB, not able to stand, endurance fair, activity tolerance                                             limited by anticipation of pain  AMPAC 6 Click ADL:  AMPAC Total Score: 20    Treatment & Education:  Transfer training, problem solving with self-care  Education:    Patient left up in chair with all lines intact, call button in reach and nurse notified    GOALS:   Multidisciplinary Problems     Occupational Therapy Goals        Problem: Occupational Therapy Goal    Goal Priority Disciplines Outcome Interventions   Occupational Therapy Goal     OT, PT/OT Ongoing (interventions implemented as appropriate)    Description:  Goals to be met by 5/28/19    SBA LBD with AE bed level  SBA bed bath with AE bed level  Min A bed-Chair or W/C transfer  Assess W/C use for basic self-care                    History:     Past Medical History:   Diagnosis Date    Afib     Alcohol abuse with other alcohol-induced disorder 04/02/2019    Anemia 04/02/2019    unspecified deficiency    Ankle fracture, left     Aortic atherosclerosis 04/02/2019    Arthritis     Asthma     Cardiomyopathy 04/02/2019    Chronic kidney disease (CKD), stage III (moderate) 04/02/2019    Depression     Erectile dysfunction 04/02/2019    Gout, arthropathy 04/02/2019    Gout, unspecified     Hypertension     Hypertensive  chronic kidney disease 04/02/2019    Hypomagnesemia 04/02/2019    Noncompliance 04/02/2019    Peripheral neuropathy 04/02/2019    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    Secondary hyperparathyroidism, renal 04/02/2019    Seizure 2016    Spastic hemiplegia affecting left nondominant side 04/02/2019         Past Surgical History:   Procedure Laterality Date    ANKLE SURGERY      x6    CHOLECYSTECTOMY-LAPAROSCOPIC N/A 3/29/2018    [facility] OR    COLONOSCOPY N/A 5/26/2014    Performed by Rikki Gilbert MD at [facility] (4TH FLR)       Time Tracking:     OT Date of Treatment: 04/28/19  OT Start Time: 1032  OT Stop Time: 1132  OT Total Time (min): 60 min    Billable Minutes:Evaluation 42  Therapeutic Activity 18    AMANDA Hughes  4/28/2019

## 2019-04-28 NOTE — ASSESSMENT & PLAN NOTE
- chronic and on oral supplementation at home  - magnesium 1.6 on admit and repeat pending  - replace as needed and resume home medication

## 2019-04-28 NOTE — PLAN OF CARE
Problem: Occupational Therapy Goal  Goal: Occupational Therapy Goal  Goals to be met by 5/28/19    SBA LBD with AE bed level  SBA bed bath with AE bed level  Min A bed-Chair or W/C transfer  Assess W/C use for basic self-care  Outcome: Ongoing (interventions implemented as appropriate)  OT evaluation completed with treatment initiated.  Recommend Home Health OT at discharge.  DME: Hip Kit with short shoe horn if desired by the patient.  AMANDA Hughes 4/28/2019

## 2019-04-28 NOTE — HOSPITAL COURSE
Michael Johnson was admitted under Observation for management of nausea with vomiting.  He has a long history of alcohol addiction, but states he has stopped approximately three weeks ago.  His blood alcohol level was undetectable in the ED.   His nausea and vomiting was treated with antiemetics in the ED and has had resolution of nausea and vomiting during his Observation stay.  However, he has complained of loose stools.  Prior to discharge he was tolerating regular diet.  He was hypokalemic on admit to the ED and did not complete his oral postassium replacement.  Repeat labs showed closed anion gap and lactic acid that was in normal range and hypokalemia K+ 2.9 that was treated with normal saline with 40 mEq K+ and oral potassium bicarbonate 50 mEq. Magnesium was replaced with intravenous magnesium sulfate 2 g.  He continued on telemetry without evidence of T wave changes or arrhythmias.  Given his report of abstinence and negative alcohol screen, I am uncertain of the etiology of his persistent nausea and vomiting. I suspect viral enteritis to be the source.  Per record, he has history of hypokalemia I suspect related to poor oral intake related to alcohol abuse and recent episodes of nausea/vomiting.  I have recommended he restart his magnesium supplementation and his home potassium supplementation.  I also urged him to follow closely with his Primary Care Physician for management of his electrolytes. I have counseled him continue to abstain from alcohol use.  He will be discharged with home health services with physical/occupation therapy and social work home evaluation.

## 2019-04-28 NOTE — ASSESSMENT & PLAN NOTE
- per ED note, patient with atrial fibrillation with RVR, converted to NSR after beta blocker administration  - stable heart rate on telemetry  - continue telemetry  - continue metoprolol succinate 50 mg daily

## 2019-04-28 NOTE — SUBJECTIVE & OBJECTIVE
Interval History: states feels much better; no nausea or vomiting overnight, but one loose watery stool this am; tolerating full liquid diet; NS with 40 mEq K+ infusing, oral potassium bicarb 50 mEq given; repeat K+ and magnesium pending    Review of Systems   Constitutional: Negative for activity change, appetite change (tolerating full liquids), fatigue and fever.   HENT: Negative for congestion and trouble swallowing.    Eyes: Negative.    Respiratory: Negative for chest tightness and shortness of breath.    Cardiovascular: Negative for chest pain, palpitations and leg swelling.   Gastrointestinal: Negative for abdominal distention, abdominal pain, constipation, nausea (resolving) and vomiting (resolved).   Endocrine: Negative.    Genitourinary: Negative for difficulty urinating, frequency and urgency.   Musculoskeletal: Positive for arthralgias (chronic right foot pain with deformity) and gait problem (states is bed bound).        Right Knee pain   Skin: Negative.    Neurological: Negative for seizures, weakness and headaches.   Hematological: Does not bruise/bleed easily.   Psychiatric/Behavioral: Negative.      Objective:     Vital Signs (Most Recent):  Temp: 98.9 °F (37.2 °C) (04/28/19 0743)  Pulse: 85 (04/28/19 0600)  Resp: 18 (04/28/19 0515)  BP: (!) 140/95 (04/28/19 0515)  SpO2: 100 % (04/28/19 0804) Vital Signs (24h Range):  Temp:  [98.5 °F (36.9 °C)-99.8 °F (37.7 °C)] 98.9 °F (37.2 °C)  Pulse:  [] 85  Resp:  [18-24] 18  SpO2:  [94 %-100 %] 100 %  BP: (116-155)/(77-95) 140/95     Weight: 89.6 kg (197 lb 8.5 oz)  Body mass index is 26.79 kg/m².    Intake/Output Summary (Last 24 hours) at 4/28/2019 0956  Last data filed at 4/28/2019 0710  Gross per 24 hour   Intake 1050 ml   Output 950 ml   Net 100 ml      Physical Exam   Constitutional: He is oriented to person, place, and time. He appears well-developed and well-nourished.   HENT:   Head: Normocephalic.   Mouth/Throat: Oropharynx is clear and moist.    Eyes: Pupils are equal, round, and reactive to light. Conjunctivae are normal. No scleral icterus.   Neck: Neck supple. No thyromegaly present.   Cardiovascular: Normal rate, regular rhythm, normal heart sounds and intact distal pulses. Exam reveals no friction rub.   No murmur heard.  Pulses:       Dorsalis pedis pulses are 2+ on the right side, and 2+ on the left side.   Pulmonary/Chest: Effort normal and breath sounds normal.   Abdominal: Soft. Bowel sounds are normal. He exhibits no distension. There is no tenderness.   Musculoskeletal: Normal range of motion. He exhibits deformity (left foot with congenital deformity). He exhibits no edema.   Left leg smaller than the right.   Lymphadenopathy:     He has no cervical adenopathy.   Neurological: He is alert and oriented to person, place, and time.   Strength equal and symmetric   Skin: Skin is warm and dry. No rash noted.   Psychiatric: He has a normal mood and affect. His behavior is normal. Thought content normal.       Significant Labs:   CBC:   Recent Labs   Lab 04/27/19  0847 04/28/19  0416   WBC 9.21 6.58   HGB 11.8* 9.6*   HCT 34.7* 28.8*   * 109*     CMP:   Recent Labs   Lab 04/27/19  0847 04/27/19  1941 04/28/19  0416    135* 136   K 3.0* 2.9* 2.9*   CL 95 100 100   CO2 23 23 26   GLU 91 118* 81   BUN 18 19 18   CREATININE 1.4 1.2 1.1   CALCIUM 9.2 8.1* 7.9*   PROT 9.3* 7.8 7.6   ALBUMIN 3.6 2.8* 2.8*   BILITOT 1.5* 0.8 0.7   ALKPHOS 74 64 75   AST 89* 115* 203*   ALT 37 43 68*   ANIONGAP 18* 12 10   EGFRNONAA 56* >60 >60     Magnesium: Pending       All pertinent labs within the past 24 hours have been reviewed.    Significant Imaging: I have reviewed all pertinent imaging results/findings within the past 24 hours.

## 2019-04-28 NOTE — NURSING
Pt AOx4, NAD, denies SOB, CP, NVD. No episodes of vomiting during shift. Resp unlabored and even. Wife remains at bedside. Pt is no longer worried about transportation home at time of discharge. Pt has been out of bed in recliner for at least 2 hours today with O/T and P/T  Call bell within reach. Bed low and locked. Will continue to monitor.

## 2019-04-28 NOTE — NURSING
Pt AOx4, NAD, denies SOB, CP, NVD. No episodes of vomiting during shift. Resp unlabored and even. Wife remains at bedside. Pt expressed concern over ability to get home if discharged. Reassured him there were options open to him when the time arrived. Pt did not sleep during shift. Was awake every time I checked on him. Call bell within reach. Bed low and locked. Will continue to monitor.

## 2019-04-28 NOTE — PLAN OF CARE
Problem: Adult Inpatient Plan of Care  Goal: Plan of Care Review  Pt has been updated and acknowledges POC. Discharge orders have been submitted and pt will be discharged home with spouse

## 2019-04-28 NOTE — PLAN OF CARE
04/28/19 1038   GUERRA Message   Medicare Outpatient and Observation Notification regarding financial responsibility Given to patient/caregiver;Explained to patient/caregiver;Signed/date by patient/caregiver   Date GUERRA was signed 04/28/19   Time GUERRA was signed 0815

## 2019-04-28 NOTE — PLAN OF CARE
Met with patient at bedside to complete discharge planning assessment. Patient is current with Neelima Bolden NP for primary care. [facility] choice [facility] unable to locate in epic. Patient is current with [facility] & voiced a preference to return to their care upon discharge. Patient's wife will provide transportation home. Patient denied any anticipated DC needs other than a power chair which is currently being worked on by patient's PCP      04/28/19 1030   Discharge Assessment   Assessment Type Discharge Planning Assessment   Confirmed/corrected address and phone number on facesheet? Yes   Assessment information obtained from? Patient   Communicated expected length of stay with patient/caregiver no   Prior to hospitilization cognitive status: Alert/Oriented   Prior to hospitalization functional status: Needs Assistance   Current cognitive status: Alert/Oriented   Current Functional Status: Needs Assistance   Lives With spouse   Able to Return to Prior Arrangements yes   Is patient able to care for self after discharge? No   Patient's perception of discharge disposition home health   Readmission Within the Last 30 Days no previous admission in last 30 days   Patient currently being followed by outpatient case management? Yes   If yes, name of outpatient case management following: insurance company assigned oupatient case management   Patient currently receives any other outside agency services? Yes   Name and contact number of agency or person providing outside services [facility]    Is it the patient/care giver preference to resume care with the current outside agency? Yes   Equipment Currently Used at Home cane, straight;crutches;rollator;wheelchair;bedside commode   Do you have any problems affording any of your prescribed medications? No   Is the patient taking medications as prescribed? yes   Does the patient have transportation home? Yes   Transportation Anticipated family  or friend will provide   Does the patient receive services at the [facility]? No   Discharge Plan A Home Health   DME Needed Upon Discharge  power chair   Patient/Family in Agreement with Plan yes

## 2019-04-28 NOTE — ASSESSMENT & PLAN NOTE
- Alcohol level undetectable on admit and reports no EtOH in approximately two weeks  - Elevated lactic acid on admit and repeat showed return normal limits  - Anion mild anion gap on admit and closed with repeat labs  - IV fluid hydration  - Monitor with daily cmp

## 2019-04-28 NOTE — PROGRESS NOTES
"[facility] Hospital Medicine  Progress Note    Patient Name: Michael Johnson Jr.  MRN: [id number]  Patient Class: OP- Observation   Admission Date: 4/27/2019  Length of Stay: 0 days  Attending Physician: Joshua Powell MD  Primary Care Provider: Zunilda Bolden NP        Subjective:     Principal Problem:Acute hypokalemia    HPI:  Michael Johnson is a 58 year old male with medical history of alcohol abuse, history of seizures (related to alcohol withdrawal), gout, paroxysmal atrial fibrillation, chronic kidney disease stage III, anemia and hypertension.  The patient presented to the [facility] with a three day history of nausea with persistent vomiting and generalized malaise.   The patient has a long history of alcohol abuse, but states he has not had a drink in approximately three weeks.  The patient states he felt this way after his last drink three weeks ago, but those symptoms had subsided.  He reports decreased appetite during the past three days, but denied fever and chills or abdominal pain.  He denies sick contacts.  He states the only pain he has experienced in from his chronic left foot pain.  He is bed bound related to his foot and states he has been working with physical therapy to try to regain his ability to stand.  Patient denies use of tobacco or illicit drugs. nontoxic-appearing stable vital signs. Urinary analysis reveals a protein and bilirubin.  Initial work up in the ED revealed mild anion gap 18 and lactic acid 3.1 with total bili 1.5 and AST 89 .  He was found to have hypokalemia K+ 3.0 and the patient states he did not complete oral potassium supplementation as he began to vomit.  He states he was given "something for vomiting" in his IV that caused his heart rate to accelerate. Per ED note, the patient went into atrial fibrillation with rapid ventricular response with heart rate of 205.   He was treated with  5 mg of Lopressor IV and converted " to sinus rhythm on repeat EKG.  The patient has no acute findings on chest x-ray.   He will be admitted under Observation for further evaluation and management.               Hospital Course:  Michael Johnson was admitted under Observation for management of nausea with vomiting.  His alcohol level was undetectable in the ED and I am uncertain of the etiology of his persistent nausea and vomiting.  He was treated with antiemetics in the ED and has had resolution of nausea and vomiting during his Observation stay.  However, he has complained of loose stools.   He is tolerating full liquid diet at present and will continue to provide supportive care and anti-emetics as needed.  He was hypokalemic on admit to the ED and did not complete his oral postassium replacement.  Repeat labs showed closed anion gap and lactic acid that was in normal range;  potassium 2.9 and the patient was started on normal saline with 40 mEq K+.  He is on telemetry and has been without changes.  Morning labs showed no change in potassium and he was given K-lyte 50 mEq which he tolerated well.  Repeat labs with magnesium level pending; will follow and replace potassium as needed.  I suspect his hypokalemia is related to gastrointestinal losses and poor dietary intake related to nausea/vomiting.    Interval History: states feels much better; no nausea or vomiting overnight, but one loose watery stool this am; tolerating full liquid diet; NS with 40 mEq K+ infusing, oral potassium bicarb 50 mEq given; repeat K+ and magnesium pending    Review of Systems   Constitutional: Negative for activity change, appetite change (tolerating full liquids), fatigue and fever.   HENT: Negative for congestion and trouble swallowing.    Eyes: Negative.    Respiratory: Negative for chest tightness and shortness of breath.    Cardiovascular: Negative for chest pain, palpitations and leg swelling.   Gastrointestinal: Negative for abdominal distention, abdominal pain,  constipation, nausea (resolving) and vomiting (resolved).   Endocrine: Negative.    Genitourinary: Negative for difficulty urinating, frequency and urgency.   Musculoskeletal: Positive for arthralgias (chronic right foot pain with deformity) and gait problem (states is bed bound).        Right Knee pain   Skin: Negative.    Neurological: Negative for seizures, weakness and headaches.   Hematological: Does not bruise/bleed easily.   Psychiatric/Behavioral: Negative.      Objective:     Vital Signs (Most Recent):  Temp: 98.9 °F (37.2 °C) (04/28/19 0743)  Pulse: 85 (04/28/19 0600)  Resp: 18 (04/28/19 0515)  BP: (!) 140/95 (04/28/19 0515)  SpO2: 100 % (04/28/19 0804) Vital Signs (24h Range):  Temp:  [98.5 °F (36.9 °C)-99.8 °F (37.7 °C)] 98.9 °F (37.2 °C)  Pulse:  [] 85  Resp:  [18-24] 18  SpO2:  [94 %-100 %] 100 %  BP: (116-155)/(77-95) 140/95     Weight: 89.6 kg (197 lb 8.5 oz)  Body mass index is 26.79 kg/m².    Intake/Output Summary (Last 24 hours) at 4/28/2019 0956  Last data filed at 4/28/2019 0710  Gross per 24 hour   Intake 1050 ml   Output 950 ml   Net 100 ml      Physical Exam   Constitutional: He is oriented to person, place, and time. He appears well-developed and well-nourished.   HENT:   Head: Normocephalic.   Mouth/Throat: Oropharynx is clear and moist.   Eyes: Pupils are equal, round, and reactive to light. Conjunctivae are normal. No scleral icterus.   Neck: Neck supple. No thyromegaly present.   Cardiovascular: Normal rate, regular rhythm, normal heart sounds and intact distal pulses. Exam reveals no friction rub.   No murmur heard.  Pulses:       Dorsalis pedis pulses are 2+ on the right side, and 2+ on the left side.   Pulmonary/Chest: Effort normal and breath sounds normal.   Abdominal: Soft. Bowel sounds are normal. He exhibits no distension. There is no tenderness.   Musculoskeletal: Normal range of motion. He exhibits deformity (left foot with congenital deformity). He exhibits no edema.    Left leg smaller than the right.   Lymphadenopathy:     He has no cervical adenopathy.   Neurological: He is alert and oriented to person, place, and time.   Strength equal and symmetric   Skin: Skin is warm and dry. No rash noted.   Psychiatric: He has a normal mood and affect. His behavior is normal. Thought content normal.       Significant Labs:   CBC:   Recent Labs   Lab 04/27/19  0847 04/28/19  0416   WBC 9.21 6.58   HGB 11.8* 9.6*   HCT 34.7* 28.8*   * 109*     CMP:   Recent Labs   Lab 04/27/19  0847 04/27/19  1941 04/28/19  0416    135* 136   K 3.0* 2.9* 2.9*   CL 95 100 100   CO2 23 23 26   GLU 91 118* 81   BUN 18 19 18   CREATININE 1.4 1.2 1.1   CALCIUM 9.2 8.1* 7.9*   PROT 9.3* 7.8 7.6   ALBUMIN 3.6 2.8* 2.8*   BILITOT 1.5* 0.8 0.7   ALKPHOS 74 64 75   AST 89* 115* 203*   ALT 37 43 68*   ANIONGAP 18* 12 10   EGFRNONAA 56* >60 >60     Magnesium: Pending       All pertinent labs within the past 24 hours have been reviewed.    Significant Imaging: I have reviewed all pertinent imaging results/findings within the past 24 hours.    Assessment/Plan:      * Acute hypokalemia  - replaced in ED, but patient reports vomiting immediately after administration  - suspect related to GI losses after nausea/vomiting and poor oral intake  - repeat K+ 2.9 and started NS with 40 mEq K+ without change with morning labs  - potassium bicarb 50 mEq oral once and tolerated well  - Magnesium 1.6 on admit; repeat K+ and magnesium pending  - will replace electrolytes as needed and will continue to monitor closely  - continue telemetry      Alcoholic ketoacidosis  - Alcohol level undetectable on admit and reports no EtOH in approximately two weeks  - Elevated lactic acid on admit and repeat showed return normal limits  - Anion mild anion gap on admit and closed with repeat labs  - IV fluid hydration  - Monitor with daily cmp        Alcohol abuse  -On admit alcohol level was negative; - reports no alcohol in  approximately 2 weeks  - CIWA protocol in place and will start thiamine and folic acid.  - chronic problem and will continue to  to abstain from alcohol  - case manamgnent/social work involvement    - elevated AST//68 with morning labs; will monitor  - History of alcohol withdrawal induced seizure; will continue  keppra 500 mg twice daily for seizure prophylaxis      GERD (gastroesophageal reflux disease)  - chronic EtOH abuse  - Hx of Aspirin and NSAID use  - Pantoprazole 40 mg daily       Essential hypertension  -continue home diltiazem 180 mg daily and metoprolol 50 mg daily    CKD (chronic kidney disease), stage III  -appears stable with creatinine 1.1 and at baseline      PAF (paroxysmal atrial fibrillation)  - per ED note, patient with atrial fibrillation with RVR, converted to NSR after beta blocker administration  - stable heart rate on telemetry  - continue telemetry  - continue metoprolol succinate 50 mg daily      Debility  -He has chronic debility that is very significant  -Denied SNF previously as he is not thought to be able to improve  -I offered [facility] placement which patient and family declined  -Will order home health for pt/ot/sn/sw/aid.      Pain in left ankle  Congenital left foot deformity  PT/OT consult      Hypocalcemia  - corrected calcium 9.18  - due to GI losses and poor oral intake; will monitor      Hypomagnesemia  - chronic and on oral supplementation at home  - magnesium 1.6 on admit and repeat pending  - replace as needed and resume home medication        VTE Risk Mitigation (From admission, onward)        Ordered     Place sequential compression device  Until discontinued      04/27/19 1237     IP VTE HIGH RISK PATIENT  Once      04/27/19 1237     Reason for no Mechanical VTE Prophylaxis  Once      04/27/19 1237              Michelle Lockett NP  Department of Hospital Medicine   [facility]

## 2019-04-29 NOTE — PT/OT/SLP DISCHARGE
Physical Therapy Discharge Summary    Name: Michael Johnson Jr.  MRN: [id number]   Principal Problem: Acute hypokalemia     Patient Discharged from acute Physical Therapy on 19.  Please refer to prior PT noted date on 19 for functional status.     Assessment:     Patient has not met goals. only seen for 1 visit    Objective:     GOALS:   Multidisciplinary Problems     Physical Therapy Goals        Problem: Physical Therapy Goal    Goal Priority Disciplines Outcome Goal Variances Interventions   Physical Therapy Goal     PT, PT/OT      Description:  Goals to be met by: 19    Patient will increase functional independence with mobility by performin. Sit<>stand with modA with RW.  2. Supine<>sit with mod(I) without use of hospital bed features.  3. Bed<>chair/commode transfer with slide board transfer.  4. Independent with supine HEP for LE.                      Reasons for Discontinuation of Therapy Services  Transfer to alternate level of care.      Plan:     Patient Discharged to: Home with Home Health Service.    Nuria Patrick, PT  2019     PT of record not available for this documentation

## 2019-04-29 NOTE — PT/OT/SLP DISCHARGE
Occupational Therapy Discharge Summary    Michael Johnson Jr.  MRN: [id number]   Principal Problem: Acute hypokalemia      Patient Discharged from acute Occupational Therapy on 4/28/19.  Please refer to prior OT note dated 4/28/19 for functional status.    Assessment:      Patient appropriate for care in another setting.    Objective:     GOALS:   Multidisciplinary Problems     Occupational Therapy Goals        Problem: Occupational Therapy Goal    Goal Priority Disciplines Outcome Interventions   Occupational Therapy Goal     OT, PT/OT Ongoing (interventions implemented as appropriate)    Description:  Goals to be met by 5/28/19    SBA LBD with AE bed level  SBA bed bath with AE bed level  Min A bed-Chair or W/C transfer  Assess W/C use for basic self-care                    Reasons for Discontinuation of Therapy Services  Transfer to alternate level of care.      Plan:     Patient Discharged to: Home with Home Health Service     OT of record unavailable.     Tania Syed, OT  4/29/2019

## 2019-04-30 NOTE — DISCHARGE SUMMARY
"[facility] Hospital Medicine  Discharge Summary      Patient Name: Michael Johnson Jr.  MRN: [id number]  Admission Date: 4/27/2019  Hospital Length of Stay: 0 days  Discharge Date and Time: 4/28/2019  5:42 PM  Attending Physician: Joshua Powell MD   Discharging Provider: Michelle Lockett NP  Primary Care Provider: Zunilda Bolden NP      HPI:   Michael Johnson is a 58 year old male with medical history of alcohol abuse, history of seizures (related to alcohol withdrawal), gout, paroxysmal atrial fibrillation, chronic kidney disease stage III, anemia and hypertension.  The patient presented to the [facility] Department with a three day history of nausea with persistent vomiting and generalized malaise.   The patient has a long history of alcohol abuse, but states he has not had a drink in approximately three weeks.  The patient states he felt this way after his last drink three weeks ago, but those symptoms had subsided.  He reports decreased appetite during the past three days, but denied fever and chills or abdominal pain.  He denies sick contacts.  He states the only pain he has experienced in from his chronic left foot pain.  He is bed bound related to his foot and states he has been working with physical therapy to try to regain his ability to stand.  Patient denies use of tobacco or illicit drugs. nontoxic-appearing stable vital signs. Urinary analysis reveals a protein and bilirubin.  Initial work up in the ED revealed mild anion gap 18 and lactic acid 3.1 with total bili 1.5 and AST 89 .  He was found to have hypokalemia K+ 3.0 and the patient states he did not complete oral potassium supplementation as he began to vomit.  He states he was given "something for vomiting" in his IV that caused his heart rate to accelerate. Per ED note, the patient went into atrial fibrillation with rapid ventricular response with heart rate of 205.   He was treated with  5 mg of Lopressor IV " and converted to sinus rhythm on repeat EKG.  The patient has no acute findings on chest x-ray.   He will be admitted under Observation for further evaluation and management.      Hospital Course:   Michael Johnson was admitted under Observation for management of nausea with vomiting.  He has a long history of alcohol addiction, but states he has stopped approximately three weeks ago.  His blood alcohol level was undetectable in the ED.   His nausea and vomiting was treated with antiemetics in the ED and has had resolution of nausea and vomiting during his Observation stay.  However, he has complained of loose stools.  Prior to discharge he was tolerating regular diet.  He was hypokalemic on admit to the ED and did not complete his oral postassium replacement.  Repeat labs showed closed anion gap and lactic acid that was in normal range and hypokalemia K+ 2.9 that was treated with normal saline with 40 mEq K+ and oral potassium bicarbonate 50 mEq. Magnesium was replaced with intravenous magnesium sulfate 2 g.  He continued on telemetry without evidence of T wave changes or arrhythmias.  Given his report of abstinence and negative alcohol screen, I am uncertain of the etiology of his persistent nausea and vomiting. I suspect viral enteritis to be the source.  Per record, he has history of hypokalemia I suspect related to poor oral intake related to alcohol abuse and recent episodes of nausea/vomiting.  I have recommended he restart his magnesium supplementation and his home potassium supplementation.  I also urged him to follow closely with his Primary Care Physician for management of his electrolytes. I have counseled him continue to abstain from alcohol use.  He will be discharged with home health services with physical/occupation therapy and social work home evaluation.           Final Active Diagnoses:    Diagnosis Date Noted POA    PRINCIPAL PROBLEM:  Acute hypokalemia [E87.6] 03/16/2017 Yes    Alcoholic  ketoacidosis [E87.2] 04/27/2019 Yes    Alcohol abuse [F10.10]  Yes    GERD (gastroesophageal reflux disease) [K21.9] 03/16/2017 Yes    Essential hypertension [I10]  Yes    CKD (chronic kidney disease), stage III [N18.3] 01/17/2018 Yes    PAF (paroxysmal atrial fibrillation) [I48.0] 10/20/2014 Yes    Debility [R53.81] 04/04/2019 Yes    Pain in left ankle [M25.572] 05/19/2014 Yes    Hypocalcemia [E83.51] 04/28/2019 Yes    Hypomagnesemia [E83.42] 03/16/2017 Yes      Problems Resolved During this Admission:    Diagnosis Date Noted Date Resolved POA    Elevated lactic acid level [R79.89]  04/28/2019 Yes       Discharged Condition: good    Disposition: Home or Self Care    Follow Up:  Follow-up Information     Zunilda Bolden NP. Schedule an appointment as soon as possible for a visit in 3 days.    [facility]  Why:  for follow up after hospital discharge and for re-evaluation of serum electrolytes (potassium and magnesium)  Contact information:  [address]  [facility] [address]  [phone]                 Patient Instructions:      Diet full liquid   Order Comments: Progress to full diet as tolerated     Notify your health care provider if you experience any of the following:  temperature >100.4     Notify your health care provider if you experience any of the following:  persistent nausea and vomiting or diarrhea     Notify your health care provider if you experience any of the following:  severe uncontrolled pain     Activity as tolerated       Significant Diagnostic Studies: Labs: CMP , CBC  and All labs within the past 24 hours have been reviewed       Medications:  Reconciled Home Medications:      Medication List      CONTINUE taking these medications    allopurinol 300 MG tablet  Commonly known as:  ZYLOPRIM  Take 300 mg by mouth once daily.     aspirin 81 MG EC tablet  Commonly known as:  ECOTRIN  Take 81 mg by mouth once daily.     diltiaZEM 180 MG Cs24  Commonly  known as:  TIAZAC  Take 180 mg by mouth once daily.     ferrous sulfate 325 (65 FE) MG EC tablet  Take 325 mg by mouth once daily.     gabapentin 300 MG capsule  Commonly known as:  NEURONTIN  Take 300 mg by mouth 3 (three) times daily.     HYDROcodone-acetaminophen 5-325 mg per tablet  Commonly known as:  NORCO  Take 1 tablet by mouth every 6 (six) hours as needed for Pain.     ibuprofen 800 MG tablet  Commonly known as:  ADVIL,MOTRIN  Take 800 mg by mouth 3 (three) times daily.     levETIRAcetam 500 MG Tab  Commonly known as:  KEPPRA  Take 500 mg by mouth 2 (two) times daily.     magnesium 250 mg Tab  Take 1 tablet by mouth once daily.     metoprolol succinate 50 MG 24 hr tablet  Commonly known as:  TOPROL-XL  TAKE ONE TABLET BY MOUTH ONCE DAILY     potassium chloride 20 mEq Pack  Commonly known as:  KLOR-CON  Take 20 mEq by mouth 2 (two) times daily.     VITAMIN D2 50,000 unit Cap  Generic drug:  ergocalciferol  Take 50,000 Units by mouth every 7 days.              Time spent on the discharge of patient: >30 minutes  Patient was seen and examined on the date of discharge and determined to be suitable for discharge.         Michelle Lockett NP  Department of Hospital Medicine  [facility]

## 2019-11-29 ENCOUNTER — HOSPITAL ENCOUNTER (INPATIENT)
Facility: OTHER | Age: 59
LOS: 3 days | Discharge: HOME HEALTH | DRG: 101 | End: 2019-12-02
Attending: EMERGENCY MEDICINE | Admitting: HOSPITALIST
Payer: MEDICARE

## 2019-11-29 DIAGNOSIS — I48.91 ATRIAL FIBRILLATION WITH RVR: Primary | ICD-10-CM

## 2019-11-29 DIAGNOSIS — K21.9 GASTROESOPHAGEAL REFLUX DISEASE WITHOUT ESOPHAGITIS: ICD-10-CM

## 2019-11-29 DIAGNOSIS — E87.8 ELECTROLYTE DISTURBANCE: ICD-10-CM

## 2019-11-29 DIAGNOSIS — R56.9 SEIZURE: ICD-10-CM

## 2019-11-29 DIAGNOSIS — R00.0 TACHYCARDIA: ICD-10-CM

## 2019-11-29 DIAGNOSIS — R06.02 SOB (SHORTNESS OF BREATH): ICD-10-CM

## 2019-11-29 DIAGNOSIS — R07.9 CHEST PAIN: ICD-10-CM

## 2019-11-29 PROBLEM — N18.30 ACUTE RENAL FAILURE SUPERIMPOSED ON STAGE 3 CHRONIC KIDNEY DISEASE: Status: ACTIVE | Noted: 2019-11-29

## 2019-11-29 PROBLEM — E83.51 HYPOCALCEMIA: Status: RESOLVED | Noted: 2019-04-28 | Resolved: 2019-11-29

## 2019-11-29 PROBLEM — R79.89 TROPONIN LEVEL ELEVATED: Status: RESOLVED | Noted: 2018-01-09 | Resolved: 2019-11-29

## 2019-11-29 PROBLEM — R78.81 BACTEREMIA DUE TO ENTEROBACTER SPECIES: Status: RESOLVED | Noted: 2018-01-17 | Resolved: 2019-11-29

## 2019-11-29 PROBLEM — B96.89 BACTEREMIA DUE TO ENTEROBACTER SPECIES: Status: RESOLVED | Noted: 2018-01-17 | Resolved: 2019-11-29

## 2019-11-29 PROBLEM — D72.829 LEUKOCYTOSIS: Status: ACTIVE | Noted: 2019-11-29

## 2019-11-29 PROBLEM — R55 SYNCOPE, CARDIOGENIC: Status: RESOLVED | Noted: 2017-03-16 | Resolved: 2019-11-29

## 2019-11-29 PROBLEM — N17.9 ACUTE RENAL FAILURE SUPERIMPOSED ON STAGE 3 CHRONIC KIDNEY DISEASE: Status: ACTIVE | Noted: 2019-11-29

## 2019-11-29 PROBLEM — R78.81 GRAM-NEGATIVE BACTEREMIA: Status: RESOLVED | Noted: 2018-01-17 | Resolved: 2019-11-29

## 2019-11-29 PROBLEM — E87.6 HYPOKALEMIA: Status: RESOLVED | Noted: 2019-04-02 | Resolved: 2019-11-29

## 2019-11-29 PROBLEM — E87.29 HIGH ANION GAP METABOLIC ACIDOSIS: Status: ACTIVE | Noted: 2019-11-29

## 2019-11-29 PROBLEM — D69.6 THROMBOCYTOPENIA: Status: RESOLVED | Noted: 2018-01-17 | Resolved: 2019-11-29

## 2019-11-29 PROBLEM — R79.89 ELEVATED TROPONIN: Status: RESOLVED | Noted: 2017-03-16 | Resolved: 2019-11-29

## 2019-11-29 PROBLEM — L03.115 CELLULITIS OF RIGHT KNEE: Status: RESOLVED | Noted: 2019-04-02 | Resolved: 2019-11-29

## 2019-11-29 PROBLEM — R50.9 FEVER: Status: RESOLVED | Noted: 2019-04-03 | Resolved: 2019-11-29

## 2019-11-29 PROBLEM — K82.9 DISEASE OF GALLBLADDER: Status: RESOLVED | Noted: 2018-03-29 | Resolved: 2019-11-29

## 2019-11-29 LAB
ALBUMIN SERPL BCP-MCNC: 3.1 G/DL (ref 3.5–5.2)
ALLENS TEST: ABNORMAL
ALP SERPL-CCNC: 67 U/L (ref 55–135)
ALT SERPL W/O P-5'-P-CCNC: 22 U/L (ref 10–44)
AMPHET+METHAMPHET UR QL: NEGATIVE
AMPHET+METHAMPHET UR QL: NEGATIVE
ANION GAP SERPL CALC-SCNC: 11 MMOL/L (ref 8–16)
ANION GAP SERPL CALC-SCNC: 12 MMOL/L (ref 8–16)
ANION GAP SERPL CALC-SCNC: 13 MMOL/L (ref 8–16)
ANION GAP SERPL CALC-SCNC: 31 MMOL/L (ref 8–16)
AST SERPL-CCNC: 40 U/L (ref 10–40)
B-OH-BUTYR BLD STRIP-SCNC: 0 MMOL/L (ref 0–0.5)
BARBITURATES UR QL SCN>200 NG/ML: NEGATIVE
BARBITURATES UR QL SCN>200 NG/ML: NEGATIVE
BASOPHILS # BLD AUTO: 0.04 K/UL (ref 0–0.2)
BASOPHILS NFR BLD: 0.2 % (ref 0–1.9)
BENZODIAZ UR QL SCN>200 NG/ML: NEGATIVE
BENZODIAZ UR QL SCN>200 NG/ML: NEGATIVE
BILIRUB SERPL-MCNC: 0.9 MG/DL (ref 0.1–1)
BILIRUB UR QL STRIP: NEGATIVE
BNP SERPL-MCNC: 315 PG/ML (ref 0–99)
BUN SERPL-MCNC: 12 MG/DL (ref 6–20)
BUN SERPL-MCNC: 13 MG/DL (ref 6–20)
BUN SERPL-MCNC: 13 MG/DL (ref 6–20)
BUN SERPL-MCNC: 14 MG/DL (ref 6–20)
BZE UR QL SCN: NEGATIVE
BZE UR QL SCN: NEGATIVE
CALCIUM SERPL-MCNC: 7 MG/DL (ref 8.7–10.5)
CALCIUM SERPL-MCNC: 7.3 MG/DL (ref 8.7–10.5)
CALCIUM SERPL-MCNC: 7.5 MG/DL (ref 8.7–10.5)
CALCIUM SERPL-MCNC: 8.1 MG/DL (ref 8.7–10.5)
CANNABINOIDS UR QL SCN: NEGATIVE
CANNABINOIDS UR QL SCN: NEGATIVE
CHLORIDE SERPL-SCNC: 103 MMOL/L (ref 95–110)
CHLORIDE SERPL-SCNC: 104 MMOL/L (ref 95–110)
CHLORIDE SERPL-SCNC: 105 MMOL/L (ref 95–110)
CHLORIDE SERPL-SCNC: 107 MMOL/L (ref 95–110)
CK SERPL-CCNC: 181 U/L (ref 20–200)
CLARITY UR: CLEAR
CO2 SERPL-SCNC: 20 MMOL/L (ref 23–29)
CO2 SERPL-SCNC: 21 MMOL/L (ref 23–29)
CO2 SERPL-SCNC: 21 MMOL/L (ref 23–29)
CO2 SERPL-SCNC: 8 MMOL/L (ref 23–29)
COLOR UR: YELLOW
CREAT SERPL-MCNC: 1.2 MG/DL (ref 0.5–1.4)
CREAT SERPL-MCNC: 1.3 MG/DL (ref 0.5–1.4)
CREAT SERPL-MCNC: 1.3 MG/DL (ref 0.5–1.4)
CREAT SERPL-MCNC: 1.6 MG/DL (ref 0.5–1.4)
CREAT UR-MCNC: 110.3 MG/DL (ref 23–375)
CREAT UR-MCNC: 110.3 MG/DL (ref 23–375)
DELSYS: ABNORMAL
DIFFERENTIAL METHOD: ABNORMAL
EOSINOPHIL # BLD AUTO: 0.1 K/UL (ref 0–0.5)
EOSINOPHIL NFR BLD: 0.4 % (ref 0–8)
ERYTHROCYTE [DISTWIDTH] IN BLOOD BY AUTOMATED COUNT: 17.9 % (ref 11.5–14.5)
ERYTHROCYTE [SEDIMENTATION RATE] IN BLOOD BY WESTERGREN METHOD: 20 MM/H
EST. GFR  (AFRICAN AMERICAN): 54 ML/MIN/1.73 M^2
EST. GFR  (AFRICAN AMERICAN): >60 ML/MIN/1.73 M^2
EST. GFR  (NON AFRICAN AMERICAN): 47 ML/MIN/1.73 M^2
EST. GFR  (NON AFRICAN AMERICAN): >60 ML/MIN/1.73 M^2
ETHANOL SERPL-MCNC: <10 MG/DL
ETHANOL UR-MCNC: <10 MG/DL
FERRITIN SERPL-MCNC: 1143 NG/ML (ref 20–300)
FIO2: 28
FLOW: 2
GLUCOSE SERPL-MCNC: 139 MG/DL (ref 70–110)
GLUCOSE SERPL-MCNC: 92 MG/DL (ref 70–110)
GLUCOSE SERPL-MCNC: 92 MG/DL (ref 70–110)
GLUCOSE SERPL-MCNC: 95 MG/DL (ref 70–110)
GLUCOSE UR QL STRIP: NEGATIVE
HCO3 UR-SCNC: 19.4 MMOL/L (ref 24–28)
HCT VFR BLD AUTO: 32.9 % (ref 40–54)
HGB BLD-MCNC: 10.2 G/DL (ref 14–18)
HGB UR QL STRIP: NEGATIVE
HIV1+2 IGG SERPL QL IA.RAPID: NEGATIVE
IMM GRANULOCYTES # BLD AUTO: 0.15 K/UL (ref 0–0.04)
IMM GRANULOCYTES NFR BLD AUTO: 0.8 % (ref 0–0.5)
INR PPP: 1 (ref 0.8–1.2)
IRON SERPL-MCNC: 16 UG/DL (ref 45–160)
KETONES UR QL STRIP: NEGATIVE
LACTATE SERPL-SCNC: 1.3 MMOL/L (ref 0.5–2.2)
LACTATE SERPL-SCNC: 1.7 MMOL/L (ref 0.5–2.2)
LEUKOCYTE ESTERASE UR QL STRIP: NEGATIVE
LIPASE SERPL-CCNC: 37 U/L (ref 4–60)
LYMPHOCYTES # BLD AUTO: 4.7 K/UL (ref 1–4.8)
LYMPHOCYTES NFR BLD: 25.6 % (ref 18–48)
MAGNESIUM SERPL-MCNC: 0.8 MG/DL (ref 1.6–2.6)
MAGNESIUM SERPL-MCNC: 1.2 MG/DL (ref 1.6–2.6)
MAGNESIUM SERPL-MCNC: <0.7 MG/DL (ref 1.6–2.6)
MCH RBC QN AUTO: 35.8 PG (ref 27–31)
MCHC RBC AUTO-ENTMCNC: 31 G/DL (ref 32–36)
MCV RBC AUTO: 115 FL (ref 82–98)
METHADONE UR QL SCN>300 NG/ML: NEGATIVE
METHADONE UR QL SCN>300 NG/ML: NEGATIVE
MODE: ABNORMAL
MONOCYTES # BLD AUTO: 1.8 K/UL (ref 0.3–1)
MONOCYTES NFR BLD: 10 % (ref 4–15)
NEUTROPHILS # BLD AUTO: 11.6 K/UL (ref 1.8–7.7)
NEUTROPHILS NFR BLD: 63 % (ref 38–73)
NITRITE UR QL STRIP: NEGATIVE
NRBC BLD-RTO: 0 /100 WBC
OPIATES UR QL SCN: NEGATIVE
OPIATES UR QL SCN: NEGATIVE
PCO2 BLDA: 27.9 MMHG (ref 35–45)
PCP UR QL SCN>25 NG/ML: NEGATIVE
PCP UR QL SCN>25 NG/ML: NEGATIVE
PH SMN: 7.45 [PH] (ref 7.35–7.45)
PH UR STRIP: 7 [PH] (ref 5–8)
PHOSPHATE SERPL-MCNC: 1.7 MG/DL (ref 2.7–4.5)
PLATELET # BLD AUTO: 321 K/UL (ref 150–350)
PMV BLD AUTO: 11.6 FL (ref 9.2–12.9)
PO2 BLDA: 134 MMHG (ref 80–100)
POC BE: -5 MMOL/L
POC SATURATED O2: 99 % (ref 95–100)
POCT GLUCOSE: 135 MG/DL (ref 70–110)
POTASSIUM SERPL-SCNC: 2.5 MMOL/L (ref 3.5–5.1)
POTASSIUM SERPL-SCNC: 2.6 MMOL/L (ref 3.5–5.1)
POTASSIUM SERPL-SCNC: 2.8 MMOL/L (ref 3.5–5.1)
POTASSIUM SERPL-SCNC: 4.3 MMOL/L (ref 3.5–5.1)
PROCALCITONIN SERPL IA-MCNC: 1.26 NG/ML
PROT SERPL-MCNC: 9.1 G/DL (ref 6–8.4)
PROT UR QL STRIP: NEGATIVE
PROTHROMBIN TIME: 11 SEC (ref 9–12.5)
RBC # BLD AUTO: 2.85 M/UL (ref 4.6–6.2)
SAMPLE: ABNORMAL
SATURATED IRON: 7 % (ref 20–50)
SITE: ABNORMAL
SODIUM SERPL-SCNC: 138 MMOL/L (ref 136–145)
SODIUM SERPL-SCNC: 142 MMOL/L (ref 136–145)
SP GR UR STRIP: 1.01 (ref 1–1.03)
SP02: 98
TOTAL IRON BINDING CAPACITY: 229 UG/DL (ref 250–450)
TOXICOLOGY INFORMATION: NORMAL
TOXICOLOGY INFORMATION: NORMAL
TRANSFERRIN SERPL-MCNC: 155 MG/DL (ref 200–375)
TROPONIN I SERPL DL<=0.01 NG/ML-MCNC: 0.02 NG/ML (ref 0–0.03)
TROPONIN I SERPL DL<=0.01 NG/ML-MCNC: <0.006 NG/ML (ref 0–0.03)
TROPONIN I SERPL DL<=0.01 NG/ML-MCNC: <0.006 NG/ML (ref 0–0.03)
TSH SERPL DL<=0.005 MIU/L-ACNC: 3.33 UIU/ML (ref 0.4–4)
URATE SERPL-MCNC: 11.9 MG/DL (ref 3.4–7)
URN SPEC COLLECT METH UR: NORMAL
UROBILINOGEN UR STRIP-ACNC: NEGATIVE EU/DL
VIT B12 SERPL-MCNC: 456 PG/ML (ref 210–950)
WBC # BLD AUTO: 18.38 K/UL (ref 3.9–12.7)

## 2019-11-29 PROCEDURE — 86703 HIV-1/HIV-2 1 RESULT ANTBDY: CPT

## 2019-11-29 PROCEDURE — 96365 THER/PROPH/DIAG IV INF INIT: CPT

## 2019-11-29 PROCEDURE — 96375 TX/PRO/DX INJ NEW DRUG ADDON: CPT | Mod: 59

## 2019-11-29 PROCEDURE — 85610 PROTHROMBIN TIME: CPT

## 2019-11-29 PROCEDURE — 80074 ACUTE HEPATITIS PANEL: CPT

## 2019-11-29 PROCEDURE — 25000003 PHARM REV CODE 250: Performed by: INTERNAL MEDICINE

## 2019-11-29 PROCEDURE — 84484 ASSAY OF TROPONIN QUANT: CPT | Mod: 91

## 2019-11-29 PROCEDURE — 95819 EEG AWAKE AND ASLEEP: CPT

## 2019-11-29 PROCEDURE — 82728 ASSAY OF FERRITIN: CPT

## 2019-11-29 PROCEDURE — 80320 DRUG SCREEN QUANTALCOHOLS: CPT

## 2019-11-29 PROCEDURE — 83735 ASSAY OF MAGNESIUM: CPT | Mod: 91

## 2019-11-29 PROCEDURE — 84550 ASSAY OF BLOOD/URIC ACID: CPT

## 2019-11-29 PROCEDURE — 99900035 HC TECH TIME PER 15 MIN (STAT)

## 2019-11-29 PROCEDURE — 36415 COLL VENOUS BLD VENIPUNCTURE: CPT

## 2019-11-29 PROCEDURE — 63600175 PHARM REV CODE 636 W HCPCS: Performed by: PHYSICIAN ASSISTANT

## 2019-11-29 PROCEDURE — 99291 CRITICAL CARE FIRST HOUR: CPT | Mod: ,,, | Performed by: HOSPITALIST

## 2019-11-29 PROCEDURE — 97802 MEDICAL NUTRITION INDIV IN: CPT

## 2019-11-29 PROCEDURE — 25000003 PHARM REV CODE 250: Performed by: PHYSICIAN ASSISTANT

## 2019-11-29 PROCEDURE — 36600 WITHDRAWAL OF ARTERIAL BLOOD: CPT

## 2019-11-29 PROCEDURE — 94761 N-INVAS EAR/PLS OXIMETRY MLT: CPT

## 2019-11-29 PROCEDURE — 25000003 PHARM REV CODE 250: Performed by: EMERGENCY MEDICINE

## 2019-11-29 PROCEDURE — 80307 DRUG TEST PRSMV CHEM ANLYZR: CPT

## 2019-11-29 PROCEDURE — 85025 COMPLETE CBC W/AUTO DIFF WBC: CPT

## 2019-11-29 PROCEDURE — 80048 BASIC METABOLIC PNL TOTAL CA: CPT | Mod: 91

## 2019-11-29 PROCEDURE — 63600175 PHARM REV CODE 636 W HCPCS: Performed by: INTERNAL MEDICINE

## 2019-11-29 PROCEDURE — 99291 PR CRITICAL CARE, E/M 30-74 MINUTES: ICD-10-PCS | Mod: ,,, | Performed by: HOSPITALIST

## 2019-11-29 PROCEDURE — 83880 ASSAY OF NATRIURETIC PEPTIDE: CPT

## 2019-11-29 PROCEDURE — 95819 EEG AWAKE AND ASLEEP: CPT | Mod: 26,,, | Performed by: PSYCHIATRY & NEUROLOGY

## 2019-11-29 PROCEDURE — 36410 VNPNXR 3YR/> PHY/QHP DX/THER: CPT

## 2019-11-29 PROCEDURE — 99291 CRITICAL CARE FIRST HOUR: CPT | Mod: 25

## 2019-11-29 PROCEDURE — 83605 ASSAY OF LACTIC ACID: CPT

## 2019-11-29 PROCEDURE — 63600175 PHARM REV CODE 636 W HCPCS

## 2019-11-29 PROCEDURE — 82962 GLUCOSE BLOOD TEST: CPT

## 2019-11-29 PROCEDURE — 82550 ASSAY OF CK (CPK): CPT

## 2019-11-29 PROCEDURE — 93010 ELECTROCARDIOGRAM REPORT: CPT | Mod: 76,,, | Performed by: INTERNAL MEDICINE

## 2019-11-29 PROCEDURE — 84145 PROCALCITONIN (PCT): CPT

## 2019-11-29 PROCEDURE — 83605 ASSAY OF LACTIC ACID: CPT | Mod: 91

## 2019-11-29 PROCEDURE — C1751 CATH, INF, PER/CENT/MIDLINE: HCPCS

## 2019-11-29 PROCEDURE — 83735 ASSAY OF MAGNESIUM: CPT

## 2019-11-29 PROCEDURE — 81003 URINALYSIS AUTO W/O SCOPE: CPT

## 2019-11-29 PROCEDURE — 83540 ASSAY OF IRON: CPT

## 2019-11-29 PROCEDURE — 83690 ASSAY OF LIPASE: CPT

## 2019-11-29 PROCEDURE — 93010 EKG 12-LEAD: ICD-10-PCS | Mod: 76,,, | Performed by: INTERNAL MEDICINE

## 2019-11-29 PROCEDURE — G0425 INPT/ED TELECONSULT30: HCPCS | Mod: GT,,, | Performed by: STUDENT IN AN ORGANIZED HEALTH CARE EDUCATION/TRAINING PROGRAM

## 2019-11-29 PROCEDURE — G0425 PR INPT TELEHEALTH CONSULT 30M: ICD-10-PCS | Mod: GT,,, | Performed by: STUDENT IN AN ORGANIZED HEALTH CARE EDUCATION/TRAINING PROGRAM

## 2019-11-29 PROCEDURE — 82803 BLOOD GASES ANY COMBINATION: CPT

## 2019-11-29 PROCEDURE — 84443 ASSAY THYROID STIM HORMONE: CPT

## 2019-11-29 PROCEDURE — 20000000 HC ICU ROOM

## 2019-11-29 PROCEDURE — 93005 ELECTROCARDIOGRAM TRACING: CPT

## 2019-11-29 PROCEDURE — 96366 THER/PROPH/DIAG IV INF ADDON: CPT

## 2019-11-29 PROCEDURE — 82607 VITAMIN B-12: CPT

## 2019-11-29 PROCEDURE — 25000003 PHARM REV CODE 250: Performed by: HOSPITALIST

## 2019-11-29 PROCEDURE — 82010 KETONE BODYS QUAN: CPT

## 2019-11-29 PROCEDURE — 80053 COMPREHEN METABOLIC PANEL: CPT

## 2019-11-29 PROCEDURE — 84100 ASSAY OF PHOSPHORUS: CPT

## 2019-11-29 PROCEDURE — 63600175 PHARM REV CODE 636 W HCPCS: Performed by: EMERGENCY MEDICINE

## 2019-11-29 PROCEDURE — 63600175 PHARM REV CODE 636 W HCPCS: Performed by: HOSPITALIST

## 2019-11-29 PROCEDURE — 87040 BLOOD CULTURE FOR BACTERIA: CPT

## 2019-11-29 PROCEDURE — 27000221 HC OXYGEN, UP TO 24 HOURS

## 2019-11-29 PROCEDURE — 96374 THER/PROPH/DIAG INJ IV PUSH: CPT | Mod: 59

## 2019-11-29 PROCEDURE — 95819 PR EEG,W/AWAKE & ASLEEP RECORD: ICD-10-PCS | Mod: 26,,, | Performed by: PSYCHIATRY & NEUROLOGY

## 2019-11-29 PROCEDURE — 76937 US GUIDE VASCULAR ACCESS: CPT

## 2019-11-29 RX ORDER — ASPIRIN 81 MG/1
81 TABLET ORAL DAILY
Status: DISCONTINUED | OUTPATIENT
Start: 2019-11-29 | End: 2019-12-01

## 2019-11-29 RX ORDER — THIAMINE HCL 100 MG
100 TABLET ORAL DAILY
Status: DISCONTINUED | OUTPATIENT
Start: 2019-11-29 | End: 2019-12-02 | Stop reason: HOSPADM

## 2019-11-29 RX ORDER — ADENOSINE 3 MG/ML
6 INJECTION, SOLUTION INTRAVENOUS
Status: COMPLETED | OUTPATIENT
Start: 2019-11-29 | End: 2019-11-29

## 2019-11-29 RX ORDER — MAGNESIUM SULFATE HEPTAHYDRATE 40 MG/ML
2 INJECTION, SOLUTION INTRAVENOUS ONCE
Status: COMPLETED | OUTPATIENT
Start: 2019-11-29 | End: 2019-11-30

## 2019-11-29 RX ORDER — MAGNESIUM SULFATE HEPTAHYDRATE 40 MG/ML
2 INJECTION, SOLUTION INTRAVENOUS
Status: COMPLETED | OUTPATIENT
Start: 2019-11-29 | End: 2019-11-29

## 2019-11-29 RX ORDER — SODIUM CHLORIDE 9 MG/ML
INJECTION, SOLUTION INTRAVENOUS CONTINUOUS
Status: DISCONTINUED | OUTPATIENT
Start: 2019-11-29 | End: 2019-12-01

## 2019-11-29 RX ORDER — IBUPROFEN 200 MG
24 TABLET ORAL
Status: DISCONTINUED | OUTPATIENT
Start: 2019-11-29 | End: 2019-12-02 | Stop reason: HOSPADM

## 2019-11-29 RX ORDER — DIAZEPAM 10 MG/2ML
5 INJECTION INTRAMUSCULAR
Status: COMPLETED | OUTPATIENT
Start: 2019-11-29 | End: 2019-11-29

## 2019-11-29 RX ORDER — POTASSIUM CHLORIDE 20 MEQ/1
40 TABLET, EXTENDED RELEASE ORAL ONCE
Status: COMPLETED | OUTPATIENT
Start: 2019-11-29 | End: 2019-11-29

## 2019-11-29 RX ORDER — ADENOSINE 3 MG/ML
INJECTION, SOLUTION INTRAVENOUS
Status: DISPENSED
Start: 2019-11-29 | End: 2019-11-29

## 2019-11-29 RX ORDER — SODIUM CHLORIDE 0.9 % (FLUSH) 0.9 %
10 SYRINGE (ML) INJECTION
Status: DISCONTINUED | OUTPATIENT
Start: 2019-11-29 | End: 2019-12-02 | Stop reason: HOSPADM

## 2019-11-29 RX ORDER — CALCIUM GLUCONATE 98 MG/ML
1 INJECTION, SOLUTION INTRAVENOUS ONCE
Status: COMPLETED | OUTPATIENT
Start: 2019-11-29 | End: 2019-11-29

## 2019-11-29 RX ORDER — ONDANSETRON 8 MG/1
8 TABLET, ORALLY DISINTEGRATING ORAL EVERY 8 HOURS PRN
Status: DISCONTINUED | OUTPATIENT
Start: 2019-11-29 | End: 2019-11-30

## 2019-11-29 RX ORDER — SODIUM CHLORIDE 0.9 % (FLUSH) 0.9 %
10 SYRINGE (ML) INJECTION EVERY 6 HOURS
Status: DISCONTINUED | OUTPATIENT
Start: 2019-11-30 | End: 2019-12-02 | Stop reason: HOSPADM

## 2019-11-29 RX ORDER — PANTOPRAZOLE SODIUM 40 MG/1
40 TABLET, DELAYED RELEASE ORAL DAILY
Status: DISCONTINUED | OUTPATIENT
Start: 2019-11-29 | End: 2019-12-02 | Stop reason: HOSPADM

## 2019-11-29 RX ORDER — ALLOPURINOL 300 MG/1
300 TABLET ORAL DAILY
Status: DISCONTINUED | OUTPATIENT
Start: 2019-11-29 | End: 2019-12-02 | Stop reason: HOSPADM

## 2019-11-29 RX ORDER — ACETAMINOPHEN 325 MG/1
650 TABLET ORAL EVERY 4 HOURS PRN
Status: DISCONTINUED | OUTPATIENT
Start: 2019-11-29 | End: 2019-12-02 | Stop reason: HOSPADM

## 2019-11-29 RX ORDER — IBUPROFEN 200 MG
16 TABLET ORAL
Status: DISCONTINUED | OUTPATIENT
Start: 2019-11-29 | End: 2019-12-02 | Stop reason: HOSPADM

## 2019-11-29 RX ORDER — HEPARIN SODIUM 5000 [USP'U]/ML
5000 INJECTION, SOLUTION INTRAVENOUS; SUBCUTANEOUS EVERY 8 HOURS
Status: DISCONTINUED | OUTPATIENT
Start: 2019-11-29 | End: 2019-12-02 | Stop reason: HOSPADM

## 2019-11-29 RX ORDER — LEVETIRACETAM 500 MG/1
500 TABLET ORAL 2 TIMES DAILY
Status: DISCONTINUED | OUTPATIENT
Start: 2019-11-29 | End: 2019-12-02 | Stop reason: HOSPADM

## 2019-11-29 RX ORDER — LORAZEPAM 2 MG/ML
INJECTION INTRAMUSCULAR
Status: COMPLETED
Start: 2019-11-29 | End: 2019-11-29

## 2019-11-29 RX ORDER — FOLIC ACID 1 MG/1
1 TABLET ORAL DAILY
Status: DISCONTINUED | OUTPATIENT
Start: 2019-11-29 | End: 2019-12-02 | Stop reason: HOSPADM

## 2019-11-29 RX ORDER — ONDANSETRON 2 MG/ML
4 INJECTION INTRAMUSCULAR; INTRAVENOUS EVERY 8 HOURS PRN
Status: DISCONTINUED | OUTPATIENT
Start: 2019-11-29 | End: 2019-12-02 | Stop reason: HOSPADM

## 2019-11-29 RX ORDER — MAGNESIUM SULFATE HEPTAHYDRATE 40 MG/ML
2 INJECTION, SOLUTION INTRAVENOUS ONCE
Status: COMPLETED | OUTPATIENT
Start: 2019-11-29 | End: 2019-11-29

## 2019-11-29 RX ORDER — GLUCAGON 1 MG
1 KIT INJECTION
Status: DISCONTINUED | OUTPATIENT
Start: 2019-11-29 | End: 2019-12-02 | Stop reason: HOSPADM

## 2019-11-29 RX ORDER — AMOXICILLIN 250 MG
1 CAPSULE ORAL 2 TIMES DAILY
Status: DISCONTINUED | OUTPATIENT
Start: 2019-11-29 | End: 2019-12-02 | Stop reason: HOSPADM

## 2019-11-29 RX ORDER — METOPROLOL TARTRATE 25 MG/1
25 TABLET, FILM COATED ORAL EVERY 6 HOURS
Status: DISCONTINUED | OUTPATIENT
Start: 2019-11-29 | End: 2019-11-30

## 2019-11-29 RX ORDER — ADENOSINE 3 MG/ML
12 INJECTION, SOLUTION INTRAVENOUS
Status: DISCONTINUED | OUTPATIENT
Start: 2019-11-29 | End: 2019-11-29

## 2019-11-29 RX ORDER — ONDANSETRON 8 MG/1
8 TABLET, ORALLY DISINTEGRATING ORAL EVERY 8 HOURS PRN
Status: DISCONTINUED | OUTPATIENT
Start: 2019-11-29 | End: 2019-12-02 | Stop reason: HOSPADM

## 2019-11-29 RX ORDER — AMIODARONE HYDROCHLORIDE 150 MG/3ML
150 INJECTION, SOLUTION INTRAVENOUS
Status: COMPLETED | OUTPATIENT
Start: 2019-11-29 | End: 2019-11-29

## 2019-11-29 RX ORDER — DILTIAZEM HYDROCHLORIDE 5 MG/ML
10 INJECTION INTRAVENOUS
Status: COMPLETED | OUTPATIENT
Start: 2019-11-29 | End: 2019-11-29

## 2019-11-29 RX ORDER — ACETAMINOPHEN 325 MG/1
650 TABLET ORAL EVERY 8 HOURS PRN
Status: DISCONTINUED | OUTPATIENT
Start: 2019-11-29 | End: 2019-12-02 | Stop reason: HOSPADM

## 2019-11-29 RX ADMIN — DIAZEPAM 5 MG: 5 INJECTION, SOLUTION INTRAMUSCULAR; INTRAVENOUS at 09:11

## 2019-11-29 RX ADMIN — DEXTROSE 2000 MG: 50 INJECTION, SOLUTION INTRAVENOUS at 08:11

## 2019-11-29 RX ADMIN — LORAZEPAM 1 MG: 2 INJECTION INTRAMUSCULAR; INTRAVENOUS at 08:11

## 2019-11-29 RX ADMIN — FOLIC ACID 1 MG: 1 TABLET ORAL at 01:11

## 2019-11-29 RX ADMIN — SODIUM CHLORIDE 1000 ML: 0.9 INJECTION, SOLUTION INTRAVENOUS at 10:11

## 2019-11-29 RX ADMIN — AMIODARONE HYDROCHLORIDE 150 MG: 50 INJECTION, SOLUTION INTRAVENOUS at 09:11

## 2019-11-29 RX ADMIN — AMIODARONE HYDROCHLORIDE 0.5 MG/MIN: 1.8 INJECTION, SOLUTION INTRAVENOUS at 03:11

## 2019-11-29 RX ADMIN — THERA TABS 1 TABLET: TAB at 01:11

## 2019-11-29 RX ADMIN — MAGNESIUM SULFATE IN WATER 2 G: 40 INJECTION, SOLUTION INTRAVENOUS at 10:11

## 2019-11-29 RX ADMIN — POTASSIUM CHLORIDE 40 MEQ: 1500 TABLET, EXTENDED RELEASE ORAL at 04:11

## 2019-11-29 RX ADMIN — SODIUM CHLORIDE: 0.9 INJECTION, SOLUTION INTRAVENOUS at 01:11

## 2019-11-29 RX ADMIN — POTASSIUM CHLORIDE 40 MEQ: 20 TABLET, EXTENDED RELEASE ORAL at 09:11

## 2019-11-29 RX ADMIN — AMIODARONE HYDROCHLORIDE 1 MG/MIN: 1.8 INJECTION, SOLUTION INTRAVENOUS at 09:11

## 2019-11-29 RX ADMIN — PANTOPRAZOLE SODIUM 40 MG: 40 TABLET, DELAYED RELEASE ORAL at 01:11

## 2019-11-29 RX ADMIN — DILTIAZEM HYDROCHLORIDE 10 MG: 5 INJECTION INTRAVENOUS at 08:11

## 2019-11-29 RX ADMIN — SENNOSIDES,DOCUSATE SODIUM 1 TABLET: 8.6; 5 TABLET, FILM COATED ORAL at 09:11

## 2019-11-29 RX ADMIN — ALLOPURINOL 300 MG: 300 TABLET ORAL at 01:11

## 2019-11-29 RX ADMIN — LEVETIRACETAM 500 MG: 500 TABLET ORAL at 09:11

## 2019-11-29 RX ADMIN — CALCIUM GLUCONATE 1 G: 98 INJECTION, SOLUTION INTRAVENOUS at 02:11

## 2019-11-29 RX ADMIN — POTASSIUM CHLORIDE 40 MEQ: 1500 TABLET, EXTENDED RELEASE ORAL at 02:11

## 2019-11-29 RX ADMIN — MAGNESIUM SULFATE IN WATER 2 G: 40 INJECTION, SOLUTION INTRAVENOUS at 01:11

## 2019-11-29 RX ADMIN — Medication 100 MG: at 01:11

## 2019-11-29 RX ADMIN — ADENOSINE 6 MG: 3 INJECTION, SOLUTION INTRAVENOUS at 08:11

## 2019-11-29 RX ADMIN — HEPARIN SODIUM 5000 UNITS: 5000 INJECTION, SOLUTION INTRAVENOUS; SUBCUTANEOUS at 09:11

## 2019-11-29 RX ADMIN — HEPARIN SODIUM 5000 UNITS: 5000 INJECTION, SOLUTION INTRAVENOUS; SUBCUTANEOUS at 01:11

## 2019-11-29 RX ADMIN — ASPIRIN 81 MG: 81 TABLET, COATED ORAL at 01:11

## 2019-11-29 RX ADMIN — METOPROLOL TARTRATE 25 MG: 25 TABLET ORAL at 11:11

## 2019-11-29 RX ADMIN — METOPROLOL TARTRATE 25 MG: 25 TABLET ORAL at 02:11

## 2019-11-29 NOTE — ASSESSMENT & PLAN NOTE
-Mr. Johnson is admitted to inpatient status  -History of pAFIB noted and has not been taking home metoprolol or diltiazem.  -[id number] score of 1 based on echo from 2018.  -TSH is normal  -Suspect due to significant dehydration, electrolyte abnormalities and possible component of alcohol withdrawal.  -Received adenosine and diltiazem in ER and subsequently loaded with and started on amiodarone infusion.  -Will correct electrolytes  -Will check and trend troponins which will at a minimum be mildly positive to to rapid heart rate and demand ischemia.  Doubt acute coronary syndrome.  -Will check echocardiogram  -Will consult Dr. Chavez

## 2019-11-29 NOTE — ED NOTES
Pt to er with c/o sz today and rapid heart rate per ems. Pt not taking meds per family member. Pt heart rate 170 . Pt with sz  activity  Currently. md called to room meds  Drawn up  And given per iv. Pt aa ox1 prior to current sz. Second iv started right arm.

## 2019-11-29 NOTE — ASSESSMENT & PLAN NOTE
-History noted.  States last seizure was several months ago.  Has not been taking home keppra.  -Could have been due to alcohol withdrawal, being off home keppra or significant acidosis and electrolyte abnormalities  -Loaded with keppra in ER  -Will order CT of head  -Will order neuro checks q2h and seizure precautions.  -Will continue home oral keppra  -Will order ativan PRN seizures.  -Will order EEG  -Will consult neurology.

## 2019-11-29 NOTE — SUBJECTIVE & OBJECTIVE
Past Medical History:   Diagnosis Date    Afib     Alcohol abuse with other alcohol-induced disorder 04/02/2019    Anemia 04/02/2019    unspecified deficiency    Ankle fracture, left     Arthritis     Asthma     Chronic kidney disease (CKD), stage III (moderate) 04/02/2019    Depression     Erectile dysfunction 04/02/2019    Gout, arthropathy 04/02/2019    Gout, unspecified     Hypertension     Hypertensive chronic kidney disease 04/02/2019    Hypomagnesemia 04/02/2019    Noncompliance 04/02/2019    Peripheral neuropathy 04/02/2019    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    Secondary hyperparathyroidism, renal 04/02/2019    Seizure 2016    Spastic hemiplegia affecting left nondominant side 04/02/2019       Past Surgical History:   Procedure Laterality Date    ANKLE SURGERY      x6    CHOLECYSTECTOMY         Review of patient's allergies indicates:   Allergen Reactions    Lisinopril Swelling     Pt admitted with angioedema 2wks after taking lisinopril.        No current facility-administered medications on file prior to encounter.      Current Outpatient Medications on File Prior to Encounter   Medication Sig    allopurinol (ZYLOPRIM) 300 MG tablet Take 300 mg by mouth once daily.    aspirin (ECOTRIN) 81 MG EC tablet Take 81 mg by mouth once daily.    diltiaZEM (TIAZAC) 180 MG Cs24 Take 180 mg by mouth once daily.    ergocalciferol (VITAMIN D2) 50,000 unit Cap Take 50,000 Units by mouth every 7 days.    ferrous sulfate 325 (65 FE) MG EC tablet Take 325 mg by mouth once daily.    gabapentin (NEURONTIN) 300 MG capsule Take 300 mg by mouth 3 (three) times daily.    hydrocodone-acetaminophen 5-325mg (NORCO) 5-325 mg per tablet Take 1 tablet by mouth every 6 (six) hours as needed for Pain.    ibuprofen (ADVIL,MOTRIN) 800 MG tablet Take 800 mg by mouth 3 (three) times daily.    levETIRAcetam (KEPPRA) 500 MG Tab Take 500 mg by mouth 2 (two) times daily.    magnesium 250 mg Tab Take 1 tablet by mouth once  daily.    metoprolol succinate (TOPROL-XL) 50 MG 24 hr tablet TAKE ONE TABLET BY MOUTH ONCE DAILY    potassium chloride (KLOR-CON) 20 mEq Pack Take 20 mEq by mouth 2 (two) times daily.     Family History     Problem Relation (Age of Onset)    Cancer Mother    Cataracts Maternal Grandfather    Hypertension Father    Stroke Maternal Grandmother, Maternal Grandfather        Tobacco Use    Smoking status: Never Smoker    Smokeless tobacco: Never Used   Substance and Sexual Activity    Alcohol use: Yes     Alcohol/week: 3.0 - 4.0 standard drinks     Types: 3 - 4 Shots of liquor per week     Comment: daily, but states he stopped all alcohol 2 weeks ago abruptly lost interest in it.    Drug use: No    Sexual activity: Not on file     Review of Systems   Constitutional: Positive for activity change and appetite change. Negative for chills, fatigue and fever.   HENT: Negative for congestion and dental problem.    Eyes: Negative for discharge and itching.   Respiratory: Negative for apnea, cough, chest tightness and shortness of breath.    Cardiovascular: Negative for chest pain, palpitations and leg swelling.   Gastrointestinal: Positive for nausea and vomiting. Negative for abdominal distention, abdominal pain and diarrhea.   Endocrine: Negative for cold intolerance and heat intolerance.   Genitourinary: Negative for difficulty urinating and dysuria.   Musculoskeletal: Negative for arthralgias and back pain.   Allergic/Immunologic: Negative for environmental allergies and food allergies.   Neurological: Negative for dizziness, facial asymmetry and light-headedness.   Hematological: Negative for adenopathy. Does not bruise/bleed easily.   Psychiatric/Behavioral: Negative for agitation and behavioral problems.     Objective:     Vital Signs (Most Recent):  Temp: 98.4 °F (36.9 °C) (11/29/19 1144)  Pulse: 79 (11/29/19 1221)  Resp: 16 (11/29/19 1221)  BP: 116/81 (11/29/19 1221)  SpO2: 99 % (11/29/19 1209) Vital Signs (24h  Range):  Temp:  [98.4 °F (36.9 °C)-98.5 °F (36.9 °C)] 98.4 °F (36.9 °C)  Pulse:  [] 79  Resp:  [15-31] 16  SpO2:  [71 %-100 %] 99 %  BP: ()/(53-84) 116/81     Weight: 88.5 kg (195 lb)  Body mass index is 26.45 kg/m².    Physical Exam   Constitutional: He is oriented to person, place, and time. He appears well-developed and well-nourished.   HENT:   Head: Normocephalic and atraumatic.   Dry mucus membranes   Eyes: Pupils are equal, round, and reactive to light. EOM are normal.   Neck: Normal range of motion. Neck supple.   Cardiovascular:   Irregularly irregular, no murmurs   Pulmonary/Chest: Effort normal. No respiratory distress.   Faint bibasilar crackles   Abdominal: Soft. Bowel sounds are normal. He exhibits no distension. There is no tenderness.   Musculoskeletal: Normal range of motion. He exhibits no edema.   Golf ball sized nodule on dorsum of left hand covered by hyperkeratotic skin (states this is chronic)   Neurological: He is oriented to person, place, and time. No cranial nerve deficit. Coordination normal.   Skin: Skin is warm and dry.   Psychiatric: He has a normal mood and affect. His behavior is normal.   Vitals reviewed.        CRANIAL NERVES     CN III, IV, VI   Pupils are equal, round, and reactive to light.  Extraocular motions are normal.        Significant Labs: All pertinent labs within the past 24 hours have been reviewed.    Significant Imaging: I have reviewed and interpreted all pertinent imaging results/findings within the past 24 hours.

## 2019-11-29 NOTE — ASSESSMENT & PLAN NOTE
-Chronic hypomagnesium noted.  -On admit Mag <0.7  -Received Mag sulfate 2 g in ER.  -Will give additional Mag sulfate 2g  -Repeat Mag level this afternoon and in AM.

## 2019-11-29 NOTE — ASSESSMENT & PLAN NOTE
-Longstanding  -Previously not folate, iron or b12 deficient  -Will repeat iron, ferritin, b12 and folate levels  -Repeat cbc in AM

## 2019-11-29 NOTE — ASSESSMENT & PLAN NOTE
-At home is supposed to be on metoprolol and diltiazem but has not been  -BP presently low normal, likely due to dehydration  -Hold home metoprolol and diltiazem pending cardiology evaluation.

## 2019-11-29 NOTE — ASSESSMENT & PLAN NOTE
-History noted  -Not on PPI at this time  -Given significant nausea recently it could be due to gerd  -Will start PPI now.  -If nausea persists will check KUB.

## 2019-11-29 NOTE — H&P
"[facility]  History & Physical    Patient Name: Michael Johnson Jr.  MRN: [id number]  Admission Date: 11/29/2019  Attending Physician: Joshua Powell MD  Primary Care Provider: Zunilda Bolden NP         Patient information was obtained from patient, spouse/SO and ER records.     Subjective:     Principal Problem:Atrial fibrillation with RVR    Chief Complaint:   Chief Complaint   Patient presents with    Seizures     Per [facility] pt transported from home w/ witnessed seizure activity while in bed by wife, lasting aprox 3-5 minutes. Pt currently alert, oriented to self only at this time. Famiyl reports pt has been non-compliant with seizure meds.     Tachycardia        HPI: Mr. Johnson is a 59 year odl man with history of alcoholism, seizure disorder, paroxysmal atrial fibrillation, ckdIII, gout and medication non-compliance who was brought in by EMS for evaluation of seizures.  History is provided by his wife, himself and review of the chart.  Apparently two weeks ago he stopped drinking all alcohol because he "suddenly lost interest in it".  Over the last week he has had a very poor appetite with significant nausea.  He had some vomiting over the last two days and notes he has not been taking any of his medications like he is supposed to.  He denies any dysphagia or difficulty eating, but just notes he has no desire to eat due to his significant nausea.  This morning he was seen by his wife having seizure activity in bed.  This consisted of full body convulsions and lasted about 5 minutes.  EMS were called and he was noted to be post-ictal and confused on their arrival.  After arrival to our ER he had another seizure and received diazepam and was loaded with keppra.  Also while in the ER he was noted to have significant tachycardia (presumed SVT) up into the 200s.  He received adenosine and was subsequently noted to be in atrial fibrillation with rvr for which he received " diltiazem with minimal results.  Subsequently he was loaded with amiodarone followed by continuous infusion.  Upon my interview he is awake and oriented with no signs of confusion or tremulousness.  He denies fevers, chills, headache, pain in any part of his body, diarrhea, leg swelling, shortness of breath, constipation, new numbness or weakness.  His only actual complaint is of ongoing nausea.    Past Medical History:   Diagnosis Date    Afib     Alcohol abuse with other alcohol-induced disorder 04/02/2019    Anemia 04/02/2019    unspecified deficiency    Ankle fracture, left     Arthritis     Asthma     Chronic kidney disease (CKD), stage III (moderate) 04/02/2019    Depression     Erectile dysfunction 04/02/2019    Gout, arthropathy 04/02/2019    Gout, unspecified     Hypertension     Hypertensive chronic kidney disease 04/02/2019    Hypomagnesemia 04/02/2019    Noncompliance 04/02/2019    Peripheral neuropathy 04/02/2019    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    Secondary hyperparathyroidism, renal 04/02/2019    Seizure 2016    Spastic hemiplegia affecting left nondominant side 04/02/2019       Past Surgical History:   Procedure Laterality Date    ANKLE SURGERY      x6    CHOLECYSTECTOMY         Review of patient's allergies indicates:   Allergen Reactions    Lisinopril Swelling     Pt admitted with angioedema 2wks after taking lisinopril.        No current facility-administered medications on file prior to encounter.      Current Outpatient Medications on File Prior to Encounter   Medication Sig    allopurinol (ZYLOPRIM) 300 MG tablet Take 300 mg by mouth once daily.    aspirin (ECOTRIN) 81 MG EC tablet Take 81 mg by mouth once daily.    diltiaZEM (TIAZAC) 180 MG Cs24 Take 180 mg by mouth once daily.    ergocalciferol (VITAMIN D2) 50,000 unit Cap Take 50,000 Units by mouth every 7 days.    ferrous sulfate 325 (65 FE) MG EC tablet Take 325 mg by mouth once daily.    gabapentin (NEURONTIN) 300 MG  capsule Take 300 mg by mouth 3 (three) times daily.    hydrocodone-acetaminophen 5-325mg (NORCO) 5-325 mg per tablet Take 1 tablet by mouth every 6 (six) hours as needed for Pain.    ibuprofen (ADVIL,MOTRIN) 800 MG tablet Take 800 mg by mouth 3 (three) times daily.    levETIRAcetam (KEPPRA) 500 MG Tab Take 500 mg by mouth 2 (two) times daily.    magnesium 250 mg Tab Take 1 tablet by mouth once daily.    metoprolol succinate (TOPROL-XL) 50 MG 24 hr tablet TAKE ONE TABLET BY MOUTH ONCE DAILY    potassium chloride (KLOR-CON) 20 mEq Pack Take 20 mEq by mouth 2 (two) times daily.     Family History     Problem Relation (Age of Onset)    Cancer Mother    Cataracts Maternal Grandfather    Hypertension Father    Stroke Maternal Grandmother, Maternal Grandfather        Tobacco Use    Smoking status: Never Smoker    Smokeless tobacco: Never Used   Substance and Sexual Activity    Alcohol use: Yes     Alcohol/week: 3.0 - 4.0 standard drinks     Types: 3 - 4 Shots of liquor per week     Comment: daily, but states he stopped all alcohol 2 weeks ago abruptly lost interest in it.    Drug use: No    Sexual activity: Not on file     Review of Systems   Constitutional: Positive for activity change and appetite change. Negative for chills, fatigue and fever.   HENT: Negative for congestion and dental problem.    Eyes: Negative for discharge and itching.   Respiratory: Negative for apnea, cough, chest tightness and shortness of breath.    Cardiovascular: Negative for chest pain, palpitations and leg swelling.   Gastrointestinal: Positive for nausea and vomiting. Negative for abdominal distention, abdominal pain and diarrhea.   Endocrine: Negative for cold intolerance and heat intolerance.   Genitourinary: Negative for difficulty urinating and dysuria.   Musculoskeletal: Negative for arthralgias and back pain.   Allergic/Immunologic: Negative for environmental allergies and food allergies.   Neurological: Negative for  dizziness, facial asymmetry and light-headedness.   Hematological: Negative for adenopathy. Does not bruise/bleed easily.   Psychiatric/Behavioral: Negative for agitation and behavioral problems.     Objective:     Vital Signs (Most Recent):  Temp: 98.4 °F (36.9 °C) (11/29/19 1144)  Pulse: 79 (11/29/19 1221)  Resp: 16 (11/29/19 1221)  BP: 116/81 (11/29/19 1221)  SpO2: 99 % (11/29/19 1209) Vital Signs (24h Range):  Temp:  [98.4 °F (36.9 °C)-98.5 °F (36.9 °C)] 98.4 °F (36.9 °C)  Pulse:  [] 79  Resp:  [15-31] 16  SpO2:  [71 %-100 %] 99 %  BP: ()/(53-84) 116/81     Weight: 88.5 kg (195 lb)  Body mass index is 26.45 kg/m².    Physical Exam   Constitutional: He is oriented to person, place, and time. He appears well-developed and well-nourished.   HENT:   Head: Normocephalic and atraumatic.   Dry mucus membranes   Eyes: Pupils are equal, round, and reactive to light. EOM are normal.   Neck: Normal range of motion. Neck supple.   Cardiovascular:   Irregularly irregular, no murmurs   Pulmonary/Chest: Effort normal. No respiratory distress.   Faint bibasilar crackles   Abdominal: Soft. Bowel sounds are normal. He exhibits no distension. There is no tenderness.   Musculoskeletal: Normal range of motion. He exhibits no edema.   Golf ball sized nodule on dorsum of left hand covered by hyperkeratotic skin (states this is chronic)   Neurological: He is oriented to person, place, and time. No cranial nerve deficit. Coordination normal.   Skin: Skin is warm and dry.   Psychiatric: He has a normal mood and affect. His behavior is normal.   Vitals reviewed.        CRANIAL NERVES     CN III, IV, VI   Pupils are equal, round, and reactive to light.  Extraocular motions are normal.        Significant Labs: All pertinent labs within the past 24 hours have been reviewed.    Significant Imaging: I have reviewed and interpreted all pertinent imaging results/findings within the past 24 hours.    Assessment/Plan:     * Atrial  fibrillation with RVR  -Mr. Johnson is admitted to inpatient status  -History of pAFIB noted and has not been taking home metoprolol or diltiazem.  -[id number] score of 1 based on echo from 2018.  -TSH is normal  -Suspect due to significant dehydration, electrolyte abnormalities and possible component of alcohol withdrawal.  -Received adenosine and diltiazem in ER and subsequently loaded with and started on amiodarone infusion.  -Will correct electrolytes  -Will check and trend troponins which will at a minimum be mildly positive to to rapid heart rate and demand ischemia.  Doubt acute coronary syndrome.  -Will check echocardiogram  -Will consult Dr. Chavez      Seizure  -History noted.  States last seizure was several months ago.  Has not been taking home keppra.  -Could have been due to alcohol withdrawal, being off home keppra or significant acidosis and electrolyte abnormalities  -Loaded with keppra in ER  -Will order CT of head  -Will order neuro checks q2h and seizure precautions.  -Will continue home oral keppra  -Will order ativan PRN seizures.  -Will order EEG  -Will consult neurology.      Alcohol abuse  -History noted.  States at baseline he drinks 2-4 coffee mugs of vodka cocktail daily.    -States he abruptly lost interest in alcohol 2 weeks ago and has had none since that time.  -Aside from seizure and tachycardia, no other signs of withdrawal.  No tremulousness and no confusion.  -Monitor closely in ICU.  -Check CIWA   -Provide folic acid, thiamine, MVI  -If any further sings of withdrawal will add scheduled librium which has worked well for him in the past.      Acute renal failure superimposed on stage 3 chronic kidney disease  -Baseline Cr is 1.2.  On admit Cr is 1.6  -He appears very dehydrated and has not been eating over the last week or two.  -He is noted to have an anion gap of 31 on admission, likely due to seizure and dehydration.  -Beta-hydroxybutyrate is normal.  -ABG showed a  partially compensated primary respiratory alkalosis and anion-gap metabolic acidosis.  -Avoid nephrotoxic agents andr renally dose medications.  -Will treat with IV fluids  -Repeat BMP this afternoon and in AM.      Dehydration  -Treatment as above      High anion gap metabolic acidosis  -Treatment as above      Hypomagnesemia  -Chronic hypomagnesium noted.  -On admit Mag <0.7  -Received Mag sulfate 2 g in ER.  -Will give additional Mag sulfate 2g  -Repeat Mag level this afternoon and in AM.      Leukocytosis  -WBC 18 on admit without significant left shift  -Likely due to hemoconcentration and stress reaction from seizures  -He is afebrile with clear cxr and no signs of infection.  -Will check UA and procalcitonin  -Monitor closely.      Macrocytic anemia  -Longstanding  -Previously not folate, iron or b12 deficient  -Will repeat iron, ferritin, b12 and folate levels  -Repeat cbc in AM      GERD (gastroesophageal reflux disease)  -History noted  -Not on PPI at this time  -Given significant nausea recently it could be due to gerd  -Will start PPI now.  -If nausea persists will check KUB.      Essential hypertension  -At home is supposed to be on metoprolol and diltiazem but has not been  -BP presently low normal, likely due to dehydration  -Hold home metoprolol and diltiazem pending cardiology evaluation.      Hyperlipidemia  -History noted  -Not on statin at this time.      Gouty arthritis  -History noted  -No obvious flare at this time  -Check uric acid  -Resume home allopurinol        VTE Risk Mitigation (From admission, onward)    heparin        35 min cc time     Joshua Powell MD  Department of Hospital Medicine   [facility]

## 2019-11-29 NOTE — ASSESSMENT & PLAN NOTE
-Baseline Cr is 1.2.  On admit Cr is 1.6  -He appears very dehydrated and has not been eating over the last week or two.  -He is noted to have an anion gap of 31 on admission, likely due to seizure and dehydration.  -Beta-hydroxybutyrate is normal.  -ABG showed a partially compensated primary respiratory alkalosis and anion-gap metabolic acidosis.  -Avoid nephrotoxic agents andr renally dose medications.  -Will treat with IV fluids  -Repeat BMP this afternoon and in AM.

## 2019-11-29 NOTE — HPI
"Mr. Johnson is a 59 year odl man with history of alcoholism, seizure disorder, paroxysmal atrial fibrillation, ckdIII, gout and medication non-compliance who was brought in by EMS for evaluation of seizures.  History is provided by his wife, himself and review of the chart.  Apparently two weeks ago he stopped drinking all alcohol because he "suddenly lost interest in it".  Over the last week he has had a very poor appetite with significant nausea.  He had some vomiting over the last two days and notes he has not been taking any of his medications like he is supposed to.  He denies any dysphagia or difficulty eating, but just notes he has no desire to eat due to his significant nausea.  This morning he was seen by his wife having seizure activity in bed.  This consisted of full body convulsions and lasted about 5 minutes.  EMS were called and he was noted to be post-ictal and confused on their arrival.  After arrival to our ER he had another seizure and received diazepam and was loaded with keppra.  Also while in the ER he was noted to have significant tachycardia (presumed SVT) up into the 200s.  He received adenosine and was subsequently noted to be in atrial fibrillation with rvr for which he received diltiazem with minimal results.  Subsequently he was loaded with amiodarone followed by continuous infusion.  Upon my interview he is awake and oriented with no signs of confusion or tremulousness.  He denies fevers, chills, headache, pain in any part of his body, diarrhea, leg swelling, shortness of breath, constipation, new numbness or weakness.  His only actual complaint is of ongoing nausea.  "

## 2019-11-29 NOTE — PLAN OF CARE
Recommendations  1. Recommend Boost plus TID.   2. Encourage PO intake of meals and supplement.  3. If TF recommendation is needed: Isosource 1.5 @ 60ml/hr   - to provide 2160 kcals(98% EEN), 98 g protein( >100% EPN), and 1100 ml fluid. H2o flush 150 ml q4hr.     Goals:   1. >75% of EEN, EPN by RD follow up.   Nutrition Goal Status: new

## 2019-11-29 NOTE — ASSESSMENT & PLAN NOTE
-History noted.  States at baseline he drinks 2-4 coffee mugs of vodka cocktail daily.    -States he abruptly lost interest in alcohol 2 weeks ago and has had none since that time.  -Aside from seizure and tachycardia, no other signs of withdrawal.  No tremulousness and no confusion.  -Monitor closely in ICU.  -Check CIWA   -Provide folic acid, thiamine, MVI  -If any further sings of withdrawal will add scheduled librium which has worked well for him in the past.

## 2019-11-29 NOTE — CONSULTS
[facility]  Adult Nutrition  Consult Note    SUMMARY     Recommendations  1. Recommend Boost plus TID.   2. Encourage PO intake of meals and supplement.  3. If TF recommendation is needed: Isosource 1.5 @ 60ml/hr   - to provide 2160 kcals(98% EEN), 98 g protein( >100% EPN), and 1100 ml fluid. H2o flush 150 ml q4hr.     Goals:   1. >75% of EEN, EPN by RD follow up.   Nutrition Goal Status: new    Reason for Assessment    Reason For Assessment: consult  Diagnosis: (Atrial fibrillation with RVR)  Relevant Medical History: HTN, alcohol abuse, gout, seizures, hypertensive CKD, CKD stage 3  Interdisciplinary Rounds: did not attend  General Information Comments: Pt stopped drinking alcohol 2 weeks ago and has low PO intake. Pt has severe nausea. PO intake 0% of meals. No change in weight. NFPE 11.29.19- well nourished.   Nutrition Discharge Planning: Discharge on low sodium diet.     Nutrition/Diet History     Spiritual, Cultural Beliefs, Worship Practices, Values that Affect Care: (none stated)    Anthropometrics    Temp: 98.4 °F (36.9 °C)  Height Method: Stated  Height: 6' (182.9 cm)  Height (inches): 72 in  Weight Method: Stated  Weight: 88.5 kg (195 lb 1.7 oz)  Weight (lb): 195.11 lb  Ideal Body Weight (IBW), Male: 178 lb  % Ideal Body Weight, Male (lb): 109.61 lb  BMI (Calculated): 26.5  BMI Grade: 25 - 29.9 - overweight     Lab/Procedures/Meds    Pertinent Labs Reviewed: reviewed  Pertinent Labs Comments: Cr 1.6, Phos 1.7, Glucose 139, Mg <0.7  Pertinent Medications Reviewed: reviewed  Pertinent Medications Comments: Pantoprazole    Estimated/Assessed Needs    Weight Used For Calorie Calculations: 88.5 kg (195 lb 1.7 oz)  Energy Calorie Requirements (kcal): 2212 kcals/day(22 kcal/kg)  Energy Need Method: Kcal/kg  Protein Requirements: 70.95 g/day(0.8 g/kg)  Weight Used For Protein Calculations: 88.5 kg (195 lb 1.7 oz)  Fluid Requirements (mL): 1mL/kcal or per MD  Estimated Fluid Requirement  Method: RDA Method  RDA Method (mL): 0584    Nutrition Prescription Ordered    Current Diet Order: Regular    Evaluation of Received Nutrient/Fluid Intake    Energy Calories Required: not meeting needs  Protein Required: not meeting needs  Fluid Required: meeting needs  % Intake of Estimated Energy Needs: 0 - 25 %  % Meal Intake: 0 - 25 %    Nutrition Risk    Level of Risk/Frequency of Follow-up: low     Assessment and Plan    Nutrition Problem  Inadequate energy intake    Related to (etiology):   Decreased appetite     Signs and Symptoms (as evidenced by):   <25% PO intake of meals    Interventions/Recommendations (treatment strategy):  Collaboration with other providers  Oral supplement    Nutrition Diagnosis Status:   New    Monitor and Evaluation    Food and Nutrient Intake: food and beverage intake  Food and Nutrient Adminstration: diet order  Knowledge/Beliefs/Attitudes: food and nutrition knowledge/skill  Physical Activity and Function: nutrition-related ADLs and IADLs  Anthropometric Measurements: weight  Biochemical Data, Medical Tests and Procedures: gastrointestinal profile, electrolyte and renal panel, glucose/endocrine profile, inflammatory profile, lipid profile  Nutrition-Focused Physical Findings: overall appearance     Malnutrition Assessment     Subcutaneous Fat Loss (Final Summary): well nourished  Muscle Loss Evaluation (Final Summary): well nourished       Nutrition Follow-Up    RD Follow-up?: Yes

## 2019-11-29 NOTE — ED PROVIDER NOTES
Encounter Date: 11/29/2019    SCRIBE #1 NOTE: Vani KAPLAN am scribing for, and in the presence of, Dr. Reynolds.       History     Chief Complaint   Patient presents with    Seizures     Per [facility] pt transported from home w/ witnessed seizure activity while in bed by wife, lasting aprox 3-5 minutes. Pt currently alert, oriented to self only at this time. Famiyl reports pt has been non-compliant with seizure meds.     Tachycardia     Time seen by provider: 8:29 AM    This is a 59 y.o. male who presents via EMS due to seizure activity this morning. EMS was activated by his wife, who witnessed episode. Per EMS, pt was post-ictal upon their arrival and they state he has not seemed to return to baseline since picking him up.  They noted significant tachycardia on route, but stated that the patient had a Valsalva maneuver which improved the heart rate.  Pt had second seizure witnessed upon arrival to the ED.     His wife at bedside states that he has not been compliant with his seizure medications, though she is unsure what they are. She states that he has had N/V for the past few days, but no diarrhea or fevers. She admits that he is a daily, heavy drinker. She denies any recent falls or injuries.     For medical record review, patient has history of alcohol abuse, alcohol withdrawal seizures, seizure disorder, paroxysmal atrial fibrillation.  Per medical record he is taking metoprolol, Cardizem, Keppra, among others, but wife states he has not been taking any of his medications for several weeks.    The history is provided by the patient.     Review of patient's allergies indicates:   Allergen Reactions    Lisinopril Swelling     Pt admitted with angioedema 2wks after taking lisinopril.      Past Medical History:   Diagnosis Date    Afib     Alcohol abuse with other alcohol-induced disorder 04/02/2019    Anemia 04/02/2019    unspecified deficiency    Ankle fracture, left     Arthritis     Asthma      Chronic kidney disease (CKD), stage III (moderate) 04/02/2019    Depression     Erectile dysfunction 04/02/2019    Gout, arthropathy 04/02/2019    Gout, unspecified     Hypertension     Hypertensive chronic kidney disease 04/02/2019    Hypomagnesemia 04/02/2019    Noncompliance 04/02/2019    Peripheral neuropathy 04/02/2019    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    Secondary hyperparathyroidism, renal 04/02/2019    Seizure 2016    Spastic hemiplegia affecting left nondominant side 04/02/2019     Past Surgical History:   Procedure Laterality Date    ANKLE SURGERY      x6    CHOLECYSTECTOMY       Family History   Problem Relation Age of Onset    Hypertension Father     Stroke Maternal Grandmother     Cataracts Maternal Grandfather     Stroke Maternal Grandfather     Cancer Mother         malignant polyp     Social History     Tobacco Use    Smoking status: Never Smoker    Smokeless tobacco: Never Used   Substance Use Topics    Alcohol use: Yes     Alcohol/week: 3.0 - 4.0 standard drinks     Types: 3 - 4 Shots of liquor per week     Comment: daily, but states he stopped all alcohol 2 weeks ago abruptly lost interest in it.    Drug use: No     Review of Systems   Constitutional: Negative for chills and fever.   HENT: Negative for congestion and sore throat.    Eyes: Negative for visual disturbance.   Respiratory: Negative for cough and shortness of breath.    Cardiovascular: Negative for chest pain and palpitations.   Gastrointestinal: Positive for nausea and vomiting. Negative for abdominal pain and diarrhea.   Genitourinary: Negative for decreased urine volume, dysuria and frequency.   Musculoskeletal: Negative for joint swelling, neck pain and neck stiffness.   Skin: Negative for rash and wound.   Neurological: Positive for seizures. Negative for weakness, numbness and headaches.   Psychiatric/Behavioral: Positive for confusion. Negative for behavioral problems.       Physical Exam     Initial Vitals   BP  Pulse Resp Temp SpO2   11/29/19 0825 11/29/19 0825 11/29/19 0825 11/29/19 0825 11/29/19 0835   121/84 110 20 98.5 °F (36.9 °C) 95 %      MAP       --                Physical Exam    Nursing note and vitals reviewed.  Constitutional: He appears well-developed and well-nourished. He appears distressed.   HENT:   Head: Normocephalic and atraumatic.   Right Ear: External ear normal.   Left Ear: External ear normal.   Mouth/Throat: Oropharynx is clear and moist.   Eyes: Conjunctivae and EOM are normal. Pupils are equal, round, and reactive to light.   Neck: Normal range of motion. Neck supple.   Cardiovascular: Regular rhythm, normal heart sounds and intact distal pulses. Tachycardia present.    No murmur heard.  Pulmonary/Chest: Breath sounds normal. No respiratory distress. He has no wheezes. He has no rhonchi. He has no rales.   Abdominal: Soft. Bowel sounds are normal. There is no tenderness.   Musculoskeletal: Normal range of motion.   Neurological: GCS eye subscore is 4. GCS verbal subscore is 4. GCS motor subscore is 5.   Moves all extremities. Does not follow commands. Confused, disoriented and combative (post-ictal).    Skin: Skin is warm and dry. No rash noted.         ED Course   Critical Care  Date/Time: 11/29/2019 12:46 PM  Performed by: Sarah Reynolds MD  Authorized by: Sarah Reynolds MD   Direct patient critical care time: 34 minutes  Additional history critical care time: 8 minutes  Ordering / reviewing critical care time: 7 minutes  Documentation critical care time: 8 minutes  Consulting other physicians critical care time: 10 minutes  Consult with family critical care time: 5 minutes  Total critical care time (exclusive of procedural time) : 72 minutes  Critical care time was exclusive of separately billable procedures and treating other patients.  Critical care was necessary to treat or prevent imminent or life-threatening deterioration of the following conditions: CNS failure or compromise,  metabolic crisis, cardiac failure and circulatory failure.  Critical care was time spent personally by me on the following activities: discussions with consultants, evaluation of patient's response to treatment, obtaining history from patient or surrogate, ordering and review of laboratory studies, pulse oximetry, review of old charts, development of treatment plan with patient or surrogate, interpretation of cardiac output measurements, examination of patient, ordering and performing treatments and interventions, ordering and review of radiographic studies and re-evaluation of patient's condition.        Labs Reviewed   CBC W/ AUTO DIFFERENTIAL - Abnormal; Notable for the following components:       Result Value    WBC 18.38 (*)     RBC 2.85 (*)     Hemoglobin 10.2 (*)     Hematocrit 32.9 (*)     Mean Corpuscular Volume 115 (*)     Mean Corpuscular Hemoglobin 35.8 (*)     Mean Corpuscular Hemoglobin Conc 31.0 (*)     RDW 17.9 (*)     Immature Granulocytes 0.8 (*)     Gran # (ANC) 11.6 (*)     Immature Grans (Abs) 0.15 (*)     Mono # 1.8 (*)     All other components within normal limits   COMPREHENSIVE METABOLIC PANEL - Abnormal; Notable for the following components:    CO2 8 (*)     Glucose 139 (*)     Creatinine 1.6 (*)     Calcium 8.1 (*)     Total Protein 9.1 (*)     Albumin 3.1 (*)     Anion Gap 31 (*)     eGFR if  54 (*)     eGFR if non  47 (*)     All other components within normal limits    Narrative:     CO2 and MG critical result(s) called and verbal readback obtained   from Corona Gale RN.  by SANDY 11/29/2019 10:26   MAGNESIUM - Abnormal; Notable for the following components:    Magnesium <0.7 (*)     All other components within normal limits    Narrative:     CO2 and MG critical result(s) called and verbal readback obtained   from Corona Gale RN.  by SANDY 11/29/2019 10:26   PHOSPHORUS - Abnormal; Notable for the following components:    Phosphorus 1.7 (*)     All other  components within normal limits   B-TYPE NATRIURETIC PEPTIDE - Abnormal; Notable for the following components:     (*)     All other components within normal limits   POCT GLUCOSE - Abnormal; Notable for the following components:    POCT Glucose 135 (*)     All other components within normal limits   ISTAT PROCEDURE - Abnormal; Notable for the following components:    POC PCO2 27.9 (*)     POC PO2 134 (*)     POC HCO3 19.4 (*)     All other components within normal limits   CULTURE, BLOOD   CULTURE, BLOOD   TSH   ALCOHOL,MEDICAL (ETHANOL)   BETA - HYDROXYBUTYRATE, SERUM   LIPASE   LIPASE   ALCOHOL,MEDICAL (ETHANOL)   TSH   BETA - HYDROXYBUTYRATE, SERUM   B-TYPE NATRIURETIC PEPTIDE   DRUG SCREEN PANEL, URINE EMERGENCY   LACTIC ACID, PLASMA   TOXICOLOGY SCREEN, URINE, RANDOM (COMPLIANCE)   RAPID HIV   HEPATITIS PANEL, ACUTE   DRUG SCREEN PANEL, URINE EMERGENCY   URINALYSIS, REFLEX TO URINE CULTURE   POCT GLUCOSE MONITORING CONTINUOUS     EKG Readings: (Independently Interpreted)   08:41 - Atrial fibrillation with RVR at a rate of 183. Non specific ST abnormality present.   08:51 - Narrow complex tachycardia at a rte of 209. Non specific ST abnormality present.   09:14 - Sinus rhythm at a rate of 92. With occasional PAC. No STEMI. Non specific T wave abnormality present.        Imaging Results          CT Head Without Contrast (In process)                X-Ray Chest AP Portable (Final result)  Result time 11/29/19 10:55:25    Final result by Alvin Smith MD (11/29/19 10:55:25)                 Impression:      No acute chest disease identified.  No detrimental change noted when compared to 04/27/2019.      Electronically signed by: Alvin Smith MD  Date:    11/29/2019  Time:    10:55             Narrative:    EXAMINATION:  XR CHEST AP PORTABLE    CLINICAL HISTORY:  Shortness of breath    TECHNIQUE:  Single frontal view of the chest was performed.    COMPARISON:  04/27/2019.    FINDINGS:  The heart is not  enlarged.  Superior mediastinal structures are unremarkable.  Pulmonary vasculature is within normal limits.  The lungs are free of focal consolidations.  There is no evidence for pneumothorax or large pleural effusions.  Bony structures are grossly intact.                              X-Rays:   Independently Interpreted Readings:   Chest X-Ray: Pacer pads overlie the chest. No large infiltrate, effusion, or PTX. Will defer to radiology.      Medical Decision Making:   History:   Old Medical Records: I decided to obtain old medical records.  Independently Interpreted Test(s):   I have ordered and independently interpreted X-rays - see prior notes.  I have ordered and independently interpreted EKG Reading(s) - see prior notes  Clinical Tests:   Lab Tests: Ordered and Reviewed  Radiological Study: Ordered and Reviewed  Medical Tests: Ordered and Reviewed  ED Management:  Emergent evaluation a 59-year-old male who presented to the ED with an active seizure and tachycardia.  Vital signs revealed heart rate greater than 200.  He was postictal.  He was given IV Ativan for seizure control, and then loaded with Keppra 2 g IV.  He required additional Ativan as he was very combative and is postictal state and interfering with medical care pulling lines and monitor leads.  Initial EKG appeared to be and narrow complex tachycardia, possible SVT.  He was given 6 mg IV adenosine which then revealed atrial fibrillation.  He was then given 10 mg IV Cardizem x 2 doses which resulted in transient improvement in heart rate and rhythm, than AFib with RVR with heart rate greater than 160.  Patient had hypotension at this time, with unstable heart rate.  Amiodarone drip and bolus were given with immediate improvement.  Patient was also given IV Valium for further alcohol withdrawal/seizure prophylaxis precaution.  CMP was delayed, but labs do reveal leukocytosis, anion gap acidosis, hypomagnesemia.  He was given 2 g IV magnesium,  multiple fluid boluses, and I discussed the case with Cardiology and hospitalist.  He is admitted to the hospitalist service in improved condition, serious but stabilized.            Scribe Attestation:   Scribe #1: I performed the above scribed service and the documentation accurately describes the services I performed. I attest to the accuracy of the note.    Attending Attestation:           Physician Attestation for Scribe:  Physician Attestation Statement for Scribe #1: I, Dr. Reynolds, reviewed documentation, as scribed by Vani Nick in my presence, and it is both accurate and complete.                 ED Course as of Nov 29 1239 Fri Nov 29, 2019   0926 Paged cardiology.    [CA]   0929 Case discussed with Dr. Chavez. He will see the patient once admitted.    [CA]   1126 Case discussed with Dr. Ontiveros. Will admit to Dr. Powell.    [CA]      ED Course User Index  [CA] Vani Nick                Clinical Impression:     1. Atrial fibrillation with RVR    2. Seizure    3. Tachycardia    4. SOB (shortness of breath)    5. Electrolyte disturbance                              Sarah Reynolds MD  11/29/19 1246       Sarah Reynolds MD  11/29/19 1247

## 2019-11-29 NOTE — CONSULTS
[facility] via Telemedicine  General Neurology Consultation    Reason for consult:  Breakthrough seizures  Reason for admission:  Seizure [R56.9]  SOB (shortness of breath) [R06.02]  Tachycardia [R00.0]  Electrolyte disturbance [E87.8]  Atrial fibrillation with RVR [I48.91]  Length of Stay:  0    History of present illness:     Mr Johnson is a 60 y/o AAM was brought to the ED due to seizures. Per chart review, pt's spouse noticed seizure like activity at home in am and thus brought him to ED. While in the ED he had another spell of jerking of all 4 extremities and later being confused. He was administered Valium and Keppra. Transferred to ICU for higher level of care. Pt has been drowsy since getting EEG. He has not had any more seizures.     Per spouse, pt has EtOH dependence and his last drink was couple of days ago. Also he has been off Keppra for over a month.     Review of systems:   CONSTITUTIONAL: No weight loss, fever, chills, weakness or fatigue.   HEENT: Eyes: No visual loss, blurred vision, double vision or yellow sclerae. Ears, Nose, Throat: No hearing loss, sneezing, congestion, runny nose or sore throat.   SKIN: No rash or itching.   CARDIOVASCULAR: No chest pain, chest pressure or chest discomfort. No palpitations or edema.   RESPIRATORY: No shortness of breath, cough or sputum.   GASTROINTESTINAL: No anorexia, nausea, vomiting or diarrhea. No abdominal pain or blood.   GENITOURINARY: No dysuria, frequency or retention.   NEUROLOGICAL: As in HPI   MUSCULOSKELETAL: No muscle, back pain, joint pain or stiffness.   HEMATOLOGIC: No anemia, bleeding or bruising.   LYMPHATICS: No enlarged nodes.  PSYCHIATRIC: No history of depression or anxiety.   ENDOCRINOLOGIC: No reports of sweating, cold or heat intolerance. No polyuria or polydipsia.     Past Medical History:   Diagnosis Date    Afib     Alcohol abuse with other alcohol-induced disorder 04/02/2019    Anemia 04/02/2019     unspecified deficiency    Ankle fracture, left     Arthritis     Asthma     Chronic kidney disease (CKD), stage III (moderate) 04/02/2019    Depression     Erectile dysfunction 04/02/2019    Gout, arthropathy 04/02/2019    Gout, unspecified     Hypertension     Hypertensive chronic kidney disease 04/02/2019    Hypomagnesemia 04/02/2019    Noncompliance 04/02/2019    Peripheral neuropathy 04/02/2019    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    Secondary hyperparathyroidism, renal 04/02/2019    Seizure 2016    Spastic hemiplegia affecting left nondominant side 04/02/2019       Past Surgical History:   Procedure Laterality Date    ANKLE SURGERY      x6    CHOLECYSTECTOMY         Family History   Problem Relation Age of Onset    Hypertension Father     Stroke Maternal Grandmother     Cataracts Maternal Grandfather     Stroke Maternal Grandfather     Cancer Mother         malignant polyp       Social History     Socioeconomic History    Marital status:      Spouse name: Not on file    Number of children: Not on file    Years of education: Not on file    Highest education level: Not on file   Occupational History    Not on file   Social Needs    Financial resource strain: Not on file    Food insecurity:     Worry: Not on file     Inability: Not on file    Transportation needs:     Medical: Not on file     Non-medical: Not on file   Tobacco Use    Smoking status: Never Smoker    Smokeless tobacco: Never Used   Substance and Sexual Activity    Alcohol use: Yes     Alcohol/week: 3.0 - 4.0 standard drinks     Types: 3 - 4 Shots of liquor per week     Comment: daily, but states he stopped all alcohol 2 weeks ago abruptly lost interest in it.    Drug use: No    Sexual activity: Not on file   Lifestyle    Physical activity:     Days per week: Not on file     Minutes per session: Not on file    Stress: Not on file   Relationships    Social connections:     Talks on phone: Not on file     Gets  together: Not on file     Attends Holiness service: Not on file     Active member of club or organization: Not on file     Attends meetings of clubs or organizations: Not on file     Relationship status: Not on file   Other Topics Concern    Not on file   Social History Narrative    Not on file       Scheduled Meds:   allopurinol  300 mg Oral Daily    aspirin  81 mg Oral Daily    folic acid  1 mg Oral Daily    heparin (porcine)  5,000 Units Subcutaneous Q8H    levETIRAcetam  500 mg Oral BID    metoprolol tartrate  25 mg Oral Q6H    multivitamin   Oral Daily    pantoprazole  40 mg Oral Daily    potassium chloride  40 mEq Oral Once    senna-docusate 8.6-50 mg  1 tablet Oral BID    thiamine  100 mg Oral Daily     Continuous Infusions:   sodium chloride 0.9% 150 mL/hr at 11/29/19 1337     PRN Meds:.acetaminophen, acetaminophen, dextrose 50%, dextrose 50%, glucagon (human recombinant), glucose, glucose, lorazepam, ondansetron, ondansetron, ondansetron, sodium chloride 0.9%, sodium chloride 0.9%    Review of patient's allergies indicates:   Allergen Reactions    Lisinopril Swelling     Pt admitted with angioedema 2wks after taking lisinopril.          Physical Exam    Vitals:    11/29/19 1315 11/29/19 1330 11/29/19 1345 11/29/19 1400   BP: 112/75 109/74  117/74   BP Location:       Patient Position:       Pulse:  76 70 76   Resp:  (!) 23 (!) 23 (!) 21   Temp:       TempSrc:       SpO2:  100% (!) 80% (!) 81%   Weight:       Height:             In general, the patient is well nourished.    No bruits. Fundi are normal bilaterally.    MENTAL STATUS : Pt drowsy but arousable to voice commands, oriented to time in days, person and place. Mild dysarthria       CRANIAL NERVE EXAM:  Facial symmetry preserved, Mild dysarthria.     MOTOR EXAM: able to move all 4 extremities      IMAGING (personally reviewed):  Results for orders placed or performed during the hospital encounter of 11/29/19   CT Head Without Contrast     Narrative    EXAMINATION:  CT HEAD WITHOUT CONTRAST    CLINICAL HISTORY:  Seizures new or progressive;    TECHNIQUE:  Low dose axial images were obtained through the head.  Coronal and sagittal reformations were also performed. Contrast was not administered.    COMPARISON:  01/08/2018.    FINDINGS:  The ventricles are enlarged and the sulci are prominent consistent with generalized atrophy which is upper normal for the patient's chronological age.  There is mild decreased density within the periventricular white matter likely reflecting ischemia/infarction secondary to microvascular disease.  There is no evidence for abnormal masses, mass effect, or midline shift.  No abnormal intra or extra-axial fluid collections are identified, specifically there is no evidence for intracranial hemorrhage.  There is mucosal thickening within the left maxillary and bilateral ethmoid sinuses.  Mastoid air cells are clear.  Bony calvarium is intact.      Impression    No acute intracranial abnormalities are identified.  There is no evidence for intracranial hemorrhage.    Atrophy and periventricular white matter ischemic changes consistent with patient's age.    Paranasal sinus mucosal disease.      Electronically signed by: Alvin Smith MD  Date:    11/29/2019  Time:    13:13         LABS:  Lab Results   Component Value Date    WBC 18.38 (H) 11/29/2019    HGB 10.2 (L) 11/29/2019    HCT 32.9 (L) 11/29/2019     (H) 11/29/2019     11/29/2019   CMP  Sodium   Date Value Ref Range Status   11/29/2019 138 136 - 145 mmol/L Final     Potassium   Date Value Ref Range Status   11/29/2019 2.5 (LL) 3.5 - 5.1 mmol/L Final     Comment:     K and MG critical result(s) called and verbal readback obtained from   Klaudia March RN.  by [email or web address] 11/29/2019 14:28       Chloride   Date Value Ref Range Status   11/29/2019 104 95 - 110 mmol/L Final     CO2   Date Value Ref Range Status   11/29/2019 21 (L) 23 - 29 mmol/L Final     Glucose    Date Value Ref Range Status   11/29/2019 95 70 - 110 mg/dL Final     BUN, Bld   Date Value Ref Range Status   11/29/2019 13 6 - 20 mg/dL Final     Creatinine   Date Value Ref Range Status   11/29/2019 1.2 0.5 - 1.4 mg/dL Final     Calcium   Date Value Ref Range Status   11/29/2019 7.0 (L) 8.7 - 10.5 mg/dL Final     Total Protein   Date Value Ref Range Status   11/29/2019 9.1 (H) 6.0 - 8.4 g/dL Final     Albumin   Date Value Ref Range Status   11/29/2019 3.1 (L) 3.5 - 5.2 g/dL Final     Total Bilirubin   Date Value Ref Range Status   11/29/2019 0.9 0.1 - 1.0 mg/dL Final     Comment:     For infants and newborns, interpretation of results should be based  on gestational age, weight and in agreement with clinical  observations.  Premature Infant recommended reference ranges:  Up to 24 hours.............<8.0 mg/dL  Up to 48 hours............<12.0 mg/dL  3-5 days..................<15.0 mg/dL  6-29 days.................<15.0 mg/dL       Alkaline Phosphatase   Date Value Ref Range Status   11/29/2019 67 55 - 135 U/L Final     AST   Date Value Ref Range Status   11/29/2019 40 10 - 40 U/L Final     ALT   Date Value Ref Range Status   11/29/2019 22 10 - 44 U/L Final     Anion Gap   Date Value Ref Range Status   11/29/2019 13 8 - 16 mmol/L Final     eGFR if    Date Value Ref Range Status   11/29/2019 >60 >60 mL/min/1.73 m^2 Final     eGFR if non    Date Value Ref Range Status   11/29/2019 >60 >60 mL/min/1.73 m^2 Final     Comment:     Calculation used to obtain the estimated glomerular filtration  rate (eGFR) is the CKD-EPI equation.                 ASSESSMENT:          Seizures 2/2 EtOH withdrawal    -  Pt had couple of GTC seizures and last drink was 2 days ago   - agree with Keppra 500 mg BID in pt with CKD stage III   - seizure / fall precaution   - Ativan 4 mg IV PRN if seizures last > 5 mins   - CIWA score per protocol   - Librium taper if concern for EtOH withdrawal   - replace K, Mg  and Ph  - rec EtOH de - addication program referral outpatient   - Leukocytosis 2/ 2 seizures more likely - follow CBC tomorrow   - rec IVF   - Folic acid and Thiamine     Haja Nicole MD  Neurology   [facility]

## 2019-11-29 NOTE — CONSULTS
[facility]  Cardiology  Consult Note    Patient Name: Michael Jhonson Jr.  MRN: [id number]  Admission Date: 11/29/2019  Hospital Length of Stay: 0 days  Code Status: Full Code   Attending Provider: Joshua Powell MD   Consulting Provider: Camila Chavez MD  Primary Care Physician: Zunilda Bolden NP  Principal Problem:Atrial fibrillation with RVR    Patient information was obtained from patient and ER records.     Inpatient consult to Cardiology  Consult performed by: Camila Chavez MD  Consult ordered by: Joshua Powell MD        Subjective:     Chief Complaint: Seizure.    HPI: Michael Johnson Jr. is a 59 y.o.male with hypertension. He is a heavy drinker. He has previously been admitted to [facility] and then noted to have paroxysmal atrial fibrillation. 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 According to the ER records he quit drinking about 11/14/2019 although the patient tells me that he quit drinking in about 2015. He apparently also stopped his seize medication as well as metop[rol and diltiazem. He presents after he had a witness seizure at home. His wife called 911. On arrival to the ER he had another seizure. He was then noted to be in a narrow complex tachycardia with a rate above 200 bpm. He received adenosine  Which revealed atrial fibrillation. He was later given diltiazem iv and then amiodarone. He was admitted to the ICU.       Past Medical History:   Diagnosis Date    Afib     Alcohol abuse with other alcohol-induced disorder 04/02/2019    Anemia 04/02/2019    unspecified deficiency    Ankle fracture, left     Arthritis     Asthma     Chronic kidney disease (CKD), stage III (moderate) 04/02/2019    Depression     Erectile dysfunction 04/02/2019    Gout, arthropathy 04/02/2019    Gout, unspecified     Hypertension     Hypertensive chronic kidney disease  04/02/2019    Hypomagnesemia 04/02/2019    Noncompliance 04/02/2019    Peripheral neuropathy 04/02/2019    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    Secondary hyperparathyroidism, renal 04/02/2019    Seizure 2016    Spastic hemiplegia affecting left nondominant side 04/02/2019       Past Surgical History:   Procedure Laterality Date    ANKLE SURGERY      x6    CHOLECYSTECTOMY         Review of patient's allergies indicates:   Allergen Reactions    Lisinopril Swelling     Pt admitted with angioedema 2wks after taking lisinopril.        No current facility-administered medications on file prior to encounter.      Current Outpatient Medications on File Prior to Encounter   Medication Sig    allopurinol (ZYLOPRIM) 300 MG tablet Take 300 mg by mouth once daily.    aspirin (ECOTRIN) 81 MG EC tablet Take 81 mg by mouth once daily.    diltiaZEM (TIAZAC) 180 MG Cs24 Take 180 mg by mouth once daily.    ergocalciferol (VITAMIN D2) 50,000 unit Cap Take 50,000 Units by mouth every 7 days.    ferrous sulfate 325 (65 FE) MG EC tablet Take 325 mg by mouth once daily.    gabapentin (NEURONTIN) 300 MG capsule Take 300 mg by mouth 3 (three) times daily.    hydrocodone-acetaminophen 5-325mg (NORCO) 5-325 mg per tablet Take 1 tablet by mouth every 6 (six) hours as needed for Pain.    ibuprofen (ADVIL,MOTRIN) 800 MG tablet Take 800 mg by mouth 3 (three) times daily.    levETIRAcetam (KEPPRA) 500 MG Tab Take 500 mg by mouth 2 (two) times daily.    magnesium 250 mg Tab Take 1 tablet by mouth once daily.    metoprolol succinate (TOPROL-XL) 50 MG 24 hr tablet TAKE ONE TABLET BY MOUTH ONCE DAILY    potassium chloride (KLOR-CON) 20 mEq Pack Take 20 mEq by mouth 2 (two) times daily.     Family History     Problem Relation (Age of Onset)    Cancer Mother    Cataracts Maternal Grandfather    Hypertension Father    Stroke Maternal Grandmother, Maternal Grandfather        Tobacco Use    Smoking status: Never Smoker    Smokeless tobacco:  Never Used   Substance and Sexual Activity    Alcohol use: Yes     Alcohol/week: 3.0 - 4.0 standard drinks     Types: 3 - 4 Shots of liquor per week     Comment: daily, but states he stopped all alcohol 2 weeks ago abruptly lost interest in it.    Drug use: No    Sexual activity: Not on file     Review of Systems   Constitution: Negative for chills, fever and malaise/fatigue.   HENT: Negative for nosebleeds.    Eyes: Negative for double vision, vision loss in left eye and vision loss in right eye.   Cardiovascular: Positive for irregular heartbeat, orthopnea, palpitations and paroxysmal nocturnal dyspnea. Negative for chest pain, claudication, dyspnea on exertion, leg swelling, near-syncope and syncope.   Respiratory: Positive for shortness of breath. Negative for cough, hemoptysis and wheezing.    Endocrine: Negative for cold intolerance and heat intolerance.   Hematologic/Lymphatic: Negative for bleeding problem. Does not bruise/bleed easily.   Skin: Negative for color change and rash.   Musculoskeletal: Positive for muscle weakness (left leg and arm). Negative for back pain, falls and myalgias.   Gastrointestinal: Negative for heartburn, hematemesis, hematochezia, hemorrhoids, jaundice, melena, nausea and vomiting.   Genitourinary: Negative for dysuria and hematuria.   Neurological: Positive for seizures. Negative for dizziness, focal weakness, headaches, light-headedness, loss of balance, numbness, vertigo and weakness.   Psychiatric/Behavioral: Negative for altered mental status, depression and memory loss. The patient is not nervous/anxious.    Allergic/Immunologic: Negative for hives and persistent infections.     Objective:     Vital Signs (Most Recent):  Temp: 98.4 °F (36.9 °C) (11/29/19 1144)  Pulse: 79 (11/29/19 1221)  Resp: 16 (11/29/19 1221)  BP: 116/81 (11/29/19 1221)  SpO2: 99 % (11/29/19 1209) Vital Signs (24h Range):  Temp:  [98.4 °F (36.9 °C)-98.5 °F (36.9 °C)] 98.4 °F (36.9 °C)  Pulse:   [] 79  Resp:  [15-31] 16  SpO2:  [71 %-100 %] 99 %  BP: ()/(53-84) 116/81     Weight: 88.5 kg (195 lb 1.7 oz)  Body mass index is 26.46 kg/m².    SpO2: 99 %  O2 Device (Oxygen Therapy): nasal cannula      Intake/Output Summary (Last 24 hours) at 11/29/2019 1405  Last data filed at 11/29/2019 1150  Gross per 24 hour   Intake 100 ml   Output --   Net 100 ml       Lines/Drains/Airways     Peripheral Intravenous Line                 Peripheral IV - Single Lumen 11/29/19 0825 20 G Left Antecubital less than 1 day         Peripheral IV - Single Lumen 11/29/19 0843 3/4 in;18 G Right Antecubital less than 1 day                Physical Exam   Constitutional: He is oriented to person, place, and time. He appears well-developed and well-nourished.  Non-toxic appearance. No distress.   HENT:   Head: Normocephalic and atraumatic.   Nose: Nose normal.   Eyes: Right eye exhibits no discharge. Left eye exhibits no discharge. Right conjunctiva is not injected. Left conjunctiva is not injected. Right pupil is round. Left pupil is round. Pupils are equal.   Neck: Neck supple. No JVD present. Carotid bruit is not present. No thyromegaly present.   Cardiovascular: Normal rate, regular rhythm, S1 normal and S2 normal.  Occasional extrasystoles are present. PMI is not displaced. Exam reveals no gallop.   Pulses:       Radial pulses are 2+ on the right side, and 2+ on the left side.        Femoral pulses are 2+ on the right side, and 2+ on the left side.       Dorsalis pedis pulses are 2+ on the right side, and 2+ on the left side.        Posterior tibial pulses are 2+ on the right side, and 2+ on the left side.   Pulmonary/Chest: Effort normal and breath sounds normal.   Abdominal: Soft. Normal appearance. There is no hepatosplenomegaly. There is no tenderness.   Musculoskeletal:        Right ankle: He exhibits no swelling, no ecchymosis and no deformity.        Left ankle: He exhibits no swelling, no ecchymosis and no  deformity.   Lymphadenopathy:        Head (right side): No submandibular adenopathy present.        Head (left side): No submandibular adenopathy present.     He has no cervical adenopathy.   Neurological: He is alert and oriented to person, place, and time. He is not disoriented.   Weak in left arm and leg.   Skin: Skin is warm, dry and intact. No rash noted. He is not diaphoretic. No cyanosis. Nails show no clubbing.   Psychiatric: He has a normal mood and affect. His speech is normal and behavior is normal. Judgment and thought content normal. Cognition and memory are normal.       Current Medications:     allopurinol  300 mg Oral Daily    aspirin  81 mg Oral Daily    folic acid  1 mg Oral Daily    heparin (porcine)  5,000 Units Subcutaneous Q8H    levETIRAcetam  500 mg Oral BID    magnesium sulfate IVPB  2 g Intravenous Once    multivitamin   Oral Daily    pantoprazole  40 mg Oral Daily    senna-docusate 8.6-50 mg  1 tablet Oral BID    thiamine  100 mg Oral Daily     Current Laboratory Results:    Recent Results (from the past 24 hour(s))   POCT glucose    Collection Time: 11/29/19  8:43 AM   Result Value Ref Range    POCT Glucose 135 (H) 70 - 110 mg/dL   CBC auto differential    Collection Time: 11/29/19  8:47 AM   Result Value Ref Range    WBC 18.38 (H) 3.90 - 12.70 K/uL    RBC 2.85 (L) 4.60 - 6.20 M/uL    Hemoglobin 10.2 (L) 14.0 - 18.0 g/dL    Hematocrit 32.9 (L) 40.0 - 54.0 %    Mean Corpuscular Volume 115 (H) 82 - 98 fL    Mean Corpuscular Hemoglobin 35.8 (H) 27.0 - 31.0 pg    Mean Corpuscular Hemoglobin Conc 31.0 (L) 32.0 - 36.0 g/dL    RDW 17.9 (H) 11.5 - 14.5 %    Platelets 321 150 - 350 K/uL    MPV 11.6 9.2 - 12.9 fL    Immature Granulocytes 0.8 (H) 0.0 - 0.5 %    Gran # (ANC) 11.6 (H) 1.8 - 7.7 K/uL    Immature Grans (Abs) 0.15 (H) 0.00 - 0.04 K/uL    Lymph # 4.7 1.0 - 4.8 K/uL    Mono # 1.8 (H) 0.3 - 1.0 K/uL    Eos # 0.1 0.0 - 0.5 K/uL    Baso # 0.04 0.00 - 0.20 K/uL    nRBC 0 0 /100 WBC     Gran% 63.0 38.0 - 73.0 %    Lymph% 25.6 18.0 - 48.0 %    Mono% 10.0 4.0 - 15.0 %    Eosinophil% 0.4 0.0 - 8.0 %    Basophil% 0.2 0.0 - 1.9 %    Differential Method Automated    Comprehensive metabolic panel    Collection Time: 11/29/19  8:47 AM   Result Value Ref Range    Sodium 142 136 - 145 mmol/L    Potassium 4.3 3.5 - 5.1 mmol/L    Chloride 103 95 - 110 mmol/L    CO2 8 (LL) 23 - 29 mmol/L    Glucose 139 (H) 70 - 110 mg/dL    BUN, Bld 14 6 - 20 mg/dL    Creatinine 1.6 (H) 0.5 - 1.4 mg/dL    Calcium 8.1 (L) 8.7 - 10.5 mg/dL    Total Protein 9.1 (H) 6.0 - 8.4 g/dL    Albumin 3.1 (L) 3.5 - 5.2 g/dL    Total Bilirubin 0.9 0.1 - 1.0 mg/dL    Alkaline Phosphatase 67 55 - 135 U/L    AST 40 10 - 40 U/L    ALT 22 10 - 44 U/L    Anion Gap 31 (H) 8 - 16 mmol/L    eGFR if African American 54 (A) >60 mL/min/1.73 m^2    eGFR if non African American 47 (A) >60 mL/min/1.73 m^2   Magnesium    Collection Time: 11/29/19  8:47 AM   Result Value Ref Range    Magnesium <0.7 (LL) 1.6 - 2.6 mg/dL   Phosphorus    Collection Time: 11/29/19  8:47 AM   Result Value Ref Range    Phosphorus 1.7 (L) 2.7 - 4.5 mg/dL   Lipase    Collection Time: 11/29/19  8:47 AM   Result Value Ref Range    Lipase 37 4 - 60 U/L   Ethanol    Collection Time: 11/29/19  8:47 AM   Result Value Ref Range    Alcohol, Medical, Serum <10 <10 mg/dL   TSH    Collection Time: 11/29/19  8:47 AM   Result Value Ref Range    TSH 3.325 0.400 - 4.000 uIU/mL   Brain natriuretic peptide    Collection Time: 11/29/19  8:47 AM   Result Value Ref Range     (H) 0 - 99 pg/mL   Beta - Hydroxybutyrate, Serum    Collection Time: 11/29/19  9:18 AM   Result Value Ref Range    Beta-Hydroxybutyrate 0.0 0.0 - 0.5 mmol/L   ISTAT PROCEDURE    Collection Time: 11/29/19 12:10 PM   Result Value Ref Range    POC PH 7.450 7.35 - 7.45    POC PCO2 27.9 (LL) 35 - 45 mmHg    POC PO2 134 (H) 80 - 100 mmHg    POC HCO3 19.4 (L) 24 - 28 mmol/L    POC BE -5 -2 to 2 mmol/L    POC SATURATED O2 99 95 -  100 %    Rate 20     Sample ARTERIAL     Site RR     Allens Test Pass     DelSys Nasal Can     Mode SPONT     Flow 2     FiO2 28     Sp02 98    Lactic acid, plasma    Collection Time: 11/29/19 12:21 PM   Result Value Ref Range    Lactate (Lactic Acid) 1.3 0.5 - 2.2 mmol/L     Current Imaging Results:    CT Head Without Contrast   Final Result      No acute intracranial abnormalities are identified.  There is no evidence for intracranial hemorrhage.      Atrophy and periventricular white matter ischemic changes consistent with patient's age.      Paranasal sinus mucosal disease.         Electronically signed by: Alvin Smith MD   Date:    11/29/2019   Time:    13:13      X-Ray Chest AP Portable   Final Result      No acute chest disease identified.  No detrimental change noted when compared to 04/27/2019.         Electronically signed by: Alvin Smith MD   Date:    11/29/2019   Time:    10:55        1/15/2018: Echo:    Technical Quality: This is a technically adequate study.     Aorta: The aortic root is normal in size, measuring 3.6 cm at sinotubular junction and 3.4 cm at Sinuses of Valsalva. The proximal ascending aorta is normal in size, measuring 3.8 cm across.     Left Atrium: The left atrial volume index is normal, measuring 30.35 cc/m2.     Left Ventricle: The left ventricle is normal in size, with an end-diastolic diameter of 4.7 cm, and an end-systolic diameter of 2.9 cm. Wall thickness is upper limit of normal in size, with the septum and the posterior wall each measuring 1.1 cm across.   Relative wall thickness was increased at 0.47, and the LV mass index was 104.3 g/m2 consistent with concentric remodeling. There are no regional wall motion abnormalities. Left ventricular systolic function appears normal. Visually estimated ejection   fraction is 60-65%. The LV Doppler derived stroke volume equals 63.0 ccs.     Diastolic indices: E wave velocity 0.9 m/s, E/A ratio 1.3,  msec., E/e' ratio(avg) 9.  Diastolic function is normal.     Right Atrium: The right atrium is normal in size, measuring 4.4 cm in length and 2.4 cm in width in the apical view.     Right Ventricle: The right ventricle is normal in size measuring 3.1 cm at the base in the apical right ventricle-focused view. Global right ventricular systolic function appears normal. Tricuspid annular plane systolic excursion (TAPSE) is 1.5 cm.   Tissue Doppler-derived tricuspid annular peak systolic velocity (S prime) is .1 cm/s. The estimated PA systolic pressure is 60 mmHg.     Aortic Valve:  The aortic valve is mildly sclerotic. Additionally, there is trivial aortic regurgitation.     Mitral Valve:  The mitral valve is normal in structure.     Tricuspid Valve:  The tricuspid valve is normal in structure. There is mild to moderate tricuspid regurgitation.     Pulmonary Valve:  The pulmonic valve is normal in structure.     Pericardium: There is no evidence of pericardial effusion.      IVC: IVC is normal in size and collapses > 50% with a sniff, suggesting normal right atrial pressure of 3 mmHg.     Intracavitary: There is no evidence of intracavitary mass or thrombus. There is no evidence of vegetation.     CONCLUSIONS     1 - Concentric remodeling.     2 - Normal left ventricular systolic function (EF 60-65%).     3 - No wall motion abnormalities.     4 - Pulmonary hypertension. The estimated PA systolic pressure is 60 mmHg.     This document has been electronically    SIGNED BY: Camila Chavez MD On: 01/15/2018 18:06       Assessment and Plan:     Active Diagnoses:    Diagnosis Date Noted POA    PRINCIPAL PROBLEM:  Atrial fibrillation with RVR [I48.91] 11/29/2019 Yes    High anion gap metabolic acidosis [E87.2] 11/29/2019 Yes    Acute renal failure superimposed on stage 3 chronic kidney disease [N17.9, N18.3] 11/29/2019 Yes    Leukocytosis [D72.829] 11/29/2019 Yes    Alcohol abuse [F10.10]  Yes    Essential hypertension [I10]  Yes    Macrocytic  anemia [D53.9] 01/09/2018 Yes    GERD (gastroesophageal reflux disease) [K21.9] 03/16/2017 Yes    Hypomagnesemia [E83.42] 03/16/2017 Yes    Seizure [R56.9] 03/16/2017 Yes    Dehydration [E86.0] 10/20/2014 Yes    Gouty arthritis [M10.9] 10/20/2014 Yes    Hyperlipidemia [E78.5] 10/20/2014 Yes      Problems Resolved During this Admission:     Assessment and Plan:    1. Atrial Fibrillation   2018: Diagnosed with paroxysmal atrial fibrillation.   [id number] (H).   Used to be on metoprolol 50 mg Q24 and diltiazem 180 mg Q24.   11/14/2019: Appears to have stopped taking above medications.   11/29/2019: Atrial fibrillation 210 bpm.   11/29/2019: Began amiodarone iv.   Resume metoprolol.   Not a good candidate for anticoagulation due to being unsteady with falls.    2. Hypertension   2015: Diagnosed.    3. Seizure Disorder.   11/29/2019: Two seizures after being off Keppra.      VTE Risk Mitigation (From admission, onward)         Ordered     heparin (porcine) injection 5,000 Units  Every 8 hours      11/29/19 1316     Place sequential compression device  Until discontinued      11/29/19 1316     IP VTE HIGH RISK PATIENT  Once      11/29/19 1316                Thank you for your consult.    I will follow-up with patient. Please contact us if you have any additional questions.    Camila Chavez MD  Cardiology   [facility]

## 2019-11-30 LAB
ABO + RH BLD: NORMAL
ANION GAP SERPL CALC-SCNC: 10 MMOL/L (ref 8–16)
ANION GAP SERPL CALC-SCNC: 11 MMOL/L (ref 8–16)
ANISOCYTOSIS BLD QL SMEAR: SLIGHT
ANISOCYTOSIS BLD QL SMEAR: SLIGHT
BASOPHILS # BLD AUTO: 0.02 K/UL (ref 0–0.2)
BASOPHILS # BLD AUTO: ABNORMAL K/UL (ref 0–0.2)
BASOPHILS NFR BLD: 0 % (ref 0–1.9)
BASOPHILS NFR BLD: 0.3 % (ref 0–1.9)
BLD GP AB SCN CELLS X3 SERPL QL: NORMAL
BUN SERPL-MCNC: 12 MG/DL (ref 6–20)
BUN SERPL-MCNC: 13 MG/DL (ref 6–20)
CALCIUM SERPL-MCNC: 7.3 MG/DL (ref 8.7–10.5)
CALCIUM SERPL-MCNC: 7.4 MG/DL (ref 8.7–10.5)
CHLORIDE SERPL-SCNC: 108 MMOL/L (ref 95–110)
CHLORIDE SERPL-SCNC: 109 MMOL/L (ref 95–110)
CO2 SERPL-SCNC: 17 MMOL/L (ref 23–29)
CO2 SERPL-SCNC: 20 MMOL/L (ref 23–29)
CREAT SERPL-MCNC: 1.3 MG/DL (ref 0.5–1.4)
CREAT SERPL-MCNC: 1.3 MG/DL (ref 0.5–1.4)
DIFFERENTIAL METHOD: ABNORMAL
DIFFERENTIAL METHOD: ABNORMAL
EOSINOPHIL # BLD AUTO: 0.1 K/UL (ref 0–0.5)
EOSINOPHIL # BLD AUTO: ABNORMAL K/UL (ref 0–0.5)
EOSINOPHIL NFR BLD: 1.3 % (ref 0–8)
EOSINOPHIL NFR BLD: 2 % (ref 0–8)
ERYTHROCYTE [DISTWIDTH] IN BLOOD BY AUTOMATED COUNT: 17.6 % (ref 11.5–14.5)
ERYTHROCYTE [DISTWIDTH] IN BLOOD BY AUTOMATED COUNT: 17.8 % (ref 11.5–14.5)
EST. GFR  (AFRICAN AMERICAN): >60 ML/MIN/1.73 M^2
EST. GFR  (AFRICAN AMERICAN): >60 ML/MIN/1.73 M^2
EST. GFR  (NON AFRICAN AMERICAN): >60 ML/MIN/1.73 M^2
EST. GFR  (NON AFRICAN AMERICAN): >60 ML/MIN/1.73 M^2
FOLATE SERPL-MCNC: 19.5 NG/ML (ref 4–24)
GLUCOSE SERPL-MCNC: 90 MG/DL (ref 70–110)
GLUCOSE SERPL-MCNC: 95 MG/DL (ref 70–110)
HCT VFR BLD AUTO: 21.9 % (ref 40–54)
HCT VFR BLD AUTO: 22.6 % (ref 40–54)
HGB BLD-MCNC: 7.1 G/DL (ref 14–18)
HGB BLD-MCNC: 7.4 G/DL (ref 14–18)
HYPOCHROMIA BLD QL SMEAR: ABNORMAL
IMM GRANULOCYTES # BLD AUTO: 0.02 K/UL (ref 0–0.04)
IMM GRANULOCYTES # BLD AUTO: ABNORMAL K/UL (ref 0–0.04)
IMM GRANULOCYTES NFR BLD AUTO: 0.3 % (ref 0–0.5)
IMM GRANULOCYTES NFR BLD AUTO: ABNORMAL % (ref 0–0.5)
LYMPHOCYTES # BLD AUTO: 1.3 K/UL (ref 1–4.8)
LYMPHOCYTES # BLD AUTO: ABNORMAL K/UL (ref 1–4.8)
LYMPHOCYTES NFR BLD: 14 % (ref 18–48)
LYMPHOCYTES NFR BLD: 19.3 % (ref 18–48)
MAGNESIUM SERPL-MCNC: 1.4 MG/DL (ref 1.6–2.6)
MCH RBC QN AUTO: 35.1 PG (ref 27–31)
MCH RBC QN AUTO: 35.9 PG (ref 27–31)
MCHC RBC AUTO-ENTMCNC: 32.4 G/DL (ref 32–36)
MCHC RBC AUTO-ENTMCNC: 32.7 G/DL (ref 32–36)
MCV RBC AUTO: 108 FL (ref 82–98)
MCV RBC AUTO: 110 FL (ref 82–98)
MONOCYTES # BLD AUTO: 0.4 K/UL (ref 0.3–1)
MONOCYTES # BLD AUTO: ABNORMAL K/UL (ref 0.3–1)
MONOCYTES NFR BLD: 11 % (ref 4–15)
MONOCYTES NFR BLD: 6.2 % (ref 4–15)
NEUTROPHILS # BLD AUTO: 5.1 K/UL (ref 1.8–7.7)
NEUTROPHILS NFR BLD: 72.6 % (ref 38–73)
NEUTROPHILS NFR BLD: 73 % (ref 38–73)
NRBC BLD-RTO: 0 /100 WBC
NRBC BLD-RTO: 0 /100 WBC
PLATELET # BLD AUTO: 212 K/UL (ref 150–350)
PLATELET # BLD AUTO: ABNORMAL K/UL (ref 150–350)
PLATELET BLD QL SMEAR: ABNORMAL
PLATELET BLD QL SMEAR: ABNORMAL
PMV BLD AUTO: 11.3 FL (ref 9.2–12.9)
PMV BLD AUTO: ABNORMAL FL (ref 9.2–12.9)
POTASSIUM SERPL-SCNC: 3 MMOL/L (ref 3.5–5.1)
POTASSIUM SERPL-SCNC: 3.6 MMOL/L (ref 3.5–5.1)
RBC # BLD AUTO: 2.02 M/UL (ref 4.6–6.2)
RBC # BLD AUTO: 2.06 M/UL (ref 4.6–6.2)
SODIUM SERPL-SCNC: 137 MMOL/L (ref 136–145)
SODIUM SERPL-SCNC: 138 MMOL/L (ref 136–145)
URATE SERPL-MCNC: 10 MG/DL (ref 3.4–7)
WBC # BLD AUTO: 6.96 K/UL (ref 3.9–12.7)
WBC # BLD AUTO: 7.92 K/UL (ref 3.9–12.7)

## 2019-11-30 PROCEDURE — 80048 BASIC METABOLIC PNL TOTAL CA: CPT | Mod: 91

## 2019-11-30 PROCEDURE — 85027 COMPLETE CBC AUTOMATED: CPT

## 2019-11-30 PROCEDURE — 25000003 PHARM REV CODE 250: Performed by: INTERNAL MEDICINE

## 2019-11-30 PROCEDURE — 99900035 HC TECH TIME PER 15 MIN (STAT)

## 2019-11-30 PROCEDURE — 86901 BLOOD TYPING SEROLOGIC RH(D): CPT

## 2019-11-30 PROCEDURE — 84550 ASSAY OF BLOOD/URIC ACID: CPT

## 2019-11-30 PROCEDURE — 83735 ASSAY OF MAGNESIUM: CPT

## 2019-11-30 PROCEDURE — 63600175 PHARM REV CODE 636 W HCPCS: Performed by: HOSPITALIST

## 2019-11-30 PROCEDURE — 25000003 PHARM REV CODE 250: Performed by: HOSPITALIST

## 2019-11-30 PROCEDURE — 25000003 PHARM REV CODE 250: Performed by: PHYSICIAN ASSISTANT

## 2019-11-30 PROCEDURE — 85007 BL SMEAR W/DIFF WBC COUNT: CPT

## 2019-11-30 PROCEDURE — 11000001 HC ACUTE MED/SURG PRIVATE ROOM

## 2019-11-30 PROCEDURE — 82746 ASSAY OF FOLIC ACID SERUM: CPT

## 2019-11-30 PROCEDURE — 86920 COMPATIBILITY TEST SPIN: CPT

## 2019-11-30 PROCEDURE — 94761 N-INVAS EAR/PLS OXIMETRY MLT: CPT

## 2019-11-30 PROCEDURE — 97161 PT EVAL LOW COMPLEX 20 MIN: CPT

## 2019-11-30 PROCEDURE — 97165 OT EVAL LOW COMPLEX 30 MIN: CPT

## 2019-11-30 PROCEDURE — 99233 PR SUBSEQUENT HOSPITAL CARE,LEVL III: ICD-10-PCS | Mod: ,,, | Performed by: HOSPITALIST

## 2019-11-30 PROCEDURE — 85025 COMPLETE CBC W/AUTO DIFF WBC: CPT

## 2019-11-30 PROCEDURE — A4216 STERILE WATER/SALINE, 10 ML: HCPCS | Performed by: HOSPITALIST

## 2019-11-30 PROCEDURE — 99233 SBSQ HOSP IP/OBS HIGH 50: CPT | Mod: ,,, | Performed by: HOSPITALIST

## 2019-11-30 RX ORDER — METOPROLOL TARTRATE 50 MG/1
50 TABLET ORAL 2 TIMES DAILY
Status: DISCONTINUED | OUTPATIENT
Start: 2019-11-30 | End: 2019-12-02 | Stop reason: HOSPADM

## 2019-11-30 RX ORDER — METOPROLOL TARTRATE 25 MG/1
25 TABLET, FILM COATED ORAL ONCE
Status: COMPLETED | OUTPATIENT
Start: 2019-11-30 | End: 2019-11-30

## 2019-11-30 RX ORDER — MAGNESIUM SULFATE HEPTAHYDRATE 40 MG/ML
2 INJECTION, SOLUTION INTRAVENOUS
Status: DISPENSED | OUTPATIENT
Start: 2019-11-30 | End: 2019-11-30

## 2019-11-30 RX ORDER — LIDOCAINE HYDROCHLORIDE 20 MG/ML
15 SOLUTION OROPHARYNGEAL ONCE
Status: COMPLETED | OUTPATIENT
Start: 2019-11-30 | End: 2019-11-30

## 2019-11-30 RX ORDER — POTASSIUM CHLORIDE 20 MEQ/1
60 TABLET, EXTENDED RELEASE ORAL ONCE
Status: COMPLETED | OUTPATIENT
Start: 2019-11-30 | End: 2019-11-30

## 2019-11-30 RX ADMIN — Medication 10 ML: at 06:11

## 2019-11-30 RX ADMIN — ASPIRIN 81 MG: 81 TABLET, COATED ORAL at 08:11

## 2019-11-30 RX ADMIN — POTASSIUM CHLORIDE 60 MEQ: 20 TABLET, EXTENDED RELEASE ORAL at 12:11

## 2019-11-30 RX ADMIN — ALLOPURINOL 300 MG: 300 TABLET ORAL at 08:11

## 2019-11-30 RX ADMIN — Medication 100 MG: at 08:11

## 2019-11-30 RX ADMIN — LIDOCAINE HYDROCHLORIDE 15 ML: 20 SOLUTION ORAL; TOPICAL at 08:11

## 2019-11-30 RX ADMIN — METOPROLOL TARTRATE 25 MG: 25 TABLET ORAL at 12:11

## 2019-11-30 RX ADMIN — SENNOSIDES,DOCUSATE SODIUM 1 TABLET: 8.6; 5 TABLET, FILM COATED ORAL at 08:11

## 2019-11-30 RX ADMIN — MAGNESIUM SULFATE IN WATER 2 G: 40 INJECTION, SOLUTION INTRAVENOUS at 11:11

## 2019-11-30 RX ADMIN — THERA TABS 1 TABLET: TAB at 08:11

## 2019-11-30 RX ADMIN — PANTOPRAZOLE SODIUM 40 MG: 40 TABLET, DELAYED RELEASE ORAL at 08:11

## 2019-11-30 RX ADMIN — Medication 10 ML: at 05:11

## 2019-11-30 RX ADMIN — HEPARIN SODIUM 5000 UNITS: 5000 INJECTION, SOLUTION INTRAVENOUS; SUBCUTANEOUS at 02:11

## 2019-11-30 RX ADMIN — HEPARIN SODIUM 5000 UNITS: 5000 INJECTION, SOLUTION INTRAVENOUS; SUBCUTANEOUS at 08:11

## 2019-11-30 RX ADMIN — LEVETIRACETAM 500 MG: 500 TABLET ORAL at 08:11

## 2019-11-30 RX ADMIN — FOLIC ACID 1 MG: 1 TABLET ORAL at 08:11

## 2019-11-30 RX ADMIN — SODIUM CHLORIDE: 0.9 INJECTION, SOLUTION INTRAVENOUS at 04:11

## 2019-11-30 RX ADMIN — METOPROLOL TARTRATE 50 MG: 50 TABLET ORAL at 08:11

## 2019-11-30 NOTE — ASSESSMENT & PLAN NOTE
-History noted.  States at baseline he drinks 2-4 coffee mugs of vodka cocktail daily.    -States he abruptly lost interest in alcohol 2 weeks ago and has had none since that time.  -Aside from seizure and tachycardia, no other signs of withdrawal such as confusion or tremulousness on admit.  -Continue to monitor CIWA and to provide folic acid, thiamine, MVI  -If any further sings of withdrawal will add scheduled librium which has worked well for him in the past.  -Counseled on need for abstinence from alcohol.

## 2019-11-30 NOTE — PROGRESS NOTES
[facility]  Cardiology  Progress Note    Patient Name: Michael Johnson Jr.  MRN: [id number]  Admission Date: 11/29/2019  Hospital Length of Stay: 1 days  Code Status: Full Code   Attending Physician: Joshua Powell MD   Primary Care Physician: Zunilda Bolden NP  Expected Discharge Date:   Principal Problem:Atrial fibrillation with RVR    Subjective:     Brief HPI:    Michael Johnson Jr. is a 59 y.o.male with hypertension. He is a heavy drinker. He has previously been admitted to [facility] and then noted to have paroxysmal atrial fibrillation. 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 According to the ER records he quit drinking about 11/14/2019 although the patient tells me that he quit drinking in about 2015. He apparently also stopped his seize medication as well as metop[rol and diltiazem. He presents after he had a witness seizure at home. His wife called 911. On arrival to the ER he had another seizure. He was then noted to be in a narrow complex tachycardia with a rate above 200 bpm. He received adenosine which revealed atrial fibrillation. He was later given diltiazem iv and then amiodarone iv. He was admitted to the ICU.      Hospital Course:     Amiodarone iv.    Metoprolol po.    Correction of electrolyte abnormalities.    Interval History:    No further atrial fibrillation. Tells me he has not been drinking for months whereas his wife says he was drinking until a few days prior to presentation.     Review of Systems   Constitution: Negative for chills, fever and malaise/fatigue.   HENT: Negative for nosebleeds.    Eyes: Negative for double vision, vision loss in left eye and vision loss in right eye.   Cardiovascular: Negative for chest pain, claudication, dyspnea on exertion, irregular heartbeat, leg swelling, near-syncope, orthopnea, palpitations, paroxysmal nocturnal dyspnea and  syncope.   Respiratory: Negative for cough, hemoptysis, shortness of breath and wheezing.    Endocrine: Negative for cold intolerance and heat intolerance.   Hematologic/Lymphatic: Negative for bleeding problem. Does not bruise/bleed easily.   Skin: Negative for color change and rash.   Musculoskeletal: Negative for back pain, falls, muscle weakness and myalgias.   Gastrointestinal: Negative for heartburn, hematemesis, hematochezia, hemorrhoids, jaundice, melena, nausea and vomiting.   Genitourinary: Negative for dysuria and hematuria.   Neurological: Positive for focal weakness (left arm and leg). Negative for dizziness, headaches, light-headedness, loss of balance, numbness, vertigo and weakness.   Psychiatric/Behavioral: Positive for memory loss. Negative for altered mental status and depression. The patient is not nervous/anxious.    Allergic/Immunologic: Negative for hives and persistent infections.     Objective:     Vital Signs (Most Recent):  Temp: 98 °F (36.7 °C) (11/30/19 0730)  Pulse: 74 (11/30/19 0954)  Resp: (!) 31 (11/30/19 0954)  BP: 118/77 (11/30/19 0730)  SpO2: 100 % (11/30/19 0954) Vital Signs (24h Range):  Temp:  [98 °F (36.7 °C)-98.4 °F (36.9 °C)] 98 °F (36.7 °C)  Pulse:  [66-88] 74  Resp:  [16-31] 31  SpO2:  [80 %-100 %] 100 %  BP: ()/(56-83) 118/77     Weight: 80.8 kg (178 lb 2.1 oz)  Body mass index is 24.16 kg/m².    SpO2: 100 %  O2 Device (Oxygen Therapy): room air      Intake/Output Summary (Last 24 hours) at 11/30/2019 1103  Last data filed at 11/30/2019 0742  Gross per 24 hour   Intake 3134.89 ml   Output 925 ml   Net 2209.89 ml       Lines/Drains/Airways     Peripheral Intravenous Line                 Peripheral IV - Single Lumen 11/29/19 0825 20 G Left Antecubital 1 day         Midline Catheter Insertion/Assessment  - Single Lumen 11/29/19 2240 Right basilic vein (medial side of arm) other (see comments) less than 1 day                Physical Exam   Constitutional: He appears  well-developed and well-nourished.  Non-toxic appearance. No distress.   HENT:   Head: Normocephalic and atraumatic.   Nose: Nose normal.   Eyes: Right eye exhibits no discharge. Left eye exhibits no discharge. Right conjunctiva is not injected. Left conjunctiva is not injected. Right pupil is round. Left pupil is round. Pupils are equal.   Neck: Neck supple. No JVD present. Carotid bruit is not present. No thyromegaly present.   Cardiovascular: Normal rate, regular rhythm, S1 normal and S2 normal.  No extrasystoles are present. PMI is not displaced. Exam reveals no gallop.   Pulses:       Radial pulses are 2+ on the right side, and 2+ on the left side.        Femoral pulses are 2+ on the right side, and 2+ on the left side.       Dorsalis pedis pulses are 2+ on the right side, and 2+ on the left side.        Posterior tibial pulses are 2+ on the right side, and 2+ on the left side.   Pulmonary/Chest: Effort normal and breath sounds normal.   Abdominal: Soft. Normal appearance. There is no hepatosplenomegaly. There is no tenderness.   Musculoskeletal:        Right ankle: He exhibits no swelling, no ecchymosis and no deformity.        Left ankle: He exhibits no swelling, no ecchymosis and no deformity.   Lymphadenopathy:        Head (right side): No submandibular adenopathy present.        Head (left side): No submandibular adenopathy present.     He has no cervical adenopathy.   Neurological: He is alert. He is disoriented. No cranial nerve deficit.   Weak left leg and arm.   Skin: Skin is warm, dry and intact. No rash noted. He is not diaphoretic.   Psychiatric: He has a normal mood and affect. His speech is normal and behavior is normal. Cognition and memory are impaired.       Current Medications:     allopurinol  300 mg Oral Daily    aspirin  81 mg Oral Daily    folic acid  1 mg Oral Daily    heparin (porcine)  5,000 Units Subcutaneous Q8H    levETIRAcetam  500 mg Oral BID    magnesium sulfate in water  2  g Intravenous Q2H    metoprolol tartrate  25 mg Oral Q6H    multivitamin   Oral Daily    pantoprazole  40 mg Oral Daily    potassium chloride  60 mEq Oral Once    senna-docusate 8.6-50 mg  1 tablet Oral BID    sodium chloride 0.9%  10 mL Intravenous Q6H    thiamine  100 mg Oral Daily     Current Laboratory Results:    Recent Results (from the past 24 hour(s))   ISTAT PROCEDURE    Collection Time: 11/29/19 12:10 PM   Result Value Ref Range    POC PH 7.450 7.35 - 7.45    POC PCO2 27.9 (LL) 35 - 45 mmHg    POC PO2 134 (H) 80 - 100 mmHg    POC HCO3 19.4 (L) 24 - 28 mmol/L    POC BE -5 -2 to 2 mmol/L    POC SATURATED O2 99 95 - 100 %    Rate 20     Sample ARTERIAL     Site RR     Allens Test Pass     DelSys Nasal Can     Mode SPONT     Flow 2     FiO2 28     Sp02 98    Lactic acid, plasma    Collection Time: 11/29/19 12:21 PM   Result Value Ref Range    Lactate (Lactic Acid) 1.3 0.5 - 2.2 mmol/L   Blood Culture #1 **CANNOT BE ORDERED STAT**    Collection Time: 11/29/19 12:21 PM   Result Value Ref Range    Blood Culture, Routine No Growth to date    Rapid HIV    Collection Time: 11/29/19  1:56 PM   Result Value Ref Range    HIV Rapid Testing Negative Negative   Lactic acid, plasma    Collection Time: 11/29/19  1:56 PM   Result Value Ref Range    Lactate (Lactic Acid) 1.7 0.5 - 2.2 mmol/L   Basic metabolic panel    Collection Time: 11/29/19  1:57 PM   Result Value Ref Range    Sodium 138 136 - 145 mmol/L    Potassium 2.5 (LL) 3.5 - 5.1 mmol/L    Chloride 104 95 - 110 mmol/L    CO2 21 (L) 23 - 29 mmol/L    Glucose 95 70 - 110 mg/dL    BUN, Bld 13 6 - 20 mg/dL    Creatinine 1.2 0.5 - 1.4 mg/dL    Calcium 7.0 (L) 8.7 - 10.5 mg/dL    Anion Gap 13 8 - 16 mmol/L    eGFR if African American >60 >60 mL/min/1.73 m^2    eGFR if non African American >60 >60 mL/min/1.73 m^2   Magnesium    Collection Time: 11/29/19  1:57 PM   Result Value Ref Range    Magnesium 0.8 (LL) 1.6 - 2.6 mg/dL   Troponin I    Collection Time: 11/29/19   1:57 PM   Result Value Ref Range    Troponin I <0.006 0.000 - 0.026 ng/mL   CK    Collection Time: 11/29/19  1:57 PM   Result Value Ref Range     20 - 200 U/L   Uric acid    Collection Time: 11/29/19  1:57 PM   Result Value Ref Range    Uric Acid 11.9 (H) 3.4 - 7.0 mg/dL   Procalcitonin    Collection Time: 11/29/19  1:58 PM   Result Value Ref Range    Procalcitonin 1.26 (H) <0.25 ng/mL   Iron and TIBC    Collection Time: 11/29/19  1:58 PM   Result Value Ref Range    Iron 16 (L) 45 - 160 ug/dL    Transferrin 155 (L) 200 - 375 mg/dL    TIBC 229 (L) 250 - 450 ug/dL    Saturated Iron 7 (L) 20 - 50 %   Ferritin    Collection Time: 11/29/19  1:58 PM   Result Value Ref Range    Ferritin 1,143 (H) 20.0 - 300.0 ng/mL   PT/INR    Collection Time: 11/29/19  1:58 PM   Result Value Ref Range    Prothrombin Time 11.0 9.0 - 12.5 sec    INR 1.0 0.8 - 1.2   Vitamin B12    Collection Time: 11/29/19  1:58 PM   Result Value Ref Range    Vitamin B-12 456 210 - 950 pg/mL   Blood Culture #2 **CANNOT BE ORDERED STAT**    Collection Time: 11/29/19  1:59 PM   Result Value Ref Range    Blood Culture, Routine No Growth to date    Troponin I    Collection Time: 11/29/19  4:47 PM   Result Value Ref Range    Troponin I 0.018 0.000 - 0.026 ng/mL   Basic metabolic panel    Collection Time: 11/29/19  6:46 PM   Result Value Ref Range    Sodium 138 136 - 145 mmol/L    Potassium 2.6 (LL) 3.5 - 5.1 mmol/L    Chloride 105 95 - 110 mmol/L    CO2 21 (L) 23 - 29 mmol/L    Glucose 92 70 - 110 mg/dL    BUN, Bld 12 6 - 20 mg/dL    Creatinine 1.3 0.5 - 1.4 mg/dL    Calcium 7.5 (L) 8.7 - 10.5 mg/dL    Anion Gap 12 8 - 16 mmol/L    eGFR if African American >60 >60 mL/min/1.73 m^2    eGFR if non African American >60 >60 mL/min/1.73 m^2   Magnesium    Collection Time: 11/29/19  6:46 PM   Result Value Ref Range    Magnesium 1.2 (L) 1.6 - 2.6 mg/dL   Toxicology screen, urine    Collection Time: 11/29/19  9:36 PM   Result Value Ref Range    Alcohol, Urine <10  <10 mg/dL    Benzodiazepines Negative     Methadone metabolites Negative     Cocaine (Metab.) Negative     Opiate Scrn, Ur Negative     Barbiturate Screen, Ur Negative     Amphetamine Screen, Ur Negative     THC Negative     Phencyclidine Negative     Creatinine, Random Ur 110.3 23.0 - 375.0 mg/dL    Toxicology Information SEE COMMENT    Drug screen panel, emergency    Collection Time: 11/29/19  9:36 PM   Result Value Ref Range    Benzodiazepines Negative     Methadone metabolites Negative     Cocaine (Metab.) Negative     Opiate Scrn, Ur Negative     Barbiturate Screen, Ur Negative     Amphetamine Screen, Ur Negative     THC Negative     Phencyclidine Negative     Creatinine, Random Ur 110.3 23.0 - 375.0 mg/dL    Toxicology Information SEE COMMENT    Urinalysis, Reflex to Urine Culture Urine, Clean Catch    Collection Time: 11/29/19  9:37 PM   Result Value Ref Range    Specimen UA Urine, Clean Catch     Color, UA Yellow Yellow, Straw, Sophia    Appearance, UA Clear Clear    pH, UA 7.0 5.0 - 8.0    Specific Gravity, UA 1.010 1.005 - 1.030    Protein, UA Negative Negative    Glucose, UA Negative Negative    Ketones, UA Negative Negative    Bilirubin (UA) Negative Negative    Occult Blood UA Negative Negative    Nitrite, UA Negative Negative    Urobilinogen, UA Negative <2.0 EU/dL    Leukocytes, UA Negative Negative   Basic metabolic panel    Collection Time: 11/29/19 11:26 PM   Result Value Ref Range    Sodium 138 136 - 145 mmol/L    Potassium 2.8 (L) 3.5 - 5.1 mmol/L    Chloride 107 95 - 110 mmol/L    CO2 20 (L) 23 - 29 mmol/L    Glucose 92 70 - 110 mg/dL    BUN, Bld 13 6 - 20 mg/dL    Creatinine 1.3 0.5 - 1.4 mg/dL    Calcium 7.3 (L) 8.7 - 10.5 mg/dL    Anion Gap 11 8 - 16 mmol/L    eGFR if African American >60 >60 mL/min/1.73 m^2    eGFR if non African American >60 >60 mL/min/1.73 m^2   Troponin I    Collection Time: 11/29/19 11:26 PM   Result Value Ref Range    Troponin I <0.006 0.000 - 0.026 ng/mL   CBC with  Automated Differential    Collection Time: 11/30/19  4:07 AM   Result Value Ref Range    WBC 6.96 3.90 - 12.70 K/uL    RBC 2.02 (L) 4.60 - 6.20 M/uL    Hemoglobin 7.1 (L) 14.0 - 18.0 g/dL    Hematocrit 21.9 (L) 40.0 - 54.0 %    Mean Corpuscular Volume 108 (H) 82 - 98 fL    Mean Corpuscular Hemoglobin 35.1 (H) 27.0 - 31.0 pg    Mean Corpuscular Hemoglobin Conc 32.4 32.0 - 36.0 g/dL    RDW 17.8 (H) 11.5 - 14.5 %    Platelets 212 150 - 350 K/uL    MPV 11.3 9.2 - 12.9 fL    Immature Granulocytes 0.3 0.0 - 0.5 %    Gran # (ANC) 5.1 1.8 - 7.7 K/uL    Immature Grans (Abs) 0.02 0.00 - 0.04 K/uL    Lymph # 1.3 1.0 - 4.8 K/uL    Mono # 0.4 0.3 - 1.0 K/uL    Eos # 0.1 0.0 - 0.5 K/uL    Baso # 0.02 0.00 - 0.20 K/uL    nRBC 0 0 /100 WBC    Gran% 72.6 38.0 - 73.0 %    Lymph% 19.3 18.0 - 48.0 %    Mono% 6.2 4.0 - 15.0 %    Eosinophil% 1.3 0.0 - 8.0 %    Basophil% 0.3 0.0 - 1.9 %    Platelet Estimate Appears normal     Aniso Slight     Differential Method Automated    Uric acid    Collection Time: 11/30/19  4:07 AM   Result Value Ref Range    Uric Acid 10.0 (H) 3.4 - 7.0 mg/dL   Basic metabolic panel    Collection Time: 11/30/19  4:07 AM   Result Value Ref Range    Sodium 138 136 - 145 mmol/L    Potassium 3.0 (L) 3.5 - 5.1 mmol/L    Chloride 108 95 - 110 mmol/L    CO2 20 (L) 23 - 29 mmol/L    Glucose 95 70 - 110 mg/dL    BUN, Bld 12 6 - 20 mg/dL    Creatinine 1.3 0.5 - 1.4 mg/dL    Calcium 7.3 (L) 8.7 - 10.5 mg/dL    Anion Gap 10 8 - 16 mmol/L    eGFR if African American >60 >60 mL/min/1.73 m^2    eGFR if non African American >60 >60 mL/min/1.73 m^2   Magnesium    Collection Time: 11/30/19  4:07 AM   Result Value Ref Range    Magnesium 1.4 (L) 1.6 - 2.6 mg/dL   Type & Screen    Collection Time: 11/30/19  5:17 AM   Result Value Ref Range    Group & Rh O POS     Indirect [place] NEG    CBC auto differential    Collection Time: 11/30/19 10:29 AM   Result Value Ref Range    WBC 7.92 3.90 - 12.70 K/uL    RBC 2.06 (L) 4.60 - 6.20 M/uL     Hemoglobin 7.4 (L) 14.0 - 18.0 g/dL    Hematocrit 22.6 (L) 40.0 - 54.0 %    Mean Corpuscular Volume 110 (H) 82 - 98 fL    Mean Corpuscular Hemoglobin 35.9 (H) 27.0 - 31.0 pg    Mean Corpuscular Hemoglobin Conc 32.7 32.0 - 36.0 g/dL    RDW 17.6 (H) 11.5 - 14.5 %   Basic metabolic panel    Collection Time: 11/30/19 10:29 AM   Result Value Ref Range    Sodium 137 136 - 145 mmol/L    Potassium 3.6 3.5 - 5.1 mmol/L    Chloride 109 95 - 110 mmol/L    CO2 17 (L) 23 - 29 mmol/L    Glucose 90 70 - 110 mg/dL    BUN, Bld 13 6 - 20 mg/dL    Creatinine 1.3 0.5 - 1.4 mg/dL    Calcium 7.4 (L) 8.7 - 10.5 mg/dL    Anion Gap 11 8 - 16 mmol/L    eGFR if African American >60 >60 mL/min/1.73 m^2    eGFR if non African American >60 >60 mL/min/1.73 m^2     Current Imaging Results:    CT Head Without Contrast   Final Result      No acute intracranial abnormalities are identified.  There is no evidence for intracranial hemorrhage.      Atrophy and periventricular white matter ischemic changes consistent with patient's age.      Paranasal sinus mucosal disease.         Electronically signed by: Alvin Smith MD   Date:    11/29/2019   Time:    13:13      X-Ray Chest AP Portable   Final Result      No acute chest disease identified.  No detrimental change noted when compared to 04/27/2019.         Electronically signed by: Alvin Smith MD   Date:    11/29/2019   Time:    10:55          Assessment and Plan:     Problem List:    Active Diagnoses:    Diagnosis Date Noted POA    PRINCIPAL PROBLEM:  Atrial fibrillation with RVR [I48.91] 11/29/2019 Yes    High anion gap metabolic acidosis [E87.2] 11/29/2019 Yes    Acute renal failure superimposed on stage 3 chronic kidney disease [N17.9, N18.3] 11/29/2019 Yes    Leukocytosis [D72.829] 11/29/2019 Yes    Alcohol abuse [F10.10]  Yes    Essential hypertension [I10]  Yes    Macrocytic anemia [D53.9] 01/09/2018 Yes    GERD (gastroesophageal reflux disease) [K21.9] 03/16/2017 Yes     Hypomagnesemia [E83.42] 03/16/2017 Yes    Seizure [R56.9] 03/16/2017 Yes    Dehydration [E86.0] 10/20/2014 Yes    Gouty arthritis [M10.9] 10/20/2014 Yes    Hyperlipidemia [E78.5] 10/20/2014 Yes      Problems Resolved During this Admission:     Assessment and Plan:     1. Atrial Fibrillation              2018: Diagnosed with paroxysmal atrial fibrillation.              [id number] (H).              Used to be on metoprolol 50 mg Q24 and diltiazem 180 mg Q24.              11/14/2019: Appears to have stopped taking above medications.              11/29/2019: Atrial fibrillation 210 bpm.              11/29/2019: Began amiodarone iv.              Resumed metoprolo at 25 mg Q6.              Not a good candidate for anticoagulation due to being unsteady with falls.   Plan metoprolol 50 mg Q12.    No amiodarone or anticoagulation.     2. Hypertension              2015: Diagnosed.     3. Seizure Disorder.              11/29/2019: Two seizures after being off Keppra.      VTE Risk Mitigation (From admission, onward)         Ordered     heparin (porcine) injection 5,000 Units  Every 8 hours      11/29/19 1316     Place sequential compression device  Until discontinued      11/29/19 1316     IP VTE HIGH RISK PATIENT  Once      11/29/19 1316                Camila Chavez MD  Cardiology  [facility]

## 2019-11-30 NOTE — PLAN OF CARE
11/30/19 0941   Medicare Message   Important Message from Medicare regarding Discharge Appeal Rights Given to patient/caregiver;Explained to patient/caregiver;Signed/date by patient/caregiver   Date IMM was signed 11/29/19   Time IMM was signed [id number]

## 2019-11-30 NOTE — PROGRESS NOTES
"[facility] Hospital Medicine  Progress Note    Patient Name: Michael Johnson Jr.  MRN: [id number]  Patient Class: IP- Inpatient   Admission Date: 11/29/2019  Length of Stay: 1 days  Attending Physician: Joshua Pwoell MD  Primary Care Provider: Zunilda Bolden NP        Subjective:     Principal Problem:Atrial fibrillation with RVR        HPI:  Mr. Johnson is a 59 year odl man with history of alcoholism, seizure disorder, paroxysmal atrial fibrillation, ckdIII, gout and medication non-compliance who was brought in by EMS for evaluation of seizures.  History is provided by his wife, himself and review of the chart.  Apparently two weeks ago he stopped drinking all alcohol because he "suddenly lost interest in it".  Over the last week he has had a very poor appetite with significant nausea.  He had some vomiting over the last two days and notes he has not been taking any of his medications like he is supposed to.  He denies any dysphagia or difficulty eating, but just notes he has no desire to eat due to his significant nausea.  This morning he was seen by his wife having seizure activity in bed.  This consisted of full body convulsions and lasted about 5 minutes.  EMS were called and he was noted to be post-ictal and confused on their arrival.  After arrival to our ER he had another seizure and received diazepam and was loaded with keppra.  Also while in the ER he was noted to have significant tachycardia (presumed SVT) up into the 200s.  He received adenosine and was subsequently noted to be in atrial fibrillation with rvr for which he received diltiazem with minimal results.  Subsequently he was loaded with amiodarone followed by continuous infusion.  Upon my interview he is awake and oriented with no signs of confusion or tremulousness.  He denies fevers, chills, headache, pain in any part of his body, diarrhea, leg swelling, shortness of breath, constipation, new numbness or weakness.  His " only actual complaint is of ongoing nausea.    Overview/Hospital Course:  No notes on file    Interval History: No acute events overnight.  No further seizures or rapid afib.  Sitting on edge of bed working with OT.  Denies pain and sob.  Wife at bedside and all questions answered.    Review of Systems   Constitutional: Positive for activity change and appetite change. Negative for chills, fatigue and fever.   HENT: Negative for congestion and dental problem.    Eyes: Negative for discharge and itching.   Respiratory: Negative for apnea, cough, chest tightness and shortness of breath.    Cardiovascular: Negative for chest pain, palpitations and leg swelling.   Gastrointestinal: Negative for abdominal distention, abdominal pain, diarrhea, nausea and vomiting.   Endocrine: Negative for cold intolerance and heat intolerance.   Genitourinary: Negative for difficulty urinating and dysuria.   Musculoskeletal: Negative for arthralgias and back pain.   Allergic/Immunologic: Negative for environmental allergies and food allergies.   Neurological: Negative for dizziness, facial asymmetry and light-headedness.   Hematological: Negative for adenopathy. Does not bruise/bleed easily.   Psychiatric/Behavioral: Negative for agitation and behavioral problems.     Objective:     Vital Signs (Most Recent):  Temp: 98 °F (36.7 °C) (11/30/19 0730)  Pulse: 74 (11/30/19 0954)  Resp: (!) 31 (11/30/19 0954)  BP: 118/77 (11/30/19 0730)  SpO2: 100 % (11/30/19 0954) Vital Signs (24h Range):  Temp:  [98 °F (36.7 °C)-98.3 °F (36.8 °C)] 98 °F (36.7 °C)  Pulse:  [66-78] 74  Resp:  [16-31] 31  SpO2:  [80 %-100 %] 100 %  BP: ()/(56-79) 118/77     Weight: 80.8 kg (178 lb 2.1 oz)  Body mass index is 24.16 kg/m².    Intake/Output Summary (Last 24 hours) at 11/30/2019 1233  Last data filed at 11/30/2019 0742  Gross per 24 hour   Intake 3034.89 ml   Output 925 ml   Net 2109.89 ml      Physical Exam   Constitutional: He is oriented to person, place,  and time. He appears well-developed and well-nourished.   HENT:   Head: Normocephalic and atraumatic.   Dry mucus membranes   Eyes: Pupils are equal, round, and reactive to light. EOM are normal.   Neck: Normal range of motion. Neck supple.   Cardiovascular:   Irregularly irregular, no murmurs   Pulmonary/Chest: Effort normal. No respiratory distress.   Faint bibasilar crackles   Abdominal: Soft. Bowel sounds are normal. He exhibits no distension. There is no tenderness.   Musculoskeletal: Normal range of motion. He exhibits no edema.   Golf ball sized nodule on dorsum of left hand covered by hyperkeratotic skin (states this is chronic)   Neurological: He is oriented to person, place, and time. No cranial nerve deficit. Coordination normal.   No tremors but diffusely weak   Skin: Skin is warm and dry.   Psychiatric: He has a normal mood and affect. His behavior is normal.   Vitals reviewed.      Significant Labs: All pertinent labs within the past 24 hours have been reviewed.    Significant Imaging: I have reviewed and interpreted all pertinent imaging results/findings within the past 24 hours.      Assessment/Plan:      * Atrial fibrillation with RVR  -Mr. Johnson was admitted to inpatient status in our icu  -History of pAFIB noted and has not been taking home metoprolol or diltiazem.  In ER had SVT and received adenosine which subsequently revealed afib with rvr.  He received diltiazem in ER and subsequently loaded with and started on amiodarone infusion.  -Amiodarone infusion stopped and metoprolol resumed by cardiology and he continues with good rate control.  -[id number] score of 1 based on echo from 2018.  -TSH is normal  -Troponin negative  -Suspect this was due to significant dehydration and electrolyte abnormalities.  No evidence of EtOH withdrawal at this time.  -Will check echocardiogram  -Appreciate input from Dr. Chavez  -Step down to the floor today.      Seizure  -History noted.  States last  seizure was several months ago.  Has not been taking home keppra.  -Could have been due to alcohol withdrawal, being off home keppra or significant acidosis and electrolyte abnormalities  -Loaded with keppra in ER  -CT of head without any acute changes.  -Seen by neurology and input appreciated  -Continue home keppra and frequent neuro checks with seizure precautions.  -Continue ativan PRN seizures.    Alcohol abuse  -History noted.  States at baseline he drinks 2-4 coffee mugs of vodka cocktail daily.    -States he abruptly lost interest in alcohol 2 weeks ago and has had none since that time.  -Aside from seizure and tachycardia, no other signs of withdrawal such as confusion or tremulousness on admit.  -Continue to monitor CIWA and to provide folic acid, thiamine, MVI  -If any further sings of withdrawal will add scheduled librium which has worked well for him in the past.  -Counseled on need for abstinence from alcohol.      Acute renal failure superimposed on stage 3 chronic kidney disease  -Baseline Cr is 1.2.  On admit Cr was 1.6 but now improved to 1.3  -He appeared very dehydrated and had not been eating over the last week or two.  -He is noted to have an anion gap of 31 on admission, likely due to seizure and dehydration which has normalized  -Beta-hydroxybutyrate wass normal.  Lactic acid was normal.  ABG showed a partially compensated primary respiratory alkalosis and anion-gap metabolic acidosis.  -Avoid nephrotoxic agents andr renally dose medications.  -Continue with IV fluids  -Repeat BMP this afternoon and in AM.      Dehydration  -Treatment as above      High anion gap metabolic acidosis  -Treatment as above      Hypomagnesemia  -Chronic hypomagnesium noted.  -Replace mag again today  -Repeat Mag level this afternoon and in AM.      Leukocytosis  -WBC 18 on admit without significant left shift  -Likely due to hemoconcentration and stress reaction from seizures  -He is afebrile with clear cxr and no  signs of infection.  PCT is normal.  UA was normal  -WBC has normalized today.  -Monitor closely.      Macrocytic anemia  -Longstanding anemia noted  -Previously not folate, iron or b12 deficient  -Hb has dropped from 10.2 - 7.4 today.  No evidence of bleeding.  Likely due to hemodilution  -Elevated ferritin argues against iron deficiency.  B12 is normal.  Await Folate  -Repeat cbc in AM  -Transfuse for Hb less than 7.0      GERD (gastroesophageal reflux disease)  -History noted  -Not on PPI prior to admit  -Given significant nausea recently it could be due to gerd  -Continue PPI now.  -If nausea persists will check KUB.      Essential hypertension  -At home is supposed to be on metoprolol and diltiazem but has not been  -Continue to hold diltiazem  -Metoprolol resumed    Hyperlipidemia  -History noted  -Not on statin at this time.      Gouty arthritis  -History noted  -No obvious flare at this time  -Uric acid is elevated  -Continue home allopurinol        VTE Risk Mitigation (From admission, onward)         Ordered     heparin (porcine) injection 5,000 Units  Every 8 hours      11/29/19 1316     Place sequential compression device  Until discontinued      11/29/19 1316     IP VTE HIGH RISK PATIENT  Once      11/29/19 1316                      Joshua Powell MD  Department of Hospital Medicine   [facility]

## 2019-11-30 NOTE — PLAN OF CARE
CM met with patient and spouse, Giana  to complete initial discharge planning assessment. Patient is oriented x 3. Verified information on face sheet is correct. Patient PCP is BAR Bolden. Primary insurer is [employer]. Pharmacy is     Patient currently doesn't have a POA or living will. Is not on coumadin or dialysis. Wife states the patient has been taking his seizure medication as prescribed. Patient states he will start take medication as prescribed from this point on. He has reliable tansportation to and from appointments.      Currently, doesn't have home health services. Patient states he needs assistance and uses medical equipment to perform  ADL's., however he complains he stays in bed most of the day. He has crutches, wheelchair, cane, walker, and bedside commode at home.    Therapy recommended home health for patient. Patient agrees that he would like to discharge with home health. CM provided patient with home health provider list. However, he would like me to talk to his wife so she could choose a home health agency for him.    He has  no financial issues at this time.      CM to continue to follow.            11/30/19 1243   Discharge Assessment   Assessment Type Discharge Planning Assessment   Confirmed/corrected address and phone number on facesheet? Yes   Assessment information obtained from? Patient  (spouse)   Prior to hospitilization cognitive status: Alert/Oriented   Prior to hospitalization functional status: Assistive Equipment;Needs Assistance   Current cognitive status: Alert/Oriented   Current Functional Status: Assistive Equipment;Needs Assistance   Lives With spouse;child(sulaiman), adult   Able to Return to Prior Arrangements other (see comments)  (PT/OT recommend Home Health )   Is patient able to care for self after discharge? Unable to determine at this time (comments)   Who are your caregiver(s) and their phone number(s)? giana londono [phone]   Patient's perception of  discharge disposition home or selfcare   Readmission Within the Last 30 Days no previous admission in last 30 days   Patient currently being followed by outpatient case management? No   Patient currently receives any other outside agency services? No   Equipment Currently Used at Home walker, rolling;bedside commode;crutches;wheelchair;cane, quad   Do you have any problems affording any of your prescribed medications? No   Is the patient taking medications as prescribed? no   If no, which medications is patient not taking? wife reports he hasn't taken his seizure medication as prescribed,    Does the patient have transportation home? Yes   Transportation Anticipated family or friend will provide   Does the patient receive services at the Coumadin Clinic? No   Discharge Plan A Home Health   DME Needed Upon Discharge  none   Patient/Family in Agreement with Plan yes

## 2019-11-30 NOTE — PLAN OF CARE
CM met with the patient and wife at the bedside. Wife picked the following HH agencies, [facility], and [facility].      Awaiting HH orders to send referrals.

## 2019-11-30 NOTE — PLAN OF CARE
Problem: Occupational Therapy Goal  Goal: Occupational Therapy Goal  Description  Goals to be met by 12/7/19    SBA G/H seated at sink  Max A LBD  Assess toilet hygiene  Assess bed>BSC and W/C transfers   Outcome: Ongoing, Progressing     OT evaluation completed with treatment to follow.  Recommend Home Health PT and OT.  DME: None at present.  Selene Green, ScD, LOTR 11/30/2019

## 2019-11-30 NOTE — PT/OT/SLP EVAL
"Physical Therapy Evaluation    Patient Name:  Michael Johnson Jr.   MRN:  [id number]    Recommendations:     Discharge Recommendations:  home health PT   Discharge Equipment Recommendations: none   Barriers to discharge: Decreased caregiver support    Assessment:     Michael Johnson Jr. is a 59 y.o. male admitted with a medical diagnosis of Atrial fibrillation with RVR.  He presents with the following impairments/functional limitations:  weakness, impaired endurance, impaired functional mobilty, impaired balance, decreased lower extremity function, decreased coordination, decreased safety awareness, impaired coordination, impaired cardiopulmonary response to activity, impaired joint extensibility .Pt  with functional mobility deficits and should benefit from  PT  to maximize I and safety decrease risk of further decline of injury.    Rehab Prognosis: Good; patient would benefit from acute skilled PT services to address these deficits and reach maximum level of function.    Recent Surgery: * No surgery found *      Plan:     During this hospitalization, patient to be seen 5 x/week to address the identified rehab impairments via therapeutic activities, therapeutic exercises, neuromuscular re-education and progress toward the following goals:    · Plan of Care Expires:  12/30/19    Subjective     Chief Complaint: pt without specific c/o  Patient/Family Comments/goals: to transfer to regular room  Pain/Comfort:  · Pain Rating 1: 0/10    Patients cultural, spiritual, Ep[employer] conflicts given the current situation: no    Living Environment:  Living Environment: lives with his wife in a 2 story house (bed/bath first floor) 8 MARCIAL with (B) HR and w/c elevator at entry  Previous level of function:Pt reports non ambulatory for yrs. W/C dependent, bed bound except transfers to BSC,mod I with recent fall ~2 weeks, ago. Pt reports  having fallen"a lot" always when transferring to/from BS. Pt states that for the last few months he " is only OOB to BSC.    . Equipment used at home: wheelchair, walker, rolling, bedside commode.  DME owned (not currently used): single point cane and forearm crutches.  Upon discharge, patient will have assistance from wife who asssits when she is not at work, otherwise pt is alone at home.       Objective:     Communicated with Nurse prior to session.  Patient found supine with (ICU monitoring)  upon PT entry to room.    General Precautions: Standard, fall, seizure, diabetic   Orthopedic Precautions:N/A, RLE toe touch weight bearing   Braces: N/A   Exams:  · Cognition:   · Patient is oriented x 4.  · Pt follows approximately 100% of multi step commands.    · Mood: Pleasant and cooperative.   · Musculoskeletal:  · Posture:    · Grossly WFL  ·   B LE ROM/Strength: L ankle fused otherwise ROM: WNL. Strength B LE grossly 4/5  · Neuromuscular:  · Sensation: Intact to light touch bilateral LEs.   · Tone/Reflexes: No impairments identified with functional mobility. No formal testing performed.   · Coordination: diminished  LE        Balance: min>mod A for static standing and taking 3 steps without AD  · Visual-vestibular: No impairments identified with functional mobility. No formal testing performed.  · Integument: scarring to B knee, pt reports due to multiple falls to knees during transfers    Functional Mobility:  · Bed Mobility:     · Supine to Sit: modified independence  · Sit to Supine: modified independence  · Transfers:     · Sit to Stand:  minimum assistance with no AD  · Gait: mod A for balance 3 steps forward 3 steps back      Therapeutic Activities and Exercises:      Pt presented supine in bed, reports fatigue form prior OT eval, but agreeable.  Pt transferred supine sit with mod I, using bed rail, sit>stand transfer with CGA, deferred use of RW for mobility stating he didn't feel safe.  Pt required  min A to maintain static standing. Pt took 3 steps forward and 3 steps back with min >mod A for balance.  Return to sit EOB with CGA, pt moved buttock back on bed I.  Encourage pt to attempt transfer to BSC, pt deferred stating he was too fatigue, static sitting EOB x 7 min with SPV.  Pt requested return to supine, stating heart felt like it was racing( HR monitored showed ((bpm). Pt return to supine with mod I., moved up in bed with instruction for using LE and min assist to place hands in  at HOB. Pt able to move self up in bed with min A.            AM-PAC 6 CLICK MOBILITY  Total Score:14     Patient left supine with all lines intact, call button in reach and nurse present.    GOALS:   Multidisciplinary Problems     Physical Therapy Goals        Problem: Physical Therapy Goal    Goal Priority Disciplines Outcome Goal Variances Interventions   Physical Therapy Goal     PT, PT/OT      Description:  Patient will increase functional independence with mobility by performin. Sit<>stand with Min A with RW  2. Transfer bed<> chair with RW and min A                      History:     Past Medical History:   Diagnosis Date    Afib     Alcohol abuse with other alcohol-induced disorder 2019    Anemia 2019    unspecified deficiency    Ankle fracture, left     Arthritis     Asthma     Chronic kidney disease (CKD), stage III (moderate) 2019    Depression     Erectile dysfunction 2019    Gout, arthropathy 2019    Gout, unspecified     Hypertension     Hypertensive chronic kidney disease 2019    Hypomagnesemia 2019    Noncompliance 2019    Peripheral neuropathy 2019    Preglaucoma 2019    Secondary hyperparathyroidism, renal 2019    Seizure 2016    Spastic hemiplegia affecting left nondominant side 2019       Past Surgical History:   Procedure Laterality Date    ANKLE SURGERY      x6    CHOLECYSTECTOMY         Time Tracking:     PT Received On: 19  PT Start Time: 1112     PT Stop Time: 1132  PT Total Time (min): 20 min      Billable Minutes: Evaluation 20      Bryan Carlin, PT  11/30/2019

## 2019-11-30 NOTE — PLAN OF CARE
Per Fan MURPHY, ok to skip Mg level at this time until IV Mg can be administered.    0515: JEFFREY Chavira notified re: significant drop in H&H compared to results from yesterday. See new orders.

## 2019-11-30 NOTE — PT/OT/SLP EVAL
Occupational Therapy   Evaluation    Name: Michael Johnson Jr.  MRN: [id number]  Admitting Diagnosis:  Atrial fibrillation with RVR      Recommendations:     Discharge Recommendations: home health OT, home health PT  Discharge Equipment Recommendations:  none  Barriers to discharge:  Decreased caregiver support    Assessment:     Michael Johnson Jr. is a 59 y.o. male with a medical diagnosis of Atrial fibrillation with RVR.  He presents with concerns about frequent falls with home transfers. . Performance deficits affecting function: weakness, impaired endurance, impaired self care skills, impaired balance, gait instability, decreased lower extremity function, decreased ROM, decreased safety awareness, impaired cognition(ST and IM memory loss).  He was SPV bed mobility (trnansfers NT).  He completed self-feeding MI, G/H SPV (set-up) seated EOB, UBD MI seated and LBD Total A.  OT treatment is needed to further assess safety with ADL transfers and to maximize patient function while in the hospital.    Rehab Prognosis: Good; patient would benefit from acute skilled OT services to address these deficits and reach maximum level of function.       Plan:     Patient to be seen 4 x/week to address the above listed problems via    · Plan of Care Expires:    · Plan of Care Reviewed with: patient, spouse    Subjective     Chief Complaint: frequent falls with transfers  Patient/Family Comments/goals: increase functional independance    Occupational Profile:  Living Environment: lives with his wife in a 2 story house (bed/bath first floor) 8 MARCIAL with (B) HR and elevator at entry  Previous level of function: W/C dependent with SBA> Mod A transfers, MI self-care (Max A LBD and Mod A sponge bath)   Roles and Routines: none reported  Equipment Used at Home:  crutches, forearm, wheelchair, cane, quad, bedside commodeAssistance upon Discharge: he stays in the bed much of the day, [place] to [facility] [place] (wife  works)    Pain/Comfort:  Pain Rating 1: 0/10  Pain Rating Post-Intervention 1: 8/10  Location - Side 2: Left  Location - Orientation 2: generalized  Location 2: ankle  Pain Addressed 2: Reposition, Cessation of Activity(chronic condition did not want pain medication)    Patients cultural, spiritual, Hoahaoism conflicts given the current situation: no    Objective:     Communicated with: patient and his nurse prior to session.  Patient found supine with pulse ox (continuous), telemetry, PICC line, blood pressure cuff upon OT entry to room.  He was agreeable to OC service    General Precautions: Standard, fall, seizure, diabetic   Orthopedic Precautions:N/A   Braces: N/A     Occupational Performance:    Bed Mobility:    · SPV supine>sit EOB  · SPV sit>supine  · MI scoot EOB    Functional Mobility/Transfers:  · None due to ICU evaluation limitations    Activities of Daily Living:  · MI self-feeding bed level  · MI UBD (don hospital gown as robe) sitting EOB  · Total A LBD (doff/don socks) sitting EOB    Cognitive/Visual Perceptual:  Cognitive/Psychosocial Skills:     -       Oriented to: Person, Place, Time and Situation   -       Follows Commands/attention:Follows one-step commands  -       Communication: clear/fluent  -       Memory: Impaired STM and Poor immediate recall  -       Safety awareness/insight to disability: impaired   -       Mood/Affect/Coping skills/emotional control: Appropriate to situation and Cooperative  Visual/Perceptual:      -Impaired  acuity glasses    Physical Exam:  Balance:    -       good static sitting balance EOB, LE balance and transfers NT  Postural examination/scapula alignment:    -       Rounded shoulders  -       Forward head  -       Posterior pelvic tilt  Skin integrity: Wound scars from old wounds and skin discoleration both knees  Edema:  None noted  Sensation:    -       Intact for light touch both hands  Motor Planning:    -       WFL  Dominant hand:    -       Right  UE  ROM: WFL BUE  UE Strength: 4/5 BUE  Hand Function: WFL for  strength and dexterity both hands    AMPAC 6 Click ADL:  AMPAC Total Score: 18    Patient left with bed in chair position with all lines intact, call button in reach, nurse notified and patient's wife present    GOALS:   Multidisciplinary Problems     Occupational Therapy Goals        Problem: Occupational Therapy Goal    Goal Priority Disciplines Outcome Interventions   Occupational Therapy Goal     OT, PT/OT Ongoing, Progressing    Description:  Goals to be met by 12/7/19    SBA G/H seated at sink  Max A LBD  Assess toilet hygiene  Assess bed>BSC and W/C transfers                    History:     Past Medical History:   Diagnosis Date    Afib     Alcohol abuse with other alcohol-induced disorder 04/02/2019    Anemia 04/02/2019    unspecified deficiency    Ankle fracture, left     Arthritis     Asthma     Chronic kidney disease (CKD), stage III (moderate) 04/02/2019    Depression     Erectile dysfunction 04/02/2019    Gout, arthropathy 04/02/2019    Gout, unspecified     Hypertension     Hypertensive chronic kidney disease 04/02/2019    Hypomagnesemia 04/02/2019    Noncompliance 04/02/2019    Peripheral neuropathy 04/02/2019    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    Secondary hyperparathyroidism, renal 04/02/2019    Seizure 2016    Spastic hemiplegia affecting left nondominant side 04/02/2019         Past Surgical History:   Procedure Laterality Date    ANKLE SURGERY      x6    CHOLECYSTECTOMY         Time Tracking:     OT Date of Treatment: 11/30/19  OT Start Time: 1008  OT Stop Time: 1040  OT Total Time (min): 32 min    Billable Minutes:Evaluation 32    Bayron Hughes,  LOTR  11/30/2019

## 2019-11-30 NOTE — ASSESSMENT & PLAN NOTE
-At home is supposed to be on metoprolol and diltiazem but has not been  -Continue to hold diltiazem  -Metoprolol resumed

## 2019-11-30 NOTE — SUBJECTIVE & OBJECTIVE
Interval History: No acute events overnight.  No further seizures or rapid afib.  Sitting on edge of bed working with OT.  Denies pain and sob.  Wife at bedside and all questions answered.    Review of Systems   Constitutional: Positive for activity change and appetite change. Negative for chills, fatigue and fever.   HENT: Negative for congestion and dental problem.    Eyes: Negative for discharge and itching.   Respiratory: Negative for apnea, cough, chest tightness and shortness of breath.    Cardiovascular: Negative for chest pain, palpitations and leg swelling.   Gastrointestinal: Negative for abdominal distention, abdominal pain, diarrhea, nausea and vomiting.   Endocrine: Negative for cold intolerance and heat intolerance.   Genitourinary: Negative for difficulty urinating and dysuria.   Musculoskeletal: Negative for arthralgias and back pain.   Allergic/Immunologic: Negative for environmental allergies and food allergies.   Neurological: Negative for dizziness, facial asymmetry and light-headedness.   Hematological: Negative for adenopathy. Does not bruise/bleed easily.   Psychiatric/Behavioral: Negative for agitation and behavioral problems.     Objective:     Vital Signs (Most Recent):  Temp: 98 °F (36.7 °C) (11/30/19 0730)  Pulse: 74 (11/30/19 0954)  Resp: (!) 31 (11/30/19 0954)  BP: 118/77 (11/30/19 0730)  SpO2: 100 % (11/30/19 0954) Vital Signs (24h Range):  Temp:  [98 °F (36.7 °C)-98.3 °F (36.8 °C)] 98 °F (36.7 °C)  Pulse:  [66-78] 74  Resp:  [16-31] 31  SpO2:  [80 %-100 %] 100 %  BP: ()/(56-79) 118/77     Weight: 80.8 kg (178 lb 2.1 oz)  Body mass index is 24.16 kg/m².    Intake/Output Summary (Last 24 hours) at 11/30/2019 1233  Last data filed at 11/30/2019 0742  Gross per 24 hour   Intake 3034.89 ml   Output 925 ml   Net 2109.89 ml      Physical Exam   Constitutional: He is oriented to person, place, and time. He appears well-developed and well-nourished.   HENT:   Head: Normocephalic and  atraumatic.   Dry mucus membranes   Eyes: Pupils are equal, round, and reactive to light. EOM are normal.   Neck: Normal range of motion. Neck supple.   Cardiovascular:   Irregularly irregular, no murmurs   Pulmonary/Chest: Effort normal. No respiratory distress.   Faint bibasilar crackles   Abdominal: Soft. Bowel sounds are normal. He exhibits no distension. There is no tenderness.   Musculoskeletal: Normal range of motion. He exhibits no edema.   Golf ball sized nodule on dorsum of left hand covered by hyperkeratotic skin (states this is chronic)   Neurological: He is oriented to person, place, and time. No cranial nerve deficit. Coordination normal.   No tremors but diffusely weak   Skin: Skin is warm and dry.   Psychiatric: He has a normal mood and affect. His behavior is normal.   Vitals reviewed.      Significant Labs: All pertinent labs within the past 24 hours have been reviewed.    Significant Imaging: I have reviewed and interpreted all pertinent imaging results/findings within the past 24 hours.

## 2019-11-30 NOTE — ASSESSMENT & PLAN NOTE
-Mr. Johnson was admitted to inpatient status in our icu  -History of pAFIB noted and has not been taking home metoprolol or diltiazem.  In ER had SVT and received adenosine which subsequently revealed afib with rvr.  He received diltiazem in ER and subsequently loaded with and started on amiodarone infusion.  -Amiodarone infusion stopped and metoprolol resumed by cardiology and he continues with good rate control.  -[id number] score of 1 based on echo from 2018.  -TSH is normal  -Troponin negative  -Suspect this was due to significant dehydration and electrolyte abnormalities.  No evidence of EtOH withdrawal at this time.  -Will check echocardiogram  -Appreciate input from Dr. Chavez  -Step down to the floor today.

## 2019-11-30 NOTE — ASSESSMENT & PLAN NOTE
-Longstanding anemia noted  -Previously not folate, iron or b12 deficient  -Hb has dropped from 10.2 - 7.4 today.  No evidence of bleeding.  Likely due to hemodilution  -Elevated ferritin argues against iron deficiency.  B12 is normal.  Await Folate  -Repeat cbc in AM  -Transfuse for Hb less than 7.0

## 2019-11-30 NOTE — PLAN OF CARE
Problem: Physical Therapy Goal  Goal: Physical Therapy Goal  Description  Patient will increase functional independence with mobility by performin. Sit<>stand with Min A with RW  2. Transfer bed<> chair with RW and min A     Outcome: Ongoing, Progressing   Pt presented supine in bed, reports fatigue form prior OT eval, but agreeable.  Pt transferred supine sit with mod I, using bed rail, sit>stand transfer with CGA, deferred use of RW for mobility stating he didn't feel safe.  Pt required  min A to maintain static standing. Pt took 3 steps forward and 3 steps back with min >mod A for balance. Return to sit EOB with CGA, pt moved buttock back on bed I.  Encourage pt to attempt transfer to BSC, pt deferred stating he was too fatigue, static sitting EOB x 7 min with SPV.  Pt requested return to supine, stating heart felt like it was racing( HR monitored showed ((bpm). Pt return to supine with mod I., moved up in bed with instruction for using LE and min assist to place hands in  at HOB. Pt able to move self up in bed with min A.

## 2019-11-30 NOTE — ASSESSMENT & PLAN NOTE
-Chronic hypomagnesium noted.  -Replace mag again today  -Repeat Mag level this afternoon and in AM.

## 2019-11-30 NOTE — ASSESSMENT & PLAN NOTE
-Baseline Cr is 1.2.  On admit Cr was 1.6 but now improved to 1.3  -He appeared very dehydrated and had not been eating over the last week or two.  -He is noted to have an anion gap of 31 on admission, likely due to seizure and dehydration which has normalized  -Beta-hydroxybutyrate wass normal.  Lactic acid was normal.  ABG showed a partially compensated primary respiratory alkalosis and anion-gap metabolic acidosis.  -Avoid nephrotoxic agents andr renally dose medications.  -Continue with IV fluids  -Repeat BMP this afternoon and in AM.

## 2019-11-30 NOTE — ASSESSMENT & PLAN NOTE
-WBC 18 on admit without significant left shift  -Likely due to hemoconcentration and stress reaction from seizures  -He is afebrile with clear cxr and no signs of infection.  PCT is normal.  UA was normal  -WBC has normalized today.  -Monitor closely.

## 2019-11-30 NOTE — ASSESSMENT & PLAN NOTE
-History noted  -No obvious flare at this time  -Uric acid is elevated  -Continue home allopurinol

## 2019-11-30 NOTE — ASSESSMENT & PLAN NOTE
-History noted  -Not on PPI prior to admit  -Given significant nausea recently it could be due to gerd  -Continue PPI now.  -If nausea persists will check KUB.

## 2019-12-01 LAB
ANION GAP SERPL CALC-SCNC: 10 MMOL/L (ref 8–16)
BASOPHILS # BLD AUTO: 0.02 K/UL (ref 0–0.2)
BASOPHILS NFR BLD: 0.3 % (ref 0–1.9)
BLD PROD TYP BPU: NORMAL
BLOOD UNIT EXPIRATION DATE: NORMAL
BLOOD UNIT TYPE CODE: 5100
BLOOD UNIT TYPE: NORMAL
BUN SERPL-MCNC: 11 MG/DL (ref 6–20)
CALCIUM SERPL-MCNC: 7.6 MG/DL (ref 8.7–10.5)
CHLORIDE SERPL-SCNC: 108 MMOL/L (ref 95–110)
CO2 SERPL-SCNC: 16 MMOL/L (ref 23–29)
CODING SYSTEM: NORMAL
CREAT SERPL-MCNC: 1.2 MG/DL (ref 0.5–1.4)
DIFFERENTIAL METHOD: ABNORMAL
DISPENSE STATUS: NORMAL
EOSINOPHIL # BLD AUTO: 0.1 K/UL (ref 0–0.5)
EOSINOPHIL NFR BLD: 1.4 % (ref 0–8)
ERYTHROCYTE [DISTWIDTH] IN BLOOD BY AUTOMATED COUNT: 17.7 % (ref 11.5–14.5)
EST. GFR  (AFRICAN AMERICAN): >60 ML/MIN/1.73 M^2
EST. GFR  (NON AFRICAN AMERICAN): >60 ML/MIN/1.73 M^2
GLUCOSE SERPL-MCNC: 94 MG/DL (ref 70–110)
HCT VFR BLD AUTO: 20.8 % (ref 40–54)
HGB BLD-MCNC: 6.6 G/DL (ref 14–18)
IMM GRANULOCYTES # BLD AUTO: 0.04 K/UL (ref 0–0.04)
IMM GRANULOCYTES NFR BLD AUTO: 0.5 % (ref 0–0.5)
LYMPHOCYTES # BLD AUTO: 0.8 K/UL (ref 1–4.8)
LYMPHOCYTES NFR BLD: 10.9 % (ref 18–48)
MAGNESIUM SERPL-MCNC: 1.3 MG/DL (ref 1.6–2.6)
MCH RBC QN AUTO: 35.5 PG (ref 27–31)
MCHC RBC AUTO-ENTMCNC: 31.7 G/DL (ref 32–36)
MCV RBC AUTO: 112 FL (ref 82–98)
MONOCYTES # BLD AUTO: 0.5 K/UL (ref 0.3–1)
MONOCYTES NFR BLD: 6.2 % (ref 4–15)
NEUTROPHILS # BLD AUTO: 6.2 K/UL (ref 1.8–7.7)
NEUTROPHILS NFR BLD: 80.7 % (ref 38–73)
NRBC BLD-RTO: 0 /100 WBC
OB PNL STL: NEGATIVE
PLATELET # BLD AUTO: 191 K/UL (ref 150–350)
PMV BLD AUTO: 11.7 FL (ref 9.2–12.9)
POTASSIUM SERPL-SCNC: 3.6 MMOL/L (ref 3.5–5.1)
PROCALCITONIN SERPL IA-MCNC: 0.39 NG/ML
RBC # BLD AUTO: 1.86 M/UL (ref 4.6–6.2)
SODIUM SERPL-SCNC: 134 MMOL/L (ref 136–145)
TRANS ERYTHROCYTES VOL PATIENT: NORMAL ML
WBC # BLD AUTO: 7.69 K/UL (ref 3.9–12.7)

## 2019-12-01 PROCEDURE — A4216 STERILE WATER/SALINE, 10 ML: HCPCS | Performed by: HOSPITALIST

## 2019-12-01 PROCEDURE — 99233 PR SUBSEQUENT HOSPITAL CARE,LEVL III: ICD-10-PCS | Mod: ,,, | Performed by: HOSPITALIST

## 2019-12-01 PROCEDURE — 84145 PROCALCITONIN (PCT): CPT

## 2019-12-01 PROCEDURE — 25000003 PHARM REV CODE 250: Performed by: HOSPITALIST

## 2019-12-01 PROCEDURE — 25000003 PHARM REV CODE 250: Performed by: INTERNAL MEDICINE

## 2019-12-01 PROCEDURE — 36430 TRANSFUSION BLD/BLD COMPNT: CPT

## 2019-12-01 PROCEDURE — 80048 BASIC METABOLIC PNL TOTAL CA: CPT

## 2019-12-01 PROCEDURE — 85025 COMPLETE CBC W/AUTO DIFF WBC: CPT

## 2019-12-01 PROCEDURE — 82272 OCCULT BLD FECES 1-3 TESTS: CPT

## 2019-12-01 PROCEDURE — P9021 RED BLOOD CELLS UNIT: HCPCS

## 2019-12-01 PROCEDURE — 63600175 PHARM REV CODE 636 W HCPCS: Performed by: HOSPITALIST

## 2019-12-01 PROCEDURE — 99233 SBSQ HOSP IP/OBS HIGH 50: CPT | Mod: ,,, | Performed by: HOSPITALIST

## 2019-12-01 PROCEDURE — 83735 ASSAY OF MAGNESIUM: CPT

## 2019-12-01 PROCEDURE — 11000001 HC ACUTE MED/SURG PRIVATE ROOM

## 2019-12-01 RX ORDER — MAGNESIUM SULFATE HEPTAHYDRATE 40 MG/ML
2 INJECTION, SOLUTION INTRAVENOUS ONCE
Status: COMPLETED | OUTPATIENT
Start: 2019-12-01 | End: 2019-12-01

## 2019-12-01 RX ORDER — HYDROCODONE BITARTRATE AND ACETAMINOPHEN 500; 5 MG/1; MG/1
TABLET ORAL
Status: DISCONTINUED | OUTPATIENT
Start: 2019-12-01 | End: 2019-12-02 | Stop reason: HOSPADM

## 2019-12-01 RX ADMIN — Medication 10 ML: at 05:12

## 2019-12-01 RX ADMIN — HEPARIN SODIUM 5000 UNITS: 5000 INJECTION, SOLUTION INTRAVENOUS; SUBCUTANEOUS at 01:12

## 2019-12-01 RX ADMIN — HEPARIN SODIUM 5000 UNITS: 5000 INJECTION, SOLUTION INTRAVENOUS; SUBCUTANEOUS at 05:12

## 2019-12-01 RX ADMIN — Medication 100 MG: at 08:12

## 2019-12-01 RX ADMIN — SENNOSIDES,DOCUSATE SODIUM 1 TABLET: 8.6; 5 TABLET, FILM COATED ORAL at 08:12

## 2019-12-01 RX ADMIN — LEVETIRACETAM 500 MG: 500 TABLET ORAL at 08:12

## 2019-12-01 RX ADMIN — METOPROLOL TARTRATE 50 MG: 50 TABLET ORAL at 08:12

## 2019-12-01 RX ADMIN — THERA TABS 1 TABLET: TAB at 08:12

## 2019-12-01 RX ADMIN — HEPARIN SODIUM 5000 UNITS: 5000 INJECTION, SOLUTION INTRAVENOUS; SUBCUTANEOUS at 08:12

## 2019-12-01 RX ADMIN — Medication 10 ML: at 01:12

## 2019-12-01 RX ADMIN — SODIUM CHLORIDE: 0.9 INJECTION, SOLUTION INTRAVENOUS at 01:12

## 2019-12-01 RX ADMIN — ASPIRIN 81 MG: 81 TABLET, COATED ORAL at 08:12

## 2019-12-01 RX ADMIN — PANTOPRAZOLE SODIUM 40 MG: 40 TABLET, DELAYED RELEASE ORAL at 08:12

## 2019-12-01 RX ADMIN — ALLOPURINOL 300 MG: 300 TABLET ORAL at 08:12

## 2019-12-01 RX ADMIN — ACETAMINOPHEN 650 MG: 325 TABLET ORAL at 09:12

## 2019-12-01 RX ADMIN — MAGNESIUM SULFATE IN WATER 2 G: 40 INJECTION, SOLUTION INTRAVENOUS at 06:12

## 2019-12-01 RX ADMIN — MAGNESIUM SULFATE IN WATER 2 G: 40 INJECTION, SOLUTION INTRAVENOUS at 09:12

## 2019-12-01 RX ADMIN — Medication 10 ML: at 06:12

## 2019-12-01 RX ADMIN — FOLIC ACID 1 MG: 1 TABLET ORAL at 08:12

## 2019-12-01 NOTE — PLAN OF CARE
POC reviewed with patient. VS stable on room air. Complaints of pain treated with prn pain medication. No signs of distress noted and no seizure activity today. No complaints of NV. Telemetry monitor reveals NSR with first degree block. Blood draws sent to lab as ordered. One unit of RBCs infused as ordered; patient tolerated well. Neuro checks and CIWA performed Q4. No difficulty voiding; urinal at bedside. Positions self independently. Instructed to call for help ambulating. Seizure precautions maintained with O2 and suction at bedside. No injuries, falls, or trauma occurred during shift. Purposeful rounding completed. Bed low and locked with side rails up x 2 and call light within reach. Will continue to monitor.

## 2019-12-01 NOTE — SUBJECTIVE & OBJECTIVE
Interval History: No acute events overnight.  Mildly elevated temperature but not objectively febrile and no evidence of infection.  Denies pain and sob.  Wife at bedside and all questions answered.    Review of Systems   Constitutional: Positive for activity change and appetite change. Negative for chills, fatigue and fever.   HENT: Negative for congestion and dental problem.    Eyes: Negative for discharge and itching.   Respiratory: Negative for apnea, cough, chest tightness and shortness of breath.    Cardiovascular: Negative for chest pain, palpitations and leg swelling.   Gastrointestinal: Negative for abdominal distention, abdominal pain, diarrhea, nausea and vomiting.   Endocrine: Negative for cold intolerance and heat intolerance.   Genitourinary: Negative for difficulty urinating and dysuria.   Musculoskeletal: Negative for arthralgias and back pain.   Allergic/Immunologic: Negative for environmental allergies and food allergies.   Neurological: Negative for dizziness, facial asymmetry and light-headedness.   Hematological: Negative for adenopathy. Does not bruise/bleed easily.   Psychiatric/Behavioral: Negative for agitation and behavioral problems.     Objective:     Vital Signs (Most Recent):  Temp: 98.2 °F (36.8 °C) (12/01/19 1208)  Pulse: 76 (12/01/19 1208)  Resp: 16 (12/01/19 1208)  BP: 109/71 (12/01/19 1208)  SpO2: 100 % (12/01/19 0732) Vital Signs (24h Range):  Temp:  [97 °F (36.1 °C)-100.1 °F (37.8 °C)] 98.2 °F (36.8 °C)  Pulse:  [76-92] 76  Resp:  [16-20] 16  SpO2:  [93 %-100 %] 100 %  BP: (109-128)/(68-83) 109/71     Weight: 80.8 kg (178 lb 2.1 oz)  Body mass index is 24.16 kg/m².    Intake/Output Summary (Last 24 hours) at 12/1/2019 1403  Last data filed at 12/1/2019 0800  Gross per 24 hour   Intake 1552 ml   Output 826 ml   Net 726 ml      Physical Exam   Constitutional: He is oriented to person, place, and time. He appears well-developed and well-nourished.   HENT:   Head: Normocephalic and  atraumatic.   Eyes: Pupils are equal, round, and reactive to light. EOM are normal.   Neck: Normal range of motion. Neck supple.   Cardiovascular:   Irregularly irregular, no murmurs   Pulmonary/Chest: Effort normal and breath sounds normal. No respiratory distress.   Abdominal: Soft. Bowel sounds are normal. He exhibits no distension. There is no tenderness.   Musculoskeletal: Normal range of motion. He exhibits no edema.   Golf ball sized nodule on dorsum of left hand covered by hyperkeratotic skin (states this is chronic)   Neurological: He is oriented to person, place, and time. No cranial nerve deficit. Coordination normal.   No tremors but diffusely weak   Skin: Skin is warm and dry.   Psychiatric: He has a normal mood and affect. His behavior is normal.   Vitals reviewed.      Significant Labs: All pertinent labs within the past 24 hours have been reviewed.    Significant Imaging: I have reviewed and interpreted all pertinent imaging results/findings within the past 24 hours.

## 2019-12-01 NOTE — PLAN OF CARE
Pt had large laurence colored bm. Sent to lab. Formed stool.  Midline dressing changed 11/30/2019.  Safety maintained.  Bscc placed in bathroom , pt very unsteady on feet.  Pt spouse at bedside.  Denies pain. Sinus rhythm on tele monitor.  Purposeful rounding completed.

## 2019-12-01 NOTE — ASSESSMENT & PLAN NOTE
-Longstanding anemia noted  -Previously not folate, iron or b12 deficient  -Hb has dropped from 10.2 - 7.4--6.6 today.  No evidence of bleeding.  Likely due to hemodilution  -Elevated ferritin argues against iron deficiency.  B12 and Folate are normal.  Stool OB is negative.  -Transfuse 1 unit PRBC today.  -Repeat cbc in AM

## 2019-12-01 NOTE — ASSESSMENT & PLAN NOTE
-History noted.  States last seizure was several months ago.  Has not been taking home keppra.  -Could have been due to alcohol withdrawal, being off home keppra or significant acidosis and electrolyte abnormalities  -Loaded with keppra in ER  -CT of head without any acute changes.  -Seen by neurology and input appreciated  -Continue home keppra and seizure precautions.  -Continue ativan PRN seizures.

## 2019-12-01 NOTE — PROGRESS NOTES
"[facility] Hospital Medicine  Progress Note    Patient Name: Michael Johnson Jr.  MRN: [id number]  Patient Class: IP- Inpatient   Admission Date: 11/29/2019  Length of Stay: 2 days  Attending Physician: Joshua Powell MD  Primary Care Provider: Zunilda Bolden NP        Subjective:     Principal Problem:Atrial fibrillation with RVR        HPI:  Mr. Johnson is a 59 year odl man with history of alcoholism, seizure disorder, paroxysmal atrial fibrillation, ckdIII, gout and medication non-compliance who was brought in by EMS for evaluation of seizures.  History is provided by his wife, himself and review of the chart.  Apparently two weeks ago he stopped drinking all alcohol because he "suddenly lost interest in it".  Over the last week he has had a very poor appetite with significant nausea.  He had some vomiting over the last two days and notes he has not been taking any of his medications like he is supposed to.  He denies any dysphagia or difficulty eating, but just notes he has no desire to eat due to his significant nausea.  This morning he was seen by his wife having seizure activity in bed.  This consisted of full body convulsions and lasted about 5 minutes.  EMS were called and he was noted to be post-ictal and confused on their arrival.  After arrival to our ER he had another seizure and received diazepam and was loaded with keppra.  Also while in the ER he was noted to have significant tachycardia (presumed SVT) up into the 200s.  He received adenosine and was subsequently noted to be in atrial fibrillation with rvr for which he received diltiazem with minimal results.  Subsequently he was loaded with amiodarone followed by continuous infusion.  Upon my interview he is awake and oriented with no signs of confusion or tremulousness.  He denies fevers, chills, headache, pain in any part of his body, diarrhea, leg swelling, shortness of breath, constipation, new numbness or weakness.  His " only actual complaint is of ongoing nausea.    Overview/Hospital Course:  No notes on file    Interval History: No acute events overnight.  Mildly elevated temperature but not objectively febrile and no evidence of infection.  Denies pain and sob.  Wife at bedside and all questions answered.    Review of Systems   Constitutional: Positive for activity change and appetite change. Negative for chills, fatigue and fever.   HENT: Negative for congestion and dental problem.    Eyes: Negative for discharge and itching.   Respiratory: Negative for apnea, cough, chest tightness and shortness of breath.    Cardiovascular: Negative for chest pain, palpitations and leg swelling.   Gastrointestinal: Negative for abdominal distention, abdominal pain, diarrhea, nausea and vomiting.   Endocrine: Negative for cold intolerance and heat intolerance.   Genitourinary: Negative for difficulty urinating and dysuria.   Musculoskeletal: Negative for arthralgias and back pain.   Allergic/Immunologic: Negative for environmental allergies and food allergies.   Neurological: Negative for dizziness, facial asymmetry and light-headedness.   Hematological: Negative for adenopathy. Does not bruise/bleed easily.   Psychiatric/Behavioral: Negative for agitation and behavioral problems.     Objective:     Vital Signs (Most Recent):  Temp: 98.2 °F (36.8 °C) (12/01/19 1208)  Pulse: 76 (12/01/19 1208)  Resp: 16 (12/01/19 1208)  BP: 109/71 (12/01/19 1208)  SpO2: 100 % (12/01/19 0732) Vital Signs (24h Range):  Temp:  [97 °F (36.1 °C)-100.1 °F (37.8 °C)] 98.2 °F (36.8 °C)  Pulse:  [76-92] 76  Resp:  [16-20] 16  SpO2:  [93 %-100 %] 100 %  BP: (109-128)/(68-83) 109/71     Weight: 80.8 kg (178 lb 2.1 oz)  Body mass index is 24.16 kg/m².    Intake/Output Summary (Last 24 hours) at 12/1/2019 1403  Last data filed at 12/1/2019 0800  Gross per 24 hour   Intake 1552 ml   Output 826 ml   Net 726 ml      Physical Exam   Constitutional: He is oriented to person,  place, and time. He appears well-developed and well-nourished.   HENT:   Head: Normocephalic and atraumatic.   Eyes: Pupils are equal, round, and reactive to light. EOM are normal.   Neck: Normal range of motion. Neck supple.   Cardiovascular:   Irregularly irregular, no murmurs   Pulmonary/Chest: Effort normal and breath sounds normal. No respiratory distress.   Abdominal: Soft. Bowel sounds are normal. He exhibits no distension. There is no tenderness.   Musculoskeletal: Normal range of motion. He exhibits no edema.   Golf ball sized nodule on dorsum of left hand covered by hyperkeratotic skin (states this is chronic)   Neurological: He is oriented to person, place, and time. No cranial nerve deficit. Coordination normal.   No tremors but diffusely weak   Skin: Skin is warm and dry.   Psychiatric: He has a normal mood and affect. His behavior is normal.   Vitals reviewed.      Significant Labs: All pertinent labs within the past 24 hours have been reviewed.    Significant Imaging: I have reviewed and interpreted all pertinent imaging results/findings within the past 24 hours.      Assessment/Plan:      * Atrial fibrillation with RVR  -Mr. Johnson was admitted to inpatient status in our icu and was stepped down to the floor on 11/30/19.  -History of pAFIB noted and has not been taking home metoprolol or diltiazem.  In ER had SVT and received adenosine which subsequently revealed afib with rvr.  He received diltiazem in ER and subsequently loaded with and started on amiodarone infusion.  -Amiodarone infusion stopped and metoprolol resumed by cardiology and he continues with good rate control.  -[id number]-FOPt score of 1 based on echo from 2018.  -TSH is normal  -Troponin negative  -Suspect this was due to significant dehydration and electrolyte abnormalities.  No evidence of EtOH withdrawal at this time.  -Continue metoprolol.  -Appreciate input from Dr. Chavez    Seizure  -History noted.  States last seizure was  several months ago.  Has not been taking home keppra.  -Could have been due to alcohol withdrawal, being off home keppra or significant acidosis and electrolyte abnormalities  -Loaded with keppra in ER  -CT of head without any acute changes.  -Seen by neurology and input appreciated  -Continue home keppra and seizure precautions.  -Continue ativan PRN seizures.    Alcohol abuse  -History noted.  States at baseline he drinks 2-4 coffee mugs of vodka cocktail daily.    -States he abruptly lost interest in alcohol 2 weeks ago and has had none since that time.  -Aside from seizure and tachycardia, no other signs of withdrawal such as confusion or tremulousness on admit.  -Continue to monitor CIWA and to provide folic acid, thiamine, MVI  -He has had no sings of withdrawal in last 24 hours  -Counseled on need for abstinence from alcohol.      Acute renal failure superimposed on stage 3 chronic kidney disease  -Baseline Cr is 1.2.  On admit Cr was 1.6 but now improved to 1.2  -He appeared very dehydrated and had not been eating over the last week or two.  -He is noted to have an anion gap of 31 on admission, likely due to seizure and dehydration which has normalized  -Beta-hydroxybutyrate was normal.  Lactic acid was normal.  ABG showed a partially compensated primary respiratory alkalosis and anion-gap metabolic acidosis.  -Avoid nephrotoxic agents and renally dose medications.  -Stop IV fluids today  -Repeat BMP in AM    Dehydration  -Treatment as above      High anion gap metabolic acidosis  -Treatment as above      Hypomagnesemia  -Chronic hypomagnesium noted.  -Replace mag again today  -Repeat Mag level in AM.      Leukocytosis  -WBC 18 on admit without significant left shift  -Likely due to hemoconcentration and stress reaction from seizures  -He is afebrile with clear cxr and no signs of infection.  PCT is normal.  UA was normal  -WBC normalized on 11/30  -Monitor closely.      Macrocytic anemia  -Longstanding  anemia noted  -Previously not folate, iron or b12 deficient  -Hb has dropped from 10.2 - 7.4--6.6 today.  No evidence of bleeding.  Likely due to hemodilution  -Elevated ferritin argues against iron deficiency.  B12 and Folate are normal.  Stool OB is negative.  -Transfuse 1 unit PRBC today.  -Repeat cbc in AM    GERD (gastroesophageal reflux disease)  -History noted  -Not on PPI prior to admit  -Given significant nausea recently it could be due to gerd  -Continue PPI now.  -If nausea persists will check KUB.      Debility  -PT/OT consulted and recommend HH at d/c for PT/OT    Essential hypertension  -At home is supposed to be on metoprolol and diltiazem but has not been  -Continue to hold diltiazem as BP is normal.  -Metoprolol resumed    Hyperlipidemia  -History noted  -Not on statin at this time.      Gouty arthritis  -History noted  -No obvious flare at this time  -Uric acid is elevated  -Continue home allopurinol        VTE Risk Mitigation (From admission, onward)         Ordered     heparin (porcine) injection 5,000 Units  Every 8 hours      11/29/19 1316     Place sequential compression device  Until discontinued      11/29/19 1316     IP VTE HIGH RISK PATIENT  Once      11/29/19 1316                      Joshua Powell MD  Department of Hospital Medicine   [facility]

## 2019-12-01 NOTE — ASSESSMENT & PLAN NOTE
-WBC 18 on admit without significant left shift  -Likely due to hemoconcentration and stress reaction from seizures  -He is afebrile with clear cxr and no signs of infection.  PCT is normal.  UA was normal  -WBC normalized on 11/30  -Monitor closely.

## 2019-12-01 NOTE — ASSESSMENT & PLAN NOTE
-History noted but not on PPI prior to admit  -Given significant nausea and diminished appetite recently it could be due to gerd  -No further nausea during admit while on treatment with PPI  -Will continue PPI at discharge.  -Will place ambulatory referral to GI for evaluation and possible EGD

## 2019-12-01 NOTE — ASSESSMENT & PLAN NOTE
-Baseline Cr is 1.2.  On admit Cr was 1.6 but now improved to 1.2  -He appeared very dehydrated and had not been eating over the last week or two.  -He is noted to have an anion gap of 31 on admission, likely due to seizure and dehydration which has normalized  -Beta-hydroxybutyrate was normal.  Lactic acid was normal.  ABG showed a partially compensated primary respiratory alkalosis and anion-gap metabolic acidosis.  -Avoid nephrotoxic agents and renally dose medications.  -Stop IV fluids today  -Repeat BMP in AM

## 2019-12-01 NOTE — NURSING
Pt tongue is sore and beefy from prior seizure activity before admission.  JOHN MURPHY notified and new orders given vicious lidocaine pt educated on medicatiion to swish and spit out not to swallow.  Also continuous tele ordered stat tele d/cd. Pt states medication relieved sore tongue

## 2019-12-01 NOTE — ASSESSMENT & PLAN NOTE
-Mr. Johnson was admitted to inpatient status in our icu and was stepped down to the floor on 11/30/19.  -History of pAFIB noted and has not been taking home metoprolol or diltiazem.  In ER had SVT and received adenosine which subsequently revealed afib with rvr.  He received diltiazem in ER and subsequently loaded with and started on amiodarone infusion.  -Amiodarone infusion stopped and metoprolol resumed by cardiology and he continues with good rate control.  -[id number]-SIEp score of 1 based on echo from 2018.  -TSH is normal  -Troponin negative  -Suspect this was due to significant dehydration and electrolyte abnormalities.  No evidence of EtOH withdrawal at this time.  -Continue metoprolol.  -Appreciate input from Dr. Chavez

## 2019-12-01 NOTE — PLAN OF CARE
[facility] HOME HEALTH ORDERS  FACE TO FACE ENCOUNTER    Patient Name: Michael Johnson Jr.  YOB: 1960    PCP: Zunilda Bolden NP   PCP Address: [address] [place] [facility] [address]  PCP Phone Number: [phone]  PCP Fax: [phone]    Encounter Date: 12/2/19    Admit to Home Health    Diagnoses:  Active Hospital Problems    Diagnosis  POA    *Atrial fibrillation with RVR [I48.91]  Yes     Priority: 1 - High    Seizure [R56.9]  Yes     Priority: 2     Alcohol abuse [F10.10]  Yes     Priority: 3     Acute renal failure superimposed on stage 3 chronic kidney disease [N17.9, N18.3]  Yes     Priority: 4     Dehydration [E86.0]  Yes     Priority: 5     High anion gap metabolic acidosis [E87.2]  Yes     Priority: 6     Hypomagnesemia [E83.42]  Yes     Priority: 7     Leukocytosis [D72.829]  Yes     Priority: 8     Macrocytic anemia [D53.9]  Yes     Priority: 9     GERD (gastroesophageal reflux disease) [K21.9]  Yes     Priority: 10     Debility [R53.81]  Yes    Essential hypertension [I10]  Yes    Gouty arthritis [M10.9]  Yes    Hyperlipidemia [E78.5]  Yes      Resolved Hospital Problems   No resolved problems to display.       No future appointments.  Follow-up Information     Zunilda Bolden NP In 1 week.    Specialty:  Urgent Care  Contact information:  [address] [place]  [facility] [id number] [phone]                     I have seen and examined this patient face to face today. My clinical findings that support the need for the home health skilled services and home bound status are the following:  Weakness/numbness causing balance and gait disturbance due to Weakness/Debility making it taxing to leave home.  Patient with medication mismanagement issues requiring home bound status as evidenced by  Unstable vital signs (blood pressure, heart rate), Poor understanding of medication regimen/dosage and Poor adherence to  medication regimen/dosage.    Allergies:  Review of patient's allergies indicates:   Allergen Reactions    Lisinopril Swelling     Pt admitted with angioedema 2wks after taking lisinopril.        Diet: cardiac diet    Activities: activity as tolerated and ambulate in house with assistance    Nursing:   SN to complete comprehensive assessment including routine vital signs. Instruct on disease process and s/s of complications to report to MD. Review/verify medication list sent home with the patient at time of discharge  and instruct patient/caregiver as needed. Frequency may be adjusted depending on start of care date.    Notify MD if SBP > 160 or < 90; DBP > 90 or < 50; HR > 120 or < 50; Temp > 101; Other:       Check CBC weekly x 2 weeks and send results to Primary care provider    CONSULTS:    Physical Therapy to evaluate and treat. Evaluate for home safety and equipment needs; Establish/upgrade home exercise program. Perform / instruct on therapeutic exercises, gait training, transfer training, and Range of Motion.  Occupational Therapy to evaluate and treat. Evaluate home environment for safety and equipment needs. Perform/Instruct on transfers, ADL training, ROM, and therapeutic exercises.   to evaluate for community resources/long-range planning.  Aide to provide assistance with personal care, ADLs, and vital signs.    Medications:    Michael Johnson Jr.   Home Medication Instructions GLENDA:[id number]    Printed on:12/02/19 1211   Medication Information                      allopurinol (ZYLOPRIM) 300 MG tablet  Take 1 tablet (300 mg total) by mouth once daily.             ergocalciferol (VITAMIN D2) 50,000 unit Cap  Take 50,000 Units by mouth every 7 days.             ferrous sulfate 325 (65 FE) MG EC tablet  Take 1 tablet (325 mg total) by mouth 2 (two) times daily.             folic acid (FOLVITE) 1 MG tablet  Take 1 tablet (1 mg total) by mouth once daily.             gabapentin (NEURONTIN) 300  MG capsule  Take 300 mg by mouth 3 (three) times daily.             levETIRAcetam (KEPPRA) 500 MG Tab  Take 1 tablet (500 mg total) by mouth 2 (two) times daily.             magnesium 250 mg Tab  Take 2 tablets (500 mg total) by mouth once daily.             metoprolol tartrate (LOPRESSOR) 50 MG tablet  Take 1 tablet (50 mg total) by mouth 2 (two) times daily.             pantoprazole (PROTONIX) 40 MG tablet  Take 1 tablet (40 mg total) by mouth once daily.             potassium chloride (KLOR-CON) 20 mEq Pack  Take 20 mEq by mouth 2 (two) times daily.                   I certify that this patient is confined to his home and needs intermittent skilled nursing care, physical therapy and occupational therapy.

## 2019-12-01 NOTE — ASSESSMENT & PLAN NOTE
-History noted.  States at baseline he drinks 2-4 coffee mugs of vodka cocktail daily.    -States he abruptly lost interest in alcohol 2 weeks ago and has had none since that time.  -Aside from seizure and tachycardia, no other signs of withdrawal such as confusion or tremulousness on admit.  -Continue to monitor CIWA and to provide folic acid, thiamine, MVI  -He has had no sings of withdrawal in last 24 hours  -Counseled on need for abstinence from alcohol.

## 2019-12-01 NOTE — ASSESSMENT & PLAN NOTE
-At home is supposed to be on metoprolol and diltiazem but has not been  -Continue to hold diltiazem as BP is normal.  -Metoprolol resumed

## 2019-12-01 NOTE — PROGRESS NOTES
[facility]  Cardiology  Progress Note    Patient Name: Michael Johnson Jr.  MRN: [id number]  Admission Date: 11/29/2019  Hospital Length of Stay: 2 days  Code Status: Full Code   Attending Physician: Joshua Powell MD   Primary Care Physician: Zunilda Bolden NP  Expected Discharge Date:   Principal Problem:Atrial fibrillation with RVR    Subjective:     Brief HPI:    Michael Johnson Jr. is a 59 y.o.male with hypertension. He is a heavy drinker. He has previously been admitted to [facility] and then noted to have paroxysmal atrial fibrillation. 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 According to the ER records he quit drinking about 11/14/2019 although the patient tells me that he quit drinking in about 2015. He apparently also stopped his seize medication as well as metop[rol and diltiazem. He presents after he had a witness seizure at home. His wife called 911. On arrival to the ER he had another seizure. He was then noted to be in a narrow complex tachycardia with a rate above 200 bpm. He received adenosine which revealed atrial fibrillation. He was later given diltiazem iv and then amiodarone iv. He was admitted to the ICU.      Hospital Course:     Amiodarone iv.    Metoprolol po.    Correction of electrolyte abnormalities.    Interval History:    No further atrial fibrillation.     Tells me he has not been drinking for months whereas his wife says he was drinking until a few days prior to presentation.     Review of Systems   Constitution: Negative for chills, fever and malaise/fatigue.   HENT: Negative for nosebleeds.    Eyes: Negative for double vision, vision loss in left eye and vision loss in right eye.   Cardiovascular: Negative for chest pain, claudication, dyspnea on exertion, irregular heartbeat, leg swelling, near-syncope, orthopnea, palpitations, paroxysmal nocturnal dyspnea  and syncope.   Respiratory: Negative for cough, hemoptysis, shortness of breath and wheezing.    Endocrine: Negative for cold intolerance and heat intolerance.   Hematologic/Lymphatic: Negative for bleeding problem. Does not bruise/bleed easily.   Skin: Negative for color change and rash.   Musculoskeletal: Negative for back pain, falls, muscle weakness and myalgias.   Gastrointestinal: Negative for heartburn, hematemesis, hematochezia, hemorrhoids, jaundice, melena, nausea and vomiting.   Genitourinary: Negative for dysuria and hematuria.   Neurological: Positive for focal weakness (left arm and leg). Negative for dizziness, headaches, light-headedness, loss of balance, numbness, vertigo and weakness.   Psychiatric/Behavioral: Positive for memory loss. Negative for altered mental status and depression. The patient is not nervous/anxious.    Allergic/Immunologic: Negative for hives and persistent infections.     Objective:     Vital Signs (Most Recent):  Temp: 98.2 °F (36.8 °C) (12/01/19 1208)  Pulse: 76 (12/01/19 1208)  Resp: 16 (12/01/19 1208)  BP: 109/71 (12/01/19 1208)  SpO2: 100 % (12/01/19 0732) Vital Signs (24h Range):  Temp:  [97 °F (36.1 °C)-100.1 °F (37.8 °C)] 98.2 °F (36.8 °C)  Pulse:  [74-92] 76  Resp:  [16-20] 16  SpO2:  [93 %-100 %] 100 %  BP: (109-128)/(68-83) 109/71     Weight: 80.8 kg (178 lb 2.1 oz)  Body mass index is 24.16 kg/m².    SpO2: 100 %  O2 Device (Oxygen Therapy): room air      Intake/Output Summary (Last 24 hours) at 12/1/2019 1351  Last data filed at 12/1/2019 0800  Gross per 24 hour   Intake 1552 ml   Output 826 ml   Net 726 ml       Lines/Drains/Airways     Peripheral Intravenous Line                 Midline Catheter Insertion/Assessment  - Single Lumen 11/29/19 2240 Right basilic vein (medial side of arm) other (see comments) 1 day                Physical Exam   Constitutional: He appears well-developed and well-nourished.  Non-toxic appearance. No distress.   HENT:   Head:  Normocephalic and atraumatic.   Nose: Nose normal.   Eyes: Right eye exhibits no discharge. Left eye exhibits no discharge. Right conjunctiva is not injected. Left conjunctiva is not injected. Right pupil is round. Left pupil is round. Pupils are equal.   Neck: Neck supple. No JVD present. Carotid bruit is not present. No thyromegaly present.   Cardiovascular: Normal rate, regular rhythm, S1 normal and S2 normal.  No extrasystoles are present. PMI is not displaced. Exam reveals no gallop.   Pulses:       Radial pulses are 2+ on the right side, and 2+ on the left side.        Femoral pulses are 2+ on the right side, and 2+ on the left side.       Dorsalis pedis pulses are 2+ on the right side, and 2+ on the left side.        Posterior tibial pulses are 2+ on the right side, and 2+ on the left side.   Pulmonary/Chest: Effort normal and breath sounds normal.   Abdominal: Soft. Normal appearance. There is no hepatosplenomegaly. There is no tenderness.   Musculoskeletal:        Right ankle: He exhibits no swelling, no ecchymosis and no deformity.        Left ankle: He exhibits no swelling, no ecchymosis and no deformity.   Lymphadenopathy:        Head (right side): No submandibular adenopathy present.        Head (left side): No submandibular adenopathy present.     He has no cervical adenopathy.   Neurological: He is alert. He is disoriented. No cranial nerve deficit.   Weak left leg and arm.   Skin: Skin is warm, dry and intact. No rash noted. He is not diaphoretic.   Psychiatric: He has a normal mood and affect. His speech is normal and behavior is normal. Cognition and memory are impaired.       Current Medications:     allopurinol  300 mg Oral Daily    aspirin  81 mg Oral Daily    folic acid  1 mg Oral Daily    heparin (porcine)  5,000 Units Subcutaneous Q8H    levETIRAcetam  500 mg Oral BID    metoprolol tartrate  50 mg Oral BID    multivitamin   Oral Daily    pantoprazole  40 mg Oral Daily     senna-docusate 8.6-50 mg  1 tablet Oral BID    sodium chloride 0.9%  10 mL Intravenous Q6H    thiamine  100 mg Oral Daily     Current Laboratory Results:    Recent Results (from the past 24 hour(s))   Occult blood x 1, stool    Collection Time: 12/01/19  4:02 AM   Result Value Ref Range    Occult Blood Negative Negative   CBC with Automated Differential    Collection Time: 12/01/19  4:02 AM   Result Value Ref Range    WBC 7.69 3.90 - 12.70 K/uL    RBC 1.86 (L) 4.60 - 6.20 M/uL    Hemoglobin 6.6 (L) 14.0 - 18.0 g/dL    Hematocrit 20.8 (L) 40.0 - 54.0 %    Mean Corpuscular Volume 112 (H) 82 - 98 fL    Mean Corpuscular Hemoglobin 35.5 (H) 27.0 - 31.0 pg    Mean Corpuscular Hemoglobin Conc 31.7 (L) 32.0 - 36.0 g/dL    RDW 17.7 (H) 11.5 - 14.5 %    Platelets 191 150 - 350 K/uL    MPV 11.7 9.2 - 12.9 fL    Immature Granulocytes 0.5 0.0 - 0.5 %    Gran # (ANC) 6.2 1.8 - 7.7 K/uL    Immature Grans (Abs) 0.04 0.00 - 0.04 K/uL    Lymph # 0.8 (L) 1.0 - 4.8 K/uL    Mono # 0.5 0.3 - 1.0 K/uL    Eos # 0.1 0.0 - 0.5 K/uL    Baso # 0.02 0.00 - 0.20 K/uL    nRBC 0 0 /100 WBC    Gran% 80.7 (H) 38.0 - 73.0 %    Lymph% 10.9 (L) 18.0 - 48.0 %    Mono% 6.2 4.0 - 15.0 %    Eosinophil% 1.4 0.0 - 8.0 %    Basophil% 0.3 0.0 - 1.9 %    Differential Method Automated    Basic metabolic panel    Collection Time: 12/01/19  4:02 AM   Result Value Ref Range    Sodium 134 (L) 136 - 145 mmol/L    Potassium 3.6 3.5 - 5.1 mmol/L    Chloride 108 95 - 110 mmol/L    CO2 16 (L) 23 - 29 mmol/L    Glucose 94 70 - 110 mg/dL    BUN, Bld 11 6 - 20 mg/dL    Creatinine 1.2 0.5 - 1.4 mg/dL    Calcium 7.6 (L) 8.7 - 10.5 mg/dL    Anion Gap 10 8 - 16 mmol/L    eGFR if African American >60 >60 mL/min/1.73 m^2    eGFR if non African American >60 >60 mL/min/1.73 m^2   Magnesium    Collection Time: 12/01/19  4:02 AM   Result Value Ref Range    Magnesium 1.3 (L) 1.6 - 2.6 mg/dL   Procalcitonin    Collection Time: 12/01/19 12:20 PM   Result Value Ref Range     Procalcitonin 0.39 (H) <0.25 ng/mL     Current Imaging Results:    CT Head Without Contrast   Final Result      No acute intracranial abnormalities are identified.  There is no evidence for intracranial hemorrhage.      Atrophy and periventricular white matter ischemic changes consistent with patient's age.      Paranasal sinus mucosal disease.         Electronically signed by: Alvin Smith MD   Date:    11/29/2019   Time:    13:13      X-Ray Chest AP Portable   Final Result      No acute chest disease identified.  No detrimental change noted when compared to 04/27/2019.         Electronically signed by: Alvin Smith MD   Date:    11/29/2019   Time:    10:55          Assessment and Plan:     Problem List:    Active Diagnoses:    Diagnosis Date Noted POA    PRINCIPAL PROBLEM:  Atrial fibrillation with RVR [I48.91] 11/29/2019 Yes    High anion gap metabolic acidosis [E87.2] 11/29/2019 Yes    Acute renal failure superimposed on stage 3 chronic kidney disease [N17.9, N18.3] 11/29/2019 Yes    Leukocytosis [D72.829] 11/29/2019 Yes    Debility [R53.81] 04/04/2019 Yes    Alcohol abuse [F10.10]  Yes    Essential hypertension [I10]  Yes    Macrocytic anemia [D53.9] 01/09/2018 Yes    GERD (gastroesophageal reflux disease) [K21.9] 03/16/2017 Yes    Hypomagnesemia [E83.42] 03/16/2017 Yes    Seizure [R56.9] 03/16/2017 Yes    Dehydration [E86.0] 10/20/2014 Yes    Gouty arthritis [M10.9] 10/20/2014 Yes    Hyperlipidemia [E78.5] 10/20/2014 Yes      Problems Resolved During this Admission:     Assessment and Plan:     1. Atrial Fibrillation              2018: Diagnosed with paroxysmal atrial fibrillation.              [id number]=6 (H).              Used to be on metoprolol 50 mg Q24 and diltiazem 180 mg Q24.              11/14/2019: Appears to have stopped taking above medications.              11/29/2019: Atrial fibrillation 210 bpm.              11/29/2019: Received amiodarone iv.   Not a good candidate for  anticoagulation due to being unsteady with falls.   No amiodarone or anticoagulation.              On metoprolol 50 mg Q12.   Will discontinue aspirin.      2. Hypertension              2015: Diagnosed.     3. Seizure Disorder.              11/29/2019: Two seizures after being off Keppra.      VTE Risk Mitigation (From admission, onward)         Ordered     heparin (porcine) injection 5,000 Units  Every 8 hours      11/29/19 1316     Place sequential compression device  Until discontinued      11/29/19 1316     IP VTE HIGH RISK PATIENT  Once      11/29/19 1316                Camila Chavez MD  Cardiology  [facility]

## 2019-12-02 VITALS
OXYGEN SATURATION: 99 % | DIASTOLIC BLOOD PRESSURE: 79 MMHG | WEIGHT: 178.13 LBS | TEMPERATURE: 100 F | BODY MASS INDEX: 24.13 KG/M2 | HEART RATE: 88 BPM | RESPIRATION RATE: 18 BRPM | HEIGHT: 72 IN | SYSTOLIC BLOOD PRESSURE: 122 MMHG

## 2019-12-02 LAB
ANION GAP SERPL CALC-SCNC: 9 MMOL/L (ref 8–16)
ANISOCYTOSIS BLD QL SMEAR: SLIGHT
BASOPHILS # BLD AUTO: ABNORMAL K/UL (ref 0–0.2)
BASOPHILS NFR BLD: 1 % (ref 0–1.9)
BILIRUB UR QL STRIP: NEGATIVE
BUN SERPL-MCNC: 9 MG/DL (ref 6–20)
CALCIUM SERPL-MCNC: 7.7 MG/DL (ref 8.7–10.5)
CHLORIDE SERPL-SCNC: 109 MMOL/L (ref 95–110)
CLARITY UR: CLEAR
CO2 SERPL-SCNC: 16 MMOL/L (ref 23–29)
COLOR UR: YELLOW
CREAT SERPL-MCNC: 1.2 MG/DL (ref 0.5–1.4)
DIFFERENTIAL METHOD: ABNORMAL
EOSINOPHIL # BLD AUTO: ABNORMAL K/UL (ref 0–0.5)
EOSINOPHIL NFR BLD: 0 % (ref 0–8)
ERYTHROCYTE [DISTWIDTH] IN BLOOD BY AUTOMATED COUNT: 20.2 % (ref 11.5–14.5)
EST. GFR  (AFRICAN AMERICAN): >60 ML/MIN/1.73 M^2
EST. GFR  (NON AFRICAN AMERICAN): >60 ML/MIN/1.73 M^2
GLUCOSE SERPL-MCNC: 100 MG/DL (ref 70–110)
GLUCOSE UR QL STRIP: NEGATIVE
HAV IGM SERPL QL IA: NEGATIVE
HBV CORE IGM SERPL QL IA: NEGATIVE
HBV SURFACE AG SERPL QL IA: NEGATIVE
HCT VFR BLD AUTO: 22.2 % (ref 40–54)
HCV AB SERPL QL IA: NEGATIVE
HGB BLD-MCNC: 7.2 G/DL (ref 14–18)
HGB UR QL STRIP: NEGATIVE
HYPOCHROMIA BLD QL SMEAR: ABNORMAL
IMM GRANULOCYTES # BLD AUTO: ABNORMAL K/UL (ref 0–0.04)
IMM GRANULOCYTES NFR BLD AUTO: ABNORMAL % (ref 0–0.5)
KETONES UR QL STRIP: NEGATIVE
LEUKOCYTE ESTERASE UR QL STRIP: NEGATIVE
LYMPHOCYTES # BLD AUTO: ABNORMAL K/UL (ref 1–4.8)
LYMPHOCYTES NFR BLD: 14 % (ref 18–48)
MAGNESIUM SERPL-MCNC: 1.5 MG/DL (ref 1.6–2.6)
MCH RBC QN AUTO: 34.1 PG (ref 27–31)
MCHC RBC AUTO-ENTMCNC: 32.4 G/DL (ref 32–36)
MCV RBC AUTO: 105 FL (ref 82–98)
MONOCYTES # BLD AUTO: ABNORMAL K/UL (ref 0.3–1)
MONOCYTES NFR BLD: 14 % (ref 4–15)
NEUTROPHILS NFR BLD: 71 % (ref 38–73)
NITRITE UR QL STRIP: NEGATIVE
NRBC BLD-RTO: 0 /100 WBC
PH UR STRIP: 7 [PH] (ref 5–8)
PLATELET # BLD AUTO: 169 K/UL (ref 150–350)
PMV BLD AUTO: 11.4 FL (ref 9.2–12.9)
POLYCHROMASIA BLD QL SMEAR: ABNORMAL
POTASSIUM SERPL-SCNC: 3.3 MMOL/L (ref 3.5–5.1)
PROT UR QL STRIP: NEGATIVE
RBC # BLD AUTO: 2.11 M/UL (ref 4.6–6.2)
SODIUM SERPL-SCNC: 134 MMOL/L (ref 136–145)
SP GR UR STRIP: 1.02 (ref 1–1.03)
URN SPEC COLLECT METH UR: NORMAL
UROBILINOGEN UR STRIP-ACNC: 1 EU/DL
WBC # BLD AUTO: 7.8 K/UL (ref 3.9–12.7)

## 2019-12-02 PROCEDURE — 94761 N-INVAS EAR/PLS OXIMETRY MLT: CPT

## 2019-12-02 PROCEDURE — 85007 BL SMEAR W/DIFF WBC COUNT: CPT

## 2019-12-02 PROCEDURE — 25000003 PHARM REV CODE 250: Performed by: INTERNAL MEDICINE

## 2019-12-02 PROCEDURE — 25000003 PHARM REV CODE 250: Performed by: HOSPITALIST

## 2019-12-02 PROCEDURE — 99239 HOSP IP/OBS DSCHRG MGMT >30: CPT | Mod: ,,, | Performed by: HOSPITALIST

## 2019-12-02 PROCEDURE — 80048 BASIC METABOLIC PNL TOTAL CA: CPT

## 2019-12-02 PROCEDURE — 85027 COMPLETE CBC AUTOMATED: CPT

## 2019-12-02 PROCEDURE — 99239 PR HOSPITAL DISCHARGE DAY,>30 MIN: ICD-10-PCS | Mod: ,,, | Performed by: HOSPITALIST

## 2019-12-02 PROCEDURE — A4216 STERILE WATER/SALINE, 10 ML: HCPCS | Performed by: HOSPITALIST

## 2019-12-02 PROCEDURE — 83735 ASSAY OF MAGNESIUM: CPT

## 2019-12-02 PROCEDURE — 63600175 PHARM REV CODE 636 W HCPCS: Performed by: HOSPITALIST

## 2019-12-02 PROCEDURE — 81003 URINALYSIS AUTO W/O SCOPE: CPT

## 2019-12-02 RX ORDER — FERROUS SULFATE 325(65) MG
325 TABLET ORAL 2 TIMES DAILY
Qty: 60 TABLET | Refills: 1 | Status: SHIPPED | OUTPATIENT
Start: 2019-12-02 | End: 2020-07-06

## 2019-12-02 RX ORDER — LEVETIRACETAM 500 MG/1
500 TABLET ORAL 2 TIMES DAILY
Qty: 60 TABLET | Refills: 1 | Status: SHIPPED | OUTPATIENT
Start: 2019-12-02 | End: 2020-03-06 | Stop reason: SDUPTHER

## 2019-12-02 RX ORDER — MAGNESIUM SULFATE HEPTAHYDRATE 40 MG/ML
2 INJECTION, SOLUTION INTRAVENOUS ONCE
Status: DISCONTINUED | OUTPATIENT
Start: 2019-12-02 | End: 2019-12-02

## 2019-12-02 RX ORDER — PANTOPRAZOLE SODIUM 40 MG/1
40 TABLET, DELAYED RELEASE ORAL DAILY
Qty: 30 TABLET | Refills: 1 | Status: SHIPPED | OUTPATIENT
Start: 2019-12-02 | End: 2020-07-06 | Stop reason: SDUPTHER

## 2019-12-02 RX ORDER — FOLIC ACID 1 MG/1
1 TABLET ORAL DAILY
Qty: 30 TABLET | Refills: 1 | Status: SHIPPED | OUTPATIENT
Start: 2019-12-02 | End: 2020-07-06 | Stop reason: SDUPTHER

## 2019-12-02 RX ORDER — METOPROLOL TARTRATE 50 MG/1
50 TABLET ORAL 2 TIMES DAILY
Qty: 60 TABLET | Refills: 1 | Status: SHIPPED | OUTPATIENT
Start: 2019-12-02 | End: 2020-02-04 | Stop reason: SDUPTHER

## 2019-12-02 RX ORDER — MAGNESIUM 250 MG
2 TABLET ORAL DAILY
Qty: 60 TABLET | Refills: 1 | Status: SHIPPED | OUTPATIENT
Start: 2019-12-02 | End: 2020-07-06

## 2019-12-02 RX ORDER — ALLOPURINOL 300 MG/1
300 TABLET ORAL DAILY
Qty: 30 TABLET | Refills: 1 | Status: SHIPPED | OUTPATIENT
Start: 2019-12-02 | End: 2020-07-06 | Stop reason: SDUPTHER

## 2019-12-02 RX ORDER — POTASSIUM CHLORIDE 20 MEQ/1
20 TABLET, EXTENDED RELEASE ORAL 2 TIMES DAILY
Qty: 60 TABLET | Refills: 1 | Status: SHIPPED | OUTPATIENT
Start: 2019-12-02 | End: 2020-07-06

## 2019-12-02 RX ORDER — POTASSIUM CHLORIDE 750 MG/1
30 TABLET, EXTENDED RELEASE ORAL ONCE
Status: COMPLETED | OUTPATIENT
Start: 2019-12-02 | End: 2019-12-02

## 2019-12-02 RX ADMIN — Medication 10 ML: at 01:12

## 2019-12-02 RX ADMIN — Medication 100 MG: at 09:12

## 2019-12-02 RX ADMIN — ALLOPURINOL 300 MG: 300 TABLET ORAL at 09:12

## 2019-12-02 RX ADMIN — POTASSIUM CHLORIDE 30 MEQ: 10 TABLET, EXTENDED RELEASE ORAL at 10:12

## 2019-12-02 RX ADMIN — HEPARIN SODIUM 5000 UNITS: 5000 INJECTION, SOLUTION INTRAVENOUS; SUBCUTANEOUS at 01:12

## 2019-12-02 RX ADMIN — SENNOSIDES,DOCUSATE SODIUM 1 TABLET: 8.6; 5 TABLET, FILM COATED ORAL at 09:12

## 2019-12-02 RX ADMIN — PANTOPRAZOLE SODIUM 40 MG: 40 TABLET, DELAYED RELEASE ORAL at 09:12

## 2019-12-02 RX ADMIN — LEVETIRACETAM 500 MG: 500 TABLET ORAL at 09:12

## 2019-12-02 RX ADMIN — THERA TABS 1 TABLET: TAB at 09:12

## 2019-12-02 RX ADMIN — Medication 10 ML: at 06:12

## 2019-12-02 RX ADMIN — METOPROLOL TARTRATE 50 MG: 50 TABLET ORAL at 09:12

## 2019-12-02 RX ADMIN — FOLIC ACID 1 MG: 1 TABLET ORAL at 09:12

## 2019-12-02 RX ADMIN — HEPARIN SODIUM 5000 UNITS: 5000 INJECTION, SOLUTION INTRAVENOUS; SUBCUTANEOUS at 06:12

## 2019-12-02 RX ADMIN — MAGNESIUM SULFATE HEPTAHYDRATE 3 G: 500 INJECTION, SOLUTION INTRAMUSCULAR; INTRAVENOUS at 10:12

## 2019-12-02 NOTE — PLAN OF CARE
[facility]  confirmed acceptance for 12/3/19.    Family informed and in agreement with plan.   12/02/19 1157   Post-Acute Status   Post-Acute Authorization Placement   Post-Acute Placement Status Set-up Complete

## 2019-12-02 NOTE — ASSESSMENT & PLAN NOTE
-Longstanding anemia noted  -Previously not folate, iron or b12 deficient  -Hb dropped from 10.2-7.4-6.6 on 12/1.  No evidence of bleeding.  Likely due to hemodilution  -Elevated ferritin argues against iron deficiency, but has been on iron supplement at home.  B12 and Folate are normal.  Stool OB is negative.  -Transfused 1 unit PRBC on 12/2 and Hb has increased into satisfactory range.  -Recommend increasing iron supplement to BID.  Will ask that HH nurse check cbc and send results to pcp for close monitoring.

## 2019-12-02 NOTE — PROCEDURES
EEG REPORT      Michael Johnson Jr.  [id number]  1960    DATE OF SERVICE: 11/29/2019         METHODOLOGY      Extended electroencephalographic recording is made while the patient is ambulatory and continuing normal daily activities.  Electrodes are placed according to the International 10-20 placement system and included T1 and T2 electrode placement.  Twenty four (24) channels of digital signal (sampling rate of 512/sec) was simultaneously recorded from the scalp including EKG and eye monitors.  Recording band pass was 0.1 to 100 hz and all data was stored digitally on the recorder.  The patient is instructed to press an event button when clinical symptoms occur and write the symptoms into a diary. Activation procedures which include photic stimulation, hyperventilation and instructing patients to perform simple task are done in selected patients.        The EEG is displayed on a monitor screen and can be reformatted into different montages for evaluation.  The entire recoding is submitted for computer assisted analysis to detect spike and electrographic seizure activity.  The entire recording is visually reviewed and the times identified by computer analysis as being spikes or seizures are reviewed again.  Compresses spectral analysis (CSA) is also performed on the activity recorded from each individual channel.  This is displayed as a power display of frequencies from 0 to 30 Hz over time.   The CSA analysis is done and displayed continuously.  This is reviewed for asymmetries in power between homologous areas of the scalp and for presence of changes in power which canbe seen when seizures occur.  Sections of suspected abnormalities on the CSA is then compared with the original EEG recording.  .     [employer] software was also utilized in the review of this study.  This software suite analyzes the EEG recording in multiple domains.  Coherence and rhythmicity is computed to identify EEG sections which may  contain organized seizures.  Each channel undergoes analysis to detect presence of spike and sharp waves which have special and morphological characteristic of epileptic activity.  The routine EEG recording is converted from spacial into frequency domain.  This is then displayed comparing homologous areas to identify areas of significant asymmetry.  Algorithm to identify non-cortically generated artifact is used to separate eye movement, EMG and other artifact from the EEG     Recording Times    A total of 00:29:04 hours of EEG was recorded.      EEG FINDINGS:  Background activity:   The background rhythm was characterized by alpha and anterior dominant beta activity with a 10-11Hz posterior dominant alpha rhythm at 30-70 microvolts.   Symmetry and continuity: the background was continuous and symmetric     Sleep:   Normal sleep transients including sleep spindles, K complexes, vertex waves and POSTS were seen.    Activation procedures:   Patient oriented to person but not year    Abnormal activity:   No epileptiform discharges, periodic discharges, lateralized rhythmic delta activity or electrographic seizures were seen.    IMPRESSION:   This is a normal EEG of the awake and asleep states.    Fredy Arrieta MD.  Neurology-Epilepsy.  [facility].

## 2019-12-02 NOTE — ASSESSMENT & PLAN NOTE
-History noted.  States at baseline he drinks 2-4 coffee mugs of vodka cocktail daily.    -States he abruptly lost interest in alcohol 2 weeks ago and has had none since that time.  -Aside from seizure and tachycardia, no other signs of withdrawal such as confusion or tremulousness on admit.  -Was monitored with [facility] and provided folic acid, thiamine, MVI while in house  -He has had no sings of withdrawal during his admission  -Counseled on need for abstinence from alcohol.

## 2019-12-02 NOTE — PROGRESS NOTES
Discharge instructions reviewed with patient and spouse. Verbalized understanding and deny any further questions. All medications for discharge delivered to patient at bedside by [facility]. Patient denies any acute pain, nausea or SOB. IV removed with catheter tip fully intact. Gauze and tegaderm placed over IV insertion site following discontinuation. Telemetry box removed from patient. Waiting for pt transport to arrive at bedside for discharge home. All belongings with patient. Will continue to monitor.

## 2019-12-02 NOTE — PLAN OF CARE
Pt in bed, no noted acute distress, no complaints of pain, bed in low locked position, call light in reach, hourly rounds complete, will continue to assess

## 2019-12-02 NOTE — ASSESSMENT & PLAN NOTE
-Chronic hypomagnesium noted.  -Replace mag again today  -Increase dose of home oral mag supplement to two tabs po daily  -Encourage better oral intake and follow up with pcp within 1 week.

## 2019-12-02 NOTE — DISCHARGE SUMMARY
"[facility] Hospital Medicine  Discharge Summary      Patient Name: Michael Johnson Jr.  MRN: [id number]  Admission Date: 11/29/2019  Hospital Length of Stay: 3 days  Discharge Date and Time: No discharge date for patient encounter.  Attending Physician: Joshua Powell MD   Discharging Provider: Joshua Powell MD  Primary Care Provider: Zunilda Bolden NP      HPI:   Mr. Johnson is a 59 year odl man with history of alcoholism, seizure disorder, paroxysmal atrial fibrillation, ckdIII, gout and medication non-compliance who was brought in by EMS for evaluation of seizures.  History is provided by his wife, himself and review of the chart.  Apparently two weeks ago he stopped drinking all alcohol because he "suddenly lost interest in it".  Over the last week he has had a very poor appetite with significant nausea.  He had some vomiting over the last two days and notes he has not been taking any of his medications like he is supposed to.  He denies any dysphagia or difficulty eating, but just notes he has no desire to eat due to his significant nausea.  This morning he was seen by his wife having seizure activity in bed.  This consisted of full body convulsions and lasted about 5 minutes.  EMS were called and he was noted to be post-ictal and confused on their arrival.  After arrival to our ER he had another seizure and received diazepam and was loaded with keppra.  Also while in the ER he was noted to have significant tachycardia (presumed SVT) up into the 200s.  He received adenosine and was subsequently noted to be in atrial fibrillation with rvr for which he received diltiazem with minimal results.  Subsequently he was loaded with amiodarone followed by continuous infusion.  Upon my interview he is awake and oriented with no signs of confusion or tremulousness.  He denies fevers, chills, headache, pain in any part of his body, diarrhea, leg swelling, shortness of breath, constipation, new " numbness or weakness.  His only actual complaint is of ongoing nausea.    Consults:   Consults (From admission, onward)        Status Ordering Provider     Inpatient consult to Cardiology  Once     Provider:  Camila Chavez MD    Completed NONI VALDEZ     Inpatient consult to Midline team  Once     Provider:  (Not yet assigned)    Acknowledged NONI VALDEZ     Inpatient consult to Neurology  Once     Provider:  (Not yet assigned)    Completed NONI VALDEZ     Inpatient consult to Registered Dietitian/Nutritionist  Once     Provider:  (Not yet assigned)    Completed NONI VALDEZ        Hospital Course By Problem:   * Atrial fibrillation with RVR  -Mr. Johnson was admitted to inpatient status in our icu and was stepped down to the floor on 11/30/19.  -History of pAFIB noted and has not been taking home metoprolol or diltiazem.  In ER had SVT and received adenosine which subsequently revealed afib with rvr.  He received diltiazem in ER and subsequently loaded with and started on amiodarone infusion.  -Amiodarone infusion stopped and metoprolol resumed by cardiology and he continues with good rate control.  -[id number] score of 1 based on echo from 2018.  -TSH is normal  -Troponin negative  -Suspect this was due to significant dehydration and electrolyte abnormalities.  No evidence of EtOH withdrawal at this time.  -Continue metoprolol Per Dr. Chavez.  -Aspirin stopped by Dr. Chavez  -No further recurrence and OK for discharge home with  PT/OT/SN to assist with weakness and medication management    Seizure  -History noted.  States last seizure was several months ago.  Has not been taking home keppra.  -Could have been due to alcohol withdrawal, being off home keppra or significant acidosis and electrolyte abnormalities  -Loaded with keppra in ER  -CT of head without any acute changes.  -Seen by neurology and input appreciated  -No further seizures.  Educated on importance of medication  compliance.  -Continue home keppra     Alcohol abuse  -History noted.  States at baseline he drinks 2-4 coffee mugs of vodka cocktail daily.    -States he abruptly lost interest in alcohol 2 weeks ago and has had none since that time.  -Aside from seizure and tachycardia, no other signs of withdrawal such as confusion or tremulousness on admit.  -Was monitored with [facility] and provided folic acid, thiamine, MVI while in house  -He has had no sings of withdrawal during his admission  -Counseled on need for abstinence from alcohol.    Acute renal failure superimposed on stage 3 chronic kidney disease  -Baseline Cr is 1.2.  On admit Cr was 1.6 but now improved to 1.2  -He appeared very dehydrated and had not been eating over the last week or two.  -He is noted to have an anion gap of 31 on admission, likely due to seizure and dehydration which has normalized  -Beta-hydroxybutyrate was normal.  Lactic acid was normal.  ABG showed a partially compensated primary respiratory alkalosis and anion-gap metabolic acidosis.  -Avoid nephrotoxic agents and renally dose medications.  -Good oral intake and Cr remains stable off IV fluids.  -F/U with pcp within 1 week.    Dehydration  -Treatment as above    High anion gap metabolic acidosis  -Treatment as above    Hypomagnesemia  -Chronic hypomagnesium noted.  -Replace mag again today  -Increase dose of home oral mag supplement to two tabs po daily  -Encourage better oral intake and follow up with pcp within 1 week.    Leukocytosis  -WBC 18 on admit without significant left shift  -Likely due to hemoconcentration and stress reaction from seizures  -He is afebrile with clear cxr and no signs of infection.  PCT is normal.  UA was normal  -WBC normalized on 11/30  -Did have isolated fever on morning of discharge but looked great without cough, rash, diarrhea or dysuria.  Repeat cxr and UA are normal.  OK for discharge.    Macrocytic anemia  -Longstanding anemia noted  -Previously not  folate, iron or b12 deficient  -Hb dropped from 10.2-7.4-6.6 on 12/1.  No evidence of bleeding.  Likely due to hemodilution  -Elevated ferritin argues against iron deficiency, but has been on iron supplement at home.  B12 and Folate are normal.  Stool OB is negative.  -Transfused 1 unit PRBC on 12/2 and Hb has increased into satisfactory range.  -Recommend increasing iron supplement to BID.  Will ask that HH nurse check cbc and send results to pcp for close monitoring.    GERD (gastroesophageal reflux disease)  -History noted but not on PPI prior to admit  -Given significant nausea and diminished appetite recently it could be due to gerd  -No further nausea during admit while on treatment with PPI  -Will continue PPI at discharge.  -Will place ambulatory referral to GI for evaluation and possible EGD    Debility  -PT/OT consulted and recommend HH at d/c for PT/OT    Essential hypertension  -At home is supposed to be on metoprolol and diltiazem but has not been  -Continue to hold diltiazem as BP is normal.  -Metoprolol resumed    Hyperlipidemia  -History noted  -Not on statin at this time.  -Follow up with pcp for monitoring.    Gouty arthritis  -History noted  -No obvious flare at this time  -Uric acid is elevated  -Continue home allopurinol    Final Active Diagnoses:    Diagnosis Date Noted POA    PRINCIPAL PROBLEM:  Atrial fibrillation with RVR [I48.91] 11/29/2019 Yes    Seizure [R56.9] 03/16/2017 Yes    Alcohol abuse [F10.10]  Yes    Acute renal failure superimposed on stage 3 chronic kidney disease [N17.9, N18.3] 11/29/2019 Yes    Dehydration [E86.0] 10/20/2014 Yes    High anion gap metabolic acidosis [E87.2] 11/29/2019 Yes    Hypomagnesemia [E83.42] 03/16/2017 Yes    Leukocytosis [D72.829] 11/29/2019 Yes    Macrocytic anemia [D53.9] 01/09/2018 Yes    GERD (gastroesophageal reflux disease) [K21.9] 03/16/2017 Yes    Debility [R53.81] 04/04/2019 Yes    Essential hypertension [I10]  Yes    Gouty  arthritis [M10.9] 10/20/2014 Yes    Hyperlipidemia [E78.5] 10/20/2014 Yes      Problems Resolved During this Admission:       Discharged Condition: fair    Disposition: Home-Health Care [facility]    Follow Up:  Follow-up Information     Zunilda Bolden NP In 1 week.    Specialty:  Urgent Care  Contact information:  [address]  [place] [address]  [phone]             [facility].    Specialties:  Home Health Services, Physical Therapy  Why:  Home Health  Contact information:  [address]  [address]  [place] [address]  [phone]             Zunilda Bolden NP In 1 week.    Specialty:  Urgent Care  Contact information:  [address]  [facility] [id number] [phone]             Camila Chavez MD In 2 weeks.    Specialty:  Cardiology  Contact information:  [address]  [facility] [id number] [phone]                 Patient Instructions:      Ambulatory Referral to Gastroenterology   Referral Priority: Routine Referral Type: Consultation   Referral Reason: Specialty Services Required   Requested Specialty: Gastroenterology   Number of Visits Requested: 1     Diet Cardiac     Diet Cardiac     Notify your health care provider if you experience any of the following:  increased confusion or weakness     Notify your health care provider if you experience any of the following:  persistent dizziness, light-headedness, or visual disturbances     Notify your health care provider if you experience any of the following:  worsening rash     Notify your health care provider if you experience any of the following:  severe persistent headache     Notify your health care provider if you experience any of the following:  difficulty breathing or increased cough     Notify your health care provider if you experience any of the following:  severe uncontrolled pain     Notify your health care provider if you experience any of the following:  persistent  nausea and vomiting or diarrhea     Notify your health care provider if you experience any of the following:  temperature >100.4     Activity as tolerated     Activity as tolerated       Significant Diagnostic Studies: Labs:   BMP:   Recent Labs   Lab 12/01/19  0402 12/02/19  0350   GLU 94 100   * 134*   K 3.6 3.3*    109   CO2 16* 16*   BUN 11 9   CREATININE 1.2 1.2   CALCIUM 7.6* 7.7*   MG 1.3* 1.5*   , CMP   Recent Labs   Lab 12/01/19  0402 12/02/19  0350   * 134*   K 3.6 3.3*    109   CO2 16* 16*   GLU 94 100   BUN 11 9   CREATININE 1.2 1.2   CALCIUM 7.6* 7.7*   ANIONGAP 10 9   ESTGFRAFRICA >60 >60   EGFRNONAA >60 >60   , CBC   Recent Labs   Lab 12/01/19  0402 12/02/19  0350   WBC 7.69 7.80   HGB 6.6* 7.2*   HCT 20.8* 22.2*    169    and A1C: No results for input(s): HGBA1C in the last 4320 hours.    Pending Diagnostic Studies:     None         Medications:  Reconciled Home Medications:      Medication List      START taking these medications    folic acid 1 MG tablet  Commonly known as:  FOLVITE  Take 1 tablet (1 mg total) by mouth once daily.     metoprolol tartrate 50 MG tablet  Commonly known as:  LOPRESSOR  Take 1 tablet (50 mg total) by mouth 2 (two) times daily.     pantoprazole 40 MG tablet  Commonly known as:  PROTONIX  Take 1 tablet (40 mg total) by mouth once daily.        CHANGE how you take these medications    ferrous sulfate 325 (65 FE) MG EC tablet  Take 1 tablet (325 mg total) by mouth 2 (two) times daily.  What changed:  when to take this     magnesium 250 mg Tab  Take 2 tablets (500 mg total) by mouth once daily.  What changed:  how much to take        CONTINUE taking these medications    allopurinol 300 MG tablet  Commonly known as:  ZYLOPRIM  Take 1 tablet (300 mg total) by mouth once daily.     gabapentin 300 MG capsule  Commonly known as:  NEURONTIN  Take 300 mg by mouth 3 (three) times daily.     levETIRAcetam 500 MG Tab  Commonly known as:  KEPPRA  Take 1  tablet (500 mg total) by mouth 2 (two) times daily.     potassium chloride 20 mEq Pack  Commonly known as:  KLOR-CON  Take 20 mEq by mouth 2 (two) times daily.     Vitamin D2 50,000 unit Cap  Generic drug:  ergocalciferol  Take 50,000 Units by mouth every 7 days.        STOP taking these medications    aspirin 81 MG EC tablet  Commonly known as:  ECOTRIN     diltiaZEM 180 MG Cs24  Commonly known as:  TIAZAC     HYDROcodone-acetaminophen 5-325 mg per tablet  Commonly known as:  NORCO     ibuprofen 800 MG tablet  Commonly known as:  ADVIL,MOTRIN     metoprolol succinate 50 MG 24 hr tablet  Commonly known as:  TOPROL-XL            Indwelling Lines/Drains at time of discharge:   Lines/Drains/Airways     None                 Time spent on the discharge of patient: 35 minutes  Patient was seen and examined on the date of discharge and determined to be suitable for discharge.         Joshua Powell MD  Department of Hospital Medicine  [facility]

## 2019-12-02 NOTE — NURSING
Received report from Sunday, RN and resumed care; rounds on pt done, will continue to monitor throughout shift

## 2019-12-02 NOTE — ASSESSMENT & PLAN NOTE
-Mr. Johnson was admitted to inpatient status in our icu and was stepped down to the floor on 11/30/19.  -History of pAFIB noted and has not been taking home metoprolol or diltiazem.  In ER had SVT and received adenosine which subsequently revealed afib with rvr.  He received diltiazem in ER and subsequently loaded with and started on amiodarone infusion.  -Amiodarone infusion stopped and metoprolol resumed by cardiology and he continues with good rate control.  -[id number] score of 1 based on echo from 2018.  -TSH is normal  -Troponin negative  -Suspect this was due to significant dehydration and electrolyte abnormalities.  No evidence of EtOH withdrawal at this time.  -Continue metoprolol Per Dr. Chavez.  -Aspirin stopped by Dr. Chavez  -No further recurrence and OK for discharge home with HH PT/OT/SN to assist with weakness and medication management

## 2019-12-02 NOTE — PLAN OF CARE
Pt and spouse selected [facility] , referral sent thru .   12/02/19 0830   Post-Acute Status   Post-Acute Authorization Placement   Post-Acute Placement Status Referrals Sent

## 2019-12-02 NOTE — ASSESSMENT & PLAN NOTE
-History noted.  States last seizure was several months ago.  Has not been taking home keppra.  -Could have been due to alcohol withdrawal, being off home keppra or significant acidosis and electrolyte abnormalities  -Loaded with keppra in ER  -CT of head without any acute changes.  -Seen by neurology and input appreciated  -No further seizures.  Educated on importance of medication compliance.  -Continue home keppra

## 2019-12-02 NOTE — ASSESSMENT & PLAN NOTE
-Baseline Cr is 1.2.  On admit Cr was 1.6 but now improved to 1.2  -He appeared very dehydrated and had not been eating over the last week or two.  -He is noted to have an anion gap of 31 on admission, likely due to seizure and dehydration which has normalized  -Beta-hydroxybutyrate was normal.  Lactic acid was normal.  ABG showed a partially compensated primary respiratory alkalosis and anion-gap metabolic acidosis.  -Avoid nephrotoxic agents and renally dose medications.  -Good oral intake and Cr remains stable off IV fluids.  -F/U with pcp within 1 week.

## 2019-12-02 NOTE — PT/OT/SLP DISCHARGE
Occupational Therapy Discharge Summary    Michael Johnson Jr.  MRN: [id number]   Principal Problem: Atrial fibrillation with RVR      Patient Discharged from acute Occupational Therapy on 12/2/2019.  Please refer to prior OT note dated 11/30/2019 for functional status.    Assessment:      Patient appropriate for care in another setting.    Objective:     GOALS:   Multidisciplinary Problems     Occupational Therapy Goals        Problem: Occupational Therapy Goal    Goal Priority Disciplines Outcome Interventions   Occupational Therapy Goal     OT, PT/OT Ongoing, Progressing    Description:  Goals to be met by 12/7/19    SBA G/H seated at sink  Max A LBD  Assess toilet hygiene  Assess bed>BSC and W/C transfers                    Reasons for Discontinuation of Therapy Services  Transfer to alternate level of care.      Plan:     Patient Discharged to: Home with Home Health Service    Tania Syed OT  12/2/2019

## 2019-12-02 NOTE — ASSESSMENT & PLAN NOTE
-WBC 18 on admit without significant left shift  -Likely due to hemoconcentration and stress reaction from seizures  -He is afebrile with clear cxr and no signs of infection.  PCT is normal.  UA was normal  -WBC normalized on 11/30  -Did have isolated fever on morning of discharge but looked great without cough, rash, diarrhea or dysuria.  Repeat cxr and UA are normal.  OK for discharge.

## 2019-12-03 PROCEDURE — G0180 PR HOME HEALTH MD CERTIFICATION: ICD-10-PCS | Mod: ,,, | Performed by: HOSPITALIST

## 2019-12-03 PROCEDURE — G0180 MD CERTIFICATION HHA PATIENT: HCPCS | Mod: ,,, | Performed by: HOSPITALIST

## 2019-12-04 ENCOUNTER — PATIENT OUTREACH (OUTPATIENT)
Dept: ADMINISTRATIVE | Facility: CLINIC | Age: 59
End: 2019-12-04

## 2019-12-04 LAB
BACTERIA BLD CULT: NORMAL
BACTERIA BLD CULT: NORMAL

## 2019-12-04 NOTE — PATIENT INSTRUCTIONS
Discharge Instructions for Atrial Fibrillation  You have been diagnosed with an abnormal heart rhythm called atrial fibrillation. With this condition, your hearts 2 upper chambers quiver rather than squeeze the blood out in a normal pattern. This leads to an irregular and sometimes rapid heartbeat. Some people will develop associated symptoms such as a flip-flopping heartbeat, chest pain, lightheadedness, or shortness of breath. Other people may have no symptoms at all. Atrial fibrillation is serious because it affects the hearts ability to fill with blood as it should. Blood clots may form. This increases the risk for stroke. Untreated atrial fibrillation can also lead to heart failure. Atrial fibrillation can be controlled. With treatment, most people with atrial fibrillation lead normal lives.  Treatment options  Recommended treatment for atrial fibrillation depends on your age, symptoms, how long you have had atrial fibrillation, and other factors. You will have a complete evaluation to find out if you have any abnormalities that caused your heart to go into atrial fibrillation. This might be blocked heart arteries or a thyroid problem. Your doctor will assess your particular case and discuss choices with you.  Treatment choices may include:  · Treating an underlying disorder that puts you at risk for atrial fibrillation. For example, correcting an abnormal thyroid or electrolyte problem, or treating a blocked heart artery.  · Restoring a normal heart rhythm with an electrical shock (cardioversion) or with an antiarrhythmic medicine (chemical cardioversion)  · Using medicine to control your heart rate in atrial fibrillation.  · Preventing the risk for blood clot and stroke using blood-thinning medicines. Your doctor will tell you what he or she recommends. Choices may include aspirin, clopidogrel, warfarin, dabigatran, rivaroxaban, apixaban, and edoxaban.  · Doing catheter ablation or a surgical maze  procedure. These use different methods to destroy certain areas of heart tissue. This interrupts the electrical signals causing atrial fibrillation. One of these procedures may be a choice when medicines do not work, or as an alternative to long-term medicine.  · Other treatment choices may be recommended for you by your doctor.  Managing risk factors for stroke and preventing heart failure are important parts of any treatment plan for atrial fibrillation.  Home care  · Take your medicines exactly as directed. Dont skip doses.  · Work with your doctor to find the right medicines and doses for you.  · Learn to take your own pulse. Keep a record of your results. Ask your doctor which pulse rates mean that you need medical attention. Slowing your pulse is often the goal of treatment. Ask your doctor if its OK for you to use an automatic machine to check your pulse at home. Sometimes these machines dont count the pulse correctly when you have atrial fibrillation.  · Limit your intake of coffee, tea, cola, and other beverages with caffeine. Talk with your doctor about whether you should eliminate caffeine.  · Avoid over-the-counter medicines that have caffeine in them.  · Let your doctor know what medicines you take, including prescription and over-the-counter medicines, as well as any supplements. They interfere with some medicines given for atrial fibrillation.  · Ask your doctor about whether you can drink alcohol. Some people need to avoid alcohol to better treat atrial fibrillation. If you are taking blood-thinner medicines, alcohol may interfere with them by increasing their effect.  · Never take stimulants such as amphetamines or cocaine. These drugs can speed up your heart rate and trigger atrial fibrillation.  Follow-up care  Follow up with your doctor, or as advised.     When should I call my healthcare provider  Call your healthcare provider right away if you have any of the  following:  · Weakness  · Dizziness  · Fainting  · Fatigue  · Shortness of breath  · Chest pain with increased activity  · A change in the usual regularity of your heartbeat, or an unusually fast heartbeat   Date Last Reviewed: 4/23/2016  © 7996-1295 [employer]. [address] [place] [address], [place] [address]. All rights reserved. This information is not intended as a substitute for professional medical care. Always follow your healthcare professional's instructions.

## 2019-12-04 NOTE — PROGRESS NOTES
C3 nurse attempted to contact patient. No answer. The following message was left for the patient to return the call:  Good Morning I am a nurse calling on behalf of [facility] from the [facility].  This is a Transitional Care Call for Michael Johnson Jr. . When you have a moment please contact us at [phone] or [phone] Monday through Friday, between the hours of 8 am to 4 pm. We look forward to speaking with you. On behalf of [facility] have a nice day.    The patient does not have a scheduled HOSFU appointment within 7-14 days post hospital discharge date 12/02/2019. Non [facility] PCP.

## 2019-12-18 ENCOUNTER — EXTERNAL HOME HEALTH (OUTPATIENT)
Dept: HOME HEALTH SERVICES | Facility: HOSPITAL | Age: 59
End: 2019-12-18
Payer: MEDICARE

## 2019-12-23 ENCOUNTER — HOSPITAL ENCOUNTER (EMERGENCY)
Facility: OTHER | Age: 59
Discharge: HOME | End: 2019-12-23
Attending: EMERGENCY MEDICINE
Payer: MEDICARE

## 2019-12-23 VITALS
DIASTOLIC BLOOD PRESSURE: 99 MMHG | TEMPERATURE: 100 F | HEIGHT: 72 IN | BODY MASS INDEX: 30.48 KG/M2 | WEIGHT: 225 LBS | HEART RATE: 80 BPM | OXYGEN SATURATION: 99 % | SYSTOLIC BLOOD PRESSURE: 154 MMHG | RESPIRATION RATE: 16 BRPM

## 2019-12-23 DIAGNOSIS — E83.42 HYPOMAGNESEMIA: ICD-10-CM

## 2019-12-23 DIAGNOSIS — R42 DIZZINESS: ICD-10-CM

## 2019-12-23 DIAGNOSIS — I10 ESSENTIAL HYPERTENSION: Primary | ICD-10-CM

## 2019-12-23 LAB
ALBUMIN SERPL BCP-MCNC: 3.4 G/DL (ref 3.5–5.2)
ALP SERPL-CCNC: 110 U/L (ref 55–135)
ALT SERPL W/O P-5'-P-CCNC: 18 U/L (ref 10–44)
ANION GAP SERPL CALC-SCNC: 11 MMOL/L (ref 8–16)
AST SERPL-CCNC: 25 U/L (ref 10–40)
BASOPHILS # BLD AUTO: 0.04 K/UL (ref 0–0.2)
BASOPHILS NFR BLD: 0.6 % (ref 0–1.9)
BILIRUB SERPL-MCNC: 0.3 MG/DL (ref 0.1–1)
BUN SERPL-MCNC: 13 MG/DL (ref 6–20)
CALCIUM SERPL-MCNC: 8.5 MG/DL (ref 8.7–10.5)
CHLORIDE SERPL-SCNC: 113 MMOL/L (ref 95–110)
CO2 SERPL-SCNC: 19 MMOL/L (ref 23–29)
CREAT SERPL-MCNC: 1.1 MG/DL (ref 0.5–1.4)
DIFFERENTIAL METHOD: ABNORMAL
EOSINOPHIL # BLD AUTO: 0.1 K/UL (ref 0–0.5)
EOSINOPHIL NFR BLD: 0.8 % (ref 0–8)
ERYTHROCYTE [DISTWIDTH] IN BLOOD BY AUTOMATED COUNT: 21 % (ref 11.5–14.5)
EST. GFR  (AFRICAN AMERICAN): >60 ML/MIN/1.73 M^2
EST. GFR  (NON AFRICAN AMERICAN): >60 ML/MIN/1.73 M^2
EXTRA BLUE TOP RAINBOW: NORMAL
EXTRA GOLD TOP RAINBOW: NORMAL
EXTRA GREEN TOP RAINBOW: NORMAL
EXTRA LAVENDER TOP RAINBOW: NORMAL
EXTRA RED TOP RAINBOW: NORMAL
GLUCOSE SERPL-MCNC: 119 MG/DL (ref 70–110)
HCT VFR BLD AUTO: 32 % (ref 40–54)
HGB BLD-MCNC: 10.1 G/DL (ref 14–18)
IMM GRANULOCYTES # BLD AUTO: 0.01 K/UL (ref 0–0.04)
IMM GRANULOCYTES NFR BLD AUTO: 0.2 % (ref 0–0.5)
LYMPHOCYTES # BLD AUTO: 1.4 K/UL (ref 1–4.8)
LYMPHOCYTES NFR BLD: 22.5 % (ref 18–48)
MAGNESIUM SERPL-MCNC: 1 MG/DL (ref 1.6–2.6)
MCH RBC QN AUTO: 33 PG (ref 27–31)
MCHC RBC AUTO-ENTMCNC: 31.6 G/DL (ref 32–36)
MCV RBC AUTO: 105 FL (ref 82–98)
MONOCYTES # BLD AUTO: 0.7 K/UL (ref 0.3–1)
MONOCYTES NFR BLD: 11.9 % (ref 4–15)
NEUTROPHILS # BLD AUTO: 4 K/UL (ref 1.8–7.7)
NEUTROPHILS NFR BLD: 64 % (ref 38–73)
NRBC BLD-RTO: 0 /100 WBC
PLATELET # BLD AUTO: 227 K/UL (ref 150–350)
PMV BLD AUTO: 10.4 FL (ref 9.2–12.9)
POTASSIUM SERPL-SCNC: 3.9 MMOL/L (ref 3.5–5.1)
PROT SERPL-MCNC: 8.3 G/DL (ref 6–8.4)
RBC # BLD AUTO: 3.06 M/UL (ref 4.6–6.2)
SODIUM SERPL-SCNC: 143 MMOL/L (ref 136–145)
TSH SERPL DL<=0.005 MIU/L-ACNC: 2.16 UIU/ML (ref 0.4–4)
WBC # BLD AUTO: 6.21 K/UL (ref 3.9–12.7)

## 2019-12-23 PROCEDURE — 63600175 PHARM REV CODE 636 W HCPCS: Performed by: EMERGENCY MEDICINE

## 2019-12-23 PROCEDURE — 96365 THER/PROPH/DIAG IV INF INIT: CPT

## 2019-12-23 PROCEDURE — 85025 COMPLETE CBC W/AUTO DIFF WBC: CPT

## 2019-12-23 PROCEDURE — 25000003 PHARM REV CODE 250: Performed by: EMERGENCY MEDICINE

## 2019-12-23 PROCEDURE — 83735 ASSAY OF MAGNESIUM: CPT

## 2019-12-23 PROCEDURE — 93010 ELECTROCARDIOGRAM REPORT: CPT | Mod: ,,, | Performed by: INTERNAL MEDICINE

## 2019-12-23 PROCEDURE — 96361 HYDRATE IV INFUSION ADD-ON: CPT

## 2019-12-23 PROCEDURE — 93005 ELECTROCARDIOGRAM TRACING: CPT

## 2019-12-23 PROCEDURE — 93010 EKG 12-LEAD: ICD-10-PCS | Mod: ,,, | Performed by: INTERNAL MEDICINE

## 2019-12-23 PROCEDURE — 99284 EMERGENCY DEPT VISIT MOD MDM: CPT | Mod: 25

## 2019-12-23 PROCEDURE — 80053 COMPREHEN METABOLIC PANEL: CPT

## 2019-12-23 PROCEDURE — 84443 ASSAY THYROID STIM HORMONE: CPT

## 2019-12-23 PROCEDURE — 96366 THER/PROPH/DIAG IV INF ADDON: CPT

## 2019-12-23 RX ORDER — METOPROLOL TARTRATE 50 MG/1
50 TABLET ORAL
Status: COMPLETED | OUTPATIENT
Start: 2019-12-23 | End: 2019-12-23

## 2019-12-23 RX ORDER — LANOLIN ALCOHOL/MO/W.PET/CERES
400 CREAM (GRAM) TOPICAL DAILY
Qty: 30 TABLET | Refills: 0 | Status: SHIPPED | OUTPATIENT
Start: 2019-12-23 | End: 2020-07-06 | Stop reason: SDUPTHER

## 2019-12-23 RX ORDER — POTASSIUM CHLORIDE 20 MEQ/1
20 TABLET, EXTENDED RELEASE ORAL DAILY
Qty: 30 TABLET | Refills: 0 | Status: SHIPPED | OUTPATIENT
Start: 2019-12-23 | End: 2020-07-06 | Stop reason: SDUPTHER

## 2019-12-23 RX ORDER — MAGNESIUM SULFATE HEPTAHYDRATE 40 MG/ML
2 INJECTION, SOLUTION INTRAVENOUS
Status: COMPLETED | OUTPATIENT
Start: 2019-12-23 | End: 2019-12-23

## 2019-12-23 RX ADMIN — METOPROLOL TARTRATE 50 MG: 50 TABLET ORAL at 12:12

## 2019-12-23 RX ADMIN — SODIUM CHLORIDE 500 ML: 0.9 INJECTION, SOLUTION INTRAVENOUS at 12:12

## 2019-12-23 RX ADMIN — MAGNESIUM SULFATE IN WATER 2 G: 40 INJECTION, SOLUTION INTRAVENOUS at 01:12

## 2019-12-23 NOTE — ED NOTES
Rounding on pt performed. Pt updated on plan of care. Mg has finished. Urinal provided. Denies any concerns. Monitoring continues.

## 2019-12-23 NOTE — ED TRIAGE NOTES
Pt reports dizziness upon waking this morning. Denies any new weakness. Pt reports elevated diastolic BP this morning. Reports not taking daily medication till noon so he hasn't taken them today. Denies any CP or SOB. denies feeling like his heart is racing. Denies any cough/congestion or fever/chills/body aches. denie any n/v/d. Reports dizziness as if he stood up to fast but denies feeling like his is off balance or like he is going to pass out.

## 2019-12-23 NOTE — ED PROVIDER NOTES
Encounter Date: 12/23/2019    SCRIBE #1 NOTE: IMerlene, am scribing for, and in the presence of, Dr. Todd.       History     Chief Complaint   Patient presents with    Dizziness     Pt reports dizziness and weakness since this am and states his B/P was elevated this am.      Time seen by provider: 12:35 PM    This is a 59 y.o. male with hx of HTN, asthma, CKD, Afib, and seizures who presents with complaint of dizziness this morning. His home health nurse reported elevated blood pressure PTA. He reports fatigue, decreased appetite at baseline, weight loss, and congestion. He denies fever, sore throat, rhinorrhea, cough, shortness of breath, chest pain, or headache. He notes he was admitted last month with Afib.    The history is provided by the patient.     Review of patient's allergies indicates:   Allergen Reactions    Lisinopril Swelling     Pt admitted with angioedema 2wks after taking lisinopril.      Past Medical History:   Diagnosis Date    Afib     Alcohol abuse with other alcohol-induced disorder 04/02/2019    Anemia 04/02/2019    unspecified deficiency    Ankle fracture, left     Arthritis     Asthma     Chronic kidney disease (CKD), stage III (moderate) 04/02/2019    Depression     Erectile dysfunction 04/02/2019    Gout, arthropathy 04/02/2019    Gout, unspecified     Hypertension     Hypertensive chronic kidney disease 04/02/2019    Hypomagnesemia 04/02/2019    Noncompliance 04/02/2019    Peripheral neuropathy 04/02/2019    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    Secondary hyperparathyroidism, renal 04/02/2019    Seizure 2016    Spastic hemiplegia affecting left nondominant side 04/02/2019     Past Surgical History:   Procedure Laterality Date    ANKLE SURGERY      x6    CHOLECYSTECTOMY       Family History   Problem Relation Age of Onset    Hypertension Father     Stroke Maternal Grandmother     Cataracts Maternal Grandfather     Stroke Maternal Grandfather     Cancer Mother          malignant polyp     Social History     Tobacco Use    Smoking status: Never Smoker    Smokeless tobacco: Never Used   Substance Use Topics    Alcohol use: Yes     Alcohol/week: 3.0 - 4.0 standard drinks     Types: 3 - 4 Shots of liquor per week     Comment: daily, but states he stopped all alcohol 2 weeks ago abruptly lost interest in it.    Drug use: No     Review of Systems   Constitutional: Positive for appetite change, fatigue and unexpected weight change. Negative for fever.   HENT: Positive for congestion. Negative for rhinorrhea and sore throat.    Respiratory: Negative for shortness of breath.    Cardiovascular: Negative for chest pain.   Gastrointestinal: Negative for nausea.   Genitourinary: Negative for dysuria.   Musculoskeletal: Negative for back pain.   Skin: Negative for color change, rash and wound.   Neurological: Positive for dizziness. Negative for weakness.   Hematological: Does not bruise/bleed easily.   All other systems reviewed and are negative.      Physical Exam     Initial Vitals [12/23/19 1211]   BP Pulse Resp Temp SpO2   (!) 146/96 (!) 129 18 99.8 °F (37.7 °C) 98 %      MAP       --         Physical Exam    Nursing note and vitals reviewed.  Constitutional: He appears well-developed. He is not diaphoretic. No distress.   Appears dehydrated.   HENT:   Head: Normocephalic and atraumatic.   Nose: Nose normal.   Mouth/Throat: Mucous membranes are dry.   Eyes: EOM are normal. Pupils are equal, round, and reactive to light.   Neck: Normal range of motion. Neck supple.   Cardiovascular: Regular rhythm and normal heart sounds. Tachycardia present.  Exam reveals no gallop and no friction rub.    No murmur heard.  Heart rate in 120s on initial evaluation.   Pulmonary/Chest: Breath sounds normal. No respiratory distress. He has no wheezes. He has no rhonchi. He has no rales.   Musculoskeletal: Normal range of motion. He exhibits no edema or tenderness.   Neurological: He is alert and oriented  to person, place, and time.   Skin: Skin is warm and dry.   Psychiatric: He has a normal mood and affect. His behavior is normal. Judgment and thought content normal.         ED Course   Procedures  Labs Reviewed   CBC W/ AUTO DIFFERENTIAL - Abnormal; Notable for the following components:       Result Value    RBC 3.06 (*)     Hemoglobin 10.1 (*)     Hematocrit 32.0 (*)     Mean Corpuscular Volume 105 (*)     Mean Corpuscular Hemoglobin 33.0 (*)     Mean Corpuscular Hemoglobin Conc 31.6 (*)     RDW 21.0 (*)     All other components within normal limits   MAGNESIUM - Abnormal; Notable for the following components:    Magnesium 1.0 (*)     All other components within normal limits   COMPREHENSIVE METABOLIC PANEL - Abnormal; Notable for the following components:    Chloride 113 (*)     CO2 19 (*)     Glucose 119 (*)     Calcium 8.5 (*)     Albumin 3.4 (*)     All other components within normal limits   BLUE-TOP TUBE   GREEN-TOP TUBE   LAVENDER-TOP TUBE   GOLD-TOP TUBE   RED-TOP TUBE   CBC W/ AUTO DIFFERENTIAL   COMPREHENSIVE METABOLIC PANEL   MAGNESIUM   TSH   TSH     EKG Readings: (Independently Interpreted)   Rhythm: Sinus Tachycardia. Heart Rate: 117. Ectopy: No Ectopy. Conduction: Normal. ST Segments: Normal ST Segments. T Waves: Normal. Clinical Impression: Sinus Tachycardia   Rhythm has changed from prior EKG on 11/29/19 with Afib.       Imaging Results    None          Medical Decision Making:   History:   Old Medical Records: I decided to obtain old medical records.  Initial Assessment:   59 y.o. male recent admission for Afib with RVR presents with vague symptoms of dizziness/weakness with hypertension noted by home health this morning. Blood pressure has improved spontaneously. Plan labs, monitoring, IV fluids, and EKG.  Differential Diagnosis:   Peripheral vertigo, inner ear disease, vestibular neuritis, migraine headache, meniere disease, vestibular tumor, CVA, carotid disease or dissection, orthostatic  hypotension, dehydration, electrolyte abnormality, anemia, arrhythmia, myocardial ischemia  Electrolyte abnormality, hypoglycemia, CVA, spinal cord abnormality, infectious causes, Guillain [place], neuromuscular junction disease, muscle disease, endocrine abnormalities, sepsis.    Independently Interpreted Test(s):   I have ordered and independently interpreted EKG Reading(s) - see prior notes  Clinical Tests:   Lab Tests: Ordered and Reviewed  Medical Tests: Ordered and Reviewed  ED Management:  Patient improved with treatment in the emergency department and comfortable going home. Discussed reasons to return and importance of followup.  Patient understands that the emergency visit today is primarily to address immediate concerns and to rule out emergent cause of symptoms and that they may require further workup and evaluation as an outpatient. All questions addressed and patient given discharge instructions and followup information.   Rx magnesium and potassium.             Scribe Attestation:   Scribe #1: I performed the above scribed service and the documentation accurately describes the services I performed. I attest to the accuracy of the note.    Attending Attestation:           Physician Attestation for Scribe:  Physician Attestation Statement for Scribe #1: I, Dr. Todd, reviewed documentation, as scribed by Merlene Payan in my presence, and it is both accurate and complete.                               Clinical Impression:     1. Essential hypertension    2. Dizziness    3. Hypomagnesemia          Disposition:   Disposition: Discharged  Condition: Stable                     Michelle Todd MD  12/25/19 [id number]

## 2019-12-23 NOTE — ED NOTES
Rounding on pt performed. Family at bedside. Family and pt updated on plan of care. Denies any changes. Bed in low, locked position. Monitoring continues.

## 2019-12-23 NOTE — ED NOTES
Rounding on pt performed.  Pt updated on plan of care. IV medications infusing. Denies any changes. Monitoring continues.

## 2020-02-04 ENCOUNTER — HOSPITAL ENCOUNTER (OUTPATIENT)
Dept: RADIOLOGY | Facility: OTHER | Age: 60
Discharge: HOME | End: 2020-02-04
Attending: INTERNAL MEDICINE
Payer: MEDICARE

## 2020-02-04 ENCOUNTER — OFFICE VISIT (OUTPATIENT)
Dept: INTERNAL MEDICINE | Facility: CLINIC | Age: 60
End: 2020-02-04
Payer: MEDICARE

## 2020-02-04 VITALS
DIASTOLIC BLOOD PRESSURE: 80 MMHG | SYSTOLIC BLOOD PRESSURE: 118 MMHG | BODY MASS INDEX: 29.69 KG/M2 | OXYGEN SATURATION: 98 % | HEIGHT: 73 IN | HEART RATE: 106 BPM

## 2020-02-04 DIAGNOSIS — E55.9 VITAMIN D DEFICIENCY: ICD-10-CM

## 2020-02-04 DIAGNOSIS — G89.29 CHRONIC PAIN OF LEFT ANKLE: Primary | ICD-10-CM

## 2020-02-04 DIAGNOSIS — D53.9 MACROCYTIC ANEMIA: ICD-10-CM

## 2020-02-04 DIAGNOSIS — F17.210 LIGHT CIGARETTE SMOKER (1-9 CIGS/DAY): ICD-10-CM

## 2020-02-04 DIAGNOSIS — I48.0 PAROXYSMAL ATRIAL FIBRILLATION: ICD-10-CM

## 2020-02-04 DIAGNOSIS — R56.9 SEIZURE: ICD-10-CM

## 2020-02-04 DIAGNOSIS — I10 ESSENTIAL HYPERTENSION: ICD-10-CM

## 2020-02-04 DIAGNOSIS — K21.9 GASTROESOPHAGEAL REFLUX DISEASE WITHOUT ESOPHAGITIS: ICD-10-CM

## 2020-02-04 DIAGNOSIS — Z23 FLU VACCINE NEED: ICD-10-CM

## 2020-02-04 DIAGNOSIS — F10.10 ALCOHOL ABUSE: ICD-10-CM

## 2020-02-04 DIAGNOSIS — M25.572 CHRONIC PAIN OF LEFT ANKLE: Primary | ICD-10-CM

## 2020-02-04 DIAGNOSIS — E78.2 MIXED HYPERLIPIDEMIA: ICD-10-CM

## 2020-02-04 DIAGNOSIS — E83.42 HYPOMAGNESEMIA: ICD-10-CM

## 2020-02-04 DIAGNOSIS — N18.30 CKD (CHRONIC KIDNEY DISEASE), STAGE III: ICD-10-CM

## 2020-02-04 DIAGNOSIS — R53.81 DEBILITY: ICD-10-CM

## 2020-02-04 DIAGNOSIS — Z11.4 SCREENING FOR HIV (HUMAN IMMUNODEFICIENCY VIRUS): ICD-10-CM

## 2020-02-04 DIAGNOSIS — M10.9 GOUTY ARTHRITIS: ICD-10-CM

## 2020-02-04 DIAGNOSIS — E87.6 ACUTE HYPOKALEMIA: ICD-10-CM

## 2020-02-04 PROBLEM — K81.9 CHOLECYSTITIS: Status: RESOLVED | Noted: 2018-01-15 | Resolved: 2020-02-04

## 2020-02-04 PROBLEM — D72.829 LEUKOCYTOSIS: Status: RESOLVED | Noted: 2019-11-29 | Resolved: 2020-02-04

## 2020-02-04 PROCEDURE — 3074F SYST BP LT 130 MM HG: CPT | Mod: CPTII,S$GLB,, | Performed by: INTERNAL MEDICINE

## 2020-02-04 PROCEDURE — 3074F PR MOST RECENT SYSTOLIC BLOOD PRESSURE < 130 MM HG: ICD-10-PCS | Mod: CPTII,S$GLB,, | Performed by: INTERNAL MEDICINE

## 2020-02-04 PROCEDURE — 3079F DIAST BP 80-89 MM HG: CPT | Mod: CPTII,S$GLB,, | Performed by: INTERNAL MEDICINE

## 2020-02-04 PROCEDURE — 76770 US EXAM ABDO BACK WALL COMP: CPT | Mod: TC

## 2020-02-04 PROCEDURE — 3079F PR MOST RECENT DIASTOLIC BLOOD PRESSURE 80-89 MM HG: ICD-10-PCS | Mod: CPTII,S$GLB,, | Performed by: INTERNAL MEDICINE

## 2020-02-04 PROCEDURE — 99214 OFFICE O/P EST MOD 30 MIN: CPT | Mod: S$GLB,,, | Performed by: INTERNAL MEDICINE

## 2020-02-04 PROCEDURE — 76770 US EXAM ABDO BACK WALL COMP: CPT | Mod: 26,,, | Performed by: RADIOLOGY

## 2020-02-04 PROCEDURE — 3008F BODY MASS INDEX DOCD: CPT | Mod: CPTII,S$GLB,, | Performed by: INTERNAL MEDICINE

## 2020-02-04 PROCEDURE — 99406 PR TOBACCO USE CESSATION INTERMEDIATE 3-10 MINUTES: ICD-10-PCS | Mod: S$GLB,,, | Performed by: INTERNAL MEDICINE

## 2020-02-04 PROCEDURE — 76770 US RETROPERITONEAL COMPLETE: ICD-10-PCS | Mod: 26,,, | Performed by: RADIOLOGY

## 2020-02-04 PROCEDURE — 99999 PR PBB SHADOW E&M-EST. PATIENT-LVL IV: CPT | Mod: PBBFAC,,, | Performed by: INTERNAL MEDICINE

## 2020-02-04 PROCEDURE — 99214 PR OFFICE/OUTPT VISIT, EST, LEVL IV, 30-39 MIN: ICD-10-PCS | Mod: S$GLB,,, | Performed by: INTERNAL MEDICINE

## 2020-02-04 PROCEDURE — 3008F PR BODY MASS INDEX (BMI) DOCUMENTED: ICD-10-PCS | Mod: CPTII,S$GLB,, | Performed by: INTERNAL MEDICINE

## 2020-02-04 PROCEDURE — 99406 BEHAV CHNG SMOKING 3-10 MIN: CPT | Mod: S$GLB,,, | Performed by: INTERNAL MEDICINE

## 2020-02-04 PROCEDURE — 99999 PR PBB SHADOW E&M-EST. PATIENT-LVL IV: ICD-10-PCS | Mod: PBBFAC,,, | Performed by: INTERNAL MEDICINE

## 2020-02-04 RX ORDER — METOPROLOL TARTRATE 50 MG/1
50 TABLET ORAL 2 TIMES DAILY
Qty: 60 TABLET | Refills: 3 | Status: SHIPPED | OUTPATIENT
Start: 2020-02-04 | End: 2020-05-27 | Stop reason: SDUPTHER

## 2020-02-04 NOTE — PROGRESS NOTES
Subjective:       Patient ID: Michael Johnson Jr. is a 59 y.o. 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    Chief Complaint: Establish Care and Ankle Pain (left )     History was obtained from the patient and supplemented through chart review and from his wife who accompanied the patient.  He was previously seen by Dr. England in 2014 for annual, HTN.    His wife is also my patient, Leeanne Johnson.    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  Gradually able to walk.  Can transfer himself out of a wheelchair.  Bathes in bed.  Has bedside commode.  Cannot prepare his own meals.    HPI    Chronic left ankle pain:   Pain is 8-10 in severity.  History of multiple ankle surgeries/fusion x 6.  Had HH, but discharged d/t reaching max potential.    Paroxysmal A fib:  Was not compliant with metoprolol, diltiazem.  Admission  for seizure. HR 200s (Presumed SVT).  Received adenosine and was then in AFib with RVR that did not respond to diltiazem.  Then received amiodarone infusion.  AFib with RVR was thought to be due to dehydration and electrolyte abnormalities.  Resumed Lopressor 50 BID.  Chads Vasc score 1.  Cardiology stopped aspirin.  Was discharged with home health PT/OT for weakness, medication management.    Compliant with Lopressor 50 BID.  Denies CP, SOB, BARBER, lightheadedness,  dizziness.  Palpitations resolved.  No syncope.    Lab Results   Component Value Date    TSH 2.159 12/23/2019     Hypokalemia , hypomagnesemia:  Decreased PO d/t unable to prepare his own meals.  Eats ramen noodles.  +Nausea, vomiting if he eats too much ramen noodles.  No diarrhea.  Is taking KCl 20 BID, Mag-Ox    HTN:    Pt's BP is controlled on Lopressor 50 b.i.d.  Was noncompliant with diltiazem; BP WNL on only metoprolol. +A fib.  Tolerating meds well. Pt denies CP, SOB, lightheadedness, dizziness.      FHx: yes  Tobacco: sometimes  ETOH: yes  Exercise: in a wheelchair.     Tobacco use:    Occasionally smokes a pipe. No cocaine use.    HLD:   Not on statin.  Lab Results   Component Value Date    LDLCALC 63.6 01/10/2018     The 10-year ASCVD risk score (Len BEE Jr., et al., 2013) is: 14.7%    Values used to calculate the score:      Age: 59 years      Sex: Male      Is Non- : Yes      Diabetic: No      Tobacco smoker: Yes      Systolic Blood Pressure: 118 mmHg      Is BP treated: Yes      HDL Cholesterol: 62 mg/dL      Total Cholesterol: 144 mg/dL    Alcohol abuse:  Drinks 2-4 coffee mugs of vodka cocktail daily.  Last drink this AM.  States that he is currently intoxicated. Also drank yesterday d/t death in the family.  States that he drinks socially, not 1-2 shots/day.  Denies daily drinking. No longer on folate, MVI.    Seizure:  As an adult.  Admission  for seizure activity.    History of med noncompliance, but had also stopped drinking.  CT head without acute changes.  Resumed home Keppra  500 b.i.d..  No seizures since then.  Does not follow with Neuro.    CKD 3:  Check for proteinuria.  Not on ACE/ARB.  Renal ultrasound: ordering  Does not see Nephrology.  Lab Results   Component Value Date    CREATININE 1.1 12/23/2019    BUN 13 12/23/2019     12/23/2019    K 3.9 12/23/2019     (H) 12/23/2019    CO2 19 (L) 12/23/2019     Lab Results   Component Value Date     CALCIUM 8.5 (L) 12/23/2019    PHOS 1.7 (L) 11/29/2019     Lab Results   Component Value Date    CALCIUM 8.5 (L) 12/23/2019    PHOS 1.7 (L) 11/29/2019     Vitamin D deficiency: completed course of Ergocalciferol.  No results found for: [id number]    H/o Gout:  On allopurinol 300.  Lab Results   Component Value Date    URICACID 10.0 (H) 11/30/2019     Macrocytic anemia, AOCD:   Long history of anemia.  Hemoglobin dropped to 6.6, thought to be due to hemodilution. Did receive 1 unit PRBCs. Increased iron to b.i.d.. FOBT negative.  Not on multivitamin or folic acid.  There is documentation of C scope at [facility] 05/26/2014, but no report.  Lab Results   Component Value Date    IRON 16 (L) 11/29/2019    TIBC 229 (L) 11/29/2019    FERRITIN 1,143 (H) 11/29/2019     Lab Results   Component Value Date    [id number] 11/29/2019     Lab Results   Component Value Date    FOLATE 19.5 11/30/2019     GERD:  +nausea.   Started on Protonix.     Debility:  Was discharged with home health PT/OT, but discharged due to reaching max potential.    Review of Systems   Constitutional: Negative for fever and unexpected weight change.   HENT: Negative for rhinorrhea and sneezing.    Eyes: Negative for redness and itching.   Respiratory: Negative for shortness of breath and wheezing.    Cardiovascular: Negative for chest pain and palpitations.   Gastrointestinal: Negative for abdominal pain and vomiting.   Genitourinary: Negative for dysuria and hematuria.   Musculoskeletal: Positive for arthralgias and gait problem.   Skin: Negative for color change and rash.   Neurological: Negative for dizziness and light-headedness.   Hematological: Negative for adenopathy.   Psychiatric/Behavioral: Negative for confusion. The patient is not nervous/anxious.          Past Medical History:   Diagnosis Date    Afib     Alcohol abuse with other alcohol-induced disorder 04/02/2019    Anemia 04/02/2019    unspecified deficiency    Ankle fracture, left      Arthritis     Asthma     Cholecystitis 1/15/2018    Chronic kidney disease (CKD), stage III (moderate) 04/02/2019    Depression     Erectile dysfunction 04/02/2019    Gout, arthropathy 04/02/2019    Gout, unspecified     Hypertension     Hypertensive chronic kidney disease 04/02/2019    Hypomagnesemia 04/02/2019    Noncompliance 04/02/2019    Peripheral neuropathy 04/02/2019    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    Secondary hyperparathyroidism, renal 04/02/2019    Seizure 2016    Spastic hemiplegia affecting left nondominant side 04/02/2019     Past Surgical History:   Procedure Laterality Date    ANKLE SURGERY      x6    CHOLECYSTECTOMY       Family History   Problem Relation Age of Onset    Hypertension Father     Stroke Maternal Grandmother     Cataracts Maternal Grandfather     Stroke Maternal Grandfather     Cancer Mother         malignant polyp     Social History     Socioeconomic History    Marital status:      Spouse name: Not on file    Number of children: Not on file    Years of education: Not on file    Highest education level: Not on file   Occupational History    Not on file   Social Needs    Financial resource strain: Not on file    Food insecurity:     Worry: Not on file     Inability: Not on file    Transportation needs:     Medical: Not on file     Non-medical: Not on file   Tobacco Use    Smoking status: Light Tobacco Smoker    Smokeless tobacco: Never Used   Substance and Sexual Activity    Alcohol use: Yes     Alcohol/week: 3.0 - 4.0 standard drinks     Types: 3 - 4 Shots of liquor per week     Comment: daily, but states he stopped all alcohol 2 weeks ago abruptly lost interest in it.    Drug use: No    Sexual activity: Not on file   Lifestyle    Physical activity:     Days per week: Not on file     Minutes per session: Not on file    Stress: Not on file   Relationships    Social connections:     Talks on phone: Not on file     Gets together: Not on file      "Attends Church service: Not on file     Active member of club or organization: Not on file     Attends meetings of clubs or organizations: Not on file     Relationship status: Not on file   Other Topics Concern    Not on file   Social History Narrative    Not on file     Objective:      Vitals:    02/04/20 0920   BP: 118/80   Pulse: 106   SpO2: 98%   Height: 6' 1" (1.854 m)      Physical Exam   Constitutional: He appears well-developed and well-nourished. No distress.   HENT:   Head: Normocephalic and atraumatic.   Nose: Nose normal.   Mouth/Throat: Oropharynx is clear and moist. No oropharyngeal exudate.   Eyes: Pupils are equal, round, and reactive to light. EOM are normal. Right eye exhibits no discharge. Left eye exhibits no discharge. No scleral icterus.   Neck: Neck supple. No tracheal deviation present. No thyromegaly present.   Cardiovascular: Normal rate, regular rhythm, normal heart sounds and intact distal pulses.   No murmur heard.  Pulmonary/Chest: Effort normal and breath sounds normal. No respiratory distress. He has no wheezes.   Abdominal: Soft. Bowel sounds are normal. He exhibits no distension. There is no tenderness.   Musculoskeletal: He exhibits no edema or deformity.   Lymphadenopathy:     He has no cervical adenopathy.   Neurological: He is alert.   In a wheelchair   Skin: Skin is warm and dry. He is not diaphoretic. No erythema. There is pallor.   Psychiatric: He has a normal mood and affect. His behavior is normal. His speech is not slurred. He is not slowed.         Lab Results   Component Value Date    WBC 6.21 12/23/2019    HGB 10.1 (L) 12/23/2019    HCT 32.0 (L) 12/23/2019     12/23/2019    CHOL 144 01/10/2018    TRIG 92 01/10/2018    HDL 62 01/10/2018    ALT 18 12/23/2019    AST 25 12/23/2019     12/23/2019    K 3.9 12/23/2019     (H) 12/23/2019    CREATININE 1.1 12/23/2019    BUN 13 12/23/2019    CO2 19 (L) 12/23/2019    TSH 2.159 12/23/2019    PSA 0.27 " 02/05/2013    INR 1.0 11/29/2019    HGBA1C 4.2 01/09/2018       The 10-year ASCVD risk score (Len BEE Jr., et al., 2013) is: 14.7%    Values used to calculate the score:      Age: 59 years      Sex: Male      Is Non- : Yes      Diabetic: No      Tobacco smoker: Yes      Systolic Blood Pressure: 118 mmHg      Is BP treated: Yes      HDL Cholesterol: 62 mg/dL      Total Cholesterol: 144 mg/dL    (Imaging have been independently reviewed)  CXR without acute abnormality.    Assessment:       1. Chronic pain of left ankle    2. Paroxysmal atrial fibrillation    3. Acute hypokalemia    4. Hypomagnesemia    5. Essential hypertension    6. Light cigarette smoker (1-9 cigs/day)    7. Mixed hyperlipidemia    8. Alcohol abuse    9. Seizure    10. CKD (chronic kidney disease), stage III    11. Vitamin D deficiency    12. Gouty arthritis    13. Macrocytic anemia    14. Gastroesophageal reflux disease without esophagitis    15. Debility    16. Screening for HIV (human immunodeficiency virus)    17. Flu vaccine need          Plan:       Michael was seen today for establish care and ankle pain.    Diagnoses and all orders for this visit:    Chronic pain of left ankle  Comments:  Persistent pain. H/o multiple surgeries. Avoid controlled substances given ETOH abuse. Refer to Ortho.  Orders:  -     Ambulatory referral/consult to Orthopedics; Future    Paroxysmal atrial fibrillation  Comments:  Cont Lopessor 50 BID. CHADSVASC 1, so no ASA, NOAC needed. Refer to Cards.  Orders:  -     Ambulatory referral/consult to Cardiology; Future  -     metoprolol tartrate (LOPRESSOR) 50 MG tablet; Take 1 tablet (50 mg total) by mouth 2 (two) times daily.    Acute hypokalemia  Comments:  Chronic decreased PO d/t decreased mobility. On KCl 20 BID. Check K.  Orders:  -     Basic metabolic panel; Future    Hypomagnesemia  Comments:  Decreased PO intake as above. On Mag Ox. Check Mg.  Orders:  -     Magnesium; Future    Essential  hypertension  Comments:  At goal. Cont Lopressor 50 BID.  Orders:  -     metoprolol tartrate (LOPRESSOR) 50 MG tablet; Take 1 tablet (50 mg total) by mouth 2 (two) times daily.    Light cigarette smoker (1-9 cigs/day)  Comments:  Counseled for 5 min on tobacco cessation.    Mixed hyperlipidemia  Comments:  Not on statin. Check FLP when he is fasting.  Orders:  -     Lipid panel; Future    Alcohol abuse  Comments:  He states that he drinks socially, but unreliable historian. Counseled on cessation. Rec MVI daily OTC. Refer to Addiction Psych.  Orders:  -     Ambulatory referral/consult to Addiction Psychiatry; Future    Seizure  Comments:  Continue Keppra.  Refer to Neurology.  Discussed ETOH cessation.  Orders:  -     Ambulatory referral/consult to Neurology; Future    CKD (chronic kidney disease), stage III  Comments:  CKD 3 w/u, renal U/S, refer to Nephrology.  Orders:  -     Ambulatory referral/consult to Nephrology; Future  -     Vitamin D; Future  -     PTH, intact; Future  -     Protein / creatinine ratio, urine; Future  -     US Retroperitoneal Complete (Kidney and; Future    Vitamin D deficiency  Comments:  Completed ergocalciferol.  Check vitamin-D level.  Orders:  -     Vitamin D; Future    Gouty arthritis  Comments:  Continue allopurinol.  Refer to Nephrology.    Macrocytic anemia  Comments:  ETOH abuse. B12, folate wnl. FOBT neg. Cont iron BID. Refer to [facility] for cscope.    Gastroesophageal reflux disease without esophagitis  Comments:  Cont PPI. Refer to GI.    Debility  Comments:  Was discharged from  PT/OT.    Screening for HIV (human immunodeficiency virus)  -     HIV 1/2 Ag/Ab (4th Gen); Future    Flu vaccine need  Comments:  Advised to obtain vaccine at Pharmacy.    Other orders  -     Cancel: Phosphorus; Future  -     Cancel: Ambulatory referral/consult to Addiction Psychiatry; Future         Side effects of medication(s) were discussed in detail and patient voiced understanding.  Patient  will call back for any issues or complications.     RTC in 3 month(s) or sooner PRN for HTN.

## 2020-02-05 ENCOUNTER — TELEPHONE (OUTPATIENT)
Dept: NEUROLOGY | Facility: CLINIC | Age: 60
End: 2020-02-05

## 2020-02-05 DIAGNOSIS — E87.6 ACUTE HYPOKALEMIA: Primary | ICD-10-CM

## 2020-02-05 DIAGNOSIS — E83.42 HYPOMAGNESEMIA: ICD-10-CM

## 2020-02-05 NOTE — TELEPHONE ENCOUNTER
----- Message from Mag Helm MA sent at 2/4/2020 10:10 AM CST -----  Please call and assist patient with scheduling.

## 2020-02-06 ENCOUNTER — TELEPHONE (OUTPATIENT)
Dept: ORTHOPEDICS | Facility: CLINIC | Age: 60
End: 2020-02-06

## 2020-02-06 NOTE — TELEPHONE ENCOUNTER
Spoke to Ms. Fallon after attempting Mr. Johnson primary number and no answer, informed her I was calling to schedule his appt with the correct provider, she stated she will let him know when she gets by him and he will give us a call.----- Message from Guillermina Jin LPN sent at 2/6/2020  2:46 PM CST -----  Thank you, Dr. Mchugh.    Erika,  As per our conversation, please contact Mr. Johnson and reyes.  Thank you so much,  Guillermina  ----- Message -----  From: Kiki Mchugh MD  Sent: 2/6/2020   1:04 PM CST  To: Guillermina Jin LPN    Yes.    ----- Message -----  From: Guillermina Jin LPN  Sent: 2/6/2020  12:40 PM CST  To: MD Dr. Lolita Farah -  Can you see this pt?  Please advise.  Guillermina  ----- Message -----  From: Jo Middleton MA  Sent: 2/6/2020  11:06 AM CST  To: [facility] Ortho Triage    Pt was inappropriately scheduled with Dr. Darby for L ankle pain s/p fusion. Please reach out to patient to schedule with an appropriate provider.     Thank you,  Jo Middleton  Clinical Assistant to Dr. Amelia Darby

## 2020-02-07 ENCOUNTER — TELEPHONE (OUTPATIENT)
Dept: INTERNAL MEDICINE | Facility: CLINIC | Age: 60
End: 2020-02-07

## 2020-02-07 NOTE — TELEPHONE ENCOUNTER
----- Message from Marilu Candelario MD sent at 2/4/2020  2:18 PM CST -----  Please call with results:    The ultrasound of your kidney did not show kidney stones or swelling; this suggests chronic kidney disease.      ----------  Renal U/S: No hydro, nephrolithiasis.

## 2020-02-07 NOTE — TELEPHONE ENCOUNTER
----- Message from Marilu Candelario MD sent at 2/5/2020  5:19 PM CST -----  -Please call the patient with lab results  -schedule labs in 1 week.    Your potassium and magnesium levels are still low.  Please continue taking the potassium KCl supplement 20 mEq twice a day and magnesium oxide.  Please increase fluid intake/hydration.  We will recheck your blood work in 1 week.    Your labs checking for vitamin-D and HIV were normal.        -------------------------  Hypokalemia  Chronic decreased PO d/t decreased mobility. On KCl 20 BID. K persistently low at 3.0. Will encourage to cont KCl 20 BID and increase hydration.  Recheck BMP in 1 week.    Hypomagnesemia  Decreased PO intake as above. Mg slightly improved. Cont Mag Ox and encourage hydration.  Recheck Mg in 1 week.    CKD3: At baseline. Has referral to Nephro.    Vitamin D deficiency:  Completed ergocalciferol.  Vitamin-D, PTH wnl.    HIV NR

## 2020-02-21 ENCOUNTER — IMMUNIZATION (OUTPATIENT)
Dept: PHARMACY | Facility: CLINIC | Age: 60
End: 2020-02-21
Payer: MEDICAID

## 2020-03-12 DIAGNOSIS — R56.9 SEIZURE: Primary | ICD-10-CM

## 2020-03-12 RX ORDER — LEVETIRACETAM 500 MG/1
500 TABLET ORAL 2 TIMES DAILY
Qty: 180 TABLET | Refills: 3 | Status: SHIPPED | OUTPATIENT
Start: 2020-03-12 | End: 2020-07-06 | Stop reason: SDUPTHER

## 2020-03-12 NOTE — TELEPHONE ENCOUNTER
----- Message from Courtney Leyva sent at 3/12/2020  1:06 PM CDT -----  Contact: Maryam (spouse)  Name of Who is Calling : Maryam (spouse)    Patient is requesting a call from staff in regards to medication   .....Please contact to further discuss and advise.    Can the clinic reply by [facility] : No    What Number to Call Back :  [phone]

## 2020-03-26 ENCOUNTER — TELEPHONE (OUTPATIENT)
Dept: CARDIOLOGY | Facility: CLINIC | Age: 60
End: 2020-03-26

## 2020-04-15 ENCOUNTER — TELEPHONE (OUTPATIENT)
Dept: NEPHROLOGY | Facility: CLINIC | Age: 60
End: 2020-04-15

## 2020-05-20 DIAGNOSIS — N18.30 STAGE 3 CHRONIC KIDNEY DISEASE: Primary | ICD-10-CM

## 2020-05-26 DIAGNOSIS — I10 ESSENTIAL HYPERTENSION: ICD-10-CM

## 2020-05-26 DIAGNOSIS — I48.0 PAROXYSMAL ATRIAL FIBRILLATION: ICD-10-CM

## 2020-05-26 NOTE — TELEPHONE ENCOUNTER
----- Message from Corinne Chapman sent at 5/26/2020  4:29 PM CDT -----  Contact: TIERA MARTIN JR. [[id number]]  Can the clinic reply in [facility]:     Please refill the medication(s) listed below. The patient can be reached at this phone number once it is called into the pharmacy.   [phone] (home)      Medication #1metoprolol tartrate (LOPRESSOR) 50 MG tablet    Medication #2    Preferred Pharmacy: [facility] #[id number] [address] [facility] [address]

## 2020-05-27 RX ORDER — METOPROLOL TARTRATE 50 MG/1
50 TABLET ORAL 2 TIMES DAILY
Qty: 60 TABLET | Refills: 3 | Status: SHIPPED | OUTPATIENT
Start: 2020-05-27 | End: 2020-06-11 | Stop reason: SDUPTHER

## 2020-06-01 ENCOUNTER — TELEPHONE (OUTPATIENT)
Dept: CARDIOLOGY | Facility: CLINIC | Age: 60
End: 2020-06-01

## 2020-06-03 NOTE — TELEPHONE ENCOUNTER
Pt does not want to reschedule. He states he does not want to have an appt to get his blood pressure and temp taken and come back with COVID-19. Advised patient to call back when he is ready. He states it is not a refusal but it is a refusal in professional way.

## 2020-06-11 DIAGNOSIS — I10 ESSENTIAL HYPERTENSION: ICD-10-CM

## 2020-06-11 DIAGNOSIS — I48.0 PAROXYSMAL ATRIAL FIBRILLATION: ICD-10-CM

## 2020-06-11 RX ORDER — METOPROLOL TARTRATE 50 MG/1
50 TABLET ORAL 2 TIMES DAILY
Qty: 60 TABLET | Refills: 3 | Status: SHIPPED | OUTPATIENT
Start: 2020-06-11 | End: 2020-07-29 | Stop reason: SDUPTHER

## 2020-06-11 NOTE — TELEPHONE ENCOUNTER
----- Message from Mehnaz Lennon sent at 6/11/2020  2:10 PM CDT -----  Contact: Self/  [phone]  Type: RX Refill Request    Who Called:   Patient    Refill or New Rx:  Refill    RX Name and Strength:  metoprolol tartrate (LOPRESSOR) 50 MG tablet    Preferred Pharmacy with phone number:  [facility] #[id number] [address] [facility] [address]    Local or Mail Order:  Local    Ordering Provider:  CARINA Candelario    Would the patient rather a call back or a response via [facility]?  Call back    Best Call Back Number:  [phone]

## 2020-06-22 ENCOUNTER — TELEPHONE (OUTPATIENT)
Dept: INTERNAL MEDICINE | Facility: CLINIC | Age: 60
End: 2020-06-22

## 2020-06-22 NOTE — TELEPHONE ENCOUNTER
lov 02/04/2020  lvm to call us back to find out more  RICE msg on vm and to go to UC or ER if worsens like swelling

## 2020-06-22 NOTE — TELEPHONE ENCOUNTER
----- Message from Candie Hendrix sent at 6/22/2020  5:00 PM CDT -----  Regarding: Self  Type: Patient Call Back    Who called: Self    What is the request in detail: pt has pain in his left ankle and would like to know what to do?    Can the clinic reply by [facility]? No     Would the patient rather a call back or a response via [facility]?  Call     Best call back number: [phone]

## 2020-07-06 ENCOUNTER — TELEPHONE (OUTPATIENT)
Dept: INTERNAL MEDICINE | Facility: CLINIC | Age: 60
End: 2020-07-06

## 2020-07-06 DIAGNOSIS — E83.42 HYPOMAGNESEMIA: ICD-10-CM

## 2020-07-06 DIAGNOSIS — M10.9 GOUTY ARTHRITIS: ICD-10-CM

## 2020-07-06 DIAGNOSIS — K21.9 GASTROESOPHAGEAL REFLUX DISEASE WITHOUT ESOPHAGITIS: ICD-10-CM

## 2020-07-06 DIAGNOSIS — E87.6 ACUTE HYPOKALEMIA: ICD-10-CM

## 2020-07-06 DIAGNOSIS — R56.9 SEIZURE: ICD-10-CM

## 2020-07-06 DIAGNOSIS — F10.10 ALCOHOL ABUSE: Primary | ICD-10-CM

## 2020-07-06 RX ORDER — FOLIC ACID 1 MG/1
1 TABLET ORAL DAILY
Qty: 30 TABLET | Refills: 1 | Status: CANCELLED | OUTPATIENT
Start: 2020-07-06 | End: 2021-07-06

## 2020-07-06 RX ORDER — FERROUS SULFATE 325(65) MG
325 TABLET ORAL 2 TIMES DAILY
Qty: 60 TABLET | Refills: 1 | Status: CANCELLED | OUTPATIENT
Start: 2020-07-06

## 2020-07-06 RX ORDER — PANTOPRAZOLE SODIUM 40 MG/1
40 TABLET, DELAYED RELEASE ORAL DAILY
Qty: 90 TABLET | Refills: 3 | Status: SHIPPED | OUTPATIENT
Start: 2020-07-06 | End: 2021-05-10 | Stop reason: SDUPTHER

## 2020-07-06 RX ORDER — ERGOCALCIFEROL 1.25 MG/1
50000 CAPSULE ORAL
Qty: 12 CAPSULE | Refills: 0 | Status: CANCELLED | OUTPATIENT
Start: 2020-07-06 | End: 2020-10-04

## 2020-07-06 RX ORDER — FOLIC ACID 1 MG/1
1 TABLET ORAL DAILY
Qty: 90 TABLET | Refills: 3 | Status: SHIPPED | OUTPATIENT
Start: 2020-07-06 | End: 2021-05-10 | Stop reason: SDUPTHER

## 2020-07-06 RX ORDER — MAGNESIUM 250 MG
2 TABLET ORAL DAILY
Qty: 60 TABLET | Refills: 1 | Status: CANCELLED | OUTPATIENT
Start: 2020-07-06

## 2020-07-06 RX ORDER — GABAPENTIN 300 MG/1
300 CAPSULE ORAL 3 TIMES DAILY
Qty: 270 CAPSULE | Refills: 3 | Status: SHIPPED | OUTPATIENT
Start: 2020-07-06 | End: 2020-07-07 | Stop reason: SDUPTHER

## 2020-07-06 RX ORDER — LEVETIRACETAM 500 MG/1
500 TABLET ORAL 2 TIMES DAILY
Qty: 180 TABLET | Refills: 3 | Status: ON HOLD | OUTPATIENT
Start: 2020-07-06 | End: 2020-09-19 | Stop reason: SDUPTHER

## 2020-07-06 RX ORDER — ALLOPURINOL 300 MG/1
300 TABLET ORAL DAILY
Qty: 90 TABLET | Refills: 3 | Status: SHIPPED | OUTPATIENT
Start: 2020-07-06 | End: 2021-05-10 | Stop reason: SDUPTHER

## 2020-07-06 RX ORDER — POTASSIUM CHLORIDE 20 MEQ/1
20 TABLET, EXTENDED RELEASE ORAL 2 TIMES DAILY
Qty: 30 TABLET | Refills: 3 | Status: SHIPPED | OUTPATIENT
Start: 2020-07-06 | End: 2021-05-10 | Stop reason: SDUPTHER

## 2020-07-06 RX ORDER — LANOLIN ALCOHOL/MO/W.PET/CERES
400 CREAM (GRAM) TOPICAL DAILY
Qty: 30 TABLET | Refills: 3 | Status: SHIPPED | OUTPATIENT
Start: 2020-07-06 | End: 2021-01-02 | Stop reason: SDUPTHER

## 2020-07-06 NOTE — TELEPHONE ENCOUNTER
Spoke to pt and asked his chief complaint  CC:  Whole body for a split second jerk of pain throughout body   Rate pain 8 at whole body   States he puts all his weight on his right leg due to not using the left  And that his right leg now hurts  Has upcoming appt 07/16 with PCP but states he does not want to come in bc he is afraid of covid  Scheduled telephone call tomorrow at 7:30 am  Pt verbally understood    Pt is requesting refills and states he called them in already, I explained I do not see that encounter and pended all prescriptions except the recently filled metoprolol

## 2020-07-06 NOTE — TELEPHONE ENCOUNTER
----- Message from Naida Wright sent at 7/6/2020  1:30 PM CDT -----  Regarding: Patient Call Back  Who called: Leeanne (wife)    What is the request in detail: Patient is requesting a call back. She states her  has been having some sort of spasms for the past two days. She would like to know if there can be a script called in today for those spasms [address] [facility] [id number] [address] [facility] [address]. She states he is in a lot of pain, and the gabapentin is not helping.   Please advise.    Can the clinic reply by [facility]? No    Best call back number: [phone]    Additional Information: N/A

## 2020-07-07 ENCOUNTER — OFFICE VISIT (OUTPATIENT)
Dept: INTERNAL MEDICINE | Facility: CLINIC | Age: 60
End: 2020-07-07
Payer: MEDICARE

## 2020-07-07 DIAGNOSIS — E78.2 MIXED HYPERLIPIDEMIA: ICD-10-CM

## 2020-07-07 DIAGNOSIS — G89.29 CHRONIC PAIN OF LEFT ANKLE: ICD-10-CM

## 2020-07-07 DIAGNOSIS — G62.9 POLYNEUROPATHY: Primary | ICD-10-CM

## 2020-07-07 DIAGNOSIS — M62.838 MUSCLE SPASM: ICD-10-CM

## 2020-07-07 DIAGNOSIS — E87.6 ACUTE HYPOKALEMIA: ICD-10-CM

## 2020-07-07 DIAGNOSIS — E83.42 HYPOMAGNESEMIA: ICD-10-CM

## 2020-07-07 DIAGNOSIS — M25.572 CHRONIC PAIN OF LEFT ANKLE: ICD-10-CM

## 2020-07-07 PROCEDURE — 99443 PR PHYSICIAN TELEPHONE EVALUATION 21-30 MIN: CPT | Mod: HCNC,95,, | Performed by: INTERNAL MEDICINE

## 2020-07-07 PROCEDURE — 99443 PR PHYSICIAN TELEPHONE EVALUATION 21-30 MIN: ICD-10-PCS | Mod: HCNC,95,, | Performed by: INTERNAL MEDICINE

## 2020-07-07 RX ORDER — DICLOFENAC SODIUM 10 MG/G
2 GEL TOPICAL 4 TIMES DAILY
Qty: 100 G | Refills: 11 | Status: SHIPPED | OUTPATIENT
Start: 2020-07-07 | End: 2021-01-02 | Stop reason: SDUPTHER

## 2020-07-07 RX ORDER — GABAPENTIN 400 MG/1
400 CAPSULE ORAL 3 TIMES DAILY
Qty: 90 CAPSULE | Refills: 11 | Status: SHIPPED | OUTPATIENT
Start: 2020-07-07 | End: 2021-05-10 | Stop reason: SDUPTHER

## 2020-07-07 RX ORDER — TIZANIDINE HYDROCHLORIDE 2 MG/1
2 CAPSULE, GELATIN COATED ORAL 3 TIMES DAILY PRN
Qty: 30 CAPSULE | Refills: 11 | Status: SHIPPED | OUTPATIENT
Start: 2020-07-07 | End: 2020-07-31

## 2020-07-07 NOTE — TELEPHONE ENCOUNTER
Informed patient that Dr. Candelario said...  Refilled meds.   He can stop taking iron supplements and ergocalciferol.

## 2020-07-07 NOTE — PROGRESS NOTES
Audio Only Telehealth Visit     The patient location is: home  The chief complaint leading to consultation is: RLE pain.  Pt elected for phone visit to reduce risk of exposure to COVID.  Visit type: Virtual visit with audio only (telephone)     The reason for the audio only service rather than synchronous audio and video virtual visit was related to technical difficulties or patient preference/necessity.     Each patient to whom I provide medical services by telemedicine is:  (1) informed of the relationship between the physician and patient and the respective role of any other health care provider with respect to management of the patient; and (2) notified that they may decline to receive medical services by telemedicine and may withdraw from such care at any time. Patient verbally consented to receive this service via voice-only telephone call.    This service was not originating from a related E/M service provided within the previous 7 days nor will  to an E/M service or procedure within the next 24 hours or my soonest available appointment.  Prevailing standard of care was able to be met in this audio-only visit.      Subjective:       Patient ID: Michael Johnson Jr. is a 59 y.o. 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    Chief Complaint: Ankle Pain     History was obtained  "from the patient and supplemented through chart review and from his wife on the phone.  There were no ER or clinic visits since our last appointment.    His wife is also my patient, Leeanne Johnson.    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  Gradually able to walk.  Can transfer himself out of a wheelchair.  Bathes in bed.  Has bedside commode.  Cannot prepare his own meals.    HPI    Chronic left ankle pain:   History of multiple ankle surgeries/fusion x 6.  Had HH, but discharged d/t reaching max potential.    Polyneuropathy:    X 1 week.  No inciting event, fall.  Debility as above. Reports excruciating pain starting in his toes, involving his b/l feet traveling up to knees and hands.  Can't make a fist. No pain involving his hips and elbows.  Pain is constant.  Pain is worse with standing.    Pain is so bad that it causes numbness.    Also reports very brief excruciating muscle spasms "all over" every few minutes.  Even occurs at night.  Occurs regardless if he stretches his legs, lying down or seated.  No muscle tightness.  No recent sz or LOC. Is compliant with Keppra.    No bowel, bladder incontinence.  Has chronic back pain from laying down     Takes Gabapentin 300 TID regularly.  No sedation.  Tried ibuprofen when pain is severe.  Lab Results   Component Value Date    PVPRMXSY08 456 11/29/2019     Lab Results   Component Value Date    HGBA1C 4.2 01/09/2018     Hypokalemia , hypomagnesemia:  Decreased PO d/t unable to prepare his own meals.  Eats ramen noodles.  +Nausea, vomiting if he eats too much ramen noodles.  No diarrhea.  Is taking KCl 20 BID, Mag-Ox    HLD:   Not on statin.  Lab Results   Component Value Date    LDLCALC 63.6 01/10/2018     The 10-year ASCVD risk score (Mouthcardyashira BEE Jr., et al., 2013) is: 14.7%    Values used to calculate the score:      Age: 59 years      Sex: Male      Is Non- : Yes      Diabetic: No      Tobacco smoker: Yes      Systolic " Blood Pressure: 118 mmHg      Is BP treated: Yes      HDL Cholesterol: 62 mg/dL      Total Cholesterol: 144 mg/dL              Not addressed today.  Seizure:  Onset as an adult.  Admission  for seizure activity.    History of med noncompliance, but had also stopped drinking.  CT head without acute changes.  Resumed home Keppra  500 b.i.d..  No seizures since then.  Does not follow with Neuro.  Continue Keppra.  Reschedule Neurology.  Discussed ETOH cessation.    Debility:    Was discharged with home health PT/OT, but discharged due to reaching max potential.    Paroxysmal A fib:  Was not compliant with metoprolol, diltiazem.  Admission  for seizure. HR 200s (Presumed SVT).  Received adenosine and was then in AFib with RVR that did not respond to diltiazem.  Then received amiodarone infusion.  AFib with RVR was thought to be due to dehydration and electrolyte abnormalities.  Resumed Lopressor 50 BID.  Chads Vasc score 1.  Cardiology stopped aspirin.  Was discharged with home health PT/OT for weakness, medication management.    Compliant with Lopressor 50 BID.  Denies CP, SOB, BARBER, lightheadedness, dizziness.  Palpitations resolved.  No syncope.    Cont Lopessor 50 BID. CHADSVASC 1, so no ASA, NOAC needed. Referred to Cards.  Lab Results   Component Value Date    TSH 2.159 12/23/2019     HTN:    Pt's BP is controlled on Lopressor 50 b.i.d.  Was noncompliant with diltiazem; BP WNL on only metoprolol. +A fib.  Tolerating meds well. Pt denies CP, SOB, lightheadedness, dizziness.      FHx: yes  Tobacco: sometimes  ETOH: yes  Exercise: in a wheelchair.   At goal. Cont Lopressor 50 BID.    Tobacco use:    Occasionally smokes a pipe. No cocaine use.  Counseled for 5 min on tobacco cessation.    Alcohol abuse:  Drinks 2-4 coffee mugs of vodka cocktail daily.  Last drink this AM.  States that he is currently intoxicated. Also drank yesterday d/t death in the family.  States that he drinks socially, not 1-2  shots/day.  Denies daily drinking. No longer on folate, MVI.  He states that he drinks socially, but unreliable historian. Counseled on cessation. Rec MVI daily OTC. Referred to Addiction Psych.    CKD 3:  Check for proteinuria.  Not on ACE/ARB.  Renal ultrasound: normal  Does not see Nephrology.  CKD 3 w/u, renal U/S, refer to Nephrology.  Lab Results   Component Value Date    CREATININE 1.5 (H) 02/04/2020    BUN 13 02/04/2020     02/04/2020    K 3.0 (L) 02/04/2020     02/04/2020    CO2 21 (L) 02/04/2020     Lab Results   Component Value Date    CALCIUM 8.7 02/04/2020    PHOS 1.7 (L) 11/29/2019     Lab Results   Component Value Date    PTH 25.5 02/04/2020    CALCIUM 8.7 02/04/2020    PHOS 1.7 (L) 11/29/2019     Vitamin D deficiency: completed course of Ergocalciferol.  Lab Results   Component Value Date    UNOBHCLY50VW 32 02/04/2020     H/o Gout:  On allopurinol 300.  Continue allopurinol.  Referred to Nephrology.  Lab Results   Component Value Date    URICACID 10.0 (H) 11/30/2019     Macrocytic anemia, AOCD:   Long history of anemia.  Hemoglobin dropped to 6.6, thought to be due to hemodilution. Did receive 1 unit PRBCs. Increased iron to b.i.d.. FOBT negative.  Not on multivitamin or folic acid.  There is documentation of C scope at [facility] 05/26/2014, but no report.  ETOH abuse. B12, folate wnl. FOBT neg. Can stop iron. Referred to [facility] for cscope.  Lab Results   Component Value Date    IRON 16 (L) 11/29/2019    TIBC 229 (L) 11/29/2019    FERRITIN 1,143 (H) 11/29/2019     Lab Results   Component Value Date    [id number] 456 11/29/2019     Lab Results   Component Value Date    FOLATE 19.5 11/30/2019     GERD:  +nausea.   Started on Protonix.   Cont PPI. Referred to GI.    Review of Systems   Constitutional: Negative for fever and unexpected weight change.   HENT: Negative for rhinorrhea and sneezing.    Eyes: Negative for redness and itching.   Respiratory: Negative for shortness of breath and  wheezing.    Cardiovascular: Negative for chest pain and palpitations.   Gastrointestinal: Negative for abdominal pain and diarrhea.   Genitourinary: Negative for dysuria and hematuria.   Musculoskeletal: Positive for arthralgias and gait problem. Negative for back pain.   Skin: Negative for color change and rash.   Neurological: Negative for seizures and numbness.   Hematological: Negative for adenopathy.   Psychiatric/Behavioral: Negative for confusion. The patient is not nervous/anxious.        I personally reviewed Past Medical History, Past Surgical History, Social History, and Family History.    Objective:      There were no vitals filed for this visit.   Physical Exam  Pulmonary:      Effort: No respiratory distress.           Lab Results   Component Value Date    WBC 6.21 12/23/2019    HGB 10.1 (L) 12/23/2019    HCT 32.0 (L) 12/23/2019     12/23/2019    CHOL 144 01/10/2018    TRIG 92 01/10/2018    HDL 62 01/10/2018    ALT 18 12/23/2019    AST 25 12/23/2019     02/04/2020    K 3.0 (L) 02/04/2020     02/04/2020    CREATININE 1.5 (H) 02/04/2020    BUN 13 02/04/2020    CO2 21 (L) 02/04/2020    TSH 2.159 12/23/2019    PSA 0.27 02/05/2013    INR 1.0 11/29/2019    HGBA1C 4.2 01/09/2018       The 10-year ASCVD risk score (Len BEE Jr., et al., 2013) is: 14.7%    Values used to calculate the score:      Age: 59 years      Sex: Male      Is Non- : Yes      Diabetic: No      Tobacco smoker: Yes      Systolic Blood Pressure: 118 mmHg      Is BP treated: Yes      HDL Cholesterol: 62 mg/dL      Total Cholesterol: 144 mg/dL    (Imaging have been independently reviewed)  Renal U/S: No hydro, nephrolithiasis.    Assessment:       1. Polyneuropathy    2. Chronic pain of left ankle    3. Muscle spasm    4. Acute hypokalemia    5. Hypomagnesemia    6. Mixed hyperlipidemia          Plan:       Michael was seen today for ankle pain.    Diagnoses and all orders for this  visit:    Polyneuropathy  Comments:  Increase gabapentin to 400 TID. Counseled on sedation. A1c, B12 wnl. Reschedule Neuro.  Orders:  -     Hemoglobin A1C; Future  -     gabapentin (NEURONTIN) 400 MG capsule; Take 1 capsule (400 mg total) by mouth 3 (three) times daily.    Chronic pain of left ankle  Comments:  Persistent pain. H/o multiple surgeries. Avoid controlled substances given ETOH abuse. Trial of topical voltaren. Refer to Ortho, PM&R.   Orders:  -     Ambulatory referral/consult to Pain Clinic; Future  -     diclofenac sodium (VOLTAREN) 1 % Gel; Apply 2 g topically 4 (four) times daily.    Muscle spasm  Comments:  Trial of Tizanidine PRN. Counseled on sedation. Reschedule Neuro, PM&R.  Orders:  -     tiZANidine 2 mg Cap; Take 1 capsule (2 mg total) by mouth 3 (three) times daily as needed (Muscle spasms).    Acute hypokalemia  Comments:  Persistently low. Chronic d/t ETOH, decreased PO d/t decreased mobility. On KCl 20 BID. Reschedule K, Mg.    Hypomagnesemia  Comments:  Slightly improved. 2/2 decreased PO intake as above. Cont Mag Ox and encouraged hydration.  Recheck Mg.    Mixed hyperlipidemia  Comments:  Not on statin. Reschedule FLP    Other orders  -     Cancel: TSH; Future  -     Cancel: Ambulatory referral/consult to Home Health; Future         Side effects of medication(s) were discussed in detail and patient voiced understanding.  Patient will call back for any issues or complications.     RTC in 1 month(s) or sooner PRN for HTN.  30 minute visit if possible d/t many comorbidities. Telephone d/t decreased mobility.

## 2020-07-20 ENCOUNTER — PATIENT OUTREACH (OUTPATIENT)
Dept: ADMINISTRATIVE | Facility: OTHER | Age: 60
End: 2020-07-20

## 2020-07-22 ENCOUNTER — TELEPHONE (OUTPATIENT)
Dept: INTERNAL MEDICINE | Facility: CLINIC | Age: 60
End: 2020-07-22

## 2020-07-22 NOTE — TELEPHONE ENCOUNTER
A message was left for the patient to return my call regarding scheduling his referral to see Pain Clinic.

## 2020-07-29 ENCOUNTER — TELEPHONE (OUTPATIENT)
Dept: NEUROLOGY | Facility: CLINIC | Age: 60
End: 2020-07-29

## 2020-07-29 ENCOUNTER — OFFICE VISIT (OUTPATIENT)
Dept: INTERNAL MEDICINE | Facility: CLINIC | Age: 60
End: 2020-07-29
Payer: MEDICARE

## 2020-07-29 DIAGNOSIS — M25.572 CHRONIC PAIN OF LEFT ANKLE: ICD-10-CM

## 2020-07-29 DIAGNOSIS — G89.29 CHRONIC PAIN OF LEFT ANKLE: ICD-10-CM

## 2020-07-29 DIAGNOSIS — G62.9 POLYNEUROPATHY: ICD-10-CM

## 2020-07-29 DIAGNOSIS — E87.6 ACUTE HYPOKALEMIA: ICD-10-CM

## 2020-07-29 DIAGNOSIS — E83.42 HYPOMAGNESEMIA: ICD-10-CM

## 2020-07-29 DIAGNOSIS — I10 ESSENTIAL HYPERTENSION: Primary | ICD-10-CM

## 2020-07-29 DIAGNOSIS — E78.2 MIXED HYPERLIPIDEMIA: ICD-10-CM

## 2020-07-29 PROCEDURE — 99443 PR PHYSICIAN TELEPHONE EVALUATION 21-30 MIN: ICD-10-PCS | Mod: HCNC,95,, | Performed by: INTERNAL MEDICINE

## 2020-07-29 PROCEDURE — 99443 PR PHYSICIAN TELEPHONE EVALUATION 21-30 MIN: CPT | Mod: HCNC,95,, | Performed by: INTERNAL MEDICINE

## 2020-07-29 RX ORDER — METOPROLOL TARTRATE 50 MG/1
50 TABLET ORAL 2 TIMES DAILY
Qty: 60 TABLET | Refills: 3 | Status: SHIPPED | OUTPATIENT
Start: 2020-07-29 | End: 2021-05-10 | Stop reason: SDUPTHER

## 2020-07-29 NOTE — PROGRESS NOTES
Audio Only Telehealth Visit     The patient location is: home  The chief complaint leading to consultation is: HTN, ankle pain. Pt elected for phone visit to reduce risk of exposure to COVID.  Visit type: Virtual visit with audio only (telephone)     The reason for the audio only service rather than synchronous audio and video virtual visit was related to technical difficulties or patient preference/necessity.     Each patient to whom I provide medical services by telemedicine is:  (1) informed of the relationship between the physician and patient and the respective role of any other health care provider with respect to management of the patient; and (2) notified that they may decline to receive medical services by telemedicine and may withdraw from such care at any time. Patient verbally consented to receive this service via voice-only telephone call.    This service was not originating from a related E/M service provided within the previous 7 days nor will  to an E/M service or procedure within the next 24 hours or my soonest available appointment.  Prevailing standard of care was able to be met in this audio-only visit.      Subjective:       Patient ID: Michael Johnson Jr. is a 59 y.o. 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    Chief Complaint: Foot Pain and Hypertension      History was obtained from the patient and supplemented through chart review and from his wife on the phone.  There were no ER or clinic visits since our last appointment.    His wife is also my patient, Leeanne Johnson.    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  Gradually able to walk.  Can transfer himself out of a wheelchair.  Bathes in bed.  Has bedside commode.  Cannot prepare his own meals.    HPI    HTN:    Is taking Lopressor 50 b.i.d.  Was noncompliant with diltiazem, but BP WNL on only metoprolol. +A fib.  Tolerating meds well. Pt denies CP, SOB, lightheadedness, dizziness.    Home BP: has been a while since he's checked BP at home.     FHx: yes  Tobacco: sometimes  ETOH: yes  Exercise: in a wheelchair.     Hypokalemia , hypomagnesemia:  Is unable to prepare his own meals.  Eats ramen noodles.  Reports chronic little PO intake. Drinks Ensure to avoid going to the bathroom d/t decreased mobility.  No diarrhea.  Completed KCl 20 BID.  Has a few tabs of Mag-Ox left     HLD:   Not on statin.  Lab Results   Component Value Date    LDLCALC 63.6 01/10/2018     The 10-year ASCVD risk score (Len DC Jr., et al., 2013) is: 14.7%    Values used to calculate the score:      Age: 59 years      Sex: Male      Is Non- : Yes      Diabetic: No      Tobacco smoker: Yes      Systolic Blood Pressure: 118 mmHg      Is BP treated: Yes      HDL Cholesterol: 62 mg/dL      Total Cholesterol: 144 mg/dL    Chronic left ankle pain:   History of multiple ankle surgeries/fusion x 6.  Had HH, but discharged d/t reaching max potential.  Started topical Voltaren w/o relief; caused itching.   Used to be on a narcotic, but was discontinued d/t concern for addiction.  States that he has seen Ortho several times throughout the years.    Polyneuropathy:    Debility as above. Reports excruciating pain starting in his toes, involving his b/l feet traveling up to knees and hands.  Can't make a  "fist. No pain involving his hips and elbows.  Pain is constant.  Pain is worse with standing.  Pain can be so bad that it causes numbness.    Also reports very brief excruciating muscle spasms "all over" every few minutes.  Even occurs at night.  Occurs regardless if he stretches his legs, lying down or seated.  No muscle tightness.  No recent sz or LOC. Is compliant with Keppra.    No bowel, bladder incontinence.  Has chronic back pain from laying down     Increased Gabapentin to 400 TID and Tizanidine d/t muscle spasms, but no relief.  No sedation.  Tried ibuprofen when pain is severe.    Lab Results   Component Value Date    [id number] 456 11/29/2019     Lab Results   Component Value Date    HGBA1C 4.2 01/09/2018                 Not addressed today.  Tobacco use:    Occasionally smokes a pipe. No cocaine use.  Counseled for 5 min on tobacco cessation.    Seizure:  Onset as an adult.  Admission  for seizure activity.    History of med noncompliance, but had also stopped drinking.  CT head without acute changes.  Resumed home Keppra  500 b.i.d..  No seizures since then.  Does not follow with Neuro.  Continue Keppra.  Reschedule Neurology.  Discussed ETOH cessation.    Paroxysmal A fib:  Was not compliant with metoprolol, diltiazem.  Admission  for seizure. HR 200s (Presumed SVT).  Received adenosine and was then in AFib with RVR that did not respond to diltiazem.  Then received amiodarone infusion.  AFib with RVR was thought to be due to dehydration and electrolyte abnormalities.  Resumed Lopressor 50 BID.  Chads Vasc score 1.  Cardiology stopped aspirin.  Was discharged with home health PT/OT for weakness, medication management.    Compliant with Lopressor 50 BID.  Denies CP, SOB, BARBER, lightheadedness, dizziness.  Palpitations resolved.  No syncope.    Cont Lopessor 50 BID. CHADSVASC 1, so no ASA, NOAC needed.  Has appointment with Cards.  Lab Results   Component Value Date    TSH 2.159 12/23/2019 "     Alcohol abuse:  Drinks 2-4 coffee mugs of vodka cocktail daily.  Last drink this AM.  States that he is currently intoxicated. Also drank yesterday d/t death in the family.  States that he drinks socially, not 1-2 shots/day.  Denies daily drinking. No longer on folate, MVI.  He states that he drinks socially, but unreliable historian. Counseled on cessation. Rec MVI daily OTC. Referred to Addiction Psych.    CKD 3:  Check for proteinuria.  Not on ACE/ARB.  Renal ultrasound: normal  Missed appt with Nephrology.  Reschedule Nephrology.  Lab Results   Component Value Date    CREATININE 1.5 (H) 02/04/2020    BUN 13 02/04/2020     02/04/2020    K 3.0 (L) 02/04/2020     02/04/2020    CO2 21 (L) 02/04/2020     Lab Results   Component Value Date    CALCIUM 8.7 02/04/2020    PHOS 1.7 (L) 11/29/2019     Lab Results   Component Value Date    PTH 25.5 02/04/2020    CALCIUM 8.7 02/04/2020    PHOS 1.7 (L) 11/29/2019     Vitamin D deficiency: completed course of Ergocalciferol.  Lab Results   Component Value Date    [id number] 32 02/04/2020     H/o Gout:  On allopurinol 300.  Continue allopurinol.  Referred to Nephrology.  Lab Results   Component Value Date    URICACID 10.0 (H) 11/30/2019     Macrocytic anemia, AOCD:   Long history of anemia.  Hemoglobin dropped to 6.6, thought to be due to hemodilution. Did receive 1 unit PRBCs. Increased iron to b.i.d.. FOBT negative.  Not on multivitamin or folic acid.  There is documentation of C scope at [facility] 05/26/2014, but no report.  ETOH abuse. B12, folate wnl. FOBT neg. Can stop iron. Referred to [facility] for cscope.  Lab Results   Component Value Date    IRON 16 (L) 11/29/2019    TIBC 229 (L) 11/29/2019    FERRITIN 1,143 (H) 11/29/2019     Lab Results   Component Value Date    [id number] 456 11/29/2019     Lab Results   Component Value Date    FOLATE 19.5 11/30/2019     GERD:  +nausea.   Started on Protonix.   Cont PPI. Referred to GI.    Review of Systems    Constitutional: Negative for appetite change, fever and unexpected weight change.   HENT: Negative for rhinorrhea and sneezing.    Eyes: Negative for redness and itching.   Respiratory: Negative for shortness of breath and wheezing.    Cardiovascular: Negative for chest pain and palpitations.   Gastrointestinal: Negative for abdominal pain and diarrhea.   Genitourinary: Negative for dysuria and hematuria.   Musculoskeletal: Positive for arthralgias and gait problem. Negative for back pain.   Skin: Negative for color change and rash.   Neurological: Negative for seizures and numbness.   Hematological: Negative for adenopathy.   Psychiatric/Behavioral: Negative for confusion. The patient is not nervous/anxious.        I personally reviewed Past Medical History, Past Surgical History, Social History, and Family History.    Objective:      There were no vitals filed for this visit.   Physical Exam  Pulmonary:      Effort: No respiratory distress.           Lab Results   Component Value Date    WBC 6.21 12/23/2019    HGB 10.1 (L) 12/23/2019    HCT 32.0 (L) 12/23/2019     12/23/2019    CHOL 144 01/10/2018    TRIG 92 01/10/2018    HDL 62 01/10/2018    ALT 18 12/23/2019    AST 25 12/23/2019     02/04/2020    K 3.0 (L) 02/04/2020     02/04/2020    CREATININE 1.5 (H) 02/04/2020    BUN 13 02/04/2020    CO2 21 (L) 02/04/2020    TSH 2.159 12/23/2019    PSA 0.27 02/05/2013    INR 1.0 11/29/2019    HGBA1C 4.2 01/09/2018       The 10-year ASCVD risk score (Len CRISTAL Jr., et al., 2013) is: 14.7%    Values used to calculate the score:      Age: 59 years      Sex: Male      Is Non- : Yes      Diabetic: No      Tobacco smoker: Yes      Systolic Blood Pressure: 118 mmHg      Is BP treated: Yes      HDL Cholesterol: 62 mg/dL      Total Cholesterol: 144 mg/dL    (Imaging have been independently reviewed)  Renal U/S: No hydro, nephrolithiasis.    Assessment:       1. Essential hypertension    2.  Acute hypokalemia    3. Hypomagnesemia    4. Mixed hyperlipidemia    5. Chronic pain of left ankle    6. Polyneuropathy          Plan:       Michael was seen today for foot pain and hypertension.    Diagnoses and all orders for this visit:    Essential hypertension  Comments:  Unknown home BP.  Continue Lopressor 50 BID for now d/t A fib.  Nurse visit BP check.  Orders:  -     metoprolol tartrate (LOPRESSOR) 50 MG tablet; Take 1 tablet (50 mg total) by mouth 2 (two) times daily.    Acute hypokalemia  Comments:  Persistently low. Chronic d/t ETOH, decreased PO d/t decreased mobility. Completed KCl 20 BID. Reschedule K, Mg.    Hypomagnesemia  Comments:  Slightly improved. 2/2 decreased PO intake as above. Cont Mag Ox and encouraged hydration.  Recheck Mg.    Mixed hyperlipidemia  Comments:  Not on statin. Reschedule FLP    Chronic pain of left ankle  Comments:  Persistent pain. H/o multiple surgeries. Avoid controlled substances given ETOH abuse. Unable to tolerate topical voltaren.  Reschedule Ortho, PM&R.   Orders:  -     Ambulatory referral/consult to Home Health; Future    Polyneuropathy  Comments:  Persistent despite increase of gabapentin and Tizanidine. A1c, B12 wnl. Reschedule pain , neuro         Side effects of medication(s) were discussed in detail and patient voiced understanding.  Patient will call back for any issues or complications.     RTC in 3 month(s) or sooner PRN for HTN.  In person. 30 minute visit d/t many comorbidities. Nurse visit for BP check in 1-2 weeks.

## 2020-07-29 NOTE — TELEPHONE ENCOUNTER
----- Message from Mag Helm MA sent at 7/29/2020  3:23 PM CDT -----  Regarding: p  Please reach out and schedule an appointment for patient.      Thank you!  Mag GALDAMEZ MA

## 2020-07-30 ENCOUNTER — TELEPHONE (OUTPATIENT)
Dept: NEUROLOGY | Facility: CLINIC | Age: 60
End: 2020-07-30

## 2020-07-30 NOTE — TELEPHONE ENCOUNTER
Spoke with patient in regards to scheduling an appt, patient states he does not need to see a neurologist

## 2020-07-31 ENCOUNTER — TELEPHONE (OUTPATIENT)
Dept: INTERNAL MEDICINE | Facility: CLINIC | Age: 60
End: 2020-07-31

## 2020-07-31 DIAGNOSIS — M62.838 MUSCLE SPASM: Primary | ICD-10-CM

## 2020-07-31 RX ORDER — METHOCARBAMOL 500 MG/1
500 TABLET, FILM COATED ORAL 3 TIMES DAILY PRN
Qty: 30 TABLET | Refills: 11 | Status: ON HOLD | OUTPATIENT
Start: 2020-07-31 | End: 2020-09-19 | Stop reason: SDUPTHER

## 2020-07-31 NOTE — TELEPHONE ENCOUNTER
Spoke to pharmacy  Alternative was covered  And lvm to pt telling him a rx was sent in for his muscle spasms prn

## 2020-07-31 NOTE — TELEPHONE ENCOUNTER
Tizanidine switched to Robaxin due to insurance coverage.  He may take this as needed for muscle spasms.

## 2020-08-17 PROCEDURE — G0180 PR HOME HEALTH MD CERTIFICATION: ICD-10-PCS | Mod: ,,, | Performed by: INTERNAL MEDICINE

## 2020-08-17 PROCEDURE — G0180 MD CERTIFICATION HHA PATIENT: HCPCS | Mod: ,,, | Performed by: INTERNAL MEDICINE

## 2020-08-18 ENCOUNTER — TELEPHONE (OUTPATIENT)
Dept: INTERNAL MEDICINE | Facility: CLINIC | Age: 60
End: 2020-08-18

## 2020-08-18 NOTE — TELEPHONE ENCOUNTER
----- Message from Corinne Chapman sent at 8/18/2020 12:11 PM CDT -----  Name of Who is Calling: TIERA MARTIN JR. [[id number]]    What is the request in detail: TIERA MARTIN JR. [[id number]] is calling in regards to personal concerns ... Please contact to further discuss and advise      Can the clinic reply by [facility]: no     What Number to Call Back if not in [facility]:  [phone] (home)

## 2020-08-18 NOTE — TELEPHONE ENCOUNTER
Patient called to schedule an appointment but then stated that he has to contact his insurance company first and will call back to schedule.

## 2020-08-18 NOTE — TELEPHONE ENCOUNTER
----- Message from Evelia Palomares sent at 8/18/2020  2:58 PM CDT -----  Type: Patient Call Back    Who called: pt     What is the request in detail: pt calling to request a call back from nurse. Pt is upset that he has to make an appt to speak to the doctor regarding his health concerns. Please contact pt.     Can the clinic reply by [facility]? No     Would the patient rather a call back or a response via [facility]? Call back     Best call back number: [phone]    Additional Information:

## 2020-08-18 NOTE — TELEPHONE ENCOUNTER
Patient stated he has to figure out the number to transportation before he can schedule an appointment.

## 2020-08-19 ENCOUNTER — TELEPHONE (OUTPATIENT)
Dept: INTERNAL MEDICINE | Facility: CLINIC | Age: 60
End: 2020-08-19

## 2020-08-19 DIAGNOSIS — R53.81 DEBILITY: Primary | ICD-10-CM

## 2020-08-19 NOTE — TELEPHONE ENCOUNTER
----- Message from Gracielaignacia Guevara sent at 8/19/2020 11:21 AM CDT -----  Contact: Self [phone]  Type: Patient Call Back    Who called: Self    What is the request in detail: pt is calling because he is requesting a motorized wheelchair and he is wondering what he should do    Can the clinic reply by [facility]? Call back    Would the patient rather a call back or a response via [facility]? Call back    Best call back number: [phone]

## 2020-08-20 NOTE — TELEPHONE ENCOUNTER
Spoke to pt told him I will fax the order to his preferred dme   Pt did not have one  Rec. By [facility] faxed order to [phone]  Phone [phone]    Also to  Wheelchair [phone]  Fax [phone]    Called to explain to the pt that I faxed the order to 2 dme stores and they will process it with his insurance  Pt did not understand and thought he was already getting one delivered  Reviewed info with him again and Pt understood that the 2 dme companies that I faxed the order to will have to process it and will contact our office if they need any further info     Pt was offered to schedule appts but he did not approve and only wanted to speak about his motorized scooter request

## 2020-08-21 ENCOUNTER — PATIENT OUTREACH (OUTPATIENT)
Dept: ADMINISTRATIVE | Facility: OTHER | Age: 60
End: 2020-08-21

## 2020-08-26 ENCOUNTER — TELEPHONE (OUTPATIENT)
Dept: INTERNAL MEDICINE | Facility: CLINIC | Age: 60
End: 2020-08-26

## 2020-08-26 NOTE — TELEPHONE ENCOUNTER
Spoke to [facility] and they did receive the 08/20 fax scooter order with lov    Evelia processes the scooter orders and states with [employer]- they require virtual or in person visit notes and not addend    Last 2 notes were audio  And Evelia received audio note  Was told I will send the in person visit note from 02/2019 faxed to her office and she will let us know if he will need an in person visit    She states that is the last resort that he may need a in person visit in order to get scooter approved

## 2020-08-31 ENCOUNTER — DOCUMENT SCAN (OUTPATIENT)
Dept: HOME HEALTH SERVICES | Facility: HOSPITAL | Age: 60
End: 2020-08-31
Payer: MEDICAID

## 2020-09-09 ENCOUNTER — NURSE TRIAGE (OUTPATIENT)
Dept: ADMINISTRATIVE | Facility: CLINIC | Age: 60
End: 2020-09-09

## 2020-09-09 ENCOUNTER — TELEPHONE (OUTPATIENT)
Dept: INTERNAL MEDICINE | Facility: CLINIC | Age: 60
End: 2020-09-09

## 2020-09-09 NOTE — TELEPHONE ENCOUNTER
Heart rate, counting by hand, around 60, states he took three of his metoprolol pills. States he takes 400 per day, dose verified, his dose is 50 bid, confused re what he has been taking, very anxious, insists he is to take 400, appears he took 150 metoprolol at one, time. Informed patient he needed to call poison control Spoke to wife, advised poison control number given, 911 advice given also. Verb understanding.     Reason for Disposition   [1] DOUBLE DOSE (an extra dose or lesser amount) of prescription drug AND [2] any symptoms (e.g., dizziness, nausea, pain, sleepiness)    Protocols used: MEDICATION QUESTION CALL-A-

## 2020-09-09 NOTE — TELEPHONE ENCOUNTER
Called back, patient's wife states is was the wrong number for poison control, number verified, she did not have correct number written, states he is fine now. Strongly advised re need to call poison control, possible delayed reaction of medication. Verb understanding. Patient insisting he is fine, again advised to call poison control

## 2020-09-09 NOTE — TELEPHONE ENCOUNTER
----- Message from Caroline Schumacher sent at 9/9/2020 10:16 AM CDT -----  Contact: [facility] [phone]  Type: Patient Call Back    Who called: K[facility]    What is the request in detail: Need to speak to nurse in regards to physical therapy. Please call.    Would the patient rather a call back or a response via [facility]? Call back    Best call back number: [phone]

## 2020-09-10 NOTE — TELEPHONE ENCOUNTER
States hh nurse checked his bp and it was normal but did not tell him what the range was, and explained how he mixed his medicine   Pt states he is taking metoprolol 50 mg bid     Pt is unable to stand up and has been getting worst and has occurred for 3 years, states the pain occurred 3 years ago.   Pt states he has assistance for showering and bathroom with HH  States he fell 13 times     Still waiting on scooter order to be processed  Pt's wife will asssit him to go to PCP appt 09/14 at 3:15 pm

## 2020-09-11 ENCOUNTER — TELEPHONE (OUTPATIENT)
Dept: INTERNAL MEDICINE | Facility: CLINIC | Age: 60
End: 2020-09-11

## 2020-09-11 NOTE — TELEPHONE ENCOUNTER
LVM to Poliwa that MD approved  and read msg, and told her she can reach back if anything further is needed.  Yes, it is okay for verbal approval for social work.  I recommend home health to help with his medications, weakness, chronic ankle pain.

## 2020-09-11 NOTE — TELEPHONE ENCOUNTER
Spoke to ELAYNE Quan,   States [facility] needs home health to be discontinued in order to get a approved for a physical therapy evaluation from [facility] in order to get the scooter  Pt confirmed he wants a scooter and is not capable to perform ADLs with a wheelchair due to shoulder and debility issues    [facility] is requesting for a  evaluation before PCP decides whether or not to discontinued, [facility] state that their physical therapy state he has reached max potential and can no longer further assist him  Confirmed with  [facility] that a referral for case management or a verbal approval from PCP is fine too for

## 2020-09-11 NOTE — TELEPHONE ENCOUNTER
----- Message from Sancho Giraldo sent at 9/11/2020 10:11 AM CDT -----  Regarding: [facility]  [facility] calling in regards to speak a nurse in office in reference to patient current home health orders, she needs clarification         Please advise Kadeem can be contact at [phone]

## 2020-09-17 ENCOUNTER — DOCUMENT SCAN (OUTPATIENT)
Dept: HOME HEALTH SERVICES | Facility: HOSPITAL | Age: 60
End: 2020-09-17
Payer: MEDICAID

## 2020-09-17 ENCOUNTER — HOSPITAL ENCOUNTER (OUTPATIENT)
Facility: OTHER | Age: 60
Discharge: HOME HEALTH | End: 2020-09-19
Attending: EMERGENCY MEDICINE | Admitting: INTERNAL MEDICINE
Payer: MEDICARE

## 2020-09-17 DIAGNOSIS — G81.10 SPASTIC HEMIPLEGIA, UNSPECIFIED ETIOLOGY, UNSPECIFIED LATERALITY: ICD-10-CM

## 2020-09-17 DIAGNOSIS — R00.0 TACHYCARDIA: ICD-10-CM

## 2020-09-17 DIAGNOSIS — G40.909 SEIZURE DISORDER: ICD-10-CM

## 2020-09-17 DIAGNOSIS — R50.9 FEVER: ICD-10-CM

## 2020-09-17 DIAGNOSIS — G40.919 BREAKTHROUGH SEIZURE: ICD-10-CM

## 2020-09-17 DIAGNOSIS — M62.838 MUSCLE SPASM: ICD-10-CM

## 2020-09-17 DIAGNOSIS — R56.9 SEIZURE: Primary | ICD-10-CM

## 2020-09-17 DIAGNOSIS — G62.9 POLYNEUROPATHY: ICD-10-CM

## 2020-09-17 LAB
ALBUMIN SERPL BCP-MCNC: 3.9 G/DL (ref 3.5–5.2)
ALP SERPL-CCNC: 93 U/L (ref 55–135)
ALT SERPL W/O P-5'-P-CCNC: 106 U/L (ref 10–44)
ANION GAP SERPL CALC-SCNC: 16 MMOL/L (ref 8–16)
AST SERPL-CCNC: 67 U/L (ref 10–40)
BACTERIA #/AREA URNS HPF: ABNORMAL /HPF
BASOPHILS # BLD AUTO: 0.02 K/UL (ref 0–0.2)
BASOPHILS NFR BLD: 0.2 % (ref 0–1.9)
BILIRUB SERPL-MCNC: 0.9 MG/DL (ref 0.1–1)
BILIRUB UR QL STRIP: ABNORMAL
BUN SERPL-MCNC: 19 MG/DL (ref 6–20)
CALCIUM SERPL-MCNC: 8.1 MG/DL (ref 8.7–10.5)
CHLORIDE SERPL-SCNC: 95 MMOL/L (ref 95–110)
CLARITY UR: CLEAR
CO2 SERPL-SCNC: 24 MMOL/L (ref 23–29)
COLOR UR: YELLOW
CREAT SERPL-MCNC: 1.7 MG/DL (ref 0.5–1.4)
CTP QC/QA: YES
DIFFERENTIAL METHOD: ABNORMAL
EOSINOPHIL # BLD AUTO: 0 K/UL (ref 0–0.5)
EOSINOPHIL NFR BLD: 0.5 % (ref 0–8)
EPITH CASTS #/AREA URNS LPF: 2 /LPF
ERYTHROCYTE [DISTWIDTH] IN BLOOD BY AUTOMATED COUNT: 14.8 % (ref 11.5–14.5)
EST. GFR  (AFRICAN AMERICAN): 50 ML/MIN/1.73 M^2
EST. GFR  (NON AFRICAN AMERICAN): 43 ML/MIN/1.73 M^2
GLUCOSE SERPL-MCNC: 107 MG/DL (ref 70–110)
GLUCOSE UR QL STRIP: NEGATIVE
HCT VFR BLD AUTO: 33.2 % (ref 40–54)
HGB BLD-MCNC: 11.2 G/DL (ref 14–18)
HGB UR QL STRIP: NEGATIVE
HYALINE CASTS #/AREA URNS LPF: 0 /LPF
IMM GRANULOCYTES # BLD AUTO: 0.03 K/UL (ref 0–0.04)
IMM GRANULOCYTES NFR BLD AUTO: 0.3 % (ref 0–0.5)
KETONES UR QL STRIP: ABNORMAL
LACTATE SERPL-SCNC: 2 MMOL/L (ref 0.5–2.2)
LEUKOCYTE ESTERASE UR QL STRIP: NEGATIVE
LYMPHOCYTES # BLD AUTO: 1.1 K/UL (ref 1–4.8)
LYMPHOCYTES NFR BLD: 12.7 % (ref 18–48)
MAGNESIUM SERPL-MCNC: 1 MG/DL (ref 1.6–2.6)
MCH RBC QN AUTO: 34.7 PG (ref 27–31)
MCHC RBC AUTO-ENTMCNC: 33.7 G/DL (ref 32–36)
MCV RBC AUTO: 103 FL (ref 82–98)
MICROSCOPIC COMMENT: ABNORMAL
MONOCYTES # BLD AUTO: 1 K/UL (ref 0.3–1)
MONOCYTES NFR BLD: 11.4 % (ref 4–15)
NEUTROPHILS # BLD AUTO: 6.5 K/UL (ref 1.8–7.7)
NEUTROPHILS NFR BLD: 74.9 % (ref 38–73)
NITRITE UR QL STRIP: NEGATIVE
NRBC BLD-RTO: 0 /100 WBC
PH UR STRIP: 7 [PH] (ref 5–8)
PLATELET # BLD AUTO: 276 K/UL (ref 150–350)
PMV BLD AUTO: 10.6 FL (ref 9.2–12.9)
POTASSIUM SERPL-SCNC: 3.2 MMOL/L (ref 3.5–5.1)
PROT SERPL-MCNC: 9.4 G/DL (ref 6–8.4)
PROT UR QL STRIP: ABNORMAL
RBC # BLD AUTO: 3.23 M/UL (ref 4.6–6.2)
RBC #/AREA URNS HPF: 2 /HPF (ref 0–4)
SARS-COV-2 RDRP RESP QL NAA+PROBE: NEGATIVE
SODIUM SERPL-SCNC: 135 MMOL/L (ref 136–145)
SP GR UR STRIP: 1.01 (ref 1–1.03)
SQUAMOUS #/AREA URNS HPF: 0 /HPF
URN SPEC COLLECT METH UR: ABNORMAL
UROBILINOGEN UR STRIP-ACNC: ABNORMAL EU/DL
WBC # BLD AUTO: 8.65 K/UL (ref 3.9–12.7)
WBC #/AREA URNS HPF: 4 /HPF (ref 0–5)

## 2020-09-17 PROCEDURE — U0002 COVID-19 LAB TEST NON-CDC: HCPCS | Mod: HCNC | Performed by: NURSE PRACTITIONER

## 2020-09-17 PROCEDURE — G0378 HOSPITAL OBSERVATION PER HR: HCPCS | Mod: HCNC,CS

## 2020-09-17 PROCEDURE — 93005 ELECTROCARDIOGRAM TRACING: CPT | Mod: HCNC

## 2020-09-17 PROCEDURE — 96375 TX/PRO/DX INJ NEW DRUG ADDON: CPT | Mod: HCNC

## 2020-09-17 PROCEDURE — 25000003 PHARM REV CODE 250: Mod: HCNC | Performed by: EMERGENCY MEDICINE

## 2020-09-17 PROCEDURE — 85025 COMPLETE CBC W/AUTO DIFF WBC: CPT | Mod: HCNC

## 2020-09-17 PROCEDURE — 63600175 PHARM REV CODE 636 W HCPCS: Mod: HCNC | Performed by: EMERGENCY MEDICINE

## 2020-09-17 PROCEDURE — 99291 CRITICAL CARE FIRST HOUR: CPT | Mod: HCNC

## 2020-09-17 PROCEDURE — 83735 ASSAY OF MAGNESIUM: CPT | Mod: HCNC

## 2020-09-17 PROCEDURE — 96367 TX/PROPH/DG ADDL SEQ IV INF: CPT | Mod: HCNC

## 2020-09-17 PROCEDURE — 63600175 PHARM REV CODE 636 W HCPCS: Mod: HCNC | Performed by: NURSE PRACTITIONER

## 2020-09-17 PROCEDURE — 82550 ASSAY OF CK (CPK): CPT | Mod: HCNC

## 2020-09-17 PROCEDURE — 93010 ELECTROCARDIOGRAM REPORT: CPT | Mod: HCNC,,, | Performed by: INTERNAL MEDICINE

## 2020-09-17 PROCEDURE — 96365 THER/PROPH/DIAG IV INF INIT: CPT | Mod: HCNC

## 2020-09-17 PROCEDURE — 80053 COMPREHEN METABOLIC PANEL: CPT | Mod: HCNC

## 2020-09-17 PROCEDURE — 83605 ASSAY OF LACTIC ACID: CPT | Mod: HCNC

## 2020-09-17 PROCEDURE — 93010 EKG 12-LEAD: ICD-10-PCS | Mod: HCNC,,, | Performed by: INTERNAL MEDICINE

## 2020-09-17 PROCEDURE — 96366 THER/PROPH/DIAG IV INF ADDON: CPT | Mod: HCNC

## 2020-09-17 PROCEDURE — 81000 URINALYSIS NONAUTO W/SCOPE: CPT | Mod: HCNC

## 2020-09-17 RX ORDER — MAGNESIUM SULFATE 1 G/100ML
1 INJECTION INTRAVENOUS
Status: COMPLETED | OUTPATIENT
Start: 2020-09-17 | End: 2020-09-17

## 2020-09-17 RX ORDER — LEVETIRACETAM 10 MG/ML
1000 INJECTION INTRAVASCULAR
Status: COMPLETED | OUTPATIENT
Start: 2020-09-17 | End: 2020-09-17

## 2020-09-17 RX ORDER — LORAZEPAM 2 MG/ML
INJECTION INTRAMUSCULAR
Status: DISPENSED
Start: 2020-09-17 | End: 2020-09-18

## 2020-09-17 RX ADMIN — SODIUM CHLORIDE 1000 ML: 0.9 INJECTION, SOLUTION INTRAVENOUS at 11:09

## 2020-09-17 RX ADMIN — MAGNESIUM SULFATE 1 G: 1 INJECTION INTRAVENOUS at 09:09

## 2020-09-17 RX ADMIN — LEVETIRACETAM INJECTION 1000 MG: 10 INJECTION INTRAVENOUS at 11:09

## 2020-09-17 RX ADMIN — LORAZEPAM 2 MG: 2 INJECTION INTRAMUSCULAR; INTRAVENOUS at 11:09

## 2020-09-17 NOTE — TELEPHONE ENCOUNTER
Spoke to Kadeem and confirmed lvm  She confirmed  order and was told MD advise He needs help at home, so I think that he should continue HH. However, if they need him to be evaluated by outpatient PT for the scooter, maybe they can pause the HH and restart it afterwards.

## 2020-09-18 ENCOUNTER — TELEPHONE (OUTPATIENT)
Dept: INTERNAL MEDICINE | Facility: CLINIC | Age: 60
End: 2020-09-18

## 2020-09-18 PROBLEM — G40.909 SEIZURE DISORDER: Status: ACTIVE | Noted: 2020-09-17

## 2020-09-18 PROBLEM — N28.9 ACUTE ON CHRONIC RENAL INSUFFICIENCY: Status: ACTIVE | Noted: 2020-09-18

## 2020-09-18 PROBLEM — N18.9 ACUTE ON CHRONIC RENAL INSUFFICIENCY: Status: ACTIVE | Noted: 2020-09-18

## 2020-09-18 LAB
ALBUMIN SERPL BCP-MCNC: 3.2 G/DL (ref 3.5–5.2)
ALP SERPL-CCNC: 76 U/L (ref 55–135)
ALT SERPL W/O P-5'-P-CCNC: 77 U/L (ref 10–44)
AMPHET+METHAMPHET UR QL: NEGATIVE
AMPHET+METHAMPHET UR QL: NEGATIVE
ANION GAP SERPL CALC-SCNC: 13 MMOL/L (ref 8–16)
AST SERPL-CCNC: 53 U/L (ref 10–40)
BARBITURATES UR QL SCN>200 NG/ML: NEGATIVE
BARBITURATES UR QL SCN>200 NG/ML: NEGATIVE
BENZODIAZ UR QL SCN>200 NG/ML: NEGATIVE
BENZODIAZ UR QL SCN>200 NG/ML: NEGATIVE
BILIRUB SERPL-MCNC: 0.7 MG/DL (ref 0.1–1)
BUN SERPL-MCNC: 19 MG/DL (ref 6–20)
BZE UR QL SCN: NEGATIVE
BZE UR QL SCN: NEGATIVE
CALCIUM SERPL-MCNC: 7.1 MG/DL (ref 8.7–10.5)
CANNABINOIDS UR QL SCN: NEGATIVE
CANNABINOIDS UR QL SCN: NEGATIVE
CHLORIDE SERPL-SCNC: 101 MMOL/L (ref 95–110)
CK SERPL-CCNC: 219 U/L (ref 20–200)
CO2 SERPL-SCNC: 22 MMOL/L (ref 23–29)
CREAT SERPL-MCNC: 1.5 MG/DL (ref 0.5–1.4)
CREAT UR-MCNC: 379 MG/DL (ref 23–375)
CREAT UR-MCNC: 70.3 MG/DL (ref 23–375)
CREAT UR-MCNC: 70.3 MG/DL (ref 23–375)
EST. GFR  (AFRICAN AMERICAN): 58 ML/MIN/1.73 M^2
EST. GFR  (NON AFRICAN AMERICAN): 51 ML/MIN/1.73 M^2
ETHANOL SERPL-MCNC: <10 MG/DL
ETHANOL UR-MCNC: <10 MG/DL
ETHANOL UR-MCNC: <10 MG/DL
GLUCOSE SERPL-MCNC: 87 MG/DL (ref 70–110)
METHADONE UR QL SCN>300 NG/ML: NEGATIVE
METHADONE UR QL SCN>300 NG/ML: NEGATIVE
OPIATES UR QL SCN: NEGATIVE
OPIATES UR QL SCN: NEGATIVE
OSMOLALITY UR: 313 MOSM/KG (ref 50–1200)
PCP UR QL SCN>25 NG/ML: NEGATIVE
PCP UR QL SCN>25 NG/ML: NEGATIVE
POTASSIUM SERPL-SCNC: 3.3 MMOL/L (ref 3.5–5.1)
PROT SERPL-MCNC: 7.8 G/DL (ref 6–8.4)
PROT UR-MCNC: <7 MG/DL (ref 0–15)
PROT/CREAT UR: NORMAL MG/G{CREAT} (ref 0–0.2)
SODIUM SERPL-SCNC: 136 MMOL/L (ref 136–145)
SODIUM UR-SCNC: 74 MMOL/L (ref 20–250)
TOXICOLOGY INFORMATION: ABNORMAL
TOXICOLOGY INFORMATION: NORMAL

## 2020-09-18 PROCEDURE — 99214 PR OFFICE/OUTPT VISIT, EST, LEVL IV, 30-39 MIN: ICD-10-PCS | Mod: HCNC,,, | Performed by: STUDENT IN AN ORGANIZED HEALTH CARE EDUCATION/TRAINING PROGRAM

## 2020-09-18 PROCEDURE — 99900035 HC TECH TIME PER 15 MIN (STAT): Mod: HCNC

## 2020-09-18 PROCEDURE — 97166 OT EVAL MOD COMPLEX 45 MIN: CPT | Mod: HCNC

## 2020-09-18 PROCEDURE — 80053 COMPREHEN METABOLIC PANEL: CPT | Mod: HCNC

## 2020-09-18 PROCEDURE — 95819 EEG AWAKE AND ASLEEP: CPT | Mod: HCNC

## 2020-09-18 PROCEDURE — 84156 ASSAY OF PROTEIN URINE: CPT | Mod: HCNC,59

## 2020-09-18 PROCEDURE — 25000003 PHARM REV CODE 250: Mod: HCNC | Performed by: PHYSICIAN ASSISTANT

## 2020-09-18 PROCEDURE — 84300 ASSAY OF URINE SODIUM: CPT | Mod: HCNC

## 2020-09-18 PROCEDURE — 99214 OFFICE O/P EST MOD 30 MIN: CPT | Mod: HCNC,,, | Performed by: STUDENT IN AN ORGANIZED HEALTH CARE EDUCATION/TRAINING PROGRAM

## 2020-09-18 PROCEDURE — 97530 THERAPEUTIC ACTIVITIES: CPT | Mod: HCNC

## 2020-09-18 PROCEDURE — 97802 MEDICAL NUTRITION INDIV IN: CPT | Mod: HCNC,59

## 2020-09-18 PROCEDURE — 80307 DRUG TEST PRSMV CHEM ANLYZR: CPT | Mod: HCNC

## 2020-09-18 PROCEDURE — 83935 ASSAY OF URINE OSMOLALITY: CPT | Mod: HCNC

## 2020-09-18 PROCEDURE — 80320 DRUG SCREEN QUANTALCOHOLS: CPT | Mod: HCNC

## 2020-09-18 PROCEDURE — G0378 HOSPITAL OBSERVATION PER HR: HCPCS | Mod: HCNC,CS

## 2020-09-18 PROCEDURE — 95819 EEG AWAKE AND ASLEEP: CPT | Mod: 26,HCNC,, | Performed by: PSYCHIATRY & NEUROLOGY

## 2020-09-18 PROCEDURE — 36415 COLL VENOUS BLD VENIPUNCTURE: CPT | Mod: HCNC

## 2020-09-18 PROCEDURE — 80177 DRUG SCRN QUAN LEVETIRACETAM: CPT | Mod: HCNC

## 2020-09-18 PROCEDURE — 99220 PR INITIAL OBSERVATION CARE,LEVL III: ICD-10-PCS | Mod: HCNC,,, | Performed by: PHYSICIAN ASSISTANT

## 2020-09-18 PROCEDURE — 25000003 PHARM REV CODE 250: Mod: HCNC | Performed by: HOSPITALIST

## 2020-09-18 PROCEDURE — 94761 N-INVAS EAR/PLS OXIMETRY MLT: CPT | Mod: HCNC

## 2020-09-18 PROCEDURE — 99220 PR INITIAL OBSERVATION CARE,LEVL III: CPT | Mod: HCNC,,, | Performed by: PHYSICIAN ASSISTANT

## 2020-09-18 PROCEDURE — 63600175 PHARM REV CODE 636 W HCPCS: Mod: HCNC | Performed by: PHYSICIAN ASSISTANT

## 2020-09-18 PROCEDURE — 95819 PR EEG,W/AWAKE & ASLEEP RECORD: ICD-10-PCS | Mod: 26,HCNC,, | Performed by: PSYCHIATRY & NEUROLOGY

## 2020-09-18 PROCEDURE — 96361 HYDRATE IV INFUSION ADD-ON: CPT | Performed by: EMERGENCY MEDICINE

## 2020-09-18 PROCEDURE — 97162 PT EVAL MOD COMPLEX 30 MIN: CPT | Mod: HCNC

## 2020-09-18 RX ORDER — METOPROLOL TARTRATE 50 MG/1
50 TABLET ORAL 2 TIMES DAILY
Status: DISCONTINUED | OUTPATIENT
Start: 2020-09-18 | End: 2020-09-19 | Stop reason: HOSPADM

## 2020-09-18 RX ORDER — LANOLIN ALCOHOL/MO/W.PET/CERES
100 CREAM (GRAM) TOPICAL DAILY
Status: DISCONTINUED | OUTPATIENT
Start: 2020-09-18 | End: 2020-09-19 | Stop reason: HOSPADM

## 2020-09-18 RX ORDER — POTASSIUM CHLORIDE 1.5 G/1.58G
40 POWDER, FOR SOLUTION ORAL ONCE
Status: COMPLETED | OUTPATIENT
Start: 2020-09-18 | End: 2020-09-18

## 2020-09-18 RX ORDER — LEVETIRACETAM 500 MG/1
500 TABLET ORAL 2 TIMES DAILY
Status: DISCONTINUED | OUTPATIENT
Start: 2020-09-18 | End: 2020-09-19 | Stop reason: HOSPADM

## 2020-09-18 RX ORDER — SODIUM CHLORIDE 9 MG/ML
INJECTION, SOLUTION INTRAVENOUS CONTINUOUS
Status: DISCONTINUED | OUTPATIENT
Start: 2020-09-18 | End: 2020-09-19 | Stop reason: HOSPADM

## 2020-09-18 RX ORDER — DIAZEPAM 10 MG/2ML
5 INJECTION INTRAMUSCULAR EVERY 4 HOURS PRN
Status: DISCONTINUED | OUTPATIENT
Start: 2020-09-18 | End: 2020-09-18

## 2020-09-18 RX ORDER — SODIUM CHLORIDE 0.9 % (FLUSH) 0.9 %
10 SYRINGE (ML) INJECTION
Status: DISCONTINUED | OUTPATIENT
Start: 2020-09-18 | End: 2020-09-19 | Stop reason: HOSPADM

## 2020-09-18 RX ORDER — GABAPENTIN 400 MG/1
400 CAPSULE ORAL 3 TIMES DAILY
Status: DISCONTINUED | OUTPATIENT
Start: 2020-09-18 | End: 2020-09-19 | Stop reason: HOSPADM

## 2020-09-18 RX ORDER — PANTOPRAZOLE SODIUM 40 MG/1
40 TABLET, DELAYED RELEASE ORAL DAILY
Status: DISCONTINUED | OUTPATIENT
Start: 2020-09-18 | End: 2020-09-19 | Stop reason: HOSPADM

## 2020-09-18 RX ORDER — MAG HYDROX/ALUMINUM HYD/SIMETH 200-200-20
30 SUSPENSION, ORAL (FINAL DOSE FORM) ORAL
Status: DISCONTINUED | OUTPATIENT
Start: 2020-09-18 | End: 2020-09-19 | Stop reason: HOSPADM

## 2020-09-18 RX ORDER — SUCRALFATE 1 G/10ML
1 SUSPENSION ORAL EVERY 6 HOURS
Status: DISCONTINUED | OUTPATIENT
Start: 2020-09-18 | End: 2020-09-19 | Stop reason: HOSPADM

## 2020-09-18 RX ORDER — FOLIC ACID 1 MG/1
1 TABLET ORAL DAILY
Status: DISCONTINUED | OUTPATIENT
Start: 2020-09-18 | End: 2020-09-19 | Stop reason: HOSPADM

## 2020-09-18 RX ADMIN — PANTOPRAZOLE SODIUM 40 MG: 40 TABLET, DELAYED RELEASE ORAL at 09:09

## 2020-09-18 RX ADMIN — GABAPENTIN 400 MG: 400 CAPSULE ORAL at 02:09

## 2020-09-18 RX ADMIN — SODIUM CHLORIDE: 0.9 INJECTION, SOLUTION INTRAVENOUS at 06:09

## 2020-09-18 RX ADMIN — GABAPENTIN 400 MG: 400 CAPSULE ORAL at 09:09

## 2020-09-18 RX ADMIN — FOLIC ACID 1 MG: 1 TABLET ORAL at 09:09

## 2020-09-18 RX ADMIN — ALUMINUM HYDROXIDE, MAGNESIUM HYDROXIDE, AND SIMETHICONE 30 ML: 200; 200; 20 SUSPENSION ORAL at 12:09

## 2020-09-18 RX ADMIN — METOPROLOL TARTRATE 50 MG: 50 TABLET, FILM COATED ORAL at 09:09

## 2020-09-18 RX ADMIN — ALUMINUM HYDROXIDE, MAGNESIUM HYDROXIDE, AND SIMETHICONE 30 ML: 200; 200; 20 SUSPENSION ORAL at 09:09

## 2020-09-18 RX ADMIN — LEVETIRACETAM 500 MG: 500 TABLET ORAL at 09:09

## 2020-09-18 RX ADMIN — FOLIC ACID: 5 INJECTION, SOLUTION INTRAMUSCULAR; INTRAVENOUS; SUBCUTANEOUS at 02:09

## 2020-09-18 RX ADMIN — ALUMINUM HYDROXIDE, MAGNESIUM HYDROXIDE, AND SIMETHICONE 30 ML: 200; 200; 20 SUSPENSION ORAL at 06:09

## 2020-09-18 RX ADMIN — POTASSIUM CHLORIDE 40 MEQ: 1.5 POWDER, FOR SOLUTION ORAL at 02:09

## 2020-09-18 RX ADMIN — THIAMINE HCL TAB 100 MG 100 MG: 100 TAB at 09:09

## 2020-09-18 RX ADMIN — THERA TABS 1 TABLET: TAB at 09:09

## 2020-09-18 RX ADMIN — SODIUM CHLORIDE: 0.9 INJECTION, SOLUTION INTRAVENOUS at 03:09

## 2020-09-18 RX ADMIN — POTASSIUM CHLORIDE 40 MEQ: 1.5 POWDER, FOR SOLUTION ORAL at 12:09

## 2020-09-18 RX ADMIN — ALUMINUM HYDROXIDE, MAGNESIUM HYDROXIDE, AND SIMETHICONE 30 ML: 200; 200; 20 SUSPENSION ORAL at 04:09

## 2020-09-18 RX ADMIN — SUCRALFATE 1 G: 1 SUSPENSION ORAL at 06:09

## 2020-09-18 RX ADMIN — SUCRALFATE 1 G: 1 SUSPENSION ORAL at 11:09

## 2020-09-18 RX ADMIN — SUCRALFATE 1 G: 1 SUSPENSION ORAL at 05:09

## 2020-09-18 NOTE — ASSESSMENT & PLAN NOTE
- cont folic and thiamine  - monitor for w/d, although her states last drink was a week ago  - counseled on cessation and increased risk for seizures

## 2020-09-18 NOTE — PLAN OF CARE
Problem: Physical Therapy Goal  Goal: Physical Therapy Goal  Description: Goals to be met by: 10/18/2020    Patient will perform the following to increase strength, improve mobility, and return to prior level of function:    1. Supine <> sit with SBA.  2. Sit EOB x 15 min with mod I for balance.  3. Sit <> stand with CGA.  4. Bed <> wheelchair transfer with SBA  Outcome: Ongoing, Progressing    PT orders received. PT evaluation completed and goals/POC established. Pt tolerated evaluation well with no adverse reactions. Pt performed supine <> sit with CGA, bed > wheelchair transfer with CGA, wheelchair > bed transfer with mod A x 2 people. PT will continue to follow and progress goals as tolerated. Recommend discharge to home with HHPT and 24 hour supervision/assistance.

## 2020-09-18 NOTE — PT/OT/SLP EVAL
Physical Therapy Evaluation and Treatment    Patient Name:  Michael Johnson Jr.   MRN:  [id number]    Recommendations:     Discharge Recommendations:  home, home health PT(24 hour supervision/assistance)   Discharge Equipment Recommendations: power chair, hospital bed   Barriers to discharge: Decreased caregiver support and current functional level    Assessment:     Michael Johnson Jr. is a 60 y.o. male admitted with a medical diagnosis of Breakthrough seizure.  He presents with the following impairments/functional limitations:  weakness, impaired endurance, impaired self care skills, impaired functional mobilty, gait instability, impaired balance, decreased lower extremity function, decreased upper extremity function, decreased safety awareness.    PT orders received. PT evaluation completed and goals/POC established. Pt tolerated evaluation well with no adverse reactions. Pt performed supine <> sit with CGA, bed > wheelchair transfer with CGA, wheelchair > bed transfer with mod A x 2 people. PT will continue to follow and progress goals as tolerated. Recommend discharge to home with HHPT and 24 hour supervision/assistance.      Rehab Prognosis: Fair; patient would benefit from acute skilled PT services to address these deficits and reach maximum level of function.    Recent Surgery: * No surgery found *      Plan:     During this hospitalization, patient to be seen 3 x/week to address the identified rehab impairments via therapeutic activities, therapeutic exercises, wheelchair management/training and progress toward the following goals:    · Plan of Care Expires:  10/18/20    Subjective     Chief Complaint: Reports difficulty getting in/out of bed and wheelchair at home.  Patient/Family Comments/goals: No goal stated.  Pain/Comfort:  · Pain Rating 1: 0/10  · Pain Rating Post-Intervention 1: 0/10    Patients cultural, spiritual, Rastafarian conflicts given the current situation: no(None stated)    Living  Environment:  · Pt lives with his wife in a two story house with 8 steps to enter and bilateral handrail(s). Bedroom and bathroom are on the 1st level. Pt has wheelchair elevator to enter home.  · DME owned: Rolling walker, Bedside commode, Manual wheelchair, Straight cane and forearm crutches  · Upon discharge, patient will have very limited assistance from his wife.  · Wife reports she is also disabled and is unable to physically assist the patient at home.  Prior level of function:  · Non-ambulatory. Utilizes wheelchair for household and community mobility. Unable to self-propel wheelchair. Performed lateral scooting transfers in/out of wheelchair with mod I at baseline. Also transfers bed <> BSC with mod I at baseline.  · Falls: Reports 13 falls in the last 6 months.    Objective:     Communicated with RN (Katie) prior to session.  Patient found supine with peripheral IV, telemetry, pulse ox (continuous), blood pressure cuff  upon PT entry to room.    General Precautions: Standard, fall   Orthopedic Precautions:N/A   Braces: N/A     Exams:  · Cognition:   · Patient is oriented to person and situation.  · Pt follows approximately 100% of single step commands.    · Mood: Pleasant and cooperative.   · Safety Awareness: Fair  · Musculoskeletal:  · Posture:  Forward head, rounded shoulders  · LE ROM/Strength:   · R ROM: Limited ankle DF. Otherwise WNL.   · L ROM: Limited knee extension and ankle ROM (hx of ankle fusion).  · R Strength:   · Hip flexion: 3-/5  · Knee extension: 3-/5  · Dorsiflexion: 2/5   · L Strength:   · Hip flexion: 3-/5  · Knee extension: 3-/5  · Neuromuscular:  · Sensation: Intact to light touch bilateral LEs.   · Tone/Reflexes/Coordination: No impairments identified with functional mobility. No formal testing performed.   · Balance:   · Static sitting: SBA  · Dynamic sitting: CGA  · Visual-vestibular: No impairments identified with functional mobility. No formal testing performed.  · Integument:  Visible skin intact    Functional Mobility: Gait belt and non-slip socks donned prior to mobility.  · Bed mobility: Supine <> sit with CGA. Performed lateral scooting in short sitting with SBA.  · Sitting balance: Sat EOB x 10 min with SBA to CGA for balance.  · Transfers: Bed > wheelchair transfer with CGA via lateral scooting. Transfer going toward pt's right side and downhill. Wheelchair > Bed transfer with mod A x 2 people via squat pivot. Transfer going toward pt's left side and uphill.    Therapeutic Activities and Exercises:   Bed mobility, transfer, and gait training as described above.    AM-PAC 6 CLICK MOBILITY  Total Score:10     Patient left supine with all lines intact and call button in reach.    GOALS:   Multidisciplinary Problems     Physical Therapy Goals        Problem: Physical Therapy Goal    Goal Priority Disciplines Outcome Goal Variances Interventions   Physical Therapy Goal     PT, PT/OT Ongoing, Progressing     Description: Goals to be met by: 10/18/2020    Patient will perform the following to increase strength, improve mobility, and return to prior level of function:    1. Supine <> sit with SBA.  2. Sit EOB x 15 min with mod I for balance.  3. Sit <> stand with CGA.  4. Bed <> wheelchair transfer with SBA                   History:     Past Medical History:   Diagnosis Date    Afib     Alcohol abuse with other alcohol-induced disorder 04/02/2019    Anemia 04/02/2019    unspecified deficiency    Ankle fracture, left     Arthritis     Asthma     Cholecystitis 1/15/2018    Chronic kidney disease (CKD), stage III (moderate) 04/02/2019    Depression     Erectile dysfunction 04/02/2019    Gout, arthropathy 04/02/2019    Gout, unspecified     Hypertension     Hypertensive chronic kidney disease 04/02/2019    Hypomagnesemia 04/02/2019    Noncompliance 04/02/2019    Peripheral neuropathy 04/02/2019    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    Secondary hyperparathyroidism, renal 04/02/2019     Seizure 2016    Spastic hemiplegia affecting left nondominant side 04/02/2019       Past Surgical History:   Procedure Laterality Date    ANKLE SURGERY      x6    CHOLECYSTECTOMY         Time Tracking:     PT Received On: 09/18/20  PT Start Time: 1012     PT Stop Time: 1035  PT Total Time (min): 23 min     Overlap with OT for portions of session due to complex nature of patient and for safety with mobility to decrease fall risk for patient and caregiver injury requiring two skilled therapists to provide interventions.     Billable Minutes: Evaluation 15 and Therapeutic Activity 8      Radha Marino, PT  09/18/2020

## 2020-09-18 NOTE — HPI
"Mr. Michael Johnson Jr. is a 60 y.o. male, with PMH of alcoholism, seizure disorder, paroxysmal A. Fib, HTN, HLD, polyneuropathy, CKD-3, GERD, tobacco abuse, medication/follow up non-compliance, who presented to [facility] ED on 9/17/20 after a witnessed seizure at home. Per his wife's report to the ED MD "his eyes rolled in his head and he was 'wiggling on the floor.'" It is unclear exactly how long these symptoms lasted, but he did not have losses of urine or feces or tongue biting. He denied chills, chest pain, SOB. He states he has been taking his Keppra. ED workup showed multiple electrolyte abnormalities (low K+ low Mg), elevated Cr os 1.7, and elevated liver enzymes. He was treated in the ED with ativan, and IV magnesium and IV fluids. He was placed on OBS.   "

## 2020-09-18 NOTE — ED NOTES
"The pt is awake and alert, states that [place] is the president. He think we are in the 1900s. Pt is slightly confused, stating "Im still a little woozy".  " ----- Message from Dashawn Roman Jr. NP sent at 4/18/2017  9:14 AM CDT -----  Please inform patient her cholesterol labs are improving. Continue with dietary and lifestyle modifications.

## 2020-09-18 NOTE — ED NOTES
"The patient is able to verbalize his , name, is aware to place but isnt aware of time. Pt states the president is "[place]". Pt is lethargic, mumbling his words, rise and fall of chest noted, NAD. Bed locked in lowest positon, bed rails paded x2. WCTM  "

## 2020-09-18 NOTE — ASSESSMENT & PLAN NOTE
- acute rise in Cr to 1.7, improving  - baseline Cr appears to be around 1.2  - suspect 2/2 alcohol use/poor hydration   - continue IV NS after banana bag  - avoid nephrotoxins   - renally dose meds

## 2020-09-18 NOTE — ASSESSMENT & PLAN NOTE
- ED EKG sinus tachycardia   - continue lopressor 50 mg BID   - patient is not anticoagulated   - monitor on tele

## 2020-09-18 NOTE — ASSESSMENT & PLAN NOTE
- acute rise in Cr to 1.7  - baseline Cr appears to be around 1.2  - suspect 2/2 alcohol use/poor hydration   - continue IV NS after banana bag  - urine lytes ordered   - monitor AM labs   - avoid nephrotoxins   - renally dose meds

## 2020-09-18 NOTE — PROCEDURES
ROUTINE ELECTROENCEPHALOGRAM REPORT      Michael Johnson Jr.  [id number]  1960    DATE OF SERVICE: 9/18/2020    REQUESTING PROVIDER: Denys Mercado MD    METHODOLOGY   Electroencephalographic (EEG) recording is with electrodes placed according to the International 10-20 placement system.  Thirty two (32) channels of digital signal (sampling rate of 512/sec) including T1 and T2 was simultaneously recorded from the scalp and may include  EKG, EMG, and/or eye monitors.  Recording band pass was 0.1 to 512 hz.  Digital video recording of the patient is simultaneously recorded with the EEG.  The patient is instructed report clinical symptoms which may occur during the recording session.  EEG and video recording is stored and archived in digital format. Activation procedures which include photic stimulation, hyperventilation and instructing patients to perform simple task are done in selected patients.    The EEG is displayed on a monitor screen and can be reviewed using different montages.  Computer assisted analysis is employed to detect spike and electrographic seizure activity.   The entire record is submitted for computer analysis.  The entire recording is visually reviewed and the times identified by computer analysis as being spikes or seizures are reviewed again.  Compresses spectral analysis (CSA) is also performed on the activity recorded from each individual channel.  This is displayed as a power display of frequencies from 0 to 30 Hz over time.   The CSA is reviewed looking for asymmetries in power between homologous areas of the scalp and then compared with the original EEG recording.     [employer] software was also utilized in the review of this study.  This software suite analyzes the EEG recording in multiple domains.  Coherence and rhythmicity is computed to identify EEG sections which may contain organized seizures.  Each channel undergoes analysis to detect presence of spike and sharp waves which have  special and morphological characteristic of epileptic activity.  The routine EEG recording is converted from spacial into frequency domain.  This is then displayed comparing homologous areas to identify areas of significant asymmetry.  Algorithm to identify non-cortically generated artifact is used to separate eye movement, EMG and other artifact from the EEG    EEG FINDINGS  Background activity:   The background rhythm was characterized by low amplitude (<50 uV) alpha (9-11 Hz) activity with a 11 Hz posterior dominant alpha rhythm at 30-70 microvolts.   Symmetry and continuity: The background was continuous and symmetric    Sleep:   Normal sleep transients including sleep spindles, K complexes, vertex waves and POSTS were seen.    Activation procedures:   Photic stimulation and hyperventilation were not performed   Responsive to verbal stimulation with reactivity seen on EEG.    Abnormal activity:   No epileptiform discharges, periodic discharges, lateralized rhythmic delta activity or electrographic seizures were seen.    EKG:   Irregular rhythm seen    IMPRESSION:   This is a normal routine EEG done in awake, drowsy and asleep states.  Irregular heart rhythm seen on EKG.    A normal EEG does not rule out seizures/epilepsy.  CLINICAL CORRELATION IS RECOMMENDED.    Tala Taylor MD, NAMAN(), [place] [place].  Neurology-Epilepsy.  [facility].

## 2020-09-18 NOTE — NURSING
Pt transferred to ICU bed 11, in NAD, denies pain. Pt hooked up to monitors, temp taken and head to toe assess,ent completed. Pt is drowsy and disoriented to time and situation. IV fluid bolus infusing from ED to LAC IV. Skin tears noted to R knee and L elbow-mepilex applied and sos and wound care consult placed. Pt oriented to room, call bell in reach, bed alarm on, will continue to monitor.

## 2020-09-18 NOTE — H&P
"[facility] Medicine  History & Physical    Patient Name: Michael Johnson Jr.  MRN: [id number]  Admission Date: 9/17/2020  Attending Physician: Farrukh Beverly MD   Primary Care Provider: Marilu Candelario MD         Patient information was obtained from patient, past medical records and ER records.     Subjective:     Principal Problem:Breakthrough seizure    Chief Complaint:   Chief Complaint   Patient presents with    Seizures     Has hx of seizures        HPI: Mr. Michael Johnson Jr. is a 60 y.o. male, with PMH of alcoholism, seizure disorder, paroxysmal A. Fib, HTN, HLD, polyneuropathy, CKD-3, GERD, tobacco abuse, medication/follow up non-compliance, who presented to [facility] ED on 9/17/20 after a witnessed seizure at home. Per his wife's report to the ED MD "his eyes rolled in his head and he was 'wiggling on the floor.'" It is unclear exactly how long these symptoms lasted, but he did not have losses of urine or feces or tongue biting. He denied chills, chest pain, SOB. He states he has been taking his Keppra. ED workup showed multiple electrolyte abnormalities (low K+ low Mg), elevated Cr os 1.7, and elevated liver enzymes. He was treated in the ED with ativan, and IV magnesium and IV fluids. He was placed on OBS.     Past Medical History:   Diagnosis Date    Afib     Alcohol abuse with other alcohol-induced disorder 04/02/2019    Anemia 04/02/2019    unspecified deficiency    Ankle fracture, left     Arthritis     Asthma     Cholecystitis 1/15/2018    Chronic kidney disease (CKD), stage III (moderate) 04/02/2019    Depression     Erectile dysfunction 04/02/2019    Gout, arthropathy 04/02/2019    Gout, unspecified     Hypertension     Hypertensive chronic kidney disease 04/02/2019    Hypomagnesemia 04/02/2019    Noncompliance 04/02/2019    Peripheral neuropathy 04/02/2019    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    Secondary hyperparathyroidism, renal 04/02/2019    Seizure 2016    " Spastic hemiplegia affecting left nondominant side 04/02/2019       Past Surgical History:   Procedure Laterality Date    ANKLE SURGERY      x6    CHOLECYSTECTOMY         Review of patient's allergies indicates:   Allergen Reactions    Lisinopril Swelling     Pt admitted with angioedema 2wks after taking lisinopril.        No current facility-administered medications on file prior to encounter.      Current Outpatient Medications on File Prior to Encounter   Medication Sig    allopurinoL (ZYLOPRIM) 300 MG tablet Take 1 tablet (300 mg total) by mouth once daily.    gabapentin (NEURONTIN) 400 MG capsule Take 1 capsule (400 mg total) by mouth 3 (three) times daily.    levETIRAcetam (KEPPRA) 500 MG Tab Take 1 tablet (500 mg total) by mouth 2 (two) times daily.    magnesium oxide (MAG-OX) 400 mg (241.3 mg magnesium) tablet Take 1 tablet (400 mg total) by mouth once daily.    metoprolol tartrate (LOPRESSOR) 50 MG tablet Take 1 tablet (50 mg total) by mouth 2 (two) times daily.    pantoprazole (PROTONIX) 40 MG tablet Take 1 tablet (40 mg total) by mouth once daily.    potassium chloride SA (K-DUR,KLOR-CON) 20 MEQ tablet Take 1 tablet (20 mEq total) by mouth 2 (two) times daily.    diclofenac sodium (VOLTAREN) 1 % Gel Apply 2 g topically 4 (four) times daily.    folic acid (FOLVITE) 1 MG tablet Take 1 tablet (1 mg total) by mouth once daily.    methocarbamoL (ROBAXIN) 500 MG Tab Take 1 tablet (500 mg total) by mouth 3 (three) times daily as needed.     Family History     Problem Relation (Age of Onset)    Cancer Mother    Cataracts Maternal Grandfather    Hypertension Father    Stroke Maternal Grandmother, Maternal Grandfather        Tobacco Use    Smoking status: Light Tobacco Smoker    Smokeless tobacco: Never Used   Substance and Sexual Activity    Alcohol use: Yes     Alcohol/week: 3.0 - 4.0 standard drinks     Types: 3 - 4 Shots of liquor per week     Comment: daily, but states he stopped all alcohol 2  weeks ago abruptly lost interest in it.    Drug use: No    Sexual activity: Not on file     Review of Systems   Unable to perform ROS: Other (ROS limiated 2/2 poor patient participation. )   Constitutional: Negative for activity change, appetite change, chills, diaphoresis and fever.   Respiratory: Negative for cough, shortness of breath and wheezing.    Cardiovascular: Negative for chest pain and palpitations.   Gastrointestinal: Negative for abdominal pain, diarrhea, nausea and vomiting.   Genitourinary: Negative for decreased urine volume, difficulty urinating, dysuria, frequency, hematuria and urgency.   Musculoskeletal: Negative for back pain and neck pain.   Skin: Negative for color change and pallor.   Neurological: Positive for seizures. Negative for dizziness, syncope, weakness, light-headedness and headaches.   Psychiatric/Behavioral: Negative for confusion and decreased concentration.     Objective:     Vital Signs (Most Recent):  Temp: 99 °F (37.2 °C) (09/18/20 0330)  Pulse: 80 (09/18/20 0344)  Resp: 20 (09/18/20 0344)  BP: 130/89 (09/18/20 0344)  SpO2: 100 % (09/18/20 0344) Vital Signs (24h Range):  Temp:  [99 °F (37.2 °C)-100.1 °F (37.8 °C)] 99 °F (37.2 °C)  Pulse:  [] 80  Resp:  [18-24] 20  SpO2:  [96 %-100 %] 100 %  BP: (109-130)/(76-89) 130/89     Weight: 82.5 kg (181 lb 14.1 oz)  Body mass index is 24.67 kg/m².    Physical Exam  Vitals signs and nursing note reviewed.   Constitutional:       General: He is not in acute distress.     Appearance: Normal appearance. He is well-developed. He is not ill-appearing or toxic-appearing.      Comments: States his last alcoholic beverage was 3-4 days ago.    HENT:      Head: Normocephalic and atraumatic.      Right Ear: External ear normal.      Left Ear: External ear normal.   Eyes:      General: No scleral icterus.     Conjunctiva/sclera: Conjunctivae normal.      Pupils: Pupils are equal, round, and reactive to light.   Neck:      Musculoskeletal:  Normal range of motion and neck supple.      Vascular: No JVD.      Trachea: No tracheal deviation.   Cardiovascular:      Rate and Rhythm: Normal rate and regular rhythm.      Heart sounds: Normal heart sounds. No murmur. No gallop.    Pulmonary:      Effort: Pulmonary effort is normal. No respiratory distress.      Breath sounds: Normal breath sounds. No stridor. No wheezing or rales.   Abdominal:      General: Bowel sounds are normal. There is no distension.      Palpations: Abdomen is soft. There is no mass.      Tenderness: There is no abdominal tenderness. There is no guarding.   Skin:     General: Skin is warm and dry.   Neurological:      General: No focal deficit present.      Mental Status: He is alert and oriented to person, place, and time.      Cranial Nerves: No cranial nerve deficit.      Motor: No abnormal muscle tone.      Comments: Limited exam as patient is sleeping and difficult to keep aroused / answering questions.    Psychiatric:         Mood and Affect: Mood normal.         Behavior: Behavior normal.           CRANIAL NERVES     CN III, IV, VI   Pupils are equal, round, and reactive to light.       Significant Labs:   BMP:   Recent Labs   Lab 09/17/20 2008      *   K 3.2*   CL 95   CO2 24   BUN 19   CREATININE 1.7*   CALCIUM 8.1*   MG 1.0*     CBC:   Recent Labs   Lab 09/17/20 2008   WBC 8.65   HGB 11.2*   HCT 33.2*        CMP:   Recent Labs   Lab 09/17/20 2008   *   K 3.2*   CL 95   CO2 24      BUN 19   CREATININE 1.7*   CALCIUM 8.1*   PROT 9.4*   ALBUMIN 3.9   BILITOT 0.9   ALKPHOS 93   AST 67*   *   ANIONGAP 16   EGFRNONAA 43*     Urine Culture: No results for input(s): LABURIN in the last 48 hours.  Urine Studies:   Recent Labs   Lab 09/17/20  2130   COLORU Yellow   APPEARANCEUA Clear   PHUR 7.0   SPECGRAV 1.010   PROTEINUA 2+*   GLUCUA Negative   KETONESU Trace*   BILIRUBINUA 1+*   OCCULTUA Negative   NITRITE Negative   UROBILINOGEN 2.0-3.0*    LEUKOCYTESUR Negative   RBCUA 2   WBCUA 4   BACTERIA None   SQUAMEPITHEL 0   HYALINECASTS 0     All pertinent labs within the past 24 hours have been reviewed.    Significant Imaging: I have reviewed all pertinent imaging results/findings within the past 24 hours.   Imaging Results          CT Head Without Contrast (Final result)  Result time 09/18/20 00:30:38    Final result by Emilee Munroe MD (09/18/20 00:30:38)                 Impression:      No acute intracranial abnormality detected. Stable cerebral atrophy and chronic small vessel ischemic changes.    Chronic left maxillary sinusitis.      Electronically signed by: Emilee Munroe  Date:    09/18/2020  Time:    00:30             Narrative:    EXAMINATION:  CT OF THE HEAD WITHOUT    CLINICAL HISTORY:  Seizure, nontraumatic (Age => 43y);    TECHNIQUE:  5 mm unenhanced axial images were obtained from the skull base to the vertex.    COMPARISON:  01/08/2018    FINDINGS:  Stable cerebral atrophy and chronic small vessel ischemic changes are present.  There is no acute intracranial hemorrhage, territorial infarct or mass effect, or midline shift. In the visualized paranasal sinuses and mastoid air cells, there is opacification of the left maxillary sinus with overlying wall thickening.                               X-Ray Chest AP Portable (Final result)  Result time 09/17/20 20:26:36    Final result by Vineet Bob MD (09/17/20 20:26:36)                 Impression:      1. No acute cardiopulmonary process.  No large focal consolidation.      Electronically signed by: Vineet Bob MD  Date:    09/17/2020  Time:    20:26             Narrative:    EXAMINATION:  XR CHEST AP PORTABLE    CLINICAL HISTORY:  Fever, unspecified    TECHNIQUE:  Single frontal view of the chest was performed.    COMPARISON:  02/12/2019    FINDINGS:  The cardiomediastinal silhouette is not enlarged.  There is elevation of the right hemidiaphragm..  There is no pleural  effusion.  The trachea is midline.  The lungs are symmetrically expanded bilaterally without evidence of acute parenchymal process. No large focal consolidation seen.  There is no pneumothorax.  The osseous structures are remarkable for degenerative change..                                 Assessment/Plan:     * Breakthrough seizure  - Mr. Tiera Martin Jr. presented after having a witnessed seizure at home   - he is an alcoholic     - no alcohol level ordered in ED, pending   - state he is compliant with Keppra    - Keppra level pending    - s/p IV keppra in ED   - PRN ativan ordered   - patient has refused a follow up appt with Neuro this year   - Neuro consulted   - EEG ordered       Acute on chronic renal insufficiency  - acute rise in Cr to 1.7  - baseline Cr appears to be around 1.2  - suspect 2/2 alcohol use/poor hydration   - continue IV NS after banana bag  - urine lytes ordered   - monitor AM labs   - avoid nephrotoxins   - renally dose meds       Dehydration  - banana bag followed by IV NS tonight   - monitor AM labs       Acute hypokalemia  - suspect 2/2 poor PO intake   - dietary consulted   - replacement not ordered overnight given reduction in renal function   - monitor AM labs   - monitor on tele       Hypomagnesemia  - s/p replacement in ED   - monitor AM labs       Paroxysmal atrial fibrillation  - ED EKG sinus tachycardia   - continue lopressor 50 mg BID   - patient is not anticoagulated   - monitor on tele       Essential hypertension  - normotensive at present   - continue lopressor 50 mg BID   - monitor       Mixed hyperlipidemia  - no statin listed on home meds list   - last lipid panel in 2018  Results for TIERA MARTIN JR. (MRN [id number]) as of 9/18/2020 02:37   Ref. Range 1/10/2018 03:41   Cholesterol Latest Ref Range: 120 - 199 mg/dL 144   HDL Latest Ref Range: 40 - 75 mg/dL 62   Hdl/Cholesterol Ratio Latest Ref Range: 20.0 - 50.0 % 43.1   LDL Cholesterol External Latest Ref Range: 63.0 -  159.0 mg/dL 63.6   Non-HDL Cholesterol Latest Units: mg/dL 82   Total Cholesterol/HDL Ratio Latest Ref Range: 2.0 - 5.0  2.3   Triglycerides Latest Ref Range: 30 - 150 mg/dL 92         VTE Risk Mitigation (From admission, onward)         Ordered     IP VTE LOW RISK PATIENT  Once      09/18/20 0027     Place sequential compression device  Until discontinued      09/18/20 0027                   Oxana Chavira PA-C  Department of Hospital Medicine   [facility]

## 2020-09-18 NOTE — PROGRESS NOTES
Consulted to see for abrasions to extremities from falls  Wound care consulted for skin tears to right knee and left elbow which were present on admit.  60 y.o. male, with PMH of alcoholism, seizure disorder, paroxysmal A. Fib, HTN, HLD, polyneuropathy, CKD-3, GERD, tobacco abuse, medication/follow up non-compliance, who presented to [facility] ED on 9/17/20 after a witnessed seizure at home.ED workup showed multiple electrolyte abnormalities (low K+ low Mg), elevated Cr os 1.7, and elevated liver enzymes. He was treated in the ED with ativan, and IV magnesium and IV fluids. He was placed in ICU with breakthrough seizure.    Skin assessment reveals:    Right arm with raised swollen area , c/o pain with palpation        Right knee with scattered abrasions in varying stages of healing.   Pt reports he has had many recent falls and brush burned his knee.   Cleaned and applied a Aquacel lite bordered silicone dressing.       Left elbow with old scattered abrasions/scar tissue. One 0.5x0.3x0.1cm open red wound yet to heal. Cleaned and applied Aquacel lite bordered silicone dressing         09/18/20 1500        Altered Skin Integrity 09/18/20 0050 Right anterior Knee Abrasion(s)   Date First Assessed/Time First Assessed: 09/18/20 0050   Altered Skin Integrity Present on Admission: yes  Side: Right  Orientation: anterior  Location: Knee  Is this injury device related?: No  Primary Wound Type: Abrasion(s)   Wound Image   (8x1cm abrasions in various stages of healing s/p falls)   Dressing Appearance Intact;Clean   Drainage Amount None   Drainage Characteristics/Odor No odor   Appearance Pink;Dry;Epithelialization   Tissue loss description Partial thickness   Periwound Area Intact   Wound Edges Open   Wound Length (cm) 8 cm   Wound Width (cm) 1 cm   Wound Depth (cm) 0.1 cm  (scattered abrasions)   Wound Volume (cm^3) 0.8 cm^3   Wound Surface Area (cm^2) 8 cm^2   Care Cleansed with:;Wound cleanser   Dressing Applied;Foam         Altered Skin Integrity 09/18/20 0055 Left Elbow Abrasion(s)   Date First Assessed/Time First Assessed: 09/18/20 0055   Altered Skin Integrity Present on Admission: yes  Side: Left  Location: Elbow  Is this injury device related?: No  Primary Wound Type: Abrasion(s)   Wound Image    Dressing Appearance Open to air;No dressing   Drainage Amount None   Drainage Characteristics/Odor No odor   Appearance Pink;Red;Moist   Tissue loss description Partial thickness   Periwound Area Intact   Wound Edges Open   Wound Length (cm) 0.5 cm   Wound Width (cm) 0.3 cm   Wound Depth (cm) 0.1 cm   Wound Volume (cm^3) 0.02 cm^3   Wound Surface Area (cm^2) 0.15 cm^2   Care Wound cleanser   Dressing Applied;Foam     Olesya Gross RN CWON

## 2020-09-18 NOTE — PROGRESS NOTES
eICU Brief Admission Note       Briefly, 60-year-old male with medical history of alcohol abuse, anemia, arthritis, CKD, depression, hypertension, seizures, neuropathy presenting to the ED for possible seizure-like activity.  Patient's wife states immediately prior to coming to the ED he had a seizure.  Patient's wife states patient's eyes rolled in his head and he was wiggling on the floor.  Wife unsure how long symptoms lasted.  Patient denies any loss of bowel or bladder or tongue biting.  Patient states he now feels woozy.  Patient denies any chills, chest pain or shortness of breath.  Patient states he has been compliant with his Keppra.       Video assessment :  Sleeping comfortably     Vitals reviewed   Afebrile, stable vitals     LABs reviewed       Radiology reviewed     CT head  No acute intracranial abnormality detected. Stable cerebral atrophy and chronic small vessel ischemic changes.  Chronic left maxillary sinusitis.        Assessment / Plan :  Seizure   Fever   Low K   ZAIN/CKD   Anemia   Mild elevated LFTs   - on Keppra ; PRN Ativan   - banana bag ongoing   - seizure precaution   - EEG   - may need LP   - f/u Keppra level   - drug screen, a;cohol level         DVT Px : Heparin SQ / Lovenox SQ if no plans to do LP ; SCD for now   GI Px : N/A      Patient seen over video : Yes  Chart reviewed : Yes  Spoke with RN : No

## 2020-09-18 NOTE — ED TRIAGE NOTES
Presented to ED via EMS AAO x's 4. Pt states he had a seizure today x's 2 witnessed by wife. Denies any injuries. Follows commands and responds appropriately. VS stable. NADN.

## 2020-09-18 NOTE — SUBJECTIVE & OBJECTIVE
Past Medical History:   Diagnosis Date    Afib     Alcohol abuse with other alcohol-induced disorder 04/02/2019    Anemia 04/02/2019    unspecified deficiency    Ankle fracture, left     Arthritis     Asthma     Cholecystitis 1/15/2018    Chronic kidney disease (CKD), stage III (moderate) 04/02/2019    Depression     Erectile dysfunction 04/02/2019    Gout, arthropathy 04/02/2019    Gout, unspecified     Hypertension     Hypertensive chronic kidney disease 04/02/2019    Hypomagnesemia 04/02/2019    Noncompliance 04/02/2019    Peripheral neuropathy 04/02/2019    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    Secondary hyperparathyroidism, renal 04/02/2019    Seizure 2016    Spastic hemiplegia affecting left nondominant side 04/02/2019       Past Surgical History:   Procedure Laterality Date    ANKLE SURGERY      x6    CHOLECYSTECTOMY         Review of patient's allergies indicates:   Allergen Reactions    Lisinopril Swelling     Pt admitted with angioedema 2wks after taking lisinopril.        No current facility-administered medications on file prior to encounter.      Current Outpatient Medications on File Prior to Encounter   Medication Sig    allopurinoL (ZYLOPRIM) 300 MG tablet Take 1 tablet (300 mg total) by mouth once daily.    gabapentin (NEURONTIN) 400 MG capsule Take 1 capsule (400 mg total) by mouth 3 (three) times daily.    levETIRAcetam (KEPPRA) 500 MG Tab Take 1 tablet (500 mg total) by mouth 2 (two) times daily.    magnesium oxide (MAG-OX) 400 mg (241.3 mg magnesium) tablet Take 1 tablet (400 mg total) by mouth once daily.    metoprolol tartrate (LOPRESSOR) 50 MG tablet Take 1 tablet (50 mg total) by mouth 2 (two) times daily.    pantoprazole (PROTONIX) 40 MG tablet Take 1 tablet (40 mg total) by mouth once daily.    potassium chloride SA (K-DUR,KLOR-CON) 20 MEQ tablet Take 1 tablet (20 mEq total) by mouth 2 (two) times daily.    diclofenac sodium (VOLTAREN) 1 % Gel Apply 2 g topically 4  (four) times daily.    folic acid (FOLVITE) 1 MG tablet Take 1 tablet (1 mg total) by mouth once daily.    methocarbamoL (ROBAXIN) 500 MG Tab Take 1 tablet (500 mg total) by mouth 3 (three) times daily as needed.     Family History     Problem Relation (Age of Onset)    Cancer Mother    Cataracts Maternal Grandfather    Hypertension Father    Stroke Maternal Grandmother, Maternal Grandfather        Tobacco Use    Smoking status: Light Tobacco Smoker    Smokeless tobacco: Never Used   Substance and Sexual Activity    Alcohol use: Yes     Alcohol/week: 3.0 - 4.0 standard drinks     Types: 3 - 4 Shots of liquor per week     Comment: daily, but states he stopped all alcohol 2 weeks ago abruptly lost interest in it.    Drug use: No    Sexual activity: Not on file     Review of Systems   Unable to perform ROS: Other (ROS limiated 2/2 poor patient participation. )   Constitutional: Negative for activity change, appetite change, chills, diaphoresis and fever.   Respiratory: Negative for cough, shortness of breath and wheezing.    Cardiovascular: Negative for chest pain and palpitations.   Gastrointestinal: Negative for abdominal pain, diarrhea, nausea and vomiting.   Genitourinary: Negative for decreased urine volume, difficulty urinating, dysuria, frequency, hematuria and urgency.   Musculoskeletal: Negative for back pain and neck pain.   Skin: Negative for color change and pallor.   Neurological: Positive for seizures. Negative for dizziness, syncope, weakness, light-headedness and headaches.   Psychiatric/Behavioral: Negative for confusion and decreased concentration.     Objective:     Vital Signs (Most Recent):  Temp: 99 °F (37.2 °C) (09/18/20 0330)  Pulse: 80 (09/18/20 0344)  Resp: 20 (09/18/20 0344)  BP: 130/89 (09/18/20 0344)  SpO2: 100 % (09/18/20 0344) Vital Signs (24h Range):  Temp:  [99 °F (37.2 °C)-100.1 °F (37.8 °C)] 99 °F (37.2 °C)  Pulse:  [] 80  Resp:  [18-24] 20  SpO2:  [96 %-100 %] 100 %  BP:  (109-130)/(76-89) 130/89     Weight: 82.5 kg (181 lb 14.1 oz)  Body mass index is 24.67 kg/m².    Physical Exam  Vitals signs and nursing note reviewed.   Constitutional:       General: He is not in acute distress.     Appearance: Normal appearance. He is well-developed. He is not ill-appearing or toxic-appearing.      Comments: States his last alcoholic beverage was 3-4 days ago.    HENT:      Head: Normocephalic and atraumatic.      Right Ear: External ear normal.      Left Ear: External ear normal.   Eyes:      General: No scleral icterus.     Conjunctiva/sclera: Conjunctivae normal.      Pupils: Pupils are equal, round, and reactive to light.   Neck:      Musculoskeletal: Normal range of motion and neck supple.      Vascular: No JVD.      Trachea: No tracheal deviation.   Cardiovascular:      Rate and Rhythm: Normal rate and regular rhythm.      Heart sounds: Normal heart sounds. No murmur. No gallop.    Pulmonary:      Effort: Pulmonary effort is normal. No respiratory distress.      Breath sounds: Normal breath sounds. No stridor. No wheezing or rales.   Abdominal:      General: Bowel sounds are normal. There is no distension.      Palpations: Abdomen is soft. There is no mass.      Tenderness: There is no abdominal tenderness. There is no guarding.   Skin:     General: Skin is warm and dry.   Neurological:      General: No focal deficit present.      Mental Status: He is alert and oriented to person, place, and time.      Cranial Nerves: No cranial nerve deficit.      Motor: No abnormal muscle tone.      Comments: Limited exam as patient is sleeping and difficult to keep aroused / answering questions.    Psychiatric:         Mood and Affect: Mood normal.         Behavior: Behavior normal.           CRANIAL NERVES     CN III, IV, VI   Pupils are equal, round, and reactive to light.       Significant Labs:   BMP:   Recent Labs   Lab 09/17/20 2008      *   K 3.2*   CL 95   CO2 24   BUN 19    CREATININE 1.7*   CALCIUM 8.1*   MG 1.0*     CBC:   Recent Labs   Lab 09/17/20 2008   WBC 8.65   HGB 11.2*   HCT 33.2*        CMP:   Recent Labs   Lab 09/17/20 2008   *   K 3.2*   CL 95   CO2 24      BUN 19   CREATININE 1.7*   CALCIUM 8.1*   PROT 9.4*   ALBUMIN 3.9   BILITOT 0.9   ALKPHOS 93   AST 67*   *   ANIONGAP 16   EGFRNONAA 43*     Urine Culture: No results for input(s): LABURIN in the last 48 hours.  Urine Studies:   Recent Labs   Lab 09/17/20  2130   COLORU Yellow   APPEARANCEUA Clear   PHUR 7.0   SPECGRAV 1.010   PROTEINUA 2+*   GLUCUA Negative   KETONESU Trace*   BILIRUBINUA 1+*   OCCULTUA Negative   NITRITE Negative   UROBILINOGEN 2.0-3.0*   LEUKOCYTESUR Negative   RBCUA 2   WBCUA 4   BACTERIA None   SQUAMEPITHEL 0   HYALINECASTS 0     All pertinent labs within the past 24 hours have been reviewed.    Significant Imaging: I have reviewed all pertinent imaging results/findings within the past 24 hours.   Imaging Results          CT Head Without Contrast (Final result)  Result time 09/18/20 00:30:38    Final result by Emilee Munroe MD (09/18/20 00:30:38)                 Impression:      No acute intracranial abnormality detected. Stable cerebral atrophy and chronic small vessel ischemic changes.    Chronic left maxillary sinusitis.      Electronically signed by: Emilee Munroe  Date:    09/18/2020  Time:    00:30             Narrative:    EXAMINATION:  CT OF THE HEAD WITHOUT    CLINICAL HISTORY:  Seizure, nontraumatic (Age => 43y);    TECHNIQUE:  5 mm unenhanced axial images were obtained from the skull base to the vertex.    COMPARISON:  01/08/2018    FINDINGS:  Stable cerebral atrophy and chronic small vessel ischemic changes are present.  There is no acute intracranial hemorrhage, territorial infarct or mass effect, or midline shift. In the visualized paranasal sinuses and mastoid air cells, there is opacification of the left maxillary sinus with overlying wall  thickening.                               X-Ray Chest AP Portable (Final result)  Result time 09/17/20 20:26:36    Final result by Vineet Bob MD (09/17/20 20:26:36)                 Impression:      1. No acute cardiopulmonary process.  No large focal consolidation.      Electronically signed by: Vineet Bob MD  Date:    09/17/2020  Time:    20:26             Narrative:    EXAMINATION:  XR CHEST AP PORTABLE    CLINICAL HISTORY:  Fever, unspecified    TECHNIQUE:  Single frontal view of the chest was performed.    COMPARISON:  02/12/2019    FINDINGS:  The cardiomediastinal silhouette is not enlarged.  There is elevation of the right hemidiaphragm..  There is no pleural effusion.  The trachea is midline.  The lungs are symmetrically expanded bilaterally without evidence of acute parenchymal process. No large focal consolidation seen.  There is no pneumothorax.  The osseous structures are remarkable for degenerative change..

## 2020-09-18 NOTE — PLAN OF CARE
ZANDRA met with pt at bedside to complete discharge assessment, verified PCP and uses [facility] on  and would like bedside delivery.  Pt has POA but stated he has a LW.  Pt has crutches, WC, straight cane, BSC and SC at home but doesn't use.   Pt's wife is his caregiver and will drive him home.  Pt reported that he needs a hospital bed and lift device and power chair.  ZANDRA informed pt that power chair has to be ordered by PCP.  Pt has home health but doesn't remember name of agency.  ZANDRA called wife and she stated pt has [facility]     09/18/20 0024   Discharge Assessment   Assessment Type Discharge Planning Assessment   Confirmed/corrected address and phone number on facesheet? Yes   Assessment information obtained from? Patient   Communicated expected length of stay with patient/caregiver no   Prior to hospitilization cognitive status: Alert/Oriented   Prior to hospitalization functional status: Partially Dependent   Current cognitive status: Alert/Oriented   Current Functional Status: Partially Dependent   Lives With spouse   Able to Return to Prior Arrangements yes   Is patient able to care for self after discharge? No   Who are your caregiver(s) and their phone number(s)? spouse   Patient currently being followed by outpatient case management? No   Patient currently receives any other outside agency services? Yes   Equipment Currently Used at Home crutches;wheelchair;cane, straight;bedside commode;shower chair   Do you have any problems affording any of your prescribed medications? No   Is the patient taking medications as prescribed? yes   Does the patient have transportation home? Yes   Transportation Anticipated family or friend will provide   Does the patient receive services at the [facility]? No   Discharge Plan A Home Health   DME Needed Upon Discharge  none   Patient/Family in Agreement with Plan yes

## 2020-09-18 NOTE — ED PROVIDER NOTES
Encounter Date: 9/17/2020       History     Chief Complaint   Patient presents with    Seizures     Has hx of seizures     Patient is a 60-year-old male with medical history of alcohol abuse, anemia, arthritis, CKD, depression, hypertension, seizures, neuropathy presenting to the ED for possible seizure-like activity.  Patient's wife states immediately prior to coming to the ED he had a seizure.  Patient's wife states patient's eyes rolled in his head and he was wiggling on the floor.  Wife unsure how long symptoms lasted.  Patient denies any loss of bowel or bladder or tongue biting.  Patient states he now feels woozy.  Patient denies any chills, chest pain or shortness of breath.  Patient states he has been compliant with his Keppra.    The history is provided by the patient and the EMS personnel.     Review of patient's allergies indicates:   Allergen Reactions    Lisinopril Swelling     Pt admitted with angioedema 2wks after taking lisinopril.      Past Medical History:   Diagnosis Date    Afib     Alcohol abuse with other alcohol-induced disorder 04/02/2019    Anemia 04/02/2019    unspecified deficiency    Ankle fracture, left     Arthritis     Asthma     Cholecystitis 1/15/2018    Chronic kidney disease (CKD), stage III (moderate) 04/02/2019    Depression     Erectile dysfunction 04/02/2019    Gout, arthropathy 04/02/2019    Gout, unspecified     Hypertension     Hypertensive chronic kidney disease 04/02/2019    Hypomagnesemia 04/02/2019    Noncompliance 04/02/2019    Peripheral neuropathy 04/02/2019    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    Secondary hyperparathyroidism, renal 04/02/2019    Seizure 2016    Spastic hemiplegia affecting left nondominant side 04/02/2019     Past Surgical History:   Procedure Laterality Date    ANKLE SURGERY      x6    CHOLECYSTECTOMY       Family History   Problem Relation Age of Onset    Hypertension Father     Stroke Maternal Grandmother     Cataracts  Maternal Grandfather     Stroke Maternal Grandfather     Cancer Mother         malignant polyp     Social History     Tobacco Use    Smoking status: Light Tobacco Smoker    Smokeless tobacco: Never Used   Substance Use Topics    Alcohol use: Yes     Alcohol/week: 3.0 - 4.0 standard drinks     Types: 3 - 4 Shots of liquor per week     Comment: daily, but states he stopped all alcohol 2 weeks ago abruptly lost interest in it.    Drug use: No     Review of Systems   Constitutional: Positive for fever. Negative for activity change, appetite change and chills.   HENT: Negative for sore throat.    Respiratory: Negative for cough, chest tightness, shortness of breath and wheezing.    Cardiovascular: Negative for chest pain and palpitations.   Gastrointestinal: Negative for abdominal pain, constipation, diarrhea, nausea and vomiting.   Genitourinary: Negative for decreased urine volume, difficulty urinating, dysuria and urgency.   Musculoskeletal: Negative for back pain and myalgias.   Skin: Negative for color change, rash and wound.   Neurological: Positive for seizures. Negative for weakness.   Hematological: Does not bruise/bleed easily.   All other systems reviewed and are negative.      Physical Exam     Initial Vitals [09/17/20 1914]   BP Pulse Resp Temp SpO2   118/82 (!) 111 18 100.1 °F (37.8 °C) 96 %      MAP       --         Physical Exam    Nursing note and vitals reviewed.  Constitutional: Vital signs are normal. He appears well-developed and well-nourished. He is cooperative. No distress. Face mask in place.   HENT:   Head: Normocephalic and atraumatic.   Mouth/Throat: Uvula is midline, oropharynx is clear and moist and mucous membranes are normal.   Pupils equal round reactive   Eyes: Conjunctivae, EOM and lids are normal. Pupils are equal, round, and reactive to light.   Pupils equal round reactive 3-2 mm   Neck: Trachea normal, normal range of motion, full passive range of motion without pain and  phonation normal. Neck supple. No JVD present.   Cardiovascular: Regular rhythm and intact distal pulses. Tachycardia present.    Pulses:       Radial pulses are 2+ on the right side and 2+ on the left side.   Pulmonary/Chest: Effort normal and breath sounds normal.   Abdominal: Normal appearance.   Musculoskeletal: Normal range of motion.   Neurological: He is alert and oriented to person, place, and time. He has normal strength. No sensory deficit. He displays a negative Romberg sign. GCS eye subscore is 4. GCS verbal subscore is 5. GCS motor subscore is 6.   Skin: Skin is warm, dry and intact. Capillary refill takes 2 to 3 seconds. No abrasion, no laceration and no rash noted. No cyanosis. Nails show no clubbing.         ED Course   Procedures  Labs Reviewed   CBC W/ AUTO DIFFERENTIAL - Abnormal; Notable for the following components:       Result Value    RBC 3.23 (*)     Hemoglobin 11.2 (*)     Hematocrit 33.2 (*)     Mean Corpuscular Volume 103 (*)     Mean Corpuscular Hemoglobin 34.7 (*)     RDW 14.8 (*)     Gran% 74.9 (*)     Lymph% 12.7 (*)     All other components within normal limits   COMPREHENSIVE METABOLIC PANEL - Abnormal; Notable for the following components:    Sodium 135 (*)     Potassium 3.2 (*)     Creatinine 1.7 (*)     Calcium 8.1 (*)     Total Protein 9.4 (*)     AST 67 (*)      (*)     eGFR if  50 (*)     eGFR if non  43 (*)     All other components within normal limits   URINALYSIS, REFLEX TO URINE CULTURE - Abnormal; Notable for the following components:    Protein, UA 2+ (*)     Ketones, UA Trace (*)     Bilirubin (UA) 1+ (*)     Urobilinogen, UA 2.0-3.0 (*)     All other components within normal limits    Narrative:     Specimen Source->Urine   MAGNESIUM - Abnormal; Notable for the following components:    Magnesium 1.0 (*)     All other components within normal limits   URINALYSIS MICROSCOPIC - Abnormal; Notable for the following components:     Epithelial Casts, UA 2 (*)     All other components within normal limits    Narrative:     Specimen Source->Urine   LACTIC ACID, PLASMA   LEVETIRACETAM  (KEPPRA) LEVEL   LEVETIRACETAM  (KEPPRA) LEVEL   SARS-COV-2 RDRP GENE          Imaging Results          X-Ray Chest AP Portable (Final result)  Result time 09/17/20 20:26:36    Final result by Vineet Bob MD (09/17/20 20:26:36)                 Impression:      1. No acute cardiopulmonary process.  No large focal consolidation.      Electronically signed by: Vineet Bob MD  Date:    09/17/2020  Time:    20:26             Narrative:    EXAMINATION:  XR CHEST AP PORTABLE    CLINICAL HISTORY:  Fever, unspecified    TECHNIQUE:  Single frontal view of the chest was performed.    COMPARISON:  02/12/2019    FINDINGS:  The cardiomediastinal silhouette is not enlarged.  There is elevation of the right hemidiaphragm..  There is no pleural effusion.  The trachea is midline.  The lungs are symmetrically expanded bilaterally without evidence of acute parenchymal process. No large focal consolidation seen.  There is no pneumothorax.  The osseous structures are remarkable for degenerative change..                                 Medical Decision Making:   Initial Assessment:   Emergent evaluation of a 60-year-old male presenting to the ED for possible seizure-like activity earlier this evening.  Patient denies any loss of bowel or bladder.  Patient denies any postictal symptoms.  Patient states he has been compliant with his medications and does not drink alcohol any longer.  Patient denies any complaints on exam.  On exam patient is A&O x3.  Patient is slightly febrile but nontoxic appearing.  Breath sounds clear bilaterally.  Abdomen soft and nontender.  Bowel sounds within normal limits.  Strength 5/5 in all extremity.  Cap refill less than 3 sec. Patient is neurologically intact.  No focal deficits noted.  Differential Diagnosis:   Differential diagnoses include but  are not limited to infection, medication noncompliance, UTI, alcohol abuse.  Independently Interpreted Test(s):   I have ordered and independently interpreted X-rays - see prior notes.  I have ordered and independently interpreted EKG Reading(s) - see prior notes  Clinical Tests:   Lab Tests: Ordered and Reviewed  The following lab test(s) were unremarkable: CBC and CMP       <> Summary of Lab: Patient's CBC unremarkable.  No leukocytosis noted.  H&H stable 11.2 and 33.2 patient's CMP shows slight hypokalemia at 3.2.  Creatinine slightly elevated at 1.7.  This appears to be patient's baseline after chart review.  Mag low at 1.0.  Will replete in the ED. Lactic negative.  Radiological Study: Ordered and Reviewed  ED Management:  I will get labs, x-ray and reassess.  I discussed this case with my supervising physician.    Labs unremarkable.  Chest x-ray negative for any acute infectious process.  Patient signed out to Dr. Lopez pending urine and reevaluation.                Attending Attestation:     Physician Attestation Statement for NP/PA:   I have conducted a face to face encounter with this patient in addition to the NP/PA, due to Medical Complexity    Other NP/PA Attestation Additions:      Medical Decision Makin-year-old male with complex medical history including seizure disorder, alcohol abuse, AFib, CKD brought by EMS after wife witnessed seizure at home.  Patient initially said he has been compliant with Keppra but then admitted to not taking it for few days.  He is postictal period resolved soon after ED arrival.  He did have low-grade fever on arrival but no clear source, he has normal lactate with no evidence of UTI, pneumonia, COVID-19.  Workup with chronic low Mag and K which were repleted, no elevated WBC.     On reassessment wife at bedside now and states that he has been vomiting for the past week, with unclear etiology, and has not taking his Keppra since then.  He also has not been  drinking alcohol, so low suspicion for alcohol withdrawal seizure.  While I was discussing his disposition, patient had brief generalized tonic-clonic seizure for less than 1 min.  He was given 2 mg IV Ativan, and will also give 1 g IV Keppra load.  Patient postictal and slowly return to baseline again, no evidence of status epilepticus.  Given his vomiting and 2nd breakthrough seizure, will admit to hospitalist for observation and further management.  Discussed with neurology on-call Dr. Nicole who will see patient in the morning and requested CT head to be done now.         Attending Critical Care:   Critical Care Times:   Direct Patient Care (initial evaluation, reassessments, and time considering the case)................................................................21 minutes.   Additional History from reviewing old medical records or taking additional history from the family, EMS, PCP, etc.......................6 minutes.   Ordering, Reviewing, and Interpreting Diagnostic Studies...............................................................................................................5 minutes.   Documentation..................................................................................................................................................................................4 minutes.   Consultation with other Physicians. .................................................................................................................................................6 minutes.   ==============================================================  · Total Critical Care Time - exclusive of procedural time: 42 minutes.  ==============================================================                        Clinical Impression:       ICD-10-CM ICD-9-CM   1. Breakthrough seizure  G40.919 345.91   2. Fever  R50.9 780.60   3. Tachycardia  R00.0 785.0   4. Seizure disorder  G40.909 345.90                       Disposition:   Condition: Stable                          Karen Moreno NP  09/17/20 [id number]       Jose Lopez MD  09/17/20 [id number]

## 2020-09-18 NOTE — ASSESSMENT & PLAN NOTE
- suspect 2/2 poor PO intake and alcohol abuse  - dietary consulted   - supplement  - monitor on tele

## 2020-09-18 NOTE — ASSESSMENT & PLAN NOTE
- no statin listed on home meds list   - last lipid panel in 2018  Results for TIERA MARTIN JR. (MRN [id number]) as of 9/18/2020 02:37   Ref. Range 1/10/2018 03:41   Cholesterol Latest Ref Range: 120 - 199 mg/dL 144   HDL Latest Ref Range: 40 - 75 mg/dL 62   Hdl/Cholesterol Ratio Latest Ref Range: 20.0 - 50.0 % 43.1   LDL Cholesterol External Latest Ref Range: 63.0 - 159.0 mg/dL 63.6   Non-HDL Cholesterol Latest Units: mg/dL 82   Total Cholesterol/HDL Ratio Latest Ref Range: 2.0 - 5.0  2.3   Triglycerides Latest Ref Range: 30 - 150 mg/dL 92

## 2020-09-18 NOTE — CONSULTS
[facility]  Adult Nutrition  Consult Note    SUMMARY     Recommendations    1. Encourage PO intake of meals.     2. If PO intake <50% of meals recommend commercial beverage- Novasource BID.     3. Daily weights.    Goals: 1. >75% of EEN, EPN by RD follow up.  Nutrition Goal Status: new  Communication of RD Recs: (POC)    Reason for Assessment    Reason For Assessment: consult  Diagnosis: (Breakthrough seizure)  Relevant Medical History: HTN, Seizures, Alcohol abuse, gout, CKD 3  Interdisciplinary Rounds: did not attend  General Information Comments: 20- Pt is a 60 year old male who presented after a witnessed seizure at home. Pt on regular diet at this time, change to renal diet if increase in renal lab values. -19.28 % weight change x 10 months. No edema noted.   NFPE 20-(Moderate/) Protein-Calorie Malnutrition in the context of Chronic Illness/Injury.  Nutrition Discharge Planning: Adequate nutrition to meet needs via low sodium diet.    Nutrition Risk Screen    Nutrition Risk Screen: other (see comments)(recent reports of N/V)    Nutrition/Diet History    Spiritual, Cultural Beliefs, Buddhism Practices, Values that Affect Care: no    Anthropometrics    Temp: 98.8 °F (37.1 °C)  Height Method: Stated  Height: 6' (182.9 cm)  Height (inches): 72 in  Weight Method: Bed Scale  Weight: 82.5 kg (181 lb 14.1 oz)  Weight (lb): 181.88 lb  Ideal Body Weight (IBW), Male: 178 lb  % Ideal Body Weight, Male (lb): 102.18 %  BMI (Calculated): 24.7  BMI Grade: 18.5-24.9 - normal  Usual Body Weight (UBW), k.2 kg  % Usual Body Weight: 80.89  % Weight Change From Usual Weight: -19.28 %     Lab/Procedures/Meds    Pertinent Labs Reviewed: reviewed  Pertinent Labs Comments: K 3.3, Cr 1.5  Pertinent Medications Reviewed: reviewed  Pertinent Medications Comments: MVI, thiamin, metoprolol, pantoprazole    Estimated/Assessed Needs    Weight Used For Calorie Calculations: 82.5 kg (181 lb 14.1 oz)  Energy  Calorie Requirements (kcal): 2,062.5 kcals/day(25 kcals/kg- CKD 3)  Energy Need Method: Kcal/kg  Protein Requirements: 66.14 g/day(0.8 g/kg - CKD 3)  Weight Used For Protein Calculations: 82.5 kg (181 lb 14.1 oz)  Fluid Requirements (mL): 1mL/kcal or per MD  Estimated Fluid Requirement Method: RDA Method  RDA Method (mL): 2    Nutrition Prescription Ordered    Current Diet Order: Regular    Evaluation of Received Nutrient/Fluid Intake    Energy Calories Required: not meeting needs  Protein Required: not meeting needs  Fluid Required: not meeting needs  Comments: LBM 9.18.20  % Intake of Estimated Energy Needs:  50 %  % Meal Intake: 50 %    Nutrition Risk    Level of Risk/Frequency of Follow-up: (2x/weekly)     Assessment and Plan    Nutrition Problem:  (Moderate/) Protein-Calorie Malnutrition  Malnutrition in the context of Chronic Illness/Injury    Related to (etiology):  Decreased appetite    Signs and Symptoms (as evidenced by):  Body Fat Depletion: mild depletion of triceps   Muscle Mass Depletion: mild depletion of temples, clavicle region, scapular region and lower extremities   Weight Loss: -19.28 % x 10 months    Interventions(treatment strategy):  Collaboration with other providers  Commercial beverage- Novasource BID    Nutrition Diagnosis Status:  New    Monitor and Evaluation    Food and Nutrient Intake: food and beverage intake  Food and Nutrient Adminstration: diet order  Knowledge/Beliefs/Attitudes: food and nutrition knowledge/skill  Physical Activity and Function: nutrition-related ADLs and IADLs  Anthropometric Measurements: weight  Biochemical Data, Medical Tests and Procedures: lipid profile, electrolyte and renal panel  Nutrition-Focused Physical Findings: overall appearance     Malnutrition Assessment  Malnutrition Type: acute illness or injury  Energy Intake: moderate energy intake  Skin (Micronutrient): (Dieudonne Score: 18)   Weight Loss (Malnutrition): greater than 20% in 1 year   Orbital  Region (Subcutaneous Fat Loss): mild depletion  Upper Arm Region (Subcutaneous Fat Loss): mild depletion  Thoracic and Lumbar Region: well nourished   Congregational Region (Muscle Loss): mild depletion  Clavicle Bone Region (Muscle Loss): mild depletion  Clavicle and Acromion Bone Region (Muscle Loss): mild depletion  Scapular Bone Region (Muscle Loss): mild depletion  Dorsal Hand (Muscle Loss): mild depletion  Patellar Region (Muscle Loss): mild depletion  Anterior Thigh Region (Muscle Loss): mild depletion  Posterior Calf Region (Muscle Loss): mild depletion   Edema (Fluid Accumulation): 0-->no edema present     Nutrition Follow-Up    RD Follow-up?: Yes

## 2020-09-18 NOTE — PLAN OF CARE
Problem: Occupational Therapy Goal  Goal: Occupational Therapy Goal  Description: Goals to be met by: 09/25/2020     Patient will increase functional independence with ADLs by performing:    UE Dressing with Stand-by Assistance.  Grooming while EOB with Stand-by Assistance.  Toileting from bedside commode with Minimal Assistance for hygiene and clothing management.   Supine to sit with Contact Guard Assistance.  Toilet transfer to bedside commode with Minimal Assistance.    Outcome: Ongoing, Progressing  Initial OT evaluation completed, with treatment to follow.  Patient with (B) shoulder bursitis, per patient report, which hinders ability to self propel w/c.  Wife limited in ability to physically assist patient.  Will need w/c consult if MD approves power chair for proper fitting. Also recommend hospital bed.  If patient continues with difficulty transferring, HH OT/PT to eval need for cherry lift.  Continue OT services to maximize patient functioning and decrease caregiver burden.

## 2020-09-18 NOTE — NURSING
Pt remains stable and in nad, no seizure activity noted, denies pain. Pt receiving banana bag and able to swallow medications with no difficulty noted. Pt did not urinate during shift-bladder scan performed at 0622, results 175 in bladder-instructed pt to call when needing to void. Will continue to monitor.    0640-EEG paged-awaiting call back. Neurology consult called to Dr. Nicole and he requested that all consults be called after 7am, unless emergent, and to secure chat with patient information and reason for consult, which was done.

## 2020-09-18 NOTE — PROGRESS NOTES
ZANDRA called pt's wife, Leeanne [phone] to discuss discharge needs.  Wife stated pt has [facility].  She reported that pt is on the Medicaid waiver waiting list for home services.  Although she has difficulty caring for pt sometimes, wife doesn't want pt placed in a nursing home.   Pt will discharge home and resume HH.  ZANDRA informed wife that pt requested a hospital bed and cherry lift.and power chair.  ZANDRA informed wife that power chair has to be order by pt's PCP.   Wife expressed concern about a place to put bed at home but does want both ordered.  Wife stated pt needs a drop arm bedside commode.    ZANDRA sent message to JEFFREY Smith that pt and wife requested hospital bed, cherry lift and drop arm bedside commode.    ZANDRA received call from Samara at [facility], hospital bed, cherry lift and drop arm BSC, all three approved.  Samara will call wife to schedule delivery.    Pt currently in ICU, drop arm bedside commode to be delivery to pt once transferred to a room on the 3rd floor.

## 2020-09-18 NOTE — ASSESSMENT & PLAN NOTE
- suspect 2/2 poor PO intake   - dietary consulted   - replacement not ordered overnight given reduction in renal function   - monitor AM labs   - monitor on tele

## 2020-09-18 NOTE — TELEPHONE ENCOUNTER
----- Message from Kurtis Austin sent at 9/18/2020  2:08 PM CDT -----  Name of Who is Calling:TIERA MARTIN JR.  What is the request in detail: Patient states he is in the hospital right now ICU and is requesting if possible can he see the Dr or speak to the Dr today. Please advise Can the clinic reply by [facility]: No What [facility] [phone]

## 2020-09-18 NOTE — SUBJECTIVE & OBJECTIVE
Interval History: awake and alert, feel ok, states he is hungry, wife at beside    Review of Systems   Constitutional: Negative for appetite change, chills and fever.   HENT: Negative for congestion and sore throat.    Eyes: Negative.    Respiratory: Negative for cough, shortness of breath and wheezing.    Cardiovascular: Negative for chest pain, palpitations and leg swelling.   Gastrointestinal: Negative for abdominal distention, abdominal pain, constipation, diarrhea, nausea and vomiting.   Endocrine: Negative for polydipsia and polyuria.   Genitourinary: Negative for difficulty urinating, frequency and hematuria.   Musculoskeletal: Positive for arthralgias and gait problem. Negative for back pain and neck pain.   Skin: Positive for wound.   Neurological: Positive for seizures and weakness (generalized). Negative for dizziness, syncope, light-headedness and headaches.     Objective:     Vital Signs (Most Recent):  Temp: 98.4 °F (36.9 °C) (09/18/20 1505)  Pulse: 80 (09/18/20 1526)  Resp: 20 (09/18/20 1526)  BP: 113/77 (09/18/20 1526)  SpO2: 99 % (09/18/20 1526) Vital Signs (24h Range):  Temp:  [98.4 °F (36.9 °C)-100.1 °F (37.8 °C)] 98.4 °F (36.9 °C)  Pulse:  [] 80  Resp:  [17-25] 20  SpO2:  [95 %-100 %] 99 %  BP: (104-130)/(64-89) 113/77     Weight: 82.5 kg (181 lb 14.1 oz)  Body mass index is 24.67 kg/m².    Intake/Output Summary (Last 24 hours) at 9/18/2020 1552  Last data filed at 9/18/2020 1156  Gross per 24 hour   Intake 2080 ml   Output 450 ml   Net 1630 ml      Physical Exam  Vitals signs and nursing note reviewed.   Constitutional:       General: He is not in acute distress.     Appearance: He is well-developed. He is not diaphoretic.      Comments: Appears older than stated age   HENT:      Head: Normocephalic and atraumatic.   Eyes:      Conjunctiva/sclera: Conjunctivae normal.   Neck:      Musculoskeletal: Normal range of motion and neck supple.   Cardiovascular:      Rate and Rhythm: Normal rate  and regular rhythm.      Heart sounds: Normal heart sounds.   Pulmonary:      Effort: Pulmonary effort is normal.      Breath sounds: Normal breath sounds. No wheezing.   Abdominal:      General: Bowel sounds are normal. There is no distension.      Palpations: Abdomen is soft.      Tenderness: There is no abdominal tenderness.   Musculoskeletal:         General: No swelling.      Comments: B/l upper and lower extremity weakness at baseline   Skin:     General: Skin is warm and dry.   Neurological:      Mental Status: He is alert.      Comments: Grossly non-focal         Significant Labs:   CBC:   Recent Labs   Lab 09/17/20 2008   WBC 8.65   HGB 11.2*   HCT 33.2*        CMP:   Recent Labs   Lab 09/17/20 2008 09/18/20  0838   * 136   K 3.2* 3.3*   CL 95 101   CO2 24 22*    87   BUN 19 19   CREATININE 1.7* 1.5*   CALCIUM 8.1* 7.1*   PROT 9.4* 7.8   ALBUMIN 3.9 3.2*   BILITOT 0.9 0.7   ALKPHOS 93 76   AST 67* 53*   * 77*   ANIONGAP 16 13   EGFRNONAA 43* 51*     All pertinent labs within the past 24 hours have been reviewed.    Significant Imaging: I have reviewed all pertinent imaging results/findings within the past 24 hours.   Imaging Results          CT Head Without Contrast (Final result)  Result time 09/18/20 00:30:38    Final result by Emilee Munroe MD (09/18/20 00:30:38)                 Impression:      No acute intracranial abnormality detected. Stable cerebral atrophy and chronic small vessel ischemic changes.    Chronic left maxillary sinusitis.      Electronically signed by: Emilee Munroe  Date:    09/18/2020  Time:    00:30             Narrative:    EXAMINATION:  CT OF THE HEAD WITHOUT    CLINICAL HISTORY:  Seizure, nontraumatic (Age => 43y);    TECHNIQUE:  5 mm unenhanced axial images were obtained from the skull base to the vertex.    COMPARISON:  01/08/2018    FINDINGS:  Stable cerebral atrophy and chronic small vessel ischemic changes are present.  There is no acute  intracranial hemorrhage, territorial infarct or mass effect, or midline shift. In the visualized paranasal sinuses and mastoid air cells, there is opacification of the left maxillary sinus with overlying wall thickening.                               X-Ray Chest AP Portable (Final result)  Result time 09/17/20 20:26:36    Final result by Vineet Bob MD (09/17/20 20:26:36)                 Impression:      1. No acute cardiopulmonary process.  No large focal consolidation.      Electronically signed by: Vineet Bob MD  Date:    09/17/2020  Time:    20:26             Narrative:    EXAMINATION:  XR CHEST AP PORTABLE    CLINICAL HISTORY:  Fever, unspecified    TECHNIQUE:  Single frontal view of the chest was performed.    COMPARISON:  02/12/2019    FINDINGS:  The cardiomediastinal silhouette is not enlarged.  There is elevation of the right hemidiaphragm..  There is no pleural effusion.  The trachea is midline.  The lungs are symmetrically expanded bilaterally without evidence of acute parenchymal process. No large focal consolidation seen.  There is no pneumothorax.  The osseous structures are remarkable for degenerative change..

## 2020-09-18 NOTE — PT/OT/SLP EVAL
Occupational Therapy   Evaluation    Name: Michael Johnson Jr.  MRN: [id number]  Admitting Diagnosis:  Breakthrough seizure      Recommendations:     Discharge Recommendations: home with home health, home health PT, home health OT(for home safety, 24/7 care and eval for possible cherry lift)  Discharge Equipment Recommendations:  hospital bed, power chair  Barriers to discharge:  Decreased caregiver support(patient's spouse is disabled)    Assessment:     Michael Johnson Jr. is a 60 y.o. male with a medical diagnosis of Breakthrough seizure.  He presents with the following performance deficits affecting function: weakness, impaired self care skills, impaired balance, decreased safety awareness, decreased ROM, impaired endurance, impaired functional mobilty, decreased upper extremity function, gait instability, decreased lower extremity function.      Initial OT evaluation completed, with treatment to follow.  Patient with (B) shoulder bursitis, per patient report, which hinders ability to self propel w/c.  Wife limited in ability to physically assist patient.  Will need w/c consult if MD approves power chair for proper fitting. Also recommend hospital bed.  If patient continues with difficulty transferring,  OT/PT to eval need for cherry lift.  Continue OT services to maximize patient functioning and decrease caregiver burden.     Rehab Prognosis: Fair; patient would benefit from acute skilled OT services to address these deficits and reach maximum level of function.       Plan:     Patient to be seen 3 x/week to address the above listed problems via self-care/home management, therapeutic activities, therapeutic exercises  · Plan of Care Expires: 10/16/20  · Plan of Care Reviewed with: patient, spouse    Subjective     Chief Complaint: Difficulty with transferring self, would like power chair to increase mobility  Patient/Family Comments/goals: Be able to care for self a little bit more than at present    Occupational  Profile:  Living Environment: Patient and spouse reside in a 2 story home with elevator access.  Previous level of function: Is able to transfer self to w/c, BSC but has difficulty transferring back to EOB.  Assist for bathing and LBD  Roles and Routines: Spouse, primarily bedbound  Equipment Used at Home:  crutches, forearm, wheelchair, cane, quad, bedside commode  Assistance upon Discharge: Limited spoue assist physically    Pain/Comfort:  · Pain Rating 1: 0/10  · Pain Rating Post-Intervention 1: 0/10    Patients cultural, spiritual, Alevism conflicts given the current situation: no    Objective:     Communicated with: GARRISON Wilson prior to session.  Patient found HOB elevated with bed alarm, blood pressure cuff, peripheral IV, pulse ox (continuous), telemetry upon OT entry to room.    General Precautions: Standard, aspiration, fall, seizure   Orthopedic Precautions:N/A   Braces: N/A     Occupational Performance:    Bed Mobility:    · Patient completed Scooting/Bridging with stand by assistance  · Patient completed Supine to Sit with moderate assistance  · Patient completed Sit to Supine with contact guard assistance    Functional Mobility/Transfers:  · Patient completed Bed <> wheelchair Transfer using Scoot Pivot technique with moderate assistance and of 2 persons  · Functional Mobility: NT    Activities of Daily Living:  · Lower Body Dressing: total assistance non skid socks    Cognitive/Visual Perceptual:  Cognitive/Psychosocial Skills:     -       Oriented to: Person and Situation   -       Follows Commands/attention:Follows two-step commands  -       Communication: clear/fluent  -       Memory: No Deficits noted  -       Safety awareness/insight to disability: impaired   -       Mood/Affect/Coping skills/emotional control: Appropriate to situation, Cooperative and Pleasant  Visual/Perceptual:      -No overt deficits noted     Physical Exam:  Postural examination/scapula alignment:    -       Rounded  shoulders  -       Forward head  Skin integrity: Thin, Dry and chart indicates (R) knee and (L) elbow wounds  Edema:  None noted  Sensation:    -       Intact  light/touch (B) UEs  Upper Extremity Range of Motion:     -       Right Upper Extremity: Deficits: shoulder flex/abd secondary to bursitis  -       Left Upper Extremity: Deficits: shoulder flex/abd secondary to bursitis  Upper Extremity Strength:    -       Right Upper Extremity: grossly 3- to 4/5  -       Left Upper Extremity: grossly 3- to 4/5   Strength:    -       Right Upper Extremity: Fair  -       Left Upper Extremity: Fair      AMPAC 6 Click ADL:  AMPAC Total Score: 15    Treatment & Education:  Educated on role of OT, POC.  Patient and spouse verbalized understanding.  Education:    Patient left HOB elevated with all lines intact, call button in reach, bed alarm on, RN notified and spouse present    GOALS:   Multidisciplinary Problems     Occupational Therapy Goals        Problem: Occupational Therapy Goal    Goal Priority Disciplines Outcome Interventions   Occupational Therapy Goal     OT, PT/OT Ongoing, Progressing    Description: Goals to be met by: 09/25/2020     Patient will increase functional independence with ADLs by performing:    UE Dressing with Stand-by Assistance.  Grooming while EOB with Stand-by Assistance.  Toileting from bedside commode with Minimal Assistance for hygiene and clothing management.   Supine to sit with Contact Guard Assistance.  Toilet transfer to bedside commode with Minimal Assistance.                     History:     Past Medical History:   Diagnosis Date    Afib     Alcohol abuse with other alcohol-induced disorder 04/02/2019    Anemia 04/02/2019    unspecified deficiency    Ankle fracture, left     Arthritis     Asthma     Cholecystitis 1/15/2018    Chronic kidney disease (CKD), stage III (moderate) 04/02/2019    Depression     Erectile dysfunction 04/02/2019    Gout, arthropathy 04/02/2019     Gout, unspecified     Hypertension     Hypertensive chronic kidney disease 04/02/2019    Hypomagnesemia 04/02/2019    Noncompliance 04/02/2019    Peripheral neuropathy 04/02/2019    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    Secondary hyperparathyroidism, renal 04/02/2019    Seizure 2016    Spastic hemiplegia affecting left nondominant side 04/02/2019       Past Surgical History:   Procedure Laterality Date    ANKLE SURGERY      x6    CHOLECYSTECTOMY         Time Tracking:     OT Date of Treatment: 09/18/20  OT Start Time: 1013  OT Stop Time: 1030  OT Total Time (min): 17 min    Billable Minutes:Evaluation 17 (seen with PT secondary to need for 2 skilled therapists for safety in transfers)    AMANDA Arceo  9/18/2020

## 2020-09-18 NOTE — DISCHARGE INSTRUCTIONS
"MEDICAID  TRANSPORT - "WHERE MY RIDE " # [id number] - PLEASE CALL 24 HRS - 48 HRS IN ADVANCE FOR RIDES TO DOCTOR OFFICE  ETC ..  -================================================================  "

## 2020-09-18 NOTE — PLAN OF CARE
Recommendations    1. Encourage PO intake of meals.     2. If PO intake <50% of meals recommend commercial beverage- Novasource BID.     3. Daily weights.    Goals: 1. >75% of EEN, EPN by RD follow up.  Nutrition Goal Status: new  Communication of RD Recs: (POC)

## 2020-09-18 NOTE — PLAN OF CARE
Problem: Fall Injury Risk  Goal: Absence of Fall and Fall-Related Injury  Outcome: Ongoing, Progressing     Problem: Adult Inpatient Plan of Care  Goal: Plan of Care Review  Outcome: Ongoing, Progressing  Goal: Optimal Comfort and Wellbeing  Outcome: Ongoing, Progressing     Problem: Wound  Goal: Optimal Wound Healing  Outcome: Ongoing, Progressing

## 2020-09-18 NOTE — PROGRESS NOTES
"[facility] Hospital Medicine  Progress Note    Patient Name: Michael Johnson Jr.  MRN: [id number]  Patient Class: OP- Observation   Admission Date: 9/17/2020  Length of Stay: 0 days  Attending Physician: Farrukh Beverly MD  Primary Care Provider: Marilu Candelario MD        Subjective:     Principal Problem:Breakthrough seizure        HPI:  Mr. Michael Johnson Jr. is a 60 y.o. male, with PMH of alcoholism, seizure disorder, paroxysmal A. Fib, HTN, HLD, polyneuropathy, CKD-3, GERD, tobacco abuse, medication/follow up non-compliance, who [place] [facility] [place] after a witnessed seizure at home. Per his wife's report to the ED MD "his eyes rolled in his head and he was 'wiggling on the floor.'" It is unclear exactly how long these symptoms lasted, but he did not have losses of urine or feces or tongue biting. He denied chills, chest pain, SOB. He states he has been taking his Keppra. ED workup showed multiple electrolyte abnormalities (low K+ low Mg), elevated Cr os 1.7, and elevated liver enzymes. He was treated in the ED with ativan, and IV magnesium and IV fluids. He was placed on OBS.     Overview/Hospital Course:  No notes on file    Interval History: awake and alert, feel ok, states he is hungry, wife at beside    Review of Systems   Constitutional: Negative for appetite change, chills and fever.   HENT: Negative for congestion and sore throat.    Eyes: Negative.    Respiratory: Negative for cough, shortness of breath and wheezing.    Cardiovascular: Negative for chest pain, palpitations and leg swelling.   Gastrointestinal: Negative for abdominal distention, abdominal pain, constipation, diarrhea, nausea and vomiting.   Endocrine: Negative for polydipsia and polyuria.   Genitourinary: Negative for difficulty urinating, frequency and hematuria.   Musculoskeletal: Positive for arthralgias and gait problem. Negative for back pain and neck pain.   Skin: Positive for wound.   Neurological: " Positive for seizures and weakness (generalized). Negative for dizziness, syncope, light-headedness and headaches.     Objective:     Vital Signs (Most Recent):  Temp: 98.4 °F (36.9 °C) (09/18/20 1505)  Pulse: 80 (09/18/20 1526)  Resp: 20 (09/18/20 1526)  BP: 113/77 (09/18/20 1526)  SpO2: 99 % (09/18/20 1526) Vital Signs (24h Range):  Temp:  [98.4 °F (36.9 °C)-100.1 °F (37.8 °C)] 98.4 °F (36.9 °C)  Pulse:  [] 80  Resp:  [17-25] 20  SpO2:  [95 %-100 %] 99 %  BP: (104-130)/(64-89) 113/77     Weight: 82.5 kg (181 lb 14.1 oz)  Body mass index is 24.67 kg/m².    Intake/Output Summary (Last 24 hours) at 9/18/2020 1552  Last data filed at 9/18/2020 1156  Gross per 24 hour   Intake 2080 ml   Output 450 ml   Net 1630 ml      Physical Exam  Vitals signs and nursing note reviewed.   Constitutional:       General: He is not in acute distress.     Appearance: He is well-developed. He is not diaphoretic.      Comments: Appears older than stated age   HENT:      Head: Normocephalic and atraumatic.   Eyes:      Conjunctiva/sclera: Conjunctivae normal.   Neck:      Musculoskeletal: Normal range of motion and neck supple.   Cardiovascular:      Rate and Rhythm: Normal rate and regular rhythm.      Heart sounds: Normal heart sounds.   Pulmonary:      Effort: Pulmonary effort is normal.      Breath sounds: Normal breath sounds. No wheezing.   Abdominal:      General: Bowel sounds are normal. There is no distension.      Palpations: Abdomen is soft.      Tenderness: There is no abdominal tenderness.   Musculoskeletal:         General: No swelling.      Comments: B/l upper and lower extremity weakness at baseline   Skin:     General: Skin is warm and dry.   Neurological:      Mental Status: He is alert.      Comments: Grossly non-focal         Significant Labs:   CBC:   Recent Labs   Lab 09/17/20 2008   WBC 8.65   HGB 11.2*   HCT 33.2*        CMP:   Recent Labs   Lab 09/17/20 2008 09/18/20  0838   * 136   K 3.2* 3.3*    CL 95 101   CO2 24 22*    87   BUN 19 19   CREATININE 1.7* 1.5*   CALCIUM 8.1* 7.1*   PROT 9.4* 7.8   ALBUMIN 3.9 3.2*   BILITOT 0.9 0.7   ALKPHOS 93 76   AST 67* 53*   * 77*   ANIONGAP 16 13   EGFRNONAA 43* 51*     All pertinent labs within the past 24 hours have been reviewed.    Significant Imaging: I have reviewed all pertinent imaging results/findings within the past 24 hours.   Imaging Results          CT Head Without Contrast (Final result)  Result time 09/18/20 00:30:38    Final result by Emilee Munroe MD (09/18/20 00:30:38)                 Impression:      No acute intracranial abnormality detected. Stable cerebral atrophy and chronic small vessel ischemic changes.    Chronic left maxillary sinusitis.      Electronically signed by: Emilee Munroe  Date:    09/18/2020  Time:    00:30             Narrative:    EXAMINATION:  CT OF THE HEAD WITHOUT    CLINICAL HISTORY:  Seizure, nontraumatic (Age => 43y);    TECHNIQUE:  5 mm unenhanced axial images were obtained from the skull base to the vertex.    COMPARISON:  01/08/2018    FINDINGS:  Stable cerebral atrophy and chronic small vessel ischemic changes are present.  There is no acute intracranial hemorrhage, territorial infarct or mass effect, or midline shift. In the visualized paranasal sinuses and mastoid air cells, there is opacification of the left maxillary sinus with overlying wall thickening.                               X-Ray Chest AP Portable (Final result)  Result time 09/17/20 20:26:36    Final result by Vineet Bob MD (09/17/20 20:26:36)                 Impression:      1. No acute cardiopulmonary process.  No large focal consolidation.      Electronically signed by: Vineet Bob MD  Date:    09/17/2020  Time:    20:26             Narrative:    EXAMINATION:  XR CHEST AP PORTABLE    CLINICAL HISTORY:  Fever, unspecified    TECHNIQUE:  Single frontal view of the chest was  performed.    COMPARISON:  02/12/2019    FINDINGS:  The cardiomediastinal silhouette is not enlarged.  There is elevation of the right hemidiaphragm..  There is no pleural effusion.  The trachea is midline.  The lungs are symmetrically expanded bilaterally without evidence of acute parenchymal process. No large focal consolidation seen.  There is no pneumothorax.  The osseous structures are remarkable for degenerative change..                                    Assessment/Plan:      * Breakthrough seizure  - Mr. Michael Johnson Jr. presented after having a witnessed seizure at home   - he is an alcoholic    - state he is compliant with Keppra    - Keppra level pending    - s/p IV keppra in ED   - PRN ativan ordered   - patient has refused a follow up appt with Neuro this year   - Neuro consulted   - EEG ordered       Acute on chronic renal insufficiency  - acute rise in Cr to 1.7, improving  - baseline Cr appears to be around 1.2  - suspect 2/2 alcohol use/poor hydration   - continue IV NS after banana bag  - avoid nephrotoxins   - renally dose meds       Paroxysmal atrial fibrillation  - ED EKG sinus tachycardia   - continue lopressor 50 mg BID   - patient is not anticoagulated   - monitor on tele       Debility  PT/OT      Alcohol abuse  - cont folic and thiamine  - monitor for w/d, although her states last drink was a week ago  - counseled on cessation and increased risk for seizures      Essential hypertension  - normotensive at present   - continue lopressor 50 mg BID   - monitor       Hypomagnesemia  - s/p replacement in ED         Acute hypokalemia  - suspect 2/2 poor PO intake and alcohol abuse  - dietary consulted   - supplement  - monitor on tele       Mixed hyperlipidemia  - no statin listed on home meds list           Dehydration  - cont IVf's  - monitor      VTE Risk Mitigation (From admission, onward)         Ordered     IP VTE LOW RISK PATIENT  Once      09/18/20 0027     Place sequential compression  device  Until discontinued      09/18/20 0027                Discharge Planning   SIMONE: 9/19/2020     Code Status: Full Code   Is the patient medically ready for discharge?:     Reason for patient still in hospital (select all that apply): Patient trending condition, Treatment and Consult recommendations  Discharge Plan A: Home Health                  Sarah Chapman PA-C  Department of Hospital Medicine   [facility]

## 2020-09-18 NOTE — ASSESSMENT & PLAN NOTE
- Mr. Michael Johnson . presented after having a witnessed seizure at home   - he is an alcoholic    - state he is compliant with Keppra    - Keppra level pending    - s/p IV keppra in ED   - PRN ativan ordered   - patient has refused a follow up appt with Neuro this year   - Neuro consulted   - EEG ordered

## 2020-09-19 VITALS
HEART RATE: 78 BPM | WEIGHT: 181.88 LBS | OXYGEN SATURATION: 98 % | HEIGHT: 72 IN | TEMPERATURE: 100 F | SYSTOLIC BLOOD PRESSURE: 121 MMHG | RESPIRATION RATE: 22 BRPM | DIASTOLIC BLOOD PRESSURE: 75 MMHG | BODY MASS INDEX: 24.63 KG/M2

## 2020-09-19 PROCEDURE — 25000003 PHARM REV CODE 250: Mod: HCNC | Performed by: PHYSICIAN ASSISTANT

## 2020-09-19 PROCEDURE — 99217 PR OBSERVATION CARE DISCHARGE: CPT | Mod: HCNC,,, | Performed by: PHYSICIAN ASSISTANT

## 2020-09-19 PROCEDURE — 94761 N-INVAS EAR/PLS OXIMETRY MLT: CPT | Mod: HCNC

## 2020-09-19 PROCEDURE — 99217 PR OBSERVATION CARE DISCHARGE: ICD-10-PCS | Mod: HCNC,,, | Performed by: PHYSICIAN ASSISTANT

## 2020-09-19 PROCEDURE — 96360 HYDRATION IV INFUSION INIT: CPT | Mod: 59 | Performed by: EMERGENCY MEDICINE

## 2020-09-19 PROCEDURE — 96361 HYDRATE IV INFUSION ADD-ON: CPT | Performed by: EMERGENCY MEDICINE

## 2020-09-19 PROCEDURE — G0378 HOSPITAL OBSERVATION PER HR: HCPCS | Mod: HCNC,CS

## 2020-09-19 RX ORDER — TRAMADOL HYDROCHLORIDE 50 MG/1
50 TABLET ORAL EVERY 6 HOURS PRN
Status: DISCONTINUED | OUTPATIENT
Start: 2020-09-19 | End: 2020-09-19 | Stop reason: HOSPADM

## 2020-09-19 RX ORDER — LEVETIRACETAM 500 MG/1
500 TABLET ORAL 2 TIMES DAILY
Qty: 60 TABLET | Refills: 0 | Status: SHIPPED | OUTPATIENT
Start: 2020-09-19 | End: 2021-01-02 | Stop reason: SDUPTHER

## 2020-09-19 RX ORDER — MAG HYDROX/ALUMINUM HYD/SIMETH 200-200-20
30 SUSPENSION, ORAL (FINAL DOSE FORM) ORAL
COMMUNITY
Start: 2020-09-19 | End: 2021-05-10 | Stop reason: SDUPTHER

## 2020-09-19 RX ORDER — LANOLIN ALCOHOL/MO/W.PET/CERES
100 CREAM (GRAM) TOPICAL DAILY
Qty: 30 TABLET | Refills: 0 | Status: SHIPPED | OUTPATIENT
Start: 2020-09-20 | End: 2021-05-10 | Stop reason: SDUPTHER

## 2020-09-19 RX ORDER — METHOCARBAMOL 500 MG/1
500 TABLET, FILM COATED ORAL 3 TIMES DAILY PRN
Qty: 30 TABLET | Refills: 11 | Status: SHIPPED | OUTPATIENT
Start: 2020-09-19 | End: 2022-03-08 | Stop reason: SDUPTHER

## 2020-09-19 RX ADMIN — ALUMINUM HYDROXIDE, MAGNESIUM HYDROXIDE, AND SIMETHICONE 30 ML: 200; 200; 20 SUSPENSION ORAL at 11:09

## 2020-09-19 RX ADMIN — PANTOPRAZOLE SODIUM 40 MG: 40 TABLET, DELAYED RELEASE ORAL at 08:09

## 2020-09-19 RX ADMIN — METOPROLOL TARTRATE 50 MG: 50 TABLET, FILM COATED ORAL at 08:09

## 2020-09-19 RX ADMIN — GABAPENTIN 400 MG: 400 CAPSULE ORAL at 08:09

## 2020-09-19 RX ADMIN — THERA TABS 1 TABLET: TAB at 08:09

## 2020-09-19 RX ADMIN — LEVETIRACETAM 500 MG: 500 TABLET ORAL at 08:09

## 2020-09-19 RX ADMIN — SUCRALFATE 1 G: 1 SUSPENSION ORAL at 11:09

## 2020-09-19 RX ADMIN — THIAMINE HCL TAB 100 MG 100 MG: 100 TAB at 08:09

## 2020-09-19 RX ADMIN — FOLIC ACID 1 MG: 1 TABLET ORAL at 08:09

## 2020-09-19 RX ADMIN — TRAMADOL HYDROCHLORIDE 50 MG: 50 TABLET, FILM COATED ORAL at 09:09

## 2020-09-19 RX ADMIN — ALUMINUM HYDROXIDE, MAGNESIUM HYDROXIDE, AND SIMETHICONE 30 ML: 200; 200; 20 SUSPENSION ORAL at 06:09

## 2020-09-19 RX ADMIN — SUCRALFATE 1 G: 1 SUSPENSION ORAL at 06:09

## 2020-09-19 RX ADMIN — SODIUM CHLORIDE: 0.9 INJECTION, SOLUTION INTRAVENOUS at 06:09

## 2020-09-19 NOTE — PLAN OF CARE
[facility] HEALTH ORDERS  FACE TO FACE ENCOUNTER    Patient Name: Michael Johnson Jr.  YOB: 1960    PCP: Marilu Candelario MD   PCP Address: [address] [address] / [facility] [address]  PCP Phone Number: [phone]  PCP Fax: [phone]       Encounter Date: 09/19/2020    Admit to Home Health    Diagnoses:  Active Hospital Problems    Diagnosis  POA    *Seizure disorder [G40.909]  Unknown    Acute on chronic renal insufficiency [N28.9, N18.9]  Yes    Paroxysmal atrial fibrillation [I48.0]  Yes    Debility [R53.81]  Yes    Alcohol abuse [F10.10]  Yes    Essential hypertension [I10]  Yes    Acute hypokalemia [E87.6]  Yes    Hypomagnesemia [E83.42]  Yes    Dehydration [E86.0]  Yes    Mixed hyperlipidemia [E78.2]  Yes      Resolved Hospital Problems   No resolved problems to display.       Future Appointments   Date Time Provider Department Center   9/25/2020 10:30 AM Marilu Candelario MD [facility]   9/25/2020  1:00 PM Camila Chavez MD [id number]   10/30/2020  7:30 AM Marilu Candelario MD [facility]     Follow-up Information     Schedule an appointment as soon as possible for a visit with Marilu Candelario MD.    Specialty: Internal Medicine  Why: post hospital follow-up  Contact information:  [address] [address]  [phone]             Alla Dowling MD PhD.    Specialty: Neurology  Why: post hospital follow-up  Contact information:  [address]  [place] [address]  [phone]                     I have seen and examined this patient face to face today. My clinical findings that support the need for the home health skilled services and home bound status are the following:  Weakness/numbness causing balance and gait disturbance due to Weakness/Debility making it taxing to leave home.  Requiring assistive device to leave home due to unsteady gait caused by   Weakness/Debility.    Allergies:  Review of patient's allergies indicates:   Allergen Reactions    Lisinopril Swelling     Pt admitted with angioedema 2wks after taking lisinopril.        Diet: regular diet    Activities: activity as tolerated    Nursing:   SN to complete comprehensive assessment including routine vital signs. Instruct on disease process and s/s of complications to report to MD. Review/verify medication list sent home with the patient at time of discharge  and instruct patient/caregiver as needed. Frequency may be adjusted depending on start of care date.    Notify MD if SBP > 160 or < 90; DBP > 90 or < 50; HR > 120 or < 50; Temp > 101       CONSULTS:    Physical Therapy to evaluate and treat. Evaluate for home safety and equipment needs; Establish/upgrade home exercise program. Perform / instruct on therapeutic exercises, gait training, transfer training, and Range of Motion.  Occupational Therapy to evaluate and treat. Evaluate home environment for safety and equipment needs. Perform/Instruct on transfers, ADL training, ROM, and therapeutic exercises.   to evaluate for community resources/long-range planning.  Aide to provide assistance with personal care, ADLs, and vital signs.    MISCELLANEOUS CARE:  N/A    WOUND CARE ORDERS  Abrasions to right knee and left elbow- clean with wound cleanser, pat dry and apply Aquacel lite silicone bordered foam twice weekly    Medications: Review discharge medications with patient and family and provide education.      Current Discharge Medication List      START taking these medications    Details   aluminum-magnesium hydroxide-simethicone (MAALOX) 200-200-20 mg/5 mL Susp Take 30 mLs by mouth before meals and at bedtime as needed.    Associated Diagnoses: Seizure      thiamine 100 MG tablet Take 1 tablet (100 mg total) by mouth once daily.  Qty: 30 tablet, Refills: 0    Associated Diagnoses: Seizure         CONTINUE these medications which have  CHANGED    Details   methocarbamoL (ROBAXIN) 500 MG Tab Take 1 tablet (500 mg total) by mouth 3 (three) times daily as needed. HOLD until follow up with Primary care provider  Qty: 30 tablet, Refills: 11    Associated Diagnoses: Muscle spasm; Seizure         CONTINUE these medications which have NOT CHANGED    Details   allopurinoL (ZYLOPRIM) 300 MG tablet Take 1 tablet (300 mg total) by mouth once daily.  Qty: 90 tablet, Refills: 3    Associated Diagnoses: Gouty arthritis      gabapentin (NEURONTIN) 400 MG capsule Take 1 capsule (400 mg total) by mouth 3 (three) times daily.  Qty: 90 capsule, Refills: 11    Associated Diagnoses: Polyneuropathy      levETIRAcetam (KEPPRA) 500 MG Tab Take 1 tablet (500 mg total) by mouth 2 (two) times daily.  Qty: 180 tablet, Refills: 3    Associated Diagnoses: Seizure      magnesium oxide (MAG-OX) 400 mg (241.3 mg magnesium) tablet Take 1 tablet (400 mg total) by mouth once daily.  Qty: 30 tablet, Refills: 3    Associated Diagnoses: Hypomagnesemia      metoprolol tartrate (LOPRESSOR) 50 MG tablet Take 1 tablet (50 mg total) by mouth 2 (two) times daily.  Qty: 60 tablet, Refills: 3    Comments: .  Associated Diagnoses: Essential hypertension      pantoprazole (PROTONIX) 40 MG tablet Take 1 tablet (40 mg total) by mouth once daily.  Qty: 90 tablet, Refills: 3    Associated Diagnoses: Gastroesophageal reflux disease without esophagitis      potassium chloride SA (K-DUR,KLOR-CON) 20 MEQ tablet Take 1 tablet (20 mEq total) by mouth 2 (two) times daily.  Qty: 30 tablet, Refills: 3    Associated Diagnoses: Acute hypokalemia      diclofenac sodium (VOLTAREN) 1 % Gel Apply 2 g topically 4 (four) times daily.  Qty: 100 g, Refills: 11    Associated Diagnoses: Chronic pain of left ankle      folic acid (FOLVITE) 1 MG tablet Take 1 tablet (1 mg total) by mouth once daily.  Qty: 90 tablet, Refills: 3    Associated Diagnoses: Alcohol abuse             I certify that this patient is confined to his  home and needs intermittent skilled nursing care, physical therapy and occupational therapy.

## 2020-09-19 NOTE — CONSULTS
[facility]  General Neurology Consultation    Reason for consult:  Breakthrough seizures  Reason for admission:  Tachycardia [R00.0]  Seizure disorder [G40.909]  Fever [R50.9]  Breakthrough seizure [G40.919]  Length of Stay:  0    History of present illness:     Mr. Michael Johnson Jr. is a 60 y.o. male, with PMH of alcoholism, seizure disorder, paroxysmal A. Fib, HTN, HLD, polyneuropathy, CKD-3, GERD, tobacco abuse, medication/follow up non-compliance.     Pt had a breakthrough seizures yesterday at home witnessed by her wife. No Bowel or bladder incontinence. He had another seizure in ED. H ewas post ictal drowsy after the seizures. He reports of may be missing dose of Keppra. Reports drinking EtOH may be 1  - 2 days in 10 days. Denies having any EtOH abuse. He is at baseline today. Denies any new focal weakness.     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   He reports of having seizures for last 3 years and has not seen by Neurologist. His last seizure was few months ago       Review of systems:   CONSTITUTIONAL: No weight loss, fever, chills, weakness or fatigue.   HEENT: Eyes: No visual loss, blurred vision, double vision or yellow sclerae. Ears, Nose, Throat: No hearing loss, sneezing, congestion, runny nose or sore throat.   SKIN: No rash or itching.   CARDIOVASCULAR: No chest pain, chest pressure or chest discomfort. No palpitations or edema.   RESPIRATORY: No shortness of breath, cough or sputum.   GASTROINTESTINAL: No anorexia, nausea, vomiting or diarrhea. No abdominal pain or blood.   GENITOURINARY: No dysuria, frequency or retention.   NEUROLOGICAL: As in HPI   MUSCULOSKELETAL: No muscle, back pain, joint pain or stiffness.   HEMATOLOGIC: No anemia, bleeding or bruising.   LYMPHATICS: No enlarged nodes.  PSYCHIATRIC: No history of depression or anxiety.   ENDOCRINOLOGIC: No reports of sweating, cold or heat intolerance. No polyuria or  polydipsia.     Past Medical History:   Diagnosis Date    Afib     Alcohol abuse with other alcohol-induced disorder 04/02/2019    Anemia 04/02/2019    unspecified deficiency    Ankle fracture, left     Arthritis     Asthma     Cholecystitis 1/15/2018    Chronic kidney disease (CKD), stage III (moderate) 04/02/2019    Depression     Erectile dysfunction 04/02/2019    Gout, arthropathy 04/02/2019    Gout, unspecified     Hypertension     Hypertensive chronic kidney disease 04/02/2019    Hypomagnesemia 04/02/2019    Noncompliance 04/02/2019    Peripheral neuropathy 04/02/2019    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    Secondary hyperparathyroidism, renal 04/02/2019    Seizure 2016    Spastic hemiplegia affecting left nondominant side 04/02/2019       Past Surgical History:   Procedure Laterality Date    ANKLE SURGERY      x6    CHOLECYSTECTOMY         Family History   Problem Relation Age of Onset    Hypertension Father     Stroke Maternal Grandmother     Cataracts Maternal Grandfather     Stroke Maternal Grandfather     Cancer Mother         malignant polyp       Social History     Socioeconomic History    Marital status:      Spouse name: Not on file    Number of children: Not on file    Years of education: Not on file    Highest education level: Not on file   Occupational History    Not on file   Social Needs    Financial resource strain: Not on file    Food insecurity     Worry: Not on file     Inability: Not on file    Transportation needs     Medical: Not on file     Non-medical: Not on file   Tobacco Use    Smoking status: Light Tobacco Smoker    Smokeless tobacco: Never Used   Substance and Sexual Activity    Alcohol use: Yes     Alcohol/week: 3.0 - 4.0 standard drinks     Types: 3 - 4 Shots of liquor per week     Comment: daily, but states he stopped all alcohol 2 weeks ago abruptly lost interest in it.    Drug use: No    Sexual activity: Not on file   Lifestyle    Physical  activity     Days per week: Not on file     Minutes per session: Not on file    Stress: Not on file   Relationships    Social connections     Talks on phone: Not on file     Gets together: Not on file     Attends [employer] service: Not on file     Active member of club or organization: Not on file     Attends meetings of clubs or organizations: Not on file     Relationship status: Not on file   Other Topics Concern    Not on file   Social History Narrative    Not on file       Scheduled Meds:   aluminum-magnesium hydroxide-simethicone  30 mL Oral QID (AC & HS)    folic acid  1 mg Oral Daily    gabapentin  400 mg Oral TID    levETIRAcetam  500 mg Oral BID    metoprolol tartrate  50 mg Oral BID    multivitamin  1 tablet Oral Daily    pantoprazole  40 mg Oral Daily    sucralfate  1 g Oral Q6H    thiamine  100 mg Oral Daily     Continuous Infusions:   sodium chloride 0.9% 125 mL/hr at 09/18/20 1534     PRN Meds:.influenza, lorazepam, pneumoc 13-neto conj-dip cr(PF), sodium chloride 0.9%    Review of patient's allergies indicates:   Allergen Reactions    Lisinopril Swelling     Pt admitted with angioedema 2wks after taking lisinopril.          Physical Exam    Vitals:    09/18/20 1253 09/18/20 1257 09/18/20 1505 09/18/20 1526   BP:    113/77   BP Location:       Patient Position:       Pulse:    80   Resp:    20   Temp:   98.4 °F (36.9 °C)    TempSrc:   Oral    SpO2:    99%   Weight: 82.5 kg (181 lb 14.1 oz)      Height: 6' (1.829 m) 6' (1.829 m)           In general, the patient is well nourished.  .    MENTAL STATUS: language is fluent, normal verbal comprehension, short-term and remote memory is intact, attention is normal, patient is alert and oriented x 3, fund of knowlege is appropriate by vocabulary.     CRANIAL NERVE EXAM:  Extraocular muscles are intact. No facial asymmetry. Facial sensation is intact bilaterally. There is no dysarthria. . Hearing is grossly intact.      MOTOR EXAM: Atrophy of  bilateral lower extremities  No pronator drift; Strength is  5/5 in all groups in the lower extremities and upper extremities.      COORDINATION: Finger to Nose examination intact,       IMAGING (personally reviewed):  Results for orders placed or performed during the hospital encounter of 09/17/20   CT Head Without Contrast    Narrative    EXAMINATION:  CT OF THE HEAD WITHOUT    CLINICAL HISTORY:  Seizure, nontraumatic (Age => 43y);    TECHNIQUE:  5 mm unenhanced axial images were obtained from the skull base to the vertex.    COMPARISON:  01/08/2018    FINDINGS:  Stable cerebral atrophy and chronic small vessel ischemic changes are present.  There is no acute intracranial hemorrhage, territorial infarct or mass effect, or midline shift. In the visualized paranasal sinuses and mastoid air cells, there is opacification of the left maxillary sinus with overlying wall thickening.      Impression    No acute intracranial abnormality detected. Stable cerebral atrophy and chronic small vessel ischemic changes.    Chronic left maxillary sinusitis.      Electronically signed by: Emilee Munroe  Date:    09/18/2020  Time:    00:30   EEG    Narrative    Tala Taylor MD     9/18/2020  5:35 PM  ROUTINE ELECTROENCEPHALOGRAM REPORT      Michael Johnson JrElida  [id number]  1960    DATE OF SERVICE: 9/18/2020    REQUESTING PROVIDER: Denys Mercado MD    METHODOLOGY   Electroencephalographic (EEG) recording is with electrodes   placed according to the International 10-20 placement system.    Thirty two (32) channels of digital signal (sampling rate of   512/sec) including T1 and T2 was simultaneously recorded from the   scalp and may include  EKG, EMG, and/or eye monitors.  Recording   band pass was 0.1 to 512 hz.  Digital video recording of the   patient is simultaneously recorded with the EEG.  The patient is   instructed report clinical symptoms which may occur during the   recording session.  EEG and video recording is stored and    archived in digital format. Activation procedures which include   photic stimulation, hyperventilation and instructing patients to   perform simple task are done in selected patients.    The EEG is displayed on a monitor screen and can be reviewed   using different montages.  Computer assisted analysis is employed   to detect spike and electrographic seizure activity.   The entire   record is submitted for computer analysis.  The entire recording   is visually reviewed and the times identified by computer   analysis as being spikes or seizures are reviewed again.    Compresses spectral analysis (CSA) is also performed on the   activity recorded from each individual channel.  This is   displayed as a power display of frequencies from 0 to 30 Hz over   time.   The CSA is reviewed looking for asymmetries in power   between homologous areas of the scalp and then compared with the   original EEG recording.     Satmex software was also utilized in the review of this study.    This software suite analyzes the EEG recording in multiple   domains.  Coherence and rhythmicity is computed to identify EEG   sections which may contain organized seizures.  Each channel   undergoes analysis to detect presence of spike and sharp waves   which have special and morphological characteristic of epileptic   activity.  The routine EEG recording is converted from spacial   into frequency domain.  This is then displayed comparing   homologous areas to identify areas of significant asymmetry.    Algorithm to identify non-cortically generated artifact is used   to separate eye movement, EMG and other artifact from the EEG    EEG FINDINGS  Background activity:   The background rhythm was characterized by low amplitude (<50 uV)   alpha (9-11 Hz) activity with a 11 Hz posterior dominant alpha   rhythm at 30-70 microvolts.   Symmetry and continuity: The background was continuous and   symmetric    Sleep:   Normal sleep transients including  sleep spindles, K complexes,   vertex waves and POSTS were seen.    Activation procedures:   Photic stimulation and hyperventilation were not performed   Responsive to verbal stimulation with reactivity seen on EEG.    Abnormal activity:   No epileptiform discharges, periodic discharges, lateralized   rhythmic delta activity or electrographic seizures were seen.    EKG:   Irregular rhythm seen    IMPRESSION:   This is a normal routine EEG done in awake, drowsy and asleep   states.  Irregular heart rhythm seen on EKG.    A normal EEG does not rule out seizures/epilepsy.  CLINICAL CORRELATION IS RECOMMENDED.    Tala Taylor MD, NAMAN(), [place] [place].  Neurology-Epilepsy.  [facility].             LABS:  Lab Results   Component Value Date    WBC 8.65 09/17/2020    HGB 11.2 (L) 09/17/2020    HCT 33.2 (L) 09/17/2020     (H) 09/17/2020     09/17/2020   CMP  Sodium   Date Value Ref Range Status   09/18/2020 136 136 - 145 mmol/L Final     Potassium   Date Value Ref Range Status   09/18/2020 3.3 (L) 3.5 - 5.1 mmol/L Final     Chloride   Date Value Ref Range Status   09/18/2020 101 95 - 110 mmol/L Final     CO2   Date Value Ref Range Status   09/18/2020 22 (L) 23 - 29 mmol/L Final     Glucose   Date Value Ref Range Status   09/18/2020 87 70 - 110 mg/dL Final     BUN, Bld   Date Value Ref Range Status   09/18/2020 19 6 - 20 mg/dL Final     Creatinine   Date Value Ref Range Status   09/18/2020 1.5 (H) 0.5 - 1.4 mg/dL Final     Calcium   Date Value Ref Range Status   09/18/2020 7.1 (L) 8.7 - 10.5 mg/dL Final     Total Protein   Date Value Ref Range Status   09/18/2020 7.8 6.0 - 8.4 g/dL Final     Albumin   Date Value Ref Range Status   09/18/2020 3.2 (L) 3.5 - 5.2 g/dL Final     Total Bilirubin   Date Value Ref Range Status   09/18/2020 0.7 0.1 - 1.0 mg/dL Final     Comment:     For infants and newborns, interpretation of results should be based  on gestational age, weight and in agreement with  clinical  observations.  Premature Infant recommended reference ranges:  Up to 24 hours.............<8.0 mg/dL  Up to 48 hours............<12.0 mg/dL  3-5 days..................<15.0 mg/dL  6-29 days.................<15.0 mg/dL       Alkaline Phosphatase   Date Value Ref Range Status   09/18/2020 76 55 - 135 U/L Final     AST   Date Value Ref Range Status   09/18/2020 53 (H) 10 - 40 U/L Final     ALT   Date Value Ref Range Status   09/18/2020 77 (H) 10 - 44 U/L Final     Anion Gap   Date Value Ref Range Status   09/18/2020 13 8 - 16 mmol/L Final     eGFR if    Date Value Ref Range Status   09/18/2020 58 (A) >60 mL/min/1.73 m^2 Final     eGFR if non    Date Value Ref Range Status   09/18/2020 51 (A) >60 mL/min/1.73 m^2 Final     Comment:     Calculation used to obtain the estimated glomerular filtration  rate (eGFR) is the CKD-EPI equation.                 ASSESSMENT  /  RECOMMENDATIONS    Breakthrough seizures     ?? EtOH withdrawal v/s  Medication noncompliance     - rec to cont Keppr 500 mg BID   - reviewed CTH with atrophy but no focal changes   - seizure and fall precaution   - rec Thiamine 100 mg TID and Folic acid   - ?? EtOH cessation program   - reviewed EEG   - rec outpatient MRI brain w/wo - Epilepsy protocol   - Follow up with Dr. Dowling at [facility]    Hajalala Nicole MD  Neurology   [facility]

## 2020-09-19 NOTE — DISCHARGE SUMMARY
"[facility] Hospital Medicine  Discharge Summary      Patient Name: Michael Johnson Jr.  MRN: [id number]  Admission Date: 9/17/2020  Hospital Length of Stay: 0 days  Discharge Date and Time:  09/19/2020 9:05 AM  Attending Physician: No att. providers found   Discharging Provider: Sarah Chapman PA-C  Primary Care Provider: Marilu Candelario MD      HPI:   Mr. Michael Johnson Jr. is a 60 y.o. male, with PMH of alcoholism, seizure disorder, paroxysmal A. Fib, HTN, HLD, polyneuropathy, CKD-3, GERD, tobacco abuse, medication/follow up non-compliance, who presented to [facility] ED on 9/17/20 after a witnessed seizure at home. Per his wife's report to the ED MD "his eyes rolled in his head and he was 'wiggling on the floor.'" It is unclear exactly how long these symptoms lasted, but he did not have losses of urine or feces or tongue biting. He denied chills, chest pain, SOB. He states he has been taking his Keppra. ED workup showed multiple electrolyte abnormalities (low K+ low Mg), elevated Cr os 1.7, and elevated liver enzymes. He was treated in the ED with ativan, and IV magnesium and IV fluids. He was placed on OBS.     * No surgery found *      Hospital Course:   60 y.o. male, with PMH of alcoholism, seizure disorder, paroxysmal A. Fib, HTN, HLD, polyneuropathy, CKD-3, GERD, tobacco abuse, medication/follow up non-compliance placed in observation after witnessed seizure at home followed by a seizure in the ED. S/p Keppra in ED, resumed home dose.  CT head No acute intracranial abnormality detected. Stable cerebral atrophy and chronic small vessel ischemic changes. EEG No epileptiform discharges, periodic discharges, lateralized rhythmic delta activity or electrographic seizures were seen. Electrolytes replaced. Unclear if seizure secondary to non-compliance or EtOH withdrawal. Discussed with patient alcohol cessation continue folic acid and thiamin. Patient to follow up rec outpatient MRI brain w/wo - Epilepsy " protocol and with Dr. Dowling, Neurology. Discussed plan of care with patient and wife, all questions answered. Stable for discharge home.      Consults:   Consults (From admission, onward)        Status Ordering Provider     Inpatient consult to Neurology  Once     Provider:  (Not yet assigned)    Completed ANAYA GRIER     Inpatient consult to Registered Dietitian/Nutritionist  Once     Provider:  (Not yet assigned)    Completed ANAYA GRIER          No new Assessment & Plan notes have been filed under this hospital service since the last note was generated.  Service: Hospital Medicine    Final Active Diagnoses:    Diagnosis Date Noted POA    PRINCIPAL PROBLEM:  Seizure disorder [G40.909] 09/17/2020 Unknown    Acute on chronic renal insufficiency [N28.9, N18.9] 09/18/2020 Yes    Paroxysmal atrial fibrillation [I48.0] 11/29/2019 Yes    Debility [R53.81] 04/04/2019 Yes    Alcohol abuse [F10.10]  Yes    Essential hypertension [I10]  Yes    Acute hypokalemia [E87.6] 03/16/2017 Yes    Hypomagnesemia [E83.42] 03/16/2017 Yes    Dehydration [E86.0] 10/20/2014 Yes    Mixed hyperlipidemia [E78.2] 10/20/2014 Yes      Problems Resolved During this Admission:       Discharged Condition: stable    Disposition: Home-Health Care [facility]    Follow Up:  Follow-up Information     Schedule an appointment as soon as possible for a visit with Marilu Candelario MD.    Specialty: Internal Medicine  Why: post hospital follow-up  Contact information:  [address]  [address]  [facility] [address]  [phone]             Alla Dowling MD PhD.    Specialty: Neurology  Why: post hospital follow-up  Contact information:  [address] [facility] [address]  [phone]                 Patient Instructions:      HOSPITAL BED FOR HOME USE     Order Specific Question Answer Comments   Type: Semi-electric    Length of need (1-99 months): 99    Does patient have medical equipment at home? crutches    Does patient have  medical equipment at home? wheelchair    Does patient have medical equipment at home? cane, straight    Does patient have medical equipment at home? bedside commode    Does patient have medical equipment at home? shower chair    Height: 6' (1.829 m)    Weight: 82.5 kg (181 lb 14.1 oz)    Please check all that apply: Patient requires positioning of the body in ways not feasible in an ordinary bed due to a medical condition which is expected to last at least one month.    Please check all that apply: Patient requires, for the alleviation of pain, positioning of the body in ways not feasible in an ordinary bed.    Please check all that apply: Patient requires a bed height different than a fixed height hospital bed to permit transfers to chair, wheelchair, or standing.      PATIENT (JESI) LIFT FOR HOME USE     Order Specific Question Answer Comments   Height: 6' (1.829 m)    Weight: 82.5 kg (181 lb 14.1 oz)    Does patient have medical equipment at home? crutches    Does patient have medical equipment at home? wheelchair    Does patient have medical equipment at home? cane, straight    Does patient have medical equipment at home? bedside commode    Does patient have medical equipment at home? shower chair    Length of need (1-99 months): 99      COMMODE FOR HOME USE     Order Specific Question Answer Comments   Type: Drop arm    Height: 6' (1.829 m)    Weight: 82.5 kg (181 lb 14.1 oz)    Does patient have medical equipment at home? crutches    Does patient have medical equipment at home? wheelchair    Does patient have medical equipment at home? cane, straight    Does patient have medical equipment at home? bedside commode    Does patient have medical equipment at home? shower chair    Length of need (1-99 months): 99      Ambulatory referral/consult to Neurology Epilepsy   Standing Status: Future   Referral Priority: Routine Referral Type: Consultation   Referral Reason: Specialty Services Required   Requested  Specialty: Neurology   Number of Visits Requested: 1     Diet Adult Regular     Notify your health care provider if you experience any of the following:  temperature >100.4     Notify your health care provider if you experience any of the following:  persistent nausea and vomiting or diarrhea     Notify your health care provider if you experience any of the following:  severe uncontrolled pain     Notify your health care provider if you experience any of the following:  difficulty breathing or increased cough     Notify your health care provider if you experience any of the following:  severe persistent headache     Notify your health care provider if you experience any of the following:  persistent dizziness, light-headedness, or visual disturbances     Notify your health care provider if you experience any of the following:  increased confusion or weakness     Activity as tolerated       Significant Diagnostic Studies: Labs:   CMP   Recent Labs   Lab 09/17/20 2008 09/18/20  0838   * 136   K 3.2* 3.3*   CL 95 101   CO2 24 22*    87   BUN 19 19   CREATININE 1.7* 1.5*   CALCIUM 8.1* 7.1*   PROT 9.4* 7.8   ALBUMIN 3.9 3.2*   BILITOT 0.9 0.7   ALKPHOS 93 76   AST 67* 53*   * 77*   ANIONGAP 16 13   ESTGFRAFRICA 50* 58*   EGFRNONAA 43* 51*   , CBC   Recent Labs   Lab 09/17/20 2008   WBC 8.65   HGB 11.2*   HCT 33.2*       and All labs within the past 24 hours have been reviewed  Radiology:    Imaging Results          CT Head Without Contrast (Final result)  Result time 09/18/20 00:30:38    Final result by Emilee Munroe MD (09/18/20 00:30:38)                 Impression:      No acute intracranial abnormality detected. Stable cerebral atrophy and chronic small vessel ischemic changes.    Chronic left maxillary sinusitis.      Electronically signed by: Emilee Munroe  Date:    09/18/2020  Time:    00:30             Narrative:    EXAMINATION:  CT OF THE HEAD WITHOUT    CLINICAL  HISTORY:  Seizure, nontraumatic (Age => 43y);    TECHNIQUE:  5 mm unenhanced axial images were obtained from the skull base to the vertex.    COMPARISON:  01/08/2018    FINDINGS:  Stable cerebral atrophy and chronic small vessel ischemic changes are present.  There is no acute intracranial hemorrhage, territorial infarct or mass effect, or midline shift. In the visualized paranasal sinuses and mastoid air cells, there is opacification of the left maxillary sinus with overlying wall thickening.                               X-Ray Chest AP Portable (Final result)  Result time 09/17/20 20:26:36    Final result by Vineet Bob MD (09/17/20 20:26:36)                 Impression:      1. No acute cardiopulmonary process.  No large focal consolidation.      Electronically signed by: Vineet Bob MD  Date:    09/17/2020  Time:    20:26             Narrative:    EXAMINATION:  XR CHEST AP PORTABLE    CLINICAL HISTORY:  Fever, unspecified    TECHNIQUE:  Single frontal view of the chest was performed.    COMPARISON:  02/12/2019    FINDINGS:  The cardiomediastinal silhouette is not enlarged.  There is elevation of the right hemidiaphragm..  There is no pleural effusion.  The trachea is midline.  The lungs are symmetrically expanded bilaterally without evidence of acute parenchymal process. No large focal consolidation seen.  There is no pneumothorax.  The osseous structures are remarkable for degenerative change..                                  Pending Diagnostic Studies:     None         Medications:  Reconciled Home Medications:      Medication List      START taking these medications    aluminum-magnesium hydroxide-simethicone 200-200-20 mg/5 mL Susp  Commonly known as: MAALOX  Take 30 mLs by mouth before meals and at bedtime as needed.     thiamine 100 MG tablet  Take 1 tablet (100 mg total) by mouth once daily.  Start taking on: September 20, 2020        CHANGE how you take these medications    methocarbamoL 500  MG Tab  Commonly known as: ROBAXIN  Take 1 tablet (500 mg total) by mouth 3 (three) times daily as needed. HOLD until follow up with Primary care provider  What changed: additional instructions        CONTINUE taking these medications    allopurinoL 300 MG tablet  Commonly known as: ZYLOPRIM  Take 1 tablet (300 mg total) by mouth once daily.     diclofenac sodium 1 % Gel  Commonly known as: VOLTAREN  Apply 2 g topically 4 (four) times daily.     folic acid 1 MG tablet  Commonly known as: FOLVITE  Take 1 tablet (1 mg total) by mouth once daily.     gabapentin 400 MG capsule  Commonly known as: NEURONTIN  Take 1 capsule (400 mg total) by mouth 3 (three) times daily.     levETIRAcetam 500 MG Tab  Commonly known as: KEPPRA  Take 1 tablet (500 mg total) by mouth 2 (two) times daily.     magnesium oxide 400 mg (241.3 mg magnesium) tablet  Commonly known as: MAG-OX  Take 1 tablet (400 mg total) by mouth once daily.     metoprolol tartrate 50 MG tablet  Commonly known as: LOPRESSOR  Take 1 tablet (50 mg total) by mouth 2 (two) times daily.     pantoprazole 40 MG tablet  Commonly known as: PROTONIX  Take 1 tablet (40 mg total) by mouth once daily.     potassium chloride SA 20 MEQ tablet  Commonly known as: K-DUR,KLOR-CON  Take 1 tablet (20 mEq total) by mouth 2 (two) times daily.            Indwelling Lines/Drains at time of discharge:   Lines/Drains/Airways     None                 Time spent on the discharge of patient: >30  minutes  Patient was seen and examined on the date of discharge and determined to be suitable for discharge.         Sarah Chapman PA-C  Department of Hospital Medicine  [facility]

## 2020-09-19 NOTE — HOSPITAL COURSE
60 y.o. male, with PMH of alcoholism, seizure disorder, paroxysmal A. Fib, HTN, HLD, polyneuropathy, CKD-3, GERD, tobacco abuse, medication/follow up non-compliance placed in observation after witnessed seizure at home followed by a seizure in the ED. S/p Keppra in ED, resumed home dose.  CT head No acute intracranial abnormality detected. Stable cerebral atrophy and chronic small vessel ischemic changes. EEG No epileptiform discharges, periodic discharges, lateralized rhythmic delta activity or electrographic seizures were seen. Electrolytes replaced. Unclear if seizure secondary to non-compliance or EtOH withdrawal. Discussed with patient alcohol cessation continue folic acid and thiamin. Patient to follow up rec outpatient MRI brain w/wo - Epilepsy protocol and with Dr. Dowling, Neurology. Discussed plan of care with patient and wife, all questions answered. Stable for discharge home.

## 2020-09-20 NOTE — PLAN OF CARE
09/19/20 1928   Final Note   Assessment Type Final Discharge Note   Anticipated Discharge Disposition Home-Health   Right Care Referral Info   Post Acute Recommendation Home-care   Referral Type [facility]   Post-Acute Status   Post-Acute Authorization HME;Home Health   HME Status Referrals Sent   Home Health Status Referrals Sent   Patient choice form signed by patient/caregiver   (pt verbal [facility])    DC  PLANNING     RNCM met with patient at the bedside.      Patient is alert and oriented with no communication barriers.      Prior to admission patient was complete dependent - JESI LIFT ORDER - PT WAS NOT SURE IF HE WANTS IT .. HE SAID   IF TO BIG HE CALL  TO SEND IT BACK  .\     MD TEAM ORDER HOME HEALTH - PT CHOICE [facility] --  PENDING ACCEPTANCE -   WILL NOTIFY PATIENT IF CASE  DENIED     ,     MD TEAM ORDER DROP ARM BEDSIDE COMMODE  @ BS .      MD TEAM ORDER HOSP BED - PT AND WIFE REFUSED - TEACHING DONE IF CHANGE MIND TO CALL PCP OFFICE      HOME RAMP INFO GIVEN   PT PT REQUESTED ,     AND INFO TO OFFICE AGING/ LOCET  NUMBER GIVEN FOR HOME PCA AND OTHER SERVICES  # GIVEN TO PT WIFE     Gaby Garcia RN  Case management 9/19/20207:34 PM  # [phone]

## 2020-09-21 ENCOUNTER — TELEPHONE (OUTPATIENT)
Dept: INTERNAL MEDICINE | Facility: CLINIC | Age: 60
End: 2020-09-21

## 2020-09-21 LAB — LEVETIRACETAM SERPL-MCNC: <1 UG/ML (ref 3–60)

## 2020-09-21 NOTE — TELEPHONE ENCOUNTER
----- Message from Marilu Candelario MD sent at 9/20/2020  9:05 PM CDT -----  He has already been evaluated by  social work.  I think the plan was that [facility] would use their PT to eval for a motorized scooter.  Does anything else need to be done for his motorized scooter?  Thank you!  ----- Message -----  From: Sarah Chapman PA-C  Sent: 9/19/2020   5:42 PM CDT  To: MD Dr. Kodak Jose    The referenced patient was discharged today with DME to assist his wife at home. The one thing I could not order was the motorized scooter as he is unable to propel a manual WC. The  said the PCP has to order. I just wanted to let you know so at his follow up you may be able to address.         Thanks    Sarah Chapman PA-C  [facility]

## 2020-09-21 NOTE — TELEPHONE ENCOUNTER
Spoke with [facility]. They said they are waiting on Edwa to call them to set up the PT eval through the [place] [facility]. Called Edwa. She reports eval is scheduled for the end of the week. After that, the eval will be sent to [facility].

## 2020-09-23 ENCOUNTER — DOCUMENT SCAN (OUTPATIENT)
Dept: HOME HEALTH SERVICES | Facility: HOSPITAL | Age: 60
End: 2020-09-23
Payer: MEDICAID

## 2020-09-25 ENCOUNTER — EXTERNAL HOME HEALTH (OUTPATIENT)
Dept: HOME HEALTH SERVICES | Facility: HOSPITAL | Age: 60
End: 2020-09-25
Payer: MEDICARE

## 2020-09-25 ENCOUNTER — TELEPHONE (OUTPATIENT)
Dept: INTERNAL MEDICINE | Facility: CLINIC | Age: 60
End: 2020-09-25

## 2020-09-25 DIAGNOSIS — N18.30 STAGE 3 CHRONIC KIDNEY DISEASE, UNSPECIFIED WHETHER STAGE 3A OR 3B CKD: ICD-10-CM

## 2020-09-25 DIAGNOSIS — M62.838 MUSCLE SPASM: ICD-10-CM

## 2020-09-25 DIAGNOSIS — R53.81 DEBILITY: Primary | ICD-10-CM

## 2020-09-25 NOTE — TELEPHONE ENCOUNTER
----- Message from Marci Fabian sent at 9/25/2020  3:35 PM CDT -----      Name of Who is Calling: Home Health nurse      What is the request in detail: Home Health Nurse called said pt needs a order for a lift pt just got out of the hospital and needs it.Please contact to further discuss and advise.          Can the clinic reply by [facility]: N      What Number to Call Back if not in [facility]: Home Health Nurse [phone]

## 2020-09-25 NOTE — TELEPHONE ENCOUNTER
----- Message from Marilu Candelario MD sent at 9/25/2020 11:24 AM CDT -----  I see that there's an order to sign for home health, but there's a problem with Epic and I can't sign it.  Could they fax it to us, or can we do verbal orders? Thanks!  ----- Message -----  From: Leticia Reyna MA  Sent: 9/24/2020   7:41 PM CDT  To: Marilu Candelario MD    Please advisee  ----- Message -----  From: Whitney Fraser  Sent: 9/24/2020   1:46 PM CDT  To: [facility] Michel Staff    Name of Who is Calling: Evelia Zambrano     What is the request in detail: Evelia Zambrano is requesting a orders for a coal lift for the pt can get out of bed the can be sent to DME ..............Please contact to further discuss and advise      Can the clinic reply by [facility]: No     What Number to Call Back if not in [facility]:

## 2020-10-09 NOTE — TELEPHONE ENCOUNTER
Ordered referral to Physiatry/Dr. Shirley for mobility evaluation.  Please assist the patient with scheduling.  Thank you!

## 2020-10-09 NOTE — TELEPHONE ENCOUNTER
Spoke with home health nurse. She reports the family has declined the requested lift and they would rather a referral to physiatry for mobility eval. He would like a motorized wheelchair. Notified home health nurse that appointments with Dr. Shirley are months out. M for patient to call me back regarding the referral and appointment. Sent pended referral to Dr. Candelario.

## 2020-10-13 ENCOUNTER — TELEPHONE (OUTPATIENT)
Dept: INTERNAL MEDICINE | Facility: CLINIC | Age: 60
End: 2020-10-13

## 2020-10-13 NOTE — TELEPHONE ENCOUNTER
----- Message from Naida Wright sent at 10/12/2020 12:45 PM CDT -----  Regarding: Patient call back  Who called: Leeanne (wife)    What is the request in detail: Patient's wife is requesting a call back. She state sit is in reference to a referral to therapy to see another provider for a wheelchair. She states the referral was sent to a Dr. Shirley,but she has not heard back from them. She would like to further discuss.    Please advise.    Can the clinic reply by [facility]? No    Best call back number: [phone]    Additional Information: N/A

## 2020-10-13 NOTE — TELEPHONE ENCOUNTER
Pt was discharger from pt on 10/2/2020 and nursing on today. He need a new order enter dated 10/23/2020 so [facility] and enroll him in.

## 2020-10-13 NOTE — TELEPHONE ENCOUNTER
lvm for pt to call office back in regards of     Will need to contact the provider office who enter order for the order status

## 2020-10-13 NOTE — TELEPHONE ENCOUNTER
----- Message from Dayana Sykes sent at 10/13/2020 11:29 AM CDT -----  Regarding: [facility]  Type: Patient Call Back       What is the request in detail:  [facility] calling to speak to a nurse regarding pt orders      Can the clinic reply by [facility]? No       Would the patient rather a call back or a response via [facility]? Call back       Best call back number:       Thank you.

## 2020-10-16 ENCOUNTER — TELEPHONE (OUTPATIENT)
Dept: INTERNAL MEDICINE | Facility: CLINIC | Age: 60
End: 2020-10-16

## 2020-10-16 NOTE — TELEPHONE ENCOUNTER
----- Message from Oxana Massey sent at 10/16/2020  9:25 AM CDT -----  Contact: [facility]  Type: Patient Call Back Who called:[facility] What is the request in detail: Message follow up Can the clinic reply by [facility]?no Would the patient rather a call back or a response via [facility]? no Best call back number:[phone] Additional Information:

## 2020-10-19 ENCOUNTER — TELEPHONE (OUTPATIENT)
Dept: INTERNAL MEDICINE | Facility: CLINIC | Age: 60
End: 2020-10-19

## 2020-10-19 DIAGNOSIS — M25.572 CHRONIC PAIN OF LEFT ANKLE: Primary | ICD-10-CM

## 2020-10-19 DIAGNOSIS — G89.29 CHRONIC PAIN OF LEFT ANKLE: Primary | ICD-10-CM

## 2020-10-19 NOTE — TELEPHONE ENCOUNTER
----- Message from Kayleigh Birmingham sent at 10/16/2020  4:13 PM CDT -----  Regarding: physical therpy orders  Name of Who is Calling: Kadeem with [facility] What is the request in detail:  Kadeem was calling in references to patients  PT admit order. She had some questions. She's requesting a call back. Can the clinic reply by [facility]: No What Number to Call Back if not in [facility]: [phone]

## 2020-10-19 NOTE — TELEPHONE ENCOUNTER
----- Message from Naida Wright sent at 10/19/2020  4:12 PM CDT -----  Regarding:  call back    Type:  Patient Returning Call    Who Called: oleksandr Hoff      Who Left Message for Patient: Abdulaziz Hearn    Does the patient know what this is regarding?: Y    Can the clinic reply in [facility]: No    Best Call Back Number: [phone]    Additional Information: N/A

## 2020-10-23 NOTE — TELEPHONE ENCOUNTER
LM stating that Dr. Candelario would like pt to see Ortho and  PM&R. Pt is schedule for PM&R on 11/25/20.

## 2020-10-23 NOTE — TELEPHONE ENCOUNTER
"Called patient. Someone answered and hung up. Called him right back. He reports the pain he is experiencing is limiting his ADL's. He is unable to transfer to and from the toiled by himself. He is unable to walk. He is having pain in his left ankle only. Denies numbness/tingling. Sent message to Dr. Candelario.    He has had about 6 surgeries in his ankle. Reports color changes in his ankle/foot (he feels the skin is "darker" since it started hurting him). He can't stand any pressure on his foot or ankle and it stays swollen. Patient saw pain management over 3 years ago and received no benefit, so he stopped going.  "

## 2020-11-16 ENCOUNTER — DOCUMENT SCAN (OUTPATIENT)
Dept: HOME HEALTH SERVICES | Facility: HOSPITAL | Age: 60
End: 2020-11-16
Payer: MEDICAID

## 2020-11-23 ENCOUNTER — PATIENT OUTREACH (OUTPATIENT)
Dept: ADMINISTRATIVE | Facility: OTHER | Age: 60
End: 2020-11-23

## 2020-12-01 ENCOUNTER — TELEPHONE (OUTPATIENT)
Dept: INTERNAL MEDICINE | Facility: CLINIC | Age: 60
End: 2020-12-01

## 2020-12-01 NOTE — TELEPHONE ENCOUNTER
----- Message from Rehan Schumacher sent at 11/30/2020 10:40 AM CST -----  Patient needs to be seen in regards to body aches and would like to be seen asap, patient phone number is [phone]. Thanks

## 2020-12-03 ENCOUNTER — TELEPHONE (OUTPATIENT)
Dept: INTERNAL MEDICINE | Facility: CLINIC | Age: 60
End: 2020-12-03

## 2020-12-03 DIAGNOSIS — Z01.00 ROUTINE EYE EXAM: Primary | ICD-10-CM

## 2020-12-03 NOTE — TELEPHONE ENCOUNTER
LOV 07/2020  Pending optometry referral   Spouse request external referral to be faxed also to the same location as her fax number

## 2020-12-03 NOTE — TELEPHONE ENCOUNTER
Signed external referral to Optometry.  Please fax and provide the patient with their phone number to schedule.  Thanks!

## 2020-12-11 ENCOUNTER — TELEPHONE (OUTPATIENT)
Dept: INTERNAL MEDICINE | Facility: CLINIC | Age: 60
End: 2020-12-11

## 2020-12-11 DIAGNOSIS — R53.81 DEBILITY: Primary | ICD-10-CM

## 2020-12-13 NOTE — TELEPHONE ENCOUNTER
He no showed to his PM&R appt. He need to be evaluated by them.  Please reschedule with Ortho and PM&R.  I'll also order case management to help coordinate this and they will call him.

## 2020-12-14 NOTE — TELEPHONE ENCOUNTER
Patient stated that he has been waiting on home health and no one has showed up in a long time. Please advise.

## 2020-12-16 ENCOUNTER — TELEPHONE (OUTPATIENT)
Dept: INTERNAL MEDICINE | Facility: CLINIC | Age: 60
End: 2020-12-16

## 2020-12-16 NOTE — TELEPHONE ENCOUNTER
----- Message from Rocio Robertson sent at 12/16/2020  1:28 PM CST -----  Regarding: home healthr eferral  Contact: pt  Pt called was told he needs another referral for home health     Pt can be reached at [phone]

## 2020-12-17 ENCOUNTER — OUTPATIENT CASE MANAGEMENT (OUTPATIENT)
Dept: ADMINISTRATIVE | Facility: OTHER | Age: 60
End: 2020-12-17

## 2020-12-17 ENCOUNTER — TELEPHONE (OUTPATIENT)
Dept: INTERNAL MEDICINE | Facility: CLINIC | Age: 60
End: 2020-12-17

## 2020-12-18 NOTE — PROGRESS NOTES
LMSW contacted patient regarding referral. LMSW explained to patient reason for referral is to assist with community resources. Patient reports he currently resides with spouse. LMSW asked patient if he is independent. Patient reports he is independent with ADL's. Patient reports he ambulates with a wheelchair. Patient reports he has been requesting a new wheelchair but has not been successful. LMSW advised patient he will need to follow up with the wheelchair clinic in order to be evaluated for equipment. Patient reports he doesnt have transportation to and from appointments and his spouse will not bring him. LMSW explored reason why spouse will not provide transportation. Patient reports his spouse claims she is disable but believes shes  not. LMSW ask patient if spouse can assist him out of the car. Patient reports he knows how to maneuver in and out of a car just need someone to drive. Patient reports he no longer drives due to health. LMSW advised patient he will need to be seen in the clinic in order to have HH and evaluation for wheelchair. Patient report he doesn't have transportation. LMSW asked patient has he applied for transportation services though [employer]. Patient reports he is still awaiting answer, and this has been over several months. LMSW advised patient she will follow-up with [employer] to determine status of application. Patient voiced understanding. Patient also stating having issues with [employer] but not clear what the barriers are. LMSW will follow up with [employer] to determine if patient is still active.     Plan: follow up with [employer] and [employer]     LMSW contacted [employer]. [employer] reports no activity since 2011/ Rep advised SW patient will need to rectify

## 2020-12-28 ENCOUNTER — OUTPATIENT CASE MANAGEMENT (OUTPATIENT)
Dept: ADMINISTRATIVE | Facility: OTHER | Age: 60
End: 2020-12-28

## 2020-12-29 ENCOUNTER — TELEPHONE (OUTPATIENT)
Dept: INTERNAL MEDICINE | Facility: CLINIC | Age: 60
End: 2020-12-29

## 2020-12-29 NOTE — TELEPHONE ENCOUNTER
----- Message from Ivis Posada sent at 12/28/2020  2:59 PM CST -----  Who Called: TIERA MARTIN is the reqeust in detail: Patient states he does not have transportation  to get to appointments, and need to get his wheelchair. He would like advice and possible information on getting his chair. Please advise. Can the clinic reply by [facility]?: Kirsten Call Back Number: [phone]

## 2020-12-31 ENCOUNTER — TELEPHONE (OUTPATIENT)
Dept: INTERNAL MEDICINE | Facility: CLINIC | Age: 60
End: 2020-12-31

## 2021-01-02 ENCOUNTER — HOSPITAL ENCOUNTER (EMERGENCY)
Facility: OTHER | Age: 61
Discharge: HOME | End: 2021-01-02
Attending: EMERGENCY MEDICINE
Payer: MEDICARE

## 2021-01-02 VITALS
SYSTOLIC BLOOD PRESSURE: 114 MMHG | RESPIRATION RATE: 16 BRPM | DIASTOLIC BLOOD PRESSURE: 78 MMHG | TEMPERATURE: 99 F | HEART RATE: 93 BPM | OXYGEN SATURATION: 98 %

## 2021-01-02 DIAGNOSIS — E83.42 HYPOMAGNESEMIA: ICD-10-CM

## 2021-01-02 DIAGNOSIS — M25.572 CHRONIC PAIN OF LEFT ANKLE: ICD-10-CM

## 2021-01-02 DIAGNOSIS — R56.9 SEIZURE: ICD-10-CM

## 2021-01-02 DIAGNOSIS — G89.29 CHRONIC PAIN OF LEFT ANKLE: ICD-10-CM

## 2021-01-02 DIAGNOSIS — M62.838 MUSCLE SPASM: ICD-10-CM

## 2021-01-02 LAB
ALBUMIN SERPL BCP-MCNC: 3.6 G/DL (ref 3.5–5.2)
ALP SERPL-CCNC: 68 U/L (ref 55–135)
ALT SERPL W/O P-5'-P-CCNC: 31 U/L (ref 10–44)
ANION GAP SERPL CALC-SCNC: 18 MMOL/L (ref 8–16)
AST SERPL-CCNC: 38 U/L (ref 10–40)
BASOPHILS # BLD AUTO: 0.04 K/UL (ref 0–0.2)
BASOPHILS NFR BLD: 0.3 % (ref 0–1.9)
BILIRUB SERPL-MCNC: 0.5 MG/DL (ref 0.1–1)
BUN SERPL-MCNC: 31 MG/DL (ref 6–20)
CALCIUM SERPL-MCNC: 8.8 MG/DL (ref 8.7–10.5)
CHLORIDE SERPL-SCNC: 96 MMOL/L (ref 95–110)
CO2 SERPL-SCNC: 22 MMOL/L (ref 23–29)
CREAT SERPL-MCNC: 1.9 MG/DL (ref 0.5–1.4)
DIFFERENTIAL METHOD: ABNORMAL
EOSINOPHIL # BLD AUTO: 0.1 K/UL (ref 0–0.5)
EOSINOPHIL NFR BLD: 0.6 % (ref 0–8)
ERYTHROCYTE [DISTWIDTH] IN BLOOD BY AUTOMATED COUNT: 15.3 % (ref 11.5–14.5)
EST. GFR  (AFRICAN AMERICAN): 44 ML/MIN/1.73 M^2
EST. GFR  (NON AFRICAN AMERICAN): 38 ML/MIN/1.73 M^2
ETHANOL SERPL-MCNC: <10 MG/DL
GLUCOSE SERPL-MCNC: 120 MG/DL (ref 70–110)
HCT VFR BLD AUTO: 34 % (ref 40–54)
HGB BLD-MCNC: 11.2 G/DL (ref 14–18)
IMM GRANULOCYTES # BLD AUTO: 0.04 K/UL (ref 0–0.04)
IMM GRANULOCYTES NFR BLD AUTO: 0.3 % (ref 0–0.5)
LYMPHOCYTES # BLD AUTO: 2.3 K/UL (ref 1–4.8)
LYMPHOCYTES NFR BLD: 20.2 % (ref 18–48)
MAGNESIUM SERPL-MCNC: 1 MG/DL (ref 1.6–2.6)
MCH RBC QN AUTO: 35.3 PG (ref 27–31)
MCHC RBC AUTO-ENTMCNC: 32.9 G/DL (ref 32–36)
MCV RBC AUTO: 107 FL (ref 82–98)
MONOCYTES # BLD AUTO: 1 K/UL (ref 0.3–1)
MONOCYTES NFR BLD: 8.8 % (ref 4–15)
NEUTROPHILS # BLD AUTO: 8 K/UL (ref 1.8–7.7)
NEUTROPHILS NFR BLD: 69.8 % (ref 38–73)
NRBC BLD-RTO: 0 /100 WBC
PLATELET # BLD AUTO: 184 K/UL (ref 150–350)
PMV BLD AUTO: 11.5 FL (ref 9.2–12.9)
POTASSIUM SERPL-SCNC: 3.6 MMOL/L (ref 3.5–5.1)
PROT SERPL-MCNC: 8.7 G/DL (ref 6–8.4)
RBC # BLD AUTO: 3.17 M/UL (ref 4.6–6.2)
SODIUM SERPL-SCNC: 136 MMOL/L (ref 136–145)
WBC # BLD AUTO: 11.54 K/UL (ref 3.9–12.7)

## 2021-01-02 PROCEDURE — 83735 ASSAY OF MAGNESIUM: CPT | Mod: HCNC

## 2021-01-02 PROCEDURE — 96367 TX/PROPH/DG ADDL SEQ IV INF: CPT | Mod: HCNC

## 2021-01-02 PROCEDURE — 93005 ELECTROCARDIOGRAM TRACING: CPT | Mod: HCNC

## 2021-01-02 PROCEDURE — 25000003 PHARM REV CODE 250: Mod: HCNC | Performed by: EMERGENCY MEDICINE

## 2021-01-02 PROCEDURE — 96366 THER/PROPH/DIAG IV INF ADDON: CPT | Mod: HCNC

## 2021-01-02 PROCEDURE — 96361 HYDRATE IV INFUSION ADD-ON: CPT | Mod: HCNC

## 2021-01-02 PROCEDURE — 93010 ELECTROCARDIOGRAM REPORT: CPT | Mod: HCNC,,, | Performed by: INTERNAL MEDICINE

## 2021-01-02 PROCEDURE — 96365 THER/PROPH/DIAG IV INF INIT: CPT | Mod: HCNC

## 2021-01-02 PROCEDURE — 63600175 PHARM REV CODE 636 W HCPCS: Mod: HCNC | Performed by: EMERGENCY MEDICINE

## 2021-01-02 PROCEDURE — 93010 EKG 12-LEAD: ICD-10-PCS | Mod: HCNC,,, | Performed by: INTERNAL MEDICINE

## 2021-01-02 PROCEDURE — 99284 EMERGENCY DEPT VISIT MOD MDM: CPT | Mod: 25,HCNC

## 2021-01-02 PROCEDURE — 80053 COMPREHEN METABOLIC PANEL: CPT | Mod: HCNC

## 2021-01-02 PROCEDURE — 85025 COMPLETE CBC W/AUTO DIFF WBC: CPT | Mod: HCNC

## 2021-01-02 PROCEDURE — 82077 ASSAY SPEC XCP UR&BREATH IA: CPT | Mod: HCNC

## 2021-01-02 RX ORDER — DICLOFENAC SODIUM 10 MG/G
2 GEL TOPICAL 4 TIMES DAILY PRN
Qty: 100 G | Refills: 0 | Status: SHIPPED | OUTPATIENT
Start: 2021-01-02 | End: 2022-03-08 | Stop reason: SDUPTHER

## 2021-01-02 RX ORDER — LEVETIRACETAM 10 MG/ML
1000 INJECTION INTRAVASCULAR
Status: COMPLETED | OUTPATIENT
Start: 2021-01-02 | End: 2021-01-02

## 2021-01-02 RX ORDER — LANOLIN ALCOHOL/MO/W.PET/CERES
400 CREAM (GRAM) TOPICAL DAILY
Qty: 30 TABLET | Refills: 0 | Status: SHIPPED | OUTPATIENT
Start: 2021-01-02 | End: 2021-05-10 | Stop reason: SDUPTHER

## 2021-01-02 RX ORDER — MAGNESIUM SULFATE HEPTAHYDRATE 40 MG/ML
2 INJECTION, SOLUTION INTRAVENOUS
Status: COMPLETED | OUTPATIENT
Start: 2021-01-02 | End: 2021-01-02

## 2021-01-02 RX ORDER — DIAZEPAM 5 MG/1
5 TABLET ORAL EVERY 6 HOURS PRN
Qty: 20 TABLET | Refills: 0 | Status: SHIPPED | OUTPATIENT
Start: 2021-01-02 | End: 2022-02-23

## 2021-01-02 RX ORDER — LEVETIRACETAM 500 MG/1
500 TABLET ORAL 2 TIMES DAILY
Qty: 60 TABLET | Refills: 0 | Status: SHIPPED | OUTPATIENT
Start: 2021-01-02 | End: 2021-05-10 | Stop reason: SDUPTHER

## 2021-01-02 RX ORDER — DIAZEPAM 5 MG/1
5 TABLET ORAL
Status: COMPLETED | OUTPATIENT
Start: 2021-01-02 | End: 2021-01-02

## 2021-01-02 RX ADMIN — LEVETIRACETAM 1000 MG: 10 INJECTION INTRAVENOUS at 01:01

## 2021-01-02 RX ADMIN — SODIUM CHLORIDE 1000 ML: 0.9 INJECTION, SOLUTION INTRAVENOUS at 01:01

## 2021-01-02 RX ADMIN — SODIUM CHLORIDE 1000 ML: 0.9 INJECTION, SOLUTION INTRAVENOUS at 02:01

## 2021-01-02 RX ADMIN — DIAZEPAM 5 MG: 5 TABLET ORAL at 05:01

## 2021-01-02 RX ADMIN — MAGNESIUM SULFATE 2 G: 2 INJECTION INTRAVENOUS at 02:01

## 2021-01-14 ENCOUNTER — OUTPATIENT CASE MANAGEMENT (OUTPATIENT)
Dept: ADMINISTRATIVE | Facility: OTHER | Age: 61
End: 2021-01-14

## 2021-02-06 ENCOUNTER — TELEPHONE (OUTPATIENT)
Dept: INTERNAL MEDICINE | Facility: CLINIC | Age: 61
End: 2021-02-06

## 2021-02-10 ENCOUNTER — OUTPATIENT CASE MANAGEMENT (OUTPATIENT)
Dept: ADMINISTRATIVE | Facility: OTHER | Age: 61
End: 2021-02-10

## 2021-02-22 ENCOUNTER — TELEPHONE (OUTPATIENT)
Dept: PHYSICAL MEDICINE AND REHAB | Facility: CLINIC | Age: 61
End: 2021-02-22

## 2021-02-22 ENCOUNTER — TELEPHONE (OUTPATIENT)
Dept: INTERNAL MEDICINE | Facility: CLINIC | Age: 61
End: 2021-02-22

## 2021-02-24 ENCOUNTER — TELEPHONE (OUTPATIENT)
Dept: INTERNAL MEDICINE | Facility: CLINIC | Age: 61
End: 2021-02-24

## 2021-02-25 ENCOUNTER — TELEPHONE (OUTPATIENT)
Dept: PHYSICAL MEDICINE AND REHAB | Facility: CLINIC | Age: 61
End: 2021-02-25

## 2021-02-25 ENCOUNTER — TELEPHONE (OUTPATIENT)
Dept: INTERNAL MEDICINE | Facility: CLINIC | Age: 61
End: 2021-02-25

## 2021-03-30 NOTE — ED NOTES
Wife, [phone], Leeanne Johnson   Curettage Text: The wound bed was treated with curettage after the biopsy was performed. Anesthesia Volume In Cc: 0.5 Detail Level: Detailed Information: Selecting Yes will display possible errors in your note based on the variables you have selected. This validation is only offered as a suggestion for you. PLEASE NOTE THAT THE VALIDATION TEXT WILL BE REMOVED WHEN YOU FINALIZE YOUR NOTE. IF YOU WANT TO FAX A PRELIMINARY NOTE YOU WILL NEED TO TOGGLE THIS TO 'NO' IF YOU DO NOT WANT IT IN YOUR FAXED NOTE. Size Of Lesion In Cm: 0 Post-Care Instructions: I reviewed with the patient in detail post-care instructions. Patient is to keep the biopsy site dry overnight, and then apply bacitracin twice daily until healed. Patient may apply hydrogen peroxide soaks to remove any crusting. Electrodesiccation And Curettage Text: The wound bed was treated with electrodesiccation and curettage after the biopsy was performed. Hide Biopsy Depth?: No Biopsy Method: Dermablade Dressing: bandage Notification Instructions: Patient will be notified of biopsy results. However, patient instructed to call the office if not contacted within 2 weeks. Billing Type: Third-Party Bill Anesthesia Type: 1% lidocaine with epinephrine and a 1:10 solution of 8.4% sodium bicarbonate Type Of Destruction Used: Electrodesiccation Was A Bandage Applied: Yes Electrodesiccation Text: The wound bed was treated with electrodesiccation after the biopsy was performed. Biopsy Type: H and E Wound Care: Vaseline Depth Of Biopsy: dermis Consent: Written consent was obtained and risks were reviewed including but not limited to scarring, infection, bleeding, scabbing, incomplete removal, nerve damage and allergy to anesthesia. Hemostasis: Drysol and Electrocautery Cryotherapy Text: The wound bed was treated with cryotherapy after the biopsy was performed. Silver Nitrate Text: The wound bed was treated with silver nitrate after the biopsy was performed.

## 2021-04-01 NOTE — ASSESSMENT & PLAN NOTE
- ED EKG sinus tachycardia   - continue lopressor 50 mg BID   - patient is not anticoagulated   - monitor on tele      denies pain/discomfort

## 2021-04-26 ENCOUNTER — PATIENT OUTREACH (OUTPATIENT)
Dept: ADMINISTRATIVE | Facility: OTHER | Age: 61
End: 2021-04-26

## 2021-05-03 ENCOUNTER — NURSE TRIAGE (OUTPATIENT)
Dept: ADMINISTRATIVE | Facility: CLINIC | Age: 61
End: 2021-05-03

## 2021-06-22 ENCOUNTER — NURSE TRIAGE (OUTPATIENT)
Dept: ADMINISTRATIVE | Facility: CLINIC | Age: 61
End: 2021-06-22

## 2021-07-14 ENCOUNTER — TELEPHONE (OUTPATIENT)
Dept: PHYSICAL MEDICINE AND REHAB | Facility: CLINIC | Age: 61
End: 2021-07-14

## 2021-07-20 ENCOUNTER — PATIENT OUTREACH (OUTPATIENT)
Dept: ADMINISTRATIVE | Facility: OTHER | Age: 61
End: 2021-07-20

## 2021-08-09 DIAGNOSIS — E83.42 HYPOMAGNESEMIA: ICD-10-CM

## 2021-08-09 DIAGNOSIS — K21.9 GASTROESOPHAGEAL REFLUX DISEASE WITHOUT ESOPHAGITIS: ICD-10-CM

## 2021-08-09 DIAGNOSIS — G62.9 POLYNEUROPATHY: ICD-10-CM

## 2021-08-11 RX ORDER — PANTOPRAZOLE SODIUM 40 MG/1
40 TABLET, DELAYED RELEASE ORAL DAILY
Qty: 90 TABLET | Refills: 0 | Status: SHIPPED | OUTPATIENT
Start: 2021-08-11 | End: 2021-08-13 | Stop reason: SDUPTHER

## 2021-08-11 RX ORDER — LANOLIN ALCOHOL/MO/W.PET/CERES
400 CREAM (GRAM) TOPICAL DAILY
Qty: 90 TABLET | Refills: 0 | Status: SHIPPED | OUTPATIENT
Start: 2021-08-11 | End: 2021-08-13 | Stop reason: SDUPTHER

## 2021-08-11 RX ORDER — GABAPENTIN 400 MG/1
400 CAPSULE ORAL 3 TIMES DAILY
Qty: 90 CAPSULE | Refills: 0 | Status: SHIPPED | OUTPATIENT
Start: 2021-08-11 | End: 2021-08-13 | Stop reason: SDUPTHER

## 2021-08-13 DIAGNOSIS — K21.9 GASTROESOPHAGEAL REFLUX DISEASE WITHOUT ESOPHAGITIS: ICD-10-CM

## 2021-08-13 DIAGNOSIS — E87.6 ACUTE HYPOKALEMIA: ICD-10-CM

## 2021-08-13 DIAGNOSIS — E83.42 HYPOMAGNESEMIA: ICD-10-CM

## 2021-08-13 DIAGNOSIS — G62.9 POLYNEUROPATHY: ICD-10-CM

## 2021-08-13 RX ORDER — PANTOPRAZOLE SODIUM 40 MG/1
40 TABLET, DELAYED RELEASE ORAL DAILY
Qty: 90 TABLET | Refills: 0 | Status: SHIPPED | OUTPATIENT
Start: 2021-08-13 | End: 2021-10-04 | Stop reason: SDUPTHER

## 2021-08-13 RX ORDER — POTASSIUM CHLORIDE 20 MEQ/1
20 TABLET, EXTENDED RELEASE ORAL 2 TIMES DAILY
Qty: 30 TABLET | Refills: 3 | Status: SHIPPED | OUTPATIENT
Start: 2021-08-13 | End: 2021-09-24

## 2021-08-13 RX ORDER — GABAPENTIN 400 MG/1
400 CAPSULE ORAL 3 TIMES DAILY
Qty: 90 CAPSULE | Refills: 0 | Status: SHIPPED | OUTPATIENT
Start: 2021-08-13 | End: 2021-10-04 | Stop reason: SDUPTHER

## 2021-08-13 RX ORDER — LANOLIN ALCOHOL/MO/W.PET/CERES
400 CREAM (GRAM) TOPICAL DAILY
Qty: 90 TABLET | Refills: 0 | Status: SHIPPED | OUTPATIENT
Start: 2021-08-13 | End: 2021-10-04 | Stop reason: SDUPTHER

## 2021-08-17 DIAGNOSIS — I10 ESSENTIAL HYPERTENSION: ICD-10-CM

## 2021-08-17 DIAGNOSIS — R56.9 SEIZURE: ICD-10-CM

## 2021-08-17 DIAGNOSIS — E87.6 ACUTE HYPOKALEMIA: ICD-10-CM

## 2021-08-17 RX ORDER — LEVETIRACETAM 500 MG/1
500 TABLET ORAL 2 TIMES DAILY
Qty: 180 TABLET | Refills: 0 | OUTPATIENT
Start: 2021-08-17

## 2021-08-17 RX ORDER — POTASSIUM CHLORIDE 20 MEQ/1
20 TABLET, EXTENDED RELEASE ORAL 2 TIMES DAILY
Qty: 30 TABLET | Refills: 3 | OUTPATIENT
Start: 2021-08-17

## 2021-08-17 RX ORDER — METOPROLOL TARTRATE 50 MG/1
50 TABLET ORAL 2 TIMES DAILY
Qty: 180 TABLET | Refills: 0 | OUTPATIENT
Start: 2021-08-17

## 2021-09-23 DIAGNOSIS — E87.6 ACUTE HYPOKALEMIA: ICD-10-CM

## 2021-09-24 RX ORDER — POTASSIUM CHLORIDE 20 MEQ/1
TABLET, EXTENDED RELEASE ORAL
Qty: 120 TABLET | Refills: 0 | Status: SHIPPED | OUTPATIENT
Start: 2021-09-24 | End: 2022-02-21

## 2021-10-04 DIAGNOSIS — G62.9 POLYNEUROPATHY: ICD-10-CM

## 2021-10-04 DIAGNOSIS — R56.9 SEIZURE: ICD-10-CM

## 2021-10-04 DIAGNOSIS — I10 ESSENTIAL HYPERTENSION: ICD-10-CM

## 2021-10-04 DIAGNOSIS — K21.9 GASTROESOPHAGEAL REFLUX DISEASE WITHOUT ESOPHAGITIS: ICD-10-CM

## 2021-10-04 DIAGNOSIS — M10.9 GOUTY ARTHRITIS: ICD-10-CM

## 2021-10-04 DIAGNOSIS — E83.42 HYPOMAGNESEMIA: ICD-10-CM

## 2021-10-05 RX ORDER — METOPROLOL TARTRATE 50 MG/1
50 TABLET ORAL 2 TIMES DAILY
Qty: 60 TABLET | Refills: 0 | Status: SHIPPED | OUTPATIENT
Start: 2021-10-05 | End: 2021-12-07

## 2021-10-05 RX ORDER — PANTOPRAZOLE SODIUM 40 MG/1
40 TABLET, DELAYED RELEASE ORAL DAILY
Qty: 30 TABLET | Refills: 0 | Status: SHIPPED | OUTPATIENT
Start: 2021-10-05 | End: 2022-02-21

## 2021-10-05 RX ORDER — GABAPENTIN 400 MG/1
400 CAPSULE ORAL 3 TIMES DAILY
Qty: 60 CAPSULE | Refills: 0 | Status: SHIPPED | OUTPATIENT
Start: 2021-10-05 | End: 2022-02-21

## 2021-10-05 RX ORDER — ALLOPURINOL 300 MG/1
300 TABLET ORAL DAILY
Qty: 30 TABLET | Refills: 0 | Status: SHIPPED | OUTPATIENT
Start: 2021-10-05 | End: 2022-02-21

## 2021-10-05 RX ORDER — LEVETIRACETAM 500 MG/1
500 TABLET ORAL 2 TIMES DAILY
Qty: 60 TABLET | Refills: 0 | Status: SHIPPED | OUTPATIENT
Start: 2021-10-05 | End: 2021-12-07

## 2021-10-05 RX ORDER — LANOLIN ALCOHOL/MO/W.PET/CERES
400 CREAM (GRAM) TOPICAL DAILY
Qty: 30 TABLET | Refills: 0 | Status: SHIPPED | OUTPATIENT
Start: 2021-10-05 | End: 2022-02-21

## 2021-12-28 ENCOUNTER — TELEPHONE (OUTPATIENT)
Dept: INTERNAL MEDICINE | Facility: CLINIC | Age: 61
End: 2021-12-28
Payer: MEDICAID

## 2022-01-30 NOTE — ASSESSMENT & PLAN NOTE
-WBC 18 on admit without significant left shift  -Likely due to hemoconcentration and stress reaction from seizures  -He is afebrile with clear cxr and no signs of infection.  -Will check UA and procalcitonin  -Monitor closely.     No acute issue

## 2022-02-18 ENCOUNTER — TELEPHONE (OUTPATIENT)
Dept: INTERNAL MEDICINE | Facility: CLINIC | Age: 62
End: 2022-02-18
Payer: MEDICAID

## 2022-02-18 ENCOUNTER — PATIENT OUTREACH (OUTPATIENT)
Dept: ADMINISTRATIVE | Facility: HOSPITAL | Age: 62
End: 2022-02-18
Payer: MEDICAID

## 2022-02-18 DIAGNOSIS — M10.9 GOUTY ARTHRITIS: ICD-10-CM

## 2022-02-18 DIAGNOSIS — E87.6 ACUTE HYPOKALEMIA: ICD-10-CM

## 2022-02-18 DIAGNOSIS — G62.9 POLYNEUROPATHY: ICD-10-CM

## 2022-02-18 DIAGNOSIS — I10 ESSENTIAL HYPERTENSION: ICD-10-CM

## 2022-02-18 DIAGNOSIS — R56.9 SEIZURE: ICD-10-CM

## 2022-02-18 DIAGNOSIS — K21.9 GASTROESOPHAGEAL REFLUX DISEASE WITHOUT ESOPHAGITIS: ICD-10-CM

## 2022-02-18 DIAGNOSIS — E83.42 HYPOMAGNESEMIA: ICD-10-CM

## 2022-02-18 DIAGNOSIS — M25.572 CHRONIC PAIN OF LEFT ANKLE: ICD-10-CM

## 2022-02-18 DIAGNOSIS — G89.29 CHRONIC PAIN OF LEFT ANKLE: ICD-10-CM

## 2022-02-18 RX ORDER — MAG HYDROX/ALUMINUM HYD/SIMETH 200-200-20
30 SUSPENSION, ORAL (FINAL DOSE FORM) ORAL
Status: CANCELLED | COMMUNITY
Start: 2022-02-18 | End: 2023-02-18

## 2022-02-18 RX ORDER — LEVETIRACETAM 500 MG/1
500 TABLET ORAL 2 TIMES DAILY
Qty: 60 TABLET | Refills: 3 | Status: CANCELLED | OUTPATIENT
Start: 2022-02-18

## 2022-02-18 RX ORDER — ALLOPURINOL 300 MG/1
300 TABLET ORAL DAILY
Qty: 30 TABLET | Refills: 3 | Status: CANCELLED | OUTPATIENT
Start: 2022-02-18 | End: 2023-02-18

## 2022-02-18 RX ORDER — LANOLIN ALCOHOL/MO/W.PET/CERES
400 CREAM (GRAM) TOPICAL DAILY
Qty: 30 TABLET | Refills: 3 | Status: CANCELLED | OUTPATIENT
Start: 2022-02-18

## 2022-02-18 RX ORDER — GABAPENTIN 400 MG/1
400 CAPSULE ORAL 3 TIMES DAILY
Qty: 60 CAPSULE | Refills: 3 | Status: CANCELLED | OUTPATIENT
Start: 2022-02-18 | End: 2023-02-18

## 2022-02-18 RX ORDER — POTASSIUM CHLORIDE 20 MEQ/1
20 TABLET, EXTENDED RELEASE ORAL 2 TIMES DAILY
Qty: 120 TABLET | Refills: 3 | Status: CANCELLED | OUTPATIENT
Start: 2022-02-18

## 2022-02-18 RX ORDER — DICLOFENAC SODIUM 10 MG/G
2 GEL TOPICAL 4 TIMES DAILY PRN
Qty: 100 G | Refills: 3 | Status: CANCELLED | OUTPATIENT
Start: 2022-02-18

## 2022-02-18 RX ORDER — DIAZEPAM 5 MG/1
5 TABLET ORAL EVERY 6 HOURS PRN
Qty: 20 TABLET | Refills: 3 | Status: CANCELLED | OUTPATIENT
Start: 2022-02-18 | End: 2022-03-20

## 2022-02-18 RX ORDER — LANOLIN ALCOHOL/MO/W.PET/CERES
100 CREAM (GRAM) TOPICAL DAILY
Qty: 90 TABLET | Refills: 3 | Status: CANCELLED | OUTPATIENT
Start: 2022-02-18

## 2022-02-18 RX ORDER — PANTOPRAZOLE SODIUM 40 MG/1
40 TABLET, DELAYED RELEASE ORAL DAILY
Qty: 30 TABLET | Refills: 3 | Status: CANCELLED | OUTPATIENT
Start: 2022-02-18 | End: 2023-02-18

## 2022-02-18 RX ORDER — METOPROLOL TARTRATE 50 MG/1
50 TABLET ORAL 2 TIMES DAILY
Qty: 60 TABLET | Refills: 3 | Status: CANCELLED | OUTPATIENT
Start: 2022-02-18

## 2022-02-18 NOTE — TELEPHONE ENCOUNTER
Care Due:                  Date            Visit Type   Department     Provider  --------------------------------------------------------------------------------                                VIRTUAL      [facility] INTERNAL  Last Visit: 07-      AUDIO ONLY   MEDICINE       Marilu BULLOCK      [facility] INTERNAL  Next Visit: 02-      AUDIO ONLY   MEDICINE       Marilu Candelario                                                            Last  Test          Frequency    Reason                     Performed    Due Date  --------------------------------------------------------------------------------    CBC.........  12 months..  allopurinoL..............  01- 12-    CMP.........  12 months..  allopurinoL..............  01- 12-    Uric Acid...  12 months..  allopurinoL..............  Not Found    Overdue    Powered by [employer]. Reference number: [id number].   2/18/2022 2:10:37 PM CST

## 2022-02-18 NOTE — TELEPHONE ENCOUNTER
----- Message from Samara De Leon sent at 2/18/2022  1:20 PM CST -----  Contact: TIERA MARTIN JR. [[id number]]  Type: Call Back    Who called: TIERA MARTIN JR. [[id number]]    What is the request in detail: Patient is requesting a call back. She states that she would like to refill all the patients medication. She would like them sent over to his local pharmacy. He states that he would also like to discuss the medication he use for his asthma. Please advise.     Can the clinic reply by [facility]? No    Would the patient rather a call back or a response via [facility]? Call back     Best call back number: [phone] (mobile)    Additional Information: [id number] - [facility] [address] [facility] [address]

## 2022-02-18 NOTE — TELEPHONE ENCOUNTER
LVM for patient to return call to office. Patient LOV was 7/7/2020.Patient scheduled for medication refill on 2/23/2022.

## 2022-02-21 RX ORDER — GABAPENTIN 400 MG/1
CAPSULE ORAL
Qty: 60 CAPSULE | Refills: 0 | Status: SHIPPED | OUTPATIENT
Start: 2022-02-21 | End: 2022-02-23 | Stop reason: SDUPTHER

## 2022-02-21 RX ORDER — PANTOPRAZOLE SODIUM 40 MG/1
40 TABLET, DELAYED RELEASE ORAL DAILY
Qty: 30 TABLET | Refills: 0 | Status: SHIPPED | OUTPATIENT
Start: 2022-02-21 | End: 2022-06-07

## 2022-02-21 RX ORDER — ALLOPURINOL 300 MG/1
TABLET ORAL
Qty: 30 TABLET | Refills: 0 | Status: SHIPPED | OUTPATIENT
Start: 2022-02-21 | End: 2022-06-08

## 2022-02-21 RX ORDER — METOPROLOL TARTRATE 50 MG/1
TABLET ORAL
Qty: 60 TABLET | Refills: 0 | Status: SHIPPED | OUTPATIENT
Start: 2022-02-21 | End: 2022-06-08

## 2022-02-21 RX ORDER — LANOLIN ALCOHOL/MO/W.PET/CERES
CREAM (GRAM) TOPICAL
Qty: 30 TABLET | Refills: 0 | Status: SHIPPED | OUTPATIENT
Start: 2022-02-21 | End: 2023-06-06 | Stop reason: SDUPTHER

## 2022-02-21 RX ORDER — POTASSIUM CHLORIDE 20 MEQ/1
TABLET, EXTENDED RELEASE ORAL
Qty: 120 TABLET | Refills: 0 | Status: SHIPPED | OUTPATIENT
Start: 2022-02-21 | End: 2022-07-10

## 2022-02-21 RX ORDER — LEVETIRACETAM 500 MG/1
TABLET ORAL
Qty: 60 TABLET | Refills: 0 | Status: SHIPPED | OUTPATIENT
Start: 2022-02-21 | End: 2022-06-08

## 2022-02-22 ENCOUNTER — TELEPHONE (OUTPATIENT)
Dept: INTERNAL MEDICINE | Facility: CLINIC | Age: 62
End: 2022-02-22
Payer: MEDICAID

## 2022-02-22 NOTE — TELEPHONE ENCOUNTER
----- Message from Aida Paiz sent at 2/22/2022  4:04 PM CST -----  Regarding: pharmacy update  Name of Who is Calling: TIERA MARTIN JR. [[id number]]      What is the request in detail: Patient is requesting all of his medication be sent over to [facility] pharmacy [phone] asap      Can the clinic reply by [facility]:no       What Number to Call Back if not in [facility]: [phone]

## 2022-02-22 NOTE — TELEPHONE ENCOUNTER
He has an audio only visit with me on Wednesday.  Please change the appt length to be 30 minutes long.  Thanks!

## 2022-02-22 NOTE — TELEPHONE ENCOUNTER
Called pt to clarify if he needs just the meds refilled from yesterday [facility] or more than that. No answer, left VM. Will need fax # for [id number] also to get this done easier if possible.  Also left on VM that pt can request this be done at appt tomorrow as well.

## 2022-02-23 ENCOUNTER — OFFICE VISIT (OUTPATIENT)
Dept: INTERNAL MEDICINE | Facility: CLINIC | Age: 62
End: 2022-02-23
Payer: MEDICARE

## 2022-02-23 ENCOUNTER — TELEPHONE (OUTPATIENT)
Dept: ORTHOPEDICS | Facility: CLINIC | Age: 62
End: 2022-02-23
Payer: MEDICAID

## 2022-02-23 DIAGNOSIS — R56.9 SEIZURE: ICD-10-CM

## 2022-02-23 DIAGNOSIS — E87.6 ACUTE HYPOKALEMIA: ICD-10-CM

## 2022-02-23 DIAGNOSIS — D53.9 MACROCYTIC ANEMIA: ICD-10-CM

## 2022-02-23 DIAGNOSIS — I48.0 PAROXYSMAL ATRIAL FIBRILLATION: ICD-10-CM

## 2022-02-23 DIAGNOSIS — N18.30 STAGE 3 CHRONIC KIDNEY DISEASE, UNSPECIFIED WHETHER STAGE 3A OR 3B CKD: ICD-10-CM

## 2022-02-23 DIAGNOSIS — I10 ESSENTIAL HYPERTENSION: ICD-10-CM

## 2022-02-23 DIAGNOSIS — G62.9 POLYNEUROPATHY: ICD-10-CM

## 2022-02-23 DIAGNOSIS — E78.2 MIXED HYPERLIPIDEMIA: ICD-10-CM

## 2022-02-23 DIAGNOSIS — N18.9 CHRONIC KIDNEY DISEASE, UNSPECIFIED CKD STAGE: ICD-10-CM

## 2022-02-23 DIAGNOSIS — E83.42 HYPOMAGNESEMIA: ICD-10-CM

## 2022-02-23 DIAGNOSIS — G81.10 SPASTIC HEMIPLEGIA, UNSPECIFIED ETIOLOGY, UNSPECIFIED LATERALITY: Primary | ICD-10-CM

## 2022-02-23 DIAGNOSIS — Z74.8 ASSISTANCE NEEDED WITH TRANSPORTATION: ICD-10-CM

## 2022-02-23 DIAGNOSIS — M10.9 GOUTY ARTHRITIS: ICD-10-CM

## 2022-02-23 PROCEDURE — 1159F MED LIST DOCD IN RCRD: CPT | Mod: CPTII,95,, | Performed by: INTERNAL MEDICINE

## 2022-02-23 PROCEDURE — 1160F PR REVIEW ALL MEDS BY PRESCRIBER/CLIN PHARMACIST DOCUMENTED: ICD-10-PCS | Mod: CPTII,95,, | Performed by: INTERNAL MEDICINE

## 2022-02-23 PROCEDURE — 99443 PR PHYSICIAN TELEPHONE EVALUATION 21-30 MIN: CPT | Mod: 95,,, | Performed by: INTERNAL MEDICINE

## 2022-02-23 PROCEDURE — 1159F PR MEDICATION LIST DOCUMENTED IN MEDICAL RECORD: ICD-10-PCS | Mod: CPTII,95,, | Performed by: INTERNAL MEDICINE

## 2022-02-23 PROCEDURE — 99443 PR PHYSICIAN TELEPHONE EVALUATION 21-30 MIN: ICD-10-PCS | Mod: 95,,, | Performed by: INTERNAL MEDICINE

## 2022-02-23 PROCEDURE — 1160F RVW MEDS BY RX/DR IN RCRD: CPT | Mod: CPTII,95,, | Performed by: INTERNAL MEDICINE

## 2022-02-23 RX ORDER — GABAPENTIN 400 MG/1
400 CAPSULE ORAL 2 TIMES DAILY
Qty: 60 CAPSULE | Refills: 3 | Status: SHIPPED | OUTPATIENT
Start: 2022-02-23 | End: 2022-05-11

## 2022-02-23 NOTE — PATIENT INSTRUCTIONS
"Thank you for your message. We have been getting a lot of questions like yours.     [facility] is offering COVID-19 vaccinations in accordance to the recommendations of the [facility].  [facility] recommends scheduling your COVID Vaccine via [employer] by following the directions outlined below.      To Schedule your vaccine on the [employer] website:  Choose "Visits" then "Schedule an Appointment" and select the visit tile titled "COVID-19 Vaccine."    To Schedule your vaccine on the [employer] sonia:  Choose "Appointments" then "Schedule an Appointment" then "Tell us why you're coming in" and select the visit tile titled "COVID Vaccine"      Patients may also call [phone] if they do not have a [facility] account.    More times and dates will become available as we receive more vaccine on a weekly basis. We encourage you to continue to check [facility] as more slots become available and appreciate your patience during this process.    Due to high activity, the [facility] website and COVID hotline may experience a slowdown or long wait times. We sincerely apologize for the inconvenience and appreciate your patience as you try and schedule your vaccination.    We are hopeful that the vaccine will be available to more members of the public soon. We encourage community members and patients to visit [email or web address] or ldh.[place].gov/[email or web address] or covidvaccine.[place].gov for the latest information and resources.     "

## 2022-02-23 NOTE — PROGRESS NOTES
Audio Only Telehealth Visit     The patient location is: [place]  The chief complaint leading to consultation is: Pain    Visit type: Virtual visit with audio only (telephone)     The reason for the audio only service rather than synchronous audio and video virtual visit was related to technical difficulties or patient preference/necessity.     Each patient to whom I provide medical services by telemedicine is:  (1) informed of the relationship between the physician and patient and the respective role of any other health care provider with respect to management of the patient; and (2) notified that they may decline to receive medical services by telemedicine and may withdraw from such care at any time. Patient verbally consented to receive this service via voice-only telephone call.    This service was not originating from a related E/M service provided within the previous 7 days nor will  to an E/M service or procedure within the next 24 hours or my soonest available appointment.  Prevailing standard of care was able to be met in this audio-only visit.      Subjective:       Patient ID: Michael Johnson Jr. is a 61 y.o. 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    Chief Complaint: Pain     History was obtained from the patient and supplemented through chart review and from his wife on the  "phone.  -Reviewed admission for seizure.    His wife is also my patient, Leeanne Johnson.    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  Gradually able to walk.  Can transfer himself out of a wheelchair.  Bathes in bed.  Has bedside commode.  Cannot prepare his own meals.    HPI    Has not been seen since 2020.  States that if he had his way, he would not be seen at all. Difficulty with transportation. Wife states that they have to call an ambulance to get to appts. Per SW, has unlimited transportation with [employer], but no one to accompany him. Consider Medicaid Waiver for PCA.  Family declined lift. Was referred to PM&R for mobility eval, motorized wheelchair. States that he is unable to stand.    Chronic left ankle pain:   History of multiple ankle surgeries/fusion x 6.  Had HH, but discharged d/t reaching max potential.  Started topical Voltaren w/o relief; caused itching.   Used to be on a narcotic, but was discontinued d/t concern for addiction.  States that he has seen Ortho several times throughout the years.     He c/o chronic pain. Takes gabapentin BID. Reports sedation; he is unsure of this since he often lies in bed.      Polyneuropathy:    Debility as above. Reports excruciating pain starting in his toes, involving his b/l feet traveling up to knees and hands.  Can't make a fist. No pain involving his hips and elbows.  Pain is constant.  Pain is worse with standing.  Pain can be so bad that it causes numbness.     Also reports very brief excruciating muscle spasms "all over" every few minutes.  Even occurs at night.  Occurs regardless if he stretches his legs, lying down or seated.  No muscle tightness.  No recent sz or LOC. Is compliant with Keppra.  No bowel, bladder incontinence.  Has chronic back pain from laying down     On gabapentin as above.  Lab Results   Component Value Date    HGBA1C 4.2 01/09/2018     Lab Results   Component Value Date    [id number] 456 11/29/2019     HTN: "    Used to be on diltiazem in the past.     Unclear of med compliance. Is supposed to be on Lopressor 50 b.i.d. +A fib.  Tolerating meds well.   Home BP:      FHx: yes  Tobacco: sometimes  ETOH: yes  Exercise: in a wheelchair.     Paroxysmal A fib:  H/o med noncompliance with metoprolol, diltiazem.  Admission  for seizure. HR 200s (Presumed SVT).  Received adenosine and was then in AFib with RVR that did not respond to diltiazem.  Then received amiodarone infusion.  AFib with RVR was thought to be due to dehydration and electrolyte abnormalities.  Resumed Lopressor 50 BID.  Chads Vasc score 1.  Cardiology stopped aspirin.    Lab Results   Component Value Date    TSH 2.159 12/23/2019    FREET4 1.00 10/20/2014     Hypokalemia , hypomagnesemia:  Is unable to prepare his own meals.  Eats ramen noodles.  Reports chronic little PO intake. Drinks Ensure to avoid going to the bathroom d/t decreased mobility.  No diarrhea.  Unclear if he has been taking KCl, Mg consistently.    HLD:   Not on statin.  Lab Results   Component Value Date    LDLCALC 63.6 01/10/2018     The ASCVD Risk score (Wolverine DC Jr., et al., 2013) failed to calculate for the following reasons:    The systolic blood pressure is missing    Cannot find a previous HDL lab    Cannot find a previous total cholesterol lab    CKD 3:  Check for proteinuria.  Not on ACE/ARB.  Renal ultrasound: normal  Missed appt with Nephrology.  Lab Results   Component Value Date    CREATININE 1.9 (H) 01/02/2021    BUN 31 (H) 01/02/2021     01/02/2021    K 3.6 01/02/2021    CL 96 01/02/2021    CO2 22 (L) 01/02/2021     Lab Results   Component Value Date    CALCIUM 8.8 01/02/2021    PHOS 1.7 (L) 11/29/2019     Lab Results   Component Value Date    PTH 25.5 02/04/2020    CALCIUM 8.8 01/02/2021    PHOS 1.7 (L) 11/29/2019     H/o Gout:  On allopurinol 300.  Lab Results   Component Value Date    URICACID 10.0 (H) 11/30/2019     Macrocytic anemia, AOCD:   Long history of  anemia.  Hemoglobin dropped to 6.6, thought to be due to hemodilution. Did receive 1 unit PRBCs. Increased iron to b.i.d.. FOBT negative.  Not on multivitamin or folic acid.  [facility], but no report.  Lab Results   Component Value Date    IRON 16 (L) 11/29/2019    TIBC 229 (L) 11/29/2019    FERRITIN 1,143 (H) 11/29/2019     Lab Results   Component Value Date    CYDIGLIK94 456 11/29/2019     Lab Results   Component Value Date    FOLATE 19.5 11/30/2019     Seizure:  Onset as an adult.  Admission  for seizure activity.    History of med noncompliance, but had also stopped drinking.  CT head without acute changes.  Resumed home Keppra  500 b.i.d..  No seizures since then.  Does not follow with Neuro.      Admitted  for seizure.  CT head without acute abnormality.  EEG without epileptiform discharges or seizures.  K, Mg were replaced.  Unclear if seizure was due to medication noncompliance or ETOH withdrawal.  Started folic acid, thiamine.  Recommended outpatient MRI with and without epilepsy protocol, neurology follow-up.                 Not addressed today.  Tobacco use:    Occasionally smokes a pipe. No cocaine use.  Counseled for 5 min on tobacco cessation.     Alcohol abuse:  Drinks 2-4 coffee mugs of vodka cocktail daily.  Last drink this AM.  States that he is currently intoxicated. Also drank yesterday d/t death in the family.  States that he drinks socially, not 1-2 shots/day.  Denies daily drinking. No longer on folate, MVI.  He states that he drinks socially, but unreliable historian. Counseled on cessation. Rec MVI daily OTC. Referred to Addiction Psych.     Vitamin D deficiency: completed course of Ergocalciferol.   Unable to recheck lab d/t tube shortage.  Lab Results   Component Value Date    [id number] 32 02/04/2020     GERD:  +nausea.   Started on Protonix.   Cont PPI. Referred to GI.    Review of Systems   Constitutional: Negative for  activity change and appetite change.   Musculoskeletal: Positive for arthralgias and gait problem.       I personally reviewed Past Medical History, Past Surgical History, Social History, and Family History.    Objective:      There were no vitals filed for this visit.   Physical Exam  Pulmonary:      Effort: No respiratory distress.           Lab Results   Component Value Date    WBC 11.54 01/02/2021    HGB 11.2 (L) 01/02/2021    HCT 34.0 (L) 01/02/2021     01/02/2021    CHOL 144 01/10/2018    TRIG 92 01/10/2018    HDL 62 01/10/2018    ALT 31 01/02/2021    AST 38 01/02/2021     01/02/2021    K 3.6 01/02/2021    CL 96 01/02/2021    CREATININE 1.9 (H) 01/02/2021    BUN 31 (H) 01/02/2021    CO2 22 (L) 01/02/2021    TSH 2.159 12/23/2019    PSA 0.27 02/05/2013    INR 1.0 11/29/2019    HGBA1C 4.2 01/09/2018       The ASCVD Risk score (Len CRISTAL Jr., et al., 2013) failed to calculate for the following reasons:    The systolic blood pressure is missing    Cannot find a previous HDL lab    Cannot find a previous total cholesterol lab    (Imaging have been independently reviewed)  CT head without acute abnormality.  Stable cerebral atrophy, chronic small-vessel ischemic changes.    Assessment:       1. Spastic hemiplegia, unspecified etiology, unspecified laterality    2. Polyneuropathy    3. Assistance needed with transportation    4. Essential hypertension    5. Paroxysmal atrial fibrillation    6. Acute hypokalemia    7. Hypomagnesemia    8. Mixed hyperlipidemia    9. Stage 3 chronic kidney disease, unspecified whether stage 3a or 3b CKD    10. Gouty arthritis    11. Macrocytic anemia    12. Seizure          Plan:       Michael was seen today for pain.    Diagnoses and all orders for this visit:    Spastic hemiplegia, unspecified etiology, unspecified laterality  Comments:  Persistent. No controlled substances given h/o ETOH abuse. Unable to tolerate voltaren gel. Resched PM&R, Pain clinic. Consider Ortho d/t  H/o multiple sx.   Orders:  -     Ambulatory referral/consult to Pain Clinic; Future  -     Ambulatory referral/consult to Outpatient Case Management  -     gabapentin (NEURONTIN) 400 MG capsule; Take 1 capsule (400 mg total) by mouth 2 (two) times daily.  -     Ambulatory referral/consult to Physical Medicine Rehab; Future  -     Ambulatory referral/consult to [facility]; Future    Polyneuropathy  Comments:  Cont gabapentin BID. Unable to titrate d/t sedation. Referrals as above.  Orders:  -     Hemoglobin A1C; Future  -     gabapentin (NEURONTIN) 400 MG capsule; Take 1 capsule (400 mg total) by mouth 2 (two) times daily.  -     Ambulatory referral/consult to [facility]; Future    Assistance needed with transportation  Comments:  Refer to CM to help with resources, [facility].  Orders:  -     Ambulatory referral/consult to Outpatient Case Management    Essential hypertension  Comments:  Unknown home BP.  Continue Lopressor 50 BID d/t A fib. Likely has hypoK d/t ETOH abuse. Consider checking aldosterone/renin.  Orders:  -     Comprehensive Metabolic Panel; Future  -     Ambulatory referral/consult to [facility]; Future    Paroxysmal atrial fibrillation  Comments:  Cont Lopessor 50 BID. CHADSVASC 1, so no ASA, NOAC needed. Will need to reschedule f/u c Cards.    Acute hypokalemia  Comments:  D/t ETOH, decreased PO d/t decreased mobility. Unclear meds. Recheck lytes.  Orders:  -     Comprehensive Metabolic Panel; Future  -     Magnesium; Future    Hypomagnesemia  Comments:  As above.  Orders:  -     Comprehensive Metabolic Panel; Future  -     Magnesium; Future    Mixed hyperlipidemia  Comments:  Not on statin. Repeat FLP.  Orders:  -     Hemoglobin A1C; Future  -     Lipid Panel; Future    Stage 3 chronic kidney disease, unspecified whether stage 3a or 3b CKD  Comments:  Will need to reschedule c Nephrology. Monitor CMP, lytes.  Orders:  -     Comprehensive  Metabolic Panel; Future  -     Uric Acid; Future  -     Phosphorus; Future  -     PTH, Intact; Future  -     Protein/Creatinine Ratio, Urine; Future    Gouty arthritis  Comments:  Continue allopurinol. Recheck level. Referred to Nephrology.  Orders:  -     Uric Acid; Future    Macrocytic anemia  Comments:  Likely d/t ETOH abuse. Recheck CBC, vitamin levels.  Orders:  -     CBC Auto Differential; Future  -     Ferritin; Future  -     Iron and TIBC; Future  -     Vitamin B12; Future  -     Folate; Future    Seizure  Comments:  Continue Keppra. Will need to resched f/u c Neurology.   Orders:  -     Hemoglobin A1C; Future         Side effects of medication(s) were discussed in detail and patient voiced understanding.  Patient will call back for any issues or complications.     RTC in 1 month(s) or sooner PRN for HTN, lab results, coordination of care.  In person. 30 minute visit d/t many comorbidities.

## 2022-02-23 NOTE — TELEPHONE ENCOUNTER
2/23/22 [facility] for pt to call the referrals department to schedule appt with Pain clinic-

## 2022-03-02 ENCOUNTER — TELEPHONE (OUTPATIENT)
Dept: INTERNAL MEDICINE | Facility: CLINIC | Age: 62
End: 2022-03-02
Payer: MEDICAID

## 2022-03-02 ENCOUNTER — PATIENT OUTREACH (OUTPATIENT)
Dept: ADMINISTRATIVE | Facility: HOSPITAL | Age: 62
End: 2022-03-02
Payer: MEDICAID

## 2022-03-02 NOTE — TELEPHONE ENCOUNTER
----- Message from [facility] sent at 3/2/2022 11:00 AM CST -----  Caller is requesting a sooner appointment.  Caller declined first available appointment listed below.  Caller will not accept being placed on the waitlist and is requesting a message be sent to doctor.    Name of Caller: TIERA MARTIN JR.    When is the first available appointment? 05/17/2022    Symptoms: Annual and pain in hand, knee, and leg    Best Call Back Number: [phone]    Additional Information:

## 2022-03-02 NOTE — TELEPHONE ENCOUNTER
Returned pt's call.  Pt states he has had pain to his L foot, L Knee, and L hand x a few weeks, but worse last 3 days. Pt states it's swollen and doesn't feel like gout, but is throbbing. Inquired for pt to check capilary refill, he states it's quick and his nail beds are pink. He doesn't feel it's circulation issues as much as joint issues. Denies fever. Made appt tomorrow am and gave ER prompts.

## 2022-03-03 ENCOUNTER — PES CALL (OUTPATIENT)
Dept: ADMINISTRATIVE | Facility: CLINIC | Age: 62
End: 2022-03-03
Payer: MEDICAID

## 2022-03-04 ENCOUNTER — PES CALL (OUTPATIENT)
Dept: ADMINISTRATIVE | Facility: CLINIC | Age: 62
End: 2022-03-04
Payer: MEDICAID

## 2022-03-07 ENCOUNTER — OFFICE VISIT (OUTPATIENT)
Dept: HOME HEALTH SERVICES | Facility: CLINIC | Age: 62
End: 2022-03-07
Payer: MEDICARE

## 2022-03-07 DIAGNOSIS — G89.29 CHRONIC PAIN OF LEFT ANKLE: Primary | ICD-10-CM

## 2022-03-07 DIAGNOSIS — G62.9 POLYNEUROPATHY: ICD-10-CM

## 2022-03-07 DIAGNOSIS — E83.42 HYPOMAGNESEMIA: ICD-10-CM

## 2022-03-07 DIAGNOSIS — E46 MALNUTRITION, UNSPECIFIED TYPE: ICD-10-CM

## 2022-03-07 DIAGNOSIS — M62.838 MUSCLE SPASM: ICD-10-CM

## 2022-03-07 DIAGNOSIS — G81.10 SPASTIC HEMIPLEGIA, UNSPECIFIED ETIOLOGY, UNSPECIFIED LATERALITY: ICD-10-CM

## 2022-03-07 DIAGNOSIS — Z91.81 HISTORY OF FALL: ICD-10-CM

## 2022-03-07 DIAGNOSIS — M25.572 CHRONIC PAIN OF LEFT ANKLE: Primary | ICD-10-CM

## 2022-03-07 DIAGNOSIS — R53.81 DEBILITY: ICD-10-CM

## 2022-03-07 DIAGNOSIS — K21.9 GASTROESOPHAGEAL REFLUX DISEASE WITHOUT ESOPHAGITIS: ICD-10-CM

## 2022-03-07 DIAGNOSIS — I10 ESSENTIAL HYPERTENSION: ICD-10-CM

## 2022-03-07 DIAGNOSIS — E87.6 ACUTE HYPOKALEMIA: ICD-10-CM

## 2022-03-07 DIAGNOSIS — M10.9 GOUTY ARTHRITIS: ICD-10-CM

## 2022-03-07 DIAGNOSIS — N18.30 STAGE 3 CHRONIC KIDNEY DISEASE, UNSPECIFIED WHETHER STAGE 3A OR 3B CKD: ICD-10-CM

## 2022-03-07 DIAGNOSIS — F10.10 ALCOHOL ABUSE: ICD-10-CM

## 2022-03-07 DIAGNOSIS — I48.0 PAROXYSMAL ATRIAL FIBRILLATION: ICD-10-CM

## 2022-03-07 DIAGNOSIS — R56.9 SEIZURE: ICD-10-CM

## 2022-03-07 DIAGNOSIS — F32.A DEPRESSION, UNSPECIFIED DEPRESSION TYPE: ICD-10-CM

## 2022-03-07 PROCEDURE — 3079F PR MOST RECENT DIASTOLIC BLOOD PRESSURE 80-89 MM HG: ICD-10-PCS | Mod: CPTII,S$GLB,, | Performed by: NURSE PRACTITIONER

## 2022-03-07 PROCEDURE — 99497 ADVNCD CARE PLAN 30 MIN: CPT | Mod: S$GLB,,, | Performed by: NURSE PRACTITIONER

## 2022-03-07 PROCEDURE — 3077F PR MOST RECENT SYSTOLIC BLOOD PRESSURE >= 140 MM HG: ICD-10-PCS | Mod: CPTII,S$GLB,, | Performed by: NURSE PRACTITIONER

## 2022-03-07 PROCEDURE — 3077F SYST BP >= 140 MM HG: CPT | Mod: CPTII,S$GLB,, | Performed by: NURSE PRACTITIONER

## 2022-03-07 PROCEDURE — 99350 HOME/RES VST EST HIGH MDM 60: CPT | Mod: S$GLB,,, | Performed by: NURSE PRACTITIONER

## 2022-03-07 PROCEDURE — 3079F DIAST BP 80-89 MM HG: CPT | Mod: CPTII,S$GLB,, | Performed by: NURSE PRACTITIONER

## 2022-03-07 PROCEDURE — 99499 RISK ADDL DX/OHS AUDIT: ICD-10-PCS | Mod: S$GLB,,, | Performed by: NURSE PRACTITIONER

## 2022-03-07 PROCEDURE — 99350 PR HOME VISIT,ESTAB PATIENT,LEVEL IV: ICD-10-PCS | Mod: S$GLB,,, | Performed by: NURSE PRACTITIONER

## 2022-03-07 PROCEDURE — 99497 PR ADVNCD CARE PLAN 30 MIN: ICD-10-PCS | Mod: S$GLB,,, | Performed by: NURSE PRACTITIONER

## 2022-03-07 PROCEDURE — 99499 UNLISTED E&M SERVICE: CPT | Mod: S$GLB,,, | Performed by: NURSE PRACTITIONER

## 2022-03-08 VITALS
DIASTOLIC BLOOD PRESSURE: 82 MMHG | SYSTOLIC BLOOD PRESSURE: 141 MMHG | OXYGEN SATURATION: 98 % | RESPIRATION RATE: 18 BRPM | TEMPERATURE: 98 F | HEART RATE: 111 BPM

## 2022-03-08 PROBLEM — Z91.81 HISTORY OF FALL: Status: ACTIVE | Noted: 2022-03-08

## 2022-03-08 PROBLEM — E46 MALNUTRITION: Status: ACTIVE | Noted: 2022-03-08

## 2022-03-08 PROBLEM — F32.A DEPRESSION: Status: ACTIVE | Noted: 2022-03-08

## 2022-03-08 RX ORDER — FOLIC ACID 1 MG/1
1 TABLET ORAL DAILY
Qty: 90 TABLET | Refills: 2 | Status: SHIPPED | OUTPATIENT
Start: 2022-03-08 | End: 2023-09-08 | Stop reason: SDUPTHER

## 2022-03-08 RX ORDER — ESCITALOPRAM OXALATE 10 MG/1
10 TABLET ORAL DAILY
Qty: 30 TABLET | Refills: 2 | Status: SHIPPED | OUTPATIENT
Start: 2022-03-08 | End: 2022-07-13

## 2022-03-08 RX ORDER — DICLOFENAC SODIUM 10 MG/G
2 GEL TOPICAL 4 TIMES DAILY PRN
Qty: 100 G | Refills: 1 | Status: SHIPPED | OUTPATIENT
Start: 2022-03-08 | End: 2023-09-08 | Stop reason: SDUPTHER

## 2022-03-08 RX ORDER — METHOCARBAMOL 500 MG/1
500 TABLET, FILM COATED ORAL 3 TIMES DAILY PRN
Qty: 30 TABLET | Refills: 2 | Status: SHIPPED | OUTPATIENT
Start: 2022-03-08 | End: 2023-09-08 | Stop reason: SDUPTHER

## 2022-03-08 RX ORDER — LANOLIN ALCOHOL/MO/W.PET/CERES
100 CREAM (GRAM) TOPICAL DAILY
Qty: 90 TABLET | Refills: 2 | Status: SHIPPED | OUTPATIENT
Start: 2022-03-08 | End: 2022-07-13

## 2022-03-09 NOTE — PROGRESS NOTES
"[facility]  Medical Home Visit    Visit Date: 3/7/2022  Encounter Provider: Myranda Culp NP  PCP:  Marilu Candelario MD    Subjective:      Patient ID: Michael Johnson Jr. is a 61 y.o. male.    Consult Requested By:  Dr. Marilu Candelario  Reason for Consult: Medical Visit by Home Care Provider    The patient is being seen at home due to a physical debility that presents a taxing effort to leave the home, to mitigate high risk of hospital readmission or due to the limited availability of reliable or safe options for transportation to the point of access to the provider. The visit meets the criteria for medical necessity as defined by CMS as "health-care services needed to prevent, diagnose, or treat an illness, injury, condition, disease, or its symptoms and that meet accepted standards of medicine." Prior to treatment on this visit the chart was reviewed and patient consent was obtained.    Chief Complaint: Follow-up for chronic medical conditions and medication review    HPI: Michael Johnson Jr. is a 61 y.o. male who  has a past medical history of Afib, Alcohol abuse with other alcohol-induced disorder, Anemia, Ankle fracture, left, Arthritis, Asthma, Cholecystitis, Chronic kidney disease, Depression, Erectile dysfunction, Gout, Hypertension, Hypertensive chronic kidney disease, Hypomagnesemia, Noncompliance, Peripheral neuropathy, Seizure.  He follows with PCP Dr. Candelario but has not seen recently due to debility, difficulty leaving home.       Today:  Mr. Michael Johnson Jr. is a 61 y.o. male. Michael presents with reports of depressions, denies SI/HI.  He reports, he sleeps a lot, withdrawn.  He denies current alcohol use. He lives with spouse who assist with care.  He requires assistance with bathing, toileting, dressing.  He is continent of bladder and bowel.  VSS. He has not taken medications today.  BP elevated.  Discussed medication compliance.  Reports fair appetite, takes ensure supplement.  He denies " chest pain, SOB, fever, chills, cough, congestion. He is is on supplements for hypokalemia and hypomagnesemia.  He has not had labs in over one year. Risks of environmental exposure to coronavirus discussed including: social distancing, hand hygiene, and limiting departures from the home for necessities only.  Reports understanding and willingness to comply.      Attestation: Screening criteria to assess the level of the patient's risk for infection with COVID-19 as recommended by the CDC at the time of the above documented home visit concluded appropriateness to proceed. Universal precautions were maintained at all times, including provider use of >60% alcohol gel hand  immediately prior to entry and upon departing the patient's home as well as cleaning of equipment used in home visit with antibacterial/germicidal disposable wipes.     Review of Systems   Constitutional: Negative for chills and fatigue.   HENT: Negative for congestion, postnasal drip and rhinorrhea.    Eyes: Negative for visual disturbance.   Respiratory: Negative for chest tightness, shortness of breath and wheezing.    Cardiovascular: Negative for chest pain and leg swelling.   Gastrointestinal: Negative for abdominal pain, constipation, diarrhea, nausea and vomiting.   Genitourinary: Negative for difficulty urinating.   Musculoskeletal: Positive for gait problem.   Skin: Negative for color change and wound.   Neurological: Positive for weakness. Negative for dizziness.   Psychiatric/Behavioral: Negative for agitation.       Assessments:  · Environmental: single story home, no steps to enter, adequate lighting and temperature control  · Functional Status: Assistance with ADL's/IADL's, wheelchair bound, continent of bowel and bladder  · Safety: Fall Precautions, COVID Precautions/Social Distancing/Mask Use  · Nutritional: fair  · Home Health: n/a  · DME/Supplies: BSC, WC, scooter, crutches      Ethical / Legal: Advance Care Planning    Capacity to make medical decisions: Yes, Conflict No  · Surrogate decision maker:  Name Leeanne, Relationship: spouse  · Advance Directives: Yes  · HCPOA: No  · La[place]:  No  · Living Will: Yes  · Code Status:  Full code    Advanced Care Directives, HCPOA and LaPost forms left in the home for family review, discussion and signing with instructions to return upon their next provider encounter for inclusion to the medical record. Discussed ACP for 20 minutes.    Objective:     Vitals:    03/07/22 1400   BP: (!) 141/82   Pulse: (!) 111   Resp: 18   Temp: 98 °F (36.7 °C)   SpO2: 98%   PainSc:   4   PainLoc: Ankle     There is no height or weight on file to calculate BMI.    Physical Exam  Constitutional:       Appearance: Normal appearance.   HENT:      Head: Normocephalic and atraumatic.      Nose: No congestion or rhinorrhea.      Mouth/Throat:      Mouth: Mucous membranes are moist.   Cardiovascular:      Rate and Rhythm: Tachycardia present.   Pulmonary:      Effort: No respiratory distress.      Breath sounds: Normal breath sounds.   Abdominal:      General: Bowel sounds are normal.      Palpations: Abdomen is soft.   Musculoskeletal:      Cervical back: Neck supple.      Right lower leg: No edema.      Left lower leg: No edema.   Skin:     General: Skin is warm and dry.   Neurological:      Mental Status: Mental status is at baseline.   Psychiatric:         Mood and Affect: Mood normal.         Assessment:     1. Chronic pain of left ankle    2. Essential hypertension    3. Spastic hemiplegia, unspecified etiology, unspecified laterality    4. Polyneuropathy    5. Alcohol abuse    6. Muscle spasm    7. Seizure    8. Hypomagnesemia    9. Gastroesophageal reflux disease without esophagitis    10. Acute hypokalemia    11. Stage 3 chronic kidney disease, unspecified whether stage 3a or 3b CKD    12. Debility    13. Paroxysmal atrial fibrillation    14. History of fall    15. Malnutrition, unspecified type    16.  Depression, unspecified depression type    17. Gouty arthritis      Plan:        Michael was seen today for establish care.    Diagnoses and all orders for this visit:        Problem List Items Addressed This Visit        Neuro    Seizure     Denies recent seizure activity  Continue Keppra           Relevant Medications    methocarbamoL (ROBAXIN) 500 MG Tab    thiamine 100 MG tablet    Polyneuropathy       Psychiatric    Alcohol abuse     Reports no alcohol in 1 month           Relevant Medications    folic acid (FOLVITE) 1 MG tablet    Depression     Withdrawn, stays in bed most of day  Denies SI/HI  Start Lexapro               Cardiac/Vascular    Essential hypertension     Continue metoprolol           Paroxysmal atrial fibrillation     HR elevated at 111  Reports have not taken meds today  Discussed importance of taking medication as prescribed  Continue BB  No on anticoagulant likely due to history of falls and alcohol abuse                Renal/    Acute hypokalemia     Continue potassium supplement  Will order labs           Hypomagnesemia     Continue magnesium supplement  Will order labs            CKD (chronic kidney disease), stage III     Will order labs to monitor               Endocrine    Malnutrition     Small frequent meals  Ensure tid  Continue alcohol cessation  Encouraged po fluids               GI    GERD (gastroesophageal reflux disease)     Continue protonix               Orthopedic    Chronic pain of left ankle - Primary     Chronic/at baseline  Continue caregiver support  Medication as prescribed            Relevant Medications    diclofenac sodium (VOLTAREN) 1 % Gel    Gouty arthritis     Stable  Continue allopurinol               Other    Debility     At baseline   Continue caregiver support           Spastic hemiplegia, unspecified etiology, unspecified laterality    History of fall     Continue caregiver support   Fall precautions              Other Visit Diagnoses     Muscle spasm         Relevant Medications    methocarbamoL (ROBAXIN) 500 MG Tab          Instructions:  - [facility] Nurse Practitioner to schedule home follow-up visit with patient in 4-6 weeks or as needed.  - Continue all medications, treatments and therapies as ordered.   - Follow all instructions, recommendations as discussed.  - Maintain Safety Precautions at all times.  - Attend all medical appointments as scheduled.  - For worsening symptoms: call Primary Care Physician or Nurse Practitioner.  - For emergencies, call 911 or immediately report to the nearest emergency room.  - Limit Risks of environmental exposure to coronavirus/COVID-19 as discussed including: social distancing, hand hygiene, and limiting departures from the home for necessities only.        Were controlled substances prescribed? No    Follow Up Appointments:   No future appointments.    Patient consent was obtained prior to treatment on this visit.    Signature:       Myranda Culp, MSN, APRN, FNP-C  [facility]    Total face-to-face time was 60 min, >50% of this was spent on counseling and coordination of care. The following issues were discussed: primary and secondary diagnoses, co-morbidities, prescribed medications, treatment modalities, importance of compliance with medical advice and directives for follow-up care

## 2022-03-09 NOTE — ASSESSMENT & PLAN NOTE
HR elevated at 111  Reports have not taken meds today  Discussed importance of taking medication as prescribed  Continue BB  No on anticoagulant likely due to history of falls and alcohol abuse

## 2022-03-09 NOTE — PATIENT INSTRUCTIONS
Instructions:  - [facility] Nurse Practitioner to schedule home follow-up visit with patient in 4-6 weeks or as needed.  - Continue all medications, treatments and therapies as ordered.   - Follow all instructions, recommendations as discussed.  - Maintain Safety Precautions at all times.  - Attend all medical appointments as scheduled.  - For worsening symptoms: call Primary Care Physician or Nurse Practitioner.  - For emergencies, call 911 or immediately report to the nearest emergency room.  - Limit Risks of environmental exposure to coronavirus/COVID-19 as discussed including: social distancing, hand hygiene, and limiting departures from the home for necessities only.

## 2022-03-11 ENCOUNTER — TELEPHONE (OUTPATIENT)
Dept: INTERNAL MEDICINE | Facility: CLINIC | Age: 62
End: 2022-03-11
Payer: MEDICAID

## 2022-03-11 DIAGNOSIS — G62.9 POLYNEUROPATHY: ICD-10-CM

## 2022-03-11 DIAGNOSIS — G81.10 SPASTIC HEMIPLEGIA, UNSPECIFIED ETIOLOGY, UNSPECIFIED LATERALITY: Primary | ICD-10-CM

## 2022-03-11 DIAGNOSIS — R53.81 DEBILITY: ICD-10-CM

## 2022-03-11 DIAGNOSIS — Z74.8 ASSISTANCE NEEDED WITH TRANSPORTATION: ICD-10-CM

## 2022-03-11 NOTE — TELEPHONE ENCOUNTER
----- Message from Tony Roblero sent at 3/11/2022 12:09 PM CST -----  Regarding: request for medical equipment  Type: Patient Call Back    Who called:Michael     What is the request in detail: the patient is requesting an order for the following equipment: wheelchair, shower chair, urinal, and handle bar for the shower. Please return call at earliest convenience.    Can the clinic reply by [facility]?no     Would the patient rather a call back or a response via [facility]? Call back    Best call back number:[phone]

## 2022-03-11 NOTE — TELEPHONE ENCOUNTER
Returned pt's call.  Pt states he has a wheelchair, but it's too wide - and he really needs a power chair as he is paralyzed on his L side.    Pt has referral in place for PM&R - he wants to speak with them before appt is made. Gave pt dept phone #.    Pended orders for the rest - which will need to be faxed to [facility] [phone] with LOV notes.

## 2022-03-16 ENCOUNTER — TELEPHONE (OUTPATIENT)
Dept: INTERNAL MEDICINE | Facility: CLINIC | Age: 62
End: 2022-03-16
Payer: MEDICAID

## 2022-03-16 NOTE — TELEPHONE ENCOUNTER
----- Message from Samara De Leon sent at 3/16/2022  1:26 PM CDT -----  Contact: Jay CASIANO  Type: Call Back    Who called: Jay CASIANO    What is the request in detail: Jay CASIANO   is requesting a call back in regards to an order received for a urinal. Please advise.     Can the clinic reply by [facility]? No    Would the patient rather a call back or a response via [facility]? Call back     Best call back number: [phone] x [phone]    Additional Information:

## 2022-03-24 ENCOUNTER — TELEPHONE (OUTPATIENT)
Dept: INTERNAL MEDICINE | Facility: CLINIC | Age: 62
End: 2022-03-24
Payer: MEDICAID

## 2022-03-24 NOTE — TELEPHONE ENCOUNTER
----- Message from Samara De Leon sent at 3/24/2022 12:57 PM CDT -----  Contact: Jay CASIANO  Type: Call Back    Who called: Jay CASIANO    What is the request in detail: Jay CASIANO is requesting a call back in regards to an order that was received for the urinal(clarification on what type). Please advise.     Can the clinic reply by [facility]? No    Would the patient rather a call back or a response via [facility]? Call back     Best call back number: [phone] ext [phone]    Additional Information:

## 2022-03-24 NOTE — TELEPHONE ENCOUNTER
Returned Kiya's call from [facility]. No answer at extension, left VM that I do not have a preference for type of urinal (as I did not know there was more than one type honestly). As pt had requested this equipment, please contact pt for preference.

## 2022-03-30 ENCOUNTER — TELEPHONE (OUTPATIENT)
Dept: INTERNAL MEDICINE | Facility: CLINIC | Age: 62
End: 2022-03-30
Payer: MEDICAID

## 2022-03-30 NOTE — TELEPHONE ENCOUNTER
----- Message from [facility] sent at 3/30/2022  4:47 PM CDT -----  Type:  Needs Medical Advice    Who Called: PT   Would the patient rather a call back or a response via [facility]? Callback   Best Call Back Number: [phone]  Additional Information: Pt requesting callback to clear up some issues with his account. Pt only wants office to communicate with him only, not his wife.

## 2022-04-18 ENCOUNTER — TELEPHONE (OUTPATIENT)
Dept: INTERNAL MEDICINE | Facility: CLINIC | Age: 62
End: 2022-04-18
Payer: MEDICAID

## 2022-04-18 DIAGNOSIS — R11.0 NAUSEA: Primary | ICD-10-CM

## 2022-04-18 RX ORDER — ONDANSETRON 4 MG/1
4 TABLET, ORALLY DISINTEGRATING ORAL EVERY 6 HOURS PRN
Qty: 15 TABLET | Refills: 0 | OUTPATIENT
Start: 2022-04-18

## 2022-04-18 RX ORDER — ONDANSETRON 4 MG/1
4 TABLET, ORALLY DISINTEGRATING ORAL EVERY 6 HOURS PRN
Qty: 15 TABLET | Refills: 0 | Status: SHIPPED | OUTPATIENT
Start: 2022-04-18 | End: 2022-07-13

## 2022-04-18 NOTE — TELEPHONE ENCOUNTER
----- Message from Elis Chapman sent at 4/18/2022  4:34 PM CDT -----  Regarding: Medication  Contact: Patient  Type: Patient Call Back    Who called: Patient    What is the request in detail: Patient is requesting a refill: zofran (Patient states medication is not at the pharmacy.)    Can the clinic reply by [facility]? No    Would the patient rather a call back or a response via [facility]? Call    Best call back number: [phone]    Additional Information:  [id number] - [place], [place] - [address]  [address] [address]  Phone: [phone] Fax: [phone]    Thanks

## 2022-04-18 NOTE — TELEPHONE ENCOUNTER
----- Message from Kurtis Austin sent at 4/18/2022  3:39 PM CDT -----  Name of Who is Calling: Leeanne (spouse)         What is the request in detail: Leeanne is calling to follow up with the nurse in regards to the medication for the patient. Please advise        Can the clinic reply by [facility]: No         What Number to Call Back if not in [facility]: [phone]

## 2022-04-18 NOTE — TELEPHONE ENCOUNTER
No new care gaps identified.  Powered by [employer]. Reference number: [id number].   4/18/2022 4:54:47 PM CDT

## 2022-04-18 NOTE — TELEPHONE ENCOUNTER
Returned pt's wife's call  She states pt started feeling nauseated yesterday morning and has been vomiting at least 5 or 6 times. She denies diarrhea and other symptoms. I asked if he drank or ate anything, and she states he's tried pedialyte and pepto, but he's still vomiting.   She states that he has trouble leaving the house, and hasn't been able to reach their home health nurse to get some medicine called in.     I let her know that usually the doctors will advise ER if pt not able to keep anything down, and she states that he doesn't immediately vomit after drinking pedialyte/pepto, so it stays down for a while, but comes up later, and his vomit has been white.     I was not able to find a same day virtual visit system-wide, and messaged BAR Culp who saw the pt [facility].   She responded to me stated she spoke w/ pt's wife and is calling in nausea meds    Called pt's wife again and let her know the nurse is calling in nausea meds     Routing to provider to advise if further f/u is needed

## 2022-04-19 NOTE — TELEPHONE ENCOUNTER
Spoke to Mr. Johnson and informed him per Dr. Candelario that I recommend an in person evaluation (here, urgent care, or ER) unless [facility] is able to evaluate him at home.      Patient states that he feel better and that he think that it was some spoiled milk that he drunk.  Instructed patient to Call the office for any questions or concerns.

## 2022-04-19 NOTE — TELEPHONE ENCOUNTER
I recommend an in person evaluation (here, urgent care, or ER) unless [facility] is able to evaluate him at home.

## 2022-05-09 ENCOUNTER — NURSE TRIAGE (OUTPATIENT)
Dept: ADMINISTRATIVE | Facility: CLINIC | Age: 62
End: 2022-05-09
Payer: COMMERCIAL

## 2022-05-09 ENCOUNTER — TELEPHONE (OUTPATIENT)
Dept: INTERNAL MEDICINE | Facility: CLINIC | Age: 62
End: 2022-05-09
Payer: COMMERCIAL

## 2022-05-09 DIAGNOSIS — G89.29 CHRONIC PAIN OF LEFT ANKLE: Primary | ICD-10-CM

## 2022-05-09 DIAGNOSIS — M25.572 CHRONIC PAIN OF LEFT ANKLE: Primary | ICD-10-CM

## 2022-05-09 NOTE — TELEPHONE ENCOUNTER
"Pt calling with L sided ankle pain "x weeks and weeks and weeks, I cant tell you how many weeks is been hurting." reports feels like nerve pain. Reports that when he tries to stand on it the pain is "9.5" out of 10 but when he is laying in the bed its about a "6" out of 10. Per protocol advised to be seen within 3 days. Attempted to make pt appt and was unable to. Verbalized understanding. Will send message to provider. Advised pt to call back with any other concerns or worsening symptoms. Verbalized understanding and will route message to provider.       Reason for Disposition   MODERATE pain (e.g., interferes with normal activities, limping) and present > 3 days    Additional Information   Negative: Entire foot is cool or blue in comparison to other side   Negative: Fever and red area (or area very tender to touch)   Negative: Swollen joint and fever   Negative: Thigh or calf pain and only 1 side and present > 1 hour   Negative: Thigh or calf swelling and only 1 side   Negative: Patient sounds very sick or weak to the triager   Negative: SEVERE pain (e.g., excruciating, unable to walk) and not improved after 2 hours of pain medicine   Negative: Looks infected (spreading redness, pus) and large red area (> 2 in. or 5 cm)   Negative: Looks like a boil, infected sore, deep ulcer, or other infected rash (spreading redness, pus)   Negative: Redness of the skin and no fever    Protocols used: ANKLE PAIN-A-OH      "

## 2022-05-09 NOTE — TELEPHONE ENCOUNTER
Returned call  to Mr. Johnson and patient states that he did not call the office and would us rather not talk to anyone but him about his medical condition, not even his wife.  Patient states that he is able to talk for himself.  Instructed patient to call the office for any questions or concerns.

## 2022-05-09 NOTE — TELEPHONE ENCOUNTER
----- Message from Radha Loyola sent at 5/9/2022  3:16 PM CDT -----  Type: Patient Call Back    Who called: self     What is the request in detail: Patient would like to speak with someone in the office. Did not go into further detail.     Can the clinic reply by [facility]? No     Would the patient rather a call back or a response via [facility]? Call back     Best call back number: [phone]

## 2022-05-10 NOTE — TELEPHONE ENCOUNTER
He has had multiple ankle surgeries, so he needs to see Orthopedics.  Ordered referral to Ortho.  Please assist the patient with scheduling.  Thank you!

## 2022-05-11 NOTE — TELEPHONE ENCOUNTER
Spoke to Mr. Johnson and confirmed appt date and time.  Instructed patient to call his insurance to see what Ortho they rec, if need sooner appt.  Patient states understanding.

## 2022-06-01 NOTE — TELEPHONE ENCOUNTER
Patient here for wellness check:    He has been generally healthy with no significant past medical or past surgical history.    He complains over the last 2 months while lying on the left side he could feel his heart beating heart but not fast.  He complain of shortness of breath when he lies on his stomach.  He has no exertional chest pain does not feel any rapid heartbeat no exertional dyspnea.  Did not notice any swelling in the legs.    He had an episode a month ago of suprapubic pain with urinary frequency he has to urinate every 1 hour, it radiates to the testicles, last about 10 to 14 days have totally resolved there was no dysuria or gross hematuria no perennial pain.    He has history of renal calculi  renal calculi passed spontaneously 3 times    He has mild hyperlipidemia    He had no further heartburn ever since he changed his diet to a healthier diet.    He had COVID infection January 2022 with mild symptoms.    He had tonsillectomy at the age of 15    Review of other systems:    He has no current headache dizziness or syncope  No visual symptoms  No chronic tinnitus no impaired hearing he has no chronic cough or wheezing no chronic hoarseness  Normal appetite, no unusual regular weight loss.  No reflux symptoms or dysphagia.  No chronic abdominal pain, usually has 3 bowel movement a day which are normal consistency no melena or hematochezia  No chronic back pain no joint pain  No paresthesia  No symptoms of depression    Allergy: He has no known drug allergy    Social history: He does not smoke, does not use smokeless tobacco products, drinks occasionally    Family history: Mother had thyroid problems    Currently not taking any medication    Physical exam:    Patient is well-nourished alert oriented no distress  Blood pressure 120/80 heart rate 70/min regular  HEENT are normal, conjunctive is pink, no jaundice  Thyroid gland nonenlarged, no palpable lymph nodes no JVD no carotid bruit  Heart: Normal  Please forward this important TCC information to your provider in order to maximize the post discharge care delivery of this patient.    C3 nurse spoke with Michael Johnson Jr.  for a TCC post hospital discharge follow up call. The patient does not have a scheduled HOSFU appointment with Zunilda Bolden NP  within 7-14 days post hospital discharge date 12/02/2019. C3 nurse was unable to schedule HOSFU appointment in [facility].  Please contact pcp  and schedule follow up appointment using HOSFU visit type on or before 12/16/2019    Respectfully,  Hermelinda Colbert LPN    [facility]    [email or web address]       Please do not reply to this message, as this inbox is not routinely monitored.   S1-S2 no murmurs no gallops  Normal breath sounds no rales or rhonchi  Abdomen is soft nontender liver spleen not palpable bowel sounds are active no: Hernia, testicles genitalia normal  No pulsatile mass  No edema  Peripheral pulses are normal  Neurologic exam is normal    Impression and plan:    Feeling heartbeat while lying the left side, explained to the patient that is normal he does not have any palpitations    Complain of out of breath when he lies on his stomach likely secondary to pressure on the diaphragm he had no exertional dyspnea and no edema advised not to sleep on his stomach    Episode of urinary frequency without dysuria or gross hematuria, will check urinalysis    We will check general health profile    Follow-up in 1 year and as needed

## 2022-06-07 ENCOUNTER — TELEPHONE (OUTPATIENT)
Dept: ORTHOPEDICS | Facility: CLINIC | Age: 62
End: 2022-06-07
Payer: COMMERCIAL

## 2022-06-07 ENCOUNTER — TELEPHONE (OUTPATIENT)
Dept: INTERNAL MEDICINE | Facility: CLINIC | Age: 62
End: 2022-06-07
Payer: COMMERCIAL

## 2022-06-07 DIAGNOSIS — M79.606 CHRONIC PAIN OF LOWER EXTREMITY, UNSPECIFIED LATERALITY: Primary | ICD-10-CM

## 2022-06-07 DIAGNOSIS — N18.9 ACUTE ON CHRONIC RENAL INSUFFICIENCY: ICD-10-CM

## 2022-06-07 DIAGNOSIS — R53.81 DEBILITY: ICD-10-CM

## 2022-06-07 DIAGNOSIS — G62.9 POLYNEUROPATHY: Primary | ICD-10-CM

## 2022-06-07 DIAGNOSIS — N28.9 ACUTE ON CHRONIC RENAL INSUFFICIENCY: ICD-10-CM

## 2022-06-07 DIAGNOSIS — M25.579 ANKLE PAIN, UNSPECIFIED CHRONICITY, UNSPECIFIED LATERALITY: Primary | ICD-10-CM

## 2022-06-07 DIAGNOSIS — G81.10 SPASTIC HEMIPLEGIA, UNSPECIFIED ETIOLOGY, UNSPECIFIED LATERALITY: ICD-10-CM

## 2022-06-07 DIAGNOSIS — G89.29 CHRONIC PAIN OF LOWER EXTREMITY, UNSPECIFIED LATERALITY: Primary | ICD-10-CM

## 2022-06-07 NOTE — TELEPHONE ENCOUNTER
----- Message from Zoie Lane sent at 6/7/2022 10:24 AM CDT -----  Type: Patient Call Back    Who called:pt's wife Leeanne [phone]    What is the request in detail:calling to get home health care back to the house. Call Leeanne    Can the clinic reply by [facility]?    Would the patient rather a call back or a response via [facility]? call    Best call back number:[phone] (home) [phone] (work)      Additional Information:

## 2022-06-07 NOTE — TELEPHONE ENCOUNTER
----- Message from [facility] sent at 6/7/2022 10:43 AM CDT -----  Who Called: TIERA MARTIN JR.    Does the patient already have the specialty appointment scheduled?: No    If yes, what is the date of that appointment?: No    Referral to What Specialty: Pain management    Reason for Referral: Chronic pain throughout body    Does the patient want the referral with a specific physician?: No    [facility]    Patient Requesting a Call Back?: Yes    Best Call Back Number: [phone]    Additional Information:

## 2022-06-07 NOTE — TELEPHONE ENCOUNTER
Returned Leeanne's call to get a bit more info. They would like 2 referrals placed.     1) for Care at Home. Pt had referral but previous insurance wouldn't cover. They have different insurance now and NP Silvia Culp is reaching out as well re: blood work and reinstating referral.    2) Would like referral to Pain Management due to ongoing and worsening joint pain.    Pended both.

## 2022-06-08 ENCOUNTER — PATIENT MESSAGE (OUTPATIENT)
Dept: INTERNAL MEDICINE | Facility: CLINIC | Age: 62
End: 2022-06-08
Payer: COMMERCIAL

## 2022-06-08 ENCOUNTER — CARE AT HOME (OUTPATIENT)
Dept: HOME HEALTH SERVICES | Facility: CLINIC | Age: 62
End: 2022-06-08
Payer: COMMERCIAL

## 2022-06-08 DIAGNOSIS — E83.42 HYPOMAGNESEMIA: ICD-10-CM

## 2022-06-08 DIAGNOSIS — M10.9 GOUTY ARTHRITIS: ICD-10-CM

## 2022-06-08 DIAGNOSIS — F10.10 ALCOHOL ABUSE: ICD-10-CM

## 2022-06-08 DIAGNOSIS — E46 MALNUTRITION, UNSPECIFIED TYPE: ICD-10-CM

## 2022-06-08 DIAGNOSIS — M25.572 CHRONIC PAIN OF LEFT ANKLE: ICD-10-CM

## 2022-06-08 DIAGNOSIS — G40.909 SEIZURE DISORDER: ICD-10-CM

## 2022-06-08 DIAGNOSIS — N18.30 STAGE 3 CHRONIC KIDNEY DISEASE, UNSPECIFIED WHETHER STAGE 3A OR 3B CKD: ICD-10-CM

## 2022-06-08 DIAGNOSIS — E87.6 ACUTE HYPOKALEMIA: ICD-10-CM

## 2022-06-08 DIAGNOSIS — K21.9 GASTROESOPHAGEAL REFLUX DISEASE WITHOUT ESOPHAGITIS: ICD-10-CM

## 2022-06-08 DIAGNOSIS — G89.29 CHRONIC PAIN OF LEFT ANKLE: ICD-10-CM

## 2022-06-08 DIAGNOSIS — R53.81 DEBILITY: ICD-10-CM

## 2022-06-08 DIAGNOSIS — F32.A DEPRESSION, UNSPECIFIED DEPRESSION TYPE: ICD-10-CM

## 2022-06-08 DIAGNOSIS — I48.0 PAROXYSMAL ATRIAL FIBRILLATION: ICD-10-CM

## 2022-06-08 PROCEDURE — 99443 PR PHYSICIAN TELEPHONE EVALUATION 21-30 MIN: ICD-10-PCS | Mod: 95,,, | Performed by: NURSE PRACTITIONER

## 2022-06-08 PROCEDURE — 99443 PR PHYSICIAN TELEPHONE EVALUATION 21-30 MIN: CPT | Mod: 95,,, | Performed by: NURSE PRACTITIONER

## 2022-06-08 NOTE — PROGRESS NOTES
Patient complaining of chronic persistant lower extremity pain.  Placed referral to pain management.

## 2022-06-11 NOTE — PROGRESS NOTES
Audio Only Telehealth Visit     The patient location is: Home  The chief complaint leading to consultation is: management of chronic medical issues   Visit type: Virtual visit with audio only (telephone)  Total time spent with patient: 26 min     The reason for the audio only service rather than synchronous audio and video virtual visit was related to technical difficulties or patient preference/necessity.     Each patient to whom I provide medical services by telemedicine is:  (1) informed of the relationship between the physician and patient and the respective role of any other health care provider with respect to management of the patient; and (2) notified that they may decline to receive medical services by telemedicine and may withdraw from such care at any time. Patient verbally consented to receive this service via voice-only telephone call.       HPI: Michael Johnson Jr. is a 61 y.o. male who  has a past medical history of Afib, Alcohol abuse with other alcohol-induced disorder, Anemia, Ankle fracture, left, Arthritis, Asthma, Cholecystitis, Chronic kidney disease, Depression, Erectile dysfunction, Gout, Hypertension, Hypertensive chronic kidney disease, hypomagnesemia, Hypokalemia, noncompliance, peripheral neuropathy, seizure.  He follows with PCP Dr. Candelario but has not seen recently due to debility, difficulty leaving home.       Today:  Mr. Michael Johnson Jr. is a 61 y.o. male. Michael presents with questions recording chronic medical issues.  He wanted to discuss each chronic medical issue and the treatment plan in place.  Reviewed each of patients chronic medical issues in detail.  Also reviewed medication list.  Discussed the importance of taking meds as prescribed.  Patient reports good bm pattern, good sleep pattern, fair appetite and fair fluid intake.  He denies current alcohol use. He lives with spouse who assist with care.  He requires assistance with bathing, toileting, dressing, meal prep.  He is  continent of bladder and bowel.  He has not had labs in over one year.  In home labs were previously ordered but not completed due to insurance not being in network with mobile lab company.  Insurance has changed, will reorder labs.  Risks of environmental exposure to coronavirus discussed including: social distancing, hand hygiene, and limiting departures from the home for necessities only.  Reports understanding and willingness to comp       Problem List Items Addressed This Visit        Neuro    Seizure disorder     Chronic  Reports no current seizure activity  Continue Keppra               Psychiatric    Alcohol abuse     Reports no alcohol in 3 months            Depression     Denies SI/HI  continue Lexapro               Cardiac/Vascular    Paroxysmal atrial fibrillation     Chronic  Continue BB  No on anticoagulant likely due to history of falls and alcohol abuse                Renal/    Acute hypokalemia     Continue potassium supplement  Will order labs           Hypomagnesemia     Continue magnesium supplement  Will order labs            CKD (chronic kidney disease), stage III     Will order labs to monitor               Endocrine    Malnutrition     Small frequent meals  Ensure tid  Continue alcohol cessation  Encouraged po fluids               GI    GERD (gastroesophageal reflux disease)     Continue protonix               Orthopedic    Chronic pain of left ankle     Chronic  Medication as prescribed   Referral to pain management            Gouty arthritis     Stable  Continue allopurinol               Other    Debility     Continue caregiver support                      Instructions:  - [facility] Nurse Practitioner to schedule home follow-up visit with patient in 4-6 weeks or as needed.  - Continue all medications, treatments and therapies as ordered.   - Follow all instructions, recommendations as discussed.  - Maintain Safety Precautions at all times.  - Attend all medical appointments as scheduled.  -  For worsening symptoms: call Primary Care Physician or Nurse Practitioner.  - For emergencies, call 911 or immediately report to the nearest emergency room.  - Limit Risks of environmental exposure to coronavirus/COVID-19 as discussed including: social distancing, hand hygiene, and limiting departures from the home for necessities only.                   This service was not originating from a related E/M service provided within the previous 7 days nor will  to an E/M service or procedure within the next 24 hours or my soonest available appointment.  Prevailing standard of care was able to be met in this audio-only visit.

## 2022-06-11 NOTE — PATIENT INSTRUCTIONS
Instructions:  - [facility] Practition[facility] to schedule home follow-up visit with patient in 4-6 weeks or as needed.  - Continue all medications, treatments and therapies as ordered.   - Follow all instructions, recommendations as discussed.  - Maintain Safety Precautions at all times.  - Attend all medical appointments as scheduled.  - For worsening symptoms: call Primary Care Physician or Nurse Practitioner.  - For emergencies, call 911 or immediately report to the nearest emergency room.  - Limit Risks of environmental exposure to coronavirus/COVID-19 as discussed including: social distancing, hand hygiene, and limiting departures from the home for necessities only.

## 2022-06-13 ENCOUNTER — TELEPHONE (OUTPATIENT)
Dept: NEPHROLOGY | Facility: CLINIC | Age: 62
End: 2022-06-13
Payer: COMMERCIAL

## 2022-06-13 ENCOUNTER — TELEPHONE (OUTPATIENT)
Dept: INTERNAL MEDICINE | Facility: CLINIC | Age: 62
End: 2022-06-13
Payer: COMMERCIAL

## 2022-06-13 NOTE — TELEPHONE ENCOUNTER
Returned wife Leeanne's call. Leeanne states she's waiting to hear back from our office about drawing lab work at home. Leeanne states NP Robbi has a call out to our office. I do not se a message outstanding, but as PCP is out of the office until 6/21 and may not see messages from other staff, I will reach out to her and inquire as to what is needed.

## 2022-06-13 NOTE — TELEPHONE ENCOUNTER
----- Message from Digna Nieves sent at 6/13/2022  8:34 AM CDT -----  Contact: Delmi - daughter  Pt's daughter requesting call back re: scheduling appt. Referral in [facility]. Caller states she was told on Thursday that someone would call within 24 hrs to schedule and has not heard anything yet. She states that pt is not doing well and he needs to get in ASAP. Caller is very upset b/c she feels like this isnt being taken seriously.     Confirmed contact below:  Contact Name: Delmi  Phone Number: [phone]

## 2022-06-13 NOTE — TELEPHONE ENCOUNTER
----- Message from Radha Loyola sent at 6/13/2022  4:40 PM CDT -----  Type: Patient Call Back    Who called: wife      What is the request in detail: Patient's wife calling to speak with someone in the office about setting up a at home lab draw.     Can the clinic reply by [facility]? No     Would the patient rather a call back or a response via [facility]? Call back     Best call back number: [phone]

## 2022-06-20 ENCOUNTER — TELEPHONE (OUTPATIENT)
Dept: INTERNAL MEDICINE | Facility: CLINIC | Age: 62
End: 2022-06-20
Payer: COMMERCIAL

## 2022-06-20 NOTE — TELEPHONE ENCOUNTER
----- Message from Dayana Sykes sent at 6/20/2022 12:20 PM CDT -----  Type: Patient Call Back       What is the request in detail:  pt calling to check status on lab work results.      Can the clinic reply by [facility]? No       Would the patient rather a call back or a response via [facility]? Call back       Best call back number: [phone]        Thank you.      Not seeing lab results.  Checked with [facility]  and they do not see the patient name.  HH also checked [facility]  and did not see patient name.  Family not sure who it was that came out to collect blood work.  Looked in [employer] and also checked faxes for lab results.

## 2022-06-20 NOTE — TELEPHONE ENCOUNTER
Spoke to Mr. Alex francisco and informed that the lab results still has not processed yet, but will contact the family when results are in the system.  Family states understanding.

## 2022-06-20 NOTE — TELEPHONE ENCOUNTER
----- Message from Kuldip Nieto MA sent at 6/20/2022 11:03 AM CDT -----  Contact: daughter(Delmi)-[phone]  Message sent to wrong department . Pt daughter would like to speak with someone in your office regarding labs that where drawn on last Thursday by [facility] .   ----- Message -----  From: Starla Romano  Sent: 6/20/2022  10:47 AM CDT  To: Kuldip Nieto MA    Type:  Patient Returning Call    Who Called:Delmi  Who Left Message for Patient:KULDIP NIETO  Does the patient know what this is regarding?:labs  Would the patient rather a call back or a response via [facility]? Call back   Best Call Back Number:[phone]  Additional Information:

## 2022-06-22 ENCOUNTER — TELEPHONE (OUTPATIENT)
Dept: INTERNAL MEDICINE | Facility: CLINIC | Age: 62
End: 2022-06-22
Payer: COMMERCIAL

## 2022-06-22 NOTE — TELEPHONE ENCOUNTER
----- Message from Marci Fabian sent at 6/22/2022  4:17 PM CDT -----      Name of Who is Calling: TIERA MARTIN JR. [[id number]]      What is the request in detail: Pt called for results.Please contact to further discuss and advise.          Can the clinic reply by [facility]: N      What [facility]: [phone]

## 2022-06-22 NOTE — TELEPHONE ENCOUNTER
----- Message from Kayleigh Birmingham sent at 6/21/2022  3:45 PM CDT -----  Regarding: lab results  Name of Who is Calling: Leeanne,            What is the request in detail: Leeanne is requesting a call back to get the lab results and he is having a pain like a shocking pain that causes him to jump.           Can the clinic reply by [facility]: No           What Number to Call Back if not in [facility]: [phone]

## 2022-06-22 NOTE — TELEPHONE ENCOUNTER
Spoke to patient and he states that he completed labs outside of [facility] and would like results. Patient was informed to contact facility and have facility fax lab results to office for review. Patient verbalize understanding. Thanks.     Fax number was given for office   [phone]

## 2022-06-24 ENCOUNTER — CARE AT HOME (OUTPATIENT)
Dept: HOME HEALTH SERVICES | Facility: CLINIC | Age: 62
End: 2022-06-24
Payer: COMMERCIAL

## 2022-06-24 DIAGNOSIS — E83.42 HYPOMAGNESEMIA: ICD-10-CM

## 2022-06-24 DIAGNOSIS — I10 ESSENTIAL HYPERTENSION: Primary | ICD-10-CM

## 2022-06-24 DIAGNOSIS — E78.2 MIXED HYPERLIPIDEMIA: ICD-10-CM

## 2022-06-24 DIAGNOSIS — E55.9 VITAMIN D DEFICIENCY: ICD-10-CM

## 2022-06-24 DIAGNOSIS — E46 MALNUTRITION, UNSPECIFIED TYPE: ICD-10-CM

## 2022-06-24 DIAGNOSIS — G40.909 SEIZURE DISORDER: ICD-10-CM

## 2022-06-24 DIAGNOSIS — N18.30 STAGE 3 CHRONIC KIDNEY DISEASE, UNSPECIFIED WHETHER STAGE 3A OR 3B CKD: ICD-10-CM

## 2022-06-24 DIAGNOSIS — I48.0 PAROXYSMAL ATRIAL FIBRILLATION: ICD-10-CM

## 2022-06-24 DIAGNOSIS — F32.A DEPRESSION, UNSPECIFIED DEPRESSION TYPE: ICD-10-CM

## 2022-06-24 DIAGNOSIS — R74.01 ELEVATED AST (SGOT): ICD-10-CM

## 2022-06-24 DIAGNOSIS — M10.9 GOUTY ARTHRITIS: ICD-10-CM

## 2022-06-24 DIAGNOSIS — R53.81 DEBILITY: ICD-10-CM

## 2022-06-24 DIAGNOSIS — Z91.81 HISTORY OF FALL: ICD-10-CM

## 2022-06-24 DIAGNOSIS — K21.9 GASTROESOPHAGEAL REFLUX DISEASE WITHOUT ESOPHAGITIS: ICD-10-CM

## 2022-06-24 DIAGNOSIS — I48.91 ATRIAL FIBRILLATION, UNSPECIFIED TYPE: ICD-10-CM

## 2022-06-24 DIAGNOSIS — R17 ELEVATED BILIRUBIN: ICD-10-CM

## 2022-06-24 DIAGNOSIS — F10.10 ALCOHOL ABUSE: ICD-10-CM

## 2022-06-24 DIAGNOSIS — E87.6 ACUTE HYPOKALEMIA: ICD-10-CM

## 2022-06-24 PROCEDURE — 99350 PR HOME VISIT,ESTAB PATIENT,LEVEL IV: ICD-10-PCS | Mod: S$GLB,,, | Performed by: NURSE PRACTITIONER

## 2022-06-24 PROCEDURE — 99350 HOME/RES VST EST HIGH MDM 60: CPT | Mod: S$GLB,,, | Performed by: NURSE PRACTITIONER

## 2022-06-26 VITALS
HEART RATE: 74 BPM | DIASTOLIC BLOOD PRESSURE: 74 MMHG | SYSTOLIC BLOOD PRESSURE: 122 MMHG | TEMPERATURE: 98 F | OXYGEN SATURATION: 99 % | RESPIRATION RATE: 18 BRPM

## 2022-06-26 NOTE — PROGRESS NOTES
"[facility]  Medical Home Visit    Visit Date: 6/24/2022  Encounter Provider: Myranda Culp NP  PCP:  Marilu Candelario MD    Subjective:      Patient ID: Michael Johnson Jr. is a 61 y.o. male.    Consult Requested By:  No ref. provider found  Reason for Consult: Medical Visit by Home Care Provider    The patient is being seen at home due to a physical debility that presents a taxing effort to leave the home, to mitigate high risk of hospital readmission or due to the limited availability of reliable or safe options for transportation to the point of access to the provider. The visit meets the criteria for medical necessity as defined by CMS as "health-care services needed to prevent, diagnose, or treat an illness, injury, condition, disease, or its symptoms and that meet accepted standards of medicine." Prior to treatment on this visit the chart was reviewed and patient consent was obtained.    Chief Complaint: Follow-up for chronic medical conditions and medication review    HPI: Michael Johnson Jr. is a 61 y.o. male who  has a past medical history of Afib, Alcohol abuse with other alcohol-induced disorder, Anemia, Ankle fracture, left, Arthritis, Asthma, Cholecystitis, Chronic kidney disease, Depression, Erectile dysfunction, Gout, Hypertension, Hypertensive chronic kidney disease, hypomagnesemia, Hypokalemia, noncompliance, peripheral neuropathy, seizure.  He follows with PCP Dr. Candelario but has not seen recently due to debility, difficulty leaving home.     Today:  Mr. Michael Johnson Jr. is a 61 y.o. male. Presents along with spouse, requesting lab results.  Recent labs collected on 6/16/22 not available yet from outside company.  He reports poor appetite, debility, requesting home health therapy, will order.  Denies chest pain, SOB, fevers, cough, congestion, change in bladder habits, change in bowel habits.  Non-compliant with medication regimen. Discussed the importance of taking meds as prescribed.  " Patient reports good bm pattern, good sleep pattern.  He denies current alcohol use. He lives with spouse who assist with care.  He requires assistance with bathing, toileting, dressing, meal prep.  He is continent of bladder and bowel.  Risks of environmental exposure to coronavirus discussed including: social distancing, hand hygiene, and limiting departures from the home for necessities only.      Attestation: Screening criteria to assess the level of the patient's risk for infection with COVID-19 as recommended by the CDC at the time of the above documented home visit concluded appropriateness to proceed. Universal precautions were maintained at all times, including provider use of >60% alcohol gel hand  immediately prior to entry and upon departing the patient's home as well as cleaning of equipment used in home visit with antibacterial/germicidal disposable wipes.     Review of Systems   Constitutional: Positive for appetite change. Negative for chills and fever.   HENT: Negative for congestion, postnasal drip and rhinorrhea.    Eyes: Negative for visual disturbance.   Respiratory: Negative for chest tightness and shortness of breath.    Gastrointestinal: Negative for abdominal pain, constipation, diarrhea, nausea and vomiting.   Genitourinary: Negative for difficulty urinating.   Musculoskeletal: Positive for arthralgias, gait problem and myalgias.   Skin: Negative for color change and wound.   Neurological: Positive for weakness. Negative for light-headedness.   Hematological: Does not bruise/bleed easily.   Psychiatric/Behavioral: Negative for agitation.       Assessments:  · Environmental: single story home, steps to enter, adequate lighting and temperature control  · Functional Status: Assistance with ADL's/IADL's, wheelchair bound, continent of bowel and bladder  · Safety: Fall Precautions, COVID Precautions/Social Distancing/Mask Use  · Nutritional: Adequate  · Home Health:  ordered  · DME/Supplies: WC, BSC, RW      Objective:     Vitals:    06/24/22 1100   BP: 122/74   Pulse: 74   Resp: 18   Temp: 98 °F (36.7 °C)   SpO2: 99%   PainSc:   4   PainLoc: Foot     There is no height or weight on file to calculate BMI.    Physical Exam  HENT:      Head: Normocephalic and atraumatic.      Nose: No congestion or rhinorrhea.      Mouth/Throat:      Mouth: Mucous membranes are moist.   Cardiovascular:      Rate and Rhythm: Normal rate.      Pulses: Normal pulses.   Pulmonary:      Effort: Pulmonary effort is normal. No respiratory distress.      Breath sounds: Normal breath sounds.   Abdominal:      General: Bowel sounds are normal.      Palpations: Abdomen is soft.   Musculoskeletal:      Cervical back: Normal range of motion and neck supple.      Right lower leg: No edema.      Left lower leg: No edema.   Skin:     General: Skin is warm and dry.   Neurological:      Mental Status: He is alert. Mental status is at baseline.   Psychiatric:         Mood and Affect: Mood normal.         Assessment:     1. Essential hypertension    2. Debility    3. Atrial fibrillation, unspecified type    4. Paroxysmal atrial fibrillation    5. Mixed hyperlipidemia    6. Stage 3 chronic kidney disease, unspecified whether stage 3a or 3b CKD    7. Gastroesophageal reflux disease without esophagitis    8. Hypomagnesemia    9. Acute hypokalemia    10. Depression, unspecified depression type    11. Alcohol abuse    12. Seizure disorder    13. Gouty arthritis    14. Vitamin D deficiency    15. History of fall    16. Malnutrition, unspecified type    17. Elevated bilirubin    18. Elevated AST (SGOT)      Plan:          1. Essential hypertension  Assessment & Plan:  Chronic   Continue metoprolol    Orders:  -     Ambulatory referral/consult to Home Health    2. Debility  Assessment & Plan:  Ordered home health  Continue caregiver support  Nutritional support     Orders:  -     Ambulatory referral/consult to Home  Health    3. Atrial fibrillation, unspecified type  -     Ambulatory referral/consult to Home Health    4. Paroxysmal atrial fibrillation  Assessment & Plan:  HR controlled  Continue metoprolol   Not on anticoagulant likely due to history of falls and alcohol abuse        5. Mixed hyperlipidemia  Assessment & Plan:  Denies chest pain, SOB  Discussed importance of low fat diet   Daily exercises       6. Stage 3 chronic kidney disease, unspecified whether stage 3a or 3b CKD  Assessment & Plan:  Voiding well  Encouraged adequate po fluids  Avoid nephrotoxic agents      7. Gastroesophageal reflux disease without esophagitis  Assessment & Plan:  Report no current issues  Continue protonix       8. Hypomagnesemia  Assessment & Plan:  Continue daily supplement       9. Acute hypokalemia  Assessment & Plan:  Continue daily supplement       10. Depression, unspecified depression type  Assessment & Plan:  Denies SI/HI  continue Lexapro       11. Alcohol abuse  Assessment & Plan:  Reports no alcohol in 3 months       12. Seizure disorder  Assessment & Plan:  Chronic  Reports no current seizure activity  Discussed importance of taking meds as prescribed   Continue Keppra       13. Gouty arthritis  Assessment & Plan:  Stable  Continue allopurinol       14. Vitamin D deficiency  Assessment & Plan:  Continue Vitamin D supplement       15. History of fall  Assessment & Plan:  Continue caregiver support  Fall precautions       16. Malnutrition, unspecified type  Assessment & Plan:  Encouraged adequate po intake  Small frequent meals  Nutritional support              Were controlled substances prescribed? No    Instructions:  - [facility] to schedule home follow-up visit with patient in 4-6 weeks or as needed.  - Continue all medications, treatments and therapies as ordered.   - Follow all instructions, recommendations as discussed.  - Maintain Safety Precautions at all times.  - Attend all medical appointments as  scheduled.  - For worsening symptoms: call Primary Care Physician or Nurse Practitioner.  - For emergencies, call 911 or immediately report to the nearest emergency room.  - Limit Risks of environmental exposure to coronavirus/COVID-19 as discussed including: social distancing, hand hygiene, and limiting departures from the home for necessities only.         Follow Up Appointments:   Future Appointments   Date Time Provider Department Center   6/30/2022  3:00 PM Jose Reynolds MD [facility] NEPHRO [address]   7/7/2022  7:30 AM [facility] XR-B LIMIT 500 LBS [facility] XRAY OP [place] [facility]   7/7/2022  8:00 AM Ioana Fontanez PA-C [facility]  [place] [facility]   7/13/2022  1:45 PM Marilu Candelario MD [facility]   10/17/2022 10:30 AM Ysabel Thayer MD [address]       Patient consent was obtained prior to treatment on this visit.    Signature:       Myranda Culp, MSN, APRN, FNP-C  [facility]    Total face-to-face time was 60 min, >50% of this was spent on counseling and coordination of care. The following issues were discussed: primary and secondary diagnoses, co-morbidities, prescribed medications, treatment modalities, importance of compliance with medical advice and directives for follow-up care

## 2022-06-26 NOTE — ASSESSMENT & PLAN NOTE
Recent labs showed creatinine of 1.5-at baseline  U/a negative for infectious process   Voiding well  Encouraged adequate po fluids  Avoid nephrotoxic agents

## 2022-06-26 NOTE — ASSESSMENT & PLAN NOTE
Recent labs showed LDL 91, triglycerides 205, total cholesterol 146  Denies chest pain, SOB  Discussed importance of low fat diet   Daily exercises

## 2022-06-26 NOTE — ASSESSMENT & PLAN NOTE
Chronic  Reports no current seizure activity  Recent labs showed Keppra level less than 1  Discussed importance of taking meds as prescribed   Continue Keppra

## 2022-06-26 NOTE — PATIENT INSTRUCTIONS
Instructions:  - [facility] Nurse Practitioner to schedule home follow-up visit with patient in 4-6 weeks or as needed.  - Continue all medications, treatments and therapies as ordered.   - Follow all instructions, recommendations as discussed.  - Maintain Safety Precautions at all times.  - Attend all medical appointments as scheduled.  - For worsening symptoms: call Primary Care Physician or Nurse Practitioner.  - For emergencies, call 911 or immediately report to the nearest emergency room.  - Limit Risks of environmental exposure to coronavirus/COVID-19 as discussed including: social distancing, hand hygiene, and limiting departures from the home for necessities only.

## 2022-06-26 NOTE — ASSESSMENT & PLAN NOTE
HR controlled  Continue metoprolol   No on anticoagulant likely due to history of falls and alcohol abuse

## 2022-06-27 PROBLEM — R74.01 ELEVATED AST (SGOT): Status: ACTIVE | Noted: 2022-06-27

## 2022-06-27 PROBLEM — R17 ELEVATED BILIRUBIN: Status: ACTIVE | Noted: 2022-06-27

## 2022-06-27 PROBLEM — F17.210 LIGHT CIGARETTE SMOKER (1-9 CIGS/DAY): Status: RESOLVED | Noted: 2020-02-04 | Resolved: 2022-06-27

## 2022-06-27 NOTE — ASSESSMENT & PLAN NOTE
Low albumin level noted on recent labs  Encouraged adequate po intake  Small frequent meals  Nutritional support

## 2022-06-28 ENCOUNTER — TELEPHONE (OUTPATIENT)
Dept: INTERNAL MEDICINE | Facility: CLINIC | Age: 62
End: 2022-06-28
Payer: COMMERCIAL

## 2022-06-28 NOTE — TELEPHONE ENCOUNTER
----- Message from Radha Loyola sent at 6/28/2022 11:35 AM CDT -----  Type:  Test Results    Who Called: wife     Name of Test (Lab/Mammo/Etc): lab and urine     Where the test was performed: portable home collect     Would the patient rather a call back or a response via [facility]? Call back     Best Call Back Number: [phone]    For Clinical Team:Has the provider reviewed the results?

## 2022-06-28 NOTE — TELEPHONE ENCOUNTER
Did you check some urine tests and/or blood work for him?  They called requesting lab results.  I don't see it on Epic and haven't ordered anything for him recently.  Thanks!

## 2022-06-29 ENCOUNTER — CARE AT HOME (OUTPATIENT)
Dept: HOME HEALTH SERVICES | Facility: CLINIC | Age: 62
End: 2022-06-29
Payer: COMMERCIAL

## 2022-06-29 DIAGNOSIS — R74.01 TRANSAMINITIS: ICD-10-CM

## 2022-06-29 DIAGNOSIS — N18.30 STAGE 3 CHRONIC KIDNEY DISEASE, UNSPECIFIED WHETHER STAGE 3A OR 3B CKD: ICD-10-CM

## 2022-06-29 DIAGNOSIS — R17 ELEVATED BILIRUBIN: ICD-10-CM

## 2022-06-29 DIAGNOSIS — I48.0 PAROXYSMAL ATRIAL FIBRILLATION: ICD-10-CM

## 2022-06-29 DIAGNOSIS — E83.42 HYPOMAGNESEMIA: ICD-10-CM

## 2022-06-29 DIAGNOSIS — E87.6 HYPOKALEMIA: ICD-10-CM

## 2022-06-29 DIAGNOSIS — E78.2 MIXED HYPERLIPIDEMIA: ICD-10-CM

## 2022-06-29 DIAGNOSIS — G40.909 SEIZURE DISORDER: ICD-10-CM

## 2022-06-29 DIAGNOSIS — I10 ESSENTIAL HYPERTENSION: ICD-10-CM

## 2022-06-29 DIAGNOSIS — E55.9 VITAMIN D DEFICIENCY: ICD-10-CM

## 2022-06-29 DIAGNOSIS — F32.A DEPRESSION, UNSPECIFIED DEPRESSION TYPE: ICD-10-CM

## 2022-06-29 DIAGNOSIS — G89.29 CHRONIC PAIN OF LEFT ANKLE: ICD-10-CM

## 2022-06-29 DIAGNOSIS — F10.10 ALCOHOL ABUSE: ICD-10-CM

## 2022-06-29 DIAGNOSIS — E46 MALNUTRITION, UNSPECIFIED TYPE: ICD-10-CM

## 2022-06-29 DIAGNOSIS — M10.9 GOUTY ARTHRITIS: ICD-10-CM

## 2022-06-29 DIAGNOSIS — M25.572 CHRONIC PAIN OF LEFT ANKLE: ICD-10-CM

## 2022-06-29 DIAGNOSIS — R53.81 DEBILITY: ICD-10-CM

## 2022-06-29 PROCEDURE — 99442 PR PHYSICIAN TELEPHONE EVALUATION 11-20 MIN: CPT | Mod: 95,,, | Performed by: NURSE PRACTITIONER

## 2022-06-29 PROCEDURE — 99442 PR PHYSICIAN TELEPHONE EVALUATION 11-20 MIN: ICD-10-PCS | Mod: 95,,, | Performed by: NURSE PRACTITIONER

## 2022-06-30 PROCEDURE — G0180 PR HOME HEALTH MD CERTIFICATION: ICD-10-PCS | Mod: ,,, | Performed by: NURSE PRACTITIONER

## 2022-06-30 PROCEDURE — G0180 MD CERTIFICATION HHA PATIENT: HCPCS | Mod: ,,, | Performed by: NURSE PRACTITIONER

## 2022-06-30 RX ORDER — FERROUS SULFATE 325(65) MG
325 TABLET, DELAYED RELEASE (ENTERIC COATED) ORAL 2 TIMES DAILY WITH MEALS
Qty: 60 TABLET | Refills: 2 | Status: SHIPPED | OUTPATIENT
Start: 2022-06-30 | End: 2022-07-13 | Stop reason: SDUPTHER

## 2022-06-30 NOTE — TELEPHONE ENCOUNTER
Could you or your staff notify him of his test results?  They have called several times inquiring of the results.  I don't know what was the clinical context in which they were ordered.  Could you also perhaps fax his labs to my office?  Thank you!

## 2022-07-05 NOTE — TELEPHONE ENCOUNTER
He was last seen in person in 2020 and previously declined in person visits/examination.  He is currently actively followed by [facility].

## 2022-07-11 NOTE — PROGRESS NOTES
Audio Only Telehealth Visit     The patient location is: Home  The chief complaint leading to consultation is: management of chronic medical issues/review labs   Visit type: Virtual visit with audio only (telephone)  Total time spent with patient: 14 minutes     The reason for the audio only service rather than synchronous audio and video virtual visit was related to technical difficulties or patient preference/necessity.     Each patient to whom I provide medical services by telemedicine is:  (1) informed of the relationship between the physician and patient and the respective role of any other health care provider with respect to management of the patient; and (2) notified that they may decline to receive medical services by telemedicine and may withdraw from such care at any time. Patient verbally consented to receive this service via voice-only telephone call.       HPI: Michael Johnson Jr. is a 61 y.o. male who  has a past medical history of Afib, Alcohol abuse with other alcohol-induced disorder, Anemia, Ankle fracture, left, Arthritis, Asthma, Cholecystitis, Chronic kidney disease, Depression, Erectile dysfunction, Gout, Hypertension, Hypertensive chronic kidney disease, hypomagnesemia, Hypokalemia, noncompliance, peripheral neuropathy, seizure.  He follows with PCP Dr. Candelario but has not seen recently due to debility, difficulty leaving home.     Today: Patient being evaluated via phone visit.  Discussed recent lab results.  Labs completed by [facility] showed Vit D3, Phosphorus, Magnesium, Vit D 1,25-Dihydroxy and TSH WNL.  Levetiracetam less than 1, Vitamin B12 >1506, LDL 91, triglycerides 205, total cholesterol 146, H/H 8.9/25.9(trended down), glucose 116, HgbA1c 5.3, Creatinine 1.5 (stable/at baseline), Albumin 3.4, Bili 1.3, ALT 74. Lab work also sent to PCP for review and additional recommendations. [facility] is working on scheduling patient for in home visits. Denies chest pain, SOB,  fevers, cough, congestion, change in bladder habits, change in bowel habits. Discussed the importance of taking meds as prescribed.  Patient reports good bm pattern, good sleep pattern.  He denies current alcohol use. He lives with spouse who assist with care.  He requires assistance with bathing, toileting, dressing, meal prep.  He is continent of bladder and bowel.  Risks of environmental exposure to coronavirus discussed including: social distancing, hand hygiene, and limiting departures from the home for necessities only.      Problem List Items Addressed This Visit        Neuro    Seizure disorder     Chronic  Reports no current seizure activity  Recent labs showed Keppra level less than 1  Discussed importance of taking meds as prescribed   Continue Keppra               Psychiatric    Alcohol abuse     Reports no alcohol in 3 months            Depression     Denies SI/HI  continue Lexapro               Cardiac/Vascular    Mixed hyperlipidemia     Recent labs showed LDL 91, triglycerides 205, total cholesterol 146  Denies chest pain, SOB  Discussed importance of low fat diet   Daily exercises            Essential hypertension     Chronic   Continue metoprolol           Paroxysmal atrial fibrillation     HR controlled  Continue metoprolol   Not on anticoagulant likely due to history of falls and alcohol abuse                Renal/    Hypokalemia     Lab showed K+ WNL  Continue daily supplement            Hypomagnesemia     Lab showed mag WNL  Continue daily supplement            CKD (chronic kidney disease), stage III     Recent labs showed creatinine of 1.5-at baseline  U/a negative for infectious process   Voiding well  Encouraged adequate po fluids  Avoid nephrotoxic agents              Endocrine    Vitamin D deficiency     Continue Vitamin D supplement            Malnutrition     Low albumin level noted on recent labs  Encouraged adequate po intake  Small frequent meals  Nutritional support                GI    Elevated bilirubin     Mildly elevated  Denies abdominal pain or jaundice            Transaminitis     Mildly elevated  Denies abdominal pain              Orthopedic    Chronic pain of left ankle     Chronic  Continue meds as prescribed            Gouty arthritis     Stable  Continue allopurinol               Other    Debility     Ordered home health  Continue caregiver support  Nutritional support                Instructions:  - [facility] to schedule home follow-up visit with patient in 4-6 weeks or as needed.  - Continue all medications, treatments and therapies as ordered.   - Follow all instructions, recommendations as discussed.  - Maintain Safety Precautions at all times.  - Attend all medical appointments as scheduled.  - For worsening symptoms: call Primary Care Physician or Nurse Practitioner.  - For emergencies, call 911 or immediately report to the nearest emergency room.  - Limit Risks of environmental exposure to coronavirus/COVID-19 as discussed including: social distancing, hand hygiene, and limiting departures from the home for necessities only.                            This service was not originating from a related E/M service provided within the previous 7 days nor will  to an E/M service or procedure within the next 24 hours or my soonest available appointment.  Prevailing standard of care was able to be met in this audio-only visit.

## 2022-07-13 ENCOUNTER — TELEPHONE (OUTPATIENT)
Dept: INTERNAL MEDICINE | Facility: CLINIC | Age: 62
End: 2022-07-13

## 2022-07-13 ENCOUNTER — OFFICE VISIT (OUTPATIENT)
Dept: INTERNAL MEDICINE | Facility: CLINIC | Age: 62
End: 2022-07-13
Payer: COMMERCIAL

## 2022-07-13 DIAGNOSIS — R74.01 TRANSAMINITIS: ICD-10-CM

## 2022-07-13 DIAGNOSIS — I10 ESSENTIAL HYPERTENSION: ICD-10-CM

## 2022-07-13 DIAGNOSIS — M10.9 GOUTY ARTHRITIS: ICD-10-CM

## 2022-07-13 DIAGNOSIS — D53.9 MACROCYTIC ANEMIA: ICD-10-CM

## 2022-07-13 DIAGNOSIS — E87.6 HYPOKALEMIA: ICD-10-CM

## 2022-07-13 DIAGNOSIS — Z00.00 ANNUAL PHYSICAL EXAM: ICD-10-CM

## 2022-07-13 DIAGNOSIS — R56.9 SEIZURE: ICD-10-CM

## 2022-07-13 DIAGNOSIS — E83.42 HYPOMAGNESEMIA: ICD-10-CM

## 2022-07-13 DIAGNOSIS — F32.A DEPRESSION, UNSPECIFIED DEPRESSION TYPE: ICD-10-CM

## 2022-07-13 DIAGNOSIS — G81.10 SPASTIC HEMIPLEGIA, UNSPECIFIED ETIOLOGY, UNSPECIFIED LATERALITY: Primary | ICD-10-CM

## 2022-07-13 DIAGNOSIS — R74.8 ELEVATED ALKALINE PHOSPHATASE LEVEL: ICD-10-CM

## 2022-07-13 PROBLEM — N28.9 ACUTE ON CHRONIC RENAL INSUFFICIENCY: Status: RESOLVED | Noted: 2020-09-18 | Resolved: 2022-07-13

## 2022-07-13 PROBLEM — N18.9 ACUTE ON CHRONIC RENAL INSUFFICIENCY: Status: RESOLVED | Noted: 2020-09-18 | Resolved: 2022-07-13

## 2022-07-13 PROBLEM — Z91.81 HISTORY OF FALL: Status: RESOLVED | Noted: 2022-03-08 | Resolved: 2022-07-13

## 2022-07-13 PROCEDURE — 99417 PROLNG OP E/M EACH 15 MIN: CPT | Mod: 95,,, | Performed by: INTERNAL MEDICINE

## 2022-07-13 PROCEDURE — 99215 OFFICE O/P EST HI 40 MIN: CPT | Mod: 95,,, | Performed by: INTERNAL MEDICINE

## 2022-07-13 PROCEDURE — 1159F PR MEDICATION LIST DOCUMENTED IN MEDICAL RECORD: ICD-10-PCS | Mod: CPTII,95,, | Performed by: INTERNAL MEDICINE

## 2022-07-13 PROCEDURE — 1160F PR REVIEW ALL MEDS BY PRESCRIBER/CLIN PHARMACIST DOCUMENTED: ICD-10-PCS | Mod: CPTII,95,, | Performed by: INTERNAL MEDICINE

## 2022-07-13 PROCEDURE — 1160F RVW MEDS BY RX/DR IN RCRD: CPT | Mod: CPTII,95,, | Performed by: INTERNAL MEDICINE

## 2022-07-13 PROCEDURE — 99215 PR OFFICE/OUTPT VISIT, EST, LEVL V, 40-54 MIN: ICD-10-PCS | Mod: 95,,, | Performed by: INTERNAL MEDICINE

## 2022-07-13 PROCEDURE — 1159F MED LIST DOCD IN RCRD: CPT | Mod: CPTII,95,, | Performed by: INTERNAL MEDICINE

## 2022-07-13 PROCEDURE — 99417 PR PROLONGED SVC, OUTPT, W/WO DIRECT PT CONTACT,  EA ADDTL 15 MIN: ICD-10-PCS | Mod: 95,,, | Performed by: INTERNAL MEDICINE

## 2022-07-13 RX ORDER — FERROUS SULFATE 325(65) MG
325 TABLET, DELAYED RELEASE (ENTERIC COATED) ORAL DAILY
Qty: 90 TABLET | Refills: 0 | Status: SHIPPED | OUTPATIENT
Start: 2022-07-13 | End: 2022-09-30

## 2022-07-13 RX ORDER — ALLOPURINOL 300 MG/1
300 TABLET ORAL DAILY
Qty: 90 TABLET | Refills: 0 | Status: SHIPPED | OUTPATIENT
Start: 2022-07-13 | End: 2022-09-30

## 2022-07-13 RX ORDER — LEVETIRACETAM 500 MG/1
500 TABLET ORAL 2 TIMES DAILY
Qty: 180 TABLET | Refills: 0 | Status: SHIPPED | OUTPATIENT
Start: 2022-07-13 | End: 2022-09-30

## 2022-07-13 RX ORDER — METOPROLOL TARTRATE 50 MG/1
50 TABLET ORAL 2 TIMES DAILY
Qty: 180 TABLET | Refills: 0 | Status: SHIPPED | OUTPATIENT
Start: 2022-07-13 | End: 2022-09-30

## 2022-07-13 NOTE — TELEPHONE ENCOUNTER
1. Ordered referral to [facility]. Please fax referral and give the patient their phone #. 2. Please schedule an in person appointment for HTN, coordination of care.  Not urgent, but preferably sooner than later.   -visits with me should be an hour due to complex health conditions, transportation issues.  Thanks!  3. Schedule abdominal ultrasound  4. Schedule labs anytime.  5. Ordered referral to Hepatology.  Please assist the patient with scheduling.  Thank you!

## 2022-07-13 NOTE — PROGRESS NOTES
Audio Only Telehealth Visit     The patient location is: [place]  The chief complaint leading to consultation is: Leg Pain and Medication Refill    Visit type: Virtual visit with audio only (telephone)     The reason for the audio only service rather than synchronous audio and video virtual visit was related to technical difficulties or patient preference/necessity.     Each patient to whom I provide medical services by telemedicine is:  (1) informed of the relationship between the physician and patient and the respective role of any other health care provider with respect to management of the patient; and (2) notified that they may decline to receive medical services by telemedicine and may withdraw from such care at any time. Patient verbally consented to receive this service via voice-only telephone call.    This service was not originating from a related E/M service provided within the previous 7 days nor will  to an E/M service or procedure within the next 24 hours or my soonest available appointment.  Prevailing standard of care was able to be met in this audio-only visit.      Subjective:       Patient ID: Michael Johnson Jr. is a 61 y.o. 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    Chief Complaint: Leg Pain and Medication Refill     History was obtained from the patient and  supplemented through chart review and   Following with [facility] for chronic pain.  -coordinated care, labs with [facility].  -Reviewed outside labs from [facility]. Scanning into the media tab.    His wife is also my patient, Leeanne Johnson.    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  Gradually able to walk.  Can transfer himself out of a wheelchair.  Bathes in bed.  Has bedside commode.  Cannot prepare his own meals.    HPI    He has had very few visits.  Last in-person evaluation in 2020. Difficulty with transportation. Wife states that they have to call an ambulance to get to appts. Per SW, has unlimited transportation with [employer], but no one to accompany him. Consider Medicaid Waiver for PCA.  Family declined lift. Was referred to PM&R for mobility eval, motorized wheelchair. States that he is unable to stand. Has [facility]. Chronic pain is his main concern today.    HTN:    Used to be on diltiazem in the past.     Issues with med compliance. Is on Lopressor 50 b.i.d. +A fib. Per [facility], HR was 111 and he had not taken meds yet.  TSH wnl 6/2022.  Home BP: Doesn't check regularly. Hasn't checked in a while. Had BP checked today by [facility], but unknown value.     Tobacco: sometimes  ETOH: as below  Exercise: in a wheelchair.     H/o Hypokalemia , hypomagnesemia:  Is unable to prepare his own meals.  Eats ramen noodles.  Reports chronic little PO intake. Drinks Ensure to avoid going to the bathroom d/t decreased mobility.  No diarrhea.      Unclear if he has been taking supplements consistently. Hasn't been taking K.  Recent level wnl on OSH labs 6/2022.     Macrocytic anemia, AOCD:   Long history of anemia.  Hemoglobin dropped to 6.6, thought to be due to hemodilution. Did receive 1 unit PRBCs. FOBT negative.   There is documentation of C scope at [facility] 05/26/2014, but no report.    Last H&H 1-2021 11/34.  Repeat OSH  H&H 8.9/25.9 in .   Denies hematochezia.  Has iron supplement once a day, but not taking regularly.  Lab Results   Component Value Date    IRON 16 (L) 11/29/2019    TIBC 229 (L) 11/29/2019    FERRITIN 1,143 (H) 11/29/2019     Lab Results   Component Value Date    [id number] 456 11/29/2019     Lab Results   Component Value Date    FOLATE 19.5 11/30/2019     Alcohol abuse, transaminitis:  Drinks 2-4 coffee mugs of vodka cocktail daily. Unsure if he is taking supplements regularly.  Per [facility]:  Denies ETOH x 1 mo.  On gabapentin.    Labs .  No thrombocytopenia. Mild transaminitis.  ALT 74, AST 50. Alk-phos borderline high at 177.     H/o Gout:  On allopurinol 300.  Lab Results   Component Value Date    URICACID 10.0 (H) 11/30/2019     Depression:  [facility] started Lexapro for depression. He was unaware of this. Has not been taking Lexapro. denies depression or h/o of this.    Seizure:  Onset as an adult.  Admission  for seizure activity.    History of med noncompliance, but had also stopped drinking.  CT head without acute changes.  Resumed home Keppra  500 b.i.d..  Does not follow with Neuro.      Admitted  for seizure.  CT head without acute abnormality.  EEG without epileptiform discharges or seizures.  K, Mg were replaced.  Unclear if seizure was due to medication noncompliance or ETOH withdrawal.  Started folic acid, thiamine.  Recommended outpatient MRI with and without epilepsy protocol, neurology follow-up.    Recent Keppra level 6/2022 undetectable.  He reports that he takes Keppra.            Not addressed today.  Paroxysmal A fib:  H/o med noncompliance with metoprolol, diltiazem.  Admission  for seizure. HR 200s (Presumed SVT).  Received adenosine and was then in AFib with RVR that did not respond to diltiazem.  Then received amiodarone infusion.  AFib with RVR was thought to be due to dehydration and electrolyte abnormalities.  Resumed Lopressor  50 BID.  Chads Vasc score 1.  Cardiology stopped aspirin.    Cont Lopessor 50 BID. CHADSVASC 1, so no ASA, NOAC needed. Will need to reschedule f/u c Cards.    HLD:   Not on statin. Has poor diet as above.  OSH FLP :  TC  146, , HDL 25, LDL 91.   Lab Results   Component Value Date    LDLCALC 63.6 01/10/2018     The ASCVD Risk score (Len BEE Jr., et al., 2013) failed to calculate for the following reasons:    Cannot find a previous HDL lab    Cannot find a previous total cholesterol lab    CKD 3:  Not on ACE/ARB.  Baseline creatinine around 1.5.  Last CMP .  Phos 3.7, WNL.  Renal ultrasound: normal  Missed and cancelled appt with Nephrology.  Stable.  Will need to reschedule c Nephrology. Monitor CMP, lytes  Lab Results   Component Value Date    CREATININE 1.9 (H) 01/02/2021    BUN 31 (H) 01/02/2021     01/02/2021    K 3.6 01/02/2021    CL 96 01/02/2021    CO2 22 (L) 01/02/2021     Lab Results   Component Value Date    CALCIUM 8.8 01/02/2021    PHOS 1.7 (L) 11/29/2019     Lab Results   Component Value Date    PTH 25.5 02/04/2020    CALCIUM 8.8 01/02/2021    PHOS 1.7 (L) 11/29/2019     Lab Results   Component Value Date    URICACID 10.0 (H) 11/30/2019     Vitamin D deficiency: completed course of Ergocalciferol.  Repeat vitamin-D  was 41.   Lab Results   Component Value Date    [id number] 32 02/04/2020     GERD:  +nausea.   Started on Protonix.   Referred to GI.    Tobacco use:    Occasionally smokes a pipe. No cocaine use.  Counseled for 5 min on tobacco cessation.      Chronic left ankle pain:   History of multiple ankle surgeries/fusion x 6.  Had HH, but discharged d/t reaching max potential.  Started topical Voltaren w/o relief; caused itching.   Used to be on a narcotic, but was discontinued d/t concern for addiction. H/o ETOH abuse. States that he has seen Ortho several times throughout the years.     He c/o chronic pain. Takes gabapentin BID. Reports sedation; he is unsure of  "this since he often lies in bed.   He is mainly concerned of pain.  Has appointment with pain clinic. He declines seeing Ortho.  Persistent. No controlled substances given h/o ETOH abuse. Unable to tolerate voltaren gel. Has appt c Pain clinic. Consider Ortho d/t H/o multiple sx.     Polyneuropathy:    Debility as above. Reports excruciating pain starting in his toes, involving his b/l feet traveling up to knees and hands.  Can't make a fist. No pain involving his hips and elbows.  Pain is constant.  Pain is worse with standing.  Pain can be so bad that it causes numbness.     Also reports very brief excruciating muscle spasms "all over" every few minutes.  Even occurs at night.  Occurs regardless if he stretches his legs, lying down or seated.  No muscle tightness.  No recent sz or LOC. Is compliant with Keppra.  No bowel, bladder incontinence.  Has chronic back pain from laying down     On gabapentin as above.  A1c 5.3.  Cont gabapentin BID. Unable to titrate d/t sedation. Referrals as above.    Review of Systems   Constitutional: Negative for activity change.   Musculoskeletal: Positive for arthralgias and gait problem.       I personally reviewed Past Medical History, Past Surgical History, Social History, and Family History.    Objective:      There were no vitals filed for this visit.   Physical Exam  Pulmonary:      Effort: No respiratory distress.           Lab Results   Component Value Date    WBC 11.54 01/02/2021    HGB 11.2 (L) 01/02/2021    HCT 34.0 (L) 01/02/2021     01/02/2021    CHOL 144 01/10/2018    TRIG 92 01/10/2018    HDL 62 01/10/2018    ALT 31 01/02/2021    AST 38 01/02/2021     01/02/2021    K 3.6 01/02/2021    CL 96 01/02/2021    CREATININE 1.9 (H) 01/02/2021    BUN 31 (H) 01/02/2021    CO2 22 (L) 01/02/2021    TSH 2.159 12/23/2019    PSA 0.27 02/05/2013    INR 1.0 11/29/2019    HGBA1C 4.2 01/09/2018       The ASCVD Risk score (Len CRISTAL Jr., et al., 2013) failed to calculate for " the following reasons:    Cannot find a previous HDL lab    Cannot find a previous total cholesterol lab    (Imaging have been independently reviewed)  CT head without acute abnormality.  Stable cerebral atrophy, chronic small-vessel ischemic changes.    Assessment:       1. Spastic hemiplegia, unspecified etiology, unspecified laterality    2. Essential hypertension    3. Hypokalemia    4. Hypomagnesemia    5. Macrocytic anemia    6. Transaminitis    7. Elevated alkaline phosphatase level    8. Gouty arthritis    9. Depression, unspecified depression type    10. Seizure    11. Annual physical exam          Plan:       Michael was seen today for leg pain and medication refill.    Diagnoses and all orders for this visit:    Spastic hemiplegia, unspecified etiology, unspecified laterality  Comments:  Refer to SW to assist c transportation to app. Has appt c Pain clinic.  Orders:  -     Ambulatory referral/consult to Outpatient Case Management    Essential hypertension  Comments:  Unknown home BP.  Continue Lopressor 50 BID d/t A fib.   Orders:  -     metoprolol tartrate (LOPRESSOR) 50 MG tablet; Take 1 tablet (50 mg total) by mouth 2 (two) times daily.    Hypokalemia  Comments:  D/t ETOH, decreased PO intake. OK to stop K given wnl level off KCl. Check Mg.  Orders:  -     Magnesium; Future    Hypomagnesemia  Comments:  As above.  Orders:  -     Magnesium; Future    Macrocytic anemia  Comments:  Malnutrition d/t h/o ETOH abuse, CKD contributing. Recheck vitamin levels. Refer to [facility] for EGD/C scope given drop in H&H.   Orders:  -     Ambulatory referral/consult to Gastroenterology; Future  -     ferrous sulfate 325 (65 FE) MG EC tablet; Take 1 tablet (325 mg total) by mouth once daily.  -     Ferritin; Future  -     Iron and TIBC; Future  -     Vitamin B12; Future  -     Folate; Future    Transaminitis  Comments:  Mild. Suspect cirrhosis. Check abdominal ultrasound, GGT.  Refer to Hepatology.   Orders:  -     US  Abdomen Complete; Future  -     Ambulatory referral/consult to Hepatology; Future  -     Gamma GT; Future    Elevated alkaline phosphatase level  Comments:  As above.  Orders:  -     US Abdomen Complete; Future  -     Ambulatory referral/consult to Hepatology; Future  -     Gamma GT; Future    Gouty arthritis  Comments:  Continue allopurinol. Recheck uric acid level. Will need to refer again to Nephrology  Orders:  -     allopurinoL (ZYLOPRIM) 300 MG tablet; Take 1 tablet (300 mg total) by mouth once daily.  -     URIC ACID; Future    Depression, unspecified depression type  Comments:  Denies h/o of this. D/C'd lexapro as he has not been taking this.    Seizure  Comments:  Questionable compliance c Keppra; continue. Will need to resched f/u c Neurology.   Orders:  -     levETIRAcetam (KEPPRA) 500 MG Tab; Take 1 tablet (500 mg total) by mouth 2 (two) times daily.    Annual physical exam  -     Ferritin; Future  -     Iron and TIBC; Future  -     Vitamin B12; Future  -     Folate; Future  -     Gamma GT; Future  -     Magnesium; Future  -     URIC ACID; Future         Side effects of medication(s) were discussed in detail and patient voiced understanding.  Patient will call back for any issues or complications.     I have spent a total of 80 minutes with the patient as well as reviewing the chart/medical record and placing orders on the day of the visit. Discussed medications, transportation, HTN, lab results, K, anemia, GI w/u, transaminitis, depression, sz.     RTC in 1 month(s) or sooner PRN for HTN, lab results, coordination of care.  In person. 1 hour visit d/t many comorbidities.

## 2022-07-13 NOTE — PATIENT INSTRUCTIONS
"Thank you for your message. We have been getting a lot of questions like yours.     [facility] is offering COVID-19 vaccinations in accordance to the recommendations of the [facility] of [facility].  [facility] recommends scheduling your COVID Vaccine via [facility] by following the directions outlined below.      To Schedule your vaccine on the [employer] website:  Choose "Visits" then "Schedule an Appointment" and select the visit tile titled "COVID-19 Vaccine."    To Schedule your vaccine on the [employer] sonia:  Choose "Appointments" then "Schedule an Appointment" then "Tell us why you're coming in" and select the visit tile titled "COVID Vaccine"      Patients may also call [phone] if they do not have a [facility] account.    More times and dates will become available as we receive more vaccine on a weekly basis. We encourage you to continue to check [facility] as more slots become available and appreciate your patience during this process.    Due to high activity, the [facility] website and COVID hotline may experience a slowdown or long wait times. We sincerely apologize for the inconvenience and appreciate your patience as you try and schedule your vaccination.    We are hopeful that the vaccine will be available to more members of the public soon. We encourage community members and patients to visit [email or web address] or [place].[place]/coronavirus or [place] for the latest information and resources.     "

## 2022-07-13 NOTE — TELEPHONE ENCOUNTER
We haven't received any faxes with lab results for this patient. Aguilar just checked our fax box.  Could you check on this and ask Ms. Culp (NP) to resend it?  I don't know if there's a better way to send lab results.  Thank you!

## 2022-07-13 NOTE — LETTER
July 19, 2022    Michael Johnson Jr.  [address]  [place] [address]             [facility]  [address]  [place] [address]  Phone: [phone]  Fax: [phone] Dear Mr. Johnson:    Dr. Candelario would like some appointments made. I'm sorry that we've missed you by phone.      Please call us at [phone] to schedule     1) Colonoscopy - for this you will call [phone]  2) An appointment with Dr. Candelario for HTN and co-ordination of care.  3) Abdominal Ultrasound.  4) labs (non-fasting).  5) Hepatology (liver doctor) appointment.        If you have any questions or concerns, please don't hesitate to call.    Sincerely,    EVELINE Alvarenga

## 2022-07-13 NOTE — TELEPHONE ENCOUNTER
----- Message from Marilu Candelario MD sent at 7/13/2022  2:22 PM CDT -----  1. Ordered referral to [facility]. Please fax referral and give the patient their phone #.    2. Please schedule an in person appointment for HTN, coordination of care.  Not urgent, but preferably sooner than later.  3. Schedule abdominal ultrasound  4. Schedule labs anytime.  5. Ordered referral to Hepatology.  Please assist the patient with scheduling.  Thank you!

## 2022-07-21 ENCOUNTER — OUTPATIENT CASE MANAGEMENT (OUTPATIENT)
Dept: ADMINISTRATIVE | Facility: OTHER | Age: 62
End: 2022-07-21
Payer: COMMERCIAL

## 2022-07-21 NOTE — PROGRESS NOTES
SW contacted patient regarding referral. SW explained to patient reason for referral to assist with community resources. Patient denied needing assistance with completing his ADLs. Patient reports hes not at the point where he needs a caregiver. SW explored options as patient states hes unable to leave the home as he needs someone to accompany him. Patient denied placing name on wait list as stated above. Patient reports physicians are not addressing his foot. Patient feels that PCP and other care providers concern is with his upper body and not foot. Patient reports he ambulates with a wheelchair and states having an elevator in the home. Patient aware he has [employer] transportation benefit. Patient reports being in severe pain therefore he doesnt walk often due to the pain. Patient denied having any social needs. SW will close case as no needs.

## 2022-07-28 ENCOUNTER — EXTERNAL HOME HEALTH (OUTPATIENT)
Dept: HOME HEALTH SERVICES | Facility: HOSPITAL | Age: 62
End: 2022-07-28
Payer: COMMERCIAL

## 2022-08-15 ENCOUNTER — TELEPHONE (OUTPATIENT)
Dept: HEPATOLOGY | Facility: CLINIC | Age: 62
End: 2022-08-15
Payer: COMMERCIAL

## 2022-08-17 ENCOUNTER — TELEPHONE (OUTPATIENT)
Dept: HEPATOLOGY | Facility: CLINIC | Age: 62
End: 2022-08-17
Payer: COMMERCIAL

## 2022-10-06 DIAGNOSIS — E87.6 ACUTE HYPOKALEMIA: ICD-10-CM

## 2022-10-06 RX ORDER — POTASSIUM CHLORIDE 20 MEQ/1
TABLET, EXTENDED RELEASE ORAL
Qty: 120 TABLET | OUTPATIENT
Start: 2022-10-06

## 2022-10-06 NOTE — TELEPHONE ENCOUNTER
No new care gaps identified.  [facility] Embedded Care Gaps. Reference number: [id number]. 10/06/2022   4:20:07 PM CDT

## 2022-10-06 NOTE — TELEPHONE ENCOUNTER
Refill Routing Note   Medication(s) are not appropriate for processing by [facility] for the following reason(s):      - PER PLAN on 6/2022, pt was to continue taking potassium supplement  - Medication not active on medication list    [facility] action(s):  Defer          Medication reconciliation completed: No     Appointments  past 12m or future 3m with PCP    Date Provider   Last Visit   7/13/2022 Marilu Candelario MD   Next Visit   Visit date not found Marilu Candelario MD   ED visits in past 90 days: 0        Note composed:5:48 PM 10/06/2022

## 2022-12-07 ENCOUNTER — TELEPHONE (OUTPATIENT)
Dept: INTERNAL MEDICINE | Facility: CLINIC | Age: 62
End: 2022-12-07
Payer: COMMERCIAL

## 2022-12-07 NOTE — TELEPHONE ENCOUNTER
----- Message from Janell Mayorga sent at 12/7/2022 10:11 AM CST -----  Name of Who is Calling:TIERA MARTIN JR. [[id number]]              What is the request in detail:Requesting a call back to schedule a f/u.               Can the clinic reply by [facility]:no              What Number to Call Back if not in [facility]:[phone]

## 2022-12-27 NOTE — TELEPHONE ENCOUNTER
Spoke with patient, he states he received a phone call from someone stating to push the appt back. Pt wants to cancel the appt and reschedule once everything has calm down with the virus that's going on.   
no

## 2023-01-19 ENCOUNTER — TELEPHONE (OUTPATIENT)
Dept: INTERNAL MEDICINE | Facility: CLINIC | Age: 63
End: 2023-01-19
Payer: MEDICARE

## 2023-01-19 DIAGNOSIS — J44.1 COPD WITH EXACERBATION: Primary | ICD-10-CM

## 2023-01-19 RX ORDER — BENZONATATE 100 MG/1
200 CAPSULE ORAL 3 TIMES DAILY PRN
Qty: 30 CAPSULE | Refills: 0 | Status: SHIPPED | OUTPATIENT
Start: 2023-01-19 | End: 2023-01-29

## 2023-01-19 RX ORDER — DOXYCYCLINE 100 MG/1
100 CAPSULE ORAL 2 TIMES DAILY
Qty: 10 CAPSULE | Refills: 0 | Status: SHIPPED | OUTPATIENT
Start: 2023-01-19 | End: 2023-01-30

## 2023-01-19 NOTE — TELEPHONE ENCOUNTER
While returning wife's call, I also spoke with pt after she handed me the phone. Pt states that he has also been having cold symptoms of post nasal drip and it's irritating his throat. Negative Covid test at home.    Denies other symptoms. Made VV appt for after his wife's appt. Discussed that this will be on his personal MyChart. He said his wife handles this for him. Then he proceeds to say that his wife does not need access to his medical info and he handles his own. Pt's wife in background and said that she will help him with it. Leeanne thanked me and call was disconnected.

## 2023-01-19 NOTE — TELEPHONE ENCOUNTER
----- Message from Huong Rodríguez RN sent at 1/19/2023  9:51 AM CST -----  Regarding: FW: [id number]    ----- Message -----  From: Aav Chao  Sent: 1/18/2023   4:26 PM CST  To: [employer] [facility] Staff  Subject: [id number]                                   Type: Patient Call Back    Who called:self    What is the request in detail:pt states she will like to speak with the nurse about getting some medication sent to the pharmacy for antibiotics for coughing,cold and flu symptoms. Negative Covid     Can the clinic reply by [facility]?no    Would the patient rather a call back or a response via [facility]? Call back    Best call back number: [phone]      Additional Information:       [id number] - [place], [place] - [address] AT [facility] [address]  [address]  [facility] [address]  Phone: [phone] Fax: [phone]

## 2023-01-23 ENCOUNTER — TELEPHONE (OUTPATIENT)
Dept: FAMILY MEDICINE | Facility: CLINIC | Age: 63
End: 2023-01-23
Payer: MEDICARE

## 2023-01-23 NOTE — TELEPHONE ENCOUNTER
Patient had virtual appointment with Dr. Mayberry. Patient wife (ROSIE) states she will call her insurance to see which medication they will cover.

## 2023-01-23 NOTE — TELEPHONE ENCOUNTER
----- Message from Ruiz Duque sent at 1/23/2023  3:53 PM CST -----  Name of Who is Calling: Wife of patient TIERA MARTIN JRElida [[id number]]           What is the request in detail: These two medications are not covered under the insurance: albuterol sulfate (PROAIR RESPICLICK) 90 mcg/actuation inhaler and benzonatate (TESSALON) 100 MG capsule. The patient stated it is 50$.Please contact to further discuss and advise.            Can the clinic reply by [facility]: NO           What Number to Call Back [facility]: [phone]

## 2023-02-02 ENCOUNTER — PATIENT OUTREACH (OUTPATIENT)
Dept: ADMINISTRATIVE | Facility: OTHER | Age: 63
End: 2023-02-02
Payer: MEDICARE

## 2023-02-02 NOTE — PROGRESS NOTES
CHW - Initial Contact    This Community Health Worker completed  the Social Determinant of Health questionnaire with patient via telephone today.    Pt identified barriers of most importance are: patient stated that he needs help with utilities assistance but bills in spouse name   Referrals to community agencies completed with patient/caregiver consent outside of [facility] include: chw gave patient number to places that can help with assistance  Referrals were put through [facility] - no:   Support and Services:yes   Other information discussed the patient needs / wants help with: no   Follow up required:   Follow-up Outreach - Due: 2/15/2023

## 2023-02-07 DIAGNOSIS — Z00.00 ENCOUNTER FOR MEDICARE ANNUAL WELLNESS EXAM: ICD-10-CM

## 2023-02-09 DIAGNOSIS — Z00.00 ENCOUNTER FOR MEDICARE ANNUAL WELLNESS EXAM: ICD-10-CM

## 2023-02-16 ENCOUNTER — PATIENT OUTREACH (OUTPATIENT)
Dept: ADMINISTRATIVE | Facility: OTHER | Age: 63
End: 2023-02-16
Payer: MEDICARE

## 2023-02-16 NOTE — PROGRESS NOTES
CHW - Outreach Attempt    Community Health Worker left a voicemail message for 1st attempt to contact patient regarding: SDOH  Community Health Worker to attempt to contact patient on: 02/16/2023

## 2023-02-28 ENCOUNTER — PATIENT OUTREACH (OUTPATIENT)
Dept: ADMINISTRATIVE | Facility: OTHER | Age: 63
End: 2023-02-28
Payer: MEDICARE

## 2023-02-28 NOTE — PROGRESS NOTES
CHW - Follow Up    This Community Health Worker completed a follow up visit with patient via telephone today.  Pt/Caregiver reported: patient stated that he did not received the information chw sent out to him  Community Health Worker provided: chw will send out information   Follow up required:   Follow-up Outreach - Due: 3/22/2023

## 2023-03-13 ENCOUNTER — PES CALL (OUTPATIENT)
Dept: ADMINISTRATIVE | Facility: OTHER | Age: 63
End: 2023-03-13
Payer: MEDICARE

## 2023-03-21 ENCOUNTER — PATIENT OUTREACH (OUTPATIENT)
Dept: ADMINISTRATIVE | Facility: OTHER | Age: 63
End: 2023-03-21
Payer: MEDICARE

## 2023-03-21 NOTE — PROGRESS NOTES
CHW - Follow Up    This Community Health Worker completed a follow up visit with patient via telephone today.  Pt/Caregiver reported: patient states he receive the information the chw sent out to him and he has everything he needs   Community Health Worker provided: chw will close case  Follow up required:   No future outreach task assigned        CHW - Case Closure    This Community Health Worker spoke to patient via telephone today.   Pt/Caregiver reported: patient does not need any  other assistance at this time  Pt/Caregiver denied any additional needs at this time and agrees with episode closure at this time.  Provided patient with Community Health Worker's contact information and encouraged him/her to contact this Community Health Worker if additional needs arise.

## 2023-04-27 ENCOUNTER — PES CALL (OUTPATIENT)
Dept: ADMINISTRATIVE | Facility: CLINIC | Age: 63
End: 2023-04-27
Payer: MEDICARE

## 2023-05-23 ENCOUNTER — PATIENT OUTREACH (OUTPATIENT)
Dept: ADMINISTRATIVE | Facility: HOSPITAL | Age: 63
End: 2023-05-23
Payer: MEDICARE

## 2023-05-23 ENCOUNTER — PATIENT MESSAGE (OUTPATIENT)
Dept: ADMINISTRATIVE | Facility: HOSPITAL | Age: 63
End: 2023-05-23
Payer: MEDICARE

## 2023-05-29 ENCOUNTER — TELEPHONE (OUTPATIENT)
Dept: ADMINISTRATIVE | Facility: CLINIC | Age: 63
End: 2023-05-29
Payer: MEDICARE

## 2023-05-29 ENCOUNTER — PATIENT MESSAGE (OUTPATIENT)
Dept: ADMINISTRATIVE | Facility: CLINIC | Age: 63
End: 2023-05-29
Payer: MEDICARE

## 2023-05-31 ENCOUNTER — TELEPHONE (OUTPATIENT)
Dept: ADMINISTRATIVE | Facility: CLINIC | Age: 63
End: 2023-05-31
Payer: MEDICARE

## 2023-06-06 ENCOUNTER — OFFICE VISIT (OUTPATIENT)
Dept: INTERNAL MEDICINE | Facility: CLINIC | Age: 63
End: 2023-06-06
Payer: MEDICARE

## 2023-06-06 DIAGNOSIS — I48.0 PAROXYSMAL ATRIAL FIBRILLATION: ICD-10-CM

## 2023-06-06 DIAGNOSIS — G62.9 PERIPHERAL POLYNEUROPATHY: ICD-10-CM

## 2023-06-06 DIAGNOSIS — N18.30 STAGE 3 CHRONIC KIDNEY DISEASE, UNSPECIFIED WHETHER STAGE 3A OR 3B CKD: ICD-10-CM

## 2023-06-06 DIAGNOSIS — G81.10 SPASTIC HEMIPLEGIA, UNSPECIFIED ETIOLOGY, UNSPECIFIED LATERALITY: ICD-10-CM

## 2023-06-06 DIAGNOSIS — I10 ESSENTIAL HYPERTENSION: Primary | ICD-10-CM

## 2023-06-06 DIAGNOSIS — Z00.00 HEALTH MAINTENANCE EXAMINATION: ICD-10-CM

## 2023-06-06 DIAGNOSIS — R73.09 OTHER ABNORMAL GLUCOSE: ICD-10-CM

## 2023-06-06 DIAGNOSIS — Z12.5 ENCOUNTER FOR SCREENING FOR MALIGNANT NEOPLASM OF PROSTATE: ICD-10-CM

## 2023-06-06 DIAGNOSIS — Z74.8 ASSISTANCE NEEDED WITH TRANSPORTATION: ICD-10-CM

## 2023-06-06 DIAGNOSIS — G89.4 CHRONIC PAIN SYNDROME: ICD-10-CM

## 2023-06-06 DIAGNOSIS — D64.9 ANEMIA, UNSPECIFIED TYPE: ICD-10-CM

## 2023-06-06 DIAGNOSIS — G40.909 SEIZURE DISORDER: ICD-10-CM

## 2023-06-06 DIAGNOSIS — E83.42 HYPOMAGNESEMIA: ICD-10-CM

## 2023-06-06 DIAGNOSIS — E53.8 VITAMIN B12 DEFICIENCY: ICD-10-CM

## 2023-06-06 DIAGNOSIS — M10.9 GOUTY ARTHRITIS: ICD-10-CM

## 2023-06-06 DIAGNOSIS — Z13.6 SCREENING FOR CARDIOVASCULAR CONDITION: ICD-10-CM

## 2023-06-06 PROCEDURE — 99499 RISK ADDL DX/OHS AUDIT: ICD-10-PCS | Mod: HCNC,95,, | Performed by: STUDENT IN AN ORGANIZED HEALTH CARE EDUCATION/TRAINING PROGRAM

## 2023-06-06 PROCEDURE — 99214 OFFICE O/P EST MOD 30 MIN: CPT | Mod: 95,HCNC,, | Performed by: STUDENT IN AN ORGANIZED HEALTH CARE EDUCATION/TRAINING PROGRAM

## 2023-06-06 PROCEDURE — 1159F MED LIST DOCD IN RCRD: CPT | Mod: HCNC,CPTII,95, | Performed by: STUDENT IN AN ORGANIZED HEALTH CARE EDUCATION/TRAINING PROGRAM

## 2023-06-06 PROCEDURE — 99214 PR OFFICE/OUTPT VISIT, EST, LEVL IV, 30-39 MIN: ICD-10-PCS | Mod: 95,HCNC,, | Performed by: STUDENT IN AN ORGANIZED HEALTH CARE EDUCATION/TRAINING PROGRAM

## 2023-06-06 PROCEDURE — 1159F PR MEDICATION LIST DOCUMENTED IN MEDICAL RECORD: ICD-10-PCS | Mod: HCNC,CPTII,95, | Performed by: STUDENT IN AN ORGANIZED HEALTH CARE EDUCATION/TRAINING PROGRAM

## 2023-06-06 PROCEDURE — 99499 UNLISTED E&M SERVICE: CPT | Mod: HCNC,95,, | Performed by: STUDENT IN AN ORGANIZED HEALTH CARE EDUCATION/TRAINING PROGRAM

## 2023-06-06 RX ORDER — LANOLIN ALCOHOL/MO/W.PET/CERES
1 CREAM (GRAM) TOPICAL DAILY
Qty: 90 TABLET | Refills: 3 | Status: SHIPPED | OUTPATIENT
Start: 2023-06-06 | End: 2023-10-09

## 2023-06-06 NOTE — PROGRESS NOTES
The patient's location is:  [place]  The chief complaint leading to consultation is:  Essential hypertension [I10]    Visit type: audiovisual    Time spent in discussion with the patient: 18 minutes  27 minutes of total time spent on the encounter. This includes time spent in discussion with the patient and time preparing for the patient appointment: review of tests, obtaining and/or reviewing separately obtained history, documenting clinical information in the electronic or other health record, independently interpreting results (not separately reported), and communicating results to the patient/family/caregiver or care coordination (not separately reported).     Each patient to whom he or she provides medical services by telemedicine is: (1) informed of the relationship between the physician and patient and the respective role of any other health care provider with respect to management of the patient; and (2) notified that he or she may decline to receive medical services by telemedicine and may withdraw from such care at any time.      Subjective:   Michael Johnson Jr. is a 62 y.o. male who presents for Essential hypertension [I10].       Patient is new to me, PCP is Dr. Candelario.    HTN, CKD3, gout, seizures, spastic left sided hemiplegia, anemia, arthritis, depression, ED, hypomagnesemia, peripheral neuropathy, vitamin D deficiency, alcohol use disorder    HTN -   Currently prescribed metoprolol.  Patient endorses taking medication as directed.  Denies side effects or concerns while taking medication.  Due for micro albumin creatinine ratio.   BP Readings from Last 5 Encounters:  06/24/22 : 122/74  03/07/22 : (!) 141/82  01/02/21 : 114/78  09/19/20 : 121/75  02/04/20 : 118/80    Taking metoprolol for atrial fibrillation   Not routinely following with cardiologist    Not routinely Keppra for seizures   History of alcohol withdrawal seizures  Denies regular alcohol use currently  Not currently following with  neurologist  Endorses has not had a seizure in months    Taking gabapentin for peripheral nueopathy with good effect    Taking allopurinol for gout with typically good effect  Had a gout flare last week when needed refill of medication  Typically <3 flares per year when taking routinely    CKD3 -   Due for recheck of labs    Anemia -  Lab Results       Component                Value               Date                       HGB                      11.2 (L)            01/02/2021                 HCT                      34.0 (L)            01/02/2021                 MCV                      107 (H)             01/02/2021                 RDW                      15.3 (H)            01/02/2021            Lab Results       Component                Value               Date                       UIBC                     244                 08/28/2009                 IRON                     16 (L)              11/29/2019                 TRANSFERRIN              155 (L)             11/29/2019                 TIBC                     229 (L)             11/29/2019                 FESATURATED              7 (L)               11/29/2019             Lab Results       Component                Value               Date                       FERRITIN                 1,143 (H)           11/29/2019            Lab Results       Component                Value               Date                       [id number]               456                 11/29/2019            Lab Results       Component                Value               Date                       FOLATE                   19.5                11/30/2019              Noted previously with hypomagnesemia and hypokalemia  Thought previously 2/2 decreased PO intake vs alcohol use  Due for recheck    Endorses significant mobility and transportation issues due to hemiplegia   Wife is unable to assist patient to appointments  Has not been seen for in person evaluation in >2  years  Previously receiving home health  Endorses significant pain issues with limited mobility  Requires assistance for all ADL's          Review of Systems   Constitutional:  Negative for activity change and unexpected weight change.   HENT:  Negative for hearing loss, rhinorrhea and trouble swallowing.    Eyes:  Negative for discharge and visual disturbance.   Respiratory:  Negative for chest tightness and wheezing.    Cardiovascular:  Negative for chest pain and palpitations.   Gastrointestinal:  Negative for constipation, diarrhea and vomiting.   Endocrine: Negative for polydipsia and polyuria.   Genitourinary:  Negative for difficulty urinating, hematuria and urgency.   Musculoskeletal:  Negative for arthralgias, joint swelling and neck pain.   Neurological:  Negative for weakness and headaches.   Psychiatric/Behavioral:  Negative for confusion and dysphoric mood.        Current Outpatient Medications   Medication Instructions    albuterol sulfate (PROAIR RESPICLICK) 90 mcg/actuation inhaler 2 puffs, Inhalation, Every 6 hours PRN, Rescue    allopurinoL (ZYLOPRIM) 300 MG tablet TAKE 1 TABLET EVERY DAY    aluminum-magnesium hydroxide-simethicone (MAALOX) 200-200-20 mg/5 mL Susp 30 mLs, Oral, Before meals & nightly PRN    diclofenac sodium (VOLTAREN) 2 g, Topical (Top), 4 times daily PRN    doxycycline (VIBRAMYCIN) 100 MG Cap TAKE 1 CAPSULE(100 MG) BY MOUTH TWICE DAILY FOR 5 DAYS    ferrous sulfate 325 (65 FE) MG EC tablet TAKE 1 TABLET EVERY DAY    folic acid (FOLVITE) 1 mg, Oral, Daily    gabapentin (NEURONTIN) 400 mg, Oral, 2 times daily    levETIRAcetam (KEPPRA) 500 MG Tab TAKE 1 TABLET TWICE DAILY    magnesium oxide (MAG-OX) 400 mg (241.3 mg magnesium) tablet TAKE 1 TABLET EVERY DAY    methocarbamoL (ROBAXIN) 500 mg, Oral, 3 times daily PRN, HOLD until follow up with Primary care provider    metoprolol tartrate (LOPRESSOR) 50 MG tablet TAKE 1 TABLET TWICE DAILY       Objective:   There were no vitals filed  for this visit.     Physical Exam  Constitutional:       General: He is not in acute distress.     Appearance: Normal appearance. He is not ill-appearing or diaphoretic.   Neurological:      Mental Status: He is alert.   Psychiatric:         Attention and Perception: Attention normal.         Mood and Affect: Mood and affect normal.         Speech: Speech normal.         Assessment/Plan:       Essential hypertension  Continue current medications.  RTC in 6-8 weeks for follow up.  -     Comprehensive Metabolic Panel; Future  -     TSH; Future  -     CBC Auto Differential; Future  -     Microalbumin/Creatinine Ratio, Urine; Future  -     Hypertension Digital Medicine ([facility]) Enrollment Order  -     Hypertension Digital Medicine ([facility]): Assign Onboarding Questionnaires    Paroxysmal atrial fibrillation  Needs cardiology evaluation once transportation needs addressed  Continue current medications.  RTC in 6-8 weeks for follow up.    Seizure disorder  Noted, recommend continued alcohol cessation    Peripheral polyneuropathy  Continue current medications.  RTC in 6-8 weeks for follow up.    Vitamin B12 deficiency  -     CBC Auto Differential; Future  -     Vitamin B12; Future  -     Folate; Future    Gouty arthritis  Continue current medications.  RTC in 6-8 weeks for follow up.  -     URIC ACID; Future    Stage 3 chronic kidney disease, unspecified whether stage 3a or 3b CKD  -     Comprehensive Metabolic Panel; Future    Anemia, unspecified type  -     CBC Auto Differential; Future  -     Vitamin B12; Future  -     Pathologist Interpretation Differential; Future  -     Folate; Future  -     Reticulocytes; Future  -     Iron and TIBC; Future  -     Ferritin; Future    Hypomagnesemia  -     magnesium oxide (MAG-OX) 400 mg (241.3 mg magnesium) tablet; Take 1 tablet (400 mg total) by mouth once daily.    Spastic hemiplegia, unspecified etiology, unspecified laterality  -     Ambulatory referral/consult to Home Health;  Future  -     Ambulatory referral/consult to [facility]; Future  -     Ambulatory referral/consult to Social Work; Future    Chronic pain syndrome  -     Ambulatory referral/consult to Social Work; Future    Assistance needed with transportation  Needs assistance with transportation to and from appointments  Significant need regarding resources for at home care  Significantly overdue for in person appt  -     Ambulatory referral/consult to [facility]; Future  -     Ambulatory referral/consult to Social Work; Future    Health maintenance examination  RTC for in person evaluation once transportation obtained  -     Comprehensive Metabolic Panel; Future  -     TSH; Future  -     Lipid Panel; Future  -     Hemoglobin A1C; Future  -     CBC Auto Differential; Future  -     Microalbumin/Creatinine Ratio, Urine; Future  -     PSA, Screening; Future  -     Vitamin B12; Future  -     URIC ACID; Future  -     Pathologist Interpretation Differential; Future  -     Folate; Future  -     Reticulocytes; Future  -     Iron and TIBC; Future  -     Ferritin; Future    Other abnormal glucose  -     Hemoglobin A1C; Future    Screening for cardiovascular condition  -     Lipid Panel; Future    Encounter for screening for malignant neoplasm of prostate  -     PSA, Screening; Future      Amelia Lai MD  06/06/2023

## 2023-06-12 ENCOUNTER — PES CALL (OUTPATIENT)
Dept: ADMINISTRATIVE | Facility: CLINIC | Age: 63
End: 2023-06-12
Payer: MEDICARE

## 2023-06-19 PROCEDURE — G0180 PR HOME HEALTH MD CERTIFICATION: ICD-10-PCS | Mod: ,,, | Performed by: STUDENT IN AN ORGANIZED HEALTH CARE EDUCATION/TRAINING PROGRAM

## 2023-06-19 PROCEDURE — G0180 MD CERTIFICATION HHA PATIENT: HCPCS | Mod: ,,, | Performed by: STUDENT IN AN ORGANIZED HEALTH CARE EDUCATION/TRAINING PROGRAM

## 2023-06-23 ENCOUNTER — TELEPHONE (OUTPATIENT)
Dept: HOME HEALTH SERVICES | Facility: CLINIC | Age: 63
End: 2023-06-23
Payer: MEDICARE

## 2023-07-18 ENCOUNTER — TELEPHONE (OUTPATIENT)
Dept: ADMINISTRATIVE | Facility: CLINIC | Age: 63
End: 2023-07-18
Payer: MEDICARE

## 2023-07-20 ENCOUNTER — TELEPHONE (OUTPATIENT)
Dept: ADMINISTRATIVE | Facility: CLINIC | Age: 63
End: 2023-07-20
Payer: MEDICARE

## 2023-07-20 ENCOUNTER — OFFICE VISIT (OUTPATIENT)
Dept: INTERNAL MEDICINE | Facility: CLINIC | Age: 63
End: 2023-07-20
Payer: MEDICARE

## 2023-07-20 ENCOUNTER — DOCUMENT SCAN (OUTPATIENT)
Dept: HOME HEALTH SERVICES | Facility: HOSPITAL | Age: 63
End: 2023-07-20
Payer: MEDICARE

## 2023-07-20 ENCOUNTER — TELEPHONE (OUTPATIENT)
Dept: INTERNAL MEDICINE | Facility: CLINIC | Age: 63
End: 2023-07-20

## 2023-07-20 DIAGNOSIS — K21.9 GASTROESOPHAGEAL REFLUX DISEASE WITHOUT ESOPHAGITIS: ICD-10-CM

## 2023-07-20 DIAGNOSIS — J30.9 ALLERGIC RHINITIS, UNSPECIFIED SEASONALITY, UNSPECIFIED TRIGGER: Primary | ICD-10-CM

## 2023-07-20 PROCEDURE — 1160F RVW MEDS BY RX/DR IN RCRD: CPT | Mod: HCNC,CPTII,95, | Performed by: INTERNAL MEDICINE

## 2023-07-20 PROCEDURE — 1160F PR REVIEW ALL MEDS BY PRESCRIBER/CLIN PHARMACIST DOCUMENTED: ICD-10-PCS | Mod: HCNC,CPTII,95, | Performed by: INTERNAL MEDICINE

## 2023-07-20 PROCEDURE — 1159F PR MEDICATION LIST DOCUMENTED IN MEDICAL RECORD: ICD-10-PCS | Mod: HCNC,CPTII,95, | Performed by: INTERNAL MEDICINE

## 2023-07-20 PROCEDURE — 99213 OFFICE O/P EST LOW 20 MIN: CPT | Mod: HCNC,95,, | Performed by: INTERNAL MEDICINE

## 2023-07-20 PROCEDURE — 1159F MED LIST DOCD IN RCRD: CPT | Mod: HCNC,CPTII,95, | Performed by: INTERNAL MEDICINE

## 2023-07-20 PROCEDURE — 99213 PR OFFICE/OUTPT VISIT, EST, LEVL III, 20-29 MIN: ICD-10-PCS | Mod: HCNC,95,, | Performed by: INTERNAL MEDICINE

## 2023-07-20 RX ORDER — FLUTICASONE PROPIONATE 50 MCG
1 SPRAY, SUSPENSION (ML) NASAL DAILY
Qty: 16 G | Refills: 1 | Status: SHIPPED | OUTPATIENT
Start: 2023-07-20 | End: 2023-11-20

## 2023-07-20 NOTE — PROGRESS NOTES
Subjective:       Patient ID: Michael Johnson Jr. is a 62 y.o. male.    Chief Complaint: Sinus Problem      The patient location is: [place]  The chief complaint leading to consultation is: SInus issues    Visit type: audiovisual    Face to Face time with patient: 12 minutes  15 minutes of total time spent on the encounter, which includes face to face time and non-face to face time preparing to see the patient (eg, review of tests), Obtaining and/or reviewing separately obtained history, Documenting clinical information in the electronic or other health record, Independently interpreting results (not separately reported) and communicating results to the patient/family/caregiver, or Care coordination (not separately reported).         Each patient to whom he or she provides medical services by telemedicine is:  (1) informed of the relationship between the physician and patient and the respective role of any other health care provider with respect to management of the patient; and (2) notified that he or she may decline to receive medical services by telemedicine and may withdraw from such care at any time.    Notes:     Starting yesterday was having sinus drainage. Get's it every month or two.   Has used Antihistamine and a medication for Nausea which helps a little.   Denies vomiting.  No blood or purulent mucus.  No fever.  Says he is bed-bound because of a bad foot.  He does have a PCP.  No cough or shortness of breath.    Sinus Problem  Associated symptoms include congestion.   Review of Systems   HENT:  Positive for nasal congestion and postnasal drip.    Gastrointestinal:  Positive for nausea (he says from swallowing sinus drainage.). Negative for vomiting.       Objective:      Physical Exam  Pulmonary:      Effort: No respiratory distress.   Neurological:      General: No focal deficit present.      Mental Status: He is alert.   Psychiatric:         Mood and Affect: Mood normal.         Behavior: Behavior normal.    "      Thought Content: Thought content normal.       Assessment:       Problem List Items Addressed This Visit          GI    GERD (gastroesophageal reflux disease)     Other Visit Diagnoses       Allergic rhinitis, unspecified seasonality, unspecified trigger    -  Primary            Plan:       Michael was seen today for sinus problem.    Diagnoses and all orders for this visit:    Allergic rhinitis, unspecified seasonality, unspecified trigger    Gastroesophageal reflux disease without esophagitis    Other orders  -     fluticasone propionate (FLONASE) 50 mcg/actuation nasal spray; 1 spray (50 mcg total) by Each Nostril route once daily.         To me it is not clear as to whether this is only a sinus drainage issue or if possibly a gastrointestinal issue but he insists that he is not having nausea or vomiting from his stomach and that it is coming from his nose and sinus.  We will try antihistamine and nasal spray and I have strongly urged him to follow-up with his PCP.  He expressed understanding          Portions of this note may have been created with voice recognition software. Occasional "wrong-word" or "sound-a-like" substitutions may have occurred due to the inherent limitations of voice recognition software. Please, read the note carefully and recognize, using context, where substitutions have occurred.  "

## 2023-07-20 NOTE — TELEPHONE ENCOUNTER
----- Message from Monroe Keita sent at 7/20/2023 11:09 AM CDT -----  Regarding: Call Back  Name of Who is Calling:  Patient Wife          What is the request in detail:  Patient wife would like to Dr. Lai to call she stated the patient is nausea and sinus draining            Can the clinic reply by [facility]: No            What Number to Call Back [facility] not [facility]:[phone]

## 2023-07-24 DIAGNOSIS — K21.9 GASTROESOPHAGEAL REFLUX DISEASE WITHOUT ESOPHAGITIS: ICD-10-CM

## 2023-07-24 DIAGNOSIS — E87.6 ACUTE HYPOKALEMIA: ICD-10-CM

## 2023-07-24 DIAGNOSIS — G81.10 SPASTIC HEMIPLEGIA, UNSPECIFIED ETIOLOGY, UNSPECIFIED LATERALITY: ICD-10-CM

## 2023-07-24 DIAGNOSIS — G62.9 POLYNEUROPATHY: ICD-10-CM

## 2023-07-24 RX ORDER — PANTOPRAZOLE SODIUM 40 MG/1
TABLET, DELAYED RELEASE ORAL
Qty: 90 TABLET | Refills: 2 | OUTPATIENT
Start: 2023-07-24

## 2023-07-24 RX ORDER — GABAPENTIN 400 MG/1
CAPSULE ORAL
Qty: 180 CAPSULE | Refills: 3 | Status: SHIPPED | OUTPATIENT
Start: 2023-07-24

## 2023-07-24 RX ORDER — POTASSIUM CHLORIDE 20 MEQ/1
TABLET, EXTENDED RELEASE ORAL
Qty: 120 TABLET | Refills: 3 | OUTPATIENT
Start: 2023-07-24

## 2023-08-01 ENCOUNTER — TELEPHONE (OUTPATIENT)
Dept: HOME HEALTH SERVICES | Facility: CLINIC | Age: 63
End: 2023-08-01

## 2023-08-02 NOTE — TELEPHONE ENCOUNTER
Attempted to reach out to patient and family several times to confirm home visit, no answer, will follow up as needed.

## 2023-08-06 NOTE — ASSESSMENT & PLAN NOTE
- Mr. Michael Johnson Jr. presented after having a witnessed seizure at home   - he is an alcoholic     - no alcohol level ordered in ED, pending   - state he is compliant with Keppra    - Keppra level pending    - s/p IV keppra in ED   - PRN ativan ordered   - patient has refused a follow up appt with Neuro this year   - Neuro consulted   - EEG ordered      continue rabies sequence   Day 7:  8/10  Day 14: 8/17

## 2023-08-10 ENCOUNTER — PATIENT OUTREACH (OUTPATIENT)
Dept: EMERGENCY MEDICINE | Facility: HOSPITAL | Age: 63
End: 2023-08-10

## 2023-08-11 ENCOUNTER — EXTERNAL HOME HEALTH (OUTPATIENT)
Dept: HOME HEALTH SERVICES | Facility: HOSPITAL | Age: 63
End: 2023-08-11
Payer: MEDICARE

## 2023-09-04 PROBLEM — N18.30 ACUTE RENAL FAILURE SUPERIMPOSED ON STAGE 3 CHRONIC KIDNEY DISEASE: Status: RESOLVED | Noted: 2019-11-29 | Resolved: 2023-09-04

## 2023-09-04 PROBLEM — R13.10 DYSPHAGIA: Status: ACTIVE | Noted: 2023-09-04

## 2023-09-04 PROBLEM — E83.42 HYPOMAGNESEMIA: Status: RESOLVED | Noted: 2017-03-16 | Resolved: 2023-09-04

## 2023-09-04 PROBLEM — N17.9 ACUTE RENAL FAILURE SUPERIMPOSED ON STAGE 3 CHRONIC KIDNEY DISEASE: Status: RESOLVED | Noted: 2019-11-29 | Resolved: 2023-09-04

## 2023-09-05 ENCOUNTER — TELEPHONE (OUTPATIENT)
Dept: INTERNAL MEDICINE | Facility: CLINIC | Age: 63
End: 2023-09-05
Payer: MEDICARE

## 2023-09-05 PROBLEM — I48.92 PAROXYSMAL ATRIAL FLUTTER: Status: RESOLVED | Noted: 2023-09-05 | Resolved: 2023-09-05

## 2023-09-05 PROBLEM — I48.92 PAROXYSMAL ATRIAL FLUTTER: Status: ACTIVE | Noted: 2023-09-05

## 2023-09-05 NOTE — TELEPHONE ENCOUNTER
----- Message from Estephania Zarate sent at 9/5/2023 11:58 AM CDT -----  Regarding: hospital follow up  Name of caller: michelle Gould is the requesting detail: pt is requesting a call back to make a 7 day hospital follow up visit. Please advise       Can the clinic reply by [facility]:       What number to call back:[phone]

## 2023-09-06 ENCOUNTER — PATIENT OUTREACH (OUTPATIENT)
Dept: ADMINISTRATIVE | Facility: CLINIC | Age: 63
End: 2023-09-06
Payer: MEDICARE

## 2023-09-06 NOTE — PROGRESS NOTES
C3 nurse spoke with patient's wife for a TCC post hospital discharge follow up call. The patient has a scheduled HOSFU appointment with Amelia Lai MD  on 09/21/2023 @ 1130 am. Stated she will call PCP office regarding appointment date (can it be virtual) due to patient needing ambulance transport to go to appointment visits.  Stated she will call cardiologist and gastro offices to schedule HOSFU appointments.

## 2023-09-07 ENCOUNTER — TELEPHONE (OUTPATIENT)
Dept: CARDIOLOGY | Facility: CLINIC | Age: 63
End: 2023-09-07
Payer: MEDICARE

## 2023-09-07 ENCOUNTER — TELEPHONE (OUTPATIENT)
Dept: INTERNAL MEDICINE | Facility: CLINIC | Age: 63
End: 2023-09-07
Payer: MEDICARE

## 2023-09-07 NOTE — TELEPHONE ENCOUNTER
Spoke with Leeanne (spouse), scheduled hospital follow up appointment with Dr. Terrazas in two weeks.  She verbalized understanding.

## 2023-09-07 NOTE — TELEPHONE ENCOUNTER
----- Message from Steve Vera sent at 9/7/2023  2:00 PM CDT -----  Contact: [phone]  the patient is calling to get scheduled for a appt.  Pt access tried but no appts are available.  the patient can be reached at.   [phone]

## 2023-09-07 NOTE — TELEPHONE ENCOUNTER
----- Message from Li Melendez sent at 9/7/2023  1:06 PM CDT -----  Regarding: pt call back  Name of Who is Calling: TIERA MARTIN JR. [[id number]]        What is the request in detail: pt would like to know if he has to come in to the office for  his appt on 9/21 even though he cannot walk, please advise.         Can the clinic reply by [facility]: no           What Number to Call Back if not in [facility]: [phone]

## 2023-09-08 DIAGNOSIS — R56.9 SEIZURE: ICD-10-CM

## 2023-09-08 DIAGNOSIS — J44.1 COPD WITH EXACERBATION: ICD-10-CM

## 2023-09-08 DIAGNOSIS — D53.9 MACROCYTIC ANEMIA: ICD-10-CM

## 2023-09-08 DIAGNOSIS — F10.10 ALCOHOL ABUSE: ICD-10-CM

## 2023-09-08 DIAGNOSIS — G89.29 CHRONIC PAIN OF LEFT ANKLE: ICD-10-CM

## 2023-09-08 DIAGNOSIS — M62.838 MUSCLE SPASM: ICD-10-CM

## 2023-09-08 DIAGNOSIS — M25.572 CHRONIC PAIN OF LEFT ANKLE: ICD-10-CM

## 2023-09-08 RX ORDER — DICLOFENAC SODIUM 10 MG/G
2 GEL TOPICAL 4 TIMES DAILY PRN
Qty: 100 G | Refills: 0 | Status: SHIPPED | OUTPATIENT
Start: 2023-09-08

## 2023-09-08 RX ORDER — FOLIC ACID 1 MG/1
1 TABLET ORAL DAILY
Qty: 90 TABLET | Refills: 0 | Status: SHIPPED | OUTPATIENT
Start: 2023-09-08 | End: 2023-10-09

## 2023-09-08 RX ORDER — METHOCARBAMOL 500 MG/1
500 TABLET, FILM COATED ORAL 3 TIMES DAILY PRN
Qty: 30 TABLET | Refills: 0 | Status: SHIPPED | OUTPATIENT
Start: 2023-09-08 | End: 2023-12-13 | Stop reason: SDUPTHER

## 2023-09-08 NOTE — TELEPHONE ENCOUNTER
No care due was identified.  [facility] Embedded Care Due Messages. Reference number: [id number].   9/08/2023 1:54:59 AM CDT

## 2023-09-11 ENCOUNTER — TELEPHONE (OUTPATIENT)
Dept: INTERNAL MEDICINE | Facility: CLINIC | Age: 63
End: 2023-09-11
Payer: MEDICARE

## 2023-09-11 RX ORDER — FERROUS SULFATE 325(65) MG
325 TABLET, DELAYED RELEASE (ENTERIC COATED) ORAL DAILY
Qty: 90 TABLET | Refills: 0 | Status: SHIPPED | OUTPATIENT
Start: 2023-09-11 | End: 2024-02-13

## 2023-09-11 RX ORDER — MAG HYDROX/ALUMINUM HYD/SIMETH 200-200-20
30 SUSPENSION, ORAL (FINAL DOSE FORM) ORAL
COMMUNITY
Start: 2023-09-11 | End: 2024-09-10

## 2023-09-11 NOTE — TELEPHONE ENCOUNTER
----- Message from Clarisse Foy sent at 9/7/2023  3:03 PM CDT -----  Regarding: transportaion.  Name of Who is Calling: Wife/ Leeanne  [phone]          What is the request in detail:Pts wife is asking if non emergency EMS sent over to his insurance. She said that he needs to be sent on a stretcher.   She is asking if you can send over an authorization for his appt w/ you on 9/21 and for his appt with Dr Terrazas on 9/19 at 1:20.  Any questions please call wife.           Can the clinic reply by [facility]:          What Number to Call Back [facility] not in [facility]:[phone]

## 2023-09-12 ENCOUNTER — TELEPHONE (OUTPATIENT)
Dept: INTERNAL MEDICINE | Facility: CLINIC | Age: 63
End: 2023-09-12
Payer: MEDICARE

## 2023-09-12 NOTE — TELEPHONE ENCOUNTER
----- Message from Mahin Hamm sent at 9/12/2023 12:35 PM CDT -----  Name of Who is Calling:Martha calling for TIERA MARTIN JR. [[id number]]         What is the request in detail:Martha is requesting a call back for change in pt travail paperwork it has he can sit in chair but he's bed bound now and they need for that to be changed. Please assist          Can the clinic reply by [facility]: No           What Number to Call Back if not in [facility]:[phone]

## 2023-09-13 ENCOUNTER — TELEPHONE (OUTPATIENT)
Dept: INTERNAL MEDICINE | Facility: CLINIC | Age: 63
End: 2023-09-13
Payer: MEDICARE

## 2023-09-13 DIAGNOSIS — L85.3 DRY SKIN: Primary | ICD-10-CM

## 2023-09-13 RX ORDER — CERAMIDE 1,3,6-II/SALICYLIC/B3
CLEANSER (ML) TOPICAL
Qty: 355 ML | Refills: 5 | Status: SHIPPED | OUTPATIENT
Start: 2023-09-13

## 2023-09-13 NOTE — TELEPHONE ENCOUNTER
----- Message from Marci Fabian sent at 9/13/2023 11:04 AM CDT -----      Name of Who is Calling: TIERA MARTIN JR. [[id number]]      What is the request in detail: pt called to ask if he can get a cream for his back sent to the pharmacy.Please contact to further discuss and advise.          Can the clinic reply by [facility]: Y      What Number to Call Back [facility] not [facility]: [id number] - [address] AT [facility] [address]   Phone:  [phone]  Fax:  [phone]

## 2023-09-20 ENCOUNTER — TELEPHONE (OUTPATIENT)
Dept: INTERNAL MEDICINE | Facility: CLINIC | Age: 63
End: 2023-09-20
Payer: MEDICARE

## 2023-09-20 NOTE — TELEPHONE ENCOUNTER
----- Message from Sofie Gillespie sent at 9/20/2023  9:35 AM CDT -----  Contact: [facility] pharmacy/[place]/[phone]  Pharmacy is calling to clarify an RX.  RX name:  methocarbamoL (ROBAXIN) 500 MG Tab  What do they need to clarify:  directions   Comments:  they need a specific hold date for the medication

## 2023-09-28 ENCOUNTER — TELEPHONE (OUTPATIENT)
Dept: INTERNAL MEDICINE | Facility: CLINIC | Age: 63
End: 2023-09-28
Payer: MEDICARE

## 2023-09-28 NOTE — TELEPHONE ENCOUNTER
----- Message from Wimler Pittman MA sent at 9/28/2023 10:44 AM CDT -----  Type: Patient Call Back    Who called: Spouse - Ms. Johnson    What is the request in detail: states the pt. Is having trouble walking ...    Can the clinic reply by [facility]?NO    Would the patient rather a call back or a response via [facility]? Yes, call    Best call back number: [phone]

## 2023-10-09 ENCOUNTER — EXTERNAL HOME HEALTH (OUTPATIENT)
Dept: HOME HEALTH SERVICES | Facility: HOSPITAL | Age: 63
End: 2023-10-09
Payer: MEDICARE

## 2023-10-09 ENCOUNTER — CARE AT HOME (OUTPATIENT)
Dept: HOME HEALTH SERVICES | Facility: CLINIC | Age: 63
End: 2023-10-09
Payer: MEDICARE

## 2023-10-09 VITALS
DIASTOLIC BLOOD PRESSURE: 81 MMHG | SYSTOLIC BLOOD PRESSURE: 134 MMHG | OXYGEN SATURATION: 98 % | RESPIRATION RATE: 20 BRPM | HEART RATE: 75 BPM | TEMPERATURE: 98 F

## 2023-10-09 DIAGNOSIS — I10 ESSENTIAL HYPERTENSION: ICD-10-CM

## 2023-10-09 DIAGNOSIS — M10.9 GOUTY ARTHRITIS: Primary | ICD-10-CM

## 2023-10-09 DIAGNOSIS — N18.30 STAGE 3 CHRONIC KIDNEY DISEASE, UNSPECIFIED WHETHER STAGE 3A OR 3B CKD: ICD-10-CM

## 2023-10-09 DIAGNOSIS — F32.A DEPRESSION, UNSPECIFIED DEPRESSION TYPE: ICD-10-CM

## 2023-10-09 DIAGNOSIS — I48.0 PAROXYSMAL ATRIAL FIBRILLATION: ICD-10-CM

## 2023-10-09 DIAGNOSIS — G62.9 PERIPHERAL POLYNEUROPATHY: ICD-10-CM

## 2023-10-09 DIAGNOSIS — D53.9 MACROCYTIC ANEMIA: ICD-10-CM

## 2023-10-09 DIAGNOSIS — Z91.199 NONCOMPLIANCE: ICD-10-CM

## 2023-10-09 DIAGNOSIS — R53.81 DEBILITY: ICD-10-CM

## 2023-10-09 DIAGNOSIS — E46 MALNUTRITION, UNSPECIFIED TYPE: ICD-10-CM

## 2023-10-09 DIAGNOSIS — K21.9 GASTROESOPHAGEAL REFLUX DISEASE WITHOUT ESOPHAGITIS: ICD-10-CM

## 2023-10-09 DIAGNOSIS — G81.10 SPASTIC HEMIPLEGIA, UNSPECIFIED ETIOLOGY, UNSPECIFIED LATERALITY: ICD-10-CM

## 2023-10-09 DIAGNOSIS — R56.9 SEIZURE: ICD-10-CM

## 2023-10-09 DIAGNOSIS — L28.0 LICHENIFICATION: ICD-10-CM

## 2023-10-09 DIAGNOSIS — E87.6 HYPOKALEMIA: ICD-10-CM

## 2023-10-09 DIAGNOSIS — R13.10 DYSPHAGIA, UNSPECIFIED TYPE: ICD-10-CM

## 2023-10-09 DIAGNOSIS — F10.10 ALCOHOL ABUSE: ICD-10-CM

## 2023-10-09 DIAGNOSIS — G62.9 POLYNEUROPATHY: ICD-10-CM

## 2023-10-09 PROCEDURE — 3075F SYST BP GE 130 - 139MM HG: CPT | Mod: CPTII,S$GLB,, | Performed by: NURSE PRACTITIONER

## 2023-10-09 PROCEDURE — 99350 HOME/RES VST EST HIGH MDM 60: CPT | Mod: S$GLB,,, | Performed by: NURSE PRACTITIONER

## 2023-10-09 PROCEDURE — 99497 PR ADVNCD CARE PLAN 30 MIN: ICD-10-PCS | Mod: S$GLB,,, | Performed by: NURSE PRACTITIONER

## 2023-10-09 PROCEDURE — 99497 ADVNCD CARE PLAN 30 MIN: CPT | Mod: S$GLB,,, | Performed by: NURSE PRACTITIONER

## 2023-10-09 PROCEDURE — 99350 PR HOME VISIT,ESTAB PATIENT,LEVEL IV: ICD-10-PCS | Mod: S$GLB,,, | Performed by: NURSE PRACTITIONER

## 2023-10-09 PROCEDURE — 3079F PR MOST RECENT DIASTOLIC BLOOD PRESSURE 80-89 MM HG: ICD-10-PCS | Mod: CPTII,S$GLB,, | Performed by: NURSE PRACTITIONER

## 2023-10-09 PROCEDURE — 3075F PR MOST RECENT SYSTOLIC BLOOD PRESS GE 130-139MM HG: ICD-10-PCS | Mod: CPTII,S$GLB,, | Performed by: NURSE PRACTITIONER

## 2023-10-09 PROCEDURE — 3079F DIAST BP 80-89 MM HG: CPT | Mod: CPTII,S$GLB,, | Performed by: NURSE PRACTITIONER

## 2023-10-09 RX ORDER — ALLOPURINOL 300 MG/1
300 TABLET ORAL DAILY
Qty: 90 TABLET | Refills: 3 | Status: SHIPPED | OUTPATIENT
Start: 2023-10-09 | End: 2023-12-14

## 2023-10-09 RX ORDER — LANOLIN ALCOHOL/MO/W.PET/CERES
100 CREAM (GRAM) TOPICAL DAILY
Qty: 90 TABLET | Refills: 3 | Status: SHIPPED | OUTPATIENT
Start: 2023-10-09 | End: 2024-02-13

## 2023-10-09 RX ORDER — FOLIC ACID 1 MG/1
1 TABLET ORAL DAILY
Qty: 30 TABLET | Refills: 2 | Status: SHIPPED | OUTPATIENT
Start: 2023-10-09

## 2023-10-09 RX ORDER — AMMONIUM LACTATE 12 G/100G
1 LOTION TOPICAL
Qty: 30 EACH | Refills: 3 | Status: SHIPPED | OUTPATIENT
Start: 2023-10-09

## 2023-10-10 ENCOUNTER — LAB VISIT (OUTPATIENT)
Dept: LAB | Facility: HOSPITAL | Age: 63
End: 2023-10-10
Attending: NURSE PRACTITIONER
Payer: MEDICARE

## 2023-10-10 DIAGNOSIS — N18.30 CHRONIC KIDNEY DISEASE, STAGE III (MODERATE): Primary | ICD-10-CM

## 2023-10-10 DIAGNOSIS — E46 UNSPECIFIED PROTEIN-CALORIE MALNUTRITION: ICD-10-CM

## 2023-10-10 DIAGNOSIS — I10 ESSENTIAL HYPERTENSION, MALIGNANT: ICD-10-CM

## 2023-10-10 LAB
ALBUMIN SERPL BCP-MCNC: 3.3 G/DL (ref 3.5–5.2)
ALP SERPL-CCNC: 114 U/L (ref 55–135)
ALT SERPL W/O P-5'-P-CCNC: 117 U/L (ref 10–44)
ANION GAP SERPL CALC-SCNC: 15 MMOL/L (ref 8–16)
AST SERPL-CCNC: 267 U/L (ref 10–40)
BILIRUB SERPL-MCNC: 0.3 MG/DL (ref 0.1–1)
BUN SERPL-MCNC: 17 MG/DL (ref 8–23)
CALCIUM SERPL-MCNC: 8.6 MG/DL (ref 8.7–10.5)
CHLORIDE SERPL-SCNC: 100 MMOL/L (ref 95–110)
CHOLEST SERPL-MCNC: 192 MG/DL (ref 120–199)
CHOLEST/HDLC SERPL: 2.1 {RATIO} (ref 2–5)
CO2 SERPL-SCNC: 20 MMOL/L (ref 23–29)
CREAT SERPL-MCNC: 1.3 MG/DL (ref 0.5–1.4)
ERYTHROCYTE [DISTWIDTH] IN BLOOD BY AUTOMATED COUNT: 15.8 % (ref 11.5–14.5)
EST. GFR  (NO RACE VARIABLE): >60 ML/MIN/1.73 M^2
ESTIMATED AVG GLUCOSE: 80 MG/DL (ref 68–131)
GLUCOSE SERPL-MCNC: 77 MG/DL (ref 70–110)
HBA1C MFR BLD: 4.4 % (ref 4–5.6)
HCT VFR BLD AUTO: 28.2 % (ref 40–54)
HDLC SERPL-MCNC: 90 MG/DL (ref 40–75)
HDLC SERPL: 46.9 % (ref 20–50)
HGB BLD-MCNC: 9.4 G/DL (ref 14–18)
LDLC SERPL CALC-MCNC: 84.2 MG/DL (ref 63–159)
MAGNESIUM SERPL-MCNC: 1 MG/DL (ref 1.6–2.6)
MCH RBC QN AUTO: 36.4 PG (ref 27–31)
MCHC RBC AUTO-ENTMCNC: 33.3 G/DL (ref 32–36)
MCV RBC AUTO: 109 FL (ref 82–98)
NONHDLC SERPL-MCNC: 102 MG/DL
PHOSPHATE SERPL-MCNC: 1.5 MG/DL (ref 2.7–4.5)
PLATELET # BLD AUTO: 162 K/UL (ref 150–450)
PMV BLD AUTO: 11.5 FL (ref 9.2–12.9)
POTASSIUM SERPL-SCNC: 3.8 MMOL/L (ref 3.5–5.1)
PROT SERPL-MCNC: 9.2 G/DL (ref 6–8.4)
RBC # BLD AUTO: 2.58 M/UL (ref 4.6–6.2)
SODIUM SERPL-SCNC: 135 MMOL/L (ref 136–145)
TRIGL SERPL-MCNC: 89 MG/DL (ref 30–150)
TSH SERPL DL<=0.005 MIU/L-ACNC: 3.96 UIU/ML (ref 0.4–4)
WBC # BLD AUTO: 4.26 K/UL (ref 3.9–12.7)

## 2023-10-10 PROCEDURE — 85027 COMPLETE CBC AUTOMATED: CPT | Mod: HCNC | Performed by: NURSE PRACTITIONER

## 2023-10-10 PROCEDURE — 83735 ASSAY OF MAGNESIUM: CPT | Mod: HCNC | Performed by: NURSE PRACTITIONER

## 2023-10-10 PROCEDURE — 84100 ASSAY OF PHOSPHORUS: CPT | Mod: HCNC | Performed by: NURSE PRACTITIONER

## 2023-10-10 PROCEDURE — 83036 HEMOGLOBIN GLYCOSYLATED A1C: CPT | Mod: HCNC | Performed by: NURSE PRACTITIONER

## 2023-10-10 PROCEDURE — 80053 COMPREHEN METABOLIC PANEL: CPT | Mod: HCNC | Performed by: NURSE PRACTITIONER

## 2023-10-10 PROCEDURE — 84443 ASSAY THYROID STIM HORMONE: CPT | Mod: HCNC | Performed by: NURSE PRACTITIONER

## 2023-10-10 PROCEDURE — 80061 LIPID PANEL: CPT | Mod: HCNC | Performed by: NURSE PRACTITIONER

## 2023-10-10 NOTE — ASSESSMENT & PLAN NOTE
Reports a shot of vodka every other day  Discussed alcohol cessation  Continue folic  Ordered thiamine

## 2023-10-10 NOTE — PROGRESS NOTES
"[facility]  Medical Home Visit    Visit Date: 10/9/2023  Encounter Provider: Myranda Culp NP  PCP:  Amelia Lai MD    Subjective:      Patient ID: Michael Johnson Jr. is a 63 y.o. male.    Chief Complaint: Follow-up for chronic medical conditions and medication review    Patient consent was obtained prior to treatment on this visit.    Reason for home services: The patient is being seen at home due to a physical debility or recent acute hospitalization that presents a taxing effort to leave the home, to mitigate high risk of hospital readmission or due to the limited availability of reliable or safe options for transportation to the point of access to the provider. The visit meets the criteria for medical necessity as defined by CMS as "health-care services needed to prevent, diagnose, or treat an illness, injury, condition, disease, or its symptoms and that meet accepted standards of medicine." Prior to treatment on this visit the chart was reviewed and patient consent was obtained.      HPI: 63 y.o. male with past medical history of HTN, gout, seizures, alcohol abuse, debility, anemia.  He was recently admitted to hospital and treated for afib, dysphagia and debility. P      Today:  Mr. Michael Johnson Jr. is a 63 y.o. male Michael presents at baseline state of health as reported by patient and caregiver. VSS. He is found resting in bed, reports no acute issues or concerns.  Denies chest pain, SOB, fevers, cough, congestion, change in bladder habits, change in bowel habits.  Reports intermittent use of prescribed medications.  Discussed the importance of taking meds as prescribed.  He reports tolerating soft diet well.  He is currently active with [facility], will order routine labs.  He reports good BM pattern, sleep, adequate po intake.  He requires assistance with ADLs.  He lives with spouse who provides 24 hour support.  No other needs identified at this time.       Review of Systems "   Constitutional:  Negative for chills and fever.   HENT:  Negative for congestion, postnasal drip and rhinorrhea.    Eyes:  Negative for visual disturbance.   Respiratory:  Negative for chest tightness.    Cardiovascular:  Negative for chest pain, palpitations and leg swelling.   Gastrointestinal:  Negative for abdominal pain, constipation, nausea and vomiting.   Genitourinary:  Negative for difficulty urinating.   Musculoskeletal:  Positive for arthralgias, gait problem and myalgias.   Skin:  Negative for color change and wound.   Neurological:  Negative for dizziness, weakness and light-headedness.   Hematological:  Does not bruise/bleed easily.   Psychiatric/Behavioral:  Negative for agitation.        Assessments:  Environmental: adequate lighting and temperature control  Functional Status: assistance with ADL's/IADL's, bedbound, incontinent of bowel and bladder at times   Safety: Fall Precautions, COVID Precautions/Social Distancing/Mask Use  Nutritional: Adequate  Home Health: [facility]   DME/Supplies: wh       Ethical / Legal: Advance Care Planning   Capacity to make medical decisions:  yes, Conflict no  Surrogate decision maker:  Name Leeanne, Relationship: spouse  Advance Directives:  no  HCPOA: no  [place]:  no  Code Status:  full code      Advance Care Planning/Goals of Care:  Explained the importance of completing advance care planning paperwork. Advanced Care Directives, HCPOA and [place] forms left in the home for family review, discussion and signing with instructions to return upon their next provider encounter for inclusion to the medical record.  Discussed Advance Care Planning for 20 minutes.     Objective:     Vitals:    10/09/23 1100   BP: 134/81   Pulse: 75   Resp: 20   Temp: 98 °F (36.7 °C)   SpO2: 98%     There is no height or weight on file to calculate BMI.    Physical Exam  Constitutional:       Appearance: Normal appearance.   HENT:      Head: Normocephalic and atraumatic.      Nose: No  congestion or rhinorrhea.      Mouth/Throat:      Mouth: Mucous membranes are moist.   Cardiovascular:      Rate and Rhythm: Normal rate.      Pulses: Normal pulses.   Pulmonary:      Effort: No respiratory distress.      Breath sounds: Normal breath sounds.   Abdominal:      General: Bowel sounds are normal.      Palpations: Abdomen is soft.   Musculoskeletal:      Cervical back: Normal range of motion and neck supple.      Right lower leg: No edema.      Left lower leg: No edema.   Skin:     General: Skin is warm.      Comments: Dry skin noted to upper and lower extremities    Neurological:      Mental Status: He is alert and oriented to person, place, and time. Mental status is at baseline.   Psychiatric:         Mood and Affect: Mood normal.         Plan:          1. Gouty arthritis  Comments:  Continue allopurinol. Recheck uric acid level. Will need to refer again to Nephrology  Assessment & Plan:  Reports no acute   Continue allopurinol     Orders:  -     allopurinoL (ZYLOPRIM) 300 MG tablet; Take 1 tablet (300 mg total) by mouth once daily.  Dispense: 90 tablet; Refill: 3    2. Spastic hemiplegia, unspecified etiology, unspecified laterality  Comments:  Persistent. No controlled substances given h/o ETOH abuse. Unable to tolerate voltaren gel. Resched PM&R, Pain clinic. Consider Ortho d/t H/o multiple sx.     3. Polyneuropathy  Comments:  Cont gabapentin BID. Unable to titrate d/t sedation. Referrals as above.    4. Seizure  Assessment & Plan:  No recent seizure activities  Continue keppra      5. Peripheral polyneuropathy  Assessment & Plan:  Chronic  Continue gabapentin       6. Alcohol abuse  Assessment & Plan:  Reports a shot of vodka every other day  Discussed alcohol cessation  Continue folic  Ordered thiamine       7. Depression, unspecified depression type  Assessment & Plan:  Stable  Denies SI/HI      8. Essential hypertension  Assessment & Plan:  Stable  Continue metoprolol       9. Paroxysmal  atrial fibrillation  Overview:  The patient has atrial fibrillation he is not really sure where these and chronically are not.  He is takes metoprolol but he does not always remember to take it his family says he forgets that a lot he says he never forgets.  He says he has been under stress lately and he suddenly had onset of fibrillation or flutter with a rate of 150 he was placed on amiodarone by the cardiologist and now his rate is 98 but looks like it is still flutter    Assessment & Plan:  Heart rate WNL  Continue amiodarone, eliquis, metoprolol as prescribed       10. Hypokalemia  Assessment & Plan:  Pending labs       11. Stage 3 chronic kidney disease, unspecified whether stage 3a or 3b CKD  Overview:  Creatinine was 1.53 years ago is now 1.9 GFR is 44    Assessment & Plan:  Pending labs  Avoid nephrotoxic agents       12. Macrocytic anemia  Assessment & Plan:  Pending labs  Continue iron and folic       13. Malnutrition, unspecified type  Assessment & Plan:  Pending labs  Continue caregiver support  Nutritional support       14. Dysphagia, unspecified type  Assessment & Plan:  Reports no acute issues, tolerating soft diet well      15. Gastroesophageal reflux disease without esophagitis  Assessment & Plan:  Stable  Continue PPI      16. Debility  Assessment & Plan:  Continue caregiver support  PT discontinued   Continue OT      17. Noncompliance  Assessment & Plan:  Reports takes meds intermittently  Discussed the importance of taking medications as prescribed       18. Lichenification  Assessment & Plan:  Areas noted to upper and lower extremities  Ordered ammonium lactate       Other orders  -     folic acid (FOLVITE) 1 MG tablet; Take 1 tablet (1 mg total) by mouth once daily.  Dispense: 30 tablet; Refill: 2  -     ammonium lactate (LAC-HYDRIN) 12 % lotion; Apply 1 g topically as needed for Dry Skin.  Dispense: 30 each; Refill: 3  -     thiamine 100 MG tablet; Take 1 tablet (100 mg total) by mouth once  daily.  Dispense: 90 tablet; Refill: 3           Instructions:  - Continue all medications, treatments and therapies as ordered.   - Follow all instructions, recommendations as discussed.  - Maintain Safety Precautions at all times.  - Attend all medical appointments as scheduled.  - For worsening symptoms: call Primary Care Physician or Nurse Practitioner.  - For emergencies, call 911 or immediately report to the nearest emergency room.  - Limit Risks of environmental exposure to coronavirus/COVID-19 as discussed including: social distancing, hand hygiene, and limiting departures from the home for necessities only.      Follow Up Appointments:   No future appointments.        Signature:       Myranda Culp, MSN, APRN, FNP-C  [facility]    Total face-to-face time was 60 min, >50% of this was spent on counseling and coordination of care. The following issues were discussed: primary and secondary diagnoses, co-morbidities, prescribed medications, treatment modalities, importance of compliance with medical advice and directives for follow-up care

## 2023-10-20 DIAGNOSIS — R74.8 ELEVATED LIVER ENZYMES: Primary | ICD-10-CM

## 2023-10-20 RX ORDER — SODIUM,POTASSIUM PHOSPHATES 280-250MG
1 POWDER IN PACKET (EA) ORAL 2 TIMES DAILY
Qty: 20 PACKET | Refills: 0 | Status: SHIPPED | OUTPATIENT
Start: 2023-10-20 | End: 2023-10-30

## 2023-10-20 RX ORDER — CALCIUM CARBONATE 300MG(750)
400 TABLET,CHEWABLE ORAL DAILY
Qty: 30 TABLET
Start: 2023-10-20

## 2023-10-20 NOTE — PROGRESS NOTES
Reviewed labs, phos and magnesium low, ordered supplements, Na improved, calcium improved, albumin 3.3.  I instructed to also take ensure twice daily to help with nutritional needs. Patient endorses current daily alcohol use, I discussed the importance of cessation.  AST/ALT elevated, trending up, denies abdominal pain, referral placed to hepatology. CBC stable.

## 2023-10-24 ENCOUNTER — DOCUMENT SCAN (OUTPATIENT)
Dept: HOME HEALTH SERVICES | Facility: HOSPITAL | Age: 63
End: 2023-10-24
Payer: MEDICARE

## 2023-11-19 NOTE — TELEPHONE ENCOUNTER
No care due was identified.  Health [facility] Embedded Care Due Messages. Reference number: [id number].   11/19/2023 4:22:40 AM CST

## 2023-11-20 RX ORDER — FLUTICASONE PROPIONATE 50 MCG
SPRAY, SUSPENSION (ML) NASAL
Qty: 16 G | Refills: 1 | Status: SHIPPED | OUTPATIENT
Start: 2023-11-20

## 2023-12-13 DIAGNOSIS — M62.838 MUSCLE SPASM: ICD-10-CM

## 2023-12-13 DIAGNOSIS — R56.9 SEIZURE: ICD-10-CM

## 2023-12-13 RX ORDER — METHOCARBAMOL 500 MG/1
500 TABLET, FILM COATED ORAL DAILY PRN
Qty: 30 TABLET | Refills: 3 | Status: SHIPPED | OUTPATIENT
Start: 2023-12-13 | End: 2024-03-13

## 2023-12-13 RX ORDER — INHALER, ASSIST DEVICES
SPACER (EA) MISCELLANEOUS
Qty: 1 EACH | Refills: 3 | Status: ON HOLD | OUTPATIENT
Start: 2023-12-13 | End: 2024-03-07 | Stop reason: HOSPADM

## 2023-12-13 NOTE — TELEPHONE ENCOUNTER
No care due was identified.  Health [facility] Embedded Care Due Messages. Reference number: [id number].   12/13/2023 12:22:04 PM CST

## 2023-12-14 DIAGNOSIS — I10 ESSENTIAL HYPERTENSION: ICD-10-CM

## 2023-12-14 DIAGNOSIS — M10.9 GOUTY ARTHRITIS: ICD-10-CM

## 2023-12-14 RX ORDER — ALLOPURINOL 300 MG/1
300 TABLET ORAL
Qty: 90 TABLET | Refills: 3 | Status: ON HOLD | OUTPATIENT
Start: 2023-12-14 | End: 2024-03-07

## 2023-12-14 RX ORDER — METOPROLOL TARTRATE 50 MG/1
TABLET ORAL
Qty: 180 TABLET | Refills: 3 | Status: ON HOLD | OUTPATIENT
Start: 2023-12-14 | End: 2024-03-07 | Stop reason: HOSPADM

## 2024-01-10 ENCOUNTER — TELEPHONE (OUTPATIENT)
Dept: INTERNAL MEDICINE | Facility: CLINIC | Age: 64
End: 2024-01-10
Payer: MEDICARE

## 2024-01-10 DIAGNOSIS — R25.2 LEG CRAMPING: Primary | ICD-10-CM

## 2024-01-10 NOTE — TELEPHONE ENCOUNTER
----- Message from Federica Dueñas sent at 1/10/2024 10:36 AM CST -----  Type: Patient Call Back    Who called:wife    What is the request in detail: is requesting a script for potassium. Please call    Can the clinic reply by [facility]? no    Would the patient rather a call back or a response via [facility]?  call    Best call back number: [phone]     Additional Information:

## 2024-01-30 NOTE — H&P
[facility] Hospital Medicine  Progress Note    Patient Name: Michael Johnson Jr.  MRN: [id number]  Patient Class: IP- Inpatient   Admission Date: 3/15/2017  Length of Stay: 0 days  Attending Physician: CAYLA Martinez MD  Primary Care Provider: Provider [facility] Medicine Team: [facility] 5 Stanley Casas MD    Subjective:     Principal Problem:Syncope, cardiogenic    HPI:  Michael Johnson is a 57 yo  male with a PMH of seizure (last Summer, 2016), gout, atrial fibrillation, and HTN who presented to the ED 2/2 a witnessed seizure the evening of [03/15/17].  His first seizure occurred last Summer while the patient was cooking BBQ outside in hot, thee weather.  Based on testimony from his wife, the patient recounted that he was dehydrated, fell to the ground and underwent convulsions, mouth foaming, in addition to biting his tongue.  He was subsequently admitted to a hospital for treatment and D/C'd without medication and follow-up with a Neurologist.    Last night, the patient experienced seizure witnessed by his wife, who noted that the patient fell from the bed, lost consciousness, foamed at the mouth, and bit his tongue.  He did not, however, experience convulsions, and he did not have bowel or bladder incontinence.  Very soon after regaining consciousness, the patient was alert and asking questions about what had happened.  The patient denies prodromic symptoms prior to losing consciousness, denied chest pain, and affirmed approximately 15 minutes of SOB.    Upon arrival to the ED, the patient was found to have hypokalemia (2.7) and hypomagnesemia (0.9).  Of note, the patient affirmed having had nausea and vomiting 3 days ago for the entire day.  Repletion of potassium and magnesium was begun.  ECG noted normal sinus rhythm and was suggestive of depressed T-waves.  CT head showed no intracranial process and CXR showed no acute process.      Hospital Course:        Interval History: Hypokalemia and Hypomagnesemia noted on admission - repleting.  Pt not in distress though requesting pain medication to address pain 2/2 tongue bite.  Plan is to evaluate for syncope vs seizure.      Review of Systems   Constitutional: Negative for chills and fever.   HENT: Positive for sore throat (pt says 2/2 tongue pain) and trouble swallowing (2/2 pain).    Eyes: Negative for visual disturbance.   Respiratory: Negative for cough, shortness of breath and wheezing.    Cardiovascular: Negative for chest pain, palpitations and leg swelling.   Gastrointestinal: Positive for abdominal pain (affirms heart burn) and vomiting (3 days ago). Negative for constipation, diarrhea and nausea.   Genitourinary: Negative for difficulty urinating, dysuria and hematuria.   Musculoskeletal: Positive for myalgias and neck stiffness.   Neurological: Positive for seizures and syncope (episode in 2007). Negative for dizziness, numbness (denies tingling) and headaches.   Psychiatric/Behavioral: Positive for sleep disturbance.     Objective:     Vital Signs (Most Recent):  Temp: 98.8 °F (37.1 °C) (03/16/17 0858)  Pulse: 86 (03/16/17 1031)  Resp: 20 (03/15/17 2326)  BP: 132/85 (03/16/17 1031)  SpO2: 98 % (03/16/17 1031) Vital Signs (24h Range):  Temp:  [98.6 °F (37 °C)-98.8 °F (37.1 °C)] 98.8 °F (37.1 °C)  Pulse:  [] 86  Resp:  [20] 20  SpO2:  [95 %-100 %] 98 %  BP: (128-166)/() 132/85     Weight: 95.3 kg (210 lb)  Body mass index is 28.48 kg/(m^2).  No intake or output data in the 24 hours ending 03/16/17 1113   Physical Exam   Constitutional: He is oriented to person, place, and time. He appears well-developed and well-nourished.   Eyes: Conjunctivae and EOM are normal. Pupils are equal, round, and reactive to light. Right eye exhibits no discharge. Left eye exhibits no discharge.   Neck: Neck supple. No thyromegaly present.   Cardiovascular: Regular rhythm.  Exam reveals no friction rub.    No murmur  heard.  Tachycardia   Pulmonary/Chest: Effort normal and breath sounds normal. No respiratory distress. He has no wheezes.   Abdominal: Soft. Bowel sounds are normal. There is no tenderness. There is no guarding.   Musculoskeletal: Normal range of motion. He exhibits no edema.   Right foot painful, difficult to plantarflex.  Right 4th PIP swollen.   Lymphadenopathy:     He has no cervical adenopathy (no supraclavicular lymphadenopathy).   Neurological: He is alert and oriented to person, place, and time. No cranial nerve deficit.   Pt's right cheek appears to rivera than the left check.   Skin: Skin is warm and dry.   Psychiatric: He has a normal mood and affect.   Vitals reviewed.      Significant Labs:   CBC:   Recent Labs  Lab 03/16/17  0018 03/16/17  0730   WBC 8.66  --    HGB 11.8*  --    HCT 33.3* 29*   *  --      CMP:   Recent Labs  Lab 03/16/17  0018   *   K 2.7*   CL 96   CO2 27   GLU 88   BUN 20   CREATININE 1.4   CALCIUM 8.3*   PROT 8.5*   ALBUMIN 3.5   BILITOT 0.9   ALKPHOS 99   *   *   ANIONGAP 12   EGFRNONAA 56.6*     Troponin:   Recent Labs  Lab 03/16/17  0018 03/16/17  0337 03/16/17  0855   TROPONINI 0.021 0.093* 0.107*       Significant Imaging: CT: I have reviewed all pertinent results/findings within the past 24 hours.  CXR: I have reviewed all pertinent results/findings within the past 24 hours.    Assessment/Plan:      * Syncope, cardiogenic  - (DX) Syncope 2/2 chronic atrial fibrillation w/ RVR vs Seizure vs Infection    1) Syncope (more likely) in setting of significant hypokalemia and hypomagnesemia        - EMT noted HR of 198 on arrival to scene       - Possible cerebral hypoperfusion 2/2 atrial fibrillation w/ RVR       - Atrial fibrillation rate-controlled with Diltiazem       - 2D echocardiogram to evaluate for structural heart disease       - Consulted EP for evaluation to rule-out cardiology etiologies other than atrial fibrillation       - F/U orthostatic  "testing    2) Seizure (less likely)       - History of witnessed seizure in Summer,  including convulsions, mouth foaming, and tongue biting       - Witnessed event last night - fell from bed, LOC, mouth foaming, tongue biting but --without-- convulsions, bowel, and bladder incontinence       - Rapidly alert following regaining consciousness and asking questions       - This event does not bear strong resemblance to a seizure but seems more consistent with a cardiogenic etiology    3) Infection (less likely) - Less likely as does not meet SIRS criteria, no constitutional symptoms              Hypokalemia  - Pt affirmed significant vomiting 3 days ago (possible K loss)  - K: 2.7 on [17] admission  - Given 120 mEq  - F/U K lab at [17 - 14:00]        Atrial fibrillation  - Continue Aspirin  - Continue Diltiazem        HTN (hypertension)  - Continue Diltiazem        Gout  - Swelling over PIP 4th digit on right hand - possibly 2/2 to gout flare  - Continue Allopurinol  - Continue Colchicine      Seizure  - See problem "Syncope, cardiogenic"  - F/U EEG  - Consider consult to Neurology to evaluate for seizure  - Consider MRI brain  - Placed on seizure precautions  - Placed on aspiration precautions        Hypomagnesemia  - M.9 on [17] admission  - Given 4 mg   - F/U Mg lab at [17 - 14:00]        GERD (gastroesophageal reflux disease)  - Pt affirmed heart burn  - Hx of Aspirin and NSAID use  - Avoid NSAID's  - Pantoprazole 40 mg DAILY  - If symptoms do not resolve, consult GI for evaluation          Elevated troponin  - Hx of atrial fibrillation with HR to 198 BPM at time of EMT arrival, suggestive of A-Fib with RVR  - More likely demand ischemia 2/2 A-Fib with RVR  - Do not believe this is ACS  - Troponin elevated: 0.068 <- 0.107 <- 0.093 <- 0.021  - Trending troponin Q6 hrs        VTE Risk Mitigation         Ordered     enoxaparin injection 40 mg  Daily     Route:  Subcutaneous        " 03/16/17 1129     Medium Risk of VTE  Once      03/16/17 1135          Stanley Casas MD  Department of Hospital Medicine   [facility]   Number Of Multiplex Special Stains Used (34634): None Immunohistochemistry (05708 and 22081) billing is not performed here. Please use the Immunohistochemistry Stain Billing plan to accomplish this. Cpt Code (62441, 67034 Or 38551): 55339 Rendering Text In Billing: The biopsy specimen will be grossed and processed into a slide. Professional Component, Technical Component Or Both?: professional and technical component Number Of Specimens Per Diagnosis: 1 Detail Level: Detailed

## 2024-02-08 ENCOUNTER — OFFICE VISIT (OUTPATIENT)
Dept: HOME HEALTH SERVICES | Facility: CLINIC | Age: 64
End: 2024-02-08
Payer: MEDICARE

## 2024-02-08 VITALS
DIASTOLIC BLOOD PRESSURE: 86 MMHG | BODY MASS INDEX: 23.16 KG/M2 | HEIGHT: 72 IN | OXYGEN SATURATION: 99 % | HEART RATE: 95 BPM | SYSTOLIC BLOOD PRESSURE: 128 MMHG | RESPIRATION RATE: 16 BRPM | TEMPERATURE: 97 F | WEIGHT: 171 LBS

## 2024-02-08 DIAGNOSIS — I10 ESSENTIAL HYPERTENSION: ICD-10-CM

## 2024-02-08 DIAGNOSIS — G62.9 PERIPHERAL POLYNEUROPATHY: ICD-10-CM

## 2024-02-08 DIAGNOSIS — I70.0 AORTIC ATHEROSCLEROSIS: ICD-10-CM

## 2024-02-08 DIAGNOSIS — Z00.00 ENCOUNTER FOR PREVENTIVE HEALTH EXAMINATION: Primary | ICD-10-CM

## 2024-02-08 DIAGNOSIS — F32.A DEPRESSION, UNSPECIFIED DEPRESSION TYPE: ICD-10-CM

## 2024-02-08 DIAGNOSIS — E78.2 MIXED HYPERLIPIDEMIA: ICD-10-CM

## 2024-02-08 DIAGNOSIS — G81.10 SPASTIC HEMIPLEGIA, UNSPECIFIED ETIOLOGY, UNSPECIFIED LATERALITY: ICD-10-CM

## 2024-02-08 DIAGNOSIS — G40.909 SEIZURE DISORDER: ICD-10-CM

## 2024-02-08 DIAGNOSIS — I48.0 PAROXYSMAL ATRIAL FIBRILLATION: ICD-10-CM

## 2024-02-08 DIAGNOSIS — Z99.89 DEPENDENCE ON OTHER ENABLING MACHINES AND DEVICES: ICD-10-CM

## 2024-02-08 DIAGNOSIS — E46 MALNUTRITION, UNSPECIFIED TYPE: ICD-10-CM

## 2024-02-08 DIAGNOSIS — E55.9 VITAMIN D DEFICIENCY: ICD-10-CM

## 2024-02-08 DIAGNOSIS — K21.9 GASTROESOPHAGEAL REFLUX DISEASE WITHOUT ESOPHAGITIS: ICD-10-CM

## 2024-02-08 DIAGNOSIS — R26.9 ABNORMALITY OF GAIT AND MOBILITY: ICD-10-CM

## 2024-02-08 PROBLEM — N18.30 CKD (CHRONIC KIDNEY DISEASE), STAGE III: Status: RESOLVED | Noted: 2018-01-17 | Resolved: 2024-02-08

## 2024-02-08 PROCEDURE — G0439 PPPS, SUBSEQ VISIT: HCPCS | Mod: S$GLB,,,

## 2024-02-08 PROCEDURE — 3079F DIAST BP 80-89 MM HG: CPT | Mod: CPTII,S$GLB,,

## 2024-02-08 PROCEDURE — 3074F SYST BP LT 130 MM HG: CPT | Mod: CPTII,S$GLB,,

## 2024-02-08 PROCEDURE — 1160F RVW MEDS BY RX/DR IN RCRD: CPT | Mod: CPTII,S$GLB,,

## 2024-02-08 PROCEDURE — 1159F MED LIST DOCD IN RCRD: CPT | Mod: CPTII,S$GLB,,

## 2024-02-08 PROCEDURE — G9919 SCRN ND POS ND PROV OF REC: HCPCS | Mod: CPTII,S$GLB,,

## 2024-02-08 NOTE — PATIENT INSTRUCTIONS
Counseling and Referral of Other Preventative  (Italic type indicates deductible and co-insurance are waived)    Patient Name: Michael Johnson  Today's Date: 2/8/2024    Health Maintenance       Date Due Completion Date    Pneumococcal Vaccines (Age 0-64) (1 of 2 - PCV) Never done ---    TETANUS VACCINE Never done ---    High Dose Statin Never done ---    Shingles Vaccine (1 of 2) Never done ---    Colorectal Cancer Screening 05/26/2022 5/26/2014    RSV Vaccine (Age 60+ and Pregnant patients) (1 - 1-dose 60+ series) Never done ---    Influenza Vaccine (1) 09/01/2023 2/4/2020    COVID-19 Vaccine (2 - 2023-24 season) 09/01/2023 4/7/2021    Lipid Panel 10/10/2028 10/10/2023        No orders of the defined types were placed in this encounter.      The following information is provided to all patients.  This information is to help you find resources for any of the problems found today that may be affecting your health:                  Living healthy guide: www.[facility].[place].gov      Understanding Diabetes: www.diabetes.org      Eating healthy: www.cdc.gov/healthyweight      CDC home safety checklist: www.cdc.gov/steadi/patient.html      Agency on Aging: www.goea.[place].gov      Alcoholics anonymous (AA): www.aa.org      Physical Activity: www.michael.nih.gov/ld3xkjf      Tobacco use: www.quitwithusla.org

## 2024-02-08 NOTE — PROGRESS NOTES
Michael Johnson presented for an initial Medicare AWV today. The following components were reviewed and updated:    Medical history  Family History  Social history  Allergies and Current Medications  Health Risk Assessment  Health Maintenance  Care Team    **See Completed Assessments for Annual Wellness visit with in the encounter summary    The following assessments were completed:  Depression Screening  Cognitive function Screening: Unable to write due to spasticity; unable to spell WORLD backwards  Timed Get Up Test: Deferred, impaired mobility  Whisper Test      Opioid documentation:      Patient does not have a current opioid prescription.          Vitals:    02/08/24 1055   BP: 128/86   BP Location: Left arm   Patient Position: Sitting   Pulse: 95   Resp: 16   Temp: 97 °F (36.1 °C)   SpO2: 99%   Weight: 77.6 kg (171 lb)   Height: 6' (1.829 m)     Body mass index is 23.19 kg/m².       Physical Exam  Vitals reviewed.   Constitutional:       General: He is not in acute distress.     Appearance: Normal appearance.   HENT:      Head: Normocephalic.   Cardiovascular:      Rate and Rhythm: Normal rate and regular rhythm.      Pulses: Normal pulses.      Heart sounds: Normal heart sounds.   Pulmonary:      Effort: Pulmonary effort is normal. No respiratory distress.      Breath sounds: Normal breath sounds. No wheezing.   Abdominal:      General: Bowel sounds are normal.      Tenderness: There is no abdominal tenderness.   Musculoskeletal:         General: Normal range of motion.      Cervical back: Normal range of motion.      Right lower leg: No edema.      Left lower leg: No edema.   Skin:     General: Skin is warm and dry.      Capillary Refill: Capillary refill takes less than 2 seconds.   Neurological:      General: No focal deficit present.      Mental Status: He is alert and oriented to person, place, and time.   Psychiatric:         Mood and Affect: Mood normal.         Behavior: Behavior normal.            Diagnoses and health risks identified today and associated recommendations/orders:    1. Encounter for preventive health examination  Assessments completed.  HM recommendations reviewed.  F/u with PCP as instructed.      2. Aortic atherosclerosis  Chronic, stable on current regimen. Not on statin. Followed by PCP.     3. Spastic hemiplegia, unspecified etiology, unspecified laterality  Chronic, stable on current Robaxin regimen. Followed by PCP.     4. Paroxysmal atrial fibrillation  Chronic, stable on current Eliquis regimen. Followed by Carduiology.     5. Seizure disorder  Chronic, stable on current Keppra regimen. Followed by Neurology.     6. Malnutrition, unspecified type  Chronic, stable on current regimen. Followed by PCP.     7. Essential hypertension  Chronic, stable on current Metoprolol regimen. Followed by PCP.     8. Mixed hyperlipidemia  Chronic, stable on current regimen. Not on statin. Followed by PCP.     9. Peripheral polyneuropathy  Chronic, stable on current Gabapentin regimen. Followed by PCP.     10. Vitamin D deficiency  Chronic, stable on current regimen. Followed by PCP.     11. Gastroesophageal reflux disease without esophagitis  Chronic, stable on current Protonix regimen. Followed by PCP.     12. Depression, unspecified depression type  Chronic, stable on current regimen. Followed by PCP.     13. Dependence on other enabling machines and devices  Bed bound.     14. Abnormality of gait and mobility  Unable to safely walk without assistance.       Provided Michael with a 5-10 year written screening schedule and personal prevention plan. Recommendations were developed using the USPSTF age appropriate recommendations. Education, counseling, and referrals were provided as needed.  After Visit Summary printed and given to patient which includes a list of additional screenings\tests needed.    Follow up in about 1 year (around 2/8/2025) for Medicare AWV.      Digna Boudreaux  NP    I offered to discuss advanced care planning, including how to pick a person who would make decisions for you if you were unable to make them for yourself, called a health care power of , and what kind of decisions you might make such as use of life sustaining treatments such as ventilators and tube feeding when faced with a life limiting illness recorded on a living will that they will need to know. (How you want to be cared for as you near the end of your natural life)     X  Patient has advanced directives on file, which we reviewed, and they do not wish to make changes.

## 2024-02-24 ENCOUNTER — HOSPITAL ENCOUNTER (INPATIENT)
Facility: OTHER | Age: 64
LOS: 11 days | Discharge: HOME HEALTH | DRG: 896 | End: 2024-03-07
Attending: EMERGENCY MEDICINE | Admitting: HOSPITALIST
Payer: MEDICARE

## 2024-02-24 DIAGNOSIS — J02.9 PHARYNGITIS, UNSPECIFIED ETIOLOGY: ICD-10-CM

## 2024-02-24 DIAGNOSIS — K21.00 GASTROESOPHAGEAL REFLUX DISEASE WITH ESOPHAGITIS WITHOUT HEMORRHAGE: ICD-10-CM

## 2024-02-24 DIAGNOSIS — M10.9 GOUT, UNSPECIFIED CAUSE, UNSPECIFIED CHRONICITY, UNSPECIFIED SITE: ICD-10-CM

## 2024-02-24 DIAGNOSIS — K21.9 GASTROESOPHAGEAL REFLUX DISEASE WITHOUT ESOPHAGITIS: ICD-10-CM

## 2024-02-24 DIAGNOSIS — F10.139 ALCOHOL ABUSE WITH WITHDRAWAL: ICD-10-CM

## 2024-02-24 DIAGNOSIS — D50.9 IRON DEFICIENCY ANEMIA, UNSPECIFIED IRON DEFICIENCY ANEMIA TYPE: ICD-10-CM

## 2024-02-24 DIAGNOSIS — R93.89 ABNORMAL CT SCAN, NECK: ICD-10-CM

## 2024-02-24 DIAGNOSIS — T18.128A FOOD IMPACTION OF ESOPHAGUS, INITIAL ENCOUNTER: ICD-10-CM

## 2024-02-24 DIAGNOSIS — R00.0 TACHYCARDIA: ICD-10-CM

## 2024-02-24 DIAGNOSIS — I10 ESSENTIAL HYPERTENSION: ICD-10-CM

## 2024-02-24 DIAGNOSIS — N18.31 STAGE 3A CHRONIC KIDNEY DISEASE: ICD-10-CM

## 2024-02-24 DIAGNOSIS — G81.10 SPASTIC HEMIPLEGIA, UNSPECIFIED ETIOLOGY, UNSPECIFIED LATERALITY: ICD-10-CM

## 2024-02-24 DIAGNOSIS — D53.9 MACROCYTIC ANEMIA: ICD-10-CM

## 2024-02-24 DIAGNOSIS — F10.939 ALCOHOL WITHDRAWAL: ICD-10-CM

## 2024-02-24 DIAGNOSIS — E87.8 ELECTROLYTE DISTURBANCE: Primary | ICD-10-CM
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
## 2024-02-24 DIAGNOSIS — M10.9 GOUTY ARTHRITIS: ICD-10-CM

## 2024-02-24 DIAGNOSIS — E83.42 HYPOMAGNESEMIA: ICD-10-CM

## 2024-02-24 LAB
ALBUMIN SERPL BCP-MCNC: 3.4 G/DL (ref 3.5–5.2)
ALP SERPL-CCNC: 94 U/L (ref 55–135)
ALT SERPL W/O P-5'-P-CCNC: 61 U/L (ref 10–44)
AMPHET+METHAMPHET UR QL: NEGATIVE
ANION GAP SERPL CALC-SCNC: 11 MMOL/L (ref 8–16)
ANION GAP SERPL CALC-SCNC: 16 MMOL/L (ref 8–16)
AST SERPL-CCNC: 97 U/L (ref 10–40)
BACTERIA #/AREA URNS HPF: ABNORMAL /HPF
BARBITURATES UR QL SCN>200 NG/ML: NEGATIVE
BASOPHILS # BLD AUTO: 0.03 K/UL (ref 0–0.2)
BASOPHILS NFR BLD: 0.4 % (ref 0–1.9)
BENZODIAZ UR QL SCN>200 NG/ML: NEGATIVE
BILIRUB SERPL-MCNC: 1.2 MG/DL (ref 0.1–1)
BILIRUB UR QL STRIP: NEGATIVE
BNP SERPL-MCNC: 169 PG/ML (ref 0–99)
BUN SERPL-MCNC: 15 MG/DL (ref 8–23)
BUN SERPL-MCNC: 17 MG/DL (ref 8–23)
BZE UR QL SCN: NEGATIVE
CALCIUM SERPL-MCNC: 7.8 MG/DL (ref 8.7–10.5)
CALCIUM SERPL-MCNC: 8.6 MG/DL (ref 8.7–10.5)
CANNABINOIDS UR QL SCN: NEGATIVE
CHLORIDE SERPL-SCNC: 101 MMOL/L (ref 95–110)
CHLORIDE SERPL-SCNC: 105 MMOL/L (ref 95–110)
CLARITY UR: CLEAR
CO2 SERPL-SCNC: 25 MMOL/L (ref 23–29)
CO2 SERPL-SCNC: 26 MMOL/L (ref 23–29)
COLOR UR: ABNORMAL
CREAT SERPL-MCNC: 1.2 MG/DL (ref 0.5–1.4)
CREAT SERPL-MCNC: 1.4 MG/DL (ref 0.5–1.4)
CREAT UR-MCNC: 163.3 MG/DL (ref 23–375)
CTP QC/QA: YES
CTP QC/QA: YES
DIFFERENTIAL METHOD BLD: ABNORMAL
EOSINOPHIL # BLD AUTO: 0 K/UL (ref 0–0.5)
EOSINOPHIL NFR BLD: 0 % (ref 0–8)
ERYTHROCYTE [DISTWIDTH] IN BLOOD BY AUTOMATED COUNT: 14.3 % (ref 11.5–14.5)
EST. GFR  (NO RACE VARIABLE): 57 ML/MIN/1.73 M^2
EST. GFR  (NO RACE VARIABLE): >60 ML/MIN/1.73 M^2
ETHANOL SERPL-MCNC: <10 MG/DL
ETHANOL UR-MCNC: <10 MG/DL
GLUCOSE SERPL-MCNC: 125 MG/DL (ref 70–110)
GLUCOSE SERPL-MCNC: 169 MG/DL (ref 70–110)
GLUCOSE UR QL STRIP: NEGATIVE
GROUP A STREP, MOLECULAR: NEGATIVE
HCT VFR BLD AUTO: 32.8 % (ref 40–54)
HGB BLD-MCNC: 11 G/DL (ref 14–18)
HGB UR QL STRIP: NEGATIVE
HYALINE CASTS #/AREA URNS LPF: 3 /LPF
IMM GRANULOCYTES # BLD AUTO: 0.02 K/UL (ref 0–0.04)
IMM GRANULOCYTES NFR BLD AUTO: 0.3 % (ref 0–0.5)
KETONES UR QL STRIP: NEGATIVE
LEUKOCYTE ESTERASE UR QL STRIP: NEGATIVE
LIPASE SERPL-CCNC: 21 U/L (ref 4–60)
LYMPHOCYTES # BLD AUTO: 1.4 K/UL (ref 1–4.8)
LYMPHOCYTES NFR BLD: 17.8 % (ref 18–48)
MAGNESIUM SERPL-MCNC: 2.1 MG/DL (ref 1.6–2.6)
MAGNESIUM SERPL-MCNC: <0.7 MG/DL (ref 1.6–2.6)
MCH RBC QN AUTO: 38.1 PG (ref 27–31)
MCHC RBC AUTO-ENTMCNC: 33.5 G/DL (ref 32–36)
MCV RBC AUTO: 114 FL (ref 82–98)
METHADONE UR QL SCN>300 NG/ML: NEGATIVE
MICROSCOPIC COMMENT: ABNORMAL
MONOCYTES # BLD AUTO: 0.8 K/UL (ref 0.3–1)
MONOCYTES NFR BLD: 10.1 % (ref 4–15)
NEUTROPHILS # BLD AUTO: 5.4 K/UL (ref 1.8–7.7)
NEUTROPHILS NFR BLD: 71.4 % (ref 38–73)
NITRITE UR QL STRIP: NEGATIVE
NRBC BLD-RTO: 0 /100 WBC
OPIATES UR QL SCN: NEGATIVE
PCP UR QL SCN>25 NG/ML: NEGATIVE
PH UR STRIP: 7 [PH] (ref 5–8)
PHOSPHATE SERPL-MCNC: 2 MG/DL (ref 2.7–4.5)
PLATELET # BLD AUTO: 143 K/UL (ref 150–450)
PMV BLD AUTO: 10.2 FL (ref 9.2–12.9)
POC MOLECULAR INFLUENZA A AGN: NEGATIVE
POC MOLECULAR INFLUENZA B AGN: NEGATIVE
POTASSIUM SERPL-SCNC: 3.1 MMOL/L (ref 3.5–5.1)
POTASSIUM SERPL-SCNC: 3.3 MMOL/L (ref 3.5–5.1)
PROT SERPL-MCNC: 8.8 G/DL (ref 6–8.4)
PROT UR QL STRIP: ABNORMAL
RBC # BLD AUTO: 2.89 M/UL (ref 4.6–6.2)
RBC #/AREA URNS HPF: 2 /HPF (ref 0–4)
SARS-COV-2 RDRP RESP QL NAA+PROBE: NEGATIVE
SODIUM SERPL-SCNC: 141 MMOL/L (ref 136–145)
SODIUM SERPL-SCNC: 143 MMOL/L (ref 136–145)
SP GR UR STRIP: 1.02 (ref 1–1.03)
SQUAMOUS #/AREA URNS HPF: 0 /HPF
TOXICOLOGY INFORMATION: NORMAL
TROPONIN I SERPL DL<=0.01 NG/ML-MCNC: <0.006 NG/ML (ref 0–0.03)
TSH SERPL DL<=0.005 MIU/L-ACNC: 3.1 UIU/ML (ref 0.4–4)
URN SPEC COLLECT METH UR: ABNORMAL
UROBILINOGEN UR STRIP-ACNC: ABNORMAL EU/DL
WBC # BLD AUTO: 7.62 K/UL (ref 3.9–12.7)
WBC #/AREA URNS HPF: 4 /HPF (ref 0–5)

## 2024-02-24 PROCEDURE — 83735 ASSAY OF MAGNESIUM: CPT | Mod: HCNC | Performed by: EMERGENCY MEDICINE

## 2024-02-24 PROCEDURE — 25000003 PHARM REV CODE 250: Mod: HCNC | Performed by: EMERGENCY MEDICINE

## 2024-02-24 PROCEDURE — G0378 HOSPITAL OBSERVATION PER HR: HCPCS | Mod: HCNC

## 2024-02-24 PROCEDURE — 25000003 PHARM REV CODE 250: Mod: HCNC | Performed by: STUDENT IN AN ORGANIZED HEALTH CARE EDUCATION/TRAINING PROGRAM

## 2024-02-24 PROCEDURE — 80307 DRUG TEST PRSMV CHEM ANLYZR: CPT | Mod: HCNC | Performed by: EMERGENCY MEDICINE

## 2024-02-24 PROCEDURE — 99285 EMERGENCY DEPT VISIT HI MDM: CPT | Mod: 25,HCNC

## 2024-02-24 PROCEDURE — 96375 TX/PRO/DX INJ NEW DRUG ADDON: CPT | Mod: HCNC

## 2024-02-24 PROCEDURE — 25000242 PHARM REV CODE 250 ALT 637 W/ HCPCS: Mod: HCNC | Performed by: STUDENT IN AN ORGANIZED HEALTH CARE EDUCATION/TRAINING PROGRAM

## 2024-02-24 PROCEDURE — 83880 ASSAY OF NATRIURETIC PEPTIDE: CPT | Mod: HCNC | Performed by: EMERGENCY MEDICINE

## 2024-02-24 PROCEDURE — 80053 COMPREHEN METABOLIC PANEL: CPT | Mod: HCNC | Performed by: EMERGENCY MEDICINE

## 2024-02-24 PROCEDURE — 80048 BASIC METABOLIC PNL TOTAL CA: CPT | Mod: HCNC,XB | Performed by: STUDENT IN AN ORGANIZED HEALTH CARE EDUCATION/TRAINING PROGRAM

## 2024-02-24 PROCEDURE — 87651 STREP A DNA AMP PROBE: CPT | Mod: HCNC | Performed by: EMERGENCY MEDICINE

## 2024-02-24 PROCEDURE — 63600175 PHARM REV CODE 636 W HCPCS: Mod: HCNC | Performed by: STUDENT IN AN ORGANIZED HEALTH CARE EDUCATION/TRAINING PROGRAM

## 2024-02-24 PROCEDURE — 80178 ASSAY OF LITHIUM: CPT | Mod: HCNC | Performed by: EMERGENCY MEDICINE

## 2024-02-24 PROCEDURE — 36415 COLL VENOUS BLD VENIPUNCTURE: CPT | Mod: HCNC,XB | Performed by: STUDENT IN AN ORGANIZED HEALTH CARE EDUCATION/TRAINING PROGRAM

## 2024-02-24 PROCEDURE — 87635 SARS-COV-2 COVID-19 AMP PRB: CPT | Mod: HCNC | Performed by: EMERGENCY MEDICINE

## 2024-02-24 PROCEDURE — 96374 THER/PROPH/DIAG INJ IV PUSH: CPT | Mod: HCNC

## 2024-02-24 PROCEDURE — 82077 ASSAY SPEC XCP UR&BREATH IA: CPT | Mod: HCNC | Performed by: HOSPITALIST

## 2024-02-24 PROCEDURE — 93010 ELECTROCARDIOGRAM REPORT: CPT | Mod: HCNC,,, | Performed by: INTERNAL MEDICINE

## 2024-02-24 PROCEDURE — 93005 ELECTROCARDIOGRAM TRACING: CPT | Mod: HCNC

## 2024-02-24 PROCEDURE — 83735 ASSAY OF MAGNESIUM: CPT | Mod: 91,HCNC | Performed by: STUDENT IN AN ORGANIZED HEALTH CARE EDUCATION/TRAINING PROGRAM

## 2024-02-24 PROCEDURE — 63600175 PHARM REV CODE 636 W HCPCS: Mod: HCNC | Performed by: EMERGENCY MEDICINE

## 2024-02-24 PROCEDURE — 83690 ASSAY OF LIPASE: CPT | Mod: HCNC | Performed by: EMERGENCY MEDICINE

## 2024-02-24 PROCEDURE — 63600175 PHARM REV CODE 636 W HCPCS: Mod: HCNC | Performed by: NURSE PRACTITIONER

## 2024-02-24 PROCEDURE — 84443 ASSAY THYROID STIM HORMONE: CPT | Mod: HCNC | Performed by: EMERGENCY MEDICINE

## 2024-02-24 PROCEDURE — 36415 COLL VENOUS BLD VENIPUNCTURE: CPT | Mod: HCNC | Performed by: EMERGENCY MEDICINE

## 2024-02-24 PROCEDURE — 84100 ASSAY OF PHOSPHORUS: CPT | Mod: HCNC | Performed by: EMERGENCY MEDICINE

## 2024-02-24 PROCEDURE — 81000 URINALYSIS NONAUTO W/SCOPE: CPT | Mod: HCNC | Performed by: EMERGENCY MEDICINE

## 2024-02-24 PROCEDURE — 96361 HYDRATE IV INFUSION ADD-ON: CPT | Mod: HCNC

## 2024-02-24 PROCEDURE — 84484 ASSAY OF TROPONIN QUANT: CPT | Mod: HCNC | Performed by: EMERGENCY MEDICINE

## 2024-02-24 PROCEDURE — 85025 COMPLETE CBC W/AUTO DIFF WBC: CPT | Mod: HCNC | Performed by: EMERGENCY MEDICINE

## 2024-02-24 RX ORDER — POTASSIUM CHLORIDE 20 MEQ/1
40 TABLET, EXTENDED RELEASE ORAL
Status: DISCONTINUED | OUTPATIENT
Start: 2024-02-24 | End: 2024-02-24

## 2024-02-24 RX ORDER — AMOXICILLIN AND CLAVULANATE POTASSIUM 875; 125 MG/1; MG/1
1 TABLET, FILM COATED ORAL EVERY 12 HOURS
Status: DISCONTINUED | OUTPATIENT
Start: 2024-02-24 | End: 2024-02-25

## 2024-02-24 RX ORDER — FOLIC ACID 1 MG/1
1 TABLET ORAL DAILY
Status: DISCONTINUED | OUTPATIENT
Start: 2024-02-24 | End: 2024-02-25

## 2024-02-24 RX ORDER — LANOLIN ALCOHOL/MO/W.PET/CERES
400 CREAM (GRAM) TOPICAL ONCE
Status: COMPLETED | OUTPATIENT
Start: 2024-02-24 | End: 2024-02-24

## 2024-02-24 RX ORDER — PANTOPRAZOLE SODIUM 40 MG/1
40 TABLET, DELAYED RELEASE ORAL DAILY
Status: DISCONTINUED | OUTPATIENT
Start: 2024-02-25 | End: 2024-02-25

## 2024-02-24 RX ORDER — ONDANSETRON HYDROCHLORIDE 2 MG/ML
4 INJECTION, SOLUTION INTRAVENOUS
Status: COMPLETED | OUTPATIENT
Start: 2024-02-24 | End: 2024-02-24

## 2024-02-24 RX ORDER — METOPROLOL TARTRATE 50 MG/1
50 TABLET ORAL 2 TIMES DAILY
Status: DISCONTINUED | OUTPATIENT
Start: 2024-02-24 | End: 2024-02-25

## 2024-02-24 RX ORDER — SODIUM CHLORIDE, SODIUM LACTATE, POTASSIUM CHLORIDE, CALCIUM CHLORIDE 600; 310; 30; 20 MG/100ML; MG/100ML; MG/100ML; MG/100ML
INJECTION, SOLUTION INTRAVENOUS CONTINUOUS
Status: DISCONTINUED | OUTPATIENT
Start: 2024-02-24 | End: 2024-02-28

## 2024-02-24 RX ORDER — ONDANSETRON HYDROCHLORIDE 2 MG/ML
4 INJECTION, SOLUTION INTRAVENOUS EVERY 8 HOURS PRN
Status: DISCONTINUED | OUTPATIENT
Start: 2024-02-24 | End: 2024-03-07 | Stop reason: HOSPADM

## 2024-02-24 RX ORDER — FAMOTIDINE 10 MG/ML
20 INJECTION INTRAVENOUS
Status: COMPLETED | OUTPATIENT
Start: 2024-02-24 | End: 2024-02-24

## 2024-02-24 RX ORDER — MAGNESIUM SULFATE HEPTAHYDRATE 40 MG/ML
2 INJECTION, SOLUTION INTRAVENOUS
Status: COMPLETED | OUTPATIENT
Start: 2024-02-24 | End: 2024-02-24

## 2024-02-24 RX ORDER — POTASSIUM CHLORIDE 7.45 MG/ML
10 INJECTION INTRAVENOUS
Status: COMPLETED | OUTPATIENT
Start: 2024-02-24 | End: 2024-02-25

## 2024-02-24 RX ORDER — MAGNESIUM SULFATE HEPTAHYDRATE 40 MG/ML
2 INJECTION, SOLUTION INTRAVENOUS ONCE
Status: COMPLETED | OUTPATIENT
Start: 2024-02-24 | End: 2024-02-24

## 2024-02-24 RX ORDER — SODIUM,POTASSIUM PHOSPHATES 280-250MG
2 POWDER IN PACKET (EA) ORAL ONCE
Status: COMPLETED | OUTPATIENT
Start: 2024-02-24 | End: 2024-02-24

## 2024-02-24 RX ORDER — SODIUM CHLORIDE 0.9 % (FLUSH) 0.9 %
10 SYRINGE (ML) INJECTION
Status: DISCONTINUED | OUTPATIENT
Start: 2024-02-24 | End: 2024-02-25

## 2024-02-24 RX ORDER — LANOLIN ALCOHOL/MO/W.PET/CERES
100 CREAM (GRAM) TOPICAL DAILY
Status: DISCONTINUED | OUTPATIENT
Start: 2024-02-24 | End: 2024-02-25

## 2024-02-24 RX ORDER — FLUTICASONE PROPIONATE 50 MCG
2 SPRAY, SUSPENSION (ML) NASAL DAILY
Status: DISCONTINUED | OUTPATIENT
Start: 2024-02-24 | End: 2024-03-07 | Stop reason: HOSPADM

## 2024-02-24 RX ORDER — LORAZEPAM 1 MG/1
2 TABLET ORAL EVERY 4 HOURS PRN
Status: DISCONTINUED | OUTPATIENT
Start: 2024-02-24 | End: 2024-02-25

## 2024-02-24 RX ORDER — LEVETIRACETAM 500 MG/1
500 TABLET ORAL 2 TIMES DAILY
Status: DISCONTINUED | OUTPATIENT
Start: 2024-02-24 | End: 2024-02-25

## 2024-02-24 RX ORDER — TALC
6 POWDER (GRAM) TOPICAL NIGHTLY PRN
Status: DISCONTINUED | OUTPATIENT
Start: 2024-02-24 | End: 2024-03-07 | Stop reason: HOSPADM

## 2024-02-24 RX ADMIN — Medication 400 MG: at 05:02

## 2024-02-24 RX ADMIN — POTASSIUM BICARBONATE 25 MEQ: 978 TABLET, EFFERVESCENT ORAL at 05:02

## 2024-02-24 RX ADMIN — FAMOTIDINE 20 MG: 10 INJECTION, SOLUTION INTRAVENOUS at 01:02

## 2024-02-24 RX ADMIN — Medication 100 MG: at 05:02

## 2024-02-24 RX ADMIN — POTASSIUM & SODIUM PHOSPHATES POWDER PACK 280-160-250 MG 2 PACKET: 280-160-250 PACK at 02:02

## 2024-02-24 RX ADMIN — LEVETIRACETAM 500 MG: 500 TABLET, FILM COATED ORAL at 10:02

## 2024-02-24 RX ADMIN — POTASSIUM CHLORIDE 10 MEQ: 7.46 INJECTION, SOLUTION INTRAVENOUS at 10:02

## 2024-02-24 RX ADMIN — METOPROLOL TARTRATE 50 MG: 50 TABLET, FILM COATED ORAL at 10:02

## 2024-02-24 RX ADMIN — SODIUM CHLORIDE 1000 ML: 9 INJECTION, SOLUTION INTRAVENOUS at 01:02

## 2024-02-24 RX ADMIN — LORAZEPAM 2 MG: 1 TABLET ORAL at 07:02

## 2024-02-24 RX ADMIN — THERA TABS 1 TABLET: TAB at 05:02

## 2024-02-24 RX ADMIN — SODIUM CHLORIDE, POTASSIUM CHLORIDE, SODIUM LACTATE AND CALCIUM CHLORIDE: 600; 310; 30; 20 INJECTION, SOLUTION INTRAVENOUS at 05:02

## 2024-02-24 RX ADMIN — MAGNESIUM SULFATE HEPTAHYDRATE 2 G: 40 INJECTION, SOLUTION INTRAVENOUS at 03:02

## 2024-02-24 RX ADMIN — ONDANSETRON 4 MG: 2 INJECTION INTRAMUSCULAR; INTRAVENOUS at 01:02

## 2024-02-24 RX ADMIN — APIXABAN 5 MG: 2.5 TABLET, FILM COATED ORAL at 10:02

## 2024-02-24 RX ADMIN — FOLIC ACID 1 MG: 1 TABLET ORAL at 05:02

## 2024-02-24 RX ADMIN — MAGNESIUM SULFATE HEPTAHYDRATE 2 G: 40 INJECTION, SOLUTION INTRAVENOUS at 05:02

## 2024-02-24 RX ADMIN — FLUTICASONE PROPIONATE 100 MCG: 50 SPRAY, METERED NASAL at 05:02

## 2024-02-24 NOTE — ASSESSMENT & PLAN NOTE
Patient has hypokalemia which is Acute and currently uncontrolled. Most recent potassium levels reviewed-   Lab Results   Component Value Date    K 3.3 (L) 02/24/2024   . Will continue potassium replacement per protocol and recheck repeat levels after replacement completed.

## 2024-02-24 NOTE — ASSESSMENT & PLAN NOTE
- Noted in history. Last seizure several years ago ~ 2019. Unclear etiology may have been related to withdrawal  - Continue with Keppra  - Follow up outpatient with Neurology; given this was a one time occurrence in likely the above setting

## 2024-02-24 NOTE — ASSESSMENT & PLAN NOTE
- Per patients wife, he used to drink about 1 bottle of vodka daily for several years; however, in the past few days he has been having 1/2 pint of vodka every other day. Last known drink 2 days ago.  - Start CIWA protocol  - Ativan PRN per CIWA  - Zofran PRN for nausea   - MVI, Thiamine and Folic acid  - Seizure, Aspiration and Fall precautions

## 2024-02-24 NOTE — ASSESSMENT & PLAN NOTE
Chronic, uncontrolled. However, may be also elevated currently in the setting of withdrawal  Latest blood pressure and vitals reviewed-     Temp:  [98.5 °F (36.9 °C)-99.7 °F (37.6 °C)]   Pulse:  [102-130]   Resp:  [15-24]   BP: (126-151)/(80-85)   SpO2:  [98 %-100 %] .   Home meds for hypertension were reviewed and noted below.   Hypertension Medications               metoprolol tartrate (LOPRESSOR) 50 MG tablet TAKE 1 TABLET TWICE DAILY          While in the hospital, will manage blood pressure as follows; Continue home antihypertensive regimen    Will utilize p.r.n. blood pressure medication only if patient's blood pressure greater than 160/100 and he develops symptoms such as worsening chest pain or shortness of breath.

## 2024-02-24 NOTE — H&P
"  [facility]  History & Physical    Patient Name: Michael Johnson Jr.  MRN: [id number]  Patient Class: OP- Observation  Admission Date: 2/24/2024  Attending Physician: Jessica Meyer MD   Primary Care Provider: Amelia Lai MD    Patient information was obtained from patient, spouse/SO, and ER records.     Subjective:     Principal Problem:Alcohol abuse with withdrawal    Chief Complaint:   Chief Complaint   Patient presents with    Fatigue     "Flu-like s/s" x3 days, worsening SOB last PM. EMS reports pt with run of SVT on arrival to ED, converted back to sinus tach without intervention.         HPI: 63 year old male with Hx of Hypertension, Seizure disorders, spastic left-sided hemiplegia, Anemia, Arthrititis, Depression, CKD stage 3, Alcohol use disorder and Gout, who presents to the ED with complaints of nausea/vomiting and abdominal pain for the past 2 days. Patient is a poor historian; most history provided by patients wife. For the past 2 days patient has not been eating. Started to feel nauseated with associated NBNB emesis and weakness. Reports that his sore throat has been hurting him since yesterday and noticed he has been shaking since yesterday. He reports that he had quit drinking for a long time; however, per patients wife he has been drinking daily for several years and in the past few weeks it has been 1/2 pint of vodka every 2 days. Patient denies any fever, chest pain, palpitaitons, dyspnea, changes in bowel or urinary habits. He does report feeling of cramping in his upper abdomen, non-radiating. Last episode of emesis was here in the ER. States he takes his medication but not everyday. Denies any illicit drug use or smoking. 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 Denies any sick contacts.     Past Medical History:   Diagnosis Date    Afib     Alcohol abuse with other alcohol-induced disorder 04/02/2019    Anemia 04/02/2019    " unspecified deficiency    Ankle fracture, left     Aortic atherosclerosis 2/8/2024    Arthritis     Asthma     Cholecystitis 1/15/2018    Chronic kidney disease (CKD), stage III (moderate) 04/02/2019    Depression     Erectile dysfunction 04/02/2019    Gout, arthropathy 04/02/2019    Gout, unspecified     Hypertension     Hypertensive chronic kidney disease 04/02/2019    Hypomagnesemia 04/02/2019    Noncompliance 04/02/2019    Peripheral neuropathy 04/02/2019    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    Secondary hyperparathyroidism, renal 04/02/2019    Seizure 2016    Spastic hemiplegia affecting left nondominant side 04/02/2019       Past Surgical History:   Procedure Laterality Date    ANKLE SURGERY      x6    CHOLECYSTECTOMY         Review of patient's allergies indicates:   Allergen Reactions    Lisinopril Swelling     Pt admitted with angioedema 2wks after taking lisinopril.        No current facility-administered medications on file prior to encounter.     Current Outpatient Medications on File Prior to Encounter   Medication Sig    albuterol sulfate (PROAIR RESPICLICK) 90 mcg/actuation inhaler Inhale 2 puffs into the lungs every 6 (six) hours as needed for Wheezing (wheezing). Rescue    allopurinoL (ZYLOPRIM) 300 MG tablet TAKE 1 TABLET EVERY DAY    aluminum-magnesium hydroxide-simethicone (MAALOX) 200-200-20 mg/5 mL Susp Take 30 mLs by mouth before meals and at bedtime as needed (acid reflux).    amiodarone (PACERONE) 200 MG Tab Take 1 tablet (200 mg total) by mouth 2 (two) times daily for 14 days, THEN 1 tablet (200 mg total) once daily.    ammonium lactate (LAC-HYDRIN) 12 % lotion Apply 1 g topically as needed for Dry Skin.    apixaban (ELIQUIS) 5 mg Tab Take 1 tablet (5 mg total) by mouth 2 (two) times daily.    ceramides 1,3,6-II (CERAVE DAILY MOISTURIZING) Lotn Applying topically to dry skin twice daily.    diclofenac sodium (VOLTAREN) 1 % Gel Apply 2 g topically 4 (four) times daily as needed.    fluticasone  propionate (FLONASE) 50 mcg/actuation nasal spray SHAKE LIQUID AND USE 1 SPRAY(50 MCG) IN EACH NOSTRIL EVERY DAY    folic acid (FOLVITE) 1 MG tablet Take 1 tablet (1 mg total) by mouth once daily.    gabapentin (NEURONTIN) 400 MG capsule TAKE 1 CAPSULE TWICE DAILY    levETIRAcetam (KEPPRA) 500 MG Tab TAKE 1 TABLET(500 MG) BY MOUTH TWICE DAILY    magnesium oxide 400 mg magnesium Tab Take 400 mg by mouth once daily.    methocarbamoL (ROBAXIN) 500 MG Tab Take 1 tablet (500 mg total) by mouth daily as needed.    metoprolol tartrate (LOPRESSOR) 50 MG tablet TAKE 1 TABLET TWICE DAILY    [facility] USE AS DIRECTED.    pantoprazole (PROTONIX) 40 MG tablet Take 1 tablet (40 mg total) by mouth once daily.     Family History       Problem Relation (Age of Onset)    Cancer Mother    Cataracts Maternal Grandfather    Hypertension Father    Stroke Maternal Grandmother, Maternal Grandfather          Tobacco Use    Smoking status: Former    Smokeless tobacco: Never   Substance and Sexual Activity    Alcohol use: Not Currently    Drug use: No    Sexual activity: Not Currently       Objective:     Vital Signs (Most Recent):  Temp: 98.5 °F (36.9 °C) (02/24/24 1607)  Pulse: 102 (02/24/24 1632)  Resp: (!) 24 (02/24/24 1632)  BP: 134/84 (02/24/24 1602)  SpO2: 98 % (02/24/24 1632) Vital Signs (24h Range):  Temp:  [98.5 °F (36.9 °C)-99.7 °F (37.6 °C)] 98.5 °F (36.9 °C)  Pulse:  [102-130] 102  Resp:  [15-24] 24  SpO2:  [98 %-100 %] 98 %  BP: (126-151)/(80-85) 134/84        There is no height or weight on file to calculate BMI.     Physical Exam  Vitals and nursing note reviewed.   Constitutional:       General: He is not in acute distress.     Appearance: He is not toxic-appearing.   HENT:      Head: Normocephalic.      Nose: No congestion or rhinorrhea.      Mouth/Throat:      Mouth: Mucous membranes are dry.      Pharynx: No oropharyngeal exudate.      Comments: Tongue fasciculations   Eyes:      General: No scleral  icterus.     Conjunctiva/sclera: Conjunctivae normal.   Cardiovascular:      Rate and Rhythm: Normal rate and regular rhythm.      Heart sounds: No murmur heard.  Pulmonary:      Effort: Pulmonary effort is normal.      Breath sounds: Normal breath sounds. No wheezing.   Abdominal:      General: There is no distension.      Palpations: Abdomen is soft.      Tenderness: There is abdominal tenderness (Epigastric).   Musculoskeletal:         General: Signs of injury present.      Right lower leg: No edema.      Left lower leg: No edema.      Comments: Wheelchair bound    Skin:     General: Skin is warm.      Findings: Lesion (R.index finger) present.   Neurological:      Mental Status: He is alert. Mental status is at baseline.      Comments: Tremors   Psychiatric:         Mood and Affect: Mood normal.       Significant Labs: All pertinent labs within the past 24 hours have been reviewed.    Significant Imaging: I have reviewed all pertinent imaging results/findings within the past 24 hours.  Assessment/Plan:     * Alcohol abuse with withdrawal  - Per patients wife, he used to drink about 1 bottle of vodka daily for several years; however, in the past few days he has been having 1/2 pint of vodka every other day. Last known drink 2 days ago.  - Start CIWA protocol  - Ativan PRN per CIWA  - Zofran PRN for nausea   - MVI, Thiamine and Folic acid  - Seizure, Aspiration and Fall precautions    Electrolyte disturbance  - Patient with Hypomagnesemia, Hypokalemia and Hypophosphatemia  - Correct electrolytes and repeat labs in 4 hours  - Telemetry monitoring for now    Pharyngitis  - CT with findings concerning for pharyngitis and possibly retained secretions. Also noted chronic appearing opacification of the left maxillary sinus with chronic osteitis of the sinus walls. Appears chronic dating back to 3/2017 per read.   - Outpatient referral to Otolaryngology   - Start Flonase and Amoxicillin    Paroxysmal atrial  fibrillation  Patient with Paroxysmal (<7 days) atrial fibrillation which is controlled currently with Beta Blocker. Patient is currently in sinus rhythm.[id number] Score: 1  - Had an episode of Atrial flutter (09/2023)  - Not compliant with Home Eliquis or BB   - Echocardiogram (9/2023): Preserved EF 50-55%  - Monitor electrolytes and Maintain Mg >2 and K >4    Essential hypertension  Chronic, uncontrolled. However, may be also elevated currently in the setting of withdrawal  Latest blood pressure and vitals reviewed-     Temp:  [98.5 °F (36.9 °C)-99.7 °F (37.6 °C)]   Pulse:  [102-130]   Resp:  [15-24]   BP: (126-151)/(80-85)   SpO2:  [98 %-100 %] .   Home meds for hypertension were reviewed and noted below.   Hypertension Medications               metoprolol tartrate (LOPRESSOR) 50 MG tablet TAKE 1 TABLET TWICE DAILY          While in the hospital, will manage blood pressure as follows; Continue home antihypertensive regimen    Will utilize p.r.n. blood pressure medication only if patient's blood pressure greater than 160/100 and he develops symptoms such as worsening chest pain or shortness of breath.    GERD (gastroesophageal reflux disease)  - Protonix 40 mg, daily      Seizure  - Noted in history. Last seizure several years ago ~ 2019. Unclear etiology may have been related to withdrawal  - Continue with Keppra  - Follow up outpatient with Neurology; given this was a one time occurrence in likely the above setting    Hypokalemia  Patient has hypokalemia which is Acute and currently uncontrolled. Most recent potassium levels reviewed-   Lab Results   Component Value Date    K 3.3 (L) 02/24/2024   . Will continue potassium replacement per protocol and recheck repeat levels after replacement completed.       VTE Risk Mitigation (From admission, onward)           Ordered     apixaban tablet 5 mg  2 times daily         02/24/24 1629     IP VTE LOW RISK PATIENT  Once         02/24/24 1605     Place sequential  compression device  Until discontinued         02/24/24 1605                    On 02/24/2024, patient should be placed in hospital observation services under my care.      Michelle Caro MD  Department of Hospital Medicine  [facility] - Emergency Dept

## 2024-02-24 NOTE — ASSESSMENT & PLAN NOTE
Patient with Paroxysmal (<7 days) atrial fibrillation which is controlled currently with Beta Blocker. Patient is currently in sinus rhythm.[id number] Score: 1  - Had an episode of Atrial flutter (09/2023)  - Not compliant with Home Eliquis or BB   - Echocardiogram (9/2023): Preserved EF 50-55%  - Monitor electrolytes and Maintain Mg >2 and K >4

## 2024-02-24 NOTE — ASSESSMENT & PLAN NOTE
- CT with findings concerning for pharyngitis and possibly retained secretions. Also noted chronic appearing opacification of the left maxillary sinus with chronic osteitis of the sinus walls. Appears chronic dating back to 3/2017 per read.   - Outpatient referral to Otolaryngology   - Start Flonase and Amoxicillin

## 2024-02-24 NOTE — ED PROVIDER NOTES
"Encounter Date: 2/24/2024    SCRIBE #1 NOTE: I, Dolores Monet, am scribing for, and in the presence of,  Sarah Reynolds MD.       History     Chief Complaint   Patient presents with    Fatigue     "Flu-like s/s" x3 days, worsening SOB last PM. EMS reports pt with run of SVT on arrival to ED, converted back to sinus tach without intervention.      63-year-old male with a PMHx of HTN and asthma presents with a complaint of shortness of breath since last night. He also reports sore throat, productive cough, and runny nose, but denies chest pain, fevers, chills, or body aches. He reports two episodes of vomiting in the last 24 hours with persistent nausea, but denies diarrhea.  Patient denies any recent alcohol abuse, but his wife states that he drinks a 5th of vodka every 2 days.    The history is provided by the patient and the spouse.     Review of patient's allergies indicates:   Allergen Reactions    Lisinopril Swelling     Pt admitted with angioedema 2wks after taking lisinopril.      Past Medical History:   Diagnosis Date    Afib     Alcohol abuse with other alcohol-induced disorder 04/02/2019    Anemia 04/02/2019    unspecified deficiency    Ankle fracture, left     Aortic atherosclerosis 2/8/2024    Arthritis     Asthma     Cholecystitis 1/15/2018    Chronic kidney disease (CKD), stage III (moderate) 04/02/2019    Depression     Erectile dysfunction 04/02/2019    Gout, arthropathy 04/02/2019    Gout, unspecified     Hypertension     Hypertensive chronic kidney disease 04/02/2019    Hypomagnesemia 04/02/2019    Noncompliance 04/02/2019    Peripheral neuropathy 04/02/2019    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    Secondary hyperparathyroidism, renal 04/02/2019    Seizure 2016    Spastic hemiplegia affecting left nondominant side 04/02/2019     Past Surgical History:   Procedure Laterality Date    ANKLE SURGERY      x6    CHOLECYSTECTOMY       Family History   Problem Relation Age of Onset    Hypertension Father     Stroke " Maternal Grandmother     Cataracts Maternal Grandfather     Stroke Maternal Grandfather     Cancer Mother         malignant polyp     Social History     Tobacco Use    Smoking status: Former    Smokeless tobacco: Never   Substance Use Topics    Alcohol use: Not Currently    Drug use: No     Review of Systems   Constitutional:  Negative for chills and fever.   HENT:  Positive for rhinorrhea and sore throat. Negative for congestion.    Eyes:  Negative for visual disturbance.   Respiratory:  Positive for cough and shortness of breath.    Cardiovascular:  Positive for palpitations. Negative for chest pain.   Gastrointestinal:  Positive for vomiting. Negative for abdominal pain and diarrhea.   Genitourinary:  Negative for decreased urine volume, dysuria and frequency.   Musculoskeletal:  Negative for joint swelling, neck pain and neck stiffness.   Skin:  Negative for rash and wound.   Neurological:  Negative for weakness, numbness and headaches.   Psychiatric/Behavioral:  Negative for behavioral problems and confusion.        Physical Exam     Initial Vitals [02/24/24 1300]   BP Pulse Resp Temp SpO2   126/80 (!) 130 20 99.7 °F (37.6 °C) 98 %      MAP       --         Physical Exam    Constitutional: He appears well-developed and well-nourished. He appears distressed.   HENT:   Head: Normocephalic and atraumatic.   Nose: Nose normal.   Patient is poorly cooperative with oropharyngeal exam.  He has a large tongue and strong gag reflex.  Posterior oropharynx briefly visualized, appears enlarged and mildly edematous.  Uvula is midline, no tonsillar enlargement.  Dry mucous membranes.   Eyes: Conjunctivae and EOM are normal. Pupils are equal, round, and reactive to light.   Neck: Neck supple.   Normal range of motion.  Cardiovascular:  Regular rhythm.   Tachycardia present.   Exam reveals no gallop and no friction rub.       No murmur heard.  Pulmonary/Chest: Breath sounds normal. No respiratory distress. He has no wheezes.  He has no rales.   Abdominal: Abdomen is soft. Bowel sounds are normal. There is no abdominal tenderness. There is no rebound and no guarding.   Musculoskeletal:         General: No tenderness or edema.      Cervical back: Normal range of motion and neck supple.     Neurological: He is alert and oriented to person, place, and time. He has normal strength. No cranial nerve deficit or sensory deficit. Gait normal. GCS score is 15. GCS eye subscore is 4. GCS verbal subscore is 5. GCS motor subscore is 6.   Skin: Skin is warm and dry. No rash noted.   Psychiatric: He has a normal mood and affect. His speech is normal and behavior is normal.          ED Course   Critical Care    Date/Time: 3/6/2024 3:39 PM    Performed by: Sarah Reynolds MD  Authorized by: Sarah Reynolds MD  Direct patient critical care time: 18 minutes  Additional history critical care time: 8 minutes  Ordering / reviewing critical care time: 8 minutes  Documentation critical care time: 12 minutes  Consulting other physicians critical care time: 12 minutes  Total critical care time (exclusive of procedural time) : 58 minutes  Critical care time was exclusive of separately billable procedures and treating other patients.  Critical care was necessary to treat or prevent imminent or life-threatening deterioration of the following conditions: metabolic crisis and respiratory failure.  Critical care was time spent personally by me on the following activities: blood draw for specimens, development of treatment plan with patient or surrogate, discussions with consultants, interpretation of cardiac output measurements, evaluation of patient's response to treatment, obtaining history from patient or surrogate, ordering and performing treatments and interventions, ordering and review of laboratory studies, ordering and review of radiographic studies, pulse oximetry, re-evaluation of patient's condition and review of old charts.        Labs Reviewed   CBC W/  AUTO DIFFERENTIAL - Abnormal; Notable for the following components:       Result Value    RBC 2.89 (*)     Hemoglobin 11.0 (*)     Hematocrit 32.8 (*)      (*)     MCH 38.1 (*)     Platelets 143 (*)     Lymph % 17.8 (*)     All other components within normal limits   COMPREHENSIVE METABOLIC PANEL - Abnormal; Notable for the following components:    Potassium 3.3 (*)     Glucose 125 (*)     Calcium 8.6 (*)     Total Protein 8.8 (*)     Albumin 3.4 (*)     Total Bilirubin 1.2 (*)     AST 97 (*)     ALT 61 (*)     eGFR 57 (*)     All other components within normal limits   URINALYSIS, REFLEX TO URINE CULTURE - Abnormal; Notable for the following components:    Color, UA Orange (*)     Protein, UA 1+ (*)     Urobilinogen, UA 2.0-3.0 (*)     All other components within normal limits    Narrative:     Specimen Source->Urine   B-TYPE NATRIURETIC PEPTIDE - Abnormal; Notable for the following components:     (*)     All other components within normal limits   PHOSPHORUS - Abnormal; Notable for the following components:    Phosphorus 2.0 (*)     All other components within normal limits   MAGNESIUM - Abnormal; Notable for the following components:    Magnesium <0.7 (*)     All other components within normal limits    Narrative:     Mg critical result(s) called and verbal readback obtained from Denise Huitron RN. by [facility] 02/24/2024 14:49   URINALYSIS MICROSCOPIC - Abnormal; Notable for the following components:    Hyaline Casts, UA 3 (*)     All other components within normal limits    Narrative:     Specimen Source->Urine   GROUP A STREP, MOLECULAR   TROPONIN I   TSH   ALCOHOL,MEDICAL (ETHANOL)   TOXICOLOGY SCREEN, URINE, RANDOM (COMPLIANCE)   BASIC METABOLIC PANEL   MAGNESIUM   LIPASE   SARS-COV-2 RDRP GENE   POCT INFLUENZA A/B MOLECULAR     EKG Readings: (Independently Interpreted)    Sinus tachycardia rate of 126, no STEMI, no ectopy.       Imaging Results              CT Soft Tissue Neck WO Contrast (Final  result)  Result time 02/24/24 15:00:29      Final result by Ousmane Kim MD (02/24/24 15:00:29)                   Impression:      1. Exam compromised by motion and lack of intravenous contrast.  2. There is fullness in the hypopharynx, involving the posterior hypopharyngeal wall and post cricoid region.  This appears mostly fluid attenuation.  In the reported contact, findings concerning for pharyngitis and possibly retained secretions.  Imaging and/or clinical follow-up to resolution would be recommended to exclude the possibility of an underlying neoplastic process.  3. Additionally, there is modest inflammatory change in the retroperitoneal fat planes, extending caudally to approximately the level of the mid to lower thyroid cartilage without discrete drainable fluid collection or abscess formation by noncontrast technique.  Question mild thickening of the epiglottis, soft palate and uvula.  4. Slight medialization of the right true vocal cord is suggested, as may be seen in setting of vocal cord paralysis/paresis.  Clinical correlation recommended in this regard.  5. No definite pathologic adenopathy by unenhanced technique.  6. Chronic appearing essentially complete opacification of the left maxillary sinus with chronic osteitis of the sinus walls and internal hyperattenuation, the latter which could relate to inspissated secretions and/or fungal elements.  This appears likely present dating back to at least 03/16/2017 CT.  Otolaryngology consultation for endoscopy and possible biopsy could be considered to exclude the possibility underlying obstructing neoplastic or non neoplastic process.      Electronically signed by: Ousmane Kim  Date:    02/24/2024  Time:    15:00               Narrative:    EXAMINATION:  CT SOFT TISSUE NECK WITHOUT CONTRAST    CLINICAL HISTORY:  Epiglottitis or tonsillitis suspected;    TECHNIQUE:  Low dose axial images, sagittal and coronal reformations were performed from the  skull base to the level of the clavicles.  Contrast was not administered.    COMPARISON:  Correlation made with CTA chest 08/09/2023.  CT head performed 01/08/2018.    FINDINGS:  Comment: Examination limited by motion and absence of intravenous contrast.    Treatment changes: No surgical or radiation changes are evident.    Mucosal space: Assessment limited by lack of intravenous contrast.    There is fullness in the hypopharynx, involving the posterior hypopharyngeal wall and post cricoid region.  This appears mostly fluid attenuation.  Additionally, there is modest inflammatory change in the retroperitoneal fat planes, extending caudally to approximately the level of the mid to lower thyroid cartilage without discrete drainable fluid collection or abscess formation by noncontrast technique.    Piriform sinuses are not well characterized may be fluid filled.  Slight medialization of the right true vocal cord is suggested, as may be seen in setting of vocal cord paralysis/paresis.    Skull base: No definite abnormality.    Lymph nodes: Lymph nodes are not pathologically enlarged by CT size criteria.    Parotid and submandibular glands: No focal lesions are identified.    Thyroid: Diminutive.    Vascular structures: Limited assessment without contrast.  Mild atherosclerotic calcifications.    Orbits: Normal.    Visualized intracranial contents: No definite acute abnormalities.    Paranasal sinuses: Chronic appearing essentially complete opacification of the left maxillary sinus with chronic osteitis of the sinus walls and internal hyperattenuation, the latter which could relate to inspissated secretions and/or fungal elements.  Relatively modest paranasal sinus mucosal thickening elsewhere.    Bones: No definite acute abnormality.  Degenerative findings of the spine.    Lung apices: Clear    Other findings:                                       X-Ray Chest AP Portable (Final result)  Result time 02/24/24 14:02:26       Final result by Ousmane Feliz MD (02/24/24 14:02:26)                   Impression:      No acute abnormality.      Electronically signed by: Ousmane Feliz MD  Date:    02/24/2024  Time:    14:02               Narrative:    EXAMINATION:  XR CHEST AP PORTABLE    CLINICAL HISTORY:  SOB;    TECHNIQUE:  Single frontal view of the chest was performed.    COMPARISON:  Chest radiograph from 09/02/2023    FINDINGS:  The lungs are clear, with normal appearance of pulmonary vasculature and no pleural effusion or pneumothorax.    The cardiac silhouette is normal in size. The hilar and mediastinal contours are unremarkable.    Bones are intact.                                    X-Rays:   Independently Interpreted Readings:   Chest X-Ray: See ED course      Medications   magnesium sulfate 2g in water 50mL IVPB (premix) (2 g Intravenous New Bag 2/24/24 1550)   magnesium sulfate 2g in water 50mL IVPB (premix) (2 g Intravenous New Bag 2/24/24 1715)   sodium chloride 0.9% flush 10 mL (has no administration in time range)   melatonin tablet 6 mg (has no administration in time range)   ondansetron injection 4 mg (has no administration in time range)   lactated ringers infusion ( Intravenous New Bag 2/24/24 1715)   apixaban tablet 5 mg (has no administration in time range)   folic acid tablet 1 mg (1 mg Oral Given 2/24/24 1720)   multivitamin tablet (1 tablet Oral Given 2/24/24 1720)   thiamine tablet 100 mg (100 mg Oral Given 2/24/24 1720)   levETIRAcetam tablet 500 mg (has no administration in time range)   metoprolol tartrate (LOPRESSOR) tablet 50 mg (has no administration in time range)   pantoprazole EC tablet 40 mg (has no administration in time range)   LORazepam tablet 2 mg (has no administration in time range)   fluticasone propionate 50 mcg/actuation nasal spray 100 mcg (100 mcg Each Nostril Given 2/24/24 1720)   amoxicillin-clavulanate 875-125mg per tablet 1 tablet (has no administration in time range)    sodium chloride 0.9% bolus 1,000 mL 1,000 mL (0 mLs Intravenous Stopped 2/24/24 1616)   ondansetron injection 4 mg (4 mg Intravenous Given 2/24/24 1359)   famotidine (PF) injection 20 mg (20 mg Intravenous Given 2/24/24 1359)   potassium, sodium phosphates 280-160-250 mg packet 2 packet (2 packets Oral Given 2/24/24 1430)   magnesium oxide tablet 400 mg (400 mg Oral Given 2/24/24 1720)   potassium bicarbonate disintegrating tablet 25 mEq (25 mEq Oral Given 2/24/24 1720)     Medical Decision Making  Urgent evaluation of 63-year-old male who presents with complaint of shortness of breath with sore throat and cough.  He reports some vomiting.  Although patient denies alcohol use, wife states that he continues to abuse alcohol.  I am concerned that given recent vomiting he could have some alcoholic ketoacidosis or withdrawal symptoms.  EKG shows sinus tachycardia but he had a run of increased heart rate which appeared to be possible SVT.  However, this was brief and resolved without intervention.  Physical exam was challenging for evaluation a posterior oropharynx.  CT shows nonspecific edema without abscess.  Strep test is negative.  COVID and flu test are negative.  No pneumonia on x-ray.  Labs reveal profound electrolyte disturbance with hypomagnesemia, hypokalemia, hypophosphatemia.  These were repleted.  He was treated with antiemetics and IV fluids.  He is admitted to the hospitalist service in serious condition for further electrolyte repletion and care.    Amount and/or Complexity of Data Reviewed  Independent Historian: spouse     Details: Spouse states that he does continue to abuse alcohol, despite his denial of this  Labs: ordered. Decision-making details documented in ED Course.  Radiology: ordered and independent interpretation performed. Decision-making details documented in ED Course.  ECG/medicine tests: ordered and independent interpretation performed.  Discussion of management or test interpretation  with external provider(s): I discussed the case with Dr. Spencer - hospitalist.  He will admit the patient.    Risk  OTC drugs.  Prescription drug management.  Decision regarding hospitalization.  Diagnosis or treatment significantly limited by social determinants of health.            Scribe Attestation:   Scribe #1: I performed the above scribed service and the documentation accurately describes the services I performed. I attest to the accuracy of the note.    Physician Attestation for Scribe: I, Sarah Reynolds, reviewed documentation as scribed in my presence, which is both accurate and complete.      ED Course as of 02/24/24 1728   Sat Feb 24, 2024   1417 POCT COVID-19 Rapid Screening  Reviewed and negative. [AK]   1417 POCT Influenza A/B Molecular  Reviewed and negative. [AK]   1417 X-Ray Chest AP Portable  No infiltrates, effusion, or pneumothorax. No acute process. Will defer to the Radiologist's reading.    [AK]      ED Course User Index  [AK] Sarah Reynolds MD                           Clinical Impression:  Final diagnoses:  [R00.0] Tachycardia  [E83.42] Hypomagnesemia  [J02.9] Pharyngitis, unspecified etiology  [R93.89] Abnormal CT scan, neck  [E87.8] Electrolyte disturbance (Primary)          ED Disposition Condition    Observation Stable                Sarah Reynolds MD  02/24/24 1733       Sarah Reynolds MD  02/24/24 1733       Sarah Reynolds MD  03/06/24 1540

## 2024-02-24 NOTE — SUBJECTIVE & OBJECTIVE
Past Medical History:   Diagnosis Date    Afib     Alcohol abuse with other alcohol-induced disorder 04/02/2019    Anemia 04/02/2019    unspecified deficiency    Ankle fracture, left     Aortic atherosclerosis 2/8/2024    Arthritis     Asthma     Cholecystitis 1/15/2018    Chronic kidney disease (CKD), stage III (moderate) 04/02/2019    Depression     Erectile dysfunction 04/02/2019    Gout, arthropathy 04/02/2019    Gout, unspecified     Hypertension     Hypertensive chronic kidney disease 04/02/2019    Hypomagnesemia 04/02/2019    Noncompliance 04/02/2019    Peripheral neuropathy 04/02/2019    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    Secondary hyperparathyroidism, renal 04/02/2019    Seizure 2016    Spastic hemiplegia affecting left nondominant side 04/02/2019       Past Surgical History:   Procedure Laterality Date    ANKLE SURGERY      x6    CHOLECYSTECTOMY         Review of patient's allergies indicates:   Allergen Reactions    Lisinopril Swelling     Pt admitted with angioedema 2wks after taking lisinopril.        No current facility-administered medications on file prior to encounter.     Current Outpatient Medications on File Prior to Encounter   Medication Sig    albuterol sulfate (PROAIR RESPICLICK) 90 mcg/actuation inhaler Inhale 2 puffs into the lungs every 6 (six) hours as needed for Wheezing (wheezing). Rescue    allopurinoL (ZYLOPRIM) 300 MG tablet TAKE 1 TABLET EVERY DAY    aluminum-magnesium hydroxide-simethicone (MAALOX) 200-200-20 mg/5 mL Susp Take 30 mLs by mouth before meals and at bedtime as needed (acid reflux).    amiodarone (PACERONE) 200 MG Tab Take 1 tablet (200 mg total) by mouth 2 (two) times daily for 14 days, THEN 1 tablet (200 mg total) once daily.    ammonium lactate (LAC-HYDRIN) 12 % lotion Apply 1 g topically as needed for Dry Skin.    apixaban (ELIQUIS) 5 mg Tab Take 1 tablet (5 mg total) by mouth 2 (two) times daily.    ceramides 1,3,6-II (CERAVE DAILY MOISTURIZING) Lotn Applying topically to  dry skin twice daily.    diclofenac sodium (VOLTAREN) 1 % Gel Apply 2 g topically 4 (four) times daily as needed.    fluticasone propionate (FLONASE) 50 mcg/actuation nasal spray SHAKE LIQUID AND USE 1 SPRAY(50 MCG) IN EACH NOSTRIL EVERY DAY    folic acid (FOLVITE) 1 MG tablet Take 1 tablet (1 mg total) by mouth once daily.    gabapentin (NEURONTIN) 400 MG capsule TAKE 1 CAPSULE TWICE DAILY    levETIRAcetam (KEPPRA) 500 MG Tab TAKE 1 TABLET(500 MG) BY MOUTH TWICE DAILY    magnesium oxide 400 mg magnesium Tab Take 400 mg by mouth once daily.    methocarbamoL (ROBAXIN) 500 MG Tab Take 1 tablet (500 mg total) by mouth daily as needed.    metoprolol tartrate (LOPRESSOR) 50 MG tablet TAKE 1 TABLET TWICE DAILY    [facility] USE AS DIRECTED.    pantoprazole (PROTONIX) 40 MG tablet Take 1 tablet (40 mg total) by mouth once daily.     Family History       Problem Relation (Age of Onset)    Cancer Mother    Cataracts Maternal Grandfather    Hypertension Father    Stroke Maternal Grandmother, Maternal Grandfather          Tobacco Use    Smoking status: Former    Smokeless tobacco: Never   Substance and Sexual Activity    Alcohol use: Not Currently    Drug use: No    Sexual activity: Not Currently       Objective:     Vital Signs (Most Recent):  Temp: 98.5 °F (36.9 °C) (02/24/24 1607)  Pulse: 102 (02/24/24 1632)  Resp: (!) 24 (02/24/24 1632)  BP: 134/84 (02/24/24 1602)  SpO2: 98 % (02/24/24 1632) Vital Signs (24h Range):  Temp:  [98.5 °F (36.9 °C)-99.7 °F (37.6 °C)] 98.5 °F (36.9 °C)  Pulse:  [102-130] 102  Resp:  [15-24] 24  SpO2:  [98 %-100 %] 98 %  BP: (126-151)/(80-85) 134/84        There is no height or weight on file to calculate BMI.     Physical Exam  Vitals and nursing note reviewed.   Constitutional:       General: He is not in acute distress.     Appearance: He is not toxic-appearing.   HENT:      Head: Normocephalic.      Nose: No congestion or rhinorrhea.      Mouth/Throat:      Mouth: Mucous membranes  are dry.      Pharynx: No oropharyngeal exudate.      Comments: Tongue fasciculations   Eyes:      General: No scleral icterus.     Conjunctiva/sclera: Conjunctivae normal.   Cardiovascular:      Rate and Rhythm: Normal rate and regular rhythm.      Heart sounds: No murmur heard.  Pulmonary:      Effort: Pulmonary effort is normal.      Breath sounds: Normal breath sounds. No wheezing.   Abdominal:      General: There is no distension.      Palpations: Abdomen is soft.      Tenderness: There is abdominal tenderness (Epigastric).   Musculoskeletal:         General: Signs of injury present.      Right lower leg: No edema.      Left lower leg: No edema.      Comments: Wheelchair bound    Skin:     General: Skin is warm.      Findings: Lesion (R.index finger) present.   Neurological:      Mental Status: He is alert. Mental status is at baseline.      Comments: Tremors   Psychiatric:         Mood and Affect: Mood normal.       Significant Labs: All pertinent labs within the past 24 hours have been reviewed.    Significant Imaging: I have reviewed all pertinent imaging results/findings within the past 24 hours.

## 2024-02-24 NOTE — ASSESSMENT & PLAN NOTE
- Patient with Hypomagnesemia, Hypokalemia and Hypophosphatemia  - Correct electrolytes and repeat labs in 4 hours  - Telemetry monitoring for now

## 2024-02-25 PROBLEM — R29.6 FALLS FREQUENTLY: Status: ACTIVE | Noted: 2024-02-25

## 2024-02-25 LAB
ALBUMIN SERPL BCP-MCNC: 2.9 G/DL (ref 3.5–5.2)
ALP SERPL-CCNC: 76 U/L (ref 55–135)
ALT SERPL W/O P-5'-P-CCNC: 43 U/L (ref 10–44)
ANION GAP SERPL CALC-SCNC: 9 MMOL/L (ref 8–16)
AST SERPL-CCNC: 60 U/L (ref 10–40)
BASOPHILS # BLD AUTO: 0.01 K/UL (ref 0–0.2)
BASOPHILS NFR BLD: 0.1 % (ref 0–1.9)
BILIRUB SERPL-MCNC: 1 MG/DL (ref 0.1–1)
BUN SERPL-MCNC: 13 MG/DL (ref 8–23)
CALCIUM SERPL-MCNC: 7.8 MG/DL (ref 8.7–10.5)
CHLORIDE SERPL-SCNC: 105 MMOL/L (ref 95–110)
CO2 SERPL-SCNC: 26 MMOL/L (ref 23–29)
CREAT SERPL-MCNC: 1.2 MG/DL (ref 0.5–1.4)
DIFFERENTIAL METHOD BLD: ABNORMAL
EOSINOPHIL # BLD AUTO: 0 K/UL (ref 0–0.5)
EOSINOPHIL NFR BLD: 0.3 % (ref 0–8)
ERYTHROCYTE [DISTWIDTH] IN BLOOD BY AUTOMATED COUNT: 14.6 % (ref 11.5–14.5)
EST. GFR  (NO RACE VARIABLE): >60 ML/MIN/1.73 M^2
ETHANOL SERPL-MCNC: <10 MG/DL
GLUCOSE SERPL-MCNC: 83 MG/DL (ref 70–110)
HCT VFR BLD AUTO: 28.2 % (ref 40–54)
HGB BLD-MCNC: 9.4 G/DL (ref 14–18)
IMM GRANULOCYTES # BLD AUTO: 0.02 K/UL (ref 0–0.04)
IMM GRANULOCYTES NFR BLD AUTO: 0.3 % (ref 0–0.5)
LITHIUM SERPL-SCNC: 0.1 MMOL/L (ref 0.6–1.2)
LYMPHOCYTES # BLD AUTO: 1 K/UL (ref 1–4.8)
LYMPHOCYTES NFR BLD: 14.5 % (ref 18–48)
MAGNESIUM SERPL-MCNC: 1.3 MG/DL (ref 1.6–2.6)
MCH RBC QN AUTO: 38.4 PG (ref 27–31)
MCHC RBC AUTO-ENTMCNC: 33.3 G/DL (ref 32–36)
MCV RBC AUTO: 115 FL (ref 82–98)
MONOCYTES # BLD AUTO: 0.8 K/UL (ref 0.3–1)
MONOCYTES NFR BLD: 11 % (ref 4–15)
NEUTROPHILS # BLD AUTO: 5 K/UL (ref 1.8–7.7)
NEUTROPHILS NFR BLD: 73.8 % (ref 38–73)
NRBC BLD-RTO: 0 /100 WBC
OHS QRS DURATION: 76 MS
OHS QTC CALCULATION: 509 MS
PHOSPHATE SERPL-MCNC: 1.6 MG/DL (ref 2.7–4.5)
PLATELET # BLD AUTO: 110 K/UL (ref 150–450)
PMV BLD AUTO: 11.2 FL (ref 9.2–12.9)
POCT GLUCOSE: 99 MG/DL (ref 70–110)
POTASSIUM SERPL-SCNC: 4 MMOL/L (ref 3.5–5.1)
PROT SERPL-MCNC: 7.5 G/DL (ref 6–8.4)
RBC # BLD AUTO: 2.45 M/UL (ref 4.6–6.2)
SODIUM SERPL-SCNC: 140 MMOL/L (ref 136–145)
URATE SERPL-MCNC: 9.5 MG/DL (ref 3.4–7)
WBC # BLD AUTO: 6.83 K/UL (ref 3.9–12.7)

## 2024-02-25 PROCEDURE — 63600175 PHARM REV CODE 636 W HCPCS: Mod: HCNC | Performed by: STUDENT IN AN ORGANIZED HEALTH CARE EDUCATION/TRAINING PROGRAM

## 2024-02-25 PROCEDURE — 36415 COLL VENOUS BLD VENIPUNCTURE: CPT | Mod: HCNC,XB | Performed by: NURSE PRACTITIONER

## 2024-02-25 PROCEDURE — 84425 ASSAY OF VITAMIN B-1: CPT | Mod: HCNC | Performed by: NURSE PRACTITIONER

## 2024-02-25 PROCEDURE — 63600175 PHARM REV CODE 636 W HCPCS: Mod: HCNC | Performed by: NURSE PRACTITIONER

## 2024-02-25 PROCEDURE — 85025 COMPLETE CBC W/AUTO DIFF WBC: CPT | Mod: HCNC | Performed by: STUDENT IN AN ORGANIZED HEALTH CARE EDUCATION/TRAINING PROGRAM

## 2024-02-25 PROCEDURE — 97530 THERAPEUTIC ACTIVITIES: CPT | Mod: HCNC

## 2024-02-25 PROCEDURE — 25000003 PHARM REV CODE 250: Mod: HCNC | Performed by: NURSE PRACTITIONER

## 2024-02-25 PROCEDURE — 36415 COLL VENOUS BLD VENIPUNCTURE: CPT | Mod: HCNC | Performed by: STUDENT IN AN ORGANIZED HEALTH CARE EDUCATION/TRAINING PROGRAM

## 2024-02-25 PROCEDURE — 96372 THER/PROPH/DIAG INJ SC/IM: CPT | Performed by: NURSE PRACTITIONER

## 2024-02-25 PROCEDURE — C9113 INJ PANTOPRAZOLE SODIUM, VIA: HCPCS | Mod: HCNC | Performed by: NURSE PRACTITIONER

## 2024-02-25 PROCEDURE — 80321 ALCOHOLS BIOMARKERS 1OR 2: CPT | Mod: HCNC | Performed by: NURSE PRACTITIONER

## 2024-02-25 PROCEDURE — 83735 ASSAY OF MAGNESIUM: CPT | Mod: HCNC | Performed by: STUDENT IN AN ORGANIZED HEALTH CARE EDUCATION/TRAINING PROGRAM

## 2024-02-25 PROCEDURE — 21400001 HC TELEMETRY ROOM: Mod: HCNC

## 2024-02-25 PROCEDURE — 80053 COMPREHEN METABOLIC PANEL: CPT | Mod: HCNC | Performed by: STUDENT IN AN ORGANIZED HEALTH CARE EDUCATION/TRAINING PROGRAM

## 2024-02-25 PROCEDURE — 97163 PT EVAL HIGH COMPLEX 45 MIN: CPT | Mod: HCNC

## 2024-02-25 PROCEDURE — 92610 EVALUATE SWALLOWING FUNCTION: CPT | Mod: HCNC

## 2024-02-25 PROCEDURE — 82077 ASSAY SPEC XCP UR&BREATH IA: CPT | Mod: HCNC | Performed by: NURSE PRACTITIONER

## 2024-02-25 PROCEDURE — 84550 ASSAY OF BLOOD/URIC ACID: CPT | Mod: HCNC | Performed by: NURSE PRACTITIONER

## 2024-02-25 PROCEDURE — 97112 NEUROMUSCULAR REEDUCATION: CPT | Mod: HCNC

## 2024-02-25 PROCEDURE — 97166 OT EVAL MOD COMPLEX 45 MIN: CPT | Mod: HCNC

## 2024-02-25 PROCEDURE — 84100 ASSAY OF PHOSPHORUS: CPT | Mod: HCNC | Performed by: STUDENT IN AN ORGANIZED HEALTH CARE EDUCATION/TRAINING PROGRAM

## 2024-02-25 RX ORDER — METOPROLOL TARTRATE 1 MG/ML
5 INJECTION, SOLUTION INTRAVENOUS ONCE
Status: COMPLETED | OUTPATIENT
Start: 2024-02-25 | End: 2024-02-25

## 2024-02-25 RX ORDER — PANTOPRAZOLE SODIUM 40 MG/10ML
40 INJECTION, POWDER, LYOPHILIZED, FOR SOLUTION INTRAVENOUS DAILY
Status: DISCONTINUED | OUTPATIENT
Start: 2024-02-25 | End: 2024-02-28

## 2024-02-25 RX ORDER — LORAZEPAM 2 MG/ML
2 INJECTION INTRAMUSCULAR EVERY 4 HOURS PRN
Status: DISCONTINUED | OUTPATIENT
Start: 2024-02-25 | End: 2024-02-25

## 2024-02-25 RX ORDER — ENOXAPARIN SODIUM 100 MG/ML
1 INJECTION SUBCUTANEOUS EVERY 12 HOURS
Status: DISCONTINUED | OUTPATIENT
Start: 2024-02-25 | End: 2024-02-25

## 2024-02-25 RX ORDER — ENOXAPARIN SODIUM 100 MG/ML
40 INJECTION SUBCUTANEOUS EVERY 24 HOURS
Status: DISCONTINUED | OUTPATIENT
Start: 2024-02-25 | End: 2024-02-28

## 2024-02-25 RX ORDER — METOPROLOL TARTRATE 1 MG/ML
2.5 INJECTION, SOLUTION INTRAVENOUS EVERY 6 HOURS
Status: DISCONTINUED | OUTPATIENT
Start: 2024-02-25 | End: 2024-02-28

## 2024-02-25 RX ORDER — LORAZEPAM 2 MG/ML
1 INJECTION INTRAMUSCULAR EVERY 4 HOURS PRN
Status: DISCONTINUED | OUTPATIENT
Start: 2024-02-25 | End: 2024-02-25

## 2024-02-25 RX ORDER — DIAZEPAM 10 MG/2ML
2.5 INJECTION INTRAMUSCULAR 2 TIMES DAILY
Status: COMPLETED | OUTPATIENT
Start: 2024-02-25 | End: 2024-02-25

## 2024-02-25 RX ORDER — DIAZEPAM 10 MG/2ML
2.5 INJECTION INTRAMUSCULAR EVERY 12 HOURS
Status: DISCONTINUED | OUTPATIENT
Start: 2024-02-25 | End: 2024-02-25

## 2024-02-25 RX ORDER — LORAZEPAM 2 MG/ML
1 INJECTION INTRAMUSCULAR EVERY 4 HOURS PRN
Status: DISCONTINUED | OUTPATIENT
Start: 2024-02-25 | End: 2024-02-28

## 2024-02-25 RX ORDER — DIAZEPAM 10 MG/2ML
2.5 INJECTION INTRAMUSCULAR EVERY 8 HOURS
Status: DISCONTINUED | OUTPATIENT
Start: 2024-02-25 | End: 2024-02-25

## 2024-02-25 RX ORDER — LEVETIRACETAM 500 MG/5ML
500 INJECTION, SOLUTION, CONCENTRATE INTRAVENOUS EVERY 12 HOURS
Status: DISCONTINUED | OUTPATIENT
Start: 2024-02-25 | End: 2024-02-28

## 2024-02-25 RX ORDER — DIAZEPAM 10 MG/2ML
2.5 INJECTION INTRAMUSCULAR 2 TIMES DAILY
Status: DISCONTINUED | OUTPATIENT
Start: 2024-02-25 | End: 2024-02-25

## 2024-02-25 RX ORDER — THIAMINE HYDROCHLORIDE 100 MG/ML
100 INJECTION, SOLUTION INTRAMUSCULAR; INTRAVENOUS DAILY
Status: DISPENSED | OUTPATIENT
Start: 2024-02-25 | End: 2024-02-28

## 2024-02-25 RX ADMIN — FLUTICASONE PROPIONATE 100 MCG: 50 SPRAY, METERED NASAL at 10:02

## 2024-02-25 RX ADMIN — LEVETIRACETAM 500 MG: 500 INJECTION, SOLUTION INTRAVENOUS at 10:02

## 2024-02-25 RX ADMIN — SODIUM CHLORIDE, POTASSIUM CHLORIDE, SODIUM LACTATE AND CALCIUM CHLORIDE: 600; 310; 30; 20 INJECTION, SOLUTION INTRAVENOUS at 08:02

## 2024-02-25 RX ADMIN — METOROPROLOL TARTRATE 2.5 MG: 5 INJECTION, SOLUTION INTRAVENOUS at 03:02

## 2024-02-25 RX ADMIN — SODIUM CHLORIDE, POTASSIUM CHLORIDE, SODIUM LACTATE AND CALCIUM CHLORIDE: 600; 310; 30; 20 INJECTION, SOLUTION INTRAVENOUS at 12:02

## 2024-02-25 RX ADMIN — CEFTRIAXONE SODIUM 1 G: 1 INJECTION, POWDER, FOR SOLUTION INTRAMUSCULAR; INTRAVENOUS at 03:02

## 2024-02-25 RX ADMIN — SODIUM CHLORIDE, POTASSIUM CHLORIDE, SODIUM LACTATE AND CALCIUM CHLORIDE: 600; 310; 30; 20 INJECTION, SOLUTION INTRAVENOUS at 07:02

## 2024-02-25 RX ADMIN — METOROPROLOL TARTRATE 5 MG: 5 INJECTION, SOLUTION INTRAVENOUS at 08:02

## 2024-02-25 RX ADMIN — DIAZEPAM 2.5 MG: 5 INJECTION, SOLUTION INTRAMUSCULAR; INTRAVENOUS at 08:02

## 2024-02-25 RX ADMIN — LORAZEPAM 1 MG: 2 INJECTION INTRAMUSCULAR; INTRAVENOUS at 03:02

## 2024-02-25 RX ADMIN — PANTOPRAZOLE SODIUM 40 MG: 40 INJECTION, POWDER, LYOPHILIZED, FOR SOLUTION INTRAVENOUS at 10:02

## 2024-02-25 RX ADMIN — POTASSIUM CHLORIDE 10 MEQ: 7.46 INJECTION, SOLUTION INTRAVENOUS at 12:02

## 2024-02-25 RX ADMIN — THIAMINE HYDROCHLORIDE 100 MG: 100 INJECTION, SOLUTION INTRAMUSCULAR; INTRAVENOUS at 05:02

## 2024-02-25 RX ADMIN — ENOXAPARIN SODIUM 40 MG: 40 INJECTION SUBCUTANEOUS at 05:02

## 2024-02-25 RX ADMIN — LEVETIRACETAM 500 MG: 500 INJECTION, SOLUTION INTRAVENOUS at 08:02

## 2024-02-25 NOTE — PT/OT/SLP PROGRESS
Speech Language Pathology      Michael Alex .  MRN: [id number]    Patient not seen today secondary to Testing/imaging (xray/CT/MRI) during SLP attempt in AM. Pt noted to be NPO.  Will follow-up in PM this date for swallow evaluation.

## 2024-02-25 NOTE — PT/OT/SLP EVAL
"Physical Therapy Evaluation, Treatment, and Discharge Note    Patient Name:  Michael Johnson Jr.   MRN:  [id number]    Recommendations:     Discharge Recommendations: Low Intensity Therapy (24/7 assistance in facility or home if family able to provide)  Discharge Equipment Recommendations: lift device, hospital bed   Barriers to discharge: None    Assessment:     Michael Johnson Jr. is a 63 y.o. male admitted with a medical diagnosis of Alcohol abuse with withdrawal. Pmhx pertinent for HTN, Seizures, spastic L sided hemiplegia, arthritis, gout, L foto deformity with fusion.    Patient evaluated and no acute care PT goals identified. Patient unreliable historian, reporting variable levels of functional mobility but confidently stating he was transferring to  - dtr at bedside refutes this indicating it's been "years" since he's been mobile and when confronted, pt admits to being bed bound and unable to transfer. This bed bound status can be for a variety of reasons, but appears to most related to LE pain associated with L ankle fusion and gout compounded by global contractures and weakness from extended time in bed. Currently, pt unable to tolerant even minimal WB thru LE and unable to complete transfer to  via scoot with 2 therapist assistance.      During this hospitalization, patient does not require further acute PT services.  Please re-consult if situation changes.  Recommendation for d/c to facility vs home with 24/7 assistance. If DC to home, requires cherry lift and hospital bed as he is unable to transfer to  and unable to sit EOB without assistance.     Recent Surgery: * No surgery found *      Plan:     During this hospitalization, patient does not require further acute PT services.  Please re-consult if situation changes.      Subjective     Chief Complaint: Pain in LE, weakness  Patient/Family Comments/goals: At times reporting wanting to get up to  and return to walking,   Pain/Comfort:  Pain Rating 1: "  (unrated)  Location - Side 1: Bilateral  Location 1: leg  Pain Addressed 2: Reposition, Distraction, Cessation of Activity  Pain Rating Post-Intervention 2: 0/10    Patients cultural, spiritual, Jain conflicts given the current situation: no    Living Environment:  Lives with his wife in a 2SH with elevator access.     Prior to admission, patients level of function was bed bound requiring assistance for all ADLs and has been at this level for years per his wife (via phone call). Pt rarely if ever sits EOB. His wife has attempted to change his bed to a HB, but he has refused. Equipment used at home: none (has RW, WC (manual and electric) and SC but has not used in past few years).      Objective:     Communicated with GARRISON Triplett prior to session.  Patient found HOB elevated with peripheral IV, telemetry, bed alarm upon PT entry to room.    General Precautions: Standard, aspiration, fall    Orthopedic Precautions:N/A   Braces: N/A  Respiratory Status: Room air    Patient donned non slip socks and gait belt for OOB mobility.    Exams:  Cognition:   Patient is oriented to person, , place. Not oriented to date besides month/year  Pt follows approximately 75% of one step commands.    Mood: Pleasant and cooperative, distractable and questionable accuracy of all information  Safety Awareness: Impaired  Musculoskeletal:  BMI: 25.41  Posture:  Forward head, rounded shoulders, flexion contractures of BUE and BLE including hands and feet (flexed and adducted toes with flattened arches)  LE ROM/Strength:   R ROM:   Knee extension lacking 20 deg  DF lacking 15 deg  Toes flexed and adducted  L ROM:   Knee extension lacking 25 deg  Minimal to no ankle ROM with fusion close to neutral DF  Toes flexed and adducted  R Strength:   Knee extension: 2/5  Dorsiflexion: 1/5   L Strength:   Knee extension: 2/5  Dorsiflexion: No ROM   Neuromuscular:  Sensation: Allodynia to BLE, reports hypersensitivity 2/2 gout  Tone/Reflexes:  Rigidity of all limbs noted, no overt spasticity with LLE  Coordination: Grossly impaired 2/2 decreased ROM and strength  Balance:   Static sitting: Fair-, posterior lean with insufficient postural reactions  Static standing: ALLA  Visual-vestibular: No impairments identified with functional mobility. No formal testing performed.  Integument: Dry and flaking skin B feet, darkened skin to BUE  Cardiopulmonary:  Edema: Slight to B feet    Functional Mobility:  Bed Mobility:     Scooting: total assistance  Bridging: total assistance  Supine to Sit: moderate assistance  Sit to Supine: maximal assistance  Transfers:     Bed to Chair: maximal assistance and of 2 persons with  no AD  using  Scoot Pivot  Attempted, unable to achieve any excursion 2/2 pain with minimal WB thru LLE  X3 attempts along EOB without success and increasing pain, deferring further attempts at this time  Balance:   Static and dynamic sitting EOB x30 min with Caron-modA.   Poor postural reactions and poor use of RUE to prevent LOB (lacking LUE ROM for WB on EOB)  Consistent posterior lean requiring continuous hands on assistance to prevent fall.     AM-PAC 6 CLICK MOBILITY  Total Score:8       Treatment and Education:  Neuro re-ed:   Multimodal facilitation of balance sitting EOB  Initiating WB thru LE for pain neuroscience  Instructed dtr in benefit of sitting with assistance for trunk strengthening and improved balance prior and goal to be able to scoot with UE support prior to attempting t/f to WC  PT educated patient dtr and spouse (via phone) re:   PT plan of care/role of PT  Safety with OOB mobility  Use of therEx balance training including sitting EOB  Discharge disposition    Pt dtr and spouse via phone verbalized understanding       AM-PAC 6 CLICK MOBILITY  Total Score:8     Patient left HOB elevated with all lines intact, call button in reach, SLP notified, and dtr present.    GOALS:   Multidisciplinary Problems       Physical Therapy Goals        Not on file              Multidisciplinary Problems (Resolved)          Problem: Physical Therapy    Goal Priority Disciplines Outcome Goal Variances Interventions   Physical Therapy Goal   (Resolved)     PT, PT/OT Met                         History:     Past Medical History:   Diagnosis Date    Afib     Alcohol abuse with other alcohol-induced disorder 04/02/2019    Anemia 04/02/2019    unspecified deficiency    Ankle fracture, left     Aortic atherosclerosis 2/8/2024    Arthritis     Asthma     Cholecystitis 1/15/2018    Chronic kidney disease (CKD), stage III (moderate) 04/02/2019    Depression     Erectile dysfunction 04/02/2019    Gout, arthropathy 04/02/2019    Gout, unspecified     Hypertension     Hypertensive chronic kidney disease 04/02/2019    Hypomagnesemia 04/02/2019    Noncompliance 04/02/2019    Peripheral neuropathy 04/02/2019    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    Secondary hyperparathyroidism, renal 04/02/2019    Seizure 2016    Spastic hemiplegia affecting left nondominant side 04/02/2019       Past Surgical History:   Procedure Laterality Date    ANKLE SURGERY      x6    CHOLECYSTECTOMY         Time Tracking:     PT Received On: 02/25/24  PT Start Time: 1305     PT Stop Time: 1355  PT Total Time (min): 50 min     Billable Minutes: Evaluation 25 and Neuromuscular Re-education 25 02/25/2024

## 2024-02-25 NOTE — ASSESSMENT & PLAN NOTE
Patient with Paroxysmal (<7 days) atrial fibrillation which is controlled currently with Beta Blocker. Patient is currently in sinus rhythm.[id number] Score: 1  - Had an episode of Atrial flutter (09/2023)  - Not compliant with Home Eliquis or BB   - Echocardiogram (9/2023): Preserved EF 50-55%  - Monitor electrolytes and Maintain Mg >2 and K >4  - holding off on full anticoagulation for fall rsk

## 2024-02-25 NOTE — SUBJECTIVE & OBJECTIVE
Interval History: Oral medications changed to IV for dysphagia. Pt complained of having black spots in his vision but denies headache or blurry vision. He has chronic pain in his left lower extremity. 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 Per daughter and wife he is still drinking large amounts of alcohol.     Review of Systems   Constitutional:  Negative for activity change, appetite change and fatigue.   HENT:  Positive for trouble swallowing.    Eyes:  Positive for visual disturbance.   Respiratory:  Negative for apnea.    Cardiovascular:  Negative for chest pain.   Gastrointestinal:  Positive for abdominal pain (chronic burning in abdomen/some int epigastric pain) and vomiting. Negative for constipation.   Genitourinary:  Negative for difficulty urinating.   Musculoskeletal:  Positive for arthralgias and myalgias.   Neurological:  Positive for weakness.        Left sided spacticity since birth   Psychiatric/Behavioral:  Negative for agitation.      Objective:     Vital Signs (Most Recent):  Temp: 99.1 °F (37.3 °C) (24 1127)  Pulse: 89 (24 1127)  Resp: 16 (247)  BP: 124/80 (247)  SpO2: 98 % (24) Vital Signs (24h Range):  Temp:  [97.6 °F (36.4 °C)-99.1 °F (37.3 °C)] 99.1 °F (37.3 °C)  Pulse:  [] 89  Resp:  [15-24] 16  SpO2:  [97 %-100 %] 98 %  BP: (107-151)/(70-88) 124/80     Weight: 85 kg (187 lb 6.3 oz)  Body mass index is 25.41 kg/m².    Intake/Output Summary (Last 24 hours) at 2024 1312  Last data filed at 2024 0630  Gross per 24 hour   Intake 1956.19 ml   Output 375 ml   Net 1581.19 ml         Physical Exam  Vitals reviewed.   HENT:      Head: Normocephalic.      Mouth/Throat:      Mouth: Mucous membranes are moist.   Eyes:      Pupils: Pupils are equal, round, and reactive to light.   Cardiovascular:      Rate and Rhythm: Normal rate.   Pulmonary:      Effort: Pulmonary  effort is normal.   Abdominal:      Palpations: Abdomen is soft.      Tenderness: There is abdominal tenderness.   Musculoskeletal:         General: Tenderness and deformity present.   Skin:     Findings: Lesion (right 4th finger-he says old gout infection) present.   Neurological:      Mental Status: He is alert.      Motor: Abnormal muscle tone present.   Psychiatric:         Behavior: Behavior is cooperative.         Cognition and Memory: He exhibits impaired remote memory.             Significant Labs: All pertinent labs within the past 24 hours have been reviewed.  BMP:   Recent Labs   Lab 02/25/24  0355   GLU 83      K 4.0      CO2 26   BUN 13   CREATININE 1.2   CALCIUM 7.8*   MG 1.3*     CBC:   Recent Labs   Lab 02/24/24  1334 02/25/24  0355   WBC 7.62 6.83   HGB 11.0* 9.4*   HCT 32.8* 28.2*   * 110*     CMP:   Recent Labs   Lab 02/24/24  1334 02/24/24  1857 02/25/24  0355    141 140   K 3.3* 3.1* 4.0    105 105   CO2 26 25 26   * 169* 83   BUN 17 15 13   CREATININE 1.4 1.2 1.2   CALCIUM 8.6* 7.8* 7.8*   PROT 8.8*  --  7.5   ALBUMIN 3.4*  --  2.9*   BILITOT 1.2*  --  1.0   ALKPHOS 94  --  76   AST 97*  --  60*   ALT 61*  --  43   ANIONGAP 16 11 9       Significant Imaging: I have reviewed all pertinent imaging results/findings within the past 24 hours.  CT Head Without Contrast  Narrative: EXAMINATION:  CT HEAD WITHOUT CONTRAST    CLINICAL HISTORY:  Neuro deficit, acute, stroke suspected;    TECHNIQUE:  Low dose axial images were obtained through the head.  Coronal and sagittal reformations were also performed. Contrast was not administered.    COMPARISON:  09/18/2020    FINDINGS:  There is diffuse volume loss also involving the cerebellum, similar to the prior study.    No evidence of hydrocephalus mass effect intracranial hemorrhage or acute territorial infarct is identified.  No overt parenchymal edema.    Decreased attenuation within the periventricular white matter  is nonspecific but may reflect mild to moderate chronic small vessel ischemic change if clinically consistent, similar to the prior study.    Chronic opacification of the left maxillary sinus with chronic osteitis with mild right maxillary mucosal thickening.  The mastoid air cells are clear.  Impression: No acute intracranial process.    Volume loss.  Presumed chronic small vessel ischemic changes if clinically consistent, similar to the prior study.    Electronically signed by: Kwadwo Cavanaugh  Date:    02/25/2024  Time:    09:10

## 2024-02-25 NOTE — PLAN OF CARE
Problem: SLP  Goal: SLP Goal  Description: 1. Pt will be able to consume a full liquid diet without overt s/s of airway threat or aspiration given min cues to monitor rate and volume of intake.   2. Ongoing assessment of diet upgrade as pt tolerates   Outcome: Ongoing, Progressing       SLP to continue to follow 3-5x/week for ongoing assessment of swallowing function

## 2024-02-25 NOTE — NURSING
Patient was able to take his PO Keppra at 2205.  Patient took his PO Eliquis at 2206.  He attempted to take his PO metoprolol at 2208.  He began to cough, sputter, and spit up his pill/water.  Garcia swallow screen failed.  Patient and his wife noted that the patient has had a severely sore throat that they attribute to his bouts of emesis.  CLARISSA Kaur notified.  Patient made NPO and SLP consult put in.  He changed his PO potassium to IV.  Patient was unable to get his Augmentin as well.  CLARISSA Kaur aware, he noted that we will continue to monitor the patient for an elevated temp and/or elevated WBC's to reassess the need for an IV antibiotic.  Patient and wife notified that the patient should not eat or drink anything for now and given sore throat spray/lozenges.

## 2024-02-25 NOTE — PT/OT/SLP EVAL
Speech Language Pathology Evaluation  Bedside Swallow    Patient Name:  Michael Johnson Jr.   MRN:  [id number]  Admitting Diagnosis: Alcohol abuse with withdrawal    Recommendations:                 General Recommendations:  SLP to continue to follow for ongoing assessment and treatment of swallowing function     Diet recommendations:  Other (Comment) (full liquid), Full liquids     Aspiration Precautions: 1 bite/sip at a time, Assistance with meals, Avoid talking while eating, Eliminate distractions, Feed only when awake/alert, HOB to 90 degrees, Remain upright 30 minutes post meal, and Small bites/sips     General Precautions: Standard, aspiration    Assessment:     Michael Johnson Jr. is a 63 y.o. male with an SLP diagnosis of dysphagia s/p throat swelling and excessive vomiting. Pt's symptoms appear to be improving, however will continue to assess diet tolerance as oral diet initiated.    History:     Principal Problem:Alcohol abuse with withdrawal    HPI per most recent NP note:  63 year old male with Hx of Hypertension, Seizure disorders, spastic left-sided hemiplegia, Anemia, Arthrititis, Depression, CKD stage 3, Alcohol use disorder and Gout, who presents to the ED with complaints of nausea/vomiting and abdominal pain for the past 2 days. Patient is a poor historian; most history provided by patients wife. For the past 2 days patient has not been eating. Started to feel nauseated with associated NBNB emesis and weakness. Reports that his sore throat has been hurting him since yesterday and noticed he has been shaking since yesterday. He reports that he had quit drinking for a long time; however, per patients wife he has been drinking daily for several years and in the past few weeks it has been 1/2 pint of vodka every 2 days. Patient denies any fever, chest pain, palpitaitons, dyspnea, changes in bowel or urinary habits. He does report feeling of cramping in his upper abdomen, non-radiating. Last episode of  emesis was here in the ER. States he takes his medication but not everyday. Denies any illicit drug use or smoking. 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 Denies any sick contacts.   Overview/Hospital Course:  Mr Rodriguez admitted for ETOH withdrawal. He reports that he has not had an alcoholic beverage in months. 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 He was vomiting the past few days but no since arrival.   Interval History: Oral medications changed to IV for dysphagia. Pt complained of having black spots in his vision but denies headache or blurry vision. He has chronic pain in his left lower extremity. 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 Per daughter and wife he is still drinking large amounts of alcohol.     Past Medical History:   Diagnosis Date    Afib     Alcohol abuse with other alcohol-induced disorder 2019    Anemia 2019    unspecified deficiency    Ankle fracture, left     Aortic atherosclerosis 2024    Arthritis     Asthma     Cholecystitis 1/15/2018    Chronic kidney disease (CKD), stage III (moderate) 2019    Depression     Erectile dysfunction 2019    Gout, arthropathy 2019    Gout, unspecified     Hypertension     Hypertensive chronic kidney disease 2019    Hypomagnesemia 2019    Noncompliance 2019    Peripheral neuropathy 2019    Preglaucoma 2019    Secondary hyperparathyroidism, renal 2019    Seizure 2016    Spastic hemiplegia affecting left nondominant side 2019       Past Surgical History:   Procedure Laterality Date    ANKLE SURGERY      x6    CHOLECYSTECTOMY         Chest X-Rays 24 per radiology report: No acute abnormality.       Subjective     Pt awake, family member at bedside.     Pain/Comfort:  Pain Rating 1: 7/10  Location 1: throat  Pain Addressed 1:  "Distraction, Cessation of Activity    Respiratory Status: Room air    Objective:     Cognitive Communication Status: Pt awake, alert, cooperative. Oriented to person, place, month, year with 100% accuracy. Not oriented to exact date, required review stating "I don't keep up with that since I don't work". Pt able to follow commands, participate in meal time routine, and participate in multiple turns of conversation without difficulty. Will continue to monitor cognitive communication status. No complaints per patient or family at this time.     Oral Musculature Evaluation  Oral Musculature: WFL  Dentition: scattered dentition  Secretion Management: adequate  Mucosal Quality: good  Mandibular Strength and Mobility: WNL  Oral Labial Strength and Mobility: WNL  Lingual Strength and Mobility: WNL  Velar Elevation: WNL  Buccal Strength and Mobility: WNL  Volitional Cough: productive  Voice Prior to PO Intake: mildy strained, however clear  Oral Musculature Comments: Face is symmetrical at rest and during smile. Lingual and labial strength and ROM appear to be WFL for speech and oral intake. Able to lateralize, elevate, retract tongue and protrusion is at midline. Speech is 100% intelligible at the conversation level.    Bedside Swallow Eval:   Consistencies Assessed:  Thin liquids single and sequential sips via straw   Puree 1tsp bites pudding   Solids single bite moisés cracker      Oral Phase:   Able to siphon liquids via straw, able to retreive pureed bolus from spoon  Lip seal, a-p transport, ability to form cohesive bolus WNL  No oral residuals noted after the swallow     Pharyngeal Phase:   Trigger of pharyngeal swallow appears to be WFL  No overt s/s of airway threat or aspiration during intake of liquids, purees, or small amount of solids: no coughing, throat clearing, change in vocal quality  Pt reporting dry solids are irritating his throat, prefers pudding and water     Compensatory Strategies  Discussed small " bites and sips, slow rate of intake     Treatment: Discussed full liquid diet with pt to start. Pt agreeable, stating he did not like the feeling of solids going down his sore throat. Pt requesting more foods like pudding and broth. Discussed starting with full liquids and SLP to continue to assess tolerance and swallow function as pt begins to eat more. MD agreeable to plan of care.     Goals:   Multidisciplinary Problems       SLP Goals          Problem: SLP    Goal Priority Disciplines Outcome   SLP Goal     SLP Ongoing, Progressing   Description: 1. Pt will be able to consume a full liquid diet without overt s/s of airway threat or aspiration given min cues to monitor rate and volume of intake.   2. Ongoing assessment of diet upgrade as pt tolerates                        Plan:     Patient to be seen:  3 x/week, 5 x/week   Plan of Care expires:  03/10/24  Plan of Care reviewed with:  patient, spouse, other (see comments) (RN, MD)   SLP Follow-Up:  Yes       Discharge recommendations:          Time Tracking:     SLP Treatment Date:   02/25/24  Speech Start Time:  1400  Speech Stop Time:  1415     Speech Total Time (min):  15 min    Billable Minutes: Eval Swallow and Oral Function 15 min    02/25/2024

## 2024-02-25 NOTE — ASSESSMENT & PLAN NOTE
- Noted in history. Last seizure several years ago ~ 2019. Unclear etiology may have been related to withdrawal  - Continue with Keppra  - Follow up outpatient with Neurology; given this was a one time occurrence in likely the above setting  - reports born with epilepsy

## 2024-02-25 NOTE — PLAN OF CARE
"  Problem: Physical Therapy  Goal: Physical Therapy Goal  Outcome: Met     Patient evaluated and no acute care PT goals identified. Patient unreliable historian, reporting variable levels of functional mobility but confidently stating he was transferring to  - dtr at bedside refutes this indicating it's been "years" since he's been mobile and when confronted, pt admits to being bed bound and unable to transfer. This status can be for a variety of reasons, but appears to most related to LE pain associated with L ankle fusion compounded by global contractures and weakness. Currently, pt unable to tolerant even minimal WB thru LE and unable to complete transfer to  via scoot with 2 therapist assistance.     During this hospitalization, patient does not require further acute PT services.  Please re-consult if situation changes.  Recommendation for d/c to facility vs home with 24/7 assistance. If DC to home, requires cherry lift and hospital bed as he is unable to transfer to  and unable to sit EOB without assistance.   "

## 2024-02-25 NOTE — ASSESSMENT & PLAN NOTE
- Per patients wife, he used to drink about 1 bottle of vodka daily for several years; however, in the past few days he has been having 1/2 pint of vodka every other day. Last known drink 2 days ago. Patient denies recent ETOH use.   - Start CIWA protocol  - scheduled fixed valium taper  - Ativan PRN per CIWA  - Zofran PRN for nausea   - IV thiamine; B1 pending  - Ethanol level ordered  - Seizure, Aspiration and Fall precautions

## 2024-02-25 NOTE — ASSESSMENT & PLAN NOTE
Chronic problem.  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 Also has h/o ETOH dependence. Thiamine level pending. IV replaced ordered.  PT/OT consulted. OP neurology referral placed.

## 2024-02-25 NOTE — HOSPITAL COURSE
Mr Rodriguez admitted for ETOH withdrawal and neck swelling. He reports that he has not had an alcoholic beverage in months. He is a poor historian and has different time frames than his wife and daughter. 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 He was vomiting the past few days but none since arrival.Started on MVI, thiamine, folic acid. PeTH confirms heavy alcohol use.    On arrival nurse reported neck swelling, CT neck shows fullness of in the hypopharynx, involving posterior hypopharynx, concerned for pharyngitis. No drainable fluid collection. Started on IV CTX. Fevers began again 2/26, as high as 102.6F. Repeat CT scan ordered to evaluate for abscess formation, none identified. CXR and UA ordered, unremarkable. CT AP shows atelectasis vs possible pneumonia, will add doxy for atypical coverage, continue IS. Broaden to Vanc and Zosyn and monitor, fever curve improved. ID was consulted on Monday 3/4 for fever unknown origin: switched to Unasyn for better ENT coverage and a viral panel that was negative was ordered.  Acute gout flare also being treated. IV unasyn changed to augmentin. Patient has been afebrile and without leukocytosis for over 3 days.    SLP following along for dysphagia and aspiration concerns. MBSS done 3/1, GI consult recommended EGD on Monday which showed benign appearing esophageal stenosis, protonix PO BID x 8 weeks recommended. He was upgraded to minced and moist diet. B12 supplementation started for macrocytic anemia, that is heavily influenced by his ETOH intake.     Mr Rodriguez has multiple issues exacerbated by his chronic alcohol drinking.  Discussed the consequences of his drinking. Mrs Johnson at bedside.  Patient states that he understands and will replace the cravings with milk. He has gone over ten days without alcohol. He does not want to go to rehab or SNF.  He will be discharged home with HH to include SLP, PT, OT, and nurse aide. He has  referrals to GI for followup, hematology, neurology, rheumatology, and [facility] at home. He and his wife feel prepared to go home today and continue to work on his functional bed mobility and overall health. Discussed getting rides to clinic appointments with his insurance. Information on DC papers. All questions answered.

## 2024-02-25 NOTE — PROGRESS NOTES
[facility] Surg [address] Medicine  Progress Note    Patient Name: Michael Johnson Jr.  MRN: [id number]  Patient Class: OP- Observation   Admission Date: 2/24/2024  Length of Stay: 0 days  Attending Physician: Jessica Meyer MD  Primary Care Provider: Amelia Lai MD        Subjective:     Principal Problem:Alcohol abuse with withdrawal        HPI:  63 year old male with Hx of Hypertension, Seizure disorders, spastic left-sided hemiplegia, Anemia, Arthrititis, Depression, CKD stage 3, Alcohol use disorder and Gout, who presents to the ED with complaints of nausea/vomiting and abdominal pain for the past 2 days. Patient is a poor historian; most history provided by patients wife. For the past 2 days patient has not been eating. Started to feel nauseated with associated NBNB emesis and weakness. Reports that his sore throat has been hurting him since yesterday and noticed he has been shaking since yesterday. He reports that he had quit drinking for a long time; however, per patients wife he has been drinking daily for several years and in the past few weeks it has been 1/2 pint of vodka every 2 days. Patient denies any fever, chest pain, palpitaitons, dyspnea, changes in bowel or urinary habits. He does report feeling of cramping in his upper abdomen, non-radiating. Last episode of emesis was here in the ER. States he takes his medication but not everyday. Denies any illicit drug use or smoking. 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 Denies any sick contacts.     Overview/Hospital Course:  Mr Rodriguez admitted for ETOH withdrawal. He reports that he has not had an alcoholic beverage in months. 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 He was vomiting the past few days but no since arrival.     Interval History: Oral medications changed to IV for dysphagia. Pt complained of having black spots in his vision but denies headache or  blurry vision. He has chronic pain in his left lower extremity. 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 Per daughter and wife he is still drinking large amounts of alcohol.     Review of Systems   Constitutional:  Negative for activity change, appetite change and fatigue.   HENT:  Positive for trouble swallowing.    Eyes:  Positive for visual disturbance.   Respiratory:  Negative for apnea.    Cardiovascular:  Negative for chest pain.   Gastrointestinal:  Positive for abdominal pain (chronic burning in abdomen/some int epigastric pain) and vomiting. Negative for constipation.   Genitourinary:  Negative for difficulty urinating.   Musculoskeletal:  Positive for arthralgias and myalgias.   Neurological:  Positive for weakness.        Left sided spacticity since birth   Psychiatric/Behavioral:  Negative for agitation.      Objective:     Vital Signs (Most Recent):  Temp: 99.1 °F (37.3 °C) (24 1127)  Pulse: 89 (24 1127)  Resp: 16 (24)  BP: 124/80 (247)  SpO2: 98 % (247) Vital Signs (24h Range):  Temp:  [97.6 °F (36.4 °C)-99.1 °F (37.3 °C)] 99.1 °F (37.3 °C)  Pulse:  [] 89  Resp:  [15-24] 16  SpO2:  [97 %-100 %] 98 %  BP: (107-151)/(70-88) 124/80     Weight: 85 kg (187 lb 6.3 oz)  Body mass index is 25.41 kg/m².    Intake/Output Summary (Last 24 hours) at 2024 1312  Last data filed at 2024 0630  Gross per 24 hour   Intake 1956.19 ml   Output 375 ml   Net 1581.19 ml         Physical Exam  Vitals reviewed.   HENT:      Head: Normocephalic.      Mouth/Throat:      Mouth: Mucous membranes are moist.   Eyes:      Pupils: Pupils are equal, round, and reactive to light.   Cardiovascular:      Rate and Rhythm: Normal rate.   Pulmonary:      Effort: Pulmonary effort is normal.   Abdominal:      Palpations: Abdomen is soft.      Tenderness: There is abdominal tenderness.   Musculoskeletal:          General: Tenderness and deformity present.   Skin:     Findings: Lesion (right 4th finger-he says old gout infection) present.   Neurological:      Mental Status: He is alert.      Motor: Abnormal muscle tone present.   Psychiatric:         Behavior: Behavior is cooperative.         Cognition and Memory: He exhibits impaired remote memory.             Significant Labs: All pertinent labs within the past 24 hours have been reviewed.  BMP:   Recent Labs   Lab 02/25/24  0355   GLU 83      K 4.0      CO2 26   BUN 13   CREATININE 1.2   CALCIUM 7.8*   MG 1.3*     CBC:   Recent Labs   Lab 02/24/24  1334 02/25/24  0355   WBC 7.62 6.83   HGB 11.0* 9.4*   HCT 32.8* 28.2*   * 110*     CMP:   Recent Labs   Lab 02/24/24  1334 02/24/24  1857 02/25/24  0355    141 140   K 3.3* 3.1* 4.0    105 105   CO2 26 25 26   * 169* 83   BUN 17 15 13   CREATININE 1.4 1.2 1.2   CALCIUM 8.6* 7.8* 7.8*   PROT 8.8*  --  7.5   ALBUMIN 3.4*  --  2.9*   BILITOT 1.2*  --  1.0   ALKPHOS 94  --  76   AST 97*  --  60*   ALT 61*  --  43   ANIONGAP 16 11 9       Significant Imaging: I have reviewed all pertinent imaging results/findings within the past 24 hours.  CT Head Without Contrast  Narrative: EXAMINATION:  CT HEAD WITHOUT CONTRAST    CLINICAL HISTORY:  Neuro deficit, acute, stroke suspected;    TECHNIQUE:  Low dose axial images were obtained through the head.  Coronal and sagittal reformations were also performed. Contrast was not administered.    COMPARISON:  09/18/2020    FINDINGS:  There is diffuse volume loss also involving the cerebellum, similar to the prior study.    No evidence of hydrocephalus mass effect intracranial hemorrhage or acute territorial infarct is identified.  No overt parenchymal edema.    Decreased attenuation within the periventricular white matter is nonspecific but may reflect mild to moderate chronic small vessel ischemic change if clinically consistent, similar to the prior  study.    Chronic opacification of the left maxillary sinus with chronic osteitis with mild right maxillary mucosal thickening.  The mastoid air cells are clear.  Impression: No acute intracranial process.    Volume loss.  Presumed chronic small vessel ischemic changes if clinically consistent, similar to the prior study.    Electronically signed by: Kwadwo Cavanaugh  Date:    02/25/2024  Time:    09:10        Assessment/Plan:      * Alcohol abuse with withdrawal  - Per patients wife, he used to drink about 1 bottle of vodka daily for several years; however, in the past few days he has been having 1/2 pint of vodka every other day. Last known drink 2 days ago. Patient denies recent ETOH use.   - Start CIWA protocol  - scheduled fixed valium taper  - Ativan PRN per CIWA  - Zofran PRN for nausea   - IV thiamine; B1 pending  - Ethanol level ordered  - Seizure, Aspiration and Fall precautions    Falls frequently  Chronic problem.  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 Also has h/o ETOH dependence. Thiamine level pending. IV replaced ordered.  PT/OT consulted. OP neurology referral placed.       Pharyngitis  - CT with findings concerning for pharyngitis and possibly retained secretions. Also noted chronic appearing opacification of the left maxillary sinus with chronic osteitis of the sinus walls. Appears chronic dating back to 3/2017 per read.   - Outpatient referral to Otolaryngology   - Amoxicillin changed to ceftriaxone since NPO    Electrolyte disturbance  - Patient with Hypomagnesemia, Hypokalemia and Hypophosphatemia  - Correct electrolytes and repeat labs in 4 hours  - Telemetry monitoring for now    Paroxysmal atrial fibrillation  Patient with Paroxysmal (<7 days) atrial fibrillation which is controlled currently with Beta Blocker. Patient is currently in sinus rhythm.[id number] Score: 1  - Had an episode of Atrial flutter (09/2023)  - Not compliant with Home Eliquis or BB   - Echocardiogram  (9/2023): Preserved EF 50-55%  - Monitor electrolytes and Maintain Mg >2 and K >4  - holding off on full anticoagulation for fall rsk    Essential hypertension  Chronic, uncontrolled. However, may be also elevated currently in the setting of withdrawal  Latest blood pressure and vitals reviewed-     Temp:  [98.5 °F (36.9 °C)-99.7 °F (37.6 °C)]   Pulse:  [102-130]   Resp:  [15-24]   BP: (126-151)/(80-85)   SpO2:  [98 %-100 %] .   Home meds for hypertension were reviewed and noted below.   Hypertension Medications               metoprolol tartrate (LOPRESSOR) 50 MG tablet TAKE 1 TABLET TWICE DAILY          While in the hospital, will manage blood pressure as follows; Continue home antihypertensive regimen    Will utilize p.r.n. blood pressure medication only if patient's blood pressure greater than 160/100 and he develops symptoms such as worsening chest pain or shortness of breath.    GERD (gastroesophageal reflux disease)  - Protonix 40 mg, daily      Seizure  - Noted in history. Last seizure several years ago ~ 2019. Unclear etiology may have been related to withdrawal  - Continue with Keppra  - Follow up outpatient with Neurology; given this was a one time occurrence in likely the above setting  - reports born with epilepsy    Hypokalemia  Patient has hypokalemia which is Acute and currently uncontrolled. Most recent potassium levels reviewed-   Lab Results   Component Value Date    K 3.3 (L) 02/24/2024   . Will continue potassium replacement per protocol and recheck repeat levels after replacement completed.       VTE Risk Mitigation (From admission, onward)           Ordered     enoxaparin injection 40 mg  Every 24 hours         02/25/24 1305     IP VTE LOW RISK PATIENT  Once         02/24/24 1605     Place sequential compression device  Until discontinued         02/24/24 1605                    Discharge Planning   SIMONE:      Code Status: Full Code   Is the patient medically ready for discharge?:     Reason for  patient still in hospital (select all that apply): Patient trending condition, Treatment, and PT / OT recommendations                     Oxana Ibanez DNP  Department of Hospital Medicine   [facility] Surg ([address])

## 2024-02-25 NOTE — ASSESSMENT & PLAN NOTE
- CT with findings concerning for pharyngitis and possibly retained secretions. Also noted chronic appearing opacification of the left maxillary sinus with chronic osteitis of the sinus walls. Appears chronic dating back to 3/2017 per read.   - Outpatient referral to Otolaryngology   - Amoxicillin changed to ceftriaxone since NPO

## 2024-02-26 PROBLEM — G40.909 SEIZURE DISORDER: Status: RESOLVED | Noted: 2020-09-17 | Resolved: 2024-02-26

## 2024-02-26 PROBLEM — Z91.199 NONCOMPLIANCE: Status: RESOLVED | Noted: 2019-04-02 | Resolved: 2024-02-26

## 2024-02-26 PROBLEM — R74.8 ELEVATED ALKALINE PHOSPHATASE LEVEL: Status: RESOLVED | Noted: 2022-07-13 | Resolved: 2024-02-26

## 2024-02-26 PROBLEM — R17 ELEVATED BILIRUBIN: Status: RESOLVED | Noted: 2022-06-27 | Resolved: 2024-02-26

## 2024-02-26 PROBLEM — E87.29 ALCOHOLIC KETOACIDOSIS: Status: RESOLVED | Noted: 2019-04-27 | Resolved: 2024-02-26

## 2024-02-26 PROBLEM — E46 MALNUTRITION: Status: RESOLVED | Noted: 2022-03-08 | Resolved: 2024-02-26

## 2024-02-26 PROBLEM — R74.01 TRANSAMINITIS: Status: RESOLVED | Noted: 2022-06-27 | Resolved: 2024-02-26

## 2024-02-26 LAB
ALBUMIN SERPL BCP-MCNC: 2.7 G/DL (ref 3.5–5.2)
ALP SERPL-CCNC: 77 U/L (ref 55–135)
ALT SERPL W/O P-5'-P-CCNC: 34 U/L (ref 10–44)
ANION GAP SERPL CALC-SCNC: 7 MMOL/L (ref 8–16)
AST SERPL-CCNC: 53 U/L (ref 10–40)
BILIRUB SERPL-MCNC: 1.2 MG/DL (ref 0.1–1)
BUN SERPL-MCNC: 12 MG/DL (ref 8–23)
CALCIUM SERPL-MCNC: 7.7 MG/DL (ref 8.7–10.5)
CHLORIDE SERPL-SCNC: 102 MMOL/L (ref 95–110)
CO2 SERPL-SCNC: 25 MMOL/L (ref 23–29)
CREAT SERPL-MCNC: 1.1 MG/DL (ref 0.5–1.4)
EST. GFR  (NO RACE VARIABLE): >60 ML/MIN/1.73 M^2
GLUCOSE SERPL-MCNC: 80 MG/DL (ref 70–110)
MAGNESIUM SERPL-MCNC: 0.7 MG/DL (ref 1.6–2.6)
POCT GLUCOSE: 108 MG/DL (ref 70–110)
POCT GLUCOSE: 108 MG/DL (ref 70–110)
POCT GLUCOSE: 125 MG/DL (ref 70–110)
POCT GLUCOSE: 98 MG/DL (ref 70–110)
POTASSIUM SERPL-SCNC: 3.7 MMOL/L (ref 3.5–5.1)
PROT SERPL-MCNC: 7.3 G/DL (ref 6–8.4)
SODIUM SERPL-SCNC: 134 MMOL/L (ref 136–145)

## 2024-02-26 PROCEDURE — 21400001 HC TELEMETRY ROOM: Mod: HCNC

## 2024-02-26 PROCEDURE — 63600175 PHARM REV CODE 636 W HCPCS: Mod: HCNC | Performed by: STUDENT IN AN ORGANIZED HEALTH CARE EDUCATION/TRAINING PROGRAM

## 2024-02-26 PROCEDURE — 25000003 PHARM REV CODE 250: Mod: HCNC | Performed by: NURSE PRACTITIONER

## 2024-02-26 PROCEDURE — 36415 COLL VENOUS BLD VENIPUNCTURE: CPT | Mod: HCNC | Performed by: STUDENT IN AN ORGANIZED HEALTH CARE EDUCATION/TRAINING PROGRAM

## 2024-02-26 PROCEDURE — 83735 ASSAY OF MAGNESIUM: CPT | Mod: HCNC | Performed by: STUDENT IN AN ORGANIZED HEALTH CARE EDUCATION/TRAINING PROGRAM

## 2024-02-26 PROCEDURE — 63600175 PHARM REV CODE 636 W HCPCS: Mod: HCNC | Performed by: NURSE PRACTITIONER

## 2024-02-26 PROCEDURE — 80053 COMPREHEN METABOLIC PANEL: CPT | Mod: HCNC | Performed by: NURSE PRACTITIONER

## 2024-02-26 PROCEDURE — 25000003 PHARM REV CODE 250: Mod: HCNC | Performed by: HOSPITALIST

## 2024-02-26 PROCEDURE — 25000242 PHARM REV CODE 250 ALT 637 W/ HCPCS: Mod: HCNC | Performed by: STUDENT IN AN ORGANIZED HEALTH CARE EDUCATION/TRAINING PROGRAM

## 2024-02-26 PROCEDURE — C9113 INJ PANTOPRAZOLE SODIUM, VIA: HCPCS | Mod: HCNC | Performed by: NURSE PRACTITIONER

## 2024-02-26 RX ORDER — LANOLIN ALCOHOL/MO/W.PET/CERES
400 CREAM (GRAM) TOPICAL EVERY 4 HOURS
Status: DISCONTINUED | OUTPATIENT
Start: 2024-02-26 | End: 2024-02-26

## 2024-02-26 RX ORDER — DIAZEPAM 10 MG/2ML
2.5 INJECTION INTRAMUSCULAR EVERY 12 HOURS
Status: COMPLETED | OUTPATIENT
Start: 2024-02-26 | End: 2024-02-26

## 2024-02-26 RX ORDER — MAGNESIUM SULFATE HEPTAHYDRATE 40 MG/ML
2 INJECTION, SOLUTION INTRAVENOUS ONCE
Status: COMPLETED | OUTPATIENT
Start: 2024-02-26 | End: 2024-02-26

## 2024-02-26 RX ORDER — AMMONIUM LACTATE 12 G/100G
LOTION TOPICAL 2 TIMES DAILY
Status: DISCONTINUED | OUTPATIENT
Start: 2024-02-26 | End: 2024-03-07 | Stop reason: HOSPADM

## 2024-02-26 RX ADMIN — FLUTICASONE PROPIONATE 100 MCG: 50 SPRAY, METERED NASAL at 09:02

## 2024-02-26 RX ADMIN — SODIUM CHLORIDE, POTASSIUM CHLORIDE, SODIUM LACTATE AND CALCIUM CHLORIDE: 600; 310; 30; 20 INJECTION, SOLUTION INTRAVENOUS at 04:02

## 2024-02-26 RX ADMIN — AMMONIUM LACTATE: 120 LOTION TOPICAL at 09:02

## 2024-02-26 RX ADMIN — METOROPROLOL TARTRATE 2.5 MG: 5 INJECTION, SOLUTION INTRAVENOUS at 05:02

## 2024-02-26 RX ADMIN — SODIUM CHLORIDE, POTASSIUM CHLORIDE, SODIUM LACTATE AND CALCIUM CHLORIDE: 600; 310; 30; 20 INJECTION, SOLUTION INTRAVENOUS at 11:02

## 2024-02-26 RX ADMIN — ENOXAPARIN SODIUM 40 MG: 40 INJECTION SUBCUTANEOUS at 05:02

## 2024-02-26 RX ADMIN — DIAZEPAM 2.5 MG: 5 INJECTION, SOLUTION INTRAMUSCULAR; INTRAVENOUS at 09:02

## 2024-02-26 RX ADMIN — PANTOPRAZOLE SODIUM 40 MG: 40 INJECTION, POWDER, LYOPHILIZED, FOR SOLUTION INTRAVENOUS at 09:02

## 2024-02-26 RX ADMIN — CEFTRIAXONE SODIUM 1 G: 1 INJECTION, POWDER, FOR SOLUTION INTRAMUSCULAR; INTRAVENOUS at 12:02

## 2024-02-26 RX ADMIN — MAGNESIUM SULFATE HEPTAHYDRATE 2 G: 40 INJECTION, SOLUTION INTRAVENOUS at 09:02

## 2024-02-26 RX ADMIN — METOROPROLOL TARTRATE 2.5 MG: 5 INJECTION, SOLUTION INTRAVENOUS at 12:02

## 2024-02-26 RX ADMIN — LEVETIRACETAM 500 MG: 500 INJECTION, SOLUTION INTRAVENOUS at 09:02

## 2024-02-26 RX ADMIN — Medication 400 MG: at 05:02

## 2024-02-26 RX ADMIN — THIAMINE HYDROCHLORIDE 100 MG: 100 INJECTION, SOLUTION INTRAMUSCULAR; INTRAVENOUS at 09:02

## 2024-02-26 RX ADMIN — AMMONIUM LACTATE: 120 LOTION TOPICAL at 12:02

## 2024-02-26 NOTE — PROGRESS NOTES
[facility] Surg [address] Medicine  Progress Note    Patient Name: Michael Johnson Jr.  MRN: [id number]  Patient Class: IP- Inpatient   Admission Date: 2/24/2024  Length of Stay: 1 days  Attending Physician: Jessica Skinner MD  Primary Care Provider: Amelia Lai MD        Subjective:     Principal Problem:Alcohol abuse with withdrawal        HPI:  63 year old male with Hx of Hypertension, Seizure disorders, spastic left-sided hemiplegia, Anemia, Arthrititis, Depression, CKD stage 3, Alcohol use disorder and Gout, who presents to the ED with complaints of nausea/vomiting and abdominal pain for the past 2 days. Patient is a poor historian; most history provided by patients wife. For the past 2 days patient has not been eating. Started to feel nauseated with associated NBNB emesis and weakness. Reports that his sore throat has been hurting him since yesterday and noticed he has been shaking since yesterday. He reports that he had quit drinking for a long time; however, per patients wife he has been drinking daily for several years and in the past few weeks it has been 1/2 pint of vodka every 2 days. Patient denies any fever, chest pain, palpitaitons, dyspnea, changes in bowel or urinary habits. He does report feeling of cramping in his upper abdomen, non-radiating. Last episode of emesis was here in the ER. States he takes his medication but not everyday. Denies any illicit drug use or smoking. 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 Denies any sick contacts.     Overview/Hospital Course:  Mr Rodriguez admitted for ETOH withdrawal. He reports that he has not had an alcoholic beverage in months. He is a poor historian and has different time frames than his wife and daughter. 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 He was vomiting the past few days but no since arrival. PT/OT/SLP have been consulted. He is on a liquid diet.  PT/OT have signed off and report his baseline is bedbound.  Need OP neurology and PCP followup.      Interval History: Mr Rodriguez upset and said that he is getting stressed out because of talk about alcohol abuse and he feels that he will be misdiagnosed if his ETOH is continuously focuses on. Per his wife, he is still drinking at home. PeTH pending. Pt still having trouble swallowing pills.     Review of Systems   Constitutional:  Negative for activity change, appetite change and fatigue.   HENT:  Positive for trouble swallowing.    Eyes:  Negative for visual disturbance.   Respiratory:  Negative for apnea.    Cardiovascular:  Negative for chest pain.   Gastrointestinal:  Negative for abdominal pain (chronic burning in abdomen/some int epigastric pain), constipation and vomiting.   Genitourinary:  Negative for difficulty urinating.   Musculoskeletal:  Positive for arthralgias and myalgias.   Neurological:  Positive for weakness.        Left sided spacticity since birth   Psychiatric/Behavioral:  Negative for agitation.      Objective:     Vital Signs (Most Recent):  Temp: (!) 100.5 °F (38.1 °C) (02/26/24 1546)  Pulse: 109 (02/26/24 1519)  Resp: 18 (02/26/24 1519)  BP: 120/79 (02/26/24 1519)  SpO2: 96 % (02/26/24 1519) Vital Signs (24h Range):  Temp:  [98 °F (36.7 °C)-100.5 °F (38.1 °C)] 100.5 °F (38.1 °C)  Pulse:  [] 109  Resp:  [15-18] 18  SpO2:  [93 %-100 %] 96 %  BP: (110-137)/(69-83) 120/79     Weight: 82 kg (180 lb 12.4 oz)  Body mass index is 24.52 kg/m².    Intake/Output Summary (Last 24 hours) at 2/26/2024 1620  Last data filed at 2/26/2024 1558  Gross per 24 hour   Intake 1636.82 ml   Output 300 ml   Net 1336.82 ml         Physical Exam  Vitals reviewed.   HENT:      Head: Normocephalic.      Mouth/Throat:      Mouth: Mucous membranes are moist.   Eyes:      Pupils: Pupils are equal, round, and reactive to light.   Cardiovascular:      Rate and Rhythm: Normal rate.   Pulmonary:      Effort: Pulmonary  effort is normal.   Abdominal:      Palpations: Abdomen is soft.      Tenderness: There is no abdominal tenderness.   Musculoskeletal:         General: Tenderness and deformity present.   Skin:     Findings: Lesion (right 4th finger-he says old gout infection) present.   Neurological:      Mental Status: He is alert.      Motor: Abnormal muscle tone present.   Psychiatric:         Behavior: Behavior is cooperative.         Cognition and Memory: He exhibits impaired remote memory.             Significant Labs: All pertinent labs within the past 24 hours have been reviewed.  BMP:   Recent Labs   Lab 02/26/24  0404   GLU 80   *   K 3.7      CO2 25   BUN 12   CREATININE 1.1   CALCIUM 7.7*   MG 0.7*     CBC:   Recent Labs   Lab 02/25/24  0355   WBC 6.83   HGB 9.4*   HCT 28.2*   *     CMP:   Recent Labs   Lab 02/24/24  1857 02/25/24  0355 02/26/24  0404    140 134*   K 3.1* 4.0 3.7    105 102   CO2 25 26 25   * 83 80   BUN 15 13 12   CREATININE 1.2 1.2 1.1   CALCIUM 7.8* 7.8* 7.7*   PROT  --  7.5 7.3   ALBUMIN  --  2.9* 2.7*   BILITOT  --  1.0 1.2*   ALKPHOS  --  76 77   AST  --  60* 53*   ALT  --  43 34   ANIONGAP 11 9 7*       Significant Imaging: I have reviewed all pertinent imaging results/findings within the past 24 hours.    Assessment/Plan:      * Alcohol abuse with withdrawal  - Per patients wife, he used to drink about 1 bottle of vodka daily for several years; however, in the past few days he has been having 1/2 pint of vodka every other day. Last known drink 2 days ago. Patient denies recent ETOH use.   - Start CIWA protocol  - scheduled fixed valium taper  - Ativan PRN per CIWA  - Zofran PRN for nausea   - IV thiamine; B1 pending  - Ethanol level ordered  - Seizure, Aspiration and Fall precautions    Falls frequently  Chronic problem.  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 Also has h/o ETOH dependence. Thiamine level pending. IV replaced ordered.  PT/OT  consulted. OP neurology referral placed. After talking with wife, patient is bedbound at baseline and has not walked in about three years.  Reason unknown.      Pharyngitis  - CT with findings concerning for pharyngitis and possibly retained secretions. Also noted chronic appearing opacification of the left maxillary sinus with chronic osteitis of the sinus walls. Appears chronic dating back to 3/2017 per read.   - Outpatient referral to Otolaryngology   - Amoxicillin changed to ceftriaxone since NPO    Electrolyte disturbance  - Patient with Hypomagnesemia, Hypokalemia, hypocalcemia and Hypophosphatemia  - Correct electrolytes and monitor  - corrected calcium 8.7  - Telemetry monitoring for now    Paroxysmal atrial fibrillation  Patient with Paroxysmal (<7 days) atrial fibrillation which is controlled currently with Beta Blocker. Patient is currently in sinus rhythm.[id number] Score: 1  - Had an episode of Atrial flutter (09/2023)  - Not compliant with Home Eliquis or BB   - Echocardiogram (9/2023): Preserved EF 50-55%  - Monitor electrolytes and Maintain Mg >2 and K >4  - holding off on full anticoagulation for fall rsk    Essential hypertension  Chronic, uncontrolled. However, may be also elevated currently in the setting of withdrawal  Latest blood pressure and vitals reviewed-     Temp:  [98.5 °F (36.9 °C)-99.7 °F (37.6 °C)]   Pulse:  [102-130]   Resp:  [15-24]   BP: (126-151)/(80-85)   SpO2:  [98 %-100 %] .   Home meds for hypertension were reviewed and noted below.   Hypertension Medications               metoprolol tartrate (LOPRESSOR) 50 MG tablet TAKE 1 TABLET TWICE DAILY          While in the hospital, will manage blood pressure as follows; Continue home antihypertensive regimen    Will utilize p.r.n. blood pressure medication only if patient's blood pressure greater than 160/100 and he develops symptoms such as worsening chest pain or shortness of breath.    GERD (gastroesophageal reflux disease)  -  Protonix 40 mg, daily      Seizure  - Noted in history. Last seizure several years ago ~ 2019. Unclear etiology may have been related to withdrawal  - Continue with Keppra  - Follow up outpatient with Neurology; given this was a one time occurrence in likely the above setting  - reports born with epilepsy    Hypokalemia  Patient has hypokalemia which is Acute and currently uncontrolled. Most recent potassium levels reviewed-   Lab Results   Component Value Date    K 3.3 (L) 02/24/2024   . Will continue potassium replacement per protocol and recheck repeat levels after replacement completed.       VTE Risk Mitigation (From admission, onward)           Ordered     enoxaparin injection 40 mg  Every 24 hours         02/25/24 1305     IP VTE LOW RISK PATIENT  Once         02/24/24 1605     Place sequential compression device  Until discontinued         02/24/24 1605                    Discharge Planning   SIMONE:      Code Status: Full Code   Is the patient medically ready for discharge?:     Reason for patient still in hospital (select all that apply): Patient trending condition  Discharge Plan A: Home with family, Home                  Oxana Ibanez DNP  Department of Hospital Medicine   [facility] ([address])

## 2024-02-26 NOTE — PLAN OF CARE
Problem: Adult Inpatient Plan of Care  Goal: Plan of Care Review  Outcome: Ongoing, Progressing  Goal: Patient-Specific Goal (Individualized)  Outcome: Ongoing, Progressing  Goal: Absence of Hospital-Acquired Illness or Injury  Outcome: Ongoing, Progressing  Goal: Readiness for Transition of Care  Outcome: Ongoing, Progressing     Problem: Adult Inpatient Plan of Care  Goal: Optimal Comfort and Wellbeing  Outcome: Ongoing, Not Progressing     Problem: Skin Injury Risk Increased  Goal: Skin Health and Integrity  Outcome: Ongoing, Not Progressing     Pt alert. Low grade temp. IV antibiotics and fluids continued. Oxygen maintained @ room air. Mg replaced. Wound care. Speech therapy to evaluate. Telemetry monitoring. Tachycardia. Tolerating very little of full liquid diet. Boost supplements added. Blood glucose WNL. Safety maintained.

## 2024-02-26 NOTE — ASSESSMENT & PLAN NOTE
Chronic problem.  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 Also has h/o ETOH dependence. Thiamine level pending. IV replaced ordered.  PT/OT consulted. OP neurology referral placed. After talking with wife, patient is bedbound at baseline and has not walked in about three years.  Reason unknown.

## 2024-02-26 NOTE — CONSULTS
[facility] ([address])  Adult Nutrition  Consult Note    SUMMARY     Recommendations    1.  Encourage po intake   2. Recommend Jesús BID and Boost BID to promote wound healing and nutritional intake   3. Monitor labs and weights   4. Collaboration with medical providers    Goals: Patient to consume >75% of estimated energy needs prior to RD follow up.  Nutrition Goal Status: new  Communication of RD Recs: other (comment) (Consult, poc)    Assessment and Plan    Nutrition Problem  Inadequate nutrition    Related to (etiology):   Alcohol abuse     Signs and Symptoms (as evidenced by):   GI intolerance   Decreased appetite   Altered skin integrity     Interventions/Recommendations (treatment strategy):  Commercial beverages  Collaboration with medical providers      Nutrition Diagnosis Status:   New         Malnutrition Assessment     Skin (Micronutrient): other (see comments)   Micronutrient Evaluation Summary: suspected deficiency                             Reason for Assessment    Reason For Assessment: consult (richie)    Diagnosis:  (alcohol abuse with withdrawal)  Patient Active Problem List   Diagnosis    Chronic pain of left ankle    Gouty arthritis    Mixed hyperlipidemia    Hypokalemia    Seizure    GERD (gastroesophageal reflux disease)    Macrocytic anemia    Essential hypertension    Alcohol abuse with withdrawal    Debility    Alcoholic ketoacidosis    Paroxysmal atrial fibrillation    High anion gap metabolic acidosis    Vitamin D deficiency    Peripheral polyneuropathy    Seizure disorder    Spastic hemiplegia, unspecified etiology, unspecified laterality    Malnutrition    Depression    Elevated bilirubin    Transaminitis    Elevated alkaline phosphatase level    Dysphagia    Noncompliance    Lichenification    Aortic atherosclerosis    Electrolyte disturbance    Pharyngitis    Falls frequently       Relevant Medical History:   Past Medical History:   Diagnosis Date    Afib     Alcohol  abuse with other alcohol-induced disorder 04/02/2019    Anemia 04/02/2019    unspecified deficiency    Ankle fracture, left     Aortic atherosclerosis 2/8/2024    Arthritis     Asthma     Cholecystitis 1/15/2018    Chronic kidney disease (CKD), stage III (moderate) 04/02/2019    Depression     Erectile dysfunction 04/02/2019    Gout, arthropathy 04/02/2019    Gout, unspecified     Hypertension     Hypertensive chronic kidney disease 04/02/2019    Hypomagnesemia 04/02/2019    Noncompliance 04/02/2019    Peripheral neuropathy 04/02/2019    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    Secondary hyperparathyroidism, renal 04/02/2019    Seizure 2016    Spastic hemiplegia affecting left nondominant side 04/02/2019       Interdisciplinary Rounds: did not attend (RD remote)    General Information Comments:   2/26/24: Patient is on a full liquid diet with 50% meal consumption noted. Patient with nausea, vomiting and decreased po intake x 2 days. Chewing and swallowing difficulties present.  Spastic left side hemiplegia. Patient with multiple locations of altered skin integrity.  Suspected malnutrition.  On site RD to follow up with [facility].  Labs reviewed.  NKFA.  LBM: 2/24/24.  RD to recommend marcelo BID to promote wound healing.    Nutrition Discharge Planning: Patient to continue on recommended oral diet post discharge    Nutrition Risk Screen    Nutrition Risk Screen: difficulty chewing/swallowing    Nutrition/Diet History    Spiritual, Cultural Beliefs, Voodoo Practices, Values that Affect Care: no  Food Allergies: NKFA  Factors Affecting Nutritional Intake: chewing difficulties/inability to chew food, difficulty/impaired swallowing    Anthropometrics    Temp: (!) 100.5 °F (38.1 °C)  Height Method: Stated  Height: 6' (182.9 cm)  Height (inches): 72 in  Weight Method: Bed Scale  Weight: 82 kg (180 lb 12.4 oz)  Weight (lb): 180.78 lb  Ideal Body Weight (IBW), Male: 178 lb  % Ideal Body Weight, Male (lb): 101.56 %  BMI (Calculated): 24.5  BMI  Grade: 18.5-24.9 - normal  Additional Documentation:  (spastic left side hemiplegia)    Lab/Procedures/Meds    Pertinent Labs Reviewed: reviewed  BMP  Lab Results   Component Value Date     (L) 02/26/2024    K 3.7 02/26/2024     02/26/2024    CO2 25 02/26/2024    BUN 12 02/26/2024    CREATININE 1.1 02/26/2024    CALCIUM 7.7 (L) 02/26/2024    ANIONGAP 7 (L) 02/26/2024    EGFRNORACEVR >60 02/26/2024     Lab Results   Component Value Date    HGBA1C 4.4 10/10/2023     Lab Results   Component Value Date    CALCIUM 7.7 (L) 02/26/2024    PHOS 1.6 (L) 02/25/2024       Pertinent Medications Reviewed: reviewed  Scheduled Meds:   ammonium lactate   Topical (Top) BID    cefTRIAXone (Rocephin) IV (PEDS and ADULTS)  1 g Intravenous Q24H    diazePAM  2.5 mg Intravenous Q12H    enoxparin  40 mg Subcutaneous Q24H (prophylaxis, 1700)    fluticasone propionate  2 spray Each Nostril Daily    levETIRAcetam (Keppra) IV (PEDS and ADULTS)  500 mg Intravenous Q12H    metoprolol  2.5 mg Intravenous Q6H    pantoprazole  40 mg Intravenous Daily    thiamine  100 mg Intravenous Daily     Continuous Infusions:   lactated ringers 125 mL/hr at 02/26/24 0400     PRN Meds:.lorazepam, melatonin, ondansetron    Physical Findings/Assessment         Estimated/Assessed Needs    Weight Used For Calorie Calculations: 82 kg (180 lb 12.4 oz)  Energy Calorie Requirements (kcal): 25-30kcals/kg (2050-2460kcals/day)  Energy Need Method: Kcal/kg  Protein Requirements: 1.2-1.5g/kg (98-123g/day)  Weight Used For Protein Calculations: 82 kg (180 lb 12.4 oz)  Fluid Requirements (mL): 1ml/kcal  Estimated Fluid Requirement Method: RDA Method  RDA Method (mL): 25  CHO Requirement: 250      Nutrition Prescription Ordered    Current Diet Order: full liquid diet  Oral Nutrition Supplement: marcelo bid    Evaluation of Received Nutrient/Fluid Intake    % Kcal Needs: 50%  % Protein Needs: 50%  I/O: +3.2L since admit  Energy Calories Required: not meeting  needs  Protein Required: not meeting needs  Fluid Required: not meeting needs  Comments: LBM: 2/24/24  Tolerance: not tolerating  % Intake of Estimated Energy Needs: 25 - 50 %  % Meal Intake: 25 - 50 %    Nutrition Risk    Level of Risk/Frequency of Follow-up: moderate - high (follow up: 2x weekly)       Monitor and Evaluation    Food and Nutrient Intake: energy intake, food and beverage intake  Food and Nutrient Adminstration: diet order  Knowledge/Beliefs/Attitudes: beliefs and attitudes  Physical Activity and Function: nutrition-related ADLs and IADLs, factors affecting access to physical activity  Anthropometric Measurements: height/length, weight, weight change, body mass index  Biochemical Data, Medical Tests and Procedures: electrolyte and renal panel, gastrointestinal profile, glucose/endocrine profile, inflammatory profile, lipid profile  Nutrition-Focused Physical Findings: skin, overall appearance       Nutrition Follow-Up    RD Follow-up?: Yes  Estephania Christensen, MS, RDN, LDN

## 2024-02-26 NOTE — SUBJECTIVE & OBJECTIVE
Interval History: Mr Rodriguez upset and said that he is getting stressed out because of talk about alcohol abuse and he feels that he will be misdiagnosed if his ETOH is continuously focuses on. Per his wife, he is still drinking at home. PeTH pending. Pt still having trouble swallowing pills.     Review of Systems   Constitutional:  Negative for activity change, appetite change and fatigue.   HENT:  Positive for trouble swallowing.    Eyes:  Negative for visual disturbance.   Respiratory:  Negative for apnea.    Cardiovascular:  Negative for chest pain.   Gastrointestinal:  Negative for abdominal pain (chronic burning in abdomen/some int epigastric pain), constipation and vomiting.   Genitourinary:  Negative for difficulty urinating.   Musculoskeletal:  Positive for arthralgias and myalgias.   Neurological:  Positive for weakness.        Left sided spacticity since birth   Psychiatric/Behavioral:  Negative for agitation.      Objective:     Vital Signs (Most Recent):  Temp: (!) 100.5 °F (38.1 °C) (02/26/24 1546)  Pulse: 109 (02/26/24 1519)  Resp: 18 (02/26/24 1519)  BP: 120/79 (02/26/24 1519)  SpO2: 96 % (02/26/24 1519) Vital Signs (24h Range):  Temp:  [98 °F (36.7 °C)-100.5 °F (38.1 °C)] 100.5 °F (38.1 °C)  Pulse:  [] 109  Resp:  [15-18] 18  SpO2:  [93 %-100 %] 96 %  BP: (110-137)/(69-83) 120/79     Weight: 82 kg (180 lb 12.4 oz)  Body mass index is 24.52 kg/m².    Intake/Output Summary (Last 24 hours) at 2/26/2024 1620  Last data filed at 2/26/2024 1558  Gross per 24 hour   Intake 1636.82 ml   Output 300 ml   Net 1336.82 ml         Physical Exam  Vitals reviewed.   HENT:      Head: Normocephalic.      Mouth/Throat:      Mouth: Mucous membranes are moist.   Eyes:      Pupils: Pupils are equal, round, and reactive to light.   Cardiovascular:      Rate and Rhythm: Normal rate.   Pulmonary:      Effort: Pulmonary effort is normal.   Abdominal:      Palpations: Abdomen is soft.      Tenderness: There is no  abdominal tenderness.   Musculoskeletal:         General: Tenderness and deformity present.   Skin:     Findings: Lesion (right 4th finger-he says old gout infection) present.   Neurological:      Mental Status: He is alert.      Motor: Abnormal muscle tone present.   Psychiatric:         Behavior: Behavior is cooperative.         Cognition and Memory: He exhibits impaired remote memory.             Significant Labs: All pertinent labs within the past 24 hours have been reviewed.  BMP:   Recent Labs   Lab 02/26/24  0404   GLU 80   *   K 3.7      CO2 25   BUN 12   CREATININE 1.1   CALCIUM 7.7*   MG 0.7*     CBC:   Recent Labs   Lab 02/25/24  0355   WBC 6.83   HGB 9.4*   HCT 28.2*   *     CMP:   Recent Labs   Lab 02/24/24  1857 02/25/24  0355 02/26/24  0404    140 134*   K 3.1* 4.0 3.7    105 102   CO2 25 26 25   * 83 80   BUN 15 13 12   CREATININE 1.2 1.2 1.1   CALCIUM 7.8* 7.8* 7.7*   PROT  --  7.5 7.3   ALBUMIN  --  2.9* 2.7*   BILITOT  --  1.0 1.2*   ALKPHOS  --  76 77   AST  --  60* 53*   ALT  --  43 34   ANIONGAP 11 9 7*       Significant Imaging: I have reviewed all pertinent imaging results/findings within the past 24 hours.

## 2024-02-26 NOTE — PLAN OF CARE
Problem: Adult Inpatient Plan of Care  Goal: Plan of Care Review  Outcome: Ongoing, Progressing  Goal: Patient-Specific Goal (Individualized)  Outcome: Ongoing, Progressing  Goal: Absence of Hospital-Acquired Illness or Injury  Outcome: Ongoing, Progressing  Goal: Optimal Comfort and Wellbeing  Outcome: Ongoing, Progressing     Problem: Skin Injury Risk Increased  Goal: Skin Health and Integrity  Outcome: Ongoing, Progressing     POC reviewed with patient and wife. All questions and concerns addressed. Fall/safety precautions implemented and maintained. Incontinent care performed. Blood glucose monitored. CIWA q8h. No acute events noted this shift. Please see flowsheet for full assessment and vitals. Bed locked in lowest position. Side rails up x2. Call bell within reach.

## 2024-02-26 NOTE — PLAN OF CARE
LMSW met with the patient at the bedside.. Patient denies the use of HH. Patient has DME. Patients PCP is correct on the face sheet. Patient choice pharmacy is bedside. Patient will need transportation home upon discharge.     [facility] ([address])  Initial Discharge Assessment       Primary Care Provider: Amelia Lai MD    Admission Diagnosis: Hypomagnesemia [E83.42]  Tachycardia [R00.0]  Electrolyte disturbance [E87.8]  Abnormal CT scan, neck [R93.89]  Pharyngitis, unspecified etiology [J02.9]  Alcohol withdrawal [F10.939]    Admission Date: 2/24/2024  Expected Discharge Date:     Transition of Care Barriers: (P) Transportation    Payor: [employer] MEDICARE / Plan: [employer] H[employer] PPO SPECIAL NEEDS / Product Type: Medicare Advantage /     Extended Emergency Contact Information  Primary Emergency Contact: Leeanne Johnson  Address: [address] [place] [address]  Home Phone: [phone]  Mobile Phone: [phone]  Relation: Spouse  Preferred language: English  Secondary Emergency Contact: Delmi Fraser  Address: [address] [place] [address] [id number] [place] [address]  Mobile Phone: [phone]  Relation: Daughter    Discharge Plan A: (P) Home with family, Home  Discharge Plan B: (P) Home Health      [id number] - [place], [place] - [address] [facility] [address]  [address]  [facility] [address]  Phone: [phone] Fax: [phone]    [facility]  [address]  [place] [address]  Phone: [phone] Fax: [phone]    [facility] - [place], [place] - [address] [facility]  [address] [facility] [address]  Phone: [phone] Fax: [phone]    [id number] - [place], [place] - [address]  [address] [facility] [address]  Phone:  [phone] Fax: [phone]    [id number] - [address] AT [facility] [address]  [address] [facility] [address]  Phone: [phone] Fax: [phone]      Initial Assessment (most recent)       Adult Discharge Assessment - 02/26/24 0920          Discharge Assessment    Assessment Type Discharge Planning Assessment     Confirmed/corrected address, phone number and insurance Yes     Confirmed Demographics Correct on Facesheet     Source of Information patient;family;health record     People in Home spouse     Do you expect to return to your current living situation? Yes     Do you have help at home or someone to help you manage your care at home? Yes     Who are your caregiver(s) and their phone number(s)? Leeanne Johnson (Spouse) [phone]     Prior to hospitilization cognitive status: Alert/Oriented     Current cognitive status: Alert/Oriented     Equipment Currently Used at Home cane, straight;wheelchair     Readmission within 30 days? No (P)      Patient currently being followed by outpatient case management? No (P)      Do you currently have service(s) that help you manage your care at home? No (P)      Do you take prescription medications? Yes (P)      Do you have prescription coverage? Yes (P)      Do you have any problems affording any of your prescribed medications? No (P)      Is the patient taking medications as prescribed? yes (P)      How do you get to doctors appointments? family or friend will provide (P)      Are you on dialysis? No (P)      Do you take coumadin? No (P)      Discharge Plan A Home with family;Home (P)      Discharge Plan B Home Health (P)      Discharge Plan discussed with: Spouse/sig other;Patient (P)      Transition of Care Barriers Transportation (P)

## 2024-02-26 NOTE — PLAN OF CARE
No OT goals established.      Problem: Occupational Therapy  Goal: Occupational Therapy Goal  Outcome: Met     Supine>sit with MAX A for BLE management and requiring partial assist for upright trunk and TOTAL A of 2 persons for return to supine and scooting towards HOB while supine via draw sheet method.  Attempting lateral scoots along EOB though Pt. Unable to perform task due to increased LLE pain with weight bearing.  Pt. Indicating various assistance levels during client interview though eventually reporting that he is mostly bed bound and sits EOB approx. Once per month.  Spouse also verified via phone that Pt. Is bed bound.  Needs cherry lift and hospital bed with pressure relieving mattress.  No acute care OT needs identified.  Recommend post acute facility placement versus return home with 24/7 supervision/assist.  Please re-consult as necessary.

## 2024-02-26 NOTE — ASSESSMENT & PLAN NOTE
- Patient with Hypomagnesemia, Hypokalemia, hypocalcemia and Hypophosphatemia  - Correct electrolytes and monitor  - corrected calcium 8.7  - Telemetry monitoring for now

## 2024-02-26 NOTE — PT/OT/SLP PROGRESS
"Speech Language Pathology      Michael Johnson Jr.  MRN: [id number]    Patient not seen today secondary to Patient fatigue. Pt and family member sleeping upon entrance into room, however alerted to name. Pt and pt's family member report pt is doing well with full liquid diet. Pt stating he does not wish to change it at this time, stating "so far so good". Pt denies any coughing, choking, difficulty swallowing. Pt requesting to rest at this time. Will follow-up 2/27/24.    "

## 2024-02-26 NOTE — PLAN OF CARE
Medicare Message     Important Message from Medicare regarding Discharge Appeal Rights Given to patient/caregiver; Explained to patient/caregiver; Signed/date by patient/caregiver   Date IMM was signed 2/26/2024   Time IMM was signed 1028

## 2024-02-26 NOTE — PLAN OF CARE
Nutrition Plan of Care:    Recommendations     1.  Encourage po intake   2. Recommend Jesús BID and Boost BID to promote wound healing and nutritional intake   3. Monitor labs and weights   4. Collaboration with medical providers     Goals: Patient to consume >75% of estimated energy needs prior to RD follow up.  Nutrition Goal Status: new  Communication of RD Recs: other (comment) (Consult, poc)     Assessment and Plan     Nutrition Problem  Inadequate nutrition     Related to (etiology):   Alcohol abuse      Signs and Symptoms (as evidenced by):   GI intolerance   Decreased appetite   Altered skin integrity      Interventions/Recommendations (treatment strategy):  Commercial beverages  Collaboration with medical providers        Nutrition Diagnosis Status:   New           Malnutrition Assessment  Skin (Micronutrient): other (see comments)   Micronutrient Evaluation Summary: suspected deficiency                         Estephania Christensen MS, RDN, LDN

## 2024-02-26 NOTE — PT/OT/SLP EVAL
Occupational Therapy   Evaluation, Treatment, and Discharge Note    Name: Michael Johnson Jr.  MRN: [id number]  Admitting Diagnosis: Alcohol abuse with withdrawal  Recent Surgery: * No surgery found *      Recommendations:     Discharge Recommendations:  (Recommend post acute facility placement versus return home with 24/7 supervision/assist.  Please re-consult as necessary.)  Discharge Equipment Recommendations: hospital bed, lift device (pressure releiving mattress)  Barriers to discharge:  None    Assessment:   Supine>sit with MAX A for BLE management and requiring partial assist for upright trunk and TOTAL A of 2 persons for return to supine and scooting towards HOB while supine via draw sheet method.  Attempting lateral scoots along EOB though Pt. Unable to perform task due to increased LLE pain with weight bearing.  Pt. Indicating various assistance levels during client interview though eventually reporting that he is mostly bed bound and sits EOB approx. Once per month.  Spouse also verified via phone that Pt. Is bed bound.  Needs cherry lift and hospital bed with pressure relieving mattress.  No acute care OT needs identified.  Recommend post acute facility placement versus return home with 24/7 supervision/assist.  Please re-consult as necessary.        Michael Johnson Jr. is a 63 y.o. male with a medical diagnosis of Alcohol abuse with withdrawal. At this time, patient is functioning at their prior level of function and does not require further acute OT services.     Plan:     During this hospitalization, patient does not require further acute OT services.  Please re-consult if situation changes.    Plan of Care Reviewed with: patient, daughter    Subjective     Chief Complaint: With c/o LLE foot pain.   Patient/Family Comments/goals: No goals stated at this time.  Pt. Spouse reports via phone conversation that Pt. Has not been up to a W/C in years.      Occupational Profile:  Lives with spouse and has a shower  stall though does not take showers since he fell during bath 2 years ago.  Giving various levels of assist and reporting at least 20 falls in a year. Initially reporting ambulating with RW just to get to restroom though eventually reporting that spouse assists with ADL at bed level.  Verified with spouse PLOF: Spouse reports that Pt. Is bed bound and can turn himself L<>R and that it has been years since he has gotten up to a W/C. She also reports that she has attempted to make his bedroom more accessible, but Pt. Has refused.   Equipment Used at home:  (W/C, power chair, RW, and shower chair not in use PTA)  Assistance upon Discharge: Spouse able to assist.     Pain/Comfort:  Pain Rating 1:  (Not rated though with yelling and facial grimace upon attempts weight bearing seated EOB for scooting task)  Location - Side 1: Left  Location 1: foot  Pain Addressed 1: Distraction, Cessation of Activity, Nurse notified  Pain Rating Post-Intervention 1: 0/10 (No signs of pain at rest including moaning, guarding, facial grimace, and furrowed brows.)    Patients cultural, spiritual, Yazidi conflicts given the current situation:  (None stated.)    Objective:     Communicated with: Ioana STUART RN prior to session.  Patient found HOB elevated with telemetry, peripheral IV and daughter present upon OT entry to room.    General Precautions: Standard, fall, aspiration, seizure  Orthopedic Precautions: N/A  Braces: N/A  Respiratory Status: Room air     Occupational Performance:    Bed Mobility:    Supine>sit with MAX A for BLE management and requiring partial assist for upright trunk and TOTAL A of 2 persons for return to supine and scooting towards HOB while supine via draw sheet method.  Attempting lateral scoots along EOB though Pt. Unable to perform task due to increased LLE pain with weight bearing.  Pt. With varying levels of assist for static sitting balance ranging from MOD A to MIN A d/t posterior trunk lean using RUE for  support with increased cuing.        Functional Mobility/Transfers:  W/C obtained and setup for plans to transfer as Pt. Would at home, but after further discussion and failed attempts at lateral scoots deferred task as it is unsafe.     Activities of Daily Living:  TOTAL A to don/doff socks seated EOB.      Cognitive/Visual Perceptual:  Cognitive/Psychosocial Skills:  -       Oriented to: Person, Place, Time, and Situation   -       Follows Commands/attention:Follows approx. 75% of one-step commands  -       Communication: able to make basic needs known and answers questions appropriately  -       Memory: No Deficits noted  -       Safety awareness/insight to disability: impaired   -       Mood/Affect/Coping skills/emotional control: Cooperative  Visual/Perceptual:  -grossly intact    Physical Exam:  Postural examination/scapula alignment: -       Rounded shoulders  -       Forward head  -       posterior trunk lean while seated EOB  Skin integrity: Visible skin intact and Dry  Dominant hand: -       R  Upper Extremity Range of Motion:  -       Right Upper Extremity: approx. 60% of shoulder flex/ext and 75% elbow flex/ext with IP joints of fingers extended  -       Left Upper Extremity: approx. 50% of shoulder flex/ext and 50% of elbow flex/ext and IP joints of fingers flexed; able to extend IP joints passively approx. 75% of ROM  Upper Extremity Strength: -       Right Upper Extremity: 3-/5 at elbow and shoulder  -       Left Upper Extremity: 3-/5 at elbow and shoulder   Strength: -       Right Upper Extremity: limited  -       Left Upper Extremity: limited  Fine Motor Coordination: -       Impaired  Left hand thumb/finger opposition skills severely impaired and Right hand thumb/finger opposition skills severely impaired    AMPAC 6 Click ADL:  AMPAC Total Score: 9    Treatment & Education:  Educated on role of OT, DME needs, and POC.   Supine>sit with MAX A for BLE management and requiring partial assist for  upright trunk and TOTAL A of 2 persons for return to supine and scooting towards HOB while supine via draw sheet method.  Attempting lateral scoots along EOB though Pt. Unable to perform task due to increased LLE pain with weight bearing.  Pt. With varying levels of assist for static sitting balance ranging from MOD A to MIN A d/t posterior trunk lean using RUE for support with increased cuing.      W/C obtained and setup for plans to transfer as Pt. Would at home, but after further discussion and failed attempts at lateral scoots deferred task as it is unsafe.   TOTAL A to don/doff socks seated EOB.    Received call light review and importance of calling for assist as needed.     Patient left HOB elevated with all lines intact, call button in reach, nursing notified, and daughter present    GOALS:   Multidisciplinary Problems       Occupational Therapy Goals       Not on file              Multidisciplinary Problems (Resolved)          Problem: Occupational Therapy    Goal Priority Disciplines Outcome Interventions   Occupational Therapy Goal   (Resolved)     OT, PT/OT Met                        History:     Past Medical History:   Diagnosis Date    Afib     Alcohol abuse with other alcohol-induced disorder 04/02/2019    Anemia 04/02/2019    unspecified deficiency    Ankle fracture, left     Aortic atherosclerosis 2/8/2024    Arthritis     Asthma     Cholecystitis 1/15/2018    Chronic kidney disease (CKD), stage III (moderate) 04/02/2019    Depression     Erectile dysfunction 04/02/2019    Gout, arthropathy 04/02/2019    Gout, unspecified     Hypertension     Hypertensive chronic kidney disease 04/02/2019    Hypomagnesemia 04/02/2019    Noncompliance 04/02/2019    Peripheral neuropathy 04/02/2019    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    Secondary hyperparathyroidism, renal 04/02/2019    Seizure 2016    Spastic hemiplegia affecting left nondominant side 04/02/2019         Past Surgical History:   Procedure Laterality Date     ANKLE SURGERY      x6    CHOLECYSTECTOMY         Time Tracking:     OT Date of Treatment: 02/25/24  OT Start Time: 1307  OT Stop Time: 1355  OT Total Time (min): 48 min    Billable Minutes:Evaluation 24  Therapeutic Activity 24    Overlap with PT for portions of session due to complex nature of patient, tolerance for therapeutic modalities, and safety with mobility to decrease fall risk for patient and caregiver injury requiring two skilled therapists to provide interventions.    2/25/2024

## 2024-02-26 NOTE — CONSULTS
[facility] - Med Surg ([address])  Wound Care    Patient Name:  Michael Johnson Jr.   MRN:  [id number]  Date: 2/26/2024  Diagnosis: Alcohol abuse with withdrawal    History:     Past Medical History:   Diagnosis Date    Afib     Alcohol abuse with other alcohol-induced disorder 04/02/2019    Anemia 04/02/2019    unspecified deficiency    Ankle fracture, left     Aortic atherosclerosis 2/8/2024    Arthritis     Asthma     Cholecystitis 1/15/2018    Chronic kidney disease (CKD), stage III (moderate) 04/02/2019    Depression     Erectile dysfunction 04/02/2019    Gout, arthropathy 04/02/2019    Gout, unspecified     Hypertension     Hypertensive chronic kidney disease 04/02/2019    Hypomagnesemia 04/02/2019    Noncompliance 04/02/2019    Peripheral neuropathy 04/02/2019    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    Secondary hyperparathyroidism, renal 04/02/2019    Seizure 2016    Spastic hemiplegia affecting left nondominant side 04/02/2019       Social History     Socioeconomic History    Marital status:    Tobacco Use    Smoking status: Former    Smokeless tobacco: Never   Substance and Sexual Activity    Alcohol use: Not Currently    Drug use: No    Sexual activity: Not Currently     Social Determinants of Health     Financial Resource Strain: Low Risk  (2/8/2024)    Overall Financial Resource Strain (CARDIA)     Difficulty of Paying Living Expenses: Not hard at all   Food Insecurity: No Food Insecurity (2/8/2024)    Hunger Vital Sign     Worried About Running Out of Food in the Last Year: Never true     Ran Out of Food in the Last Year: Never true   Transportation Needs: No Transportation Needs (2/8/2024)    PRAPARE - Transportation     Lack of Transportation (Medical): No     Lack of Transportation (Non-Medical): No   Physical Activity: Inactive (2/8/2024)    Exercise Vital Sign     Days of Exercise per Week: 0 days     Minutes of Exercise per Session: 0 min   Stress: Stress Concern Present (2/8/2024)    [employer]  [employer] Occupational [employer] - Occupational Stress Questionnaire     Feeling of Stress : Very much   Social Connections: Socially Isolated (2/8/2024)    Social Connection and Isolation Panel [NHANES]     Frequency of Communication with Friends and Family: Once a week     Frequency of Social Gatherings with Friends and Family: Never     Attends Synagogue Services: Never     Active Member of Clubs or Organizations: No     Attends Club or Organization Meetings: Never     Marital Status:    Housing Stability: Low Risk  (2/8/2024)    Housing Stability Vital Sign     Unable to Pay for Housing in the Last Year: No     Number of Places Lived in the Last Year: 1     Unstable Housing in the Last Year: No     WOC Assessment Details:        02/26/24 0954        Altered Skin Integrity 02/25/24 0700 Left anterior Knee #1 Traumatic   Date First Assessed/Time First Assessed: 02/25/24 0700   Altered Skin Integrity Present on Admission - Did Patient arrive to the hospital with altered skin?: yes  Side: Left  Orientation: anterior  Location: Knee  Wound Number: #1  Is this injury shahriar...   Wound Image    Drainage Amount None   Appearance Dry;Fibrin   Tissue loss description Not applicable   Dressing Change Due 02/26/24 02/26/24 0954        Altered Skin Integrity 02/25/24 0700 Right anterior Knee #2 Traumatic   Date First Assessed/Time First Assessed: 02/25/24 0700   Altered Skin Integrity Present on Admission - Did Patient arrive to the hospital with altered skin?: yes  Side: Right  Orientation: anterior  Location: Knee  Wound Number: #2  Is this injury dev...   Wound Image    Drainage Amount Small   Drainage Characteristics/Odor No odor;Bleeding controlled;Other (see comments)  (white, soft, chalk)   Appearance Pink;White;Moist   Tissue loss description Full thickness   Periwound Area Dry;Swelling;Redness;Scar tissue   Wound Edges Open   Care Cleansed with:;Wound cleanser   Dressing Applied;Other (comment)  (Hydrophilic Paste  - [employer])   Dressing Change Due 02/27/24 02/26/24 0954        Altered Skin Integrity 02/25/24 0700 Right Hand   Date First Assessed/Time First Assessed: 02/25/24 0700   Altered Skin Integrity Present on Admission - Did Patient arrive to the hospital with altered skin?: yes  Side: Right  Location: Hand  Is this injury device related?: No   Wound Image    Drainage Amount Small   Drainage Characteristics/Odor Malodorous   Appearance Dry;Fibrin   Periwound Area Moist;Macerated   Care Cleansed with:;Wound cleanser   Dressing Applied;Other (comment)  (Hydrophilic Paste - [employer])   Dressing Change Due 02/27/24               Moisturizer recommended, ordered, and applied.    02/26/2024

## 2024-02-27 LAB
ALBUMIN SERPL BCP-MCNC: 2.3 G/DL (ref 3.5–5.2)
ALP SERPL-CCNC: 95 U/L (ref 55–135)
ALT SERPL W/O P-5'-P-CCNC: 27 U/L (ref 10–44)
ANION GAP SERPL CALC-SCNC: 7 MMOL/L (ref 8–16)
AST SERPL-CCNC: 41 U/L (ref 10–40)
BILIRUB SERPL-MCNC: 1.1 MG/DL (ref 0.1–1)
BUN SERPL-MCNC: 11 MG/DL (ref 8–23)
CALCIUM SERPL-MCNC: 7.5 MG/DL (ref 8.7–10.5)
CHLORIDE SERPL-SCNC: 103 MMOL/L (ref 95–110)
CO2 SERPL-SCNC: 24 MMOL/L (ref 23–29)
CREAT SERPL-MCNC: 1.2 MG/DL (ref 0.5–1.4)
EST. GFR  (NO RACE VARIABLE): >60 ML/MIN/1.73 M^2
GLUCOSE SERPL-MCNC: 82 MG/DL (ref 70–110)
MAGNESIUM SERPL-MCNC: 1 MG/DL (ref 1.6–2.6)
POCT GLUCOSE: 100 MG/DL (ref 70–110)
POCT GLUCOSE: 103 MG/DL (ref 70–110)
POCT GLUCOSE: 126 MG/DL (ref 70–110)
POCT GLUCOSE: 88 MG/DL (ref 70–110)
POTASSIUM SERPL-SCNC: 3.3 MMOL/L (ref 3.5–5.1)
PROT SERPL-MCNC: 6.5 G/DL (ref 6–8.4)
SODIUM SERPL-SCNC: 134 MMOL/L (ref 136–145)

## 2024-02-27 PROCEDURE — 21400001 HC TELEMETRY ROOM: Mod: HCNC

## 2024-02-27 PROCEDURE — 80053 COMPREHEN METABOLIC PANEL: CPT | Mod: HCNC | Performed by: NURSE PRACTITIONER

## 2024-02-27 PROCEDURE — 63600175 PHARM REV CODE 636 W HCPCS: Mod: HCNC | Performed by: NURSE PRACTITIONER

## 2024-02-27 PROCEDURE — 25500020 PHARM REV CODE 255: Mod: HCNC | Performed by: STUDENT IN AN ORGANIZED HEALTH CARE EDUCATION/TRAINING PROGRAM

## 2024-02-27 PROCEDURE — C9113 INJ PANTOPRAZOLE SODIUM, VIA: HCPCS | Mod: HCNC | Performed by: NURSE PRACTITIONER

## 2024-02-27 PROCEDURE — 36415 COLL VENOUS BLD VENIPUNCTURE: CPT | Mod: HCNC,XB | Performed by: PHYSICIAN ASSISTANT

## 2024-02-27 PROCEDURE — 36415 COLL VENOUS BLD VENIPUNCTURE: CPT | Mod: HCNC | Performed by: STUDENT IN AN ORGANIZED HEALTH CARE EDUCATION/TRAINING PROGRAM

## 2024-02-27 PROCEDURE — 25000003 PHARM REV CODE 250: Mod: HCNC | Performed by: PHYSICIAN ASSISTANT

## 2024-02-27 PROCEDURE — 25000003 PHARM REV CODE 250: Mod: HCNC | Performed by: HOSPITALIST

## 2024-02-27 PROCEDURE — 83735 ASSAY OF MAGNESIUM: CPT | Mod: HCNC | Performed by: STUDENT IN AN ORGANIZED HEALTH CARE EDUCATION/TRAINING PROGRAM

## 2024-02-27 PROCEDURE — 25000003 PHARM REV CODE 250: Mod: HCNC | Performed by: NURSE PRACTITIONER

## 2024-02-27 PROCEDURE — 94761 N-INVAS EAR/PLS OXIMETRY MLT: CPT | Mod: HCNC

## 2024-02-27 PROCEDURE — 92526 ORAL FUNCTION THERAPY: CPT | Mod: HCNC

## 2024-02-27 PROCEDURE — 63600175 PHARM REV CODE 636 W HCPCS: Mod: HCNC | Performed by: PHYSICIAN ASSISTANT

## 2024-02-27 PROCEDURE — 87040 BLOOD CULTURE FOR BACTERIA: CPT | Mod: 59,HCNC | Performed by: PHYSICIAN ASSISTANT

## 2024-02-27 RX ORDER — POTASSIUM CHLORIDE 20 MEQ/1
40 TABLET, EXTENDED RELEASE ORAL ONCE
Status: COMPLETED | OUTPATIENT
Start: 2024-02-27 | End: 2024-02-27

## 2024-02-27 RX ORDER — ACETAMINOPHEN 325 MG/1
650 TABLET ORAL EVERY 6 HOURS PRN
Status: DISCONTINUED | OUTPATIENT
Start: 2024-02-27 | End: 2024-03-07 | Stop reason: HOSPADM

## 2024-02-27 RX ORDER — MAGNESIUM SULFATE HEPTAHYDRATE 40 MG/ML
2 INJECTION, SOLUTION INTRAVENOUS ONCE
Status: COMPLETED | OUTPATIENT
Start: 2024-02-27 | End: 2024-02-27

## 2024-02-27 RX ADMIN — ENOXAPARIN SODIUM 40 MG: 40 INJECTION SUBCUTANEOUS at 05:02

## 2024-02-27 RX ADMIN — IOHEXOL 100 ML: 350 INJECTION, SOLUTION INTRAVENOUS at 11:02

## 2024-02-27 RX ADMIN — LEVETIRACETAM 500 MG: 500 INJECTION, SOLUTION INTRAVENOUS at 09:02

## 2024-02-27 RX ADMIN — AMMONIUM LACTATE: 120 LOTION TOPICAL at 09:02

## 2024-02-27 RX ADMIN — ACETAMINOPHEN 650 MG: 325 TABLET, FILM COATED ORAL at 09:02

## 2024-02-27 RX ADMIN — MAGNESIUM SULFATE HEPTAHYDRATE 2 G: 40 INJECTION, SOLUTION INTRAVENOUS at 12:02

## 2024-02-27 RX ADMIN — PANTOPRAZOLE SODIUM 40 MG: 40 INJECTION, POWDER, LYOPHILIZED, FOR SOLUTION INTRAVENOUS at 09:02

## 2024-02-27 RX ADMIN — METOROPROLOL TARTRATE 2.5 MG: 5 INJECTION, SOLUTION INTRAVENOUS at 12:02

## 2024-02-27 RX ADMIN — METOROPROLOL TARTRATE 2.5 MG: 5 INJECTION, SOLUTION INTRAVENOUS at 06:02

## 2024-02-27 RX ADMIN — CEFTRIAXONE SODIUM 1 G: 1 INJECTION, POWDER, FOR SOLUTION INTRAMUSCULAR; INTRAVENOUS at 12:02

## 2024-02-27 RX ADMIN — ACETAMINOPHEN 650 MG: 325 TABLET, FILM COATED ORAL at 12:02

## 2024-02-27 RX ADMIN — METOROPROLOL TARTRATE 2.5 MG: 5 INJECTION, SOLUTION INTRAVENOUS at 05:02

## 2024-02-27 RX ADMIN — AMMONIUM LACTATE: 120 LOTION TOPICAL at 08:02

## 2024-02-27 RX ADMIN — POTASSIUM CHLORIDE 40 MEQ: 1500 TABLET, EXTENDED RELEASE ORAL at 12:02

## 2024-02-27 RX ADMIN — FLUTICASONE PROPIONATE 100 MCG: 50 SPRAY, METERED NASAL at 09:02

## 2024-02-27 RX ADMIN — LEVETIRACETAM 500 MG: 500 INJECTION, SOLUTION INTRAVENOUS at 08:02

## 2024-02-27 NOTE — ASSESSMENT & PLAN NOTE
- CT with findings concerning for pharyngitis and possibly retained secretions. Also noted chronic appearing opacification of the left maxillary sinus with chronic osteitis of the sinus walls. Appears chronic dating back to 3/2017 per read.   - Outpatient referral to Otolaryngology   - Amoxicillin changed to ceftriaxone   - Began having fevers 2/26 as high as 101.3  Repeat CT neck without abscess formation, Bcx ordered  CXR and UA ordered

## 2024-02-27 NOTE — PROGRESS NOTES
[facility] [address] Medicine  Progress Note    Patient Name: Mihcael Johnson Jr.  MRN: [id number]  Patient Class: IP- Inpatient   Admission Date: 2/24/2024  Length of Stay: 2 days  Attending Physician: Boubacar Greer MD  Primary Care Provider: Amelia Lai MD        Subjective:     Principal Problem:Alcohol abuse with withdrawal        HPI:  63 year old male with Hx of Hypertension, Seizure disorders, spastic left-sided hemiplegia, Anemia, Arthrititis, Depression, CKD stage 3, Alcohol use disorder and Gout, who presents to the ED with complaints of nausea/vomiting and abdominal pain for the past 2 days. Patient is a poor historian; most history provided by patients wife. For the past 2 days patient has not been eating. Started to feel nauseated with associated NBNB emesis and weakness. Reports that his sore throat has been hurting him since yesterday and noticed he has been shaking since yesterday. He reports that he had quit drinking for a long time; however, per patients wife he has been drinking daily for several years and in the past few weeks it has been 1/2 pint of vodka every 2 days. Patient denies any fever, chest pain, palpitaitons, dyspnea, changes in bowel or urinary habits. He does report feeling of cramping in his upper abdomen, non-radiating. Last episode of emesis was here in the ER. States he takes his medication but not everyday. Denies any illicit drug use or smoking. 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 Denies any sick contacts.     Overview/Hospital Course:  Mr Rodriguez admitted for ETOH withdrawal and neck swelling. He reports that he has not had an alcoholic beverage in months. He is a poor historian and has different time frames than his wife and daughter. 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 He was vomiting the past few days but none since arrival. Given MVI, thiamine, folic acid.      On arrival nurse reported neck swelling, CT neck shows fullness of in the hypopharynx, involving posterior hypopharynx, concerned for pharyngitis. No drainable fluid collection. Started on IV CTX. Fevers began again 2/26. Repeat CT scan ordered to evaluate for abscess formation, none identified. CXR and UA ordered    Interval History: Fevers started last afternoon through today, repeat CT neck to eval for abscess formation, none seen. CXR and UA pending  SLP upgrading diet  Pt denies dysuria, diarrhea, productive cough    Review of Systems   Constitutional:  Negative for activity change, appetite change and fatigue.   HENT:  Positive for trouble swallowing.    Gastrointestinal:  Negative for abdominal pain (chronic burning in abdomen/some int epigastric pain), constipation and vomiting.   Genitourinary:  Negative for difficulty urinating.   Musculoskeletal:  Positive for arthralgias and myalgias.   Neurological:  Positive for weakness.        Left sided spacticity since birth   Psychiatric/Behavioral:  Negative for agitation.      Objective:     Vital Signs (Most Recent):  Temp: 97.9 °F (36.6 °C) (02/27/24 1127)  Pulse: 109 (02/27/24 1127)  Resp: 18 (02/27/24 1127)  BP: 133/75 (02/27/24 1127)  SpO2: 99 % (02/27/24 1127) Vital Signs (24h Range):  Temp:  [97.9 °F (36.6 °C)-101.3 °F (38.5 °C)] 97.9 °F (36.6 °C)  Pulse:  [] 109  Resp:  [15-18] 18  SpO2:  [96 %-99 %] 99 %  BP: (117-143)/(75-90) 133/75     Weight: 82 kg (180 lb 12.4 oz)  Body mass index is 24.52 kg/m².    Intake/Output Summary (Last 24 hours) at 2/27/2024 1415  Last data filed at 2/27/2024 0635  Gross per 24 hour   Intake 1936.71 ml   Output 1300 ml   Net 636.71 ml         Physical Exam  Vitals reviewed.   HENT:      Head: Normocephalic.      Mouth/Throat:      Mouth: Mucous membranes are moist.   Eyes:      Extraocular Movements: Extraocular movements intact.   Cardiovascular:      Rate and Rhythm: Normal rate.   Pulmonary:      Effort:  Pulmonary effort is normal.   Abdominal:      Palpations: Abdomen is soft.      Tenderness: There is no abdominal tenderness.   Musculoskeletal:         General: Tenderness and deformity present.   Skin:     Findings: Lesion (right 4th finger-he says old gout infection) present.   Neurological:      Mental Status: He is alert.      Motor: Abnormal muscle tone present.   Psychiatric:         Behavior: Behavior is cooperative.         Cognition and Memory: He exhibits impaired remote memory.             Significant Labs: All pertinent labs within the past 24 hours have been reviewed.    Significant Imaging: I have reviewed all pertinent imaging results/findings within the past 24 hours.    Assessment/Plan:      * Alcohol abuse with withdrawal  - Per patients wife, he used to drink about 1 bottle of vodka daily for several years; however, in the past few days he has been having 1/2 pint of vodka every other day. Last known drink 2 days ago. Patient denies recent ETOH use.   - Start CIWA protocol  - scheduled fixed valium taper  - Ativan PRN per CIWA  - Zofran PRN for nausea   - IV thiamine; B1 pending  - Ethanol level ordered  - Seizure, Aspiration and Fall precautions    Pharyngitis  - CT with findings concerning for pharyngitis and possibly retained secretions. Also noted chronic appearing opacification of the left maxillary sinus with chronic osteitis of the sinus walls. Appears chronic dating back to 3/2017 per read.   - Outpatient referral to Otolaryngology   - Amoxicillin changed to ceftriaxone   - Began having fevers 2/26 as high as 101.3  Repeat CT neck without abscess formation, Bcx ordered  CXR and UA ordered    Falls frequently  Chronic problem.  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 Also has h/o ETOH dependence. Thiamine level pending. IV replaced ordered.  PT/OT consulted. OP neurology referral placed. After talking with wife, patient is bedbound at baseline and has not walked in about  three years.  Reason unknown.      Electrolyte disturbance  - Patient with Hypomagnesemia, Hypokalemia, hypocalcemia and Hypophosphatemia  - Correct electrolytes and monitor  - corrected calcium 8.7  - Telemetry monitoring for now    Paroxysmal atrial fibrillation  Patient with Paroxysmal (<7 days) atrial fibrillation which is controlled currently with Beta Blocker. Patient is currently in sinus rhythm.[id number] Score: 1  - Had an episode of Atrial flutter (09/2023)  - Not compliant with Home Eliquis or BB   - Echocardiogram (9/2023): Preserved EF 50-55%  - Monitor electrolytes and Maintain Mg >2 and K >4  - holding off on full anticoagulation for fall rsk    Essential hypertension  Chronic, uncontrolled. However, may be also elevated currently in the setting of withdrawal  Latest blood pressure and vitals reviewed-     Temp:  [98.5 °F (36.9 °C)-99.7 °F (37.6 °C)]   Pulse:  [102-130]   Resp:  [15-24]   BP: (126-151)/(80-85)   SpO2:  [98 %-100 %] .   Home meds for hypertension were reviewed and noted below.   Hypertension Medications               metoprolol tartrate (LOPRESSOR) 50 MG tablet TAKE 1 TABLET TWICE DAILY          While in the hospital, will manage blood pressure as follows; Continue home antihypertensive regimen    Will utilize p.r.n. blood pressure medication only if patient's blood pressure greater than 160/100 and he develops symptoms such as worsening chest pain or shortness of breath.    GERD (gastroesophageal reflux disease)  - Protonix 40 mg, daily      Seizure  - Noted in history. Last seizure several years ago ~ 2019. Unclear etiology may have been related to withdrawal  - Continue with Keppra  - Follow up outpatient with Neurology; given this was a one time occurrence in likely the above setting  - reports born with epilepsy    Hypokalemia  Patient has hypokalemia which is Acute and currently uncontrolled. Most recent potassium levels reviewed-   Lab Results   Component Value Date    K 3.3 (L)  02/24/2024   . Will continue potassium replacement per protocol and recheck repeat levels after replacement completed.       VTE Risk Mitigation (From admission, onward)           Ordered     enoxaparin injection 40 mg  Every 24 hours         02/25/24 1305     IP VTE LOW RISK PATIENT  Once         02/24/24 1605     Place sequential compression device  Until discontinued         02/24/24 1605                    Discharge Planning   SIMONE:      Code Status: Full Code   Is the patient medically ready for discharge?:     Reason for patient still in hospital (select all that apply): Patient trending condition  Discharge Plan A: Home with family, Home                  Batsheva Sales PA-C  Department of Hospital Medicine   [facility] ([address])

## 2024-02-27 NOTE — PLAN OF CARE
Sw spoke with patient and family regarding hospital bed and lift. Patient and family refused DME. Patient and family are agreeable to home healthcare.

## 2024-02-27 NOTE — PT/OT/SLP PROGRESS
Speech Language Pathology Treatment    Patient Name:  Michael Johnson Jr.   MRN:  [id number]  Admitting Diagnosis: Alcohol abuse with withdrawal    Recommendations:     General Recommendations: SLP to continue to follow 3-5x/week for diet tolerance and ongoing assessment of oropharyngeal swallow function and cognitive-linguistic status.   Diet Recommendations:  Minced & Moist Diet - IDDSI Level 5, Liquid Diet Level: Thin liquids - IDDSI Level 0   Aspiration Precautions:   Assistance with meals  Feed only when awake/alert   HOB to 90 degrees for all PO intake; remain upright 30 minutes post meal   1 bite/sip at a time and small bites/sips  Eliminate distractions; avoid talking while eating   General Precautions: Standard, aspiration  Communication Strategies: pt reports wears eye glasses but none present at bedside or in room-- provide repetition of information and orientation as needed.     Assessment:     Michael Johnson Jr. is a 63 y.o. male with an SLP diagnosis of dysphagia 2/2 throat swelling and excessive vomiting. Pt's symptoms appear to be improving and pt is able to consume a full liquid diet without overt s/s of aspiration or airway threat; however, SLP to continue to assess diet tolerance as minced and moist solids are initiated.     Subjective     RN provided clearance for pt to be seen-- pt sitting in bed, wife present at bedside for entirety of session.     Pain/Comfort: none reported this date-- pt denied feelings of discomfort with eating/swallowing solids this date.     Respiratory Status: Room air    Objective:     Cognitive-Linguistic Status: in today's session, pt...   Awake and alert throughout entirety of session   Oriented to person-- stating full name and wife's name  Oriented to place, month, and yr with 100% acc-- not oriented to exact date  pt unable to see whiteboard in room d/t distance and not having eye glasses-- dicussed using cell phone to increase orientation to exact date; pt verbalized  understanding and agreement   Able to follow verbal commands without difficulties   Verbalized wants and needs, answered open-ended questions independently   Engaged in conversation with SLP re: reason for hospitalization, brief vocational history, preferred foods, and mealtimes at home     Ongoing Bedside Swallow Eval  Consistencies Assessed:   Thin Liquids: ~2 ounces of ice water via singular and consecutive strawsips   Puree: 5 tsp of strawberry yogurt   Solids: 3 bites of moisés cracker     Oral Phase:   Able to siphon liquids via straw without anterior loss   Able to retreive pureed bolus from spoon without difficulty   Lip seal, a-p transport, and ability to form cohesive bolus appear WNL  Min oral residuals noted after the swallow of moisés cracker-- cleared with liquid wash      Pharyngeal Phase:   Trigger of pharyngeal swallow appears to be WFL  No overt s/s of airway threat or aspiration during intake of liquids, purees, or small amount of solids  No coughing, throat clearing or change in vocal quality observed with PO intake-- pt reports some coughing throughout day     Education: SLP educated pt and pt spouse re: therapist's role and purpose of skilled services. Reviewed safe swallow strategies/ aspiration precautions and discussed importance of use. Discussed change in diet and for SLP to continue to follow-- pt and spouse verbalized understanding and agreement of all discussed.     SLP communicated with PA and RN via secure chat immediatly following session-- discussed diet upgrade.     Goals:   Multidisciplinary Problems       SLP Goals          Problem: SLP    Goal Priority Disciplines Outcome   SLP Goal     SLP Ongoing, Progressing   Description: 1. Pt will be able to consume a full liquid diet without overt s/s of airway threat or aspiration given min cues to monitor rate and volume of intake.-- MET 2/27/24  2. Ongoing assessment of diet upgrade as pt tolerates.-- ongoing   3. Pt will tolerate a  minced and moist diet with thin liquids without any overt s/s of aspiration or airway threat given min cues from clinician.   4. Pt and caregivers will demonstrate understanding and use of safe swallow strategies/ aspiration precautions in 100% of opportunities given min cues from clinician.                      Plan:     Patient to be seen:  3 x/week, 5 x/week   Plan of Care expires:  03/10/24  Plan of Care reviewed with:  patient, spouse (RN and PA)   SLP Follow-Up:  Yes       Discharge Recommendations:   (SLP at next level of care)   Barriers to Discharge:  Level of Skilled Assistance Needed      Time Tracking:     SLP Treatment Date:   02/27/24  Speech Start Time:  1010  Speech Stop Time:  1028     Speech Total Time (min):  18 min    Billable Minutes: Treatment Swallowing Dysfunction 18    02/27/2024

## 2024-02-27 NOTE — PLAN OF CARE
POC updated this date re: advance in diet and goals updated. SLP recommends minced and moist diet with thin liquids-- will continue to follow 3-5x/week for diet tolerance and ongoing assessment of oropharyngeal swallow function.     Problem: SLP  Goal: SLP Goal  Description: 1. Pt will be able to consume a full liquid diet without overt s/s of airway threat or aspiration given min cues to monitor rate and volume of intake.-- MET 2/27/24  2. Ongoing assessment of diet upgrade as pt tolerates.-- ongoing   3. Pt will tolerate a minced and moist diet with thin liquids without any overt s/s of aspiration or airway threat given min cues from clinician.   4. Pt and caregivers will demonstrate understanding and use of safe swallow strategies/ aspiration precautions in 100% of opportunities given min cues from clinician.   Outcome: Ongoing, Progressing

## 2024-02-27 NOTE — PLAN OF CARE
Problem: Adult Inpatient Plan of Care  Goal: Plan of Care Review  Outcome: Ongoing, Progressing  Goal: Patient-Specific Goal (Individualized)  Outcome: Ongoing, Progressing  Goal: Absence of Hospital-Acquired Illness or Injury  Outcome: Ongoing, Progressing  Goal: Optimal Comfort and Wellbeing  Outcome: Ongoing, Progressing     Problem: Skin Injury Risk Increased  Goal: Skin Health and Integrity  Outcome: Ongoing, Progressing     Problem: Impaired Wound Healing  Goal: Optimal Wound Healing  Outcome: Ongoing, Progressing     POC reviewed with patient and wife. All questions and concerns addressed. Fall/safety precautions implemented and maintained. Condom catheter care performed. Blood glucose monitored. No acute events noted this shift. Please see flowsheet for full assessment and vitals. Bed locked in lowest position. Side rails up x2. Call bell within reach.

## 2024-02-28 LAB
ANION GAP SERPL CALC-SCNC: 9 MMOL/L (ref 8–16)
BASOPHILS # BLD AUTO: 0.01 K/UL (ref 0–0.2)
BASOPHILS NFR BLD: 0.1 % (ref 0–1.9)
BILIRUB UR QL STRIP: NEGATIVE
BUN SERPL-MCNC: 11 MG/DL (ref 8–23)
CALCIUM SERPL-MCNC: 7.9 MG/DL (ref 8.7–10.5)
CHLORIDE SERPL-SCNC: 103 MMOL/L (ref 95–110)
CLARITY UR: CLEAR
CLINICAL BIOCHEMIST REVIEW: NORMAL
CO2 SERPL-SCNC: 20 MMOL/L (ref 23–29)
COLOR UR: YELLOW
CREAT SERPL-MCNC: 1.3 MG/DL (ref 0.5–1.4)
DIFFERENTIAL METHOD BLD: ABNORMAL
EOSINOPHIL # BLD AUTO: 0.2 K/UL (ref 0–0.5)
EOSINOPHIL NFR BLD: 2 % (ref 0–8)
ERYTHROCYTE [DISTWIDTH] IN BLOOD BY AUTOMATED COUNT: 14 % (ref 11.5–14.5)
EST. GFR  (NO RACE VARIABLE): >60 ML/MIN/1.73 M^2
FERRITIN SERPL-MCNC: 1492 NG/ML (ref 20–300)
FOLATE SERPL-MCNC: 5.9 NG/ML (ref 4–24)
GLUCOSE SERPL-MCNC: 99 MG/DL (ref 70–110)
GLUCOSE UR QL STRIP: NEGATIVE
HCT VFR BLD AUTO: 24.2 % (ref 40–54)
HGB BLD-MCNC: 7.9 G/DL (ref 14–18)
HGB UR QL STRIP: NEGATIVE
IMM GRANULOCYTES # BLD AUTO: 0.06 K/UL (ref 0–0.04)
IMM GRANULOCYTES NFR BLD AUTO: 0.7 % (ref 0–0.5)
IRON SERPL-MCNC: 11 UG/DL (ref 45–160)
KETONES UR QL STRIP: NEGATIVE
LEUKOCYTE ESTERASE UR QL STRIP: NEGATIVE
LYMPHOCYTES # BLD AUTO: 0.6 K/UL (ref 1–4.8)
LYMPHOCYTES NFR BLD: 7.4 % (ref 18–48)
MAGNESIUM SERPL-MCNC: 1.2 MG/DL (ref 1.6–2.6)
MCH RBC QN AUTO: 37.8 PG (ref 27–31)
MCHC RBC AUTO-ENTMCNC: 32.6 G/DL (ref 32–36)
MCV RBC AUTO: 116 FL (ref 82–98)
MONOCYTES # BLD AUTO: 1.1 K/UL (ref 0.3–1)
MONOCYTES NFR BLD: 12.8 % (ref 4–15)
NEUTROPHILS # BLD AUTO: 6.7 K/UL (ref 1.8–7.7)
NEUTROPHILS NFR BLD: 77 % (ref 38–73)
NITRITE UR QL STRIP: NEGATIVE
NRBC BLD-RTO: 0 /100 WBC
PH UR STRIP: 8 [PH] (ref 5–8)
PLATELET # BLD AUTO: 97 K/UL (ref 150–450)
PLPETH BLD-MCNC: 854 NG/ML
PMV BLD AUTO: 12.7 FL (ref 9.2–12.9)
POCT GLUCOSE: 103 MG/DL (ref 70–110)
POCT GLUCOSE: 105 MG/DL (ref 70–110)
POCT GLUCOSE: 106 MG/DL (ref 70–110)
POCT GLUCOSE: 119 MG/DL (ref 70–110)
POCT GLUCOSE: 95 MG/DL (ref 70–110)
POPETH BLD-MCNC: 1255 NG/ML
POTASSIUM SERPL-SCNC: 3.9 MMOL/L (ref 3.5–5.1)
PROT UR QL STRIP: ABNORMAL
RBC # BLD AUTO: 2.09 M/UL (ref 4.6–6.2)
SATURATED IRON: 6 % (ref 20–50)
SODIUM SERPL-SCNC: 132 MMOL/L (ref 136–145)
SP GR UR STRIP: 1.01 (ref 1–1.03)
TOTAL IRON BINDING CAPACITY: 191 UG/DL (ref 250–450)
TRANSFERRIN SERPL-MCNC: 129 MG/DL (ref 200–375)
URN SPEC COLLECT METH UR: ABNORMAL
UROBILINOGEN UR STRIP-ACNC: NEGATIVE EU/DL
WBC # BLD AUTO: 8.68 K/UL (ref 3.9–12.7)

## 2024-02-28 PROCEDURE — 80048 BASIC METABOLIC PNL TOTAL CA: CPT | Mod: HCNC | Performed by: PHYSICIAN ASSISTANT

## 2024-02-28 PROCEDURE — 63600175 PHARM REV CODE 636 W HCPCS: Mod: HCNC | Performed by: PHYSICIAN ASSISTANT

## 2024-02-28 PROCEDURE — 21400001 HC TELEMETRY ROOM: Mod: HCNC

## 2024-02-28 PROCEDURE — 82728 ASSAY OF FERRITIN: CPT | Mod: HCNC | Performed by: PHYSICIAN ASSISTANT

## 2024-02-28 PROCEDURE — 82746 ASSAY OF FOLIC ACID SERUM: CPT | Mod: HCNC | Performed by: PHYSICIAN ASSISTANT

## 2024-02-28 PROCEDURE — 83540 ASSAY OF IRON: CPT | Mod: HCNC | Performed by: PHYSICIAN ASSISTANT

## 2024-02-28 PROCEDURE — 36415 COLL VENOUS BLD VENIPUNCTURE: CPT | Mod: HCNC,XB | Performed by: PHYSICIAN ASSISTANT

## 2024-02-28 PROCEDURE — 94761 N-INVAS EAR/PLS OXIMETRY MLT: CPT | Mod: HCNC

## 2024-02-28 PROCEDURE — 25000003 PHARM REV CODE 250: Mod: HCNC | Performed by: HOSPITALIST

## 2024-02-28 PROCEDURE — 36415 COLL VENOUS BLD VENIPUNCTURE: CPT | Mod: HCNC | Performed by: STUDENT IN AN ORGANIZED HEALTH CARE EDUCATION/TRAINING PROGRAM

## 2024-02-28 PROCEDURE — 25000003 PHARM REV CODE 250: Mod: HCNC | Performed by: NURSE PRACTITIONER

## 2024-02-28 PROCEDURE — 83735 ASSAY OF MAGNESIUM: CPT | Mod: HCNC | Performed by: STUDENT IN AN ORGANIZED HEALTH CARE EDUCATION/TRAINING PROGRAM

## 2024-02-28 PROCEDURE — 63600175 PHARM REV CODE 636 W HCPCS: Mod: HCNC | Performed by: NURSE PRACTITIONER

## 2024-02-28 PROCEDURE — 25000003 PHARM REV CODE 250: Mod: HCNC | Performed by: PHYSICIAN ASSISTANT

## 2024-02-28 PROCEDURE — C9113 INJ PANTOPRAZOLE SODIUM, VIA: HCPCS | Mod: HCNC | Performed by: NURSE PRACTITIONER

## 2024-02-28 PROCEDURE — 81003 URINALYSIS AUTO W/O SCOPE: CPT | Mod: HCNC | Performed by: PHYSICIAN ASSISTANT

## 2024-02-28 PROCEDURE — 85025 COMPLETE CBC W/AUTO DIFF WBC: CPT | Mod: HCNC | Performed by: PHYSICIAN ASSISTANT

## 2024-02-28 RX ORDER — MAGNESIUM SULFATE HEPTAHYDRATE 40 MG/ML
2 INJECTION, SOLUTION INTRAVENOUS ONCE
Status: COMPLETED | OUTPATIENT
Start: 2024-02-28 | End: 2024-02-28

## 2024-02-28 RX ORDER — GABAPENTIN 400 MG/1
400 CAPSULE ORAL 2 TIMES DAILY
Status: DISCONTINUED | OUTPATIENT
Start: 2024-02-28 | End: 2024-03-07 | Stop reason: HOSPADM

## 2024-02-28 RX ORDER — FLUTICASONE PROPIONATE 50 MCG
1 SPRAY, SUSPENSION (ML) NASAL DAILY
Status: DISCONTINUED | OUTPATIENT
Start: 2024-02-29 | End: 2024-02-28

## 2024-02-28 RX ORDER — PANTOPRAZOLE SODIUM 40 MG/1
40 TABLET, DELAYED RELEASE ORAL DAILY
Status: DISCONTINUED | OUTPATIENT
Start: 2024-02-28 | End: 2024-03-04

## 2024-02-28 RX ORDER — LEVETIRACETAM 500 MG/1
500 TABLET ORAL 2 TIMES DAILY
Status: DISCONTINUED | OUTPATIENT
Start: 2024-02-28 | End: 2024-03-07 | Stop reason: HOSPADM

## 2024-02-28 RX ORDER — GUAIFENESIN 600 MG/1
1200 TABLET, EXTENDED RELEASE ORAL 2 TIMES DAILY
Status: DISCONTINUED | OUTPATIENT
Start: 2024-02-28 | End: 2024-03-07 | Stop reason: HOSPADM

## 2024-02-28 RX ORDER — BENZONATATE 100 MG/1
100 CAPSULE ORAL 3 TIMES DAILY PRN
Status: DISCONTINUED | OUTPATIENT
Start: 2024-02-28 | End: 2024-03-07 | Stop reason: HOSPADM

## 2024-02-28 RX ADMIN — METOROPROLOL TARTRATE 2.5 MG: 5 INJECTION, SOLUTION INTRAVENOUS at 12:02

## 2024-02-28 RX ADMIN — FLUTICASONE PROPIONATE 100 MCG: 50 SPRAY, METERED NASAL at 08:02

## 2024-02-28 RX ADMIN — GUAIFENESIN 1200 MG: 600 TABLET, EXTENDED RELEASE ORAL at 08:02

## 2024-02-28 RX ADMIN — METOROPROLOL TARTRATE 2.5 MG: 5 INJECTION, SOLUTION INTRAVENOUS at 05:02

## 2024-02-28 RX ADMIN — AMMONIUM LACTATE: 120 LOTION TOPICAL at 08:02

## 2024-02-28 RX ADMIN — APIXABAN 5 MG: 2.5 TABLET, FILM COATED ORAL at 08:02

## 2024-02-28 RX ADMIN — LEVETIRACETAM 500 MG: 500 INJECTION, SOLUTION INTRAVENOUS at 08:02

## 2024-02-28 RX ADMIN — BENZONATATE 100 MG: 100 CAPSULE ORAL at 05:02

## 2024-02-28 RX ADMIN — PANTOPRAZOLE SODIUM 40 MG: 40 INJECTION, POWDER, LYOPHILIZED, FOR SOLUTION INTRAVENOUS at 08:02

## 2024-02-28 RX ADMIN — CEFTRIAXONE SODIUM 1 G: 1 INJECTION, POWDER, FOR SOLUTION INTRAMUSCULAR; INTRAVENOUS at 01:02

## 2024-02-28 RX ADMIN — GABAPENTIN 400 MG: 400 CAPSULE ORAL at 08:02

## 2024-02-28 RX ADMIN — LEVETIRACETAM 500 MG: 500 TABLET, FILM COATED ORAL at 08:02

## 2024-02-28 RX ADMIN — MAGNESIUM SULFATE HEPTAHYDRATE 2 G: 40 INJECTION, SOLUTION INTRAVENOUS at 11:02

## 2024-02-28 RX ADMIN — GUAIFENESIN 1200 MG: 600 TABLET, EXTENDED RELEASE ORAL at 10:02

## 2024-02-28 NOTE — PROGRESS NOTES
[facility] ([address])  Adult Nutrition  Progress Note    SUMMARY       Recommendations  1. Encourage po intake of texture modified diet as tolerated  2. Continue Jesús BID and Boost BID to promote wound healing and nutritional intake   3. Monitor labs and weights   4. RD to monitor and follow up    Goals:   Patient to consume >75% of estimated energy needs prior to RD follow up.  Nutrition Goal Status: progressing towards goal  Communication of RD Recs:  (POC)    Assessment and Plan  Nutrition Problem  Inadequate nutrition     Related to (etiology):   Alcohol abuse      Signs and Symptoms (as evidenced by):   Decreased appetite   Altered skin integrity      Interventions (treatment strategy):  Commercial beverages  Collaboration with medical providers        Nutrition Diagnosis Status:   Continues       Malnutrition Assessment  Malnutrition Context: chronic illness  Malnutrition Level: moderate  Skin (Micronutrient):  (Dieudonne 12)   Micronutrient Evaluation Summary: suspected deficiency   Weight Loss (Malnutrition): greater than 10% in 6 months (14%)  Energy Intake (Malnutrition): less than 75% for greater than or equal to 1 month                         Reason for Assessment    Reason For Assessment: RD follow-up  Diagnosis:  (alcohol abuse with withdrawal)  Relevant Medical History:   Patient Active Problem List   Diagnosis    Chronic pain of left ankle    Gouty arthritis    Mixed hyperlipidemia    Hypokalemia    Seizure    GERD (gastroesophageal reflux disease)    Macrocytic anemia    Essential hypertension    Alcohol abuse with withdrawal    Debility    Paroxysmal atrial fibrillation    High anion gap metabolic acidosis    Vitamin D deficiency    Peripheral polyneuropathy    Spastic hemiplegia, unspecified etiology, unspecified laterality    Depression    Dysphagia    Lichenification    Aortic atherosclerosis    Electrolyte disturbance    Pharyngitis    Falls frequently     Interdisciplinary Rounds:  did not attend  General Information Comments: Pt diet adv to IDDSI 5 - minced and moist. Previously on full liquid diet, tolerated very little of liquid diet. Pt reports poor appetite and states d/t stress. Unreliable historian, pt states it is d/t work. Pt bed bound. No N/V or GI intolerance reported to RD. Reports tolerating diet advancement, with no difficulty chewing or swallowing, but reported coughing when talking. RN notified, pt requesting meds for cough. Pt reports weight loss. Commercial beverages added to diet order. Pt meets criteria for moderate malnutrition r/t inadequate oral intake aeb reported poor appetite, weight loss of >10% in 6 months, electrolytes abnormalities, debility,  and excessive ETOH use. RD to follow up with [facility] (did not assess at time of visit).  Nutrition Discharge Planning: Patient to continue on recommended oral diet post discharge    Nutrition Risk Screen    Nutrition Risk Screen: difficulty chewing/swallowing, reduced oral intake over the last month    Nutrition/Diet History    Spiritual, Cultural Beliefs, Yazdanism Practices, Values that Affect Care: no  Food Allergies: NKFA  Factors Affecting Nutritional Intake: impaired cognitive status/motor control, chewing difficulties/inability to chew food, difficulty/impaired swallowing, decreased appetite    Anthropometrics    Temp: (!) 101.1 °F (38.4 °C)  Height Method: Stated  Height: 6' (182.9 cm)  Height (inches): 72 in  Weight Method: Bed Scale  Weight: 82 kg (180 lb 12.4 oz)  Weight (lb): 180.78 lb  Ideal Body Weight (IBW), Male: 178 lb  % Ideal Body Weight, Male (lb): 101.56 %  BMI (Calculated): 24.5  BMI Grade: 18.5-24.9 - normal  Weight Loss: unintentional  Usual Body Weight (UBW), k.5 kg (2023)  % Usual Body Weight: 86.04  % Weight Change From Usual Weight: -14.14 %  Additional Documentation:  (spastic left side hemiplegia)    Lab/Procedures/Meds    Pertinent Labs Reviewed: reviewed  CBC:  Recent Labs   Lab  02/28/24  0539   WBC 8.68   HGB 7.9*   HCT 24.2*   PLT 97*     CMP:  Recent Labs   Lab 02/28/24  0921   CALCIUM 7.9*   *   K 3.9   CO2 20*      BUN 11   CREATININE 1.3     BMP:   Recent Labs   Lab 02/28/24  0539 02/28/24  0921   GLU  --  99   NA  --  132*   K  --  3.9   CL  --  103   CO2  --  20*   BUN  --  11   CREATININE  --  1.3   CALCIUM  --  7.9*   MG 1.2*  --        Pertinent Medications Reviewed: reviewed  Scheduled Meds:   ammonium lactate   Topical (Top) BID    cefTRIAXone (Rocephin) IV (PEDS and ADULTS)  1 g Intravenous Q24H    enoxparin  40 mg Subcutaneous Q24H (prophylaxis, 1700)    fluticasone propionate  2 spray Each Nostril Daily    guaiFENesin  1,200 mg Oral BID    levETIRAcetam (Keppra) IV (PEDS and ADULTS)  500 mg Intravenous Q12H    magnesium sulfate IVPB  2 g Intravenous Once    metoprolol  2.5 mg Intravenous Q6H    pantoprazole  40 mg Intravenous Daily     Continuous Infusions:  PRN Meds:.acetaminophen, benzonatate, lorazepam, melatonin, ondansetron    Estimated/Assessed Needs    Weight Used For Calorie Calculations: 82 kg (180 lb 12.4 oz)  Energy Calorie Requirements (kcal): 25-30kcals/kg (2050-2460kcals/day)  Energy Need Method: Kcal/kg  Protein Requirements: 1.2-1.5g/kg (98-123g/day)  Weight Used For Protein Calculations: 82 kg (180 lb 12.4 oz)  Fluid Requirements (mL): 1ml/kcal  Estimated Fluid Requirement Method: RDA Method  RDA Method (mL): 25  CHO Requirement: 250      Nutrition Prescription Ordered    Current Diet Order: IDDSI 5 - minced and moist  Oral Nutrition Supplement: Jesús BID; Commercial beverages TID    Evaluation of Received Nutrient/Fluid Intake    % Kcal Needs: 25%  % Protein Needs: 25%  I/O: + 3 L since admit  Energy Calories Required: not meeting needs  Protein Required: not meeting needs  Fluid Required: not meeting needs  Comments: LBM: 2/24/24  Tolerance: not tolerating  % Intake of Estimated Energy Needs: 0 - 25 %  % Meal Intake: 25 - 50 %    Intake/Output  - Last 3 Shifts         02/26 0700 02/27 0659 02/27 0700 02/28 0659 02/28 0700 02/29 0659    P.O. 120      I.V. (mL/kg) 1659.8 (20.2)      IV Piggyback 156.9      Total Intake(mL/kg) 1936.7 (23.6)      Urine (mL/kg/hr) 1300 (0.7) 800 (0.4)     Total Output 1300 800     Net +636.7 -800                    Nutrition Risk    Level of Risk/Frequency of Follow-up: moderate - high (follow up: 2x weekly)     Monitor and Evaluation    Food and Nutrient Intake: energy intake, food and beverage intake  Food and Nutrient Adminstration: diet order  Knowledge/Beliefs/Attitudes: beliefs and attitudes  Physical Activity and Function: nutrition-related ADLs and IADLs, factors affecting access to physical activity  Anthropometric Measurements: height/length, weight, weight change, body mass index  Biochemical Data, Medical Tests and Procedures: electrolyte and renal panel, gastrointestinal profile, glucose/endocrine profile, inflammatory profile, lipid profile  Nutrition-Focused Physical Findings: skin, overall appearance     Nutrition Follow-Up    RD Follow-up?: Yes    Farhana Sykes RDN, RASHAWNN

## 2024-02-28 NOTE — SUBJECTIVE & OBJECTIVE
"Interval History: Had done well overnight without fevers, but now with temp 101.1F. CXR unremarkable, UA pending. Only complaint is "coughing up phlegm"    Review of Systems   Constitutional:  Negative for activity change, appetite change and fatigue.   HENT:  Positive for trouble swallowing.    Gastrointestinal:  Negative for abdominal pain (chronic burning in abdomen/some int epigastric pain), constipation and vomiting.   Genitourinary:  Negative for difficulty urinating.   Musculoskeletal:  Positive for arthralgias and myalgias.   Neurological:  Positive for weakness.        Left sided spacticity since birth   Psychiatric/Behavioral:  Negative for agitation.      Objective:     Vital Signs (Most Recent):  Temp: (!) 101.1 °F (38.4 °C) (02/28/24 1215)  Pulse: 96 (02/28/24 1215)  Resp: 18 (02/28/24 1215)  BP: 127/72 (02/28/24 1215)  SpO2: 95 % (02/28/24 1215) Vital Signs (24h Range):  Temp:  [99 °F (37.2 °C)-101.1 °F (38.4 °C)] 101.1 °F (38.4 °C)  Pulse:  [] 96  Resp:  [16-20] 18  SpO2:  [95 %-98 %] 95 %  BP: (115-137)/(65-74) 127/72     Weight: 82 kg (180 lb 12.4 oz)  Body mass index is 24.52 kg/m².    Intake/Output Summary (Last 24 hours) at 2/28/2024 1430  Last data filed at 2/28/2024 0629  Gross per 24 hour   Intake --   Output 800 ml   Net -800 ml         Physical Exam  Vitals reviewed.   HENT:      Head: Normocephalic.      Mouth/Throat:      Mouth: Mucous membranes are moist.   Eyes:      Extraocular Movements: Extraocular movements intact.   Cardiovascular:      Rate and Rhythm: Normal rate.   Pulmonary:      Effort: Pulmonary effort is normal.   Abdominal:      Palpations: Abdomen is soft.      Tenderness: There is no abdominal tenderness.   Musculoskeletal:         General: Tenderness and deformity present.   Skin:     Findings: Lesion (right 4th finger-he says old gout infection) present.   Neurological:      Mental Status: He is alert.      Motor: Abnormal muscle tone present.   Psychiatric:        "  Behavior: Behavior is cooperative.         Cognition and Memory: He exhibits impaired remote memory.             Significant Labs: All pertinent labs within the past 24 hours have been reviewed.    Significant Imaging: I have reviewed all pertinent imaging results/findings within the past 24 hours.

## 2024-02-28 NOTE — PLAN OF CARE
I certify I provided patient choice and a list to the patient/family of [facility].  Patient/Family signed Patient's Choice Disclosure Form choosing the following  1.[facility]  2.First available   Arpan sent referrals via [facility]. [facility] would not be able to service patient till 03/07. Arpan informed patein and family of this. Patient and family stated they would rather wait till the 7th for [facility] then go with a different company. [facility] informed [facility].

## 2024-02-28 NOTE — ASSESSMENT & PLAN NOTE
- Per patients wife, he used to drink about 1 bottle of vodka daily for several years; however, in the past few days he has been having 1/2 pint of vodka every other day. Last known drink 2 days ago. Patient denies recent ETOH use.   - Start CIWA protocol  - scheduled fixed valium taper  - Ativan PRN per CIWA  - Zofran PRN for nausea   - IV thiamine; B1 pending  - daily replacing electrolytes  - Seizure, Aspiration and Fall precautions

## 2024-02-28 NOTE — ASSESSMENT & PLAN NOTE
- CT with findings concerning for pharyngitis and possibly retained secretions. Also noted chronic appearing opacification of the left maxillary sinus with chronic osteitis of the sinus walls. Appears chronic dating back to 3/2017 per read.   - Outpatient referral to Otolaryngology   - Amoxicillin changed to ceftriaxone   - Began having fevers 2/26 as high as 101.3  Repeat CT neck without abscess formation, Bcx ordered  CXR and UA ordered, CXR unremarkable; UA pending

## 2024-02-28 NOTE — PLAN OF CARE
Recommendations  1. Encourage po intake of texture modified diet as tolerated  2. Continue Jesús BID and Boost BID to promote wound healing and nutritional intake   3. Monitor labs and weights   4. RD to monitor and follow up    Goals:   Patient to consume >75% of estimated energy needs prior to RD follow up.  Nutrition Goal Status: progressing towards goal  Communication of RD Recs:  (POC)

## 2024-02-28 NOTE — PROGRESS NOTES
[facility] Surg [address] Medicine  Progress Note    Patient Name: Michael Johnson Jr.  MRN: [id number]  Patient Class: IP- Inpatient   Admission Date: 2/24/2024  Length of Stay: 3 days  Attending Physician: Boubacar Greer MD  Primary Care Provider: Amelia Lai MD        Subjective:     Principal Problem:Alcohol abuse with withdrawal        HPI:  63 year old male with Hx of Hypertension, Seizure disorders, spastic left-sided hemiplegia, Anemia, Arthrititis, Depression, CKD stage 3, Alcohol use disorder and Gout, who presents to the ED with complaints of nausea/vomiting and abdominal pain for the past 2 days. Patient is a poor historian; most history provided by patients wife. For the past 2 days patient has not been eating. Started to feel nauseated with associated NBNB emesis and weakness. Reports that his sore throat has been hurting him since yesterday and noticed he has been shaking since yesterday. He reports that he had quit drinking for a long time; however, per patients wife he has been drinking daily for several years and in the past few weeks it has been 1/2 pint of vodka every 2 days. Patient denies any fever, chest pain, palpitaitons, dyspnea, changes in bowel or urinary habits. He does report feeling of cramping in his upper abdomen, non-radiating. Last episode of emesis was here in the ER. States he takes his medication but not everyday. Denies any illicit drug use or smoking. 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 Denies any sick contacts.     Overview/Hospital Course:  Mr Rodriguez admitted for ETOH withdrawal and neck swelling. He reports that he has not had an alcoholic beverage in months. He is a poor historian and has different time frames than his wife and daughter. 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 He was vomiting the past few days but none since arrival. Given MVI, thiamine, folic acid.  "    On arrival nurse reported neck swelling, CT neck shows fullness of in the hypopharynx, involving posterior hypopharynx, concerned for pharyngitis. No drainable fluid collection. Started on IV CTX. Fevers began again 2/26. Repeat CT scan ordered to evaluate for abscess formation, none identified. CXR and UA ordered, CXR unremarkable    Interval History: Had done well overnight without fevers, but now with temp 101.1F. CXR unremarkable, UA pending. Only complaint is "coughing up phlegm"    Review of Systems   Constitutional:  Negative for activity change, appetite change and fatigue.   HENT:  Positive for trouble swallowing.    Gastrointestinal:  Negative for abdominal pain (chronic burning in abdomen/some int epigastric pain), constipation and vomiting.   Genitourinary:  Negative for difficulty urinating.   Musculoskeletal:  Positive for arthralgias and myalgias.   Neurological:  Positive for weakness.        Left sided spacticity since birth   Psychiatric/Behavioral:  Negative for agitation.      Objective:     Vital Signs (Most Recent):  Temp: (!) 101.1 °F (38.4 °C) (02/28/24 1215)  Pulse: 96 (02/28/24 1215)  Resp: 18 (02/28/24 1215)  BP: 127/72 (02/28/24 1215)  SpO2: 95 % (02/28/24 1215) Vital Signs (24h Range):  Temp:  [99 °F (37.2 °C)-101.1 °F (38.4 °C)] 101.1 °F (38.4 °C)  Pulse:  [] 96  Resp:  [16-20] 18  SpO2:  [95 %-98 %] 95 %  BP: (115-137)/(65-74) 127/72     Weight: 82 kg (180 lb 12.4 oz)  Body mass index is 24.52 kg/m².    Intake/Output Summary (Last 24 hours) at 2/28/2024 1430  Last data filed at 2/28/2024 0629  Gross per 24 hour   Intake --   Output 800 ml   Net -800 ml         Physical Exam  Vitals reviewed.   HENT:      Head: Normocephalic.      Mouth/Throat:      Mouth: Mucous membranes are moist.   Eyes:      Extraocular Movements: Extraocular movements intact.   Cardiovascular:      Rate and Rhythm: Normal rate.   Pulmonary:      Effort: Pulmonary effort is normal.   Abdominal:      " Palpations: Abdomen is soft.      Tenderness: There is no abdominal tenderness.   Musculoskeletal:         General: Tenderness and deformity present.   Skin:     Findings: Lesion (right 4th finger-he says old gout infection) present.   Neurological:      Mental Status: He is alert.      Motor: Abnormal muscle tone present.   Psychiatric:         Behavior: Behavior is cooperative.         Cognition and Memory: He exhibits impaired remote memory.             Significant Labs: All pertinent labs within the past 24 hours have been reviewed.    Significant Imaging: I have reviewed all pertinent imaging results/findings within the past 24 hours.    Assessment/Plan:      * Alcohol abuse with withdrawal  - Per patients wife, he used to drink about 1 bottle of vodka daily for several years; however, in the past few days he has been having 1/2 pint of vodka every other day. Last known drink 2 days ago. Patient denies recent ETOH use.   - Start CIWA protocol  - scheduled fixed valium taper  - Ativan PRN per CIWA  - Zofran PRN for nausea   - IV thiamine; B1 pending  - daily replacing electrolytes  - Seizure, Aspiration and Fall precautions    Pharyngitis  - CT with findings concerning for pharyngitis and possibly retained secretions. Also noted chronic appearing opacification of the left maxillary sinus with chronic osteitis of the sinus walls. Appears chronic dating back to 3/2017 per read.   - Outpatient referral to Otolaryngology   - Amoxicillin changed to ceftriaxone   - Began having fevers 2/26 as high as 101.3  Repeat CT neck without abscess formation, Bcx ordered  CXR and UA ordered, CXR unremarkable; UA pending    Falls frequently  Chronic problem.  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 Also has h/o ETOH dependence. Thiamine level pending. IV replaced ordered.  PT/OT consulted. OP neurology referral placed. After talking with wife, patient is bedbound at baseline and has not walked in about three  years.  Reason unknown.      Electrolyte disturbance  - Patient with Hypomagnesemia, Hypokalemia, hypocalcemia and Hypophosphatemia  - Correct electrolytes and monitor  - corrected calcium 8.7  - Telemetry monitoring for now    Paroxysmal atrial fibrillation  Patient with Paroxysmal (<7 days) atrial fibrillation which is controlled currently with Beta Blocker. Patient is currently in sinus rhythm.[id number] Score: 1  - Had an episode of Atrial flutter (09/2023)  - Not compliant with Home Eliquis or BB   - Echocardiogram (9/2023): Preserved EF 50-55%  - Monitor electrolytes and Maintain Mg >2 and K >4  - holding off on full anticoagulation for fall rsk    Essential hypertension  Chronic, uncontrolled. However, may be also elevated currently in the setting of withdrawal  Latest blood pressure and vitals reviewed-     Temp:  [98.5 °F (36.9 °C)-99.7 °F (37.6 °C)]   Pulse:  [102-130]   Resp:  [15-24]   BP: (126-151)/(80-85)   SpO2:  [98 %-100 %] .   Home meds for hypertension were reviewed and noted below.   Hypertension Medications               metoprolol tartrate (LOPRESSOR) 50 MG tablet TAKE 1 TABLET TWICE DAILY          While in the hospital, will manage blood pressure as follows; Continue home antihypertensive regimen    Will utilize p.r.n. blood pressure medication only if patient's blood pressure greater than 160/100 and he develops symptoms such as worsening chest pain or shortness of breath.    GERD (gastroesophageal reflux disease)  - Protonix 40 mg, daily      Seizure  - Noted in history. Last seizure several years ago ~ 2019. Unclear etiology may have been related to withdrawal  - Continue with Keppra  - Follow up outpatient with Neurology; given this was a one time occurrence in likely the above setting  - reports born with epilepsy    Hypokalemia  Patient has hypokalemia which is Acute and currently uncontrolled. Most recent potassium levels reviewed-   Lab Results   Component Value Date    K 3.3 (L)  02/24/2024   . Will continue potassium replacement per protocol and recheck repeat levels after replacement completed.       VTE Risk Mitigation (From admission, onward)           Ordered     apixaban tablet 5 mg  2 times daily         02/28/24 1428     enoxaparin injection 40 mg  Every 24 hours         02/25/24 1305     IP VTE LOW RISK PATIENT  Once         02/24/24 1605     Place sequential compression device  Until discontinued         02/24/24 1605                    Discharge Planning   SIMONE: 3/1/2024     Code Status: Full Code   Is the patient medically ready for discharge?:     Reason for patient still in hospital (select all that apply): Patient trending condition  Discharge Plan A: Home with family, Home                  Batsheva Sales PA-C  Department of Hospital Medicine   [facility] - Med Surg ([address])

## 2024-02-29 PROBLEM — J98.11 ATELECTASIS: Status: ACTIVE | Noted: 2024-02-29

## 2024-02-29 LAB
ALBUMIN SERPL BCP-MCNC: 2.5 G/DL (ref 3.5–5.2)
ALP SERPL-CCNC: 83 U/L (ref 55–135)
ALT SERPL W/O P-5'-P-CCNC: 24 U/L (ref 10–44)
ANION GAP SERPL CALC-SCNC: 8 MMOL/L (ref 8–16)
AST SERPL-CCNC: 41 U/L (ref 10–40)
BASOPHILS # BLD AUTO: 0.02 K/UL (ref 0–0.2)
BASOPHILS NFR BLD: 0.3 % (ref 0–1.9)
BILIRUB SERPL-MCNC: 0.8 MG/DL (ref 0.1–1)
BUN SERPL-MCNC: 14 MG/DL (ref 8–23)
CALCIUM SERPL-MCNC: 8.2 MG/DL (ref 8.7–10.5)
CHLORIDE SERPL-SCNC: 103 MMOL/L (ref 95–110)
CO2 SERPL-SCNC: 21 MMOL/L (ref 23–29)
CREAT SERPL-MCNC: 1.3 MG/DL (ref 0.5–1.4)
DIFFERENTIAL METHOD BLD: ABNORMAL
EOSINOPHIL # BLD AUTO: 0.2 K/UL (ref 0–0.5)
EOSINOPHIL NFR BLD: 3 % (ref 0–8)
ERYTHROCYTE [DISTWIDTH] IN BLOOD BY AUTOMATED COUNT: 13.7 % (ref 11.5–14.5)
EST. GFR  (NO RACE VARIABLE): >60 ML/MIN/1.73 M^2
GLUCOSE SERPL-MCNC: 97 MG/DL (ref 70–110)
HCT VFR BLD AUTO: 22.6 % (ref 40–54)
HGB BLD-MCNC: 7.5 G/DL (ref 14–18)
IMM GRANULOCYTES # BLD AUTO: 0.06 K/UL (ref 0–0.04)
IMM GRANULOCYTES NFR BLD AUTO: 0.8 % (ref 0–0.5)
LYMPHOCYTES # BLD AUTO: 0.6 K/UL (ref 1–4.8)
LYMPHOCYTES NFR BLD: 7.4 % (ref 18–48)
MAGNESIUM SERPL-MCNC: 1.3 MG/DL (ref 1.6–2.6)
MCH RBC QN AUTO: 37.7 PG (ref 27–31)
MCHC RBC AUTO-ENTMCNC: 33.2 G/DL (ref 32–36)
MCV RBC AUTO: 114 FL (ref 82–98)
MONOCYTES # BLD AUTO: 1.6 K/UL (ref 0.3–1)
MONOCYTES NFR BLD: 20.6 % (ref 4–15)
NEUTROPHILS # BLD AUTO: 5.4 K/UL (ref 1.8–7.7)
NEUTROPHILS NFR BLD: 67.9 % (ref 38–73)
NRBC BLD-RTO: 0 /100 WBC
PLATELET # BLD AUTO: 152 K/UL (ref 150–450)
PMV BLD AUTO: 11 FL (ref 9.2–12.9)
POCT GLUCOSE: 102 MG/DL (ref 70–110)
POCT GLUCOSE: 104 MG/DL (ref 70–110)
POCT GLUCOSE: 89 MG/DL (ref 70–110)
POCT GLUCOSE: 96 MG/DL (ref 70–110)
POTASSIUM SERPL-SCNC: 3.8 MMOL/L (ref 3.5–5.1)
PROCALCITONIN SERPL IA-MCNC: 0.41 NG/ML
PROT SERPL-MCNC: 7.3 G/DL (ref 6–8.4)
RBC # BLD AUTO: 1.99 M/UL (ref 4.6–6.2)
SODIUM SERPL-SCNC: 132 MMOL/L (ref 136–145)
WBC # BLD AUTO: 7.92 K/UL (ref 3.9–12.7)

## 2024-02-29 PROCEDURE — 36415 COLL VENOUS BLD VENIPUNCTURE: CPT | Mod: HCNC,XB | Performed by: PHYSICIAN ASSISTANT

## 2024-02-29 PROCEDURE — 25500020 PHARM REV CODE 255: Mod: HCNC | Performed by: STUDENT IN AN ORGANIZED HEALTH CARE EDUCATION/TRAINING PROGRAM

## 2024-02-29 PROCEDURE — 99900035 HC TECH TIME PER 15 MIN (STAT): Mod: HCNC

## 2024-02-29 PROCEDURE — 85025 COMPLETE CBC W/AUTO DIFF WBC: CPT | Mod: HCNC | Performed by: PHYSICIAN ASSISTANT

## 2024-02-29 PROCEDURE — 84145 PROCALCITONIN (PCT): CPT | Mod: HCNC | Performed by: PHYSICIAN ASSISTANT

## 2024-02-29 PROCEDURE — 25000003 PHARM REV CODE 250: Mod: HCNC | Performed by: HOSPITALIST

## 2024-02-29 PROCEDURE — 80053 COMPREHEN METABOLIC PANEL: CPT | Mod: HCNC | Performed by: PHYSICIAN ASSISTANT

## 2024-02-29 PROCEDURE — 25000003 PHARM REV CODE 250: Mod: HCNC | Performed by: NURSE PRACTITIONER

## 2024-02-29 PROCEDURE — 83735 ASSAY OF MAGNESIUM: CPT | Mod: HCNC | Performed by: STUDENT IN AN ORGANIZED HEALTH CARE EDUCATION/TRAINING PROGRAM

## 2024-02-29 PROCEDURE — 36415 COLL VENOUS BLD VENIPUNCTURE: CPT | Mod: HCNC | Performed by: STUDENT IN AN ORGANIZED HEALTH CARE EDUCATION/TRAINING PROGRAM

## 2024-02-29 PROCEDURE — 63600175 PHARM REV CODE 636 W HCPCS: Mod: HCNC | Performed by: NURSE PRACTITIONER

## 2024-02-29 PROCEDURE — 94640 AIRWAY INHALATION TREATMENT: CPT | Mod: HCNC

## 2024-02-29 PROCEDURE — 92526 ORAL FUNCTION THERAPY: CPT | Mod: HCNC

## 2024-02-29 PROCEDURE — 94761 N-INVAS EAR/PLS OXIMETRY MLT: CPT | Mod: HCNC

## 2024-02-29 PROCEDURE — 25000242 PHARM REV CODE 250 ALT 637 W/ HCPCS: Mod: HCNC | Performed by: PHYSICIAN ASSISTANT

## 2024-02-29 PROCEDURE — 25000003 PHARM REV CODE 250: Mod: HCNC | Performed by: PHYSICIAN ASSISTANT

## 2024-02-29 PROCEDURE — A9698 NON-RAD CONTRAST MATERIALNOC: HCPCS | Mod: HCNC | Performed by: STUDENT IN AN ORGANIZED HEALTH CARE EDUCATION/TRAINING PROGRAM

## 2024-02-29 PROCEDURE — 94799 UNLISTED PULMONARY SVC/PX: CPT | Mod: HCNC,XB

## 2024-02-29 PROCEDURE — 21400001 HC TELEMETRY ROOM: Mod: HCNC

## 2024-02-29 RX ORDER — PROMETHAZINE HYDROCHLORIDE AND CODEINE PHOSPHATE 6.25; 1 MG/5ML; MG/5ML
5 SOLUTION ORAL EVERY 6 HOURS PRN
Status: DISCONTINUED | OUTPATIENT
Start: 2024-02-29 | End: 2024-03-07 | Stop reason: HOSPADM

## 2024-02-29 RX ORDER — GUAIFENESIN 100 MG/5ML
200 SOLUTION ORAL EVERY 4 HOURS PRN
Status: DISCONTINUED | OUTPATIENT
Start: 2024-02-29 | End: 2024-02-29

## 2024-02-29 RX ORDER — IPRATROPIUM BROMIDE AND ALBUTEROL SULFATE 2.5; .5 MG/3ML; MG/3ML
3 SOLUTION RESPIRATORY (INHALATION)
Status: DISCONTINUED | OUTPATIENT
Start: 2024-02-29 | End: 2024-03-06

## 2024-02-29 RX ORDER — CETIRIZINE HYDROCHLORIDE 5 MG/1
5 TABLET ORAL DAILY
Status: DISCONTINUED | OUTPATIENT
Start: 2024-02-29 | End: 2024-03-07 | Stop reason: HOSPADM

## 2024-02-29 RX ORDER — DOXYCYCLINE HYCLATE 100 MG
100 TABLET ORAL EVERY 12 HOURS
Status: DISCONTINUED | OUTPATIENT
Start: 2024-02-29 | End: 2024-03-02

## 2024-02-29 RX ORDER — FOLIC ACID 1 MG/1
1 TABLET ORAL DAILY
Status: DISCONTINUED | OUTPATIENT
Start: 2024-02-29 | End: 2024-03-07 | Stop reason: HOSPADM

## 2024-02-29 RX ORDER — METOPROLOL TARTRATE 50 MG/1
50 TABLET ORAL 2 TIMES DAILY
Status: DISCONTINUED | OUTPATIENT
Start: 2024-02-29 | End: 2024-03-05

## 2024-02-29 RX ORDER — LANOLIN ALCOHOL/MO/W.PET/CERES
100 CREAM (GRAM) TOPICAL DAILY
Status: DISCONTINUED | OUTPATIENT
Start: 2024-02-29 | End: 2024-03-07 | Stop reason: HOSPADM

## 2024-02-29 RX ADMIN — METOPROLOL TARTRATE 50 MG: 50 TABLET, FILM COATED ORAL at 08:02

## 2024-02-29 RX ADMIN — APIXABAN 5 MG: 2.5 TABLET, FILM COATED ORAL at 09:02

## 2024-02-29 RX ADMIN — PROMETHAZINE HYDROCHLORIDE AND CODEINE PHOSPHATE 5 ML: 6.25; 1 SOLUTION ORAL at 08:02

## 2024-02-29 RX ADMIN — FLUTICASONE PROPIONATE 100 MCG: 50 SPRAY, METERED NASAL at 09:02

## 2024-02-29 RX ADMIN — PANTOPRAZOLE SODIUM 40 MG: 40 TABLET, DELAYED RELEASE ORAL at 09:02

## 2024-02-29 RX ADMIN — IPRATROPIUM BROMIDE AND ALBUTEROL SULFATE 3 ML: 2.5; .5 SOLUTION RESPIRATORY (INHALATION) at 07:02

## 2024-02-29 RX ADMIN — DOXYCYCLINE HYCLATE 100 MG: 100 TABLET, COATED ORAL at 08:02

## 2024-02-29 RX ADMIN — CEFTRIAXONE SODIUM 1 G: 1 INJECTION, POWDER, FOR SOLUTION INTRAMUSCULAR; INTRAVENOUS at 11:02

## 2024-02-29 RX ADMIN — GABAPENTIN 400 MG: 400 CAPSULE ORAL at 09:02

## 2024-02-29 RX ADMIN — AMMONIUM LACTATE: 120 LOTION TOPICAL at 09:02

## 2024-02-29 RX ADMIN — FOLIC ACID 1 MG: 1 TABLET ORAL at 03:02

## 2024-02-29 RX ADMIN — IOHEXOL 1000 ML: 9 SOLUTION ORAL at 11:02

## 2024-02-29 RX ADMIN — GUAIFENESIN 200 MG: 100 SOLUTION ORAL at 03:02

## 2024-02-29 RX ADMIN — GUAIFENESIN 1200 MG: 600 TABLET, EXTENDED RELEASE ORAL at 08:02

## 2024-02-29 RX ADMIN — LEVETIRACETAM 500 MG: 500 TABLET, FILM COATED ORAL at 09:02

## 2024-02-29 RX ADMIN — Medication 100 MG: at 03:02

## 2024-02-29 RX ADMIN — LEVETIRACETAM 500 MG: 500 TABLET, FILM COATED ORAL at 08:02

## 2024-02-29 RX ADMIN — THERA TABS 1 TABLET: TAB at 03:02

## 2024-02-29 RX ADMIN — BENZONATATE 100 MG: 100 CAPSULE ORAL at 09:02

## 2024-02-29 RX ADMIN — GABAPENTIN 400 MG: 400 CAPSULE ORAL at 08:02

## 2024-02-29 RX ADMIN — IOHEXOL 100 ML: 350 INJECTION, SOLUTION INTRAVENOUS at 01:02

## 2024-02-29 RX ADMIN — GUAIFENESIN 1200 MG: 600 TABLET, EXTENDED RELEASE ORAL at 09:02

## 2024-02-29 RX ADMIN — CETIRIZINE HYDROCHLORIDE 5 MG: 5 TABLET, FILM COATED ORAL at 09:02

## 2024-02-29 RX ADMIN — APIXABAN 5 MG: 2.5 TABLET, FILM COATED ORAL at 08:02

## 2024-02-29 RX ADMIN — AMMONIUM LACTATE: 120 LOTION TOPICAL at 08:02

## 2024-02-29 RX ADMIN — METOPROLOL TARTRATE 50 MG: 50 TABLET, FILM COATED ORAL at 09:02

## 2024-02-29 RX ADMIN — BENZONATATE 100 MG: 100 CAPSULE ORAL at 02:02

## 2024-02-29 RX ADMIN — IPRATROPIUM BROMIDE AND ALBUTEROL SULFATE 3 ML: 2.5; .5 SOLUTION RESPIRATORY (INHALATION) at 02:02

## 2024-02-29 NOTE — PT/OT/SLP PROGRESS
Speech Language Pathology      Michael Alex .  MRN: [id number]    Patient not seen today secondary to patient off floor for ultrasound. SLP to follow-up later this date, as scheduling permits.     2/29/24

## 2024-02-29 NOTE — PROGRESS NOTES
[facility] Surg [address] Medicine  Progress Note    Patient Name: Michael Johnson Jr.  MRN: [id number]  Patient Class: IP- Inpatient   Admission Date: 2/24/2024  Length of Stay: 4 days  Attending Physician: Boubacar Greer MD  Primary Care Provider: Amelia Lai MD        Subjective:     Principal Problem:Alcohol abuse with withdrawal        HPI:  63 year old male with Hx of Hypertension, Seizure disorders, spastic left-sided hemiplegia, Anemia, Arthrititis, Depression, CKD stage 3, Alcohol use disorder and Gout, who presents to the ED with complaints of nausea/vomiting and abdominal pain for the past 2 days. Patient is a poor historian; most history provided by patients wife. For the past 2 days patient has not been eating. Started to feel nauseated with associated NBNB emesis and weakness. Reports that his sore throat has been hurting him since yesterday and noticed he has been shaking since yesterday. He reports that he had quit drinking for a long time; however, per patients wife he has been drinking daily for several years and in the past few weeks it has been 1/2 pint of vodka every 2 days. Patient denies any fever, chest pain, palpitaitons, dyspnea, changes in bowel or urinary habits. He does report feeling of cramping in his upper abdomen, non-radiating. Last episode of emesis was here in the ER. States he takes his medication but not everyday. Denies any illicit drug use or smoking. 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 Denies any sick contacts.     Overview/Hospital Course:  Mr Rodriguez admitted for ETOH withdrawal and neck swelling. He reports that he has not had an alcoholic beverage in months. He is a poor historian and has different time frames than his wife and daughter. 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 He was vomiting the past few days but none since arrival.Started on MVI, thiamine, folic acid.  PeTH confirms heavy alcohol use.    On arrival nurse reported neck swelling, CT neck shows fullness of in the hypopharynx, involving posterior hypopharynx, concerned for pharyngitis. No drainable fluid collection. Started on IV CTX. Fevers began again 2/26. Repeat CT scan ordered to evaluate for abscess formation, none identified. CXR and UA ordered, unremarkable. CT AP shows atelectasis vs possible pneumonia, will add doxy for atypical coverage, continue IS.     Interval History: Persistent fevers yesterday afternoon. US legs neg for DVT. CT AP shows atelectasis vs possible pneumonia. Continue IS and adding doxy for atypical coverage   PETH finalized and confirms heavy alcohol abuse, discussed with patient and wife, encouraged cessation     Review of Systems   Constitutional:  Negative for activity change and fatigue.   HENT:  Positive for trouble swallowing.    Respiratory:  Positive for cough. Negative for shortness of breath.    Gastrointestinal:  Negative for abdominal pain (chronic burning in abdomen/some int epigastric pain).   Genitourinary:  Negative for difficulty urinating.   Musculoskeletal:  Positive for arthralgias and myalgias.   Neurological:  Positive for weakness.        Left sided spacticity since birth   Psychiatric/Behavioral:  Negative for agitation.      Objective:     Vital Signs (Most Recent):  Temp: 98.7 °F (37.1 °C) (02/29/24 1141)  Pulse: 75 (02/29/24 1142)  Resp: 19 (02/29/24 1142)  BP: 124/82 (02/29/24 1141)  SpO2: 100 % (02/29/24 1144) Vital Signs (24h Range):  Temp:  [97.8 °F (36.6 °C)-100.7 °F (38.2 °C)] 98.7 °F (37.1 °C)  Pulse:  [] 75  Resp:  [16-19] 19  SpO2:  [95 %-100 %] 100 %  BP: (112-130)/(55-82) 124/82     Weight: 82 kg (180 lb 12.4 oz)  Body mass index is 24.52 kg/m².    Intake/Output Summary (Last 24 hours) at 2/29/2024 1339  Last data filed at 2/29/2024 0424  Gross per 24 hour   Intake 330.91 ml   Output 1150 ml   Net -819.09 ml         Physical Exam  Vitals  reviewed.   Constitutional:       General: He is not in acute distress.  HENT:      Head: Normocephalic.      Mouth/Throat:      Mouth: Mucous membranes are moist.   Eyes:      Extraocular Movements: Extraocular movements intact.   Pulmonary:      Effort: Pulmonary effort is normal. No respiratory distress.      Breath sounds: No wheezing or rales.   Abdominal:      Palpations: Abdomen is soft.      Tenderness: There is no abdominal tenderness.   Musculoskeletal:         General: Tenderness and deformity present.   Skin:     Findings: Lesion (right 4th finger-he says old gout infection) present.   Neurological:      Mental Status: He is alert. Mental status is at baseline.      Motor: Abnormal muscle tone present.   Psychiatric:         Behavior: Behavior is cooperative.         Cognition and Memory: He exhibits impaired remote memory.             Significant Labs: All pertinent labs within the past 24 hours have been reviewed.    Significant Imaging: I have reviewed all pertinent imaging results/findings within the past 24 hours.    Assessment/Plan:      * Alcohol abuse with withdrawal  - Per patients wife, he used to drink about 1 bottle of vodka daily for several years; however, in the past few days he has been having 1/2 pint of vodka every other day. Last known drink 2 days ago. Patient denies recent ETOH use. PETH 1200, confirming heavy drinking  - Start CIWA protocol  - scheduled fixed valium taper  - Ativan PRN per CIWA  - Zofran PRN for nausea   - thiamine, mvi, folic acid; B1 pending  - daily replacing electrolytes  - Seizure, Aspiration and Fall precautions    Pharyngitis  - CT with findings concerning for pharyngitis and possibly retained secretions. Also noted chronic appearing opacification of the left maxillary sinus with chronic osteitis of the sinus walls. Appears chronic dating back to 3/2017 per read.   - Outpatient referral to Otolaryngology   - Amoxicillin changed to ceftriaxone   - Began having  fevers 2/26 as high as 101.3  Repeat CT neck without abscess formation, Bcx ordered: NGTD  CXR and UA ordered, unremarkable   CT AP shows atelectasis vs developing pneumonia at bases of lung, will add doxy for atypical coverage and encourage IS    Falls frequently  Chronic problem.  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 Also has h/o ETOH dependence. Thiamine level pending. IV replaced ordered.  PT/OT consulted. OP neurology referral placed. After talking with wife, patient is bedbound at baseline and has not walked in about three years.  Reason unknown.      Electrolyte disturbance  - Patient with Hypomagnesemia, Hypokalemia, hypocalcemia and Hypophosphatemia  - Correct electrolytes and monitor  - corrected calcium 8.7  - Telemetry monitoring for now    Paroxysmal atrial fibrillation  Patient with Paroxysmal (<7 days) atrial fibrillation which is controlled currently with Beta Blocker. Patient is currently in sinus rhythm.[id number] Score: 1  - Had an episode of Atrial flutter (09/2023)  - Not compliant with Home Eliquis or BB   - Echocardiogram (9/2023): Preserved EF 50-55%  - Monitor electrolytes and Maintain Mg >2 and K >4  - holding off on full anticoagulation for fall rsk    Essential hypertension  Chronic, uncontrolled. However, may be also elevated currently in the setting of withdrawal  Latest blood pressure and vitals reviewed-     Temp:  [98.5 °F (36.9 °C)-99.7 °F (37.6 °C)]   Pulse:  [102-130]   Resp:  [15-24]   BP: (126-151)/(80-85)   SpO2:  [98 %-100 %] .   Home meds for hypertension were reviewed and noted below.   Hypertension Medications               metoprolol tartrate (LOPRESSOR) 50 MG tablet TAKE 1 TABLET TWICE DAILY          While in the hospital, will manage blood pressure as follows; Continue home antihypertensive regimen    Will utilize p.r.n. blood pressure medication only if patient's blood pressure greater than 160/100 and he develops symptoms such as worsening chest pain  or shortness of breath.    GERD (gastroesophageal reflux disease)  - Protonix 40 mg, daily      Seizure  - Noted in history. Last seizure several years ago ~ 2019. Unclear etiology may have been related to withdrawal  - Continue with Keppra  - Follow up outpatient with Neurology; given this was a one time occurrence in likely the above setting  - reports born with epilepsy    Hypokalemia  Patient has hypokalemia which is Acute and currently uncontrolled. Most recent potassium levels reviewed-   Lab Results   Component Value Date    K 3.3 (L) 02/24/2024   . Will continue potassium replacement per protocol and recheck repeat levels after replacement completed.       VTE Risk Mitigation (From admission, onward)           Ordered     apixaban tablet 5 mg  2 times daily         02/28/24 1428     IP VTE LOW RISK PATIENT  Once         02/24/24 1605     Place sequential compression device  Until discontinued         02/24/24 1605                    Discharge Planning   SIMONE: 3/1/2024     Code Status: Full Code   Is the patient medically ready for discharge?:     Reason for patient still in hospital (select all that apply): Patient trending condition  Discharge Plan A: Home with family, Home                  Batsheva Sales PA-C  Department of Hospital Medicine   [facility] - Med Surg ([address])

## 2024-02-29 NOTE — ASSESSMENT & PLAN NOTE
- Per patients wife, he used to drink about 1 bottle of vodka daily for several years; however, in the past few days he has been having 1/2 pint of vodka every other day. Last known drink 2 days ago. Patient denies recent ETOH use. PETH 1200, confirming heavy drinking  - Start CIWA protocol  - scheduled fixed valium taper  - Ativan PRN per CIWA  - Zofran PRN for nausea   - thiamine, mvi, folic acid; B1 pending  - daily replacing electrolytes  - Seizure, Aspiration and Fall precautions

## 2024-02-29 NOTE — PT/OT/SLP PROGRESS
Speech Language Pathology Treatment    Patient Name:  Michael Johnson Jr.   MRN:  [id number]  Admitting Diagnosis: Alcohol abuse with withdrawal    Recommendations:     General Recommendations: SLP to continue to follow 3-5x/week for diet tolerance and ongoing assessment of oropharyngeal swallow function and cognitive-linguistic status.   SLP recommends a Modified Barium Swallow Study (MBSS) to assess the safety and efficiency of swallow function, determine pathophysiology of suspected dysphagia, rule out aspiration and to make recommendations.   Diet Recommendations: Minced & Moist Diet - IDDSI Level 5, Liquid Diet Level: Thin liquids - IDDSI Level 0   Aspiration Precautions: further precautions to be determined following MBSS   Assistance with meals  Feed only when awake/alert   HOB to 90 degrees for all PO intake; remain upright 30 minutes post meal   1 bite/sip at a time and small bites/sips  Eliminate distractions; avoid talking while eating   General Precautions: Standard, aspiration  Communication Strategies: pt reports wears eye glasses but none present at bedside or in room this date-- provide repetition of information and orientation as needed.      Assessment:     Michael Johnson Jr. is a 63 y.o. male with an SLP diagnosis of dysphagia 2/2 throat swelling and excessive vomiting. Initially, pt's symptoms appeared to be improving; however, pt presenting with overt s/s of aspiration at bedside this date-- c/b watery eyes when drinking thin liquids, coughing, and throat clearing before, during, and after PO intake. No airway threat with solids observed and no significant change in vocal quality following PO intake observed-- pt reports eating purees (pudding) feels soothing to his throat. Pt and spouse report coughing has increased; stating pt coughing throughout entire night of 2/28/24-- pt denies choking or coughing more with liquids or solids and reports no difficulty with taking oral medications; pt stating  ""cough seems constant but biggest concern is coughing when talking".     Subjective     RN provided clearance for pt to be seen-- pt sitting upright in bed, wife present at bedside giving pt ensure via straw and pt noted to have watery eyes.     Respiratory Therapist entered room in middle of session-- provided pt with spirometer and instructions on how to use; pt completed 1x then SLP proceeded with PO trials. Spirometer left with pt, SLP reminded pt to continue practicing with spirometer as instructed by RT.     Pain/Comfort: verbalized discomfort re: constant coughing; no pain reported this date    Respiratory Status: Room air    Objective:     Cognitive-Linguistic Status: in today's session, pt...  Awake and alert throughout entirety of session  Oriented to person, place, and situation-- able to recall events from yesterday's visit without difficulty   did not assess orientation to time, date, or yr as session focused on ongoing assessment of swallow function   Able to follow verbal commands without difficulty  Verbalized wants/needs and answered open-ended questions independently   Engaged in conversation with SLP and spouse re: current concerns with coughing, dislike for hospital foods, previous meals/PO intake and mealtimes at home.     Ongoing Bedside Swallow Eval  Consistencies Assessed:   Thin Liquids: ~2 ounces of ice water via singular and consecutive strawsips   Puree: 4 tsp of vanilla pudding   Soft Solids: all of minced and moist consistency presented via fork-- small bites of zucchini and squash x1, rice x1, spaghetti with meat sauce x1 (wife's meal from cafeteria)      Oral Phase:   Able to siphon liquids via straw without anterior loss   Able to retreive pureed bolus from spoon without difficulty   Able to retrieve soft solids from fork without difficulty   Lip seal, a-p transport, and ability to form cohesive bolus appear WNL  No oral residuals noted after the swallow of purees or soft solids " observed this date     Pharyngeal Phase:   Trigger of pharyngeal swallow appears to be WFL  Coughing prior to PO intake, no significant change in vocal quality  No coughing immediately following swallow of minced and moist PO trials (outlined above) and no coughing with singular strawsips of water between trials   No s/s of airway threat given soft solids; however, coughing prior to, shortly after and between PO trials of purees   Constant coughing observed after PO trials-- while attempting to engage in conversation with SLP and wife     Other: prior to SLP offering PO trials, pt coughing and requested to expectorate-- SLP assisted by provided cup for pt. SLP instructed pt to cough again, pt's volitional cough appears stronger than spontaneous cough; pt with good management of secretions throughout session this date.     Education: SLP re-educated pt and pt spouse re: therapist's role and purpose of skilled services; reviewed safe swallow strategies/ aspiration precautions and discussed importance of use. SLP discussed with pt and spouse reason MBSS is warranted and what it entails; plan to speak with medical team and will see pt tmrw for normal tx or MBSS (if medical team on board)-- pt and spouse verbalized understanding of all discussed and agreement with plan.      SLP communicated with PA via secure chat-- discussed recommendation for pt to participate in MBSS. PA in agreement, order placed-- SLP to schedule with radiology and plan to complete MBSS on 3/1/24.     Goals:   Multidisciplinary Problems       SLP Goals          Problem: SLP    Goal Priority Disciplines Outcome   SLP Goal     SLP Ongoing, Progressing   Description: 1. Pt will participate in MBSS for further assessment of oropharyngeal swallow function.     2. Pt will be able to consume a full liquid diet without overt s/s of airway threat or aspiration given min cues to monitor rate and volume of intake.-- MET 2/27/24    3. Ongoing assessment of  diet upgrade as pt tolerates.-- ongoing      4. Pt will tolerate a minced and moist diet with thin liquids without any overt s/s of aspiration or airway threat given min cues from clinician.     5. Pt and caregivers will demonstrate understanding and use of safe swallow strategies/ aspiration precautions in 100% of opportunities given min cues from clinician.                      Plan:     Patient to be seen:  3 x/week, 5 x/week   Plan of Care expires:  03/10/24  Plan of Care reviewed with:  patient, spouse (RN and PA)   SLP Follow-Up:  Yes       Discharge Recommendations:   (SLP at next level of care)   Barriers to Discharge:  Level of Skilled Assistance Needed      Time Tracking:     SLP Treatment Date:   02/29/24  Speech Start Time:  1405  Speech Stop Time:  1430     Speech Total Time (min):  25 min    Billable Minutes: Treatment Swallowing Dysfunction 25 02/29/2024

## 2024-02-29 NOTE — ASSESSMENT & PLAN NOTE
- CT with findings concerning for pharyngitis and possibly retained secretions. Also noted chronic appearing opacification of the left maxillary sinus with chronic osteitis of the sinus walls. Appears chronic dating back to 3/2017 per read.   - Outpatient referral to Otolaryngology   - Amoxicillin changed to ceftriaxone   - Began having fevers 2/26 as high as 101.3  Repeat CT neck without abscess formation, Bcx ordered: NGTD  CXR and UA ordered, unremarkable   CT AP shows atelectasis vs developing pneumonia at bases of lung, will add doxy for atypical coverage and encourage IS

## 2024-02-29 NOTE — PLAN OF CARE
Problem: Adult Inpatient Plan of Care  Goal: Plan of Care Review  Outcome: Ongoing, Progressing  Goal: Absence of Hospital-Acquired Illness or Injury  Outcome: Ongoing, Progressing  Goal: Optimal Comfort and Wellbeing  Outcome: Ongoing, Progressing     Problem: Skin Injury Risk Increased  Goal: Skin Health and Integrity  Outcome: Ongoing, Progressing     Problem: Impaired Wound Healing  Goal: Optimal Wound Healing  Outcome: Ongoing, Progressing     Problem: Pain Acute  Goal: Acceptable Pain Control and Functional Ability  Outcome: Ongoing, Progressing     Problem: Alcohol Withdrawal  Goal: Alcohol Withdrawal Symptom Control  Outcome: Ongoing, Progressing     Problem: Eating/Swallowing Impairment  Goal: Optimal Eating/Swallowing without Aspir  Outcome: Ongoing, Progressing   POC reviewed with pt who verbalized understanding. AAOx4. VSS on RA. Complains of a cough, meds given per MAR. Tele monitored. CIWA Q8. Condom cath in place. Turned with assist. Resting between care. Remains free of falls and injury. Call light in reach and rounds made for safety. Wife at bedside.

## 2024-02-29 NOTE — SUBJECTIVE & OBJECTIVE
Interval History: Persistent fevers yesterday afternoon. US legs neg for DVT. CT AP shows atelectasis vs possible pneumonia. Continue IS and adding doxy for atypical coverage   PETH finalized and confirms heavy alcohol abuse, discussed with patient and wife, encouraged cessation     Review of Systems   Constitutional:  Negative for activity change and fatigue.   HENT:  Positive for trouble swallowing.    Respiratory:  Positive for cough. Negative for shortness of breath.    Gastrointestinal:  Negative for abdominal pain (chronic burning in abdomen/some int epigastric pain).   Genitourinary:  Negative for difficulty urinating.   Musculoskeletal:  Positive for arthralgias and myalgias.   Neurological:  Positive for weakness.        Left sided spacticity since birth   Psychiatric/Behavioral:  Negative for agitation.      Objective:     Vital Signs (Most Recent):  Temp: 98.7 °F (37.1 °C) (02/29/24 1141)  Pulse: 75 (02/29/24 1142)  Resp: 19 (02/29/24 1142)  BP: 124/82 (02/29/24 1141)  SpO2: 100 % (02/29/24 1144) Vital Signs (24h Range):  Temp:  [97.8 °F (36.6 °C)-100.7 °F (38.2 °C)] 98.7 °F (37.1 °C)  Pulse:  [] 75  Resp:  [16-19] 19  SpO2:  [95 %-100 %] 100 %  BP: (112-130)/(55-82) 124/82     Weight: 82 kg (180 lb 12.4 oz)  Body mass index is 24.52 kg/m².    Intake/Output Summary (Last 24 hours) at 2/29/2024 1339  Last data filed at 2/29/2024 0424  Gross per 24 hour   Intake 330.91 ml   Output 1150 ml   Net -819.09 ml         Physical Exam  Vitals reviewed.   Constitutional:       General: He is not in acute distress.  HENT:      Head: Normocephalic.      Mouth/Throat:      Mouth: Mucous membranes are moist.   Eyes:      Extraocular Movements: Extraocular movements intact.   Pulmonary:      Effort: Pulmonary effort is normal. No respiratory distress.      Breath sounds: No wheezing or rales.   Abdominal:      Palpations: Abdomen is soft.      Tenderness: There is no abdominal tenderness.   Musculoskeletal:          General: Tenderness and deformity present.   Skin:     Findings: Lesion (right 4th finger-he says old gout infection) present.   Neurological:      Mental Status: He is alert. Mental status is at baseline.      Motor: Abnormal muscle tone present.   Psychiatric:         Behavior: Behavior is cooperative.         Cognition and Memory: He exhibits impaired remote memory.             Significant Labs: All pertinent labs within the past 24 hours have been reviewed.    Significant Imaging: I have reviewed all pertinent imaging results/findings within the past 24 hours.

## 2024-02-29 NOTE — PLAN OF CARE
Pt presenting with overt s/s of aspiration at bedside this date-- SLP recommends a Modified Barium Swallow Study (MBSS) to assess the safety and efficiency of swallow function, determine pathophysiology of suspected dysphagia, rule out aspiration and to make recommendations. PA in agreement with SLP rec-- plan to complete MBSS on 3/1/24.     Problem: SLP  Goal: SLP Goal  Description: 1. Pt will participate in MBSS for further assessment of oropharyngeal swallow function.     2. Pt will be able to consume a full liquid diet without overt s/s of airway threat or aspiration given min cues to monitor rate and volume of intake.-- MET 2/27/24    3. Ongoing assessment of diet upgrade as pt tolerates.-- ongoing      4. Pt will tolerate a minced and moist diet with thin liquids without any overt s/s of aspiration or airway threat given min cues from clinician.     5. Pt and caregivers will demonstrate understanding and use of safe swallow strategies/ aspiration precautions in 100% of opportunities given min cues from clinician.   Outcome: Ongoing, Progressing

## 2024-02-29 NOTE — NURSING
Patient appears to be in and out of afib on tele monitor, rate controlled. PA-VIRGIE made aware. No new orders at this time.

## 2024-03-01 PROBLEM — E43 SEVERE MALNUTRITION: Status: ACTIVE | Noted: 2024-03-01

## 2024-03-01 LAB
ALBUMIN SERPL BCP-MCNC: 2.3 G/DL (ref 3.5–5.2)
ALP SERPL-CCNC: 80 U/L (ref 55–135)
ALT SERPL W/O P-5'-P-CCNC: 22 U/L (ref 10–44)
ANION GAP SERPL CALC-SCNC: 9 MMOL/L (ref 8–16)
AST SERPL-CCNC: 35 U/L (ref 10–40)
BASOPHILS # BLD AUTO: 0.04 K/UL (ref 0–0.2)
BASOPHILS NFR BLD: 0.6 % (ref 0–1.9)
BILIRUB SERPL-MCNC: 0.7 MG/DL (ref 0.1–1)
BUN SERPL-MCNC: 13 MG/DL (ref 8–23)
CALCIUM SERPL-MCNC: 8.2 MG/DL (ref 8.7–10.5)
CHLORIDE SERPL-SCNC: 102 MMOL/L (ref 95–110)
CO2 SERPL-SCNC: 21 MMOL/L (ref 23–29)
CREAT SERPL-MCNC: 1.4 MG/DL (ref 0.5–1.4)
DIFFERENTIAL METHOD BLD: ABNORMAL
EOSINOPHIL # BLD AUTO: 0.3 K/UL (ref 0–0.5)
EOSINOPHIL NFR BLD: 3.9 % (ref 0–8)
ERYTHROCYTE [DISTWIDTH] IN BLOOD BY AUTOMATED COUNT: 14.1 % (ref 11.5–14.5)
EST. GFR  (NO RACE VARIABLE): 57 ML/MIN/1.73 M^2
GLUCOSE SERPL-MCNC: 87 MG/DL (ref 70–110)
HCT VFR BLD AUTO: 22.2 % (ref 40–54)
HGB BLD-MCNC: 7.4 G/DL (ref 14–18)
IMM GRANULOCYTES # BLD AUTO: 0.03 K/UL (ref 0–0.04)
IMM GRANULOCYTES NFR BLD AUTO: 0.4 % (ref 0–0.5)
LYMPHOCYTES # BLD AUTO: 0.8 K/UL (ref 1–4.8)
LYMPHOCYTES NFR BLD: 11.3 % (ref 18–48)
MAGNESIUM SERPL-MCNC: 1.1 MG/DL (ref 1.6–2.6)
MCH RBC QN AUTO: 37.9 PG (ref 27–31)
MCHC RBC AUTO-ENTMCNC: 33.3 G/DL (ref 32–36)
MCV RBC AUTO: 114 FL (ref 82–98)
MONOCYTES # BLD AUTO: 1.8 K/UL (ref 0.3–1)
MONOCYTES NFR BLD: 25.9 % (ref 4–15)
NEUTROPHILS # BLD AUTO: 4.1 K/UL (ref 1.8–7.7)
NEUTROPHILS NFR BLD: 57.9 % (ref 38–73)
NRBC BLD-RTO: 0 /100 WBC
PLATELET # BLD AUTO: 204 K/UL (ref 150–450)
PMV BLD AUTO: 11.3 FL (ref 9.2–12.9)
POCT GLUCOSE: 105 MG/DL (ref 70–110)
POCT GLUCOSE: 113 MG/DL (ref 70–110)
POCT GLUCOSE: 114 MG/DL (ref 70–110)
POCT GLUCOSE: 115 MG/DL (ref 70–110)
POCT GLUCOSE: 93 MG/DL (ref 70–110)
POTASSIUM SERPL-SCNC: 3.7 MMOL/L (ref 3.5–5.1)
PROT SERPL-MCNC: 7.1 G/DL (ref 6–8.4)
RBC # BLD AUTO: 1.95 M/UL (ref 4.6–6.2)
SODIUM SERPL-SCNC: 132 MMOL/L (ref 136–145)
VIT B1 BLD-MCNC: 63 UG/L (ref 38–122)
WBC # BLD AUTO: 7.11 K/UL (ref 3.9–12.7)

## 2024-03-01 PROCEDURE — 94799 UNLISTED PULMONARY SVC/PX: CPT | Mod: HCNC,XB

## 2024-03-01 PROCEDURE — A9698 NON-RAD CONTRAST MATERIALNOC: HCPCS | Mod: HCNC | Performed by: STUDENT IN AN ORGANIZED HEALTH CARE EDUCATION/TRAINING PROGRAM

## 2024-03-01 PROCEDURE — 85025 COMPLETE CBC W/AUTO DIFF WBC: CPT | Mod: HCNC | Performed by: PHYSICIAN ASSISTANT

## 2024-03-01 PROCEDURE — 25000003 PHARM REV CODE 250: Mod: HCNC | Performed by: HOSPITALIST

## 2024-03-01 PROCEDURE — 25500020 PHARM REV CODE 255: Mod: HCNC | Performed by: STUDENT IN AN ORGANIZED HEALTH CARE EDUCATION/TRAINING PROGRAM

## 2024-03-01 PROCEDURE — 21400001 HC TELEMETRY ROOM: Mod: HCNC

## 2024-03-01 PROCEDURE — 80053 COMPREHEN METABOLIC PANEL: CPT | Mod: HCNC | Performed by: PHYSICIAN ASSISTANT

## 2024-03-01 PROCEDURE — 25000003 PHARM REV CODE 250: Mod: HCNC | Performed by: PHYSICIAN ASSISTANT

## 2024-03-01 PROCEDURE — 94761 N-INVAS EAR/PLS OXIMETRY MLT: CPT | Mod: HCNC

## 2024-03-01 PROCEDURE — 63600175 PHARM REV CODE 636 W HCPCS: Mod: HCNC | Performed by: PHYSICIAN ASSISTANT

## 2024-03-01 PROCEDURE — 25000003 PHARM REV CODE 250: Mod: HCNC | Performed by: NURSE PRACTITIONER

## 2024-03-01 PROCEDURE — 83735 ASSAY OF MAGNESIUM: CPT | Mod: HCNC | Performed by: STUDENT IN AN ORGANIZED HEALTH CARE EDUCATION/TRAINING PROGRAM

## 2024-03-01 PROCEDURE — 25000242 PHARM REV CODE 250 ALT 637 W/ HCPCS: Mod: HCNC | Performed by: PHYSICIAN ASSISTANT

## 2024-03-01 PROCEDURE — 63600175 PHARM REV CODE 636 W HCPCS: Mod: HCNC | Performed by: NURSE PRACTITIONER

## 2024-03-01 PROCEDURE — 92611 MOTION FLUOROSCOPY/SWALLOW: CPT | Mod: HCNC

## 2024-03-01 PROCEDURE — 94640 AIRWAY INHALATION TREATMENT: CPT | Mod: HCNC

## 2024-03-01 PROCEDURE — 36415 COLL VENOUS BLD VENIPUNCTURE: CPT | Mod: HCNC | Performed by: STUDENT IN AN ORGANIZED HEALTH CARE EDUCATION/TRAINING PROGRAM

## 2024-03-01 RX ORDER — MAGNESIUM SULFATE HEPTAHYDRATE 40 MG/ML
2 INJECTION, SOLUTION INTRAVENOUS ONCE
Status: COMPLETED | OUTPATIENT
Start: 2024-03-01 | End: 2024-03-01

## 2024-03-01 RX ORDER — POLYETHYLENE GLYCOL 3350 17 G/17G
17 POWDER, FOR SOLUTION ORAL DAILY
Status: DISCONTINUED | OUTPATIENT
Start: 2024-03-01 | End: 2024-03-07 | Stop reason: HOSPADM

## 2024-03-01 RX ORDER — MAGNESIUM SULFATE 1 G/100ML
1 INJECTION INTRAVENOUS ONCE
Status: COMPLETED | OUTPATIENT
Start: 2024-03-01 | End: 2024-03-01

## 2024-03-01 RX ADMIN — PANTOPRAZOLE SODIUM 40 MG: 40 TABLET, DELAYED RELEASE ORAL at 08:03

## 2024-03-01 RX ADMIN — GUAIFENESIN 1200 MG: 600 TABLET, EXTENDED RELEASE ORAL at 08:03

## 2024-03-01 RX ADMIN — METOPROLOL TARTRATE 50 MG: 50 TABLET, FILM COATED ORAL at 08:03

## 2024-03-01 RX ADMIN — GABAPENTIN 400 MG: 400 CAPSULE ORAL at 08:03

## 2024-03-01 RX ADMIN — MAGNESIUM SULFATE HEPTAHYDRATE 2 G: 40 INJECTION, SOLUTION INTRAVENOUS at 12:03

## 2024-03-01 RX ADMIN — BENZONATATE 100 MG: 100 CAPSULE ORAL at 05:03

## 2024-03-01 RX ADMIN — FLUTICASONE PROPIONATE 100 MCG: 50 SPRAY, METERED NASAL at 09:03

## 2024-03-01 RX ADMIN — MAGNESIUM SULFATE IN DEXTROSE 1 G: 10 INJECTION, SOLUTION INTRAVENOUS at 10:03

## 2024-03-01 RX ADMIN — DOXYCYCLINE HYCLATE 100 MG: 100 TABLET, COATED ORAL at 08:03

## 2024-03-01 RX ADMIN — AMMONIUM LACTATE: 120 LOTION TOPICAL at 08:03

## 2024-03-01 RX ADMIN — CETIRIZINE HYDROCHLORIDE 5 MG: 5 TABLET, FILM COATED ORAL at 08:03

## 2024-03-01 RX ADMIN — APIXABAN 5 MG: 2.5 TABLET, FILM COATED ORAL at 08:03

## 2024-03-01 RX ADMIN — POLYETHYLENE GLYCOL 3350 17 G: 17 POWDER, FOR SOLUTION ORAL at 12:03

## 2024-03-01 RX ADMIN — BENZONATATE 100 MG: 100 CAPSULE ORAL at 08:03

## 2024-03-01 RX ADMIN — THERA TABS 1 TABLET: TAB at 08:03

## 2024-03-01 RX ADMIN — CEFTRIAXONE SODIUM 1 G: 1 INJECTION, POWDER, FOR SOLUTION INTRAMUSCULAR; INTRAVENOUS at 12:03

## 2024-03-01 RX ADMIN — FOLIC ACID 1 MG: 1 TABLET ORAL at 08:03

## 2024-03-01 RX ADMIN — BARIUM SULFATE 90 ML: 0.81 POWDER, FOR SUSPENSION ORAL at 11:03

## 2024-03-01 RX ADMIN — Medication 100 MG: at 08:03

## 2024-03-01 RX ADMIN — LEVETIRACETAM 500 MG: 500 TABLET, FILM COATED ORAL at 08:03

## 2024-03-01 RX ADMIN — IPRATROPIUM BROMIDE AND ALBUTEROL SULFATE 3 ML: 2.5; .5 SOLUTION RESPIRATORY (INHALATION) at 02:03

## 2024-03-01 RX ADMIN — PROMETHAZINE HYDROCHLORIDE AND CODEINE PHOSPHATE 5 ML: 6.25; 1 SOLUTION ORAL at 05:03

## 2024-03-01 RX ADMIN — AMMONIUM LACTATE: 120 LOTION TOPICAL at 09:03

## 2024-03-01 RX ADMIN — PROMETHAZINE HYDROCHLORIDE AND CODEINE PHOSPHATE 5 ML: 6.25; 1 SOLUTION ORAL at 08:03

## 2024-03-01 RX ADMIN — IPRATROPIUM BROMIDE AND ALBUTEROL SULFATE 3 ML: 2.5; .5 SOLUTION RESPIRATORY (INHALATION) at 08:03

## 2024-03-01 NOTE — PLAN OF CARE
Medicare Message     Important Message from Medicare regarding Discharge Appeal Rights Given to patient/caregiver; Explained to patient/caregiver; Signed/date by patient/caregiver Given to patient/caregiver; Explained to patient/caregiver; Other (comments)Important Message from Medicare regarding Discharge Appeal Rights. Given to patient/caregiver; Explained to patient/caregiver; Other (comments). The comment is Verbal Consent. Taken on 3/1/24 1643   Date IMM was signed 2/26/2024 3/1/2024   Time IMM was signed [id number]

## 2024-03-01 NOTE — SUBJECTIVE & OBJECTIVE
Interval History: Fever curve improving, Mg low again this AM, replace IV. MBSS ordered per SLP. After MBSS Mr. Johnson had an episode of emesis followed by an episode of unresponsiveness. Rapid response called, patient found to be awake and alert. Strong pulses, SBP 110s. At his baseline, answering questions appropriately. Suspect vasovagal episode     GI consulted per SLP recs    Review of Systems   Constitutional:  Negative for activity change and fatigue.   HENT:  Positive for trouble swallowing.    Respiratory:  Positive for cough. Negative for shortness of breath.    Gastrointestinal:  Negative for abdominal pain (chronic burning in abdomen/some int epigastric pain).   Musculoskeletal:  Positive for arthralgias and myalgias.   Neurological:  Positive for weakness.        Left sided spacticity since birth   Psychiatric/Behavioral:  Negative for agitation.      Objective:     Vital Signs (Most Recent):  Temp: 98.5 °F (36.9 °C) (03/01/24 0916)  Pulse: 95 (03/01/24 1126)  Resp: 16 (03/01/24 0916)  BP: 118/78 (03/01/24 0916)  SpO2: 98 % (03/01/24 0916) Vital Signs (24h Range):  Temp:  [98.5 °F (36.9 °C)-100.6 °F (38.1 °C)] 98.5 °F (36.9 °C)  Pulse:  [] 95  Resp:  [16-19] 16  SpO2:  [94 %-99 %] 98 %  BP: (104-126)/(57-78) 118/78     Weight: 82 kg (180 lb 12.4 oz)  Body mass index is 24.52 kg/m².    Intake/Output Summary (Last 24 hours) at 3/1/2024 1251  Last data filed at 3/1/2024 0603  Gross per 24 hour   Intake 381.6 ml   Output 1850 ml   Net -1468.4 ml         Physical Exam  Vitals reviewed.   Constitutional:       General: He is not in acute distress.  HENT:      Head: Normocephalic.      Mouth/Throat:      Mouth: Mucous membranes are moist.   Eyes:      Extraocular Movements: Extraocular movements intact.   Pulmonary:      Effort: Pulmonary effort is normal. No respiratory distress.      Breath sounds: No wheezing or rales.   Abdominal:      Palpations: Abdomen is soft.      Tenderness: There is no  abdominal tenderness.   Musculoskeletal:         General: Tenderness and deformity present.   Skin:     Findings: Lesion (right 4th finger-he says old gout infection) present.   Neurological:      Mental Status: He is alert. Mental status is at baseline.      Motor: Abnormal muscle tone present.   Psychiatric:         Behavior: Behavior is cooperative.         Cognition and Memory: He exhibits impaired remote memory.             Significant Labs: All pertinent labs within the past 24 hours have been reviewed.    Significant Imaging: I have reviewed all pertinent imaging results/findings within the past 24 hours.

## 2024-03-01 NOTE — ASSESSMENT & PLAN NOTE
Malnutrition Type:  Context: chronic illness  Level: severe    Related to (etiology):   Difficulty swallowing    Signs and Symptoms (as evidenced by):   Aspiration  No appetite 0-25% intake recorded since admit LOS x 6 days and pta per pt/wife  Coughing, N/V  Altered skin integrity  14% weight loss in 6 months with moderate to severe depletion of fat loss and muscle mass  Excessive ETOH use with electrolyte abnormalities   Debility, bed bound     Malnutrition Characteristic Summary:  Weight Loss (Malnutrition): greater than 10% in 6 months (14%)  Energy Intake (Malnutrition): less than 75% for greater than or equal to 1 month  Subcutaneous Fat (Malnutrition): moderate depletion  Muscle Mass (Malnutrition): severe depletion      Interventions/Recommendations (treatment strategy):  1) Continue texture modified diet per SLP/MD as tolerated - aspiration precautions in place   2) Encourage intake of current diet order + nutrition supplements    3) If patient is unable to tolerate PO d/t aspiration, nutrition support may be warranted at this time -  rec initiation of EN   4) MVM supplementations    5) Monitor labs and weights    6) RD to monitor and follow up to make rec's accordingly.    Nutrition Diagnosis Status:   Continues

## 2024-03-01 NOTE — CARE UPDATE
03/01/24 0836   Patient Assessment/Suction   Level of Consciousness (AVPU) alert   Respiratory Effort Normal;Unlabored   Expansion/Accessory Muscles/Retractions no retractions;no use of accessory muscles   All Lung Fields Breath Sounds diminished   PRE-TX-O2   SpO2 96 %   Pulse 88   Resp 18   Aerosol Therapy   $ Aerosol Therapy Charges Aerosol Treatment   Daily Review of Necessity (SVN) completed   Respiratory Treatment Status (SVN) given   Treatment Route (SVN) air;mouthpiece   Patient Position (SVN) Augustine's   Post Treatment Assessment (SVN) breath sounds unchanged   Signs of Intolerance (SVN) none

## 2024-03-01 NOTE — PROGRESS NOTES
[facility] [address] Medicine  Progress Note    Patient Name: Michael Johnson Jr.  MRN: [id number]  Patient Class: IP- Inpatient   Admission Date: 2/24/2024  Length of Stay: 5 days  Attending Physician: Boubacar Greer MD  Primary Care Provider: Amelia Lai MD        Subjective:     Principal Problem:Alcohol abuse with withdrawal        HPI:  63 year old male with Hx of Hypertension, Seizure disorders, spastic left-sided hemiplegia, Anemia, Arthrititis, Depression, CKD stage 3, Alcohol use disorder and Gout, who presents to the ED with complaints of nausea/vomiting and abdominal pain for the past 2 days. Patient is a poor historian; most history provided by patients wife. For the past 2 days patient has not been eating. Started to feel nauseated with associated NBNB emesis and weakness. Reports that his sore throat has been hurting him since yesterday and noticed he has been shaking since yesterday. He reports that he had quit drinking for a long time; however, per patients wife he has been drinking daily for several years and in the past few weeks it has been 1/2 pint of vodka every 2 days. Patient denies any fever, chest pain, palpitaitons, dyspnea, changes in bowel or urinary habits. He does report feeling of cramping in his upper abdomen, non-radiating. Last episode of emesis was here in the ER. States he takes his medication but not everyday. Denies any illicit drug use or smoking. 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 Denies any sick contacts.     Overview/Hospital Course:  Mr Rodriguez admitted for ETOH withdrawal and neck swelling. He reports that he has not had an alcoholic beverage in months. He is a poor historian and has different time frames than his wife and daughter. 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 He was vomiting the past few days but none since arrival.Started on MVI, thiamine, folic acid.  PeTH confirms heavy alcohol use.    On arrival nurse reported neck swelling, CT neck shows fullness of in the hypopharynx, involving posterior hypopharynx, concerned for pharyngitis. No drainable fluid collection. Started on IV CTX. Fevers began again 2/26. Repeat CT scan ordered to evaluate for abscess formation, none identified. CXR and UA ordered, unremarkable. CT AP shows atelectasis vs possible pneumonia, will add doxy for atypical coverage, continue IS. SLP following along for dysphagia and aspiration concerns. MBSS done 3/1, GI consult recommended    Interval History: Fever curve improving, Mg low again this AM, replace IV. MBSS ordered per SLP. After MBSS Mr. Johnson had an episode of emesis followed by an episode of unresponsiveness. Rapid response called, patient found to be awake and alert. Strong pulses, SBP 110s. At his baseline, answering questions appropriately. Suspect vasovagal episode     GI consulted per SLP recs    Review of Systems   Constitutional:  Negative for activity change and fatigue.   HENT:  Positive for trouble swallowing.    Respiratory:  Positive for cough. Negative for shortness of breath.    Gastrointestinal:  Negative for abdominal pain (chronic burning in abdomen/some int epigastric pain).   Musculoskeletal:  Positive for arthralgias and myalgias.   Neurological:  Positive for weakness.        Left sided spacticity since birth   Psychiatric/Behavioral:  Negative for agitation.      Objective:     Vital Signs (Most Recent):  Temp: 98.5 °F (36.9 °C) (03/01/24 0916)  Pulse: 95 (03/01/24 1126)  Resp: 16 (03/01/24 0916)  BP: 118/78 (03/01/24 0916)  SpO2: 98 % (03/01/24 0916) Vital Signs (24h Range):  Temp:  [98.5 °F (36.9 °C)-100.6 °F (38.1 °C)] 98.5 °F (36.9 °C)  Pulse:  [] 95  Resp:  [16-19] 16  SpO2:  [94 %-99 %] 98 %  BP: (104-126)/(57-78) 118/78     Weight: 82 kg (180 lb 12.4 oz)  Body mass index is 24.52 kg/m².    Intake/Output Summary (Last 24 hours) at 3/1/2024 1251  Last  data filed at 3/1/2024 0603  Gross per 24 hour   Intake 381.6 ml   Output 1850 ml   Net -1468.4 ml         Physical Exam  Vitals reviewed.   Constitutional:       General: He is not in acute distress.  HENT:      Head: Normocephalic.      Mouth/Throat:      Mouth: Mucous membranes are moist.   Eyes:      Extraocular Movements: Extraocular movements intact.   Pulmonary:      Effort: Pulmonary effort is normal. No respiratory distress.      Breath sounds: No wheezing or rales.   Abdominal:      Palpations: Abdomen is soft.      Tenderness: There is no abdominal tenderness.   Musculoskeletal:         General: Tenderness and deformity present.   Skin:     Findings: Lesion (right 4th finger-he says old gout infection) present.   Neurological:      Mental Status: He is alert. Mental status is at baseline.      Motor: Abnormal muscle tone present.   Psychiatric:         Behavior: Behavior is cooperative.         Cognition and Memory: He exhibits impaired remote memory.             Significant Labs: All pertinent labs within the past 24 hours have been reviewed.    Significant Imaging: I have reviewed all pertinent imaging results/findings within the past 24 hours.    Assessment/Plan:      * Alcohol abuse with withdrawal  - Per patients wife, he used to drink about 1 bottle of vodka daily for several years; however, in the past few days he has been having 1/2 pint of vodka every other day. Last known drink 2 days ago. Patient denies recent ETOH use. PETH 1200, confirming heavy drinking  - Start CIWA protocol  - scheduled fixed valium taper  - Ativan PRN per CIWA  - Zofran PRN for nausea   - thiamine, mvi, folic acid; B1 pending  - daily replacing electrolytes  - Seizure, Aspiration and Fall precautions    Pharyngitis  - CT with findings concerning for pharyngitis and possibly retained secretions. Also noted chronic appearing opacification of the left maxillary sinus with chronic osteitis of the sinus walls. Appears chronic  dating back to 3/2017 per read.   - Outpatient referral to Otolaryngology   - Amoxicillin changed to ceftriaxone   - Began having fevers 2/26 as high as 101.3  Repeat CT neck without abscess formation, Bcx ordered: NGTD  CXR and UA ordered, unremarkable   CT AP shows atelectasis vs developing pneumonia at bases of lung, will add doxy for atypical coverage and encourage IS  Fever grade improving with IS compliance and addition of doxycycline    Dysphagia  Reports trouble swallowing, SLP following along  Has tolerated advancing diet from liquid to minced and moist  MBSS 3/1, SLP recommending GI consult      Falls frequently  Chronic problem.  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 Also has h/o ETOH dependence. Thiamine level pending. IV replaced ordered.  PT/OT consulted. OP neurology referral placed. After talking with wife, patient is bedbound at baseline and has not walked in about three years.  Reason unknown.      Electrolyte disturbance  - Patient with Hypomagnesemia, Hypokalemia, hypocalcemia and Hypophosphatemia  - Correct electrolytes and monitor  - corrected calcium 8.7  - Telemetry monitoring for now    Paroxysmal atrial fibrillation  Patient with Paroxysmal (<7 days) atrial fibrillation which is controlled currently with Beta Blocker. Patient is currently in sinus rhythm.[id number] Score: 1  - Had an episode of Atrial flutter (09/2023)  - Not compliant with Home Eliquis or BB   - Echocardiogram (9/2023): Preserved EF 50-55%  - Monitor electrolytes and Maintain Mg >2 and K >4  - holding off on full anticoagulation for fall rsk    Essential hypertension  Chronic, uncontrolled. However, may be also elevated currently in the setting of withdrawal  Latest blood pressure and vitals reviewed-     Temp:  [98.5 °F (36.9 °C)-99.7 °F (37.6 °C)]   Pulse:  [102-130]   Resp:  [15-24]   BP: (126-151)/(80-85)   SpO2:  [98 %-100 %] .   Home meds for hypertension were reviewed and noted below.    Hypertension Medications               metoprolol tartrate (LOPRESSOR) 50 MG tablet TAKE 1 TABLET TWICE DAILY          While in the hospital, will manage blood pressure as follows; Continue home antihypertensive regimen    Will utilize p.r.n. blood pressure medication only if patient's blood pressure greater than 160/100 and he develops symptoms such as worsening chest pain or shortness of breath.    GERD (gastroesophageal reflux disease)  - Protonix 40 mg, daily      Seizure  - Noted in history. Last seizure several years ago ~ 2019. Unclear etiology may have been related to withdrawal  - Continue with Keppra  - Follow up outpatient with Neurology; given this was a one time occurrence in likely the above setting  - reports born with epilepsy    Hypokalemia  Patient has hypokalemia which is Acute and currently uncontrolled. Most recent potassium levels reviewed-   Lab Results   Component Value Date    K 3.3 (L) 02/24/2024   . Will continue potassium replacement per protocol and recheck repeat levels after replacement completed.       VTE Risk Mitigation (From admission, onward)           Ordered     apixaban tablet 5 mg  2 times daily         02/28/24 1428     IP VTE LOW RISK PATIENT  Once         02/24/24 1605     Place sequential compression device  Until discontinued         02/24/24 1605                    Discharge Planning   SIMONE: 3/4/2024     Code Status: Full Code   Is the patient medically ready for discharge?:     Reason for patient still in hospital (select all that apply): Patient trending condition  Discharge Plan A: Home with family, Home                  Batsheva Sales PA-C  Department of Hospital Medicine   [facility] ([address])

## 2024-03-01 NOTE — CONSULTS
Gastroenterology Consult    3/1/2024 12:34 PM    Patient Name: Michael Johnson Jr.  MRN: [id number]  Admission Date: 2/24/2024  Hospital Length of Stay: 5 days  Code Status: Full Code   Primary Care Physician: Amelia Lai MD  Principal Problem:Alcohol abuse with withdrawal  Consulting Physician: Inpatient consult to Gastroenterology  Consult performed by: Marilu Ledesma MD  Consult ordered by: Batsheva Sales PA-C        Reason for consultation: aspiration    HPI:  Michael Johnson Jr. is a 63 y.o. male with a history of HTN, epilepsy, afib on anticoagulation, ETOH abuse, CKD3, depression, gout, neuropathy and spastic hemiplegia who presented with N/V.  GI consulted for aspiration - coughing and choking while eating and drinking and talking.  He denies any sore throat.  He coughs up mucus and liquid.  Feels some heartburn/regurgitation.  Took meds for reflux in the past but it didn't help, so he stopped them.  No prior EGD.  Does get some post nasal drip.  No shortness of breath.  Seen by speech and underwent MBSS.  He did ok with minced/moist foods and slow/single sips of thin liquids.  PETH positive c/w ETOH use.      Past Medical History:   Diagnosis Date    Afib     Alcohol abuse with other alcohol-induced disorder 04/02/2019    Anemia 04/02/2019    unspecified deficiency    Ankle fracture, left     Aortic atherosclerosis 2/8/2024    Arthritis     Asthma     Cholecystitis 1/15/2018    Chronic kidney disease (CKD), stage III (moderate) 04/02/2019    Depression     Erectile dysfunction 04/02/2019    Gout, arthropathy 04/02/2019    Gout, unspecified     Hypertension     Hypertensive chronic kidney disease 04/02/2019    Hypomagnesemia 04/02/2019    Noncompliance 04/02/2019    Peripheral neuropathy 04/02/2019    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    Secondary hyperparathyroidism, renal 04/02/2019    Seizure 2016    Spastic hemiplegia affecting left nondominant side 04/02/2019       Past Surgical History:   Procedure  Laterality Date    ANKLE SURGERY      x6    CHOLECYSTECTOMY         Social History     Tobacco Use    Smoking status: Former    Smokeless tobacco: Never   Substance Use Topics    Alcohol use: Not Currently    Drug use: No        Family History   Problem Relation Age of Onset    Hypertension Father     Stroke Maternal Grandmother     Cataracts Maternal Grandfather     Stroke Maternal Grandfather     Cancer Mother         malignant polyp         Review of patient's allergies indicates:   Allergen Reactions    Lisinopril Swelling     Pt admitted with angioedema 2wks after taking lisinopril.          Current Facility-Administered Medications:     acetaminophen tablet 650 mg, 650 mg, Oral, Q6H PRN, Will Ponce NP, 650 mg at 02/27/24 2123    albuterol-ipratropium 2.5 mg-0.5 mg/3 mL nebulizer solution 3 mL, 3 mL, Nebulization, Q6H WAKE, Batsheva Sales PA-C, 3 mL at 03/01/24 0836    ammonium lactate 12 % lotion, , Topical (Top), BID, Jessica Skinner MD, Given at 03/01/24 0900    apixaban tablet 5 mg, 5 mg, Oral, BID, Batsheva Sales PA-VIRGIE, 5 mg at 03/01/24 0857    benzonatate capsule 100 mg, 100 mg, Oral, TID PRN, Batsheva Sales PA-C, 100 mg at 03/01/24 0559    cefTRIAXone (Rocephin) 1 g in dextrose 5 % in water (D5W) 100 mL IVPB (MB+), 1 g, Intravenous, Q24H, Oxana Ibanez DNP, Stopped at 03/01/24 1243    cetirizine tablet 5 mg, 5 mg, Oral, Daily, Batsheva Sales PA-VIRGIE, 5 mg at 03/01/24 0857    doxycycline tablet 100 mg, 100 mg, Oral, Q12H, Batsheva Sales PA-C, 100 mg at 03/01/24 0857    fluticasone propionate 50 mcg/actuation nasal spray 100 mcg, 2 spray, Each Nostril, Daily, Michelle Caro MD, 100 mcg at 03/01/24 0900    folic acid tablet 1 mg, 1 mg, Oral, Daily, Batsheva Sales PA-C, 1 mg at 03/01/24 0857    gabapentin capsule 400 mg, 400 mg, Oral, BID, Batsheva Sales PA-C, 400 mg at 03/01/24 0857    guaiFENesin 12 hr tablet 1,200 mg, 1,200 mg, Oral, BID, Woods,  Batsheva GALDAMEZ PA-C, 1,200 mg at 03/01/24 0857    levETIRAcetam tablet 500 mg, 500 mg, Oral, BID, Batsheva Sales PA-C, 500 mg at 03/01/24 0857    magnesium sulfate 2g in water 50mL IVPB (premix), 2 g, Intravenous, Once, Batsheva Sales PA-C, Last Rate: 25 mL/hr at 03/01/24 1212, 2 g at 03/01/24 1212    melatonin tablet 6 mg, 6 mg, Oral, Nightly PRN, Michelle Caro MD    metoprolol tartrate (LOPRESSOR) tablet 50 mg, 50 mg, Oral, BID, Batsheva Sales PA-C, 50 mg at 03/01/24 0857    multivitamin tablet, 1 tablet, Oral, Daily, Batsheva Sales PA-C, 1 tablet at 03/01/24 0857    ondansetron injection 4 mg, 4 mg, Intravenous, Q8H PRN, Michelle Caro MD    pantoprazole EC tablet 40 mg, 40 mg, Oral, Daily, Batsheva Sales PA-C, 40 mg at 03/01/24 0857    polyethylene glycol packet 17 g, 17 g, Oral, Daily, Batsheva Sales PA-C, 17 g at 03/01/24 1213    promethazine-codeine 6.25-10 mg/5 ml syrup 5 mL, 5 mL, Oral, Q6H PRN, Batsheva Sales PA-C, 5 mL at 03/01/24 0559    thiamine tablet 100 mg, 100 mg, Oral, Daily, Batsheva Sales PA-C, 100 mg at 03/01/24 0857    Review of Systems   Respiratory:  Positive for cough.    Gastrointestinal:  Positive for abdominal pain, nausea and vomiting.   Neurological:  Positive for seizures and weakness.   All other systems reviewed and are negative.      /78 (BP Location: Right arm, Patient Position: Sitting)   Pulse 95   Temp 98.5 °F (36.9 °C) (Oral)   Resp 16   Ht 6' (1.829 m)   Wt 82 kg (180 lb 12.4 oz)   SpO2 98%   BMI 24.52 kg/m²   Physical Exam  Vitals reviewed.   Constitutional:       General: He is not in acute distress.     Appearance: He is well-developed. He is diaphoretic.   HENT:      Head: Normocephalic and atraumatic.      Right Ear: External ear normal.      Left Ear: External ear normal.      Nose: Nose normal.      Mouth/Throat:      Mouth: Mucous membranes are moist.      Pharynx: Oropharynx is clear.   Eyes:      General: No  scleral icterus.     Pupils: Pupils are equal, round, and reactive to light.   Cardiovascular:      Rate and Rhythm: Normal rate and regular rhythm.      Heart sounds: Normal heart sounds. No murmur heard.  Pulmonary:      Effort: Pulmonary effort is normal. No respiratory distress.      Breath sounds: Normal breath sounds.   Abdominal:      General: Bowel sounds are normal. There is distension.      Palpations: Abdomen is soft.      Tenderness: There is no abdominal tenderness. There is no guarding or rebound.   Musculoskeletal:         General: Deformity (muscle wasting in legs) present. Normal range of motion.   Skin:     General: Skin is warm.      Findings: No rash.   Neurological:      Mental Status: He is alert and oriented to person, place, and time. Mental status is at baseline.   Psychiatric:         Mood and Affect: Mood normal.         Thought Content: Thought content normal.         Labs:  Lab Results   Component Value Date/Time    WBC 7.11 03/01/2024 05:24 AM    HGB 7.4 (L) 03/01/2024 05:24 AM    HCT 22.2 (L) 03/01/2024 05:24 AM    HCT 29 (L) 03/16/2017 07:30 AM     03/01/2024 05:24 AM     (H) 03/01/2024 05:24 AM     (L) 03/01/2024 05:24 AM    K 3.7 03/01/2024 05:24 AM     03/01/2024 05:24 AM    CO2 21 (L) 03/01/2024 05:24 AM    BUN 13 03/01/2024 05:24 AM    CREATININE 1.4 03/01/2024 05:24 AM    GLU 87 03/01/2024 05:24 AM    CALCIUM 8.2 (L) 03/01/2024 05:24 AM    MG 1.1 (L) 03/01/2024 05:24 AM    PHOS 1.6 (L) 02/25/2024 02:57 AM    INR 1.0 09/02/2023 07:20 AM    APTT 22.4 10/20/2014 01:32 AM   ]  Lab Results   Component Value Date/Time    PROT 7.1 03/01/2024 05:24 AM    ALBUMIN 2.3 (L) 03/01/2024 05:24 AM    BILITOT 0.7 03/01/2024 05:24 AM    AST 35 03/01/2024 05:24 AM    ALT 22 03/01/2024 05:24 AM    ALKPHOS 80 03/01/2024 05:24 AM   ]    Imaging and Procedures:  I personally reviewed the imaging/procedures below.  CT A/P 2/29/24:  Bilateral small pleural effusions and  posterior basal areas of compressive atelectasis.  Pneumonia cannot be excluded.  Hiatal hernia.  Right renal cyst and 2 left renal cysts.  Mild constipation.  Mild diverticulosis.    Assessment:  Michael Johnson Jr. is a 63 y.o. male with a history of HTN, epilepsy, afib on anticoagulation, ETOH abuse, CKD3, depression, gout, neuropathy and spastic hemiplegia who presented with N/V.  He has aspiration, coughing and choking with swallowing.  GI consulted per speech recs to evaluate dysphagia.     Plan:  - diet per speech therapy  - elevate HOB when eating  - EGD Monday - hold Eliquis now if able to, in case dilation is needed  - daily PPI    Marilu Ledesma MD

## 2024-03-01 NOTE — PROCEDURES
Modified Barium Swallow Study (MBSS)    Patient Name:  Michael Johnson Jr.   MRN:  [id number]    Recommendations:     General Recommendations: SLP to continue to follow 3-5x/week during this admit to ensure pt tolerating oral diet and to determine diet advancement.   Referral to inpatient GI for esophageal function  Diet Recommendations: pt unable to self-feed entirely, requires 1:1 assistance with all PO intake; please provide encouragement throughout meals to increase PO intake!   SOLIDS: Minced & Moist Diet - IDDSI Level 5  LIQUIDS: Thin liquids - IDDSI Level 0 via open cup, limiting amount of sequential sips   Aspiration Precautions: standard aspiration precautions, including but not limited to..  Feed only when awake/alert   HOB to 90 degrees for all PO intake; remain upright for up 2hrs after meals  1 bite/sip at a time and small bites/sips  Eliminate distractions; avoid talking while eating   General Precautions: Standard, aspiration  Communication Strategies: pt reports wears eye glasses but none present at bedside or in room-- provide repetition of information and orientation as needed.      Referral     Reason for Referral: Michael Johnson Jr. is a 63 y.o. male with an SLP diagnosis of dysphagia 2/2 throat swelling and excessive vomiting. Initially, pt's symptoms appeared to be improving; however, pt presenting with overt s/s of aspiration at bedside-- c/b watery eyes when drinking thin liquids, coughing, and throat clearing before, during, and after PO intake. Patient was referred for a MBSS to assess the efficiency of his swallow function, rule out aspiration and make recommendations regarding safe dietary consistencies, effective compensatory strategies, and safe eating environment.     Diagnosis: Alcohol abuse with withdrawal; also presenting with pharyngitis and GERD-- pertinent medical hx outlined below.     Current Diet: Minced and Moist Diet with Thin Liquids    History:     Past Medical History:    Diagnosis Date    Afib     Alcohol abuse with other alcohol-induced disorder 04/02/2019    Anemia 04/02/2019    unspecified deficiency    Ankle fracture, left     Aortic atherosclerosis 2/8/2024    Arthritis     Asthma     Cholecystitis 1/15/2018    Chronic kidney disease (CKD), stage III (moderate) 04/02/2019    Depression     Erectile dysfunction 04/02/2019    Gout, arthropathy 04/02/2019    Gout, unspecified     Hypertension     Hypertensive chronic kidney disease 04/02/2019    Hypomagnesemia 04/02/2019    Noncompliance 04/02/2019    Peripheral neuropathy 04/02/2019    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    Secondary hyperparathyroidism, renal 04/02/2019    Seizure 2016    Spastic hemiplegia affecting left nondominant side 04/02/2019     Objective:     Prior to beginning the study, this SLP verified the patient's identity with the Radiologist.     Current Respiratory Status: 03/01/24 room air     Position: Patient sitting upright in Huasted chair for entirety of study-- it should be noted that pt is spastic hemiplegia, affecting left nondominant side.     Visualization: Patient was seen in the lateral view-- a/p images not taken.     Consistency/Food Type Amount Mode of Presentation Penetration-Aspiration     Thin Liquids: Thin Varibar Barium - IDDSI Level 0   - 10cc      - 20cc        - unmeasured volume     - strawsips administered by SLP       - cupsips via medicine cup administered by SLP       - sequential cupsips via opencup administered by SLP   - penetration before the swallow; no aspiration       - no penetration or aspiration witnessed         - instance of aspiration after multiple swallow observed with retrograde flow through the UES      Nectar Thick Liquids: Nectar Varibar Barium - IDDSI Level 2 - Mildly-thick     -15cc   - sequential cupsips via medicine cup administered by SLP   - no penetration or aspiration witnessed      Puree: Varibar Pudding Barium mixed with vanilla pudding     - 5mL   - spoon   - no  penetration or aspiration witnessed    Solids: moisés cracker coated with Varibar Pudding Barium   - 1 bite - via SLP's gloved hand - no penetration or aspiration witnessed      Oral Peripheral Examination  Oral Musculature: WFL  Dentition: scattered dentition  Secretion Management: adequate  Mucosal Quality: good  Mandibular Strength and Mobility: WNL  Oral Labial Strength and Mobility: WNL  Lingual Strength and Mobility: WNL  Velar Elevation: WNL  Buccal Strength and Mobility: WNL  Volitional Cough: productive  Voice Prior to PO Intake: mildy strained, however clear  Oral Musculature Comments: Face is symmetrical at rest and during smile. Lingual and labial strength and ROM appear to be WFL for speech and oral intake. Able to lateralize, elevate, retract tongue and protrusion is at midline. Speech is 100% intelligible at the conversation level.    Oral Preparation/Oral Phase  LIQUIDS  Adequate labial seal to straw, demonstrating ability to siphon thin liquids without anterior loss  Reduced lingual control and coordination resulting in premature bolus loss to the pharynx/larynx, as observed with 10cc via strawsips  Adequate labial contact/seal to medicine cup-- improved lingual control and coordination with small amount of thin liquids via cupsips   Exhibited adequate labial contact/seal to medicine cup to consume nectar (mildly) thick liquids; no anterior loss observed   Also noted to exhibit adequate labial contact/seal to open cup given unmeasured volume of thin liquids  No significant oral residues noted post swallow     PUREE and SOLIDS  Adequate labial seal to spoon, demonstrating complete bolus stripping of puree; no anterior loss observed   Min oral residue post swallow  Able to accept small bite of moisés crackers via this SLP's gloved hand-- mastication of solids appears WFL  Adequate bolus cohesion with timely and efficient AP bolus transit     Pharyngeal Phase   THIN LIQUIDS  Premature bolus loss to the  pharynx/larynx resulting in penetration of thin liquids before the swallow, as observed across 2 swallows of thin liquids via strawsips; adequate hylolaryngeal elevation/excursion with adequate airway closure-- no aspiration observed  Timely trigger of pharyngeal swallow given unmeasured volume of thin liquids-- good hylolaryngeal elevation/excursion with adequate airway closure.   instance of aspiration after the swallow observed with retrograde flow through the UES.     NECTAR (mildly) THICK LIQUIDS  Post swallow stasis in pharynx observed with first swallow; residue cleared with second swallow-- no aspiration     PUREE  Bolus aggregated to the level of the vallecular space prior to initiation of the swallow   No post swallow stasis in pharynx observed-- no airway invasion     SOLIDS  Timely trigger of pharyngeal swallow   No airway threat or invasion observed with chewable solids; no pharyngeal residue     Cervical Esophageal Phase: Retrograde flow through the UES witnessed after given an unmeasured volume of thin liquids via sequential cupsips-- esophageal scan not completed during study; SLP recommends pt is seen by GI for further evaluation of esophageal function.     Assessment:     IMPRESSIONS  Based on today's evaluation, pt presents with mild impairment of oropharyngeal swallow function c/b reduced lingual control and coordination resulting in premature bolus loss of thin liquids to the pharynx/larynx resulting in penetration before the swallow-- no penetration observed with purees or chewable solid consistencies trialed this date; SLP deferred trial of barium table 2/2 pt with instance of aspiration after the swallow observed with retrograde flow through the UES. Pt presents with GERD and pharyngitis-- pt would benefit from referral to GI. SLP to continue to discuss diet advancement with with pt and pt family; overall, pt with limited PO intake as he reports dislike for hospital foods being offered-- no  concerns re: diet consistency.   RECOMMENDATIONS  Patient appears safe for regular diet with thin liquids via open cup, limiting the amount of sequential sips taken. Patient able to advance to regular diet; however, pt reports to this SLP preferring softer diet consistency. At this time, recommend continuation of current diet with SLP to continue to follow to ensure pt tolerating oral diet and to determine diet advancement.   Patient would benefit from referral to inpatient GI.    Education: SLP reviewed MBSS results with PA and MD in person, including call for rapid response as pt became unresponsive at end of MBSS this date, prior to medical team transferring pt bag into hospital stretcher-- PA and MD reporting suspected vasovagal episode. SLP discussed MBSS results with pt and pt spouse in pt room later this date, including SLPs recommendation of referral to GI; also and reviewed plan for SLP to continue to follow during this admit-- pt and pt spouse verbalized understanding and agreement of all discussed.     Goals:   Multidisciplinary Problems       SLP Goals          Problem: SLP    Goal Priority Disciplines Outcome   SLP Goal     SLP Ongoing, Progressing   Description: 1. Pt will participate in MBSS for further assessment of oropharyngeal swallow function.-- MET 3/1/24    2. Pt will be able to consume a full liquid diet without overt s/s of airway threat or aspiration given min cues to monitor rate and volume of intake.-- MET 2/27/24    3. Ongoing assessment of diet upgrade as pt tolerates.-- ongoing      4. Pt will tolerate a minced and moist diet with thin liquids without any overt s/s of aspiration or airway threat given min cues from clinician.     5. Pt and caregivers will demonstrate understanding and use of safe swallow strategies/ aspiration precautions in 100% of opportunities given min cues from clinician.                      Plan:   Patient to be seen:  Therapy Frequency: 3 x/week, 5 x/week   Plan  of Care expires:  03/10/24  Plan of Care reviewed with:  patient, spouse (MD, PA, and RN)        Discharge Recommendations:   (SLP at next level of care)   Barriers to Discharge:  Level of Skilled Assistance Needed      Time Tracking:   SLP Treatment Date:   03/01/24  Speech Start Time:  1030  Speech Stop Time:  1100     Speech Total Time (min):  30 min    03/01/2024

## 2024-03-01 NOTE — ASSESSMENT & PLAN NOTE
Reports trouble swallowing, SLP following along  Has tolerated advancing diet from liquid to minced and moist  MBSS 3/1, SLP recommending GI consult

## 2024-03-01 NOTE — PLAN OF CARE
Recommendations  1) Continue texture modified diet per SLP/MD as tolerated - aspiration precautions in place   2) Encourage intake of current diet order + nutrition supplements    3) If patient is unable to tolerate PO d/t aspiration, nutrition support may be warranted at this time -  rec initiation of EN   4) [facility] protocol - MVI, folic acid, thiamine daily    5) Monitor labs and weights    6) RD to monitor and follow up to make rec's accordingly.    Goals:   Patient to consume >75% of estimated energy needs prior to RD follow up.  Nutrition Goal Status: goal not met, progressing towards goal  Communication of RD Recs:  (POC)

## 2024-03-01 NOTE — ASSESSMENT & PLAN NOTE
- CT with findings concerning for pharyngitis and possibly retained secretions. Also noted chronic appearing opacification of the left maxillary sinus with chronic osteitis of the sinus walls. Appears chronic dating back to 3/2017 per read.   - Outpatient referral to Otolaryngology   - Amoxicillin changed to ceftriaxone   - Began having fevers 2/26 as high as 101.3  Repeat CT neck without abscess formation, Bcx ordered: NGTD  CXR and UA ordered, unremarkable   CT AP shows atelectasis vs developing pneumonia at bases of lung, will add doxy for atypical coverage and encourage IS  Fever grade improving with IS compliance and addition of doxycycline

## 2024-03-01 NOTE — PLAN OF CARE
Problem: Adult Inpatient Plan of Care  Goal: Plan of Care Review  Outcome: Ongoing, Progressing  Goal: Absence of Hospital-Acquired Illness or Injury  Outcome: Ongoing, Progressing  Goal: Optimal Comfort and Wellbeing  Outcome: Ongoing, Progressing     Problem: Skin Injury Risk Increased  Goal: Skin Health and Integrity  Outcome: Ongoing, Progressing     Problem: Impaired Wound Healing  Goal: Optimal Wound Healing  Outcome: Ongoing, Progressing     Problem: Alcohol Withdrawal  Goal: Alcohol Withdrawal Symptom Control  Outcome: Ongoing, Progressing     Problem: Eating/Swallowing Impairment  Goal: Optimal Eating/Swallowing without Aspir  Outcome: Ongoing, Progressing   POC reviewed with pt who verbalized understanding. AAOx4. Tmax 100.6; VSS on RA otherwise. Complains of a cough, meds given per MAR. Tele monitored running SR. CIWA Q8. Condom cath in place. Turned with assist. Resting between care. Remains free of falls and injury. Call light in reach and rounds made for safety. Wife at bedside.

## 2024-03-01 NOTE — PLAN OF CARE
Modified Barium Swallow Study completed this date-- instance of aspiration after the swallow observed with retrograde flow through the UES. SLP discussed results with medical team-- recommendations include referral to GI and continuation of current diet (minced and moist solids with thin liquids).    Problem: SLP  Goal: SLP Goal  Description: 1. Pt will participate in MBSS for further assessment of oropharyngeal swallow function.-- MET 3/1/24    2. Pt will be able to consume a full liquid diet without overt s/s of airway threat or aspiration given min cues to monitor rate and volume of intake.-- MET 2/27/24    3. Ongoing assessment of diet upgrade as pt tolerates.-- ongoing      4. Pt will tolerate a minced and moist diet with thin liquids without any overt s/s of aspiration or airway threat given min cues from clinician.     5. Pt and caregivers will demonstrate understanding and use of safe swallow strategies/ aspiration precautions in 100% of opportunities given min cues from clinician.   Outcome: Ongoing, Progressing

## 2024-03-01 NOTE — PROGRESS NOTES
[facility] Med [facility] ([address])  Adult Nutrition  Progress Note    SUMMARY       Recommendations  1) Continue texture modified diet per SLP/MD as tolerated - aspiration precautions in place   2) Encourage intake of current diet order + nutrition supplements    3) If patient is unable to tolerate PO d/t aspiration, nutrition support may be warranted at this time -  rec initiation of EN   4) [facility] protocol - MVI, folic acid, thiamine daily    5) Monitor labs and weights    6) RD to monitor and follow up to make rec's accordingly.    Goals:   Patient to consume >75% of estimated energy needs prior to RD follow up.  Nutrition Goal Status: goal not met, progressing towards goal  Communication of RD Recs:  (POC)    Assessment and Plan    Endocrine  Severe malnutrition  Malnutrition Type:  Context: chronic illness  Level: severe    Related to (etiology):   Difficulty swallowing    Signs and Symptoms (as evidenced by):   Aspiration  No appetite 0-25% intake recorded since admit LOS x 6 days and pta per pt/wife  Coughing, N/V  Altered skin integrity  14% weight loss in 6 months with moderate to severe depletion of fat loss and muscle mass  Excessive ETOH use with electrolyte abnormalities   Debility, bed bound     Malnutrition Characteristic Summary:  Weight Loss (Malnutrition): greater than 10% in 6 months (14%)  Energy Intake (Malnutrition): less than 75% for greater than or equal to 1 month  Subcutaneous Fat (Malnutrition): moderate depletion  Muscle Mass (Malnutrition): severe depletion      Interventions (treatment strategy):  Texture modified diet   Commercial beverages  Modular beverages  MVM supplements - MVI, folic acid, thiamine  Collaboration with other providers    Nutrition Diagnosis Status:   New         Malnutrition Assessment  Malnutrition Context: chronic illness  Malnutrition Level: severe  Skin (Micronutrient): dry, scaly, cracked (Dieudonne 13)  Teeth (Micronutrient): broken  dentition  Musculoskeletal/Lower Extremities: muscle wasting   Micronutrient Evaluation Summary: suspected deficiency   Weight Loss (Malnutrition): greater than 10% in 6 months (14%)  Energy Intake (Malnutrition): less than 75% for greater than or equal to 1 month  Subcutaneous Fat (Malnutrition): moderate depletion  Muscle Mass (Malnutrition): severe depletion   Orbital Region (Subcutaneous Fat Loss): moderate depletion  Upper Arm Region (Subcutaneous Fat Loss): moderate depletion   [place] Region (Muscle Loss): mild depletion  Clavicle Bone Region (Muscle Loss): mild depletion  Clavicle and Acromion Bone Region (Muscle Loss): moderate depletion  Dorsal Hand (Muscle Loss): moderate depletion  Patellar Region (Muscle Loss): severe depletion  Anterior Thigh Region (Muscle Loss): severe depletion  Posterior Calf Region (Muscle Loss): severe depletion                 Reason for Assessment    Reason For Assessment: RD follow-up  Diagnosis:  (alcohol abuse with withdrawal)  Relevant Medical History:   Patient Active Problem List   Diagnosis    Chronic pain of left ankle    Gouty arthritis    Mixed hyperlipidemia    Hypokalemia    Seizure    GERD (gastroesophageal reflux disease)    Macrocytic anemia    Essential hypertension    Alcohol abuse with withdrawal    Debility    Paroxysmal atrial fibrillation    High anion gap metabolic acidosis    Vitamin D deficiency    Peripheral polyneuropathy    Spastic hemiplegia, unspecified etiology, unspecified laterality    Depression    Dysphagia    Lichenification    Aortic atherosclerosis    Electrolyte disturbance    Pharyngitis    Falls frequently    Atelectasis    Severe malnutrition     Interdisciplinary Rounds: did not attend  General Information Comments: Pt and wife in room - receiving IDDSI 5 minced & moist diet. Lunch at bedside with 0% intake. 0-25% intake recorded to meals since admit. Pt reports no appetite. Drank 1/2 Ensure. Provided 2 whole milk per pt's request as an  attempt to promote PO intake. Pt denied N/V at time of visit. Pt s/p MBSS today and GI consulted for aspiration - coughing and choking while eating, drinking, and talking. EGD scheduled for monday. MAVERICKWA protocol d/t ETOH use. PIV. Dieudonne 23 - R/L knee, R hand, buttock. Pt meets criteria for severe malnutrition r/t inadequate oral intake aeb reported poor appetite, weight loss of >10% in 6 months, electrolytes abnormalities, debility,  and excessive ETOH use and moderate to severe depletion of fat loss and muscle mass.  Nutrition Discharge Planning: Patient to continue on recommended oral diet post discharge    Nutrition Risk Screen    Nutrition Risk Screen: difficulty chewing/swallowing, large or nonhealing wound, burn or pressure injury, reduced oral intake over the last month, unintentional loss of 10 lbs or more in the past 2 months    Nutrition/Diet History    Spiritual, Cultural Beliefs, Voodoo Practices, Values that Affect Care: no  Food Allergies: NKFA  Factors Affecting Nutritional Intake: impaired cognitive status/motor control, chewing difficulties/inability to chew food, difficulty/impaired swallowing, decreased appetite    Anthropometrics    Temp: 99.9 °F (37.7 °C)  Height Method: Stated  Height: 6' (182.9 cm)  Height (inches): 72 in  Weight Method: Bed Scale  Weight: 82 kg (180 lb 12.4 oz)  Weight (lb): 180.78 lb  Ideal Body Weight (IBW), Male: 178 lb  % Ideal Body Weight, Male (lb): 101.56 %  BMI (Calculated): 24.5  BMI Grade: 18.5-24.9 - normal  Weight Loss: unintentional  Usual Body Weight (UBW), k.5 kg (2023)  % Usual Body Weight: 86.04  % Weight Change From Usual Weight: -14.14 %  Additional Documentation:  (spastic left side hemiplegia)    Lab/Procedures/Meds    Pertinent Labs Reviewed: reviewed  CBC:  Recent Labs   Lab 24   WBC 7.11   HGB 7.4*   HCT 22.2*        CMP:  Recent Labs   Lab 24   CALCIUM 8.2*   ALBUMIN 2.3*   PROT 7.1   *   K 3.7   CO2 21*       BUN 13   CREATININE 1.4   ALKPHOS 80   ALT 22   AST 35   BILITOT 0.7     BMP:   Recent Labs   Lab 03/01/24  0524   GLU 87   *   K 3.7      CO2 21*   BUN 13   CREATININE 1.4   CALCIUM 8.2*   MG 1.1*       Pertinent Medications Reviewed: reviewed  Scheduled Meds:   albuterol-ipratropium  3 mL Nebulization Q6H WAKE    ammonium lactate   Topical (Top) BID    cefTRIAXone (Rocephin) IV (PEDS and ADULTS)  1 g Intravenous Q24H    cetirizine  5 mg Oral Daily    doxycycline  100 mg Oral Q12H    fluticasone propionate  2 spray Each Nostril Daily    folic acid  1 mg Oral Daily    gabapentin  400 mg Oral BID    guaiFENesin  1,200 mg Oral BID    levETIRAcetam  500 mg Oral BID    metoprolol tartrate  50 mg Oral BID    multivitamin  1 tablet Oral Daily    pantoprazole  40 mg Oral Daily    polyethylene glycol  17 g Oral Daily    thiamine  100 mg Oral Daily     Continuous Infusions:  PRN Meds:.acetaminophen, benzonatate, melatonin, ondansetron, promethazine-codeine 6.25-10 mg/5 ml    Estimated/Assessed Needs    Weight Used For Calorie Calculations: 82 kg (180 lb 12.4 oz)  Energy Calorie Requirements (kcal): 25-30kcals/kg (2050-2460kcals/day)  Energy Need Method: Kcal/kg  Protein Requirements: 1.2-1.5g/kg (98-123g/day)  Weight Used For Protein Calculations: 82 kg (180 lb 12.4 oz)  Fluid Requirements (mL): 1ml/kcal  Estimated Fluid Requirement Method: RDA Method  RDA Method (mL): 25  CHO Requirement: 250      Nutrition Prescription Ordered    Current Diet Order: IDDSI 5 - minced and moist  Oral Nutrition Supplement: Jesús BID; Commercial beverages TID    Evaluation of Received Nutrient/Fluid Intake    Parenteral Protein (gm): 10 (1/2 Ensure Plus)  Oral Calories (kcal): 125 (1/2 Ensure Plus)  % Kcal Needs: < 25%  % Protein Needs: < 25%  I/O: + 887 mL since admit  Energy Calories Required: not meeting needs  Protein Required: not meeting needs  Fluid Required: not meeting needs  Comments: LBM: 2/28/24  Tolerance:  not tolerating  % Intake of Estimated Energy Needs: 0 - 25 %  % Meal Intake: 0 - 25 %    Intake/Output - Last 3 Shifts         02/28 0700  02/29 0659 02/29 0700 03/01 0659 03/01 0700 03/02 0659    P.O. 240 410     IV Piggyback 90.9 91.6     Total Intake(mL/kg) 330.9 (4) 501.6 (6.1)     Urine (mL/kg/hr) 1150 (0.6) 1850 (0.9)     Emesis/NG output  0     Stool  0     Blood  0     Total Output 1150 1850     Net -819.1 -1348.4                    Nutrition Risk    Level of Risk/Frequency of Follow-up: moderate - high (follow up: 2x weekly)     Monitor and Evaluation    Food and Nutrient Intake: energy intake, food and beverage intake  Food and Nutrient Adminstration: diet order  Knowledge/Beliefs/Attitudes: beliefs and attitudes  Physical Activity and Function: nutrition-related ADLs and IADLs, factors affecting access to physical activity  Anthropometric Measurements: height/length, weight, weight change, body mass index  Biochemical Data, Medical Tests and Procedures: electrolyte and renal panel, gastrointestinal profile, glucose/endocrine profile, inflammatory profile, lipid profile  Nutrition-Focused Physical Findings: skin, overall appearance     Nutrition Follow-Up    RD Follow-up?: Yes    Farhana Sykes RDN, [facility]

## 2024-03-02 LAB
ALBUMIN SERPL BCP-MCNC: 2.3 G/DL (ref 3.5–5.2)
ALP SERPL-CCNC: 94 U/L (ref 55–135)
ALT SERPL W/O P-5'-P-CCNC: 24 U/L (ref 10–44)
ANION GAP SERPL CALC-SCNC: 10 MMOL/L (ref 8–16)
ANISOCYTOSIS BLD QL SMEAR: SLIGHT
AST SERPL-CCNC: 41 U/L (ref 10–40)
BASOPHILS # BLD AUTO: 0.04 K/UL (ref 0–0.2)
BASOPHILS NFR BLD: 0.5 % (ref 0–1.9)
BILIRUB SERPL-MCNC: 0.6 MG/DL (ref 0.1–1)
BUN SERPL-MCNC: 18 MG/DL (ref 8–23)
CALCIUM SERPL-MCNC: 8.5 MG/DL (ref 8.7–10.5)
CHLORIDE SERPL-SCNC: 103 MMOL/L (ref 95–110)
CO2 SERPL-SCNC: 17 MMOL/L (ref 23–29)
CREAT SERPL-MCNC: 1.8 MG/DL (ref 0.5–1.4)
DIFFERENTIAL METHOD BLD: ABNORMAL
EOSINOPHIL # BLD AUTO: 0.2 K/UL (ref 0–0.5)
EOSINOPHIL NFR BLD: 2.3 % (ref 0–8)
ERYTHROCYTE [DISTWIDTH] IN BLOOD BY AUTOMATED COUNT: 15 % (ref 11.5–14.5)
EST. GFR  (NO RACE VARIABLE): 42 ML/MIN/1.73 M^2
GLUCOSE SERPL-MCNC: 116 MG/DL (ref 70–110)
HCT VFR BLD AUTO: 23.3 % (ref 40–54)
HGB BLD-MCNC: 7.6 G/DL (ref 14–18)
IMM GRANULOCYTES # BLD AUTO: 0.05 K/UL (ref 0–0.04)
IMM GRANULOCYTES NFR BLD AUTO: 0.6 % (ref 0–0.5)
LYMPHOCYTES # BLD AUTO: 0.9 K/UL (ref 1–4.8)
LYMPHOCYTES NFR BLD: 10.8 % (ref 18–48)
MAGNESIUM SERPL-MCNC: 1.7 MG/DL (ref 1.6–2.6)
MCH RBC QN AUTO: 38.4 PG (ref 27–31)
MCHC RBC AUTO-ENTMCNC: 32.6 G/DL (ref 32–36)
MCV RBC AUTO: 118 FL (ref 82–98)
MONOCYTES # BLD AUTO: 2.1 K/UL (ref 0.3–1)
MONOCYTES NFR BLD: 24.6 % (ref 4–15)
NEUTROPHILS # BLD AUTO: 5.3 K/UL (ref 1.8–7.7)
NEUTROPHILS NFR BLD: 61.2 % (ref 38–73)
NRBC BLD-RTO: 0 /100 WBC
PLATELET # BLD AUTO: 273 K/UL (ref 150–450)
PLATELET BLD QL SMEAR: ABNORMAL
PMV BLD AUTO: 10.7 FL (ref 9.2–12.9)
POCT GLUCOSE: 120 MG/DL (ref 70–110)
POTASSIUM SERPL-SCNC: 4 MMOL/L (ref 3.5–5.1)
PROT SERPL-MCNC: 7.2 G/DL (ref 6–8.4)
RBC # BLD AUTO: 1.98 M/UL (ref 4.6–6.2)
SODIUM SERPL-SCNC: 130 MMOL/L (ref 136–145)
WBC # BLD AUTO: 8.58 K/UL (ref 3.9–12.7)

## 2024-03-02 PROCEDURE — 83735 ASSAY OF MAGNESIUM: CPT | Mod: HCNC | Performed by: STUDENT IN AN ORGANIZED HEALTH CARE EDUCATION/TRAINING PROGRAM

## 2024-03-02 PROCEDURE — 25000003 PHARM REV CODE 250: Mod: HCNC | Performed by: NURSE PRACTITIONER

## 2024-03-02 PROCEDURE — 25000003 PHARM REV CODE 250: Mod: HCNC | Performed by: PHYSICIAN ASSISTANT

## 2024-03-02 PROCEDURE — 25000003 PHARM REV CODE 250: Mod: HCNC | Performed by: STUDENT IN AN ORGANIZED HEALTH CARE EDUCATION/TRAINING PROGRAM

## 2024-03-02 PROCEDURE — 80053 COMPREHEN METABOLIC PANEL: CPT | Mod: HCNC | Performed by: PHYSICIAN ASSISTANT

## 2024-03-02 PROCEDURE — 63600175 PHARM REV CODE 636 W HCPCS: Mod: HCNC | Performed by: PHYSICIAN ASSISTANT

## 2024-03-02 PROCEDURE — 36415 COLL VENOUS BLD VENIPUNCTURE: CPT | Mod: HCNC | Performed by: STUDENT IN AN ORGANIZED HEALTH CARE EDUCATION/TRAINING PROGRAM

## 2024-03-02 PROCEDURE — 63600175 PHARM REV CODE 636 W HCPCS: Mod: HCNC | Performed by: NURSE PRACTITIONER

## 2024-03-02 PROCEDURE — 94761 N-INVAS EAR/PLS OXIMETRY MLT: CPT | Mod: HCNC

## 2024-03-02 PROCEDURE — 25000242 PHARM REV CODE 250 ALT 637 W/ HCPCS: Mod: HCNC | Performed by: PHYSICIAN ASSISTANT

## 2024-03-02 PROCEDURE — 94640 AIRWAY INHALATION TREATMENT: CPT | Mod: HCNC

## 2024-03-02 PROCEDURE — 25000003 PHARM REV CODE 250: Mod: HCNC | Performed by: HOSPITALIST

## 2024-03-02 PROCEDURE — 21400001 HC TELEMETRY ROOM: Mod: HCNC

## 2024-03-02 PROCEDURE — 85025 COMPLETE CBC W/AUTO DIFF WBC: CPT | Mod: HCNC | Performed by: PHYSICIAN ASSISTANT

## 2024-03-02 RX ORDER — LIDOCAINE HYDROCHLORIDE 20 MG/ML
15 SOLUTION OROPHARYNGEAL ONCE
Status: COMPLETED | OUTPATIENT
Start: 2024-03-02 | End: 2024-03-02

## 2024-03-02 RX ORDER — ALUMINUM HYDROXIDE, MAGNESIUM HYDROXIDE, AND SIMETHICONE 1200; 120; 1200 MG/30ML; MG/30ML; MG/30ML
30 SUSPENSION ORAL ONCE
Status: COMPLETED | OUTPATIENT
Start: 2024-03-02 | End: 2024-03-02

## 2024-03-02 RX ADMIN — METOPROLOL TARTRATE 50 MG: 50 TABLET, FILM COATED ORAL at 10:03

## 2024-03-02 RX ADMIN — BENZONATATE 100 MG: 100 CAPSULE ORAL at 10:03

## 2024-03-02 RX ADMIN — PANTOPRAZOLE SODIUM 40 MG: 40 TABLET, DELAYED RELEASE ORAL at 11:03

## 2024-03-02 RX ADMIN — POLYETHYLENE GLYCOL 3350 17 G: 17 POWDER, FOR SOLUTION ORAL at 11:03

## 2024-03-02 RX ADMIN — PIPERACILLIN SODIUM AND TAZOBACTAM SODIUM 4.5 G: 4; .5 INJECTION, POWDER, LYOPHILIZED, FOR SOLUTION INTRAVENOUS at 11:03

## 2024-03-02 RX ADMIN — GUAIFENESIN 1200 MG: 600 TABLET, EXTENDED RELEASE ORAL at 10:03

## 2024-03-02 RX ADMIN — LIDOCAINE HYDROCHLORIDE 15 ML: 20 SOLUTION ORAL; TOPICAL at 04:03

## 2024-03-02 RX ADMIN — ACETAMINOPHEN 650 MG: 325 TABLET, FILM COATED ORAL at 12:03

## 2024-03-02 RX ADMIN — GABAPENTIN 400 MG: 400 CAPSULE ORAL at 11:03

## 2024-03-02 RX ADMIN — FOLIC ACID 1 MG: 1 TABLET ORAL at 11:03

## 2024-03-02 RX ADMIN — LEVETIRACETAM 500 MG: 500 TABLET, FILM COATED ORAL at 11:03

## 2024-03-02 RX ADMIN — ACETAMINOPHEN 650 MG: 325 TABLET, FILM COATED ORAL at 10:03

## 2024-03-02 RX ADMIN — Medication 6 MG: at 10:03

## 2024-03-02 RX ADMIN — IPRATROPIUM BROMIDE AND ALBUTEROL SULFATE 3 ML: 2.5; .5 SOLUTION RESPIRATORY (INHALATION) at 08:03

## 2024-03-02 RX ADMIN — PROMETHAZINE HYDROCHLORIDE AND CODEINE PHOSPHATE 5 ML: 6.25; 1 SOLUTION ORAL at 10:03

## 2024-03-02 RX ADMIN — FLUTICASONE PROPIONATE 100 MCG: 50 SPRAY, METERED NASAL at 11:03

## 2024-03-02 RX ADMIN — Medication 100 MG: at 11:03

## 2024-03-02 RX ADMIN — CETIRIZINE HYDROCHLORIDE 5 MG: 5 TABLET, FILM COATED ORAL at 11:03

## 2024-03-02 RX ADMIN — IPRATROPIUM BROMIDE AND ALBUTEROL SULFATE 3 ML: 2.5; .5 SOLUTION RESPIRATORY (INHALATION) at 07:03

## 2024-03-02 RX ADMIN — CEFTRIAXONE SODIUM 1 G: 1 INJECTION, POWDER, FOR SOLUTION INTRAMUSCULAR; INTRAVENOUS at 11:03

## 2024-03-02 RX ADMIN — DOXYCYCLINE HYCLATE 100 MG: 100 TABLET, COATED ORAL at 11:03

## 2024-03-02 RX ADMIN — GUAIFENESIN 1200 MG: 600 TABLET, EXTENDED RELEASE ORAL at 11:03

## 2024-03-02 RX ADMIN — AMMONIUM LACTATE: 120 LOTION TOPICAL at 11:03

## 2024-03-02 RX ADMIN — THERA TABS 1 TABLET: TAB at 11:03

## 2024-03-02 RX ADMIN — ALUMINUM HYDROXIDE, MAGNESIUM HYDROXIDE, AND DIMETHICONE 30 ML: 200; 20; 200 SUSPENSION ORAL at 04:03

## 2024-03-02 RX ADMIN — METOPROLOL TARTRATE 50 MG: 50 TABLET, FILM COATED ORAL at 11:03

## 2024-03-02 RX ADMIN — VANCOMYCIN HYDROCHLORIDE 1250 MG: 1.25 INJECTION, POWDER, LYOPHILIZED, FOR SOLUTION INTRAVENOUS at 01:03

## 2024-03-02 RX ADMIN — PIPERACILLIN SODIUM AND TAZOBACTAM SODIUM 4.5 G: 4; .5 INJECTION, POWDER, LYOPHILIZED, FOR SOLUTION INTRAVENOUS at 04:03

## 2024-03-02 RX ADMIN — LEVETIRACETAM 500 MG: 500 TABLET, FILM COATED ORAL at 10:03

## 2024-03-02 RX ADMIN — IPRATROPIUM BROMIDE AND ALBUTEROL SULFATE 3 ML: 2.5; .5 SOLUTION RESPIRATORY (INHALATION) at 03:03

## 2024-03-02 RX ADMIN — GABAPENTIN 400 MG: 400 CAPSULE ORAL at 10:03

## 2024-03-02 NOTE — PLAN OF CARE
Problem: Adult Inpatient Plan of Care  Goal: Plan of Care Review  Outcome: Ongoing, Progressing  Goal: Absence of Hospital-Acquired Illness or Injury  Outcome: Ongoing, Progressing  Goal: Optimal Comfort and Wellbeing  Outcome: Ongoing, Progressing     Problem: Skin Injury Risk Increased  Goal: Skin Health and Integrity  Outcome: Ongoing, Progressing     Problem: Impaired Wound Healing  Goal: Optimal Wound Healing  Outcome: Ongoing, Progressing     Problem: Pain Acute  Goal: Acceptable Pain Control and Functional Ability  Outcome: Ongoing, Progressing     Problem: Alcohol Withdrawal  Goal: Alcohol Withdrawal Symptom Control  Outcome: Ongoing, Progressing     Problem: Eating/Swallowing Impairment  Goal: Optimal Eating/Swallowing without Aspir  Outcome: Ongoing, Progressing   POC reviewed with pt who verbalized understanding. AAOx4. Fever treated with PRN meds per MAR; VSS on RA otherwise. IS at bedside and encouraged. PRN meds given for cough. Condom cath in place. BG monitored at bedtime with no coverage needed per MAR. Tele monitored. Repositioned with assist. Resting between care. Remains free of falls and injury. Call light in reach and rounds made for safety. Wife at bedside.

## 2024-03-02 NOTE — PROGRESS NOTES
[facility] [address] Medicine  Progress Note    Patient Name: Michael Johnson Jr.  MRN: [id number]  Patient Class: IP- Inpatient   Admission Date: 2/24/2024  Length of Stay: 6 days  Attending Physician: Boubacar Greer MD  Primary Care Provider: Amelia Lai MD        Subjective:     Principal Problem:Alcohol abuse with withdrawal        HPI:  63 year old male with Hx of Hypertension, Seizure disorders, spastic left-sided hemiplegia, Anemia, Arthrititis, Depression, CKD stage 3, Alcohol use disorder and Gout, who presents to the ED with complaints of nausea/vomiting and abdominal pain for the past 2 days. Patient is a poor historian; most history provided by patients wife. For the past 2 days patient has not been eating. Started to feel nauseated with associated NBNB emesis and weakness. Reports that his sore throat has been hurting him since yesterday and noticed he has been shaking since yesterday. He reports that he had quit drinking for a long time; however, per patients wife he has been drinking daily for several years and in the past few weeks it has been 1/2 pint of vodka every 2 days. Patient denies any fever, chest pain, palpitaitons, dyspnea, changes in bowel or urinary habits. He does report feeling of cramping in his upper abdomen, non-radiating. Last episode of emesis was here in the ER. States he takes his medication but not everyday. Denies any illicit drug use or smoking. 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 Denies any sick contacts.     Overview/Hospital Course:  Mr Rodriguez admitted for ETOH withdrawal and neck swelling. He reports that he has not had an alcoholic beverage in months. He is a poor historian and has different time frames than his wife and daughter. 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 He was vomiting the past few days but none since arrival.Started on MVI, thiamine, folic acid.  PeTH confirms heavy alcohol use.    On arrival nurse reported neck swelling, CT neck shows fullness of in the hypopharynx, involving posterior hypopharynx, concerned for pharyngitis. No drainable fluid collection. Started on IV CTX. Fevers began again 2/26. Repeat CT scan ordered to evaluate for abscess formation, none identified. CXR and UA ordered, unremarkable. CT AP shows atelectasis vs possible pneumonia, will add doxy for atypical coverage, continue IS. Fevers still persisting. Broaden to Vanc and Zosyn and monitor. Will consult ID on Monday for fever unknown origin    SLP following along for dysphagia and aspiration concerns. MBSS done 3/1, GI consult recommended EGD on Monday    Interval History: High temps overnight 102.6, today at bedside Mr. Johnson states he thinks he's having difficulties with the solid food. Resume liquids. CXR ordered to eval for aspiration, no new consolidation. Given his high temps, will broaden to vanc and zosyn     Review of Systems   Constitutional:  Negative for activity change and fatigue.   HENT:  Positive for trouble swallowing.    Respiratory:  Positive for cough. Negative for shortness of breath.    Gastrointestinal:  Negative for abdominal pain (chronic burning in abdomen/some int epigastric pain).   Musculoskeletal:  Positive for arthralgias and myalgias.   Neurological:  Positive for weakness.        Left sided spacticity since birth   Psychiatric/Behavioral:  Negative for agitation.      Objective:     Vital Signs (Most Recent):  Temp: 100 °F (37.8 °C) (03/02/24 1159)  Pulse: 91 (03/02/24 1209)  Resp: 18 (03/02/24 1159)  BP: 98/60 (03/02/24 1159)  SpO2: (!) 94 % (03/02/24 1159) Vital Signs (24h Range):  Temp:  [97.9 °F (36.6 °C)-102.6 °F (39.2 °C)] 100 °F (37.8 °C)  Pulse:  [] 91  Resp:  [16-18] 18  SpO2:  [94 %-97 %] 94 %  BP: ()/(56-70) 98/60     Weight: 85 kg (187 lb 6.3 oz)  Body mass index is 25.41 kg/m².    Intake/Output Summary (Last 24 hours) at  3/2/2024 1333  Last data filed at 3/2/2024 1159  Gross per 24 hour   Intake 449.16 ml   Output 950 ml   Net -500.84 ml         Physical Exam  Vitals reviewed.   Constitutional:       General: He is not in acute distress.  HENT:      Head: Normocephalic.      Mouth/Throat:      Mouth: Mucous membranes are moist.   Eyes:      Extraocular Movements: Extraocular movements intact.   Pulmonary:      Effort: Pulmonary effort is normal. No respiratory distress.   Abdominal:      General: Abdomen is flat.      Palpations: Abdomen is soft.   Musculoskeletal:         General: Tenderness and deformity present.   Skin:     Findings: Lesion (right 4th finger-he says old gout infection) present.   Neurological:      Mental Status: He is alert. Mental status is at baseline.      Motor: Abnormal muscle tone present.   Psychiatric:         Behavior: Behavior normal. Behavior is cooperative.         Cognition and Memory: He exhibits impaired remote memory.             Significant Labs: All pertinent labs within the past 24 hours have been reviewed.    Significant Imaging: I have reviewed all pertinent imaging results/findings within the past 24 hours.    Assessment/Plan:      * Alcohol abuse with withdrawal  - Per patients wife, he used to drink about 1 bottle of vodka daily for several years; however, in the past few days he has been having 1/2 pint of vodka every other day. Last known drink 2 days ago. Patient denies recent ETOH use. PETH 1200, confirming heavy drinking  - Start CIWA protocol  - scheduled fixed valium taper  - Ativan PRN per CIWA  - Zofran PRN for nausea   - thiamine, mvi, folic acid; B1 pending  - daily replacing electrolytes  - Seizure, Aspiration and Fall precautions    Pharyngitis  - CT with findings concerning for pharyngitis and possibly retained secretions. Also noted chronic appearing opacification of the left maxillary sinus with chronic osteitis of the sinus walls. Appears chronic dating back to 3/2017 per  read.   - Outpatient referral to Otolaryngology   - Amoxicillin changed to ceftriaxone   - Began having fevers 2/26 as high as 101.3  Repeat CT neck without abscess formation, Bcx ordered: NGTD  CXR and UA ordered, unremarkable   CT AP shows atelectasis vs developing pneumonia at bases of lung, will add doxy for atypical coverage and encourage IS  Fevers still persisting despite above. Will broaden to Vanc and Zosyn, consult ID on Monday fro fever uknown origin    Dysphagia  Reports trouble swallowing, SLP following along  Has tolerated advancing diet from liquid to minced and moist, though began to have episodes of vomiting and reported dysphagia so resumed liquid diet  MBSS 3/1, SLP recommending GI consult  Plan for EGD 3/4      Falls frequently  Chronic problem.  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 Also has h/o ETOH dependence. Thiamine level pending. IV replaced ordered.  PT/OT consulted. OP neurology referral placed. After talking with wife, patient is bedbound at baseline and has not walked in about three years.  Reason unknown.      Electrolyte disturbance  - Patient with Hypomagnesemia, Hypokalemia, hypocalcemia and Hypophosphatemia  - Correct electrolytes and monitor  - corrected calcium 8.7  - Telemetry monitoring for now    Paroxysmal atrial fibrillation  Patient with Paroxysmal (<7 days) atrial fibrillation which is controlled currently with Beta Blocker. Patient is currently in sinus rhythm.[id number] Score: 1  - Had an episode of Atrial flutter (09/2023)  - Not compliant with Home Eliquis or BB   - Echocardiogram (9/2023): Preserved EF 50-55%  - Monitor electrolytes and Maintain Mg >2 and K >4  - holding off on full anticoagulation for fall rsk    Essential hypertension  Chronic, uncontrolled. However, may be also elevated currently in the setting of withdrawal  Latest blood pressure and vitals reviewed-     Temp:  [98.5 °F (36.9 °C)-99.7 °F (37.6 °C)]   Pulse:  [102-130]   Resp:   [15-24]   BP: (126-151)/(80-85)   SpO2:  [98 %-100 %] .   Home meds for hypertension were reviewed and noted below.   Hypertension Medications               metoprolol tartrate (LOPRESSOR) 50 MG tablet TAKE 1 TABLET TWICE DAILY          While in the hospital, will manage blood pressure as follows; Continue home antihypertensive regimen    Will utilize p.r.n. blood pressure medication only if patient's blood pressure greater than 160/100 and he develops symptoms such as worsening chest pain or shortness of breath.    GERD (gastroesophageal reflux disease)  - Protonix 40 mg, daily      Seizure  - Noted in history. Last seizure several years ago ~ 2019. Unclear etiology may have been related to withdrawal  - Continue with Keppra  - Follow up outpatient with Neurology; given this was a one time occurrence in likely the above setting  - reports born with epilepsy    Hypokalemia  Patient has hypokalemia which is Acute and currently uncontrolled. Most recent potassium levels reviewed-   Lab Results   Component Value Date    K 3.3 (L) 02/24/2024   . Will continue potassium replacement per protocol and recheck repeat levels after replacement completed.       VTE Risk Mitigation (From admission, onward)           Ordered     IP VTE LOW RISK PATIENT  Once         02/24/24 1605     Place sequential compression device  Until discontinued         02/24/24 1605                    Discharge Planning   SIMONE: 3/4/2024     Code Status: Full Code   Is the patient medically ready for discharge?:     Reason for patient still in hospital (select all that apply): Patient trending condition  Discharge Plan A: Home with family, Home                  Batsheva Sales PA-C  Department of Hospital Medicine   [facility] ([address])

## 2024-03-02 NOTE — ASSESSMENT & PLAN NOTE
Reports trouble swallowing, SLP following along  Has tolerated advancing diet from liquid to minced and moist, though began to have episodes of vomiting and reported dysphagia so resumed liquid diet  MBSS 3/1, SLP recommending GI consult  Plan for EGD 3/4

## 2024-03-02 NOTE — SUBJECTIVE & OBJECTIVE
Interval History: High temps overnight 102.6, today at bedside Mr. Johnson states he thinks he's having difficulties with the solid food. Resume liquids. CXR ordered to eval for aspiration, no new consolidation. Given his high temps, will broaden to eileen and zosyn     Review of Systems   Constitutional:  Negative for activity change and fatigue.   HENT:  Positive for trouble swallowing.    Respiratory:  Positive for cough. Negative for shortness of breath.    Gastrointestinal:  Negative for abdominal pain (chronic burning in abdomen/some int epigastric pain).   Musculoskeletal:  Positive for arthralgias and myalgias.   Neurological:  Positive for weakness.        Left sided spacticity since birth   Psychiatric/Behavioral:  Negative for agitation.      Objective:     Vital Signs (Most Recent):  Temp: 100 °F (37.8 °C) (03/02/24 1159)  Pulse: 91 (03/02/24 1209)  Resp: 18 (03/02/24 1159)  BP: 98/60 (03/02/24 1159)  SpO2: (!) 94 % (03/02/24 1159) Vital Signs (24h Range):  Temp:  [97.9 °F (36.6 °C)-102.6 °F (39.2 °C)] 100 °F (37.8 °C)  Pulse:  [] 91  Resp:  [16-18] 18  SpO2:  [94 %-97 %] 94 %  BP: ()/(56-70) 98/60     Weight: 85 kg (187 lb 6.3 oz)  Body mass index is 25.41 kg/m².    Intake/Output Summary (Last 24 hours) at 3/2/2024 1333  Last data filed at 3/2/2024 1159  Gross per 24 hour   Intake 449.16 ml   Output 950 ml   Net -500.84 ml         Physical Exam  Vitals reviewed.   Constitutional:       General: He is not in acute distress.  HENT:      Head: Normocephalic.      Mouth/Throat:      Mouth: Mucous membranes are moist.   Eyes:      Extraocular Movements: Extraocular movements intact.   Pulmonary:      Effort: Pulmonary effort is normal. No respiratory distress.   Abdominal:      General: Abdomen is flat.      Palpations: Abdomen is soft.   Musculoskeletal:         General: Tenderness and deformity present.   Skin:     Findings: Lesion (right 4th finger-he says old gout infection) present.    Neurological:      Mental Status: He is alert. Mental status is at baseline.      Motor: Abnormal muscle tone present.   Psychiatric:         Behavior: Behavior normal. Behavior is cooperative.         Cognition and Memory: He exhibits impaired remote memory.             Significant Labs: All pertinent labs within the past 24 hours have been reviewed.    Significant Imaging: I have reviewed all pertinent imaging results/findings within the past 24 hours.

## 2024-03-02 NOTE — ASSESSMENT & PLAN NOTE
- CT with findings concerning for pharyngitis and possibly retained secretions. Also noted chronic appearing opacification of the left maxillary sinus with chronic osteitis of the sinus walls. Appears chronic dating back to 3/2017 per read.   - Outpatient referral to Otolaryngology   - Amoxicillin changed to ceftriaxone   - Began having fevers 2/26 as high as 101.3  Repeat CT neck without abscess formation, Bcx ordered: NGTD  CXR and UA ordered, unremarkable   CT AP shows atelectasis vs developing pneumonia at bases of lung, will add doxy for atypical coverage and encourage IS  Fevers still persisting despite above. Will broaden to Vanc and Zosyn, consult ID on Monday fro fever uknown origin

## 2024-03-02 NOTE — PROGRESS NOTES
Pharmacokinetic Initial Assessment: IV Vancomycin    Assessment/Plan:    Initiate intravenous vancomycin 1250 mg every 24 hours  Desired empiric serum trough concentration is 10 to 20 mcg/mL  Draw vancomycin trough level 60 min prior to third dose on 3/3/24 at approximately 1200  Pharmacy will continue to follow and monitor vancomycin.      Please contact pharmacy at extension [phone] with any questions regarding this assessment.     Thank you for the consult,   Jay L Schwab       Patient brief summary:  Michael Johnson Jr. is a 63 y.o. male initiated on antimicrobial therapy with IV Vancomycin for treatment of suspected  Pneumonia    Drug Allergies:   Review of patient's allergies indicates:   Allergen Reactions    Lisinopril Swelling     Pt admitted with angioedema 2wks after taking lisinopril.        Actual Body Weight:   85 kg    Renal Function:   Estimated Creatinine Clearance: 47.3 mL/min (A) (based on SCr of 1.8 mg/dL (H)).,     Dialysis Method (if applicable):  N/A    CBC (last 72 hours):  Recent Labs   Lab Result Units 02/29/24  0512 03/01/24  0524 03/02/24  0441   WBC K/uL 7.92 7.11 8.58   Hemoglobin g/dL 7.5* 7.4* 7.6*   Hematocrit % 22.6* 22.2* 23.3*   Platelets K/uL 152 204 273   Gran % % 67.9 57.9 61.2   Lymph % % 7.4* 11.3* 10.8*   Mono % % 20.6* 25.9* 24.6*   Eosinophil % % 3.0 3.9 2.3   Basophil % % 0.3 0.6 0.5   Differential Method  Automated Automated Automated       Metabolic Panel (last 72 hours):  Recent Labs   Lab Result Units 02/28/24  1432 02/29/24  0512 02/29/24  0837 03/01/24  0524 03/02/24  0441   Sodium mmol/L  --   --  132* 132* 130*   Potassium mmol/L  --   --  3.8 3.7 4.0   Chloride mmol/L  --   --  103 102 103   CO2 mmol/L  --   --  21* 21* 17*   Glucose mg/dL  --   --  97 87 116*   Glucose, UA  Negative  --   --   --   --    BUN mg/dL  --   --  14 13 18   Creatinine mg/dL  --   --  1.3 1.4 1.8*   Albumin g/dL  --   --  2.5* 2.3* 2.3*   Total Bilirubin mg/dL  --   --  0.8 0.7 0.6  "  Alkaline Phosphatase U/L  --   --  83 80 94   AST U/L  --   --  41* 35 41*   ALT U/L  --   --  24 22 24   Magnesium mg/dL  --  1.3*  --  1.1* 1.7       Drug levels (last 3 results):  No results for input(s): "VANCOMYCINRA", "VANCORANDOM", "VANCOMYCINPE", "VANCOPEAK", "VANCOMYCINTR", "VANCOTROUGH" in the last 72 hours.    Microbiologic Results:  Microbiology Results (last 7 days)       Procedure Component Value Units Date/Time    Blood culture [[id number]] Collected: 02/27/24 1413    Order Status: Completed Specimen: Blood from Peripheral, Forearm, Right Updated: 03/01/24 2212     Blood Culture, Routine No Growth to date      No Growth to date      No Growth to date      No Growth to date    Blood culture [[id number]] Collected: 02/27/24 1622    Order Status: Completed Specimen: Blood from Peripheral, Forearm, Right Updated: 03/01/24 2212     Blood Culture, Routine No Growth to date      No Growth to date      No Growth to date      No Growth to date    Group A Strep, Molecular [[id number]] Collected: 02/24/24 1434    Order Status: Completed Specimen: Throat Updated: 02/24/24 1512     Group A Strep, Molecular Negative     Comment: Arcanobacterium haemolyticum and Beta Streptococcus group C   and G will not be detected by this test method.  Please order   Throat Culture ([id number]) if suspected.                 "

## 2024-03-03 LAB
ALBUMIN SERPL BCP-MCNC: 2.3 G/DL (ref 3.5–5.2)
ALP SERPL-CCNC: 116 U/L (ref 55–135)
ALT SERPL W/O P-5'-P-CCNC: 30 U/L (ref 10–44)
ANION GAP SERPL CALC-SCNC: 9 MMOL/L (ref 8–16)
AST SERPL-CCNC: 52 U/L (ref 10–40)
BACTERIA BLD CULT: NORMAL
BACTERIA BLD CULT: NORMAL
BASOPHILS # BLD AUTO: 0.04 K/UL (ref 0–0.2)
BASOPHILS NFR BLD: 0.5 % (ref 0–1.9)
BILIRUB SERPL-MCNC: 0.5 MG/DL (ref 0.1–1)
BUN SERPL-MCNC: 22 MG/DL (ref 8–23)
CALCIUM SERPL-MCNC: 8.3 MG/DL (ref 8.7–10.5)
CHLORIDE SERPL-SCNC: 103 MMOL/L (ref 95–110)
CO2 SERPL-SCNC: 22 MMOL/L (ref 23–29)
CREAT SERPL-MCNC: 1.6 MG/DL (ref 0.5–1.4)
DIFFERENTIAL METHOD BLD: ABNORMAL
EOSINOPHIL # BLD AUTO: 0.3 K/UL (ref 0–0.5)
EOSINOPHIL NFR BLD: 3.2 % (ref 0–8)
ERYTHROCYTE [DISTWIDTH] IN BLOOD BY AUTOMATED COUNT: 14.8 % (ref 11.5–14.5)
EST. GFR  (NO RACE VARIABLE): 49 ML/MIN/1.73 M^2
GLUCOSE SERPL-MCNC: 111 MG/DL (ref 70–110)
HCT VFR BLD AUTO: 23.3 % (ref 40–54)
HGB BLD-MCNC: 7.7 G/DL (ref 14–18)
IMM GRANULOCYTES # BLD AUTO: 0.04 K/UL (ref 0–0.04)
IMM GRANULOCYTES NFR BLD AUTO: 0.5 % (ref 0–0.5)
LYMPHOCYTES # BLD AUTO: 1 K/UL (ref 1–4.8)
LYMPHOCYTES NFR BLD: 12 % (ref 18–48)
MAGNESIUM SERPL-MCNC: 1.5 MG/DL (ref 1.6–2.6)
MCH RBC QN AUTO: 37.7 PG (ref 27–31)
MCHC RBC AUTO-ENTMCNC: 33 G/DL (ref 32–36)
MCV RBC AUTO: 114 FL (ref 82–98)
MONOCYTES # BLD AUTO: 1.4 K/UL (ref 0.3–1)
MONOCYTES NFR BLD: 16.9 % (ref 4–15)
NEUTROPHILS # BLD AUTO: 5.5 K/UL (ref 1.8–7.7)
NEUTROPHILS NFR BLD: 66.9 % (ref 38–73)
NRBC BLD-RTO: 0 /100 WBC
PLATELET # BLD AUTO: 328 K/UL (ref 150–450)
PMV BLD AUTO: 11.1 FL (ref 9.2–12.9)
POTASSIUM SERPL-SCNC: 4.2 MMOL/L (ref 3.5–5.1)
PROT SERPL-MCNC: 7.3 G/DL (ref 6–8.4)
RBC # BLD AUTO: 2.04 M/UL (ref 4.6–6.2)
SODIUM SERPL-SCNC: 134 MMOL/L (ref 136–145)
VANCOMYCIN TROUGH SERPL-MCNC: 10.4 UG/ML (ref 10–22)
WBC # BLD AUTO: 8.18 K/UL (ref 3.9–12.7)

## 2024-03-03 PROCEDURE — 25000003 PHARM REV CODE 250: Mod: HCNC | Performed by: STUDENT IN AN ORGANIZED HEALTH CARE EDUCATION/TRAINING PROGRAM

## 2024-03-03 PROCEDURE — 85025 COMPLETE CBC W/AUTO DIFF WBC: CPT | Mod: HCNC | Performed by: PHYSICIAN ASSISTANT

## 2024-03-03 PROCEDURE — 94799 UNLISTED PULMONARY SVC/PX: CPT | Mod: HCNC,XB

## 2024-03-03 PROCEDURE — 36415 COLL VENOUS BLD VENIPUNCTURE: CPT | Mod: HCNC | Performed by: STUDENT IN AN ORGANIZED HEALTH CARE EDUCATION/TRAINING PROGRAM

## 2024-03-03 PROCEDURE — 83735 ASSAY OF MAGNESIUM: CPT | Mod: HCNC | Performed by: STUDENT IN AN ORGANIZED HEALTH CARE EDUCATION/TRAINING PROGRAM

## 2024-03-03 PROCEDURE — 80202 ASSAY OF VANCOMYCIN: CPT | Mod: HCNC | Performed by: PHYSICIAN ASSISTANT

## 2024-03-03 PROCEDURE — 63600175 PHARM REV CODE 636 W HCPCS: Mod: HCNC | Performed by: PHYSICIAN ASSISTANT

## 2024-03-03 PROCEDURE — 25000003 PHARM REV CODE 250: Mod: HCNC | Performed by: HOSPITALIST

## 2024-03-03 PROCEDURE — 21400001 HC TELEMETRY ROOM: Mod: HCNC

## 2024-03-03 PROCEDURE — 94640 AIRWAY INHALATION TREATMENT: CPT | Mod: HCNC

## 2024-03-03 PROCEDURE — 80053 COMPREHEN METABOLIC PANEL: CPT | Mod: HCNC | Performed by: PHYSICIAN ASSISTANT

## 2024-03-03 PROCEDURE — 94761 N-INVAS EAR/PLS OXIMETRY MLT: CPT | Mod: HCNC

## 2024-03-03 PROCEDURE — 36415 COLL VENOUS BLD VENIPUNCTURE: CPT | Mod: HCNC,XB | Performed by: PHYSICIAN ASSISTANT

## 2024-03-03 PROCEDURE — 25000003 PHARM REV CODE 250: Mod: HCNC | Performed by: PHYSICIAN ASSISTANT

## 2024-03-03 PROCEDURE — 63600175 PHARM REV CODE 636 W HCPCS: Mod: HCNC | Performed by: STUDENT IN AN ORGANIZED HEALTH CARE EDUCATION/TRAINING PROGRAM

## 2024-03-03 PROCEDURE — 25000242 PHARM REV CODE 250 ALT 637 W/ HCPCS: Mod: HCNC | Performed by: PHYSICIAN ASSISTANT

## 2024-03-03 RX ORDER — SODIUM CHLORIDE 0.9 % (FLUSH) 0.9 %
10 SYRINGE (ML) INJECTION
Status: CANCELLED | OUTPATIENT
Start: 2024-03-03

## 2024-03-03 RX ORDER — LANOLIN ALCOHOL/MO/W.PET/CERES
400 CREAM (GRAM) TOPICAL ONCE
Status: COMPLETED | OUTPATIENT
Start: 2024-03-03 | End: 2024-03-03

## 2024-03-03 RX ADMIN — GABAPENTIN 400 MG: 400 CAPSULE ORAL at 09:03

## 2024-03-03 RX ADMIN — Medication 100 MG: at 09:03

## 2024-03-03 RX ADMIN — GUAIFENESIN 1200 MG: 600 TABLET, EXTENDED RELEASE ORAL at 09:03

## 2024-03-03 RX ADMIN — FOLIC ACID 1 MG: 1 TABLET ORAL at 09:03

## 2024-03-03 RX ADMIN — IPRATROPIUM BROMIDE AND ALBUTEROL SULFATE 3 ML: 2.5; .5 SOLUTION RESPIRATORY (INHALATION) at 02:03

## 2024-03-03 RX ADMIN — IPRATROPIUM BROMIDE AND ALBUTEROL SULFATE 3 ML: 2.5; .5 SOLUTION RESPIRATORY (INHALATION) at 07:03

## 2024-03-03 RX ADMIN — PANTOPRAZOLE SODIUM 40 MG: 40 TABLET, DELAYED RELEASE ORAL at 09:03

## 2024-03-03 RX ADMIN — GUAIFENESIN 1200 MG: 600 TABLET, EXTENDED RELEASE ORAL at 08:03

## 2024-03-03 RX ADMIN — PIPERACILLIN SODIUM AND TAZOBACTAM SODIUM 4.5 G: 4; .5 INJECTION, POWDER, LYOPHILIZED, FOR SOLUTION INTRAVENOUS at 09:03

## 2024-03-03 RX ADMIN — METOPROLOL TARTRATE 50 MG: 50 TABLET, FILM COATED ORAL at 11:03

## 2024-03-03 RX ADMIN — Medication 400 MG: at 02:03

## 2024-03-03 RX ADMIN — AMMONIUM LACTATE: 120 LOTION TOPICAL at 08:03

## 2024-03-03 RX ADMIN — VANCOMYCIN HYDROCHLORIDE 1500 MG: 1.5 INJECTION, POWDER, LYOPHILIZED, FOR SOLUTION INTRAVENOUS at 02:03

## 2024-03-03 RX ADMIN — PIPERACILLIN SODIUM AND TAZOBACTAM SODIUM 4.5 G: 4; .5 INJECTION, POWDER, LYOPHILIZED, FOR SOLUTION INTRAVENOUS at 05:03

## 2024-03-03 RX ADMIN — LEVETIRACETAM 500 MG: 500 TABLET, FILM COATED ORAL at 09:03

## 2024-03-03 RX ADMIN — THERA TABS 1 TABLET: TAB at 09:03

## 2024-03-03 RX ADMIN — AMMONIUM LACTATE: 120 LOTION TOPICAL at 02:03

## 2024-03-03 RX ADMIN — FLUTICASONE PROPIONATE 100 MCG: 50 SPRAY, METERED NASAL at 02:03

## 2024-03-03 RX ADMIN — GABAPENTIN 400 MG: 400 CAPSULE ORAL at 08:03

## 2024-03-03 RX ADMIN — SODIUM CHLORIDE, POTASSIUM CHLORIDE, SODIUM LACTATE AND CALCIUM CHLORIDE 1000 ML: 600; 310; 30; 20 INJECTION, SOLUTION INTRAVENOUS at 12:03

## 2024-03-03 RX ADMIN — POLYETHYLENE GLYCOL 3350 17 G: 17 POWDER, FOR SOLUTION ORAL at 09:03

## 2024-03-03 RX ADMIN — CETIRIZINE HYDROCHLORIDE 5 MG: 5 TABLET, FILM COATED ORAL at 09:03

## 2024-03-03 RX ADMIN — IPRATROPIUM BROMIDE AND ALBUTEROL SULFATE 3 ML: 2.5; .5 SOLUTION RESPIRATORY (INHALATION) at 08:03

## 2024-03-03 RX ADMIN — LEVETIRACETAM 500 MG: 500 TABLET, FILM COATED ORAL at 08:03

## 2024-03-03 NOTE — ASSESSMENT & PLAN NOTE
- Per patients wife, he used to drink about 1 bottle of vodka daily for several years; however, in the past few days he has been having 1/2 pint of vodka every other day. Last known drink 2 days ago. Patient denies recent ETOH use. PETH 1200, confirming heavy drinking  - Start CIWA protocol  - scheduled fixed valium taper  - Ativan PRN per CIWA  - Zofran PRN for nausea   - thiamine, mvi, folic acid; B1 pending  - daily replacing electrolytes  - see dysphagia  - Seizure, Aspiration and Fall precautions

## 2024-03-03 NOTE — NURSING
Notified on call provider about low BP and elevated temp despite tylenol administration. Patient asymptomatic.   AAOx3, following commands, wife at bedside.

## 2024-03-03 NOTE — SUBJECTIVE & OBJECTIVE
Interval History: Seen at bedside, fever curve has improved with IV Vanc and zosyn. ZAIN also resolving. Plan for EGD tomorrow as well as ID consult    Review of Systems   Constitutional:  Negative for activity change and fatigue.   HENT:  Positive for trouble swallowing.    Respiratory:  Positive for cough. Negative for shortness of breath.    Gastrointestinal:  Negative for abdominal pain (chronic burning in abdomen/some int epigastric pain).   Musculoskeletal:  Positive for arthralgias and myalgias.   Neurological:  Positive for weakness.        Left sided spacticity since birth   Psychiatric/Behavioral:  Negative for agitation.      Objective:     Vital Signs (Most Recent):  Temp: 98.9 °F (37.2 °C) (03/03/24 1117)  Pulse: 90 (03/03/24 1125)  Resp: 18 (03/03/24 1117)  BP: (!) 103/51 (03/03/24 1117)  SpO2: 96 % (03/03/24 1117) Vital Signs (24h Range):  Temp:  [98.9 °F (37.2 °C)-100.2 °F (37.9 °C)] 98.9 °F (37.2 °C)  Pulse:  [] 90  Resp:  [16-20] 18  SpO2:  [95 %-99 %] 96 %  BP: ()/(42-64) 103/51     Weight: 85 kg (187 lb 6.3 oz)  Body mass index is 25.41 kg/m².    Intake/Output Summary (Last 24 hours) at 3/3/2024 1320  Last data filed at 3/3/2024 0552  Gross per 24 hour   Intake 200 ml   Output 450 ml   Net -250 ml         Physical Exam  Vitals reviewed.   Constitutional:       General: He is not in acute distress.  HENT:      Head: Normocephalic.      Mouth/Throat:      Mouth: Mucous membranes are moist.   Eyes:      Extraocular Movements: Extraocular movements intact.   Pulmonary:      Effort: Pulmonary effort is normal. No respiratory distress.   Abdominal:      General: Abdomen is flat.      Palpations: Abdomen is soft.   Musculoskeletal:         General: Tenderness and deformity present.   Skin:     Findings: Lesion (right 4th finger-he says old gout infection) present.   Neurological:      Mental Status: He is alert. Mental status is at baseline.      Motor: Abnormal muscle tone present.    Psychiatric:         Behavior: Behavior normal. Behavior is cooperative.         Cognition and Memory: He exhibits impaired remote memory.             Significant Labs: All pertinent labs within the past 24 hours have been reviewed.    Significant Imaging: I have reviewed all pertinent imaging results/findings within the past 24 hours.

## 2024-03-03 NOTE — PROGRESS NOTES
[facility] [address] Medicine  Progress Note    Patient Name: Michael Johnson Jr.  MRN: [id number]  Patient Class: IP- Inpatient   Admission Date: 2/24/2024  Length of Stay: 7 days  Attending Physician: Boubacar Greer MD  Primary Care Provider: Amelia Lai MD        Subjective:     Principal Problem:Alcohol abuse with withdrawal        HPI:  63 year old male with Hx of Hypertension, Seizure disorders, spastic left-sided hemiplegia, Anemia, Arthrititis, Depression, CKD stage 3, Alcohol use disorder and Gout, who presents to the ED with complaints of nausea/vomiting and abdominal pain for the past 2 days. Patient is a poor historian; most history provided by patients wife. For the past 2 days patient has not been eating. Started to feel nauseated with associated NBNB emesis and weakness. Reports that his sore throat has been hurting him since yesterday and noticed he has been shaking since yesterday. He reports that he had quit drinking for a long time; however, per patients wife he has been drinking daily for several years and in the past few weeks it has been 1/2 pint of vodka every 2 days. Patient denies any fever, chest pain, palpitaitons, dyspnea, changes in bowel or urinary habits. He does report feeling of cramping in his upper abdomen, non-radiating. Last episode of emesis was here in the ER. States he takes his medication but not everyday. Denies any illicit drug use or smoking. 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 Denies any sick contacts.     Overview/Hospital Course:  Mr Rodriguez admitted for ETOH withdrawal and neck swelling. He reports that he has not had an alcoholic beverage in months. He is a poor historian and has different time frames than his wife and daughter. 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 He was vomiting the past few days but none since arrival.Started on MVI, thiamine, folic acid.  PeT confirms heavy alcohol use.    On arrival nurse reported neck swelling, CT neck shows fullness of in the hypopharynx, involving posterior hypopharynx, concerned for pharyngitis. No drainable fluid collection. Started on IV CTX. Fevers began again 2/26. Repeat CT scan ordered to evaluate for abscess formation, none identified. CXR and UA ordered, unremarkable. CT AP shows atelectasis vs possible pneumonia, will add doxy for atypical coverage, continue IS. Fevers still persisting. Broaden to Vanc and Zosyn and monitor, fever curve improved. Will consult ID on Monday for fever unknown origin    SLP following along for dysphagia and aspiration concerns. MBSS done 3/1, GI consult recommended EGD on Monday    Interval History: Seen at bedside, fever curve has improved with IV Vanc and zosyn. ZAIN also resolving. Plan for EGD tomorrow as well as ID consult    Review of Systems   Constitutional:  Negative for activity change and fatigue.   HENT:  Positive for trouble swallowing.    Respiratory:  Positive for cough. Negative for shortness of breath.    Gastrointestinal:  Negative for abdominal pain (chronic burning in abdomen/some int epigastric pain).   Musculoskeletal:  Positive for arthralgias and myalgias.   Neurological:  Positive for weakness.        Left sided spacticity since birth   Psychiatric/Behavioral:  Negative for agitation.      Objective:     Vital Signs (Most Recent):  Temp: 98.9 °F (37.2 °C) (03/03/24 1117)  Pulse: 90 (03/03/24 1125)  Resp: 18 (03/03/24 1117)  BP: (!) 103/51 (03/03/24 1117)  SpO2: 96 % (03/03/24 1117) Vital Signs (24h Range):  Temp:  [98.9 °F (37.2 °C)-100.2 °F (37.9 °C)] 98.9 °F (37.2 °C)  Pulse:  [] 90  Resp:  [16-20] 18  SpO2:  [95 %-99 %] 96 %  BP: ()/(42-64) 103/51     Weight: 85 kg (187 lb 6.3 oz)  Body mass index is 25.41 kg/m².    Intake/Output Summary (Last 24 hours) at 3/3/2024 0287  Last data filed at 3/3/2024 0552  Gross per 24 hour   Intake 200 ml   Output 450  ml   Net -250 ml         Physical Exam  Vitals reviewed.   Constitutional:       General: He is not in acute distress.  HENT:      Head: Normocephalic.      Mouth/Throat:      Mouth: Mucous membranes are moist.   Eyes:      Extraocular Movements: Extraocular movements intact.   Pulmonary:      Effort: Pulmonary effort is normal. No respiratory distress.   Abdominal:      General: Abdomen is flat.      Palpations: Abdomen is soft.   Musculoskeletal:         General: Tenderness and deformity present.   Skin:     Findings: Lesion (right 4th finger-he says old gout infection) present.   Neurological:      Mental Status: He is alert. Mental status is at baseline.      Motor: Abnormal muscle tone present.   Psychiatric:         Behavior: Behavior normal. Behavior is cooperative.         Cognition and Memory: He exhibits impaired remote memory.             Significant Labs: All pertinent labs within the past 24 hours have been reviewed.    Significant Imaging: I have reviewed all pertinent imaging results/findings within the past 24 hours.    Assessment/Plan:      * Alcohol abuse with withdrawal  - Per patients wife, he used to drink about 1 bottle of vodka daily for several years; however, in the past few days he has been having 1/2 pint of vodka every other day. Last known drink 2 days ago. Patient denies recent ETOH use. PETH 1200, confirming heavy drinking  - Start CIWA protocol  - scheduled fixed valium taper  - Ativan PRN per CIWA  - Zofran PRN for nausea   - thiamine, mvi, folic acid; B1 pending  - daily replacing electrolytes  - see dysphagia  - Seizure, Aspiration and Fall precautions    Pharyngitis  - CT with findings concerning for pharyngitis and possibly retained secretions. Also noted chronic appearing opacification of the left maxillary sinus with chronic osteitis of the sinus walls. Appears chronic dating back to 3/2017 per read.   - Outpatient referral to Otolaryngology   - Amoxicillin changed to  ceftriaxone   - Began having fevers 2/26 as high as 101.3  Repeat CT neck without abscess formation, Bcx ordered: NGTD  CXR and UA ordered, unremarkable   CT AP shows atelectasis vs developing pneumonia at bases of lung, will add doxy for atypical coverage and encourage IS  Fevers still persisting despite above. Will broaden to Vanc and Zosyn, consult ID on Monday fro fever uknown origin    Dysphagia  Reports trouble swallowing, SLP following along  Has tolerated advancing diet from liquid to minced and moist, though began to have episodes of vomiting and reported dysphagia so resumed liquid diet  MBSS 3/1, SLP recommending GI consult  Plan for EGD 3/4      Falls frequently  Chronic problem.  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 Also has h/o ETOH dependence. Thiamine level pending. IV replaced ordered.  PT/OT consulted. OP neurology referral placed. After talking with wife, patient is bedbound at baseline and has not walked in about three years.  Reason unknown.      Electrolyte disturbance  - Patient with Hypomagnesemia, Hypokalemia, hypocalcemia and Hypophosphatemia  - Correct electrolytes and monitor  - corrected calcium 8.7  - Telemetry monitoring for now    Paroxysmal atrial fibrillation  Patient with Paroxysmal (<7 days) atrial fibrillation which is controlled currently with Beta Blocker. Patient is currently in sinus rhythm.[id number] Score: 1  - Had an episode of Atrial flutter (09/2023)  - Not compliant with Home Eliquis or BB   - Echocardiogram (9/2023): Preserved EF 50-55%  - Monitor electrolytes and Maintain Mg >2 and K >4  - holding off on full anticoagulation for fall rsk    Essential hypertension  Chronic, uncontrolled. However, may be also elevated currently in the setting of withdrawal  Latest blood pressure and vitals reviewed-     Temp:  [98.5 °F (36.9 °C)-99.7 °F (37.6 °C)]   Pulse:  [102-130]   Resp:  [15-24]   BP: (126-151)/(80-85)   SpO2:  [98 %-100 %] .   Home meds for  hypertension were reviewed and noted below.   Hypertension Medications               metoprolol tartrate (LOPRESSOR) 50 MG tablet TAKE 1 TABLET TWICE DAILY          While in the hospital, will manage blood pressure as follows; Continue home antihypertensive regimen    Will utilize p.r.n. blood pressure medication only if patient's blood pressure greater than 160/100 and he develops symptoms such as worsening chest pain or shortness of breath.    GERD (gastroesophageal reflux disease)  - Protonix 40 mg, daily      Seizure  - Noted in history. Last seizure several years ago ~ 2019. Unclear etiology may have been related to withdrawal  - Continue with Keppra  - Follow up outpatient with Neurology; given this was a one time occurrence in likely the above setting  - reports born with epilepsy    Hypokalemia  Patient has hypokalemia which is Acute and currently uncontrolled. Most recent potassium levels reviewed-   Lab Results   Component Value Date    K 3.3 (L) 02/24/2024   . Will continue potassium replacement per protocol and recheck repeat levels after replacement completed.       VTE Risk Mitigation (From admission, onward)           Ordered     IP VTE LOW RISK PATIENT  Once         02/24/24 1605     Place sequential compression device  Until discontinued         02/24/24 1605                    Discharge Planning   SIMONE: 3/4/2024     Code Status: Full Code   Is the patient medically ready for discharge?:     Reason for patient still in hospital (select all that apply): Patient trending condition  Discharge Plan A: Home with family, Home                  Batsheva Sales PA-C  Department of Hospital Medicine   [facility] - Med Surg ([address])

## 2024-03-03 NOTE — PROGRESS NOTES
Pharmacokinetic Assessment Follow Up: IV Vancomycin    Vancomycin serum concentration assessment(s):    The trough level was drawn correctly and can be used to guide therapy at this time. The measurement is within the desired definitive target range of 10 to 20 mcg/mL.    Vancomycin Regimen Plan:    Change regimen to Vancomycin 1500 mg IV every 24 hours with next serum trough concentration measured at 1430 prior to 1500 dose on 3/5    Drug levels (last 3 results):  Recent Labs   Lab Result Units 03/03/24  1221   Vancomycin-Trough ug/mL 10.4       Pharmacy will continue to follow and monitor vancomycin.    Please contact pharmacy at extension [phone] for questions regarding this assessment.    Thank you for the consult,   Praveena Cao       Patient brief summary:  Michael Johnson Jr. is a 63 y.o. male initiated on antimicrobial therapy with IV Vancomycin for treatment of  pneumonia    The patient's current regimen is Vancomycin 1500mg IVPB every 24 hours    Drug Allergies:   Review of patient's allergies indicates:   Allergen Reactions    Lisinopril Swelling     Pt admitted with angioedema 2wks after taking lisinopril.        Actual Body Weight:   85kg    Renal Function:   Estimated Creatinine Clearance: 53.2 mL/min (A) (based on SCr of 1.6 mg/dL (H)).,     Dialysis Method (if applicable):  N/A    CBC (last 72 hours):  Recent Labs   Lab Result Units 03/01/24 0524 03/02/24 0441 03/03/24  0407   WBC K/uL 7.11 8.58 8.18   Hemoglobin g/dL 7.4* 7.6* 7.7*   Hematocrit % 22.2* 23.3* 23.3*   Platelets K/uL 204 273 328   Gran % % 57.9 61.2 66.9   Lymph % % 11.3* 10.8* 12.0*   Mono % % 25.9* 24.6* 16.9*   Eosinophil % % 3.9 2.3 3.2   Basophil % % 0.6 0.5 0.5   Differential Method  Automated Automated Automated       Metabolic Panel (last 72 hours):  Recent Labs   Lab Result Units 03/01/24 0524 03/02/24 0441 03/03/24  0407   Sodium mmol/L 132* 130* 134*   Potassium mmol/L 3.7 4.0 4.2   Chloride mmol/L 102 103 103   CO2 mmol/L  21* 17* 22*   Glucose mg/dL 87 116* 111*   BUN mg/dL 13 18 22   Creatinine mg/dL 1.4 1.8* 1.6*   Albumin g/dL 2.3* 2.3* 2.3*   Total Bilirubin mg/dL 0.7 0.6 0.5   Alkaline Phosphatase U/L 80 94 116   AST U/L 35 41* 52*   ALT U/L 22 24 30   Magnesium mg/dL 1.1* 1.7 1.5*       Vancomycin Administrations:  vancomycin given in the last 96 hours                     vancomycin 1,250 mg in dextrose 5 % (D5W) 250 mL IVPB (Vial-Mate) (mg) 1,250 mg New Bag 03/02/24 1353                    Microbiologic Results:  Microbiology Results (last 7 days)       Procedure Component Value Units Date/Time    Blood culture [[id number]] Collected: 02/27/24 1622    Order Status: Completed Specimen: Blood from Peripheral, Forearm, Right Updated: 03/02/24 2212     Blood Culture, Routine No Growth to date      No Growth to date      No Growth to date      No Growth to date      No Growth to date    Blood culture [[id number]] Collected: 02/27/24 1413    Order Status: Completed Specimen: Blood from Peripheral, Forearm, Right Updated: 03/02/24 2212     Blood Culture, Routine No Growth to date      No Growth to date      No Growth to date      No Growth to date      No Growth to date

## 2024-03-03 NOTE — PROGRESS NOTES
"Chief Complaint / Reason for Consult: Dysphagia    Pt. Reports that his swallowing has mildly improved, though still present with some solids.      ROS: No CP, SOB, + fever yesterday, + cough    Patient Vitals for the past 24 hrs:   BP Temp Temp src Pulse Resp SpO2   03/03/24 1125 -- -- -- 90 -- --   03/03/24 1117 (!) 103/51 98.9 °F (37.2 °C) Oral 90 18 96 %   03/03/24 0747 -- -- -- 87 -- --   03/03/24 0744 -- -- -- 86 16 96 %   03/03/24 0732 101/64 98.9 °F (37.2 °C) Oral 80 16 99 %   03/03/24 0337 96/64 100.1 °F (37.8 °C) Oral 82 20 96 %   03/03/24 0313 -- -- -- 80 -- --   03/03/24 0143 (!) 90/50 -- -- 97 -- 96 %   03/03/24 0100 (!) 86/52 -- -- -- -- --   03/02/24 2319 (!) 73/42 100 °F (37.8 °C) Oral 96 16 95 %   03/02/24 2314 (!) 75/43 -- -- 94 -- --   03/02/24 2206 -- -- -- -- 18 --   03/02/24 2009 -- -- -- 102 18 96 %   03/02/24 1957 (!) 96/59 100.2 °F (37.9 °C) -- 101 18 96 %   03/02/24 1924 -- -- -- 95 -- --   03/02/24 1543 (!) 91/54 99 °F (37.2 °C) Oral 89 16 99 %   03/02/24 1503 -- -- -- 85 17 --       Physical Exam:  Gen - Well developed, well nourished, no apparent distress  HEENT - Anicteric  CV - S1, S2, no murmurs/rubs  Lungs - CTA-B, normal excursion  Abd - Soft, NT, ND, normal BS's, no HSM.  Ext - No c/c/e  Neuro - No asterixis    Labs:  Recent Labs   Lab 03/03/24  0407   WBC 8.18   RBC 2.04*   HGB 7.7*   HCT 23.3*      *   MCH 37.7*   MCHC 33.0     Recent Labs   Lab 03/03/24  0407   CALCIUM 8.3*   ALBUMIN 2.3*   PROT 7.3   *   K 4.2   CO2 22*      BUN 22   CREATININE 1.6*   ALKPHOS 116   ALT 30   AST 52*   BILITOT 0.5     Ferritin - 1492    Imaging:  Reviewed CT, bilateral pleural effusions, + HH    Reviewed MBSS - per review of notes, did OK with minced/moist foods, slow sips.      Assessment:  This patient is a 63 y.o. male with:   1. Aspiration with "coughing/choking" with swallowing - Evaluated by ST without obvious etiology.  + HH on imaging, but no typical/significant " symptoms of GERD.    2. Hx of EtOH abue  3. CKD, A-fib, HTN, Epilepsy, Gout, Depression.....    Recommendations:  1. Monitor for sx's.  2. OK for diet, per ST  3. NPO p MN  4. EGD tomorrow.    5. Daily PPI.

## 2024-03-04 ENCOUNTER — TELEPHONE (OUTPATIENT)
Dept: INTERNAL MEDICINE | Facility: CLINIC | Age: 64
End: 2024-03-04
Payer: MEDICARE

## 2024-03-04 ENCOUNTER — ANESTHESIA EVENT (OUTPATIENT)
Dept: ENDOSCOPY | Facility: OTHER | Age: 64
DRG: 896 | End: 2024-03-04
Payer: MEDICARE

## 2024-03-04 ENCOUNTER — ANESTHESIA (OUTPATIENT)
Dept: ENDOSCOPY | Facility: OTHER | Age: 64
DRG: 896 | End: 2024-03-04
Payer: MEDICARE

## 2024-03-04 ENCOUNTER — TELEPHONE (OUTPATIENT)
Dept: GASTROENTEROLOGY | Facility: CLINIC | Age: 64
End: 2024-03-04
Payer: MEDICARE

## 2024-03-04 PROBLEM — K22.2 BENIGN ESOPHAGEAL STRICTURE: Status: ACTIVE | Noted: 2024-03-04

## 2024-03-04 PROBLEM — T18.128A FOOD IMPACTION OF ESOPHAGUS: Status: ACTIVE | Noted: 2024-03-04
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
## 2024-03-04 LAB
ADENOVIRUS: NOT DETECTED
ALBUMIN SERPL BCP-MCNC: 2.3 G/DL (ref 3.5–5.2)
ALP SERPL-CCNC: 105 U/L (ref 55–135)
ALT SERPL W/O P-5'-P-CCNC: 27 U/L (ref 10–44)
ANION GAP SERPL CALC-SCNC: 9 MMOL/L (ref 8–16)
AST SERPL-CCNC: 45 U/L (ref 10–40)
BASOPHILS # BLD AUTO: 0.08 K/UL (ref 0–0.2)
BASOPHILS NFR BLD: 0.8 % (ref 0–1.9)
BILIRUB SERPL-MCNC: 0.7 MG/DL (ref 0.1–1)
BORDETELLA PARAPERTUSSIS (IS1001): NOT DETECTED
BORDETELLA PERTUSSIS (PTXP): NOT DETECTED
BUN SERPL-MCNC: 23 MG/DL (ref 8–23)
CALCIUM SERPL-MCNC: 8.6 MG/DL (ref 8.7–10.5)
CHLAMYDIA PNEUMONIAE: NOT DETECTED
CHLORIDE SERPL-SCNC: 102 MMOL/L (ref 95–110)
CO2 SERPL-SCNC: 22 MMOL/L (ref 23–29)
CORONAVIRUS 229E, COMMON COLD VIRUS: NOT DETECTED
CORONAVIRUS HKU1, COMMON COLD VIRUS: NOT DETECTED
CORONAVIRUS NL63, COMMON COLD VIRUS: NOT DETECTED
CORONAVIRUS OC43, COMMON COLD VIRUS: NOT DETECTED
CREAT SERPL-MCNC: 1.6 MG/DL (ref 0.5–1.4)
DIFFERENTIAL METHOD BLD: ABNORMAL
EOSINOPHIL # BLD AUTO: 0.5 K/UL (ref 0–0.5)
EOSINOPHIL NFR BLD: 4.5 % (ref 0–8)
ERYTHROCYTE [DISTWIDTH] IN BLOOD BY AUTOMATED COUNT: 15.6 % (ref 11.5–14.5)
EST. GFR  (NO RACE VARIABLE): 49 ML/MIN/1.73 M^2
FLUBV RNA NPH QL NAA+NON-PROBE: NOT DETECTED
GLUCOSE SERPL-MCNC: 106 MG/DL (ref 70–110)
HCT VFR BLD AUTO: 22 % (ref 40–54)
HGB BLD-MCNC: 7.2 G/DL (ref 14–18)
HPIV1 RNA NPH QL NAA+NON-PROBE: NOT DETECTED
HPIV2 RNA NPH QL NAA+NON-PROBE: NOT DETECTED
HPIV3 RNA NPH QL NAA+NON-PROBE: NOT DETECTED
HPIV4 RNA NPH QL NAA+NON-PROBE: NOT DETECTED
HUMAN METAPNEUMOVIRUS: NOT DETECTED
IMM GRANULOCYTES # BLD AUTO: 0.06 K/UL (ref 0–0.04)
IMM GRANULOCYTES NFR BLD AUTO: 0.6 % (ref 0–0.5)
INFLUENZA A (SUBTYPES H1,H1-2009,H3): NOT DETECTED
LYMPHOCYTES # BLD AUTO: 1.4 K/UL (ref 1–4.8)
LYMPHOCYTES NFR BLD: 13 % (ref 18–48)
MAGNESIUM SERPL-MCNC: 1.4 MG/DL (ref 1.6–2.6)
MCH RBC QN AUTO: 37.1 PG (ref 27–31)
MCHC RBC AUTO-ENTMCNC: 32.7 G/DL (ref 32–36)
MCV RBC AUTO: 113 FL (ref 82–98)
MONOCYTES # BLD AUTO: 1.2 K/UL (ref 0.3–1)
MONOCYTES NFR BLD: 11.6 % (ref 4–15)
MYCOPLASMA PNEUMONIAE: NOT DETECTED
NEUTROPHILS # BLD AUTO: 7.3 K/UL (ref 1.8–7.7)
NEUTROPHILS NFR BLD: 69.5 % (ref 38–73)
NRBC BLD-RTO: 0 /100 WBC
PLATELET # BLD AUTO: 396 K/UL (ref 150–450)
PMV BLD AUTO: 10.5 FL (ref 9.2–12.9)
POCT GLUCOSE: 119 MG/DL (ref 70–110)
POCT GLUCOSE: 136 MG/DL (ref 70–110)
POTASSIUM SERPL-SCNC: 4.7 MMOL/L (ref 3.5–5.1)
PROT SERPL-MCNC: 7.4 G/DL (ref 6–8.4)
RBC # BLD AUTO: 1.94 M/UL (ref 4.6–6.2)
RESPIRATORY INFECTION PANEL SOURCE: NORMAL
RSV RNA NPH QL NAA+NON-PROBE: NOT DETECTED
RV+EV RNA NPH QL NAA+NON-PROBE: NOT DETECTED
SARS-COV-2 RNA RESP QL NAA+PROBE: NOT DETECTED
SODIUM SERPL-SCNC: 133 MMOL/L (ref 136–145)
WBC # BLD AUTO: 10.46 K/UL (ref 3.9–12.7)

## 2024-03-04 PROCEDURE — 43247 EGD REMOVE FOREIGN BODY: CPT | Mod: HCNC | Performed by: INTERNAL MEDICINE

## 2024-03-04 PROCEDURE — 94761 N-INVAS EAR/PLS OXIMETRY MLT: CPT | Mod: HCNC

## 2024-03-04 PROCEDURE — 37000009 HC ANESTHESIA EA ADD 15 MINS: Mod: HCNC | Performed by: INTERNAL MEDICINE

## 2024-03-04 PROCEDURE — 85025 COMPLETE CBC W/AUTO DIFF WBC: CPT | Mod: HCNC | Performed by: PHYSICIAN ASSISTANT

## 2024-03-04 PROCEDURE — D9220A PRA ANESTHESIA: Mod: HCNC,CRNA,, | Performed by: STUDENT IN AN ORGANIZED HEALTH CARE EDUCATION/TRAINING PROGRAM

## 2024-03-04 PROCEDURE — 25000003 PHARM REV CODE 250: Mod: HCNC | Performed by: INTERNAL MEDICINE

## 2024-03-04 PROCEDURE — 25000242 PHARM REV CODE 250 ALT 637 W/ HCPCS: Mod: HCNC | Performed by: INTERNAL MEDICINE

## 2024-03-04 PROCEDURE — 25000003 PHARM REV CODE 250: Mod: HCNC | Performed by: PHYSICIAN ASSISTANT

## 2024-03-04 PROCEDURE — 63600175 PHARM REV CODE 636 W HCPCS: Mod: HCNC | Performed by: PHYSICIAN ASSISTANT

## 2024-03-04 PROCEDURE — 99900035 HC TECH TIME PER 15 MIN (STAT): Mod: HCNC

## 2024-03-04 PROCEDURE — 92526 ORAL FUNCTION THERAPY: CPT | Mod: HCNC

## 2024-03-04 PROCEDURE — 99223 1ST HOSP IP/OBS HIGH 75: CPT | Mod: HCNC,,, | Performed by: INTERNAL MEDICINE

## 2024-03-04 PROCEDURE — 87798 DETECT AGENT NOS DNA AMP: CPT | Mod: HCNC | Performed by: INTERNAL MEDICINE

## 2024-03-04 PROCEDURE — 94640 AIRWAY INHALATION TREATMENT: CPT | Mod: HCNC

## 2024-03-04 PROCEDURE — 83735 ASSAY OF MAGNESIUM: CPT | Mod: HCNC | Performed by: STUDENT IN AN ORGANIZED HEALTH CARE EDUCATION/TRAINING PROGRAM

## 2024-03-04 PROCEDURE — 63600175 PHARM REV CODE 636 W HCPCS: Mod: HCNC | Performed by: STUDENT IN AN ORGANIZED HEALTH CARE EDUCATION/TRAINING PROGRAM

## 2024-03-04 PROCEDURE — 25000003 PHARM REV CODE 250: Mod: HCNC | Performed by: STUDENT IN AN ORGANIZED HEALTH CARE EDUCATION/TRAINING PROGRAM

## 2024-03-04 PROCEDURE — 36415 COLL VENOUS BLD VENIPUNCTURE: CPT | Mod: HCNC | Performed by: STUDENT IN AN ORGANIZED HEALTH CARE EDUCATION/TRAINING PROGRAM

## 2024-03-04 PROCEDURE — D9220A PRA ANESTHESIA: Mod: HCNC,ANES,, | Performed by: ANESTHESIOLOGY

## 2024-03-04 PROCEDURE — 37000008 HC ANESTHESIA 1ST 15 MINUTES: Mod: HCNC | Performed by: INTERNAL MEDICINE

## 2024-03-04 PROCEDURE — 0D748ZZ DILATION OF ESOPHAGOGASTRIC JUNCTION, VIA NATURAL OR ARTIFICIAL OPENING ENDOSCOPIC: ICD-10-PCS | Performed by: INTERNAL MEDICINE

## 2024-03-04 PROCEDURE — 63600175 PHARM REV CODE 636 W HCPCS: Mod: HCNC | Performed by: INTERNAL MEDICINE

## 2024-03-04 PROCEDURE — 25000003 PHARM REV CODE 250: Mod: HCNC | Performed by: HOSPITALIST

## 2024-03-04 PROCEDURE — 21400001 HC TELEMETRY ROOM: Mod: HCNC

## 2024-03-04 PROCEDURE — 80053 COMPREHEN METABOLIC PANEL: CPT | Mod: HCNC | Performed by: PHYSICIAN ASSISTANT

## 2024-03-04 RX ORDER — HYDROMORPHONE HYDROCHLORIDE 2 MG/ML
0.4 INJECTION, SOLUTION INTRAMUSCULAR; INTRAVENOUS; SUBCUTANEOUS EVERY 5 MIN PRN
Status: DISCONTINUED | OUTPATIENT
Start: 2024-03-04 | End: 2024-03-05

## 2024-03-04 RX ORDER — PROPOFOL 10 MG/ML
VIAL (ML) INTRAVENOUS
Status: DISCONTINUED | OUTPATIENT
Start: 2024-03-04 | End: 2024-03-04

## 2024-03-04 RX ORDER — SODIUM CHLORIDE 0.9 % (FLUSH) 0.9 %
3 SYRINGE (ML) INJECTION
Status: DISCONTINUED | OUTPATIENT
Start: 2024-03-04 | End: 2024-03-05

## 2024-03-04 RX ORDER — MEPERIDINE HYDROCHLORIDE 25 MG/ML
12.5 INJECTION INTRAMUSCULAR; INTRAVENOUS; SUBCUTANEOUS ONCE AS NEEDED
Status: DISCONTINUED | OUTPATIENT
Start: 2024-03-04 | End: 2024-03-05

## 2024-03-04 RX ORDER — PROCHLORPERAZINE EDISYLATE 5 MG/ML
5 INJECTION INTRAMUSCULAR; INTRAVENOUS EVERY 30 MIN PRN
Status: DISCONTINUED | OUTPATIENT
Start: 2024-03-04 | End: 2024-03-05

## 2024-03-04 RX ORDER — PANTOPRAZOLE SODIUM 40 MG/1
40 TABLET, DELAYED RELEASE ORAL 2 TIMES DAILY
Status: DISCONTINUED | OUTPATIENT
Start: 2024-03-04 | End: 2024-03-07 | Stop reason: HOSPADM

## 2024-03-04 RX ORDER — OXYCODONE HYDROCHLORIDE 5 MG/1
5 TABLET ORAL
Status: DISCONTINUED | OUTPATIENT
Start: 2024-03-04 | End: 2024-03-05

## 2024-03-04 RX ORDER — LIDOCAINE HYDROCHLORIDE 20 MG/ML
INJECTION INTRAVENOUS
Status: DISCONTINUED | OUTPATIENT
Start: 2024-03-04 | End: 2024-03-04

## 2024-03-04 RX ADMIN — CETIRIZINE HYDROCHLORIDE 5 MG: 5 TABLET, FILM COATED ORAL at 09:03

## 2024-03-04 RX ADMIN — PROPOFOL 50 MG: 10 INJECTION, EMULSION INTRAVENOUS at 07:03

## 2024-03-04 RX ADMIN — Medication 100 MG: at 09:03

## 2024-03-04 RX ADMIN — SODIUM CHLORIDE, SODIUM LACTATE, POTASSIUM CHLORIDE, AND CALCIUM CHLORIDE: .6; .31; .03; .02 INJECTION, SOLUTION INTRAVENOUS at 07:03

## 2024-03-04 RX ADMIN — GUAIFENESIN 1200 MG: 600 TABLET, EXTENDED RELEASE ORAL at 09:03

## 2024-03-04 RX ADMIN — IPRATROPIUM BROMIDE AND ALBUTEROL SULFATE 3 ML: 2.5; .5 SOLUTION RESPIRATORY (INHALATION) at 07:03

## 2024-03-04 RX ADMIN — FOLIC ACID 1 MG: 1 TABLET ORAL at 09:03

## 2024-03-04 RX ADMIN — GABAPENTIN 400 MG: 400 CAPSULE ORAL at 09:03

## 2024-03-04 RX ADMIN — THERA TABS 1 TABLET: TAB at 09:03

## 2024-03-04 RX ADMIN — LEVETIRACETAM 500 MG: 500 TABLET, FILM COATED ORAL at 10:03

## 2024-03-04 RX ADMIN — IPRATROPIUM BROMIDE AND ALBUTEROL SULFATE 3 ML: 2.5; .5 SOLUTION RESPIRATORY (INHALATION) at 03:03

## 2024-03-04 RX ADMIN — PIPERACILLIN SODIUM AND TAZOBACTAM SODIUM 4.5 G: 4; .5 INJECTION, POWDER, LYOPHILIZED, FOR SOLUTION INTRAVENOUS at 01:03

## 2024-03-04 RX ADMIN — APIXABAN 5 MG: 2.5 TABLET, FILM COATED ORAL at 10:03

## 2024-03-04 RX ADMIN — LEVETIRACETAM 500 MG: 500 TABLET, FILM COATED ORAL at 09:03

## 2024-03-04 RX ADMIN — PANTOPRAZOLE SODIUM 40 MG: 40 TABLET, DELAYED RELEASE ORAL at 09:03

## 2024-03-04 RX ADMIN — LIDOCAINE HYDROCHLORIDE 100 MG: 20 INJECTION, SOLUTION INTRAVENOUS at 07:03

## 2024-03-04 RX ADMIN — AMPICILLIN SODIUM AND SULBACTAM SODIUM 3 G: 2; 1 INJECTION, POWDER, FOR SOLUTION INTRAMUSCULAR; INTRAVENOUS at 11:03

## 2024-03-04 RX ADMIN — AMMONIUM LACTATE: 120 LOTION TOPICAL at 10:03

## 2024-03-04 RX ADMIN — AMPICILLIN SODIUM AND SULBACTAM SODIUM 3 G: 2; 1 INJECTION, POWDER, FOR SOLUTION INTRAMUSCULAR; INTRAVENOUS at 03:03

## 2024-03-04 RX ADMIN — GABAPENTIN 400 MG: 400 CAPSULE ORAL at 10:03

## 2024-03-04 RX ADMIN — PANTOPRAZOLE SODIUM 40 MG: 40 TABLET, DELAYED RELEASE ORAL at 10:03

## 2024-03-04 RX ADMIN — POLYETHYLENE GLYCOL 3350 17 G: 17 POWDER, FOR SOLUTION ORAL at 09:03

## 2024-03-04 RX ADMIN — GUAIFENESIN 1200 MG: 600 TABLET, EXTENDED RELEASE ORAL at 10:03

## 2024-03-04 RX ADMIN — AMMONIUM LACTATE: 120 LOTION TOPICAL at 09:03

## 2024-03-04 RX ADMIN — FLUTICASONE PROPIONATE 100 MCG: 50 SPRAY, METERED NASAL at 09:03

## 2024-03-04 RX ADMIN — METOPROLOL TARTRATE 50 MG: 50 TABLET, FILM COATED ORAL at 10:03

## 2024-03-04 RX ADMIN — PIPERACILLIN SODIUM AND TAZOBACTAM SODIUM 4.5 G: 4; .5 INJECTION, POWDER, LYOPHILIZED, FOR SOLUTION INTRAVENOUS at 09:03

## 2024-03-04 RX ADMIN — METOPROLOL TARTRATE 50 MG: 50 TABLET, FILM COATED ORAL at 09:03

## 2024-03-04 NOTE — ASSESSMENT & PLAN NOTE
- CT with findings concerning for pharyngitis and possibly retained secretions. Also noted chronic appearing opacification of the left maxillary sinus with chronic osteitis of the sinus walls. Appears chronic dating back to 3/2017 per read.   - Outpatient referral to Otolaryngology   - Amoxicillin changed to ceftriaxone   - Began having fevers 2/26 as high as 101.3  Repeat CT neck without abscess formation, Bcx ordered: NGTD  CXR and UA ordered, unremarkable   CT AP shows atelectasis vs developing pneumonia at bases of lung, will add doxy for atypical coverage and encourage IS  Fevers still persisting despite above. Will broaden to Vanc and Zosyn, consult ID on Monday fro fever uknown origin:   - patient looks well, clinically, except for joint swelling and tenderness  - I see no need for escalating abx  - change to Unasyn for better ENT coverage and consider repeat CT of his neck  - f/u EGD results  - viral swab  - given his normal WBC and signs of gout, I would favor that as a cause of his low-grade fever, if his ID w/u remains negative

## 2024-03-04 NOTE — TRANSFER OF CARE
Anesthesia Transfer of Care Note    Patient: Michael Johnson Jr.    Procedure(s) Performed: Procedure(s) (LRB):  EGD (ESOPHAGOGASTRODUODENOSCOPY) (N/A)    Patient location: PACU    Anesthesia Type: MAC    Transport from OR: Transported from OR on room air with adequate spontaneous ventilation    Post pain: adequate analgesia    Post assessment: no apparent anesthetic complications and tolerated procedure well    Post vital signs: stable    Level of consciousness: awake, alert and oriented    Nausea/Vomiting: no nausea/vomiting    Complications: none    Transfer of care protocol was followed      Last vitals: Visit Vitals  /63 (BP Location: Right arm, Patient Position: Lying)   Pulse 79   Temp 37.2 °C (98.9 °F) (Oral)   Resp 17   Ht 6' (1.829 m)   Wt 85 kg (187 lb 6.3 oz)   SpO2 97%   BMI 25.41 kg/m²

## 2024-03-04 NOTE — PROGRESS NOTES
[facility] [address] Medicine  Progress Note    Patient Name: Michael Johnson Jr.  MRN: [id number]  Patient Class: IP- Inpatient   Admission Date: 2/24/2024  Length of Stay: 8 days  Attending Physician: Boubacar Greer MD  Primary Care Provider: Amelia Lai MD        Subjective:     Principal Problem:Alcohol abuse with withdrawal        HPI:  63 year old male with Hx of Hypertension, Seizure disorders, spastic left-sided hemiplegia, Anemia, Arthrititis, Depression, CKD stage 3, Alcohol use disorder and Gout, who presents to the ED with complaints of nausea/vomiting and abdominal pain for the past 2 days. Patient is a poor historian; most history provided by patients wife. For the past 2 days patient has not been eating. Started to feel nauseated with associated NBNB emesis and weakness. Reports that his sore throat has been hurting him since yesterday and noticed he has been shaking since yesterday. He reports that he had quit drinking for a long time; however, per patients wife he has been drinking daily for several years and in the past few weeks it has been 1/2 pint of vodka every 2 days. Patient denies any fever, chest pain, palpitaitons, dyspnea, changes in bowel or urinary habits. He does report feeling of cramping in his upper abdomen, non-radiating. Last episode of emesis was here in the ER. States he takes his medication but not everyday. Denies any illicit drug use or smoking. 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 Denies any sick contacts.     Overview/Hospital Course:  Mr Rodriguez admitted for ETOH withdrawal and neck swelling. He reports that he has not had an alcoholic beverage in months. He is a poor historian and has different time frames than his wife and daughter. 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 He was vomiting the past few days but none since arrival.Started on MVI, thiamine, folic acid.  PeTH confirms heavy alcohol use.    On arrival nurse reported neck swelling, CT neck shows fullness of in the hypopharynx, involving posterior hypopharynx, concerned for pharyngitis. No drainable fluid collection. Started on IV CTX. Fevers began again 2/26, as high as 102.6F. Repeat CT scan ordered to evaluate for abscess formation, none identified. CXR and UA ordered, unremarkable. CT AP shows atelectasis vs possible pneumonia, will add doxy for atypical coverage, continue IS. Fevers still persisting. Broaden to Vanc and Zosyn and monitor, fever curve improved. Will consult ID on Monday for fever unknown origin: switched to Unasyn for better ENT coverage and viral panel ordered    SLP following along for dysphagia and aspiration concerns. MBSS done 3/1, GI consult recommended EGD on Monday which showed benign appearing esophageal stenosis, protonix PO BID x 8 weeks recommended    Ultimately will discharge with [facility] and need GI, PCP follow up    Interval History: EGD done this morning, continuing protonix PO BID/ Will start with clears today and can advance to minced/moist with chopped meats as tolerated. Appreciate ID weighing in on fever curve    Review of Systems   Constitutional:  Negative for activity change and fatigue.   HENT:  Positive for trouble swallowing.    Respiratory:  Positive for cough. Negative for shortness of breath.    Gastrointestinal:  Negative for abdominal pain (chronic burning in abdomen/some int epigastric pain).   Musculoskeletal:  Positive for arthralgias and myalgias.   Neurological:  Positive for weakness.        Left sided spacticity since birth   Psychiatric/Behavioral:  Negative for agitation.      Objective:     Vital Signs (Most Recent):  Temp: 98.5 °F (36.9 °C) (03/04/24 1135)  Pulse: 76 (03/04/24 1141)  Resp: 18 (03/04/24 1135)  BP: 103/62 (03/04/24 1135)  SpO2: (!) 91 % (03/04/24 1135) Vital Signs (24h Range):  Temp:  [98 °F (36.7 °C)-100.4 °F (38 °C)] 98.5 °F (36.9 °C)  Pulse:   [] 76  Resp:  [16-20] 18  SpO2:  [91 %-100 %] 91 %  BP: ()/(55-73) 103/62     Weight: 85 kg (187 lb 6.3 oz)  Body mass index is 25.41 kg/m².    Intake/Output Summary (Last 24 hours) at 3/4/2024 1322  Last data filed at 3/4/2024 1142  Gross per 24 hour   Intake 400 ml   Output 2950 ml   Net -2550 ml         Physical Exam  Vitals reviewed.   Constitutional:       General: He is not in acute distress.  HENT:      Head: Normocephalic.      Mouth/Throat:      Mouth: Mucous membranes are moist.   Eyes:      Extraocular Movements: Extraocular movements intact.   Pulmonary:      Effort: Pulmonary effort is normal. No respiratory distress.   Abdominal:      General: Abdomen is flat.      Palpations: Abdomen is soft.   Musculoskeletal:         General: Tenderness and deformity present.   Skin:     Findings: Lesion (right 4th finger-he says old gout infection) present.   Neurological:      Mental Status: He is alert. Mental status is at baseline.      Motor: Abnormal muscle tone present.   Psychiatric:         Behavior: Behavior normal. Behavior is cooperative.         Cognition and Memory: He exhibits impaired remote memory.             Significant Labs: All pertinent labs within the past 24 hours have been reviewed.    Significant Imaging: I have reviewed all pertinent imaging results/findings within the past 24 hours.    Assessment/Plan:      * Alcohol abuse with withdrawal  - Per patients wife, he used to drink about 1 bottle of vodka daily for several years; however, in the past few days he has been having 1/2 pint of vodka every other day. Last known drink 2 days ago. Patient denies recent ETOH use. PETH 1200, confirming heavy drinking  - Start CIWA protocol  - scheduled fixed valium taper  - Ativan PRN per CIWA  - Zofran PRN for nausea   - thiamine, mvi, folic acid; B1 pending  - daily replacing electrolytes  - see dysphagia  - Seizure, Aspiration and Fall precautions    Pharyngitis  - CT with findings  concerning for pharyngitis and possibly retained secretions. Also noted chronic appearing opacification of the left maxillary sinus with chronic osteitis of the sinus walls. Appears chronic dating back to 3/2017 per read.   - Outpatient referral to Otolaryngology   - Amoxicillin changed to ceftriaxone   - Began having fevers 2/26 as high as 101.3  Repeat CT neck without abscess formation, Bcx ordered: NGTD  CXR and UA ordered, unremarkable   CT AP shows atelectasis vs developing pneumonia at bases of lung, will add doxy for atypical coverage and encourage IS  Fevers still persisting despite above. Will broaden to Vanc and Zosyn, consult ID on Monday fro fever uknown origin:   - patient looks well, clinically, except for joint swelling and tenderness  - I see no need for escalating abx  - change to Unasyn for better ENT coverage and consider repeat CT of his neck  - f/u EGD results  - viral swab  - given his normal WBC and signs of gout, I would favor that as a cause of his low-grade fever, if his ID w/u remains negative    Dysphagia  Reports trouble swallowing, SLP following along  Has tolerated advancing diet from liquid to minced and moist, though began to have episodes of vomiting and reported dysphagia so resumed liquid diet  MBSS 3/1, SLP recommending GI consult  Plan for EGD 3/4: benign esophageal stricture noted as well as food that was removed; continue protonix PO BID x 8 weeks      Falls frequently  Chronic problem.  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 Also has h/o ETOH dependence. Thiamine level pending. IV replaced ordered.  PT/OT consulted. OP neurology referral placed. After talking with wife, patient is bedbound at baseline and has not walked in about three years.  Reason unknown.      Electrolyte disturbance  - Patient with Hypomagnesemia, Hypokalemia, hypocalcemia and Hypophosphatemia  - Correct electrolytes and monitor  - corrected calcium 8.7  - Telemetry monitoring for  now    Paroxysmal atrial fibrillation  Patient with Paroxysmal (<7 days) atrial fibrillation which is controlled currently with Beta Blocker. Patient is currently in sinus rhythm.[id number] Score: 1  - Had an episode of Atrial flutter (09/2023)  - Not compliant with Home Eliquis or BB   - Echocardiogram (9/2023): Preserved EF 50-55%  - Monitor electrolytes and Maintain Mg >2 and K >4  - holding off on full anticoagulation for fall rsk    Essential hypertension  Chronic, uncontrolled. However, may be also elevated currently in the setting of withdrawal  Latest blood pressure and vitals reviewed-     Temp:  [98.5 °F (36.9 °C)-99.7 °F (37.6 °C)]   Pulse:  [102-130]   Resp:  [15-24]   BP: (126-151)/(80-85)   SpO2:  [98 %-100 %] .   Home meds for hypertension were reviewed and noted below.   Hypertension Medications               metoprolol tartrate (LOPRESSOR) 50 MG tablet TAKE 1 TABLET TWICE DAILY          While in the hospital, will manage blood pressure as follows; Continue home antihypertensive regimen    Will utilize p.r.n. blood pressure medication only if patient's blood pressure greater than 160/100 and he develops symptoms such as worsening chest pain or shortness of breath.    GERD (gastroesophageal reflux disease)  - Protonix 40 mg, daily      Seizure  - Noted in history. Last seizure several years ago ~ 2019. Unclear etiology may have been related to withdrawal  - Continue with Keppra  - Follow up outpatient with Neurology; given this was a one time occurrence in likely the above setting  - reports born with epilepsy    Hypokalemia  Patient has hypokalemia which is Acute and currently uncontrolled. Most recent potassium levels reviewed-   Lab Results   Component Value Date    K 3.3 (L) 02/24/2024   . Will continue potassium replacement per protocol and recheck repeat levels after replacement completed.       VTE Risk Mitigation (From admission, onward)           Ordered     apixaban tablet 5 mg  2 times  daily         03/04/24 1322     IP VTE LOW RISK PATIENT  Once         02/24/24 1605     Place sequential compression device  Until discontinued         02/24/24 1605                    Discharge Planning   SIMONE: 3/6/2024     Code Status: Full Code   Is the patient medically ready for discharge?:     Reason for patient still in hospital (select all that apply): Patient trending condition  Discharge Plan A: Home with family, Home                  Batsheva Sales PA-C  Department of Hospital Medicine   [facility] - [facility] ([address])

## 2024-03-04 NOTE — PLAN OF CARE
Medicare Message     Important Message from Medicare regarding Discharge Appeal Rights Given to patient/caregiver; Explained to patient/caregiver; Signed/date by patient/caregiver Given to patient/caregiver; Explained to patient/caregiver; Other (comments)Important Message from Medicare regarding Discharge Appeal Rights. Given to patient/caregiver; Explained to patient/caregiver; Other (comments). The comment is Verbal Consent. Taken on 3/1/24 1643 Given to patient/caregiver; Explained to patient/caregiver; Other (comments)Important Message from Medicare regarding Discharge Appeal Rights. Given to patient/caregiver; Explained to patient/caregiver; Other (comments). The comment is Verbal Consent. Taken on 3/4/24 1058   Date IMM was signed 2/26/2024 3/1/2024 3/4/2024   Time IMM was signed 1023 0330 0950

## 2024-03-04 NOTE — TELEPHONE ENCOUNTER
----- Message from Tatianna Knutson sent at 3/4/2024 11:56 AM CST -----  Regarding: Appt  Contact: [phone]  Type:  Needs Medical Advice    Who Called: Myrtle Barakat  Symptoms (please be specific): Hosp f/u for GI bleed   Would the patient rather a call back or a response via [facility]? Call  Best Call Back Number: [phone]  Additional Information: Please call patient to schedule an appt.

## 2024-03-04 NOTE — HPI
63 y.o. man admitted with FLI, N/V, and pharyngitis. Started on empiric abx. PMH with HTN, epilepsy, afib on anticoagulation, ETOH abuse, CKD3, depression, gout, neuropathy and spastic hemiplegia. Mr. Johnson tells me that he developed sore throat and sudden onset SOB. Happened about 10 years ago - no etiology found. Also being treated for ETOH withdrawal. ID is consulted for FUO (T100.4F). The patient denies symptoms. Still coughing. Only complaint is gouty pain in joints.      
63 year old male with Hx of Hypertension, Seizure disorders, spastic left-sided hemiplegia, Anemia, Arthrititis, Depression, CKD stage 3, Alcohol use disorder and Gout, who presents to the ED with complaints of nausea/vomiting and abdominal pain for the past 2 days. Patient is a poor historian; most history provided by patients wife. For the past 2 days patient has not been eating. Started to feel nauseated with associated NBNB emesis and weakness. Reports that his sore throat has been hurting him since yesterday and noticed he has been shaking since yesterday. He reports that he had quit drinking for a long time; however, per patients wife he has been drinking daily for several years and in the past few weeks it has been 1/2 pint of vodka every 2 days. Patient denies any fever, chest pain, palpitaitons, dyspnea, changes in bowel or urinary habits. He does report feeling of cramping in his upper abdomen, non-radiating. Last episode of emesis was here in the ER. States he takes his medication but not everyday. Denies any illicit drug use or smoking. 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 Denies any sick contacts.   
No

## 2024-03-04 NOTE — CONSULTS
[facility] ([address])  Infectious Disease  Consult Note    Patient Name: Michael Johnson Jr.  MRN: [id number]  Admission Date: 2/24/2024  Hospital Length of Stay: 8 days  Attending Physician: Boubacar Greer MD  Primary Care Provider: Amelia Lai MD     Isolation Status: No active isolations    Patient information was obtained from patient, spouse/SO, past medical records, and ER records.      Inpatient consult to Infectious Diseases  Consult performed by: Je Guerin MD  Consult ordered by: Batsheva Sales PA-C        Assessment/Plan:     Fever    Mr. Johnson is a pleasant 62 yo man admitted with pharyngitis and alcohol  withdrawal  - started on empiric abx  - prior CT imaging x 2 without abscess  - previously on CTX and escalated to Zosyn and vancomycin today  - patient looks well, clinically, except for joint swelling and tenderness  - I see no need for escalating abx  - change to Unasyn for better ENT coverage and consider repeat CT of his neck  - f/u EGD results  - viral swab  - given his normal WBC and signs of gout, I would favor that as a cause of his low-grade fever, if his ID w/u remains negative    Thank you for your consult. I will follow-up with patient. Please contact us if you have any additional questions.    Je Guerin MD  Infectious Disease  [facility] - [facility] ([address])    Subjective:     Principal Problem: Alcohol abuse with withdrawal    HPI:  63 y.o. man admitted with FLI, N/V, and pharyngitis. Started on empiric abx. PMH with HTN, epilepsy, afib on anticoagulation, ETOH abuse, CKD3, depression, gout, neuropathy and spastic hemiplegia. Mr. Johnson tells me that he developed sore throat and sudden onset SOB. Happened about 10 years ago - no etiology found. Also being treated for ETOH withdrawal. ID is consulted for FUO (T100.4F). The patient denies symptoms. Still coughing. Only complaint is gouty pain in joints.        Past Medical History:    Diagnosis Date    Afib     Alcohol abuse with other alcohol-induced disorder 04/02/2019    Anemia 04/02/2019    unspecified deficiency    Ankle fracture, left     Aortic atherosclerosis 2/8/2024    Arthritis     Asthma     Cholecystitis 1/15/2018    Chronic kidney disease (CKD), stage III (moderate) 04/02/2019    Depression     Erectile dysfunction 04/02/2019    Gout, arthropathy 04/02/2019    Gout, unspecified     Hypertension     Hypertensive chronic kidney disease 04/02/2019    Hypomagnesemia 04/02/2019    Noncompliance 04/02/2019    Peripheral neuropathy 04/02/2019    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    Secondary hyperparathyroidism, renal 04/02/2019    Seizure 2016    Spastic hemiplegia affecting left nondominant side 04/02/2019       Past Surgical History:   Procedure Laterality Date    ANKLE SURGERY      x6    CHOLECYSTECTOMY         Review of patient's allergies indicates:   Allergen Reactions    Lisinopril Swelling     Pt admitted with angioedema 2wks after taking lisinopril.        Medications:  Medications Prior to Admission   Medication Sig    albuterol sulfate (PROAIR RESPICLICK) 90 mcg/actuation inhaler Inhale 2 puffs into the lungs every 6 (six) hours as needed for Wheezing (wheezing). Rescue    allopurinoL (ZYLOPRIM) 300 MG tablet TAKE 1 TABLET EVERY DAY    aluminum-magnesium hydroxide-simethicone (MAALOX) 200-200-20 mg/5 mL Susp Take 30 mLs by mouth before meals and at bedtime as needed (acid reflux).    amiodarone (PACERONE) 200 MG Tab Take 1 tablet (200 mg total) by mouth 2 (two) times daily for 14 days, THEN 1 tablet (200 mg total) once daily.    ammonium lactate (LAC-HYDRIN) 12 % lotion Apply 1 g topically as needed for Dry Skin.    apixaban (ELIQUIS) 5 mg Tab Take 1 tablet (5 mg total) by mouth 2 (two) times daily.    ceramides 1,3,6-II (CERAVE DAILY MOISTURIZING) Lotn Applying topically to dry skin twice daily.    diclofenac sodium (VOLTAREN) 1 % Gel Apply 2 g topically 4 (four) times daily as needed.     fluticasone propionate (FLONASE) 50 mcg/actuation nasal spray SHAKE LIQUID AND USE 1 SPRAY(50 MCG) IN EACH NOSTRIL EVERY DAY    folic acid (FOLVITE) 1 MG tablet Take 1 tablet (1 mg total) by mouth once daily.    gabapentin (NEURONTIN) 400 MG capsule TAKE 1 CAPSULE TWICE DAILY    levETIRAcetam (KEPPRA) 500 MG Tab TAKE 1 TABLET(500 MG) BY MOUTH TWICE DAILY    magnesium oxide 400 mg magnesium Tab Take 400 mg by mouth once daily.    methocarbamoL (ROBAXIN) 500 MG Tab Take 1 tablet (500 mg total) by mouth daily as needed.    metoprolol tartrate (LOPRESSOR) 50 MG tablet TAKE 1 TABLET TWICE DAILY    [facility] USE AS DIRECTED.    pantoprazole (PROTONIX) 40 MG tablet Take 1 tablet (40 mg total) by mouth once daily.     Antibiotics (From admission, onward)      Start     Stop Route Frequency Ordered    03/04/24 1500  ampicillin-sulbactam (UNASYN) 3 g in sodium chloride 0.9 % 100 mL IVPB (MB+)         -- IV Every 6 hours (non-standard times) 03/04/24 1133    03/02/24 1321  vancomycin - pharmacy to dose  (vancomycin IVPB (PEDS and ADULTS))        See Hyperspace for full Linked Orders Report.    -- IV pharmacy to manage frequency 03/02/24 1222          Antifungals (From admission, onward)      None          Antivirals (From admission, onward)      None             Immunization History   Administered Date(s) Administered    COVID-19, vector-nr, rS-Ad26, PF (Giorgi) 04/07/2021    Influenza - Quadrivalent - PF *Preferred* (6 months and older) 02/04/2020    PPD Test 01/18/2018       Family History       Problem Relation (Age of Onset)    Cancer Mother    Cataracts Maternal Grandfather    Hypertension Father    Stroke Maternal Grandmother, Maternal Grandfather          Social History     Socioeconomic History    Marital status:    Tobacco Use    Smoking status: Former    Smokeless tobacco: Never   Substance and Sexual Activity    Alcohol use: Not Currently    Drug use: No    Sexual activity: Not Currently      Social Determinants of Health     Financial Resource Strain: Low Risk  (2/8/2024)    Overall Financial Resource Strain (CARDIA)     Difficulty of Paying Living Expenses: Not hard at all   Food Insecurity: No Food Insecurity (2/8/2024)    Hunger Vital Sign     Worried About Running Out of Food in the Last Year: Never true     Ran Out of Food in the Last Year: Never true   Transportation Needs: No Transportation Needs (2/8/2024)    PRAPARE - Transportation     Lack of Transportation (Medical): No     Lack of Transportation (Non-Medical): No   Physical Activity: Inactive (2/8/2024)    Exercise Vital Sign     Days of Exercise per Week: 0 days     Minutes of Exercise per Session: 0 min   Stress: Stress Concern Present (2/8/2024)    [place] [employer] - Occupational Stress Questionnaire     Feeling of Stress : Very much   Social Connections: Socially Isolated (2/8/2024)    Social Connection and Isolation Panel [NHANES]     Frequency of Communication with Friends and Family: Once a week     Frequency of Social Gatherings with Friends and Family: Never     Attends Hinduism Services: Never     Active Member of Clubs or Organizations: No     Attends Club or Organization Meetings: Never     Marital Status:    Housing Stability: Low Risk  (2/8/2024)    Housing Stability Vital Sign     Unable to Pay for Housing in the Last Year: No     Number of Places Lived in the Last Year: 1     Unstable Housing in the Last Year: No     Review of Systems   Constitutional:  Negative for chills and fever.   Respiratory:  Positive for cough and choking. Negative for shortness of breath.    Cardiovascular:  Negative for chest pain.   Musculoskeletal:  Positive for arthralgias and joint swelling.   All other systems reviewed and are negative.    Objective:     Vital Signs (Most Recent):  Temp: 98.5 °F (36.9 °C) (03/04/24 1135)  Pulse: 83 (03/04/24 1135)  Resp: 18 (03/04/24 1135)  BP: 103/62 (03/04/24 1135)  SpO2:  (!) 91 % (03/04/24 1135) Vital Signs (24h Range):  Temp:  [98 °F (36.7 °C)-100.4 °F (38 °C)] 98.5 °F (36.9 °C)  Pulse:  [] 83  Resp:  [16-20] 18  SpO2:  [91 %-100 %] 91 %  BP: ()/(55-73) 103/62     Weight: 85 kg (187 lb 6.3 oz)  Body mass index is 25.41 kg/m².    Estimated Creatinine Clearance: 53.2 mL/min (A) (based on SCr of 1.6 mg/dL (H)).     Physical Exam  Vitals and nursing note reviewed.   Constitutional:       General: He is not in acute distress.     Appearance: Normal appearance. He is not ill-appearing, toxic-appearing or diaphoretic.   HENT:      Head: Normocephalic and atraumatic.      Right Ear: External ear normal.      Left Ear: External ear normal.      Mouth/Throat:      Mouth: Mucous membranes are moist.   Eyes:      Extraocular Movements: Extraocular movements intact.      Pupils: Pupils are equal, round, and reactive to light.   Cardiovascular:      Rate and Rhythm: Normal rate and regular rhythm.      Heart sounds: No murmur heard.  Pulmonary:      Effort: No respiratory distress.      Breath sounds: No wheezing.   Abdominal:      Tenderness: There is no guarding or rebound.   Musculoskeletal:         General: Swelling, tenderness and deformity present.   Skin:     Findings: Erythema present.   Neurological:      General: No focal deficit present.      Mental Status: He is alert and oriented to person, place, and time.   Psychiatric:         Mood and Affect: Mood normal.         Behavior: Behavior normal.         Thought Content: Thought content normal.         Judgment: Judgment normal.          Significant Labs: Blood Culture:   Recent Labs   Lab 02/27/24  1413 02/27/24  1622   LABBLOO No growth after 5 days. No growth after 5 days.     CBC:   Recent Labs   Lab 03/03/24  0407 03/04/24  0501   WBC 8.18 10.46   HGB 7.7* 7.2*   HCT 23.3* 22.0*    396     Microbiology Results (last 7 days)       Procedure Component Value Units Date/Time    Blood culture [[id number]] Collected:  "02/27/24 1413    Order Status: Completed Specimen: Blood from Peripheral, Forearm, Right Updated: 03/03/24 2212     Blood Culture, Routine No growth after 5 days.    Blood culture [[id number]] Collected: 02/27/24 1622    Order Status: Completed Specimen: Blood from Peripheral, Forearm, Right Updated: 03/03/24 2212     Blood Culture, Routine No growth after 5 days.          Procalcitonin: No results for input(s): "PROCAL" in the last 48 hours.  Urine Culture: No results for input(s): "LABURIN" in the last 4320 hours.    Significant Imaging: I have reviewed all pertinent imaging results/findings within the past 24 hours.              "

## 2024-03-04 NOTE — PROGRESS NOTES
Active pharmacy to dose vancomycin consult:     Patient reviewed, renal function stable, cultures reviewed, no new levels, continue current therapy.    Vancomycin level has been retimed since last note entry.  New trough is due 3/5/2024 at 13:30.    Please contact inpatient pharmacy at [phone] with any questions.     Thank you,  Sondra Lares  03/04/2024

## 2024-03-04 NOTE — PROGRESS NOTES
Therapy with vancomycin complete and/or consult discontinued by provider.  Pharmacy will sign off, please re-consult as needed.    Thank you for the consult,  Sondra Lares  03/04/2024

## 2024-03-04 NOTE — ASSESSMENT & PLAN NOTE
Reports trouble swallowing, SLP following along  Has tolerated advancing diet from liquid to minced and moist, though began to have episodes of vomiting and reported dysphagia so resumed liquid diet  MBSS 3/1, SLP recommending GI consult  Plan for EGD 3/4: benign esophageal stricture noted as well as food that was removed; continue protonix PO BID x 8 weeks

## 2024-03-04 NOTE — SUBJECTIVE & OBJECTIVE
Interval History: EGD done this morning, continuing protonix PO BID/ Will start with clears today and can advance to minced/moist with chopped meats as tolerated. Appreciate ID weighing in on fever curve    Review of Systems   Constitutional:  Negative for activity change and fatigue.   HENT:  Positive for trouble swallowing.    Respiratory:  Positive for cough. Negative for shortness of breath.    Gastrointestinal:  Negative for abdominal pain (chronic burning in abdomen/some int epigastric pain).   Musculoskeletal:  Positive for arthralgias and myalgias.   Neurological:  Positive for weakness.        Left sided spacticity since birth   Psychiatric/Behavioral:  Negative for agitation.      Objective:     Vital Signs (Most Recent):  Temp: 98.5 °F (36.9 °C) (03/04/24 1135)  Pulse: 76 (03/04/24 1141)  Resp: 18 (03/04/24 1135)  BP: 103/62 (03/04/24 1135)  SpO2: (!) 91 % (03/04/24 1135) Vital Signs (24h Range):  Temp:  [98 °F (36.7 °C)-100.4 °F (38 °C)] 98.5 °F (36.9 °C)  Pulse:  [] 76  Resp:  [16-20] 18  SpO2:  [91 %-100 %] 91 %  BP: ()/(55-73) 103/62     Weight: 85 kg (187 lb 6.3 oz)  Body mass index is 25.41 kg/m².    Intake/Output Summary (Last 24 hours) at 3/4/2024 1322  Last data filed at 3/4/2024 1142  Gross per 24 hour   Intake 400 ml   Output 2950 ml   Net -2550 ml         Physical Exam  Vitals reviewed.   Constitutional:       General: He is not in acute distress.  HENT:      Head: Normocephalic.      Mouth/Throat:      Mouth: Mucous membranes are moist.   Eyes:      Extraocular Movements: Extraocular movements intact.   Pulmonary:      Effort: Pulmonary effort is normal. No respiratory distress.   Abdominal:      General: Abdomen is flat.      Palpations: Abdomen is soft.   Musculoskeletal:         General: Tenderness and deformity present.   Skin:     Findings: Lesion (right 4th finger-he says old gout infection) present.   Neurological:      Mental Status: He is alert. Mental status is at  baseline.      Motor: Abnormal muscle tone present.   Psychiatric:         Behavior: Behavior normal. Behavior is cooperative.         Cognition and Memory: He exhibits impaired remote memory.             Significant Labs: All pertinent labs within the past 24 hours have been reviewed.    Significant Imaging: I have reviewed all pertinent imaging results/findings within the past 24 hours.

## 2024-03-04 NOTE — ANESTHESIA POSTPROCEDURE EVALUATION
Anesthesia Post Evaluation    Patient: Michael Johnson Jr.    Procedure(s) Performed: Procedure(s) (LRB):  EGD (ESOPHAGOGASTRODUODENOSCOPY) (N/A)    Final Anesthesia Type: MAC      Patient location during evaluation: PACU  Patient participation: Yes- Able to Participate  Level of consciousness: awake and alert  Post-procedure vital signs: reviewed and stable  Pain management: adequate  Airway patency: patent    PONV status at discharge: No PONV  Anesthetic complications: no      Cardiovascular status: hemodynamically stable  Respiratory status: unassisted  Hydration status: euvolemic  Follow-up not needed.              Vitals Value Taken Time   BP 93/57 03/04/24 0848   Temp 36.7 °C (98 °F) 03/04/24 0757   Pulse 78 03/04/24 0842   Resp 16 03/04/24 0842   SpO2 100 % 03/04/24 0842   Vitals shown include unvalidated device data.      Event Time   Out of Recovery 03/04/2024 08:55:00         Pain/Ladonna Score: Ladonna Score: 10 (3/4/2024  8:27 AM)

## 2024-03-04 NOTE — PT/OT/SLP PROGRESS
Speech Language Pathology Treatment    Patient Name:  Michael Johnson Jr.   MRN:  [id number]  Admitting Diagnosis: Alcohol abuse with withdrawal    Recommendations:     General Recommendations: SLP to continue to follow 3-5x/week for diet tolerance and ongoing assessment of oropharyngeal swallow function and cognitive-linguistic status.    Diet Recommendations:  Minced & Moist Diet - IDDSI Level 5, Liquid Diet Level: Thin liquids - IDDSI Level 0   Aspiration Precautions:   Assistance with meals  Feed only when awake/alert   HOB to 90 degrees for all PO intake; remain upright 30 minutes post meal   1 bite/sip at a time and small bites/sips  Eliminate distractions; avoid talking while eating   General Precautions: Standard, aspiration  Communication Strategies: pt reports wears eye glasses but none present at bedside or in room this date-- provide repetition of information and orientation as needed.      Assessment:     Michael Johnsno Jr. is a 63 y.o. male with an SLP diagnosis of dysphagia 2/2 throat swelling and excessive vomiting. SLP following for diet tolerance as pt continues to present with difficulties eating/drinking.    EGD procedure completed earlier this date (3/4/24)  Findings: Food was found at the gastroesophageal junction. Removal was accomplished with gentle foward traction with passage of the bolus into the stomach. One benign-appearing, intrinsic severe stenosis was found at the gastroesophageal junction. The stenosis was traversed after auto-dilation with the gastroscope while passing food obstruction into the stomach. The entire examined stomach was normal. The examined duodenum was normal.   Impression: Food at the gastroesophageal junction. Removal was successful.  - Benign-appearing esophageal stenosis.  - Normal stomach.  - Normal examined duodenum.   Recommendation: [facility].                          - Chopped diet.                          - Use Protonix  (pantoprazole) 40 mg PO BID for 8                          weeks.   Subjective     RN provided clearance for pt to be seen-- pt awake, resting in bed.     Pt asking about blood work-- SLP informed pt unaware of blood work results but will notify RN that he is asking.     Pain/Comfort: no pain/discomfort reported; pt reports that his cough has subsided but still present.    Respiratory Status: Room air    Objective:     Cognitive-Linguistic Status: in today's session, pt...  Awake and alert throughout entirety of session  Oriented to person, place, and situation  Verbalized wants/needs and answered open-ended questions independently   Engaged in conversation with SLP re: dislike for hospital foods, previous meals/PO intake and mealtimes at home. 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     SLP removed NPO sign from outside of pt's door as this is no longer needed-- pt was NPO for EGD procedure completed earlier this date. Per chart review of EGD procedure completed this date, GI recommending chopped diet as well. Pt kindly refused PO trials; therefore, session focused on providing pt education and discussing with pt current concerns re: PO intake. In today's session pt...   Acknowledged piror NPO status, procedure completed and plan to return to previous diet order   Expressed dislike in fish-- stating he likes the veggies; does not like sweets  Reporting to this SLP again, that he does not typically eat breakfast or lunch-- eats dinner and usually eats late.. reports sometimes at 11pm  Discussed with SLP current diet and no concerns re: diet consistency; only concerns of foods offered  SLP reminded pt that his spouse is able to bring foods from home, given 1:1 supervision and SLP with no control over what foods kitchen prepares.     Other: SLP discussed with pt importance of sitting in upright  position for PO intake-- call light in reach, pt acknowledging need for upright position for PO intake and knowing what buttons to press on bed but verbalizing difficulty with using fingers to push button. Pt requesting for pencil in order to utilize pencil eraser to better reach/push buttons-- SLP provided pt with unsharpened pencil and notified RN.     Education: SLP re-educated pt re: therapist's role and purpose of skilled services; reviewed safe swallow strategies/ aspiration precautions and discussed importance of use. SLP discussed plan to see pt with meal at next opportunity-- pt verbalized understanding of all discussed.     SLP communicated with PA prior to session this date-- briefly discussed EGD results, PA stating will slowly advance to prior diet. SLP discussed pt seeming pleased with diet consistency but complaints of foods offered; plan to continue to follow during this admit.     Goals:   Multidisciplinary Problems       SLP Goals          Problem: SLP    Goal Priority Disciplines Outcome   SLP Goal     SLP Ongoing, Progressing   Description: 1. Pt will participate in MBSS for further assessment of oropharyngeal swallow function.-- MET 3/1/24    2. Pt will be able to consume a full liquid diet without overt s/s of airway threat or aspiration given min cues to monitor rate and volume of intake.-- MET 2/27/24    3. Ongoing assessment of diet upgrade as pt tolerates.-- ongoing      4. Pt will tolerate a minced and moist diet with thin liquids without any overt s/s of aspiration or airway threat given min cues from clinician.     5. Pt and caregivers will demonstrate understanding and use of safe swallow strategies/ aspiration precautions in 100% of opportunities given min cues from clinician.                      Plan:     Patient to be seen:  3 x/week, 5 x/week   Plan of Care expires:  03/10/24  Plan of Care reviewed with:  patient (RN)   SLP Follow-Up:  Yes       Discharge recommendations:   (SLP at  next level of care)   Barriers to Discharge:  Level of Skilled Assistance Needed      Time Tracking:     SLP Treatment Date:   03/04/24  Speech Start Time:  1545  Speech Stop Time:  1600     Speech Total Time (min):  15 min    Billable Minutes: Treatment Swallowing Dysfunction 15    03/04/2024

## 2024-03-04 NOTE — PLAN OF CARE
Problem: Impaired Wound Healing  Goal: Optimal Wound Healing  Outcome: Ongoing, Progressing     Problem: Skin Injury Risk Increased  Goal: Skin Health and Integrity  Outcome: Ongoing, Progressing     Problem: Eating/Swallowing Impairment  Goal: Optimal Eating/Swallowing without Aspir  Outcome: Ongoing, Progressing

## 2024-03-04 NOTE — TELEPHONE ENCOUNTER
----- Message from Lidia Ratliff sent at 3/4/2024  9:32 AM CST -----  Name of Who is calling :TIERA MARTIN JR. [[id number]]        What is the request in detail:Pt would like to schedule his sonia virtually if possible because he is bed bound. Please assist         Can the clinic reply by [facility]:no             [facility]: [phone]

## 2024-03-04 NOTE — SUBJECTIVE & OBJECTIVE
Past Medical History:   Diagnosis Date    Afib     Alcohol abuse with other alcohol-induced disorder 04/02/2019    Anemia 04/02/2019    unspecified deficiency    Ankle fracture, left     Aortic atherosclerosis 2/8/2024    Arthritis     Asthma     Cholecystitis 1/15/2018    Chronic kidney disease (CKD), stage III (moderate) 04/02/2019    Depression     Erectile dysfunction 04/02/2019    Gout, arthropathy 04/02/2019    Gout, unspecified     Hypertension     Hypertensive chronic kidney disease 04/02/2019    Hypomagnesemia 04/02/2019    Noncompliance 04/02/2019    Peripheral neuropathy 04/02/2019    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    Secondary hyperparathyroidism, renal 04/02/2019    Seizure 2016    Spastic hemiplegia affecting left nondominant side 04/02/2019       Past Surgical History:   Procedure Laterality Date    ANKLE SURGERY      x6    CHOLECYSTECTOMY         Review of patient's allergies indicates:   Allergen Reactions    Lisinopril Swelling     Pt admitted with angioedema 2wks after taking lisinopril.        Medications:  Medications Prior to Admission   Medication Sig    albuterol sulfate (PROAIR RESPICLICK) 90 mcg/actuation inhaler Inhale 2 puffs into the lungs every 6 (six) hours as needed for Wheezing (wheezing). Rescue    allopurinoL (ZYLOPRIM) 300 MG tablet TAKE 1 TABLET EVERY DAY    aluminum-magnesium hydroxide-simethicone (MAALOX) 200-200-20 mg/5 mL Susp Take 30 mLs by mouth before meals and at bedtime as needed (acid reflux).    amiodarone (PACERONE) 200 MG Tab Take 1 tablet (200 mg total) by mouth 2 (two) times daily for 14 days, THEN 1 tablet (200 mg total) once daily.    ammonium lactate (LAC-HYDRIN) 12 % lotion Apply 1 g topically as needed for Dry Skin.    apixaban (ELIQUIS) 5 mg Tab Take 1 tablet (5 mg total) by mouth 2 (two) times daily.    ceramides 1,3,6-II (CERAVE DAILY MOISTURIZING) Lotn Applying topically to dry skin twice daily.    diclofenac sodium (VOLTAREN) 1 % Gel Apply 2 g topically 4 (four)  times daily as needed.    fluticasone propionate (FLONASE) 50 mcg/actuation nasal spray SHAKE LIQUID AND USE 1 SPRAY(50 MCG) IN EACH NOSTRIL EVERY DAY    folic acid (FOLVITE) 1 MG tablet Take 1 tablet (1 mg total) by mouth once daily.    gabapentin (NEURONTIN) 400 MG capsule TAKE 1 CAPSULE TWICE DAILY    levETIRAcetam (KEPPRA) 500 MG Tab TAKE 1 TABLET(500 MG) BY MOUTH TWICE DAILY    magnesium oxide 400 mg magnesium Tab Take 400 mg by mouth once daily.    methocarbamoL (ROBAXIN) 500 MG Tab Take 1 tablet (500 mg total) by mouth daily as needed.    metoprolol tartrate (LOPRESSOR) 50 MG tablet TAKE 1 TABLET TWICE DAILY    [facility] USE AS DIRECTED.    pantoprazole (PROTONIX) 40 MG tablet Take 1 tablet (40 mg total) by mouth once daily.     Antibiotics (From admission, onward)      Start     Stop Route Frequency Ordered    03/04/24 1500  ampicillin-sulbactam (UNASYN) 3 g in sodium chloride 0.9 % 100 mL IVPB (MB+)         -- IV Every 6 hours (non-standard times) 03/04/24 1133    03/02/24 1321  vancomycin - pharmacy to dose  (vancomycin IVPB (PEDS and ADULTS))        See Hyperspace for full Linked Orders Report.    -- IV pharmacy to manage frequency 03/02/24 1222          Antifungals (From admission, onward)      None          Antivirals (From admission, onward)      None             Immunization History   Administered Date(s) Administered    COVID-19, vector-nr, rS-Ad26, PF (Base CRM) 04/07/2021    Influenza - Quadrivalent - PF *Preferred* (6 months and older) 02/04/2020    PPD Test 01/18/2018       Family History       Problem Relation (Age of Onset)    Cancer Mother    Cataracts Maternal Grandfather    Hypertension Father    Stroke Maternal Grandmother, Maternal Grandfather          Social History     Socioeconomic History    Marital status:    Tobacco Use    Smoking status: Former    Smokeless tobacco: Never   Substance and Sexual Activity    Alcohol use: Not Currently    Drug use: No    Sexual  activity: Not Currently     Social Determinants of Health     Financial Resource Strain: Low Risk  (2/8/2024)    Overall Financial Resource Strain (CARDIA)     Difficulty of Paying Living Expenses: Not hard at all   Food Insecurity: No Food Insecurity (2/8/2024)    Hunger Vital Sign     Worried About Running Out of Food in the Last Year: Never true     Ran Out of Food in the Last Year: Never true   Transportation Needs: No Transportation Needs (2/8/2024)    PRAPARE - Transportation     Lack of Transportation (Medical): No     Lack of Transportation (Non-Medical): No   Physical Activity: Inactive (2/8/2024)    Exercise Vital Sign     Days of Exercise per Week: 0 days     Minutes of Exercise per Session: 0 min   Stress: Stress Concern Present (2/8/2024)    [employer] [place] [employer] - Occupational Stress Questionnaire     Feeling of Stress : Very much   Social Connections: Socially Isolated (2/8/2024)    Social Connection and Isolation Panel [NHANES]     Frequency of Communication with Friends and Family: Once a week     Frequency of Social Gatherings with Friends and Family: Never     Attends [employer] Services: Never     Active Member of Clubs or Organizations: No     Attends Club or Organization Meetings: Never     Marital Status:    Housing Stability: Low Risk  (2/8/2024)    Housing Stability Vital Sign     Unable to Pay for Housing in the Last Year: No     Number of Places Lived in the Last Year: 1     Unstable Housing in the Last Year: No     Review of Systems   Constitutional:  Negative for chills and fever.   Respiratory:  Positive for cough and choking. Negative for shortness of breath.    Cardiovascular:  Negative for chest pain.   Musculoskeletal:  Positive for arthralgias and joint swelling.   All other systems reviewed and are negative.    Objective:     Vital Signs (Most Recent):  Temp: 98.5 °F (36.9 °C) (03/04/24 1135)  Pulse: 83 (03/04/24 1135)  Resp: 18 (03/04/24 1135)  BP: 103/62  (03/04/24 1135)  SpO2: (!) 91 % (03/04/24 1135) Vital Signs (24h Range):  Temp:  [98 °F (36.7 °C)-100.4 °F (38 °C)] 98.5 °F (36.9 °C)  Pulse:  [] 83  Resp:  [16-20] 18  SpO2:  [91 %-100 %] 91 %  BP: ()/(55-73) 103/62     Weight: 85 kg (187 lb 6.3 oz)  Body mass index is 25.41 kg/m².    Estimated Creatinine Clearance: 53.2 mL/min (A) (based on SCr of 1.6 mg/dL (H)).     Physical Exam  Vitals and nursing note reviewed.   Constitutional:       General: He is not in acute distress.     Appearance: Normal appearance. He is not ill-appearing, toxic-appearing or diaphoretic.   HENT:      Head: Normocephalic and atraumatic.      Right Ear: External ear normal.      Left Ear: External ear normal.      Mouth/Throat:      Mouth: Mucous membranes are moist.   Eyes:      Extraocular Movements: Extraocular movements intact.      Pupils: Pupils are equal, round, and reactive to light.   Cardiovascular:      Rate and Rhythm: Normal rate and regular rhythm.      Heart sounds: No murmur heard.  Pulmonary:      Effort: No respiratory distress.      Breath sounds: No wheezing.   Abdominal:      Tenderness: There is no guarding or rebound.   Musculoskeletal:         General: Swelling, tenderness and deformity present.   Skin:     Findings: Erythema present.   Neurological:      General: No focal deficit present.      Mental Status: He is alert and oriented to person, place, and time.   Psychiatric:         Mood and Affect: Mood normal.         Behavior: Behavior normal.         Thought Content: Thought content normal.         Judgment: Judgment normal.          Significant Labs: Blood Culture:   Recent Labs   Lab 02/27/24  1413 02/27/24  1622   LABBLOO No growth after 5 days. No growth after 5 days.     CBC:   Recent Labs   Lab 03/03/24  0407 03/04/24  0501   WBC 8.18 10.46   HGB 7.7* 7.2*   HCT 23.3* 22.0*    396     Microbiology Results (last 7 days)       Procedure Component Value Units Date/Time    Blood culture  "[[id number]] Collected: 02/27/24 1413    Order Status: Completed Specimen: Blood from Peripheral, Forearm, Right Updated: 03/03/24 2212     Blood Culture, Routine No growth after 5 days.    Blood culture [[id number]] Collected: 02/27/24 1622    Order Status: Completed Specimen: Blood from Peripheral, Forearm, Right Updated: 03/03/24 2212     Blood Culture, Routine No growth after 5 days.          Procalcitonin: No results for input(s): "PROCAL" in the last 48 hours.  Urine Culture: No results for input(s): "LABURIN" in the last 4320 hours.    Significant Imaging: I have reviewed all pertinent imaging results/findings within the past 24 hours.  "

## 2024-03-04 NOTE — ANESTHESIA PREPROCEDURE EVALUATION
03/04/2024  Michael Johnson Jr. is a 63 y.o., male.      Pre-op Assessment    I have reviewed the Patient Summary Reports.     I have reviewed the Nursing Notes. I have reviewed the NPO Status.   I have reviewed the Medications.     Review of Systems  Anesthesia Hx:  No problems with previous Anesthesia                Social:  Non-Smoker       Hematology/Oncology:    Oncology Normal    -- Anemia:                                  EENT/Dental:  EENT/Dental Normal           Cardiovascular:     Hypertension                                        Pulmonary:    Asthma mild                   Renal/:  Chronic Renal Disease, CKD                Hepatic/GI:     GERD             Musculoskeletal:  Arthritis               Neurological:    Neuromuscular Disease,   Seizures, well controlled                                Endocrine:  Endocrine Normal            Psych:  Psychiatric History anxiety depression                Physical Exam  General: Well nourished, Cooperative and Alert    Airway:  Mallampati: II   Mouth Opening: Normal  TM Distance: Normal  Tongue: Normal  Neck ROM: Normal ROM    Dental:        Anesthesia Plan  Type of Anesthesia, risks & benefits discussed:    Anesthesia Type: MAC  Intra-op Monitoring Plan: Standard ASA Monitors  Post Op Pain Control Plan: multimodal analgesia  Induction:  IV  Airway Plan: Video  Informed Consent: Informed consent signed with the Patient and all parties understand the risks and agree with anesthesia plan.  All questions answered.   ASA Score: 3    Ready For Surgery From Anesthesia Perspective.     .

## 2024-03-04 NOTE — ASSESSMENT & PLAN NOTE
Mr. Johnson is a pleasant 62 yo man admitted with pharyngitis and alcohol  withdrawal  - started on empiric abx  - prior CT imaging x 2 without abscess  - previously on CTX and escalated to Zosyn and vancomycin today  - patient looks well, clinically, except for joint swelling and tenderness  - I see no need for escalating abx  - change to Unasyn for better ENT coverage and consider repeat CT of his neck  - f/u EGD results  - given his normal WBC and signs of gout, I would favor that as a cause of his low-grade fever, if his ID w/u remains negative

## 2024-03-05 PROBLEM — D50.9 IRON DEFICIENCY ANEMIA: Status: ACTIVE | Noted: 2024-03-05

## 2024-03-05 LAB
ALBUMIN SERPL BCP-MCNC: 2.3 G/DL (ref 3.5–5.2)
ALP SERPL-CCNC: 104 U/L (ref 55–135)
ALT SERPL W/O P-5'-P-CCNC: 30 U/L (ref 10–44)
ANION GAP SERPL CALC-SCNC: 8 MMOL/L (ref 8–16)
AST SERPL-CCNC: 43 U/L (ref 10–40)
BASOPHILS # BLD AUTO: 0.1 K/UL (ref 0–0.2)
BASOPHILS NFR BLD: 1 % (ref 0–1.9)
BILIRUB SERPL-MCNC: 0.5 MG/DL (ref 0.1–1)
BUN SERPL-MCNC: 26 MG/DL (ref 8–23)
CALCIUM SERPL-MCNC: 8.9 MG/DL (ref 8.7–10.5)
CHLORIDE SERPL-SCNC: 105 MMOL/L (ref 95–110)
CO2 SERPL-SCNC: 22 MMOL/L (ref 23–29)
CREAT SERPL-MCNC: 1.5 MG/DL (ref 0.5–1.4)
DIFFERENTIAL METHOD BLD: ABNORMAL
EOSINOPHIL # BLD AUTO: 0.6 K/UL (ref 0–0.5)
EOSINOPHIL NFR BLD: 5.8 % (ref 0–8)
ERYTHROCYTE [DISTWIDTH] IN BLOOD BY AUTOMATED COUNT: 15.6 % (ref 11.5–14.5)
EST. GFR  (NO RACE VARIABLE): 53 ML/MIN/1.73 M^2
GLUCOSE SERPL-MCNC: 106 MG/DL (ref 70–110)
HCT VFR BLD AUTO: 22.1 % (ref 40–54)
HGB BLD-MCNC: 7.2 G/DL (ref 14–18)
IMM GRANULOCYTES # BLD AUTO: 0.12 K/UL (ref 0–0.04)
IMM GRANULOCYTES NFR BLD AUTO: 1.2 % (ref 0–0.5)
LYMPHOCYTES # BLD AUTO: 1.4 K/UL (ref 1–4.8)
LYMPHOCYTES NFR BLD: 13.8 % (ref 18–48)
MAGNESIUM SERPL-MCNC: 1.5 MG/DL (ref 1.6–2.6)
MCH RBC QN AUTO: 37.7 PG (ref 27–31)
MCHC RBC AUTO-ENTMCNC: 32.6 G/DL (ref 32–36)
MCV RBC AUTO: 116 FL (ref 82–98)
MONOCYTES # BLD AUTO: 1 K/UL (ref 0.3–1)
MONOCYTES NFR BLD: 9.7 % (ref 4–15)
NEUTROPHILS # BLD AUTO: 7.1 K/UL (ref 1.8–7.7)
NEUTROPHILS NFR BLD: 68.5 % (ref 38–73)
NRBC BLD-RTO: 0 /100 WBC
PLATELET # BLD AUTO: 434 K/UL (ref 150–450)
PMV BLD AUTO: 10.5 FL (ref 9.2–12.9)
POTASSIUM SERPL-SCNC: 4.5 MMOL/L (ref 3.5–5.1)
PROT SERPL-MCNC: 7.7 G/DL (ref 6–8.4)
RBC # BLD AUTO: 1.91 M/UL (ref 4.6–6.2)
SODIUM SERPL-SCNC: 135 MMOL/L (ref 136–145)
WBC # BLD AUTO: 10.3 K/UL (ref 3.9–12.7)

## 2024-03-05 PROCEDURE — 21400001 HC TELEMETRY ROOM: Mod: HCNC

## 2024-03-05 PROCEDURE — 25000003 PHARM REV CODE 250: Mod: HCNC | Performed by: INTERNAL MEDICINE

## 2024-03-05 PROCEDURE — 63600175 PHARM REV CODE 636 W HCPCS: Mod: HCNC | Performed by: INTERNAL MEDICINE

## 2024-03-05 PROCEDURE — 83735 ASSAY OF MAGNESIUM: CPT | Mod: HCNC | Performed by: INTERNAL MEDICINE

## 2024-03-05 PROCEDURE — 94761 N-INVAS EAR/PLS OXIMETRY MLT: CPT | Mod: HCNC

## 2024-03-05 PROCEDURE — 25000003 PHARM REV CODE 250: Mod: HCNC | Performed by: PHYSICIAN ASSISTANT

## 2024-03-05 PROCEDURE — 80053 COMPREHEN METABOLIC PANEL: CPT | Mod: HCNC | Performed by: INTERNAL MEDICINE

## 2024-03-05 PROCEDURE — 25000003 PHARM REV CODE 250: Mod: HCNC | Performed by: NURSE PRACTITIONER

## 2024-03-05 PROCEDURE — 85025 COMPLETE CBC W/AUTO DIFF WBC: CPT | Mod: HCNC | Performed by: INTERNAL MEDICINE

## 2024-03-05 PROCEDURE — 63600175 PHARM REV CODE 636 W HCPCS: Mod: HCNC | Performed by: NURSE PRACTITIONER

## 2024-03-05 PROCEDURE — 25000242 PHARM REV CODE 250 ALT 637 W/ HCPCS: Mod: HCNC | Performed by: INTERNAL MEDICINE

## 2024-03-05 PROCEDURE — 36415 COLL VENOUS BLD VENIPUNCTURE: CPT | Mod: HCNC | Performed by: INTERNAL MEDICINE

## 2024-03-05 PROCEDURE — 82272 OCCULT BLD FECES 1-3 TESTS: CPT | Mod: HCNC | Performed by: NURSE PRACTITIONER

## 2024-03-05 PROCEDURE — 92526 ORAL FUNCTION THERAPY: CPT | Mod: HCNC

## 2024-03-05 PROCEDURE — 94640 AIRWAY INHALATION TREATMENT: CPT | Mod: HCNC

## 2024-03-05 RX ORDER — MAGNESIUM SULFATE HEPTAHYDRATE 40 MG/ML
2 INJECTION, SOLUTION INTRAVENOUS ONCE
Status: COMPLETED | OUTPATIENT
Start: 2024-03-05 | End: 2024-03-05

## 2024-03-05 RX ORDER — COLCHICINE 0.6 MG/1
0.6 TABLET, FILM COATED ORAL DAILY
Status: DISCONTINUED | OUTPATIENT
Start: 2024-03-05 | End: 2024-03-05

## 2024-03-05 RX ORDER — LANOLIN ALCOHOL/MO/W.PET/CERES
1 CREAM (GRAM) TOPICAL DAILY
Status: DISCONTINUED | OUTPATIENT
Start: 2024-03-05 | End: 2024-03-07 | Stop reason: HOSPADM

## 2024-03-05 RX ORDER — COLCHICINE 0.6 MG/1
0.6 TABLET, FILM COATED ORAL 2 TIMES DAILY
Status: DISCONTINUED | OUTPATIENT
Start: 2024-03-06 | End: 2024-03-06

## 2024-03-05 RX ORDER — ALLOPURINOL 100 MG/1
100 TABLET ORAL DAILY
Status: DISCONTINUED | OUTPATIENT
Start: 2024-03-05 | End: 2024-03-07 | Stop reason: HOSPADM

## 2024-03-05 RX ORDER — METOPROLOL TARTRATE 25 MG/1
25 TABLET, FILM COATED ORAL 2 TIMES DAILY
Status: DISCONTINUED | OUTPATIENT
Start: 2024-03-05 | End: 2024-03-07 | Stop reason: HOSPADM

## 2024-03-05 RX ORDER — COLCHICINE 0.6 MG/1
1.2 TABLET, FILM COATED ORAL ONCE
Status: COMPLETED | OUTPATIENT
Start: 2024-03-05 | End: 2024-03-05

## 2024-03-05 RX ORDER — COLCHICINE 0.6 MG/1
0.6 TABLET, FILM COATED ORAL DAILY
Status: COMPLETED | OUTPATIENT
Start: 2024-03-05 | End: 2024-03-05

## 2024-03-05 RX ADMIN — GABAPENTIN 400 MG: 400 CAPSULE ORAL at 09:03

## 2024-03-05 RX ADMIN — GUAIFENESIN 1200 MG: 600 TABLET, EXTENDED RELEASE ORAL at 09:03

## 2024-03-05 RX ADMIN — APIXABAN 5 MG: 2.5 TABLET, FILM COATED ORAL at 09:03

## 2024-03-05 RX ADMIN — CETIRIZINE HYDROCHLORIDE 5 MG: 5 TABLET, FILM COATED ORAL at 09:03

## 2024-03-05 RX ADMIN — Medication 100 MG: at 09:03

## 2024-03-05 RX ADMIN — IPRATROPIUM BROMIDE AND ALBUTEROL SULFATE 3 ML: 2.5; .5 SOLUTION RESPIRATORY (INHALATION) at 08:03

## 2024-03-05 RX ADMIN — IPRATROPIUM BROMIDE AND ALBUTEROL SULFATE 3 ML: 2.5; .5 SOLUTION RESPIRATORY (INHALATION) at 09:03

## 2024-03-05 RX ADMIN — METOPROLOL TARTRATE 25 MG: 50 TABLET, FILM COATED ORAL at 09:03

## 2024-03-05 RX ADMIN — IPRATROPIUM BROMIDE AND ALBUTEROL SULFATE 3 ML: 2.5; .5 SOLUTION RESPIRATORY (INHALATION) at 02:03

## 2024-03-05 RX ADMIN — AMMONIUM LACTATE: 120 LOTION TOPICAL at 09:03

## 2024-03-05 RX ADMIN — AMPICILLIN SODIUM AND SULBACTAM SODIUM 3 G: 2; 1 INJECTION, POWDER, FOR SOLUTION INTRAMUSCULAR; INTRAVENOUS at 06:03

## 2024-03-05 RX ADMIN — AMPICILLIN SODIUM AND SULBACTAM SODIUM 3 G: 2; 1 INJECTION, POWDER, FOR SOLUTION INTRAMUSCULAR; INTRAVENOUS at 11:03

## 2024-03-05 RX ADMIN — MAGNESIUM SULFATE HEPTAHYDRATE 2 G: 40 INJECTION, SOLUTION INTRAVENOUS at 02:03

## 2024-03-05 RX ADMIN — PANTOPRAZOLE SODIUM 40 MG: 40 TABLET, DELAYED RELEASE ORAL at 09:03

## 2024-03-05 RX ADMIN — COLCHICINE 0.6 MG: 0.6 TABLET, FILM COATED ORAL at 03:03

## 2024-03-05 RX ADMIN — AMPICILLIN SODIUM AND SULBACTAM SODIUM 3 G: 2; 1 INJECTION, POWDER, FOR SOLUTION INTRAMUSCULAR; INTRAVENOUS at 05:03

## 2024-03-05 RX ADMIN — FOLIC ACID 1 MG: 1 TABLET ORAL at 09:03

## 2024-03-05 RX ADMIN — PROMETHAZINE HYDROCHLORIDE AND CODEINE PHOSPHATE 5 ML: 6.25; 1 SOLUTION ORAL at 09:03

## 2024-03-05 RX ADMIN — COLCHICINE 1.2 MG: 0.6 TABLET, FILM COATED ORAL at 02:03

## 2024-03-05 RX ADMIN — LEVETIRACETAM 500 MG: 500 TABLET, FILM COATED ORAL at 09:03

## 2024-03-05 RX ADMIN — THERA TABS 1 TABLET: TAB at 09:03

## 2024-03-05 RX ADMIN — FERROUS SULFATE TAB 325 MG (65 MG ELEMENTAL FE) 1 EACH: 325 (65 FE) TAB at 02:03

## 2024-03-05 RX ADMIN — FLUTICASONE PROPIONATE 100 MCG: 50 SPRAY, METERED NASAL at 09:03

## 2024-03-05 RX ADMIN — ALLOPURINOL 100 MG: 100 TABLET ORAL at 02:03

## 2024-03-05 NOTE — SUBJECTIVE & OBJECTIVE
Interval History: Mr oRdriguez reports feeling better than when he arrived. He is still having trouble swallowing and is working with speech. Mr Rodriguez reports pain in all of his extremities.  He feels that he is having a gout flare and notes that his skin darkens around his joints when he does  have a flare. He was taking allopurinol for prevention.  No family member at bed at this time. He did say that he has been  to his wife for 18 years.    Review of Systems   Constitutional:  Positive for appetite change. Negative for activity change, chills and fatigue.   HENT:  Positive for trouble swallowing. Negative for congestion, drooling and sore throat.    Eyes:  Negative for visual disturbance.   Respiratory:  Positive for shortness of breath.    Cardiovascular:  Negative for chest pain and leg swelling.   Gastrointestinal:  Negative for abdominal distention, constipation, nausea and vomiting.   Genitourinary:  Negative for difficulty urinating.   Musculoskeletal:  Positive for arthralgias and joint swelling (darkening around joints r/t gout).   Skin:  Positive for color change.   Neurological:  Negative for dizziness, speech difficulty and headaches.   Psychiatric/Behavioral:  Negative for agitation.      Objective:     Vital Signs (Most Recent):  Temp: 98.9 °F (37.2 °C) (03/05/24 1123)  Pulse: 86 (03/05/24 1123)  Resp: 16 (03/05/24 1123)  BP: (!) 95/59 (03/05/24 1123)  SpO2: 99 % (03/05/24 1123) Vital Signs (24h Range):  Temp:  [98.1 °F (36.7 °C)-98.9 °F (37.2 °C)] 98.9 °F (37.2 °C)  Pulse:  [72-96] 86  Resp:  [14-18] 16  SpO2:  [98 %-99 %] 99 %  BP: ()/(53-67) 95/59     Weight: 85 kg (187 lb 6.3 oz)  Body mass index is 25.41 kg/m².    Intake/Output Summary (Last 24 hours) at 3/5/2024 1313  Last data filed at 3/5/2024 0056  Gross per 24 hour   Intake --   Output 850 ml   Net -850 ml         Physical Exam  Vitals and nursing note reviewed.   HENT:      Head: Normocephalic.      Mouth/Throat:       Pharynx: Oropharynx is clear.   Eyes:      Pupils: Pupils are equal, round, and reactive to light.   Cardiovascular:      Rate and Rhythm: Normal rate.   Pulmonary:      Effort: Pulmonary effort is normal.   Abdominal:      Palpations: Abdomen is soft.      Tenderness: There is no abdominal tenderness. There is no guarding.   Musculoskeletal:         General: Swelling (BL proximal ulnar), tenderness and deformity (BL wrist contractures) present.      Right lower leg: No edema.      Left lower leg: No edema.   Skin:     Findings: Lesion (right 4th finger old gout flare) present.   Neurological:      Mental Status: He is alert. Mental status is at baseline.      Comments: Not oriented to date   Psychiatric:         Mood and Affect: Mood normal.         Behavior: Behavior normal.         Cognition and Memory: He exhibits impaired recent memory.             Significant Labs: All pertinent labs within the past 24 hours have been reviewed.  BMP:   Recent Labs   Lab 03/05/24  0450      *   K 4.5      CO2 22*   BUN 26*   CREATININE 1.5*   CALCIUM 8.9   MG 1.5*     CBC:   Recent Labs   Lab 03/04/24  0501 03/05/24  0450   WBC 10.46 10.30   HGB 7.2* 7.2*   HCT 22.0* 22.1*    434     CMP:   Recent Labs   Lab 03/04/24  0501 03/05/24  0450   * 135*   K 4.7 4.5    105   CO2 22* 22*    106   BUN 23 26*   CREATININE 1.6* 1.5*   CALCIUM 8.6* 8.9   PROT 7.4 7.7   ALBUMIN 2.3* 2.3*   BILITOT 0.7 0.5   ALKPHOS 105 104   AST 45* 43*   ALT 27 30   ANIONGAP 9 8       Significant Imaging: I have reviewed all pertinent imaging results/findings within the past 24 hours.  X-Ray Chest AP Portable  Narrative: EXAMINATION:  XR CHEST AP PORTABLE    CLINICAL HISTORY:  aspiration concern;    TECHNIQUE:  Frontal view of the chest was performed.    COMPARISON:  02/27/2024    FINDINGS:  The cardiomediastinal silhouette is normal in size and midline. Pulmonary vascularity appears within normal limits.    The  lungs appear clear without confluent pulmonary parenchymal opacity. No pleural fluid or pneumothorax.  Impression: No new focal consolidation identified.    Electronically signed by: Bhavesh Hernandez MD  Date:    03/02/2024  Time:    09:39

## 2024-03-05 NOTE — ASSESSMENT & PLAN NOTE
Macrocytic Anemia      Patient's anemia is currently  stable . Has not received any PRBCs to date. Etiology likely d/t Iron deficiency and B12 deficiency, and chronic alcohol use  Current CBC reviewed-   Lab Results   Component Value Date    HGB 7.2 (L) 03/05/2024    HCT 22.1 (L) 03/05/2024     Monitor serial CBC and transfuse if patient becomes hemodynamically unstable, symptomatic or H/H drops below 7/21.  B12 and folate pending  Lab Results   Component Value Date    UIBC 244 08/28/2009    IRON 11 (L) 02/28/2024    TRANSFERRIN 129 (L) 02/28/2024    TIBC 191 (L) 02/28/2024    FESATURATED 6 (L) 02/28/2024      Will order supplemental iron and B12. Transfuse if hgb less than 7.

## 2024-03-05 NOTE — ASSESSMENT & PLAN NOTE
Chronic, uncontrolled. However, may be also elevated currently in the setting of withdrawal  Latest blood pressure and vitals reviewed-     Temp:  [98.1 °F (36.7 °C)-98.9 °F (37.2 °C)]   Pulse:  [72-96]   Resp:  [14-18]   BP: ()/(53-67)   SpO2:  [98 %-99 %] .   Home meds for hypertension were reviewed and noted below.   Hypertension Medications               metoprolol tartrate (LOPRESSOR) 50 MG tablet TAKE 1 TABLET TWICE DAILY          Decrease the metoprolol to 25 mg BID since patient is having decreased blood pressures.    Will utilize p.r.n. blood pressure medication only if patient's blood pressure greater than 160/100 and he develops symptoms such as worsening chest pain or shortness of breath.

## 2024-03-05 NOTE — ASSESSMENT & PLAN NOTE
- Per patients wife, he used to drink about 1 bottle of vodka daily for several years; however, in the past few days he has been having 1/2 pint of vodka every other day. Last known drink 2 days ago. Patient denies recent ETOH use. PETH 1200, confirming heavy drinking  - Start CIWA protocol  - scheduled fixed valium taper  - Ativan PRN per CIWA  - Zofran PRN for nausea   - thiamine, mvi, folic acid; B1 63 within ref range  - daily replacing electrolytes  - see dysphagia  - Seizure, Aspiration and Fall precautions

## 2024-03-05 NOTE — ASSESSMENT & PLAN NOTE
Chronic problem.  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 Also has h/o ETOH dependence. Thiamine level pending. IV replaced ordered.  PT/OT consulted. OP neurology referral placed. After talking with wife, patient is bedbound at baseline and has not walked in about three years.  Reason unknown.     Health Maintenance Due   Topic Date Due   • Shingles Vaccine (2 of 3) 11/21/2013   • Breast Cancer Screening  12/03/2014   • Colorectal Cancer Screening-Colonoscopy  01/02/2016       Patient is due for topics as listed above but is not proceeding with them at this time.

## 2024-03-05 NOTE — ASSESSMENT & PLAN NOTE
Uric acid mildly elevated. Patient reports that the skin darkening at his joints correlates with his gout flares. He reports pain in his joints in each of his 4 extremities. He was prescribed allopurinol outpatient. Will resume the allopurinol and start course of colchicine.

## 2024-03-05 NOTE — ASSESSMENT & PLAN NOTE
- CT with findings concerning for pharyngitis and possibly retained secretions. Also noted chronic appearing opacification of the left maxillary sinus with chronic osteitis of the sinus walls. Appears chronic dating back to 3/2017 per read.   - Outpatient referral to Otolaryngology   - Amoxicillin changed to ceftriaxone   - Began having fevers 2/26 as high as 101.3  - Repeat CT neck without abscess formation, Bcx ordered: NGTD  - CXR and UA ordered, unremarkable   - CT AP shows atelectasis vs developing pneumonia at bases of lung, will add doxy for atypical coverage and encourage IS  - Fevers still persisting despite above. Will broaden to Vanc and Zosyn, consult ID on Monday for fever uknown origin:  - Appreciate ID recs including: change to Unasyn for better ENT coverage and consider repeat CT of his neck  - f/u EGD results  - viral swab

## 2024-03-05 NOTE — ASSESSMENT & PLAN NOTE
Patient with Paroxysmal (<7 days) atrial fibrillation which is controlled currently with Beta Blocker. Patient is currently in sinus rhythm.[id number] Score: 1  - Had an episode of Atrial flutter (09/2023)  - Not compliant with Home Eliquis or BB   - Echocardiogram (9/2023): Preserved EF 50-55%  - Monitor electrolytes and Maintain Mg >2 and K >4  - holding off on full anticoagulation for fall risk  - eliquis resumed 3/4. Patient is bedbound. Will monitor CBC

## 2024-03-05 NOTE — PT/OT/SLP PROGRESS
Speech Language Pathology Treatment    Patient Name:  Michael Johnson Jr.   MRN:  [id number]  Admitting Diagnosis: Alcohol abuse with withdrawal    Recommendations:     General Recommendations: SLP to continue to follow 3-5x/week during this admit for diet tolerance and to ensure comprehension of safe swallow strategies/ aspiration precautions.   Diet Recommendations:  Minced & Moist Diet - IDDSI Level 5, Liquid Diet Level: Thin liquids - IDDSI Level 0   Aspiration Precautions:  Assistance with meals  Feed only when awake/alert   HOB to 90 degrees for all PO intake; remain upright 30 minutes post meal   1 bite/sip at a time and small bites/sips  Eliminate distractions; avoid talking while eating   General Precautions: Standard, aspiration  Communication Strategies: pt reports wears eye glasses but none present at bedside or in room this date-- provide repetition of information and orientation as needed.       Assessment:     Michael Johnson Jr. is a 63 y.o. male with an SLP diagnosis of dysphagia 2/2 throat swelling and excessive vomiting. SLP following for diet tolerance and to ensure comprehension of safe swallow strategies/ aspiration precautions. MBSS completed on 3/1/24-- instance of aspiration after the swallow witnessed with retrograde flow through the UES. SLP recommended referral to inpatient GI for further evaluation of esophageal function, GI then proceeded with EGD-- findings outlined below:      EGD procedure completed 3/4/24  Findings: Food was found at the gastroesophageal junction. Removal was accomplished with gentle foward traction with passage of the bolus into the stomach. One benign-appearing, intrinsic severe stenosis was found at the gastroesophageal junction. The stenosis was traversed after auto-dilation with the gastroscope while passing food obstruction into the stomach. The entire examined stomach was normal. The examined duodenum was normal.   Impression: Food at the gastroesophageal  junction. Removal was successful.  - Benign-appearing esophageal stenosis.  - Normal stomach.  - Normal examined duodenum.   Recommendation: [facility].                          - Chopped diet.                          - Use Protonix (pantoprazole) 40 mg PO BID for 8                          weeks.   Subjective     SLP spoke with RN earlier this date-- discussed pt still on full liquid diet for breakfast and plan to slowly return to prior diet 2/2 EGD procedure completed yesterday morning. SLP and RN confirmed plan for lunch to be of minced and moist consistency this date-- SLP to return for lunch.     RN present at bedside, feeding pt lunch-- SLP took over, elevated pt's HOB a bit more then proceeded with PO trials.     Pain/Comfort: no pain/discomfort reported this date.     Respiratory Status: Room air    Objective:     Ongoing Bedside Swallow Exam:  Consistencies Assessed: Upon SLP taking over with feeding pt lunch, pt had consumed about 50% of solids and 75% of Ensure   Thin Liquids: consecutive strawsips of Ensure; finishing entire bottle   Minced and Moist Solids: 3 bites of potatoes, zucchini and squash mixture presented via fork     Oral Phase:   Able to siphon liquids via straw without anterior loss   Able to retrieve solids from fork without difficulty   Lip seal, a-p transport, and ability to form cohesive bolus appear WNL  No oral residuals noted after the swallow of solids observed this date     Pharyngeal Phase:   Trigger of pharyngeal swallow appears to be WFL  No coughing, throat clearing or significant change in vocal quality observed before, during or after PO intake this date   No choking or airway threat observed with PO trials of solids     Other: SLP discussed with pt plan to continue with current diet consistency-- pt verbalized agreement with plan. SLP re-iterated importance of sitting in upright position for PO intake-- left call light in reach prior to  leaving room.     Cognitive-Linguistic Status: in today's session, pt...  Awake and alert throughout entirety of session  Oriented to person, place, and situation  Verbalized wants/needs and answered open-ended questions independently   Engaged in conversation with SLP re: PO intake of lunch and current diet recs. Also briefly discussed with this SLP his wife being at home to check on the house-- reporting they live in [facility].   No concerns re: cognition this date     Education: SLP re-educated pt re: therapist's role and purpose of skilled services; reviewed safe swallow strategies/ aspiration precautions and discussed importance of use. SLP discussed plan to see pt with meal at next opportunity-- pt verbalized understanding of all discussed.      SLP communicated with RN prior to session this date and in session-- discussed plan to continue with current diet and that per chart review, GI recommending chopped diet as well.     Goals:   Multidisciplinary Problems       SLP Goals          Problem: SLP    Goal Priority Disciplines Outcome   SLP Goal     SLP Ongoing, Progressing   Description: 1. Pt will participate in MBSS for further assessment of oropharyngeal swallow function.-- MET 3/1/24    2. Pt will be able to consume a full liquid diet without overt s/s of airway threat or aspiration given min cues to monitor rate and volume of intake.-- MET 2/27/24    3. Ongoing assessment of diet upgrade as pt tolerates.-- ongoing      4. Pt will tolerate a minced and moist diet with thin liquids without any overt s/s of aspiration or airway threat given min cues from clinician.     5. Pt and caregivers will demonstrate understanding and use of safe swallow strategies/ aspiration precautions in 100% of opportunities given min cues from clinician.                      Plan:     Patient to be seen:  3 x/week, 5 x/week   Plan of Care expires:  03/10/24  Plan of Care reviewed with:  patient (RN)   SLP Follow-Up:  Yes        Discharge Recommendations:   (SLP at next level of care)   Barriers to Discharge:  Level of Skilled Assistance Needed      Time Tracking:     SLP Treatment Date:   03/05/24  Speech Start Time:  1315  Speech Stop Time:  1325     Speech Total Time (min):  10 min    Billable Minutes: Treatment Swallowing Dysfunction 10    03/05/2024

## 2024-03-05 NOTE — PROGRESS NOTES
[facility] [address] Medicine  Progress Note    Patient Name: Michael Johnson Jr.  MRN: [id number]  Patient Class: IP- Inpatient   Admission Date: 2/24/2024  Length of Stay: 9 days  Attending Physician: Jessica Skinner MD  Primary Care Provider: Amelia Lai MD        Subjective:     Principal Problem:Alcohol abuse with withdrawal        HPI:  63 year old male with Hx of Hypertension, Seizure disorders, spastic left-sided hemiplegia, Anemia, Arthrititis, Depression, CKD stage 3, Alcohol use disorder and Gout, who presents to the ED with complaints of nausea/vomiting and abdominal pain for the past 2 days. Patient is a poor historian; most history provided by patients wife. For the past 2 days patient has not been eating. Started to feel nauseated with associated NBNB emesis and weakness. Reports that his sore throat has been hurting him since yesterday and noticed he has been shaking since yesterday. He reports that he had quit drinking for a long time; however, per patients wife he has been drinking daily for several years and in the past few weeks it has been 1/2 pint of vodka every 2 days. Patient denies any fever, chest pain, palpitaitons, dyspnea, changes in bowel or urinary habits. He does report feeling of cramping in his upper abdomen, non-radiating. Last episode of emesis was here in the ER. States he takes his medication but not everyday. Denies any illicit drug use or smoking. 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 Denies any sick contacts.     Overview/Hospital Course:  Mr Rodriguez admitted for ETOH withdrawal and neck swelling. He reports that he has not had an alcoholic beverage in months. He is a poor historian and has different time frames than his wife and daughter. 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 He was vomiting the past few days but none since arrival.Started on MVI, thiamine, folic acid.  PeTH confirms heavy alcohol use.    On arrival nurse reported neck swelling, CT neck shows fullness of in the hypopharynx, involving posterior hypopharynx, concerned for pharyngitis. No drainable fluid collection. Started on IV CTX. Fevers began again 2/26, as high as 102.6F. Repeat CT scan ordered to evaluate for abscess formation, none identified. CXR and UA ordered, unremarkable. CT AP shows atelectasis vs possible pneumonia, will add doxy for atypical coverage, continue IS. Fevers still persisting. Broaden to Vanc and Zosyn and monitor, fever curve improved. Will consult ID on Monday for fever unknown origin: switched to Unasyn for better ENT coverage and viral panel ordered.  Acute gout flare also being treated.     SLP following along for dysphagia and aspiration concerns. MBSS done 3/1, GI consult recommended EGD on Monday which showed benign appearing esophageal stenosis, protonix PO BID x 8 weeks recommended    Ultimately will discharge with [facility] and need GI, PCP follow up    Interval History: Mr Rodriguez reports feeling better than when he arrived. He is still having trouble swallowing and is working with speech. Mr Rodriguez reports pain in all of his extremities.  He feels that he is having a gout flare and notes that his skin darkens around his joints when he does  have a flare. He was taking allopurinol for prevention.  No family member at bed at this time. He did say that he has been  to his wife for 18 years.    Review of Systems   Constitutional:  Positive for appetite change. Negative for activity change, chills and fatigue.   HENT:  Positive for trouble swallowing. Negative for congestion, drooling and sore throat.    Eyes:  Negative for visual disturbance.   Respiratory:  Positive for shortness of breath.    Cardiovascular:  Negative for chest pain and leg swelling.   Gastrointestinal:  Negative for abdominal distention, constipation, nausea and vomiting.   Genitourinary:  Negative for  difficulty urinating.   Musculoskeletal:  Positive for arthralgias and joint swelling (darkening around joints r/t gout).   Skin:  Positive for color change.   Neurological:  Negative for dizziness, speech difficulty and headaches.   Psychiatric/Behavioral:  Negative for agitation.      Objective:     Vital Signs (Most Recent):  Temp: 98.9 °F (37.2 °C) (03/05/24 1123)  Pulse: 86 (03/05/24 1123)  Resp: 16 (03/05/24 1123)  BP: (!) 95/59 (03/05/24 1123)  SpO2: 99 % (03/05/24 1123) Vital Signs (24h Range):  Temp:  [98.1 °F (36.7 °C)-98.9 °F (37.2 °C)] 98.9 °F (37.2 °C)  Pulse:  [72-96] 86  Resp:  [14-18] 16  SpO2:  [98 %-99 %] 99 %  BP: ()/(53-67) 95/59     Weight: 85 kg (187 lb 6.3 oz)  Body mass index is 25.41 kg/m².    Intake/Output Summary (Last 24 hours) at 3/5/2024 1313  Last data filed at 3/5/2024 0056  Gross per 24 hour   Intake --   Output 850 ml   Net -850 ml         Physical Exam  Vitals and nursing note reviewed.   HENT:      Head: Normocephalic.      Mouth/Throat:      Pharynx: Oropharynx is clear.   Eyes:      Pupils: Pupils are equal, round, and reactive to light.   Cardiovascular:      Rate and Rhythm: Normal rate.   Pulmonary:      Effort: Pulmonary effort is normal.   Abdominal:      Palpations: Abdomen is soft.      Tenderness: There is no abdominal tenderness. There is no guarding.   Musculoskeletal:         General: Swelling (BL proximal ulnar), tenderness and deformity (BL wrist contractures) present.      Right lower leg: No edema.      Left lower leg: No edema.   Skin:     Findings: Lesion (right 4th finger old gout flare) present.   Neurological:      Mental Status: He is alert. Mental status is at baseline.      Comments: Not oriented to date   Psychiatric:         Mood and Affect: Mood normal.         Behavior: Behavior normal.         Cognition and Memory: He exhibits impaired recent memory.             Significant Labs: All pertinent labs within the past 24 hours have been  reviewed.  BMP:   Recent Labs   Lab 03/05/24  0450      *   K 4.5      CO2 22*   BUN 26*   CREATININE 1.5*   CALCIUM 8.9   MG 1.5*     CBC:   Recent Labs   Lab 03/04/24  0501 03/05/24  0450   WBC 10.46 10.30   HGB 7.2* 7.2*   HCT 22.0* 22.1*    434     CMP:   Recent Labs   Lab 03/04/24  0501 03/05/24  0450   * 135*   K 4.7 4.5    105   CO2 22* 22*    106   BUN 23 26*   CREATININE 1.6* 1.5*   CALCIUM 8.6* 8.9   PROT 7.4 7.7   ALBUMIN 2.3* 2.3*   BILITOT 0.7 0.5   ALKPHOS 105 104   AST 45* 43*   ALT 27 30   ANIONGAP 9 8       Significant Imaging: I have reviewed all pertinent imaging results/findings within the past 24 hours.  X-Ray Chest AP Portable  Narrative: EXAMINATION:  XR CHEST AP PORTABLE    CLINICAL HISTORY:  aspiration concern;    TECHNIQUE:  Frontal view of the chest was performed.    COMPARISON:  02/27/2024    FINDINGS:  The cardiomediastinal silhouette is normal in size and midline. Pulmonary vascularity appears within normal limits.    The lungs appear clear without confluent pulmonary parenchymal opacity. No pleural fluid or pneumothorax.  Impression: No new focal consolidation identified.    Electronically signed by: Bhavesh Hernandez MD  Date:    03/02/2024  Time:    09:39        Assessment/Plan:      * Alcohol abuse with withdrawal  - Per patients wife, he used to drink about 1 bottle of vodka daily for several years; however, in the past few days he has been having 1/2 pint of vodka every other day. Last known drink 2 days ago. Patient denies recent ETOH use. PETH 1200, confirming heavy drinking  - Start CIWA protocol  - scheduled fixed valium taper  - Ativan PRN per CIWA  - Zofran PRN for nausea   - thiamine, mvi, folic acid; B1 63 within ref range  - daily replacing electrolytes  - see dysphagia  - Seizure, Aspiration and Fall precautions    Acute gout of multiple sites  Uric acid mildly elevated. Patient reports that the skin darkening at his joints  correlates with his gout flares. He reports pain in his joints in each of his 4 extremities. He was prescribed allopurinol outpatient. Will resume the allopurinol and start course of colchicine.       Dysphagia  Reports trouble swallowing, SLP following along  Has tolerated advancing diet from liquid to minced and moist, though began to have episodes of vomiting and reported dysphagia so resumed liquid diet  MBSS 3/1, SLP recommending GI consult  Plan for EGD 3/4: benign esophageal stricture noted as well as food that was removed; continue protonix PO BID x 8 weeks      Falls frequently  Chronic problem.  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 Also has h/o ETOH dependence. Thiamine level pending. IV replaced ordered.  PT/OT consulted. OP neurology referral placed. After talking with wife, patient is bedbound at baseline and has not walked in about three years.  Reason unknown.      Paroxysmal atrial fibrillation  Patient with Paroxysmal (<7 days) atrial fibrillation which is controlled currently with Beta Blocker. Patient is currently in sinus rhythm.[id number] Score: 1  - Had an episode of Atrial flutter (09/2023)  - Not compliant with Home Eliquis or BB   - Echocardiogram (9/2023): Preserved EF 50-55%  - Monitor electrolytes and Maintain Mg >2 and K >4  - holding off on full anticoagulation for fall risk  - eliquis resumed 3/4. Patient is bedbound. Will monitor CBC    Essential hypertension  Chronic, uncontrolled. However, may be also elevated currently in the setting of withdrawal  Latest blood pressure and vitals reviewed-     Temp:  [98.1 °F (36.7 °C)-98.9 °F (37.2 °C)]   Pulse:  [72-96]   Resp:  [14-18]   BP: ()/(53-67)   SpO2:  [98 %-99 %] .   Home meds for hypertension were reviewed and noted below.   Hypertension Medications               metoprolol tartrate (LOPRESSOR) 50 MG tablet TAKE 1 TABLET TWICE DAILY          Decrease the metoprolol to 25 mg BID since patient is having  decreased blood pressures.    Will utilize p.r.n. blood pressure medication only if patient's blood pressure greater than 160/100 and he develops symptoms such as worsening chest pain or shortness of breath.    Electrolyte disturbance  - Patient with Hypomagnesemia, Hypokalemia, hypocalcemia and Hypophosphatemia  - Correct electrolytes and monitor  - corrected calcium 8.7  - Telemetry monitoring for now    Pharyngitis  - CT with findings concerning for pharyngitis and possibly retained secretions. Also noted chronic appearing opacification of the left maxillary sinus with chronic osteitis of the sinus walls. Appears chronic dating back to 3/2017 per read.   - Outpatient referral to Otolaryngology   - Amoxicillin changed to ceftriaxone   - Began having fevers 2/26 as high as 101.3  - Repeat CT neck without abscess formation, Bcx ordered: NGTD  - CXR and UA ordered, unremarkable   - CT AP shows atelectasis vs developing pneumonia at bases of lung, will add doxy for atypical coverage and encourage IS  - Fevers still persisting despite above. Will broaden to Vanc and Zosyn, consult ID on Monday for fever uknown origin:  - Appreciate ID recs including: change to Unasyn for better ENT coverage and consider repeat CT of his neck  - f/u EGD results  - viral swab      GERD (gastroesophageal reflux disease)  - Protonix 40 mg, daily      Seizure  - Noted in history. Last seizure several years ago ~ 2019. Unclear etiology may have been related to withdrawal  - Continue with Keppra  - Follow up outpatient with Neurology; given this was a one time occurrence in likely the above setting  - reports born with epilepsy    Hypokalemia  Patient has hypokalemia which is Acute and currently uncontrolled. Most recent potassium levels reviewed-   Lab Results   Component Value Date    K 3.3 (L) 02/24/2024   . Will continue potassium replacement per protocol and recheck repeat levels after replacement completed.       VTE Risk Mitigation  (From admission, onward)           Ordered     apixaban tablet 5 mg  2 times daily         03/04/24 1322     IP VTE LOW RISK PATIENT  Once         02/24/24 1605     Place sequential compression device  Until discontinued         02/24/24 1605                    Discharge Planning   SIMONE: 3/6/2024     Code Status: Full Code   Is the patient medically ready for discharge?:     Reason for patient still in hospital (select all that apply): Treatment  Discharge Plan A: Home with family, Home                  Oxana Ibanez DNP  Department of Hospital Medicine   [facility] ([address])

## 2024-03-06 ENCOUNTER — TELEPHONE (OUTPATIENT)
Dept: INTERNAL MEDICINE | Facility: CLINIC | Age: 64
End: 2024-03-06

## 2024-03-06 LAB
ALBUMIN SERPL BCP-MCNC: 2.4 G/DL (ref 3.5–5.2)
ALP SERPL-CCNC: 134 U/L (ref 55–135)
ALT SERPL W/O P-5'-P-CCNC: 34 U/L (ref 10–44)
ANION GAP SERPL CALC-SCNC: 9 MMOL/L (ref 8–16)
AST SERPL-CCNC: 50 U/L (ref 10–40)
BASOPHILS # BLD AUTO: 0.08 K/UL (ref 0–0.2)
BASOPHILS NFR BLD: 0.8 % (ref 0–1.9)
BILIRUB SERPL-MCNC: 0.4 MG/DL (ref 0.1–1)
BUN SERPL-MCNC: 31 MG/DL (ref 8–23)
CALCIUM SERPL-MCNC: 8.7 MG/DL (ref 8.7–10.5)
CHLORIDE SERPL-SCNC: 106 MMOL/L (ref 95–110)
CO2 SERPL-SCNC: 22 MMOL/L (ref 23–29)
CREAT SERPL-MCNC: 1.5 MG/DL (ref 0.5–1.4)
DIFFERENTIAL METHOD BLD: ABNORMAL
EOSINOPHIL # BLD AUTO: 0.7 K/UL (ref 0–0.5)
EOSINOPHIL NFR BLD: 6.5 % (ref 0–8)
ERYTHROCYTE [DISTWIDTH] IN BLOOD BY AUTOMATED COUNT: 15.9 % (ref 11.5–14.5)
ERYTHROCYTE [DISTWIDTH] IN BLOOD BY AUTOMATED COUNT: 16 % (ref 11.5–14.5)
ERYTHROCYTE [SEDIMENTATION RATE] IN BLOOD BY PHOTOMETRIC METHOD: 97 MM/HR (ref 0–23)
EST. GFR  (NO RACE VARIABLE): 53 ML/MIN/1.73 M^2
GLUCOSE SERPL-MCNC: 124 MG/DL (ref 70–110)
HCT VFR BLD AUTO: 22 % (ref 40–54)
HCT VFR BLD AUTO: 24.1 % (ref 40–54)
HGB BLD-MCNC: 7 G/DL (ref 14–18)
HGB BLD-MCNC: 7.6 G/DL (ref 14–18)
IMM GRANULOCYTES # BLD AUTO: 0.13 K/UL (ref 0–0.04)
IMM GRANULOCYTES NFR BLD AUTO: 1.3 % (ref 0–0.5)
LYMPHOCYTES # BLD AUTO: 1.3 K/UL (ref 1–4.8)
LYMPHOCYTES NFR BLD: 12.4 % (ref 18–48)
MAGNESIUM SERPL-MCNC: 1.6 MG/DL (ref 1.6–2.6)
MCH RBC QN AUTO: 36.9 PG (ref 27–31)
MCH RBC QN AUTO: 37 PG (ref 27–31)
MCHC RBC AUTO-ENTMCNC: 31.5 G/DL (ref 32–36)
MCHC RBC AUTO-ENTMCNC: 31.8 G/DL (ref 32–36)
MCV RBC AUTO: 116 FL (ref 82–98)
MCV RBC AUTO: 117 FL (ref 82–98)
MONOCYTES # BLD AUTO: 1.1 K/UL (ref 0.3–1)
MONOCYTES NFR BLD: 10.5 % (ref 4–15)
NEUTROPHILS # BLD AUTO: 7.1 K/UL (ref 1.8–7.7)
NEUTROPHILS NFR BLD: 68.5 % (ref 38–73)
NRBC BLD-RTO: 0 /100 WBC
OB PNL STL: NEGATIVE
PLATELET # BLD AUTO: 490 K/UL (ref 150–450)
PLATELET # BLD AUTO: 505 K/UL (ref 150–450)
PMV BLD AUTO: 10.5 FL (ref 9.2–12.9)
PMV BLD AUTO: 10.8 FL (ref 9.2–12.9)
POTASSIUM SERPL-SCNC: 4.8 MMOL/L (ref 3.5–5.1)
PROT SERPL-MCNC: 7.9 G/DL (ref 6–8.4)
RBC # BLD AUTO: 1.89 M/UL (ref 4.6–6.2)
RBC # BLD AUTO: 2.06 M/UL (ref 4.6–6.2)
RETICS/RBC NFR AUTO: 1.1 % (ref 0.4–2)
SODIUM SERPL-SCNC: 137 MMOL/L (ref 136–145)
VIT B12 SERPL-MCNC: 939 PG/ML (ref 210–950)
WBC # BLD AUTO: 10.29 K/UL (ref 3.9–12.7)
WBC # BLD AUTO: 10.3 K/UL (ref 3.9–12.7)

## 2024-03-06 PROCEDURE — 94761 N-INVAS EAR/PLS OXIMETRY MLT: CPT | Mod: HCNC

## 2024-03-06 PROCEDURE — 25000003 PHARM REV CODE 250: Mod: HCNC | Performed by: INTERNAL MEDICINE

## 2024-03-06 PROCEDURE — 94640 AIRWAY INHALATION TREATMENT: CPT | Mod: HCNC

## 2024-03-06 PROCEDURE — 99233 SBSQ HOSP IP/OBS HIGH 50: CPT | Mod: HCNC,,, | Performed by: INTERNAL MEDICINE

## 2024-03-06 PROCEDURE — 85025 COMPLETE CBC W/AUTO DIFF WBC: CPT | Mod: HCNC | Performed by: INTERNAL MEDICINE

## 2024-03-06 PROCEDURE — 21400001 HC TELEMETRY ROOM: Mod: HCNC

## 2024-03-06 PROCEDURE — 85652 RBC SED RATE AUTOMATED: CPT | Mod: HCNC | Performed by: NURSE PRACTITIONER

## 2024-03-06 PROCEDURE — 63600175 PHARM REV CODE 636 W HCPCS: Mod: HCNC | Performed by: NURSE PRACTITIONER

## 2024-03-06 PROCEDURE — 25000003 PHARM REV CODE 250: Mod: HCNC | Performed by: PHYSICIAN ASSISTANT

## 2024-03-06 PROCEDURE — 94799 UNLISTED PULMONARY SVC/PX: CPT | Mod: HCNC,XB

## 2024-03-06 PROCEDURE — 99900035 HC TECH TIME PER 15 MIN (STAT): Mod: HCNC

## 2024-03-06 PROCEDURE — 36415 COLL VENOUS BLD VENIPUNCTURE: CPT | Mod: HCNC | Performed by: NURSE PRACTITIONER

## 2024-03-06 PROCEDURE — 25000242 PHARM REV CODE 250 ALT 637 W/ HCPCS: Mod: HCNC | Performed by: INTERNAL MEDICINE

## 2024-03-06 PROCEDURE — 36415 COLL VENOUS BLD VENIPUNCTURE: CPT | Mod: HCNC,XB | Performed by: INTERNAL MEDICINE

## 2024-03-06 PROCEDURE — 82607 VITAMIN B-12: CPT | Mod: HCNC | Performed by: NURSE PRACTITIONER

## 2024-03-06 PROCEDURE — 85045 AUTOMATED RETICULOCYTE COUNT: CPT | Mod: HCNC | Performed by: NURSE PRACTITIONER

## 2024-03-06 PROCEDURE — 63600175 PHARM REV CODE 636 W HCPCS: Mod: HCNC | Performed by: INTERNAL MEDICINE

## 2024-03-06 PROCEDURE — 85027 COMPLETE CBC AUTOMATED: CPT | Mod: HCNC | Performed by: NURSE PRACTITIONER

## 2024-03-06 PROCEDURE — 80053 COMPREHEN METABOLIC PANEL: CPT | Mod: HCNC | Performed by: INTERNAL MEDICINE

## 2024-03-06 PROCEDURE — 25000003 PHARM REV CODE 250: Mod: HCNC | Performed by: NURSE PRACTITIONER

## 2024-03-06 PROCEDURE — 83735 ASSAY OF MAGNESIUM: CPT | Mod: HCNC | Performed by: INTERNAL MEDICINE

## 2024-03-06 RX ORDER — IPRATROPIUM BROMIDE AND ALBUTEROL SULFATE 2.5; .5 MG/3ML; MG/3ML
3 SOLUTION RESPIRATORY (INHALATION) EVERY 8 HOURS PRN
Status: DISCONTINUED | OUTPATIENT
Start: 2024-03-06 | End: 2024-03-07 | Stop reason: HOSPADM

## 2024-03-06 RX ORDER — COLCHICINE 0.6 MG/1
0.6 TABLET, FILM COATED ORAL 2 TIMES DAILY
Status: DISCONTINUED | OUTPATIENT
Start: 2024-03-06 | End: 2024-03-07 | Stop reason: HOSPADM

## 2024-03-06 RX ORDER — CYANOCOBALAMIN 1000 UG/ML
1000 INJECTION, SOLUTION INTRAMUSCULAR; SUBCUTANEOUS EVERY OTHER DAY
Status: DISCONTINUED | OUTPATIENT
Start: 2024-03-06 | End: 2024-03-07 | Stop reason: HOSPADM

## 2024-03-06 RX ORDER — AMOXICILLIN AND CLAVULANATE POTASSIUM 500; 125 MG/1; MG/1
1 TABLET, FILM COATED ORAL 3 TIMES DAILY
Status: DISCONTINUED | OUTPATIENT
Start: 2024-03-06 | End: 2024-03-07 | Stop reason: HOSPADM

## 2024-03-06 RX ADMIN — GABAPENTIN 400 MG: 400 CAPSULE ORAL at 09:03

## 2024-03-06 RX ADMIN — AMOXICILLIN AND CLAVULANATE POTASSIUM 500 MG: 500; 125 TABLET, FILM COATED ORAL at 03:03

## 2024-03-06 RX ADMIN — THERA TABS 1 TABLET: TAB at 09:03

## 2024-03-06 RX ADMIN — APIXABAN 5 MG: 2.5 TABLET, FILM COATED ORAL at 09:03

## 2024-03-06 RX ADMIN — PROMETHAZINE HYDROCHLORIDE AND CODEINE PHOSPHATE 5 ML: 6.25; 1 SOLUTION ORAL at 05:03

## 2024-03-06 RX ADMIN — ALLOPURINOL 100 MG: 100 TABLET ORAL at 09:03

## 2024-03-06 RX ADMIN — AMPICILLIN SODIUM AND SULBACTAM SODIUM 3 G: 2; 1 INJECTION, POWDER, FOR SOLUTION INTRAMUSCULAR; INTRAVENOUS at 05:03

## 2024-03-06 RX ADMIN — PANTOPRAZOLE SODIUM 40 MG: 40 TABLET, DELAYED RELEASE ORAL at 09:03

## 2024-03-06 RX ADMIN — FLUTICASONE PROPIONATE 100 MCG: 50 SPRAY, METERED NASAL at 09:03

## 2024-03-06 RX ADMIN — SODIUM CHLORIDE 500 ML: 9 INJECTION, SOLUTION INTRAVENOUS at 12:03

## 2024-03-06 RX ADMIN — FERROUS SULFATE TAB 325 MG (65 MG ELEMENTAL FE) 1 EACH: 325 (65 FE) TAB at 09:03

## 2024-03-06 RX ADMIN — AMMONIUM LACTATE: 120 LOTION TOPICAL at 09:03

## 2024-03-06 RX ADMIN — CETIRIZINE HYDROCHLORIDE 5 MG: 5 TABLET, FILM COATED ORAL at 09:03

## 2024-03-06 RX ADMIN — COLCHICINE 0.6 MG: 0.6 TABLET, FILM COATED ORAL at 09:03

## 2024-03-06 RX ADMIN — GUAIFENESIN 1200 MG: 600 TABLET, EXTENDED RELEASE ORAL at 09:03

## 2024-03-06 RX ADMIN — BENZONATATE 100 MG: 100 CAPSULE ORAL at 09:03

## 2024-03-06 RX ADMIN — FOLIC ACID 1 MG: 1 TABLET ORAL at 09:03

## 2024-03-06 RX ADMIN — AMOXICILLIN AND CLAVULANATE POTASSIUM 500 MG: 500; 125 TABLET, FILM COATED ORAL at 09:03

## 2024-03-06 RX ADMIN — LEVETIRACETAM 500 MG: 500 TABLET, FILM COATED ORAL at 09:03

## 2024-03-06 RX ADMIN — Medication 100 MG: at 09:03

## 2024-03-06 RX ADMIN — METOPROLOL TARTRATE 25 MG: 50 TABLET, FILM COATED ORAL at 09:03

## 2024-03-06 RX ADMIN — CYANOCOBALAMIN 1000 MCG: 1000 INJECTION, SOLUTION INTRAMUSCULAR; SUBCUTANEOUS at 12:03

## 2024-03-06 RX ADMIN — IPRATROPIUM BROMIDE AND ALBUTEROL SULFATE 3 ML: 2.5; .5 SOLUTION RESPIRATORY (INHALATION) at 08:03

## 2024-03-06 NOTE — ASSESSMENT & PLAN NOTE
- CT with findings concerning for pharyngitis and possibly retained secretions. Also noted chronic appearing opacification of the left maxillary sinus with chronic osteitis of the sinus walls. Appears chronic dating back to 3/2017 per read.   - Outpatient referral to Otolaryngology   - Amoxicillin changed to ceftriaxone   - Began having fevers 2/26 as high as 101.3  - Repeat CT neck without abscess formation, Bcx ordered: NGTD  - CXR and UA ordered, unremarkable   - CT AP shows atelectasis vs developing pneumonia at bases of lung, will add doxy for atypical coverage and encourage IS  - Fevers still persisting despite above. Will broaden to Vanc and Zosyn, consult ID on Monday for fever uknown origin:  - Appreciate ID recs including: change to Unasyn for better ENT coverage and consider repeat CT of his neck  - f/u EGD results  - viral swab negative  - Fevers improved, swallowing improved, cough improved- will de-escalate IV abx 3/7

## 2024-03-06 NOTE — TELEPHONE ENCOUNTER
----- Message from Taylor Barbosa sent at 3/6/2024  8:16 AM CST -----  Regarding: Patient Advice                  Name of Who is Calling: Patient's Wife    Who Left The Message: Patient's Wife      What is the request in detail:        Patient's wife called to make the Office aware that he's been hospitalized and would like to have his colonoscopy while he's in the hospital?  Please give a call back at your earliest convenience .  Please further advise.  Thank you      Reply by [facility]: NO      Preferred Call Back :  [phone] (N)

## 2024-03-06 NOTE — ASSESSMENT & PLAN NOTE
Mr. Johnson is a pleasant 64 yo man admitted with pharyngitis and alcohol  withdrawal  - prior CT imaging x 2 without abscess  - previously on CTX and escalated to Zosyn and vancomycin, now Unasyn  - fever resolved and patient feeling much better  - okay to change to Amoxil x 7 days from an ID standpoint

## 2024-03-06 NOTE — PROGRESS NOTES
[facility] ([address])  Infectious Disease  Progress Note    Patient Name: Michael Johnson Jr.  MRN: [id number]  Admission Date: 2/24/2024  Length of Stay: 10 days  Attending Physician: Jessica Skinner MD  Primary Care Provider: Amelia Lai MD    Isolation Status: No active isolations  Assessment/Plan:      Fever    Mr. Johnson is a pleasant 62 yo man admitted with pharyngitis and alcohol  withdrawal  - prior CT imaging x 2 without abscess  - previously on CTX and escalated to Zosyn and vancomycin, now Unasyn  - fever resolved and patient feeling much better  - okay to change to Amoxil x 7 days from an ID standpoint    Thank you for your consult. I will sign off. Please contact us if you have any additional questions.    Je Guerin MD  Infectious Disease  [facility] ([address])    Subjective:     Principal Problem:Alcohol abuse with withdrawal    HPI:  63 y.o. man admitted with FLI, N/V, and pharyngitis. Started on empiric abx. PMH with HTN, epilepsy, afib on anticoagulation, ETOH abuse, CKD3, depression, gout, neuropathy and spastic hemiplegia. Mr. Johnson tells me that he developed sore throat and sudden onset SOB. Happened about 10 years ago - no etiology found. Also being treated for ETOH withdrawal. ID is consulted for FUO (T100.4F). The patient denies symptoms. Still coughing. Only complaint is gouty pain in joints.      Interval History: Feeling much better. Coughing still but less. Ate today.    Review of Systems   Musculoskeletal:  Positive for arthralgias.   All other systems reviewed and are negative.    Objective:     Vital Signs (Most Recent):  Temp: 97.7 °F (36.5 °C) (03/06/24 1137)  Pulse: 77 (03/06/24 1137)  Resp: 14 (03/06/24 1137)  BP: 101/68 (03/06/24 1137)  SpO2: 98 % (03/06/24 1137) Vital Signs (24h Range):  Temp:  [97.7 °F (36.5 °C)-98.6 °F (37 °C)] 97.7 °F (36.5 °C)  Pulse:  [77-98] 77  Resp:  [14-20] 14  SpO2:  [94 %-99 %] 98 %  BP: ()/(56-68)  "101/68     Weight: 85 kg (187 lb 6.3 oz)  Body mass index is 25.41 kg/m².    Estimated Creatinine Clearance: 56.8 mL/min (A) (based on SCr of 1.5 mg/dL (H)).     Physical Exam  Vitals and nursing note reviewed.   Constitutional:       Appearance: Normal appearance.   HENT:      Head: Normocephalic and atraumatic.   Eyes:      Extraocular Movements: Extraocular movements intact.      Pupils: Pupils are equal, round, and reactive to light.   Cardiovascular:      Rate and Rhythm: Normal rate.   Musculoskeletal:         General: No swelling or tenderness.   Neurological:      General: No focal deficit present.      Mental Status: He is alert and oriented to person, place, and time.   Psychiatric:         Mood and Affect: Mood normal.         Behavior: Behavior normal.         Thought Content: Thought content normal.         Judgment: Judgment normal.          Significant Labs: Blood Culture:   Recent Labs   Lab 02/27/24  1413 02/27/24  1622   LABBLOO No growth after 5 days. No growth after 5 days.     CBC:   Recent Labs   Lab 03/05/24  0450 03/06/24  0454 03/06/24  1128   WBC 10.30 10.30 10.29   HGB 7.2* 7.0* 7.6*   HCT 22.1* 22.0* 24.1*    490* 505*     Procalcitonin: No results for input(s): "PROCAL" in the last 48 hours.  Urine Culture: No results for input(s): "LABURIN" in the last 4320 hours.  Wound Culture: No results for input(s): "LABAERO" in the last 4320 hours.    Significant Imaging: I have reviewed all pertinent imaging results/findings within the past 24 hours.  "

## 2024-03-06 NOTE — SUBJECTIVE & OBJECTIVE
"Interval History: Feeling much better. Coughing still but less. Ate today.    Review of Systems   Musculoskeletal:  Positive for arthralgias.   All other systems reviewed and are negative.    Objective:     Vital Signs (Most Recent):  Temp: 97.7 °F (36.5 °C) (03/06/24 1137)  Pulse: 77 (03/06/24 1137)  Resp: 14 (03/06/24 1137)  BP: 101/68 (03/06/24 1137)  SpO2: 98 % (03/06/24 1137) Vital Signs (24h Range):  Temp:  [97.7 °F (36.5 °C)-98.6 °F (37 °C)] 97.7 °F (36.5 °C)  Pulse:  [77-98] 77  Resp:  [14-20] 14  SpO2:  [94 %-99 %] 98 %  BP: ()/(56-68) 101/68     Weight: 85 kg (187 lb 6.3 oz)  Body mass index is 25.41 kg/m².    Estimated Creatinine Clearance: 56.8 mL/min (A) (based on SCr of 1.5 mg/dL (H)).     Physical Exam  Vitals and nursing note reviewed.   Constitutional:       Appearance: Normal appearance.   HENT:      Head: Normocephalic and atraumatic.   Eyes:      Extraocular Movements: Extraocular movements intact.      Pupils: Pupils are equal, round, and reactive to light.   Cardiovascular:      Rate and Rhythm: Normal rate.   Musculoskeletal:         General: No swelling or tenderness.   Neurological:      General: No focal deficit present.      Mental Status: He is alert and oriented to person, place, and time.   Psychiatric:         Mood and Affect: Mood normal.         Behavior: Behavior normal.         Thought Content: Thought content normal.         Judgment: Judgment normal.          Significant Labs: Blood Culture:   Recent Labs   Lab 02/27/24  1413 02/27/24  1622   LABBLOO No growth after 5 days. No growth after 5 days.     CBC:   Recent Labs   Lab 03/05/24  0450 03/06/24  0454 03/06/24  1128   WBC 10.30 10.30 10.29   HGB 7.2* 7.0* 7.6*   HCT 22.1* 22.0* 24.1*    490* 505*     Procalcitonin: No results for input(s): "PROCAL" in the last 48 hours.  Urine Culture: No results for input(s): "LABURIN" in the last 4320 hours.  Wound Culture: No results for input(s): "LABAERO" in the last 4320 " hours.    Significant Imaging: I have reviewed all pertinent imaging results/findings within the past 24 hours.

## 2024-03-06 NOTE — PLAN OF CARE
Medicare Message     Important Message from Medicare regarding Discharge Appeal Rights Given to patient/caregiver; Explained to patient/caregiver; Signed/date by patient/caregiver Given to patient/caregiver; Explained to patient/caregiver; Other (comments)Important Message from Medicare regarding Discharge Appeal Rights. Given to patient/caregiver; Explained to patient/caregiver; Other (comments). The comment is Verbal Consent. Taken on 3/1/24 1643 Given to patient/caregiver; Explained to patient/caregiver; Other (comments)Important Message from Medicare regarding Discharge Appeal Rights. Given to patient/caregiver; Explained to patient/caregiver; Other (comments). The comment is Verbal Consent. Taken on 3/4/24 1058 Given to patient/caregiver; Explained to patient/caregiver; Signed/date by patient/caregiver   Date IMM was signed 2/26/2024 3/1/2024 3/4/2024 3/6/2024   Time IMM was signed 1023 0330 [id number]

## 2024-03-06 NOTE — PLAN OF CARE
Patient is agreeable to [facility]. Patient is refusing DME.     03/06/24 1150   Discharge Reassessment   Assessment Type Discharge Planning Assessment   Did the patient's condition or plan change since previous assessment? No   Discharge Plan discussed with: Spouse/sig other;Patient   Discharge Plan A Home Health   DME Needed Upon Discharge  none   Transition of Care Barriers Does not adhere to care plan   Post-Acute Status   Post-Acute Authorization Home Health   Home Health Status Set-up Complete/Auth obtained   Hospital Resources/Appts/Education Provided Provided patient/caregiver with written discharge plan information;Appointments scheduled and added to AVS   Patient choice form signed by patient/caregiver List with quality metrics by geographic area provided

## 2024-03-06 NOTE — PROGRESS NOTES
[facility] Surg [address] Medicine  Progress Note    Patient Name: Michael Johnson Jr.  MRN: [id number]  Patient Class: IP- Inpatient   Admission Date: 2/24/2024  Length of Stay: 10 days  Attending Physician: Jessica Skinner MD  Primary Care Provider: Amelia Lai MD        Subjective:     Principal Problem:Alcohol abuse with withdrawal        HPI:  63 year old male with Hx of Hypertension, Seizure disorders, spastic left-sided hemiplegia, Anemia, Arthrititis, Depression, CKD stage 3, Alcohol use disorder and Gout, who presents to the ED with complaints of nausea/vomiting and abdominal pain for the past 2 days. Patient is a poor historian; most history provided by patients wife. For the past 2 days patient has not been eating. Started to feel nauseated with associated NBNB emesis and weakness. Reports that his sore throat has been hurting him since yesterday and noticed he has been shaking since yesterday. He reports that he had quit drinking for a long time; however, per patients wife he has been drinking daily for several years and in the past few weeks it has been 1/2 pint of vodka every 2 days. Patient denies any fever, chest pain, palpitaitons, dyspnea, changes in bowel or urinary habits. He does report feeling of cramping in his upper abdomen, non-radiating. Last episode of emesis was here in the ER. States he takes his medication but not everyday. Denies any illicit drug use or smoking. 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 Denies any sick contacts.     Overview/Hospital Course:  Mr Rodriguez admitted for ETOH withdrawal and neck swelling. He reports that he has not had an alcoholic beverage in months. He is a poor historian and has different time frames than his wife and daughter. 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 He was vomiting the past few days but none since arrival.Started on MVI, thiamine, folic acid.  PeTH confirms heavy alcohol use.    On arrival nurse reported neck swelling, CT neck shows fullness of in the hypopharynx, involving posterior hypopharynx, concerned for pharyngitis. No drainable fluid collection. Started on IV CTX. Fevers began again 2/26, as high as 102.6F. Repeat CT scan ordered to evaluate for abscess formation, none identified. CXR and UA ordered, unremarkable. CT AP shows atelectasis vs possible pneumonia, will add doxy for atypical coverage, continue IS. Fevers still persisting. Broaden to Vanc and Zosyn and monitor, fever curve improved. Will consult ID on Monday for fever unknown origin: switched to Unasyn for better ENT coverage and viral panel ordered.  Acute gout flare also being treated.     SLP following along for dysphagia and aspiration concerns. MBSS done 3/1, GI consult recommended EGD on Monday which showed benign appearing esophageal stenosis, protonix PO BID x 8 weeks recommended.    B12 supplementation started for macrocytic anemia.     Ultimately will discharge with [facility] and need GI, PCP follow up    Interval History:   Mrs Johnson at bedside. Patient awake and oriented to self and situation. He has not had much improvement his pains. He is tolerating his upgraded diet.     Review of Systems   Constitutional:  Negative for activity change, appetite change, chills and fatigue.   HENT:  Positive for trouble swallowing (improving). Negative for congestion, drooling and sore throat.    Eyes:  Negative for visual disturbance.   Respiratory:  Positive for shortness of breath.    Cardiovascular:  Negative for chest pain and leg swelling.   Gastrointestinal:  Negative for abdominal distention, constipation, nausea and vomiting.   Genitourinary:  Negative for difficulty urinating.   Musculoskeletal:  Positive for arthralgias and joint swelling (darkening around joints r/t gout).   Skin:  Positive for color change.   Neurological:  Negative for dizziness, speech difficulty and headaches.    Psychiatric/Behavioral:  Negative for agitation.      Objective:     Vital Signs (Most Recent):  Temp: 98.5 °F (36.9 °C) (03/06/24 0800)  Pulse: 83 (03/06/24 1059)  Resp: 16 (03/06/24 0801)  BP: 104/60 (03/06/24 0800)  SpO2: 98 % (03/06/24 0800) Vital Signs (24h Range):  Temp:  [98 °F (36.7 °C)-98.6 °F (37 °C)] 98.5 °F (36.9 °C)  Pulse:  [80-98] 83  Resp:  [16-20] 16  SpO2:  [94 %-99 %] 98 %  BP: ()/(56-64) 104/60     Weight: 85 kg (187 lb 6.3 oz)  Body mass index is 25.41 kg/m².    Intake/Output Summary (Last 24 hours) at 3/6/2024 1133  Last data filed at 3/6/2024 0524  Gross per 24 hour   Intake 636.43 ml   Output 1500 ml   Net -863.57 ml         Physical Exam  Vitals and nursing note reviewed.   HENT:      Head: Normocephalic.      Mouth/Throat:      Pharynx: Oropharynx is clear.   Eyes:      Pupils: Pupils are equal, round, and reactive to light.   Cardiovascular:      Rate and Rhythm: Normal rate.   Pulmonary:      Effort: Pulmonary effort is normal.   Abdominal:      General: Bowel sounds are normal.      Palpations: Abdomen is soft.      Tenderness: There is no abdominal tenderness. There is no guarding.      Comments: BM 3/6   Musculoskeletal:         General: Swelling (BL proximal ulnar), tenderness and deformity (BL wrist contractures) present.      Cervical back: Normal range of motion. No rigidity or tenderness.      Right lower leg: No edema.      Left lower leg: No edema.   Lymphadenopathy:      Cervical: No cervical adenopathy.   Skin:     Findings: Lesion (right 4th finger old gout flare) present.   Neurological:      Mental Status: He is alert. Mental status is at baseline.      Comments: Not oriented to date   Psychiatric:         Mood and Affect: Mood normal.         Behavior: Behavior normal.         Cognition and Memory: He exhibits impaired recent memory.             Significant Labs: All pertinent labs within the past 24 hours have been reviewed.  BMP:   Recent Labs   Lab  03/06/24  0454   *      K 4.8      CO2 22*   BUN 31*   CREATININE 1.5*   CALCIUM 8.7   MG 1.6     CBC:   Recent Labs   Lab 03/05/24  0450 03/06/24  0454   WBC 10.30 10.30   HGB 7.2* 7.0*   HCT 22.1* 22.0*    490*     CMP:   Recent Labs   Lab 03/05/24  0450 03/06/24  0454   * 137   K 4.5 4.8    106   CO2 22* 22*    124*   BUN 26* 31*   CREATININE 1.5* 1.5*   CALCIUM 8.9 8.7   PROT 7.7 7.9   ALBUMIN 2.3* 2.4*   BILITOT 0.5 0.4   ALKPHOS 104 134   AST 43* 50*   ALT 30 34   ANIONGAP 8 9       Significant Imaging: I have reviewed all pertinent imaging results/findings within the past 24 hours.    Assessment/Plan:      * Alcohol abuse with withdrawal  - Per patients wife, he used to drink about 1 bottle of vodka daily for several years; however, in the past few days he has been having 1/2 pint of vodka every other day. Last known drink 2 days ago. Patient denies recent ETOH use. PETH 1200, confirming heavy drinking  - Start CIWA protocol  - scheduled fixed valium taper  - Ativan PRN per CIWA  - Zofran PRN for nausea   - thiamine, mvi, folic acid; B1 63 within ref range  - daily replacing electrolytes  - see dysphagia  - Seizure, Aspiration and Fall precautions    Acute gout of multiple sites  Uric acid mildly elevated. Patient reports that the skin darkening at his joints correlates with his gout flares. He reports pain in his joints in each of his 4 extremities. He was prescribed allopurinol outpatient. Will resume the allopurinol and start course of colchicine.       Dysphagia  Reports trouble swallowing, SLP following along  Has tolerated advancing diet from liquid to minced and moist, though began to have episodes of vomiting and reported dysphagia so resumed liquid diet  MBSS 3/1, SLP recommending GI consult  Plan for EGD 3/4: benign esophageal stricture noted as well as food that was removed; continue protonix PO BID x 8 weeks      Falls frequently  Chronic problem.  Born  with left side spasticity. Has ilda joints. Also has h/o ETOH dependence. Thiamine level pending. IV replaced ordered.  PT/OT consulted. OP neurology referral placed. After talking with wife, patient is bedbound at baseline and has not walked in about three years.  Reason unknown.      Paroxysmal atrial fibrillation  Patient with Paroxysmal (<7 days) atrial fibrillation which is controlled currently with Beta Blocker. Patient is currently in sinus rhythm.[id number] Score: 1  - Had an episode of Atrial flutter (09/2023)  - Not compliant with Home Eliquis or BB   - Echocardiogram (9/2023): Preserved EF 50-55%  - Monitor electrolytes and Maintain Mg >2 and K >4  - holding off on full anticoagulation for fall risk  - eliquis resumed 3/4. Patient is bedbound. Will monitor CBC    Essential hypertension  Chronic, uncontrolled. However, may be also elevated currently in the setting of withdrawal  Latest blood pressure and vitals reviewed-     Temp:  [98.1 °F (36.7 °C)-98.9 °F (37.2 °C)]   Pulse:  [72-96]   Resp:  [14-18]   BP: ()/(53-67)   SpO2:  [98 %-99 %] .   Home meds for hypertension were reviewed and noted below.   Hypertension Medications               metoprolol tartrate (LOPRESSOR) 50 MG tablet TAKE 1 TABLET TWICE DAILY          Decrease the metoprolol to 25 mg BID since patient is having decreased blood pressures.    Will utilize p.r.n. blood pressure medication only if patient's blood pressure greater than 160/100 and he develops symptoms such as worsening chest pain or shortness of breath.    Electrolyte disturbance  - Patient with Hypomagnesemia, Hypokalemia, hypocalcemia and Hypophosphatemia  - Correct electrolytes and monitor  - corrected calcium 8.7  - Telemetry monitoring for now    Iron deficiency anemia  Macrocytic Anemia      Patient's anemia is currently  stable . Has not received any PRBCs to date. Etiology likely d/t Iron deficiency and B12 deficiency, and chronic alcohol use  Current CBC  reviewed-   Lab Results   Component Value Date    HGB 7.2 (L) 03/05/2024    HCT 22.1 (L) 03/05/2024     Monitor serial CBC and transfuse if patient becomes hemodynamically unstable, symptomatic or H/H drops below 7/21.  B12 and folate pending  Lab Results   Component Value Date    UIBC 244 08/28/2009    IRON 11 (L) 02/28/2024    TRANSFERRIN 129 (L) 02/28/2024    TIBC 191 (L) 02/28/2024    FESATURATED 6 (L) 02/28/2024      Will order supplemental iron and B12. Transfuse if hgb less than 7.     Pharyngitis  - CT with findings concerning for pharyngitis and possibly retained secretions. Also noted chronic appearing opacification of the left maxillary sinus with chronic osteitis of the sinus walls. Appears chronic dating back to 3/2017 per read.   - Outpatient referral to Otolaryngology   - Amoxicillin changed to ceftriaxone   - Began having fevers 2/26 as high as 101.3  - Repeat CT neck without abscess formation, Bcx ordered: NGTD  - CXR and UA ordered, unremarkable   - CT AP shows atelectasis vs developing pneumonia at bases of lung, will add doxy for atypical coverage and encourage IS  - Fevers still persisting despite above. Will broaden to Vanc and Zosyn, consult ID on Monday for fever uknown origin:  - Appreciate ID recs including: change to Unasyn for better ENT coverage and consider repeat CT of his neck  - f/u EGD results  - viral swab negative  - Fevers improved, swallowing improved, cough improved- will de-escalate IV abx 3/7      GERD (gastroesophageal reflux disease)  - Protonix 40 mg, daily      Seizure  - Noted in history. Last seizure several years ago ~ 2019. Unclear etiology may have been related to withdrawal  - Continue with Keppra  - Follow up outpatient with Neurology; given this was a one time occurrence in likely the above setting  - reports born with epilepsy    Hypokalemia  Patient has hypokalemia which is Acute and currently uncontrolled. Most recent potassium levels reviewed-   Lab Results    Component Value Date    K 3.3 (L) 02/24/2024   . Will continue potassium replacement per protocol and recheck repeat levels after replacement completed.       VTE Risk Mitigation (From admission, onward)           Ordered     apixaban tablet 5 mg  2 times daily         03/04/24 1322     IP VTE LOW RISK PATIENT  Once         02/24/24 1605     Place sequential compression device  Until discontinued         02/24/24 1605                    Discharge Planning   SIMONE: 3/6/2024     Code Status: Full Code   Is the patient medically ready for discharge?:     Reason for patient still in hospital (select all that apply): Patient trending condition and Treatment  Discharge Plan A: Home with family, Home                  Oxana Ibanez DNP  Department of Hospital Medicine   [facility] Surg ([address])

## 2024-03-06 NOTE — SUBJECTIVE & OBJECTIVE
Interval History:   Mrs Johnson at bedside. Patient awake and oriented to self and situation. He has not had much improvement his pains. He is tolerating his upgraded diet.     Review of Systems   Constitutional:  Negative for activity change, appetite change, chills and fatigue.   HENT:  Positive for trouble swallowing (improving). Negative for congestion, drooling and sore throat.    Eyes:  Negative for visual disturbance.   Respiratory:  Positive for shortness of breath.    Cardiovascular:  Negative for chest pain and leg swelling.   Gastrointestinal:  Negative for abdominal distention, constipation, nausea and vomiting.   Genitourinary:  Negative for difficulty urinating.   Musculoskeletal:  Positive for arthralgias and joint swelling (darkening around joints r/t gout).   Skin:  Positive for color change.   Neurological:  Negative for dizziness, speech difficulty and headaches.   Psychiatric/Behavioral:  Negative for agitation.      Objective:     Vital Signs (Most Recent):  Temp: 98.5 °F (36.9 °C) (03/06/24 0800)  Pulse: 83 (03/06/24 1059)  Resp: 16 (03/06/24 0801)  BP: 104/60 (03/06/24 0800)  SpO2: 98 % (03/06/24 0800) Vital Signs (24h Range):  Temp:  [98 °F (36.7 °C)-98.6 °F (37 °C)] 98.5 °F (36.9 °C)  Pulse:  [80-98] 83  Resp:  [16-20] 16  SpO2:  [94 %-99 %] 98 %  BP: ()/(56-64) 104/60     Weight: 85 kg (187 lb 6.3 oz)  Body mass index is 25.41 kg/m².    Intake/Output Summary (Last 24 hours) at 3/6/2024 1133  Last data filed at 3/6/2024 0524  Gross per 24 hour   Intake 636.43 ml   Output 1500 ml   Net -863.57 ml         Physical Exam  Vitals and nursing note reviewed.   HENT:      Head: Normocephalic.      Mouth/Throat:      Pharynx: Oropharynx is clear.   Eyes:      Pupils: Pupils are equal, round, and reactive to light.   Cardiovascular:      Rate and Rhythm: Normal rate.   Pulmonary:      Effort: Pulmonary effort is normal.   Abdominal:      General: Bowel sounds are normal.      Palpations: Abdomen  is soft.      Tenderness: There is no abdominal tenderness. There is no guarding.      Comments: BM 3/6   Musculoskeletal:         General: Swelling (BL proximal ulnar), tenderness and deformity (BL wrist contractures) present.      Cervical back: Normal range of motion. No rigidity or tenderness.      Right lower leg: No edema.      Left lower leg: No edema.   Lymphadenopathy:      Cervical: No cervical adenopathy.   Skin:     Findings: Lesion (right 4th finger old gout flare) present.   Neurological:      Mental Status: He is alert. Mental status is at baseline.      Comments: Not oriented to date   Psychiatric:         Mood and Affect: Mood normal.         Behavior: Behavior normal.         Cognition and Memory: He exhibits impaired recent memory.             Significant Labs: All pertinent labs within the past 24 hours have been reviewed.  BMP:   Recent Labs   Lab 03/06/24  0454   *      K 4.8      CO2 22*   BUN 31*   CREATININE 1.5*   CALCIUM 8.7   MG 1.6     CBC:   Recent Labs   Lab 03/05/24  0450 03/06/24  0454   WBC 10.30 10.30   HGB 7.2* 7.0*   HCT 22.1* 22.0*    490*     CMP:   Recent Labs   Lab 03/05/24  0450 03/06/24  0454   * 137   K 4.5 4.8    106   CO2 22* 22*    124*   BUN 26* 31*   CREATININE 1.5* 1.5*   CALCIUM 8.9 8.7   PROT 7.7 7.9   ALBUMIN 2.3* 2.4*   BILITOT 0.5 0.4   ALKPHOS 104 134   AST 43* 50*   ALT 30 34   ANIONGAP 8 9       Significant Imaging: I have reviewed all pertinent imaging results/findings within the past 24 hours.

## 2024-03-07 VITALS
WEIGHT: 187.38 LBS | HEIGHT: 72 IN | OXYGEN SATURATION: 96 % | TEMPERATURE: 98 F | SYSTOLIC BLOOD PRESSURE: 109 MMHG | RESPIRATION RATE: 14 BRPM | BODY MASS INDEX: 25.38 KG/M2 | DIASTOLIC BLOOD PRESSURE: 68 MMHG | HEART RATE: 77 BPM

## 2024-03-07 LAB
ALBUMIN SERPL BCP-MCNC: 2.6 G/DL (ref 3.5–5.2)
ALP SERPL-CCNC: 126 U/L (ref 55–135)
ALT SERPL W/O P-5'-P-CCNC: 47 U/L (ref 10–44)
ANION GAP SERPL CALC-SCNC: 7 MMOL/L (ref 8–16)
ANISOCYTOSIS BLD QL SMEAR: SLIGHT
AST SERPL-CCNC: 75 U/L (ref 10–40)
BASOPHILS # BLD AUTO: 0.07 K/UL (ref 0–0.2)
BASOPHILS NFR BLD: 0.8 % (ref 0–1.9)
BILIRUB SERPL-MCNC: 0.4 MG/DL (ref 0.1–1)
BUN SERPL-MCNC: 31 MG/DL (ref 8–23)
CALCIUM SERPL-MCNC: 9.1 MG/DL (ref 8.7–10.5)
CHLORIDE SERPL-SCNC: 104 MMOL/L (ref 95–110)
CO2 SERPL-SCNC: 22 MMOL/L (ref 23–29)
CREAT SERPL-MCNC: 1.4 MG/DL (ref 0.5–1.4)
DIFFERENTIAL METHOD BLD: ABNORMAL
EOSINOPHIL # BLD AUTO: 0.7 K/UL (ref 0–0.5)
EOSINOPHIL NFR BLD: 7.9 % (ref 0–8)
ERYTHROCYTE [DISTWIDTH] IN BLOOD BY AUTOMATED COUNT: 15.9 % (ref 11.5–14.5)
EST. GFR  (NO RACE VARIABLE): 57 ML/MIN/1.73 M^2
GIANT PLATELETS BLD QL SMEAR: PRESENT
GLUCOSE SERPL-MCNC: 92 MG/DL (ref 70–110)
HCT VFR BLD AUTO: 26.1 % (ref 40–54)
HGB BLD-MCNC: 8.3 G/DL (ref 14–18)
IMM GRANULOCYTES # BLD AUTO: 0.16 K/UL (ref 0–0.04)
IMM GRANULOCYTES NFR BLD AUTO: 1.9 % (ref 0–0.5)
LYMPHOCYTES # BLD AUTO: 1.4 K/UL (ref 1–4.8)
LYMPHOCYTES NFR BLD: 16.7 % (ref 18–48)
MAGNESIUM SERPL-MCNC: 1.5 MG/DL (ref 1.6–2.6)
MCH RBC QN AUTO: 36.9 PG (ref 27–31)
MCHC RBC AUTO-ENTMCNC: 31.8 G/DL (ref 32–36)
MCV RBC AUTO: 116 FL (ref 82–98)
MONOCYTES # BLD AUTO: 0.7 K/UL (ref 0.3–1)
MONOCYTES NFR BLD: 8.1 % (ref 4–15)
NEUTROPHILS # BLD AUTO: 5.6 K/UL (ref 1.8–7.7)
NEUTROPHILS NFR BLD: 64.6 % (ref 38–73)
NRBC BLD-RTO: 0 /100 WBC
PLATELET # BLD AUTO: 556 K/UL (ref 150–450)
PLATELET BLD QL SMEAR: ABNORMAL
PMV BLD AUTO: 10.6 FL (ref 9.2–12.9)
POCT GLUCOSE: 97 MG/DL (ref 70–110)
POTASSIUM SERPL-SCNC: 4.9 MMOL/L (ref 3.5–5.1)
PROT SERPL-MCNC: 8.5 G/DL (ref 6–8.4)
RBC # BLD AUTO: 2.25 M/UL (ref 4.6–6.2)
SODIUM SERPL-SCNC: 133 MMOL/L (ref 136–145)
WBC # BLD AUTO: 8.62 K/UL (ref 3.9–12.7)

## 2024-03-07 PROCEDURE — 83735 ASSAY OF MAGNESIUM: CPT | Mod: HCNC | Performed by: INTERNAL MEDICINE

## 2024-03-07 PROCEDURE — 85025 COMPLETE CBC W/AUTO DIFF WBC: CPT | Mod: HCNC | Performed by: INTERNAL MEDICINE

## 2024-03-07 PROCEDURE — 25000003 PHARM REV CODE 250: Mod: HCNC | Performed by: INTERNAL MEDICINE

## 2024-03-07 PROCEDURE — 36415 COLL VENOUS BLD VENIPUNCTURE: CPT | Mod: HCNC | Performed by: INTERNAL MEDICINE

## 2024-03-07 PROCEDURE — 92526 ORAL FUNCTION THERAPY: CPT | Mod: HCNC

## 2024-03-07 PROCEDURE — 25000003 PHARM REV CODE 250: Mod: HCNC | Performed by: PHYSICIAN ASSISTANT

## 2024-03-07 PROCEDURE — 25000003 PHARM REV CODE 250: Mod: HCNC | Performed by: NURSE PRACTITIONER

## 2024-03-07 PROCEDURE — 80053 COMPREHEN METABOLIC PANEL: CPT | Mod: HCNC | Performed by: INTERNAL MEDICINE

## 2024-03-07 PROCEDURE — 94761 N-INVAS EAR/PLS OXIMETRY MLT: CPT | Mod: HCNC

## 2024-03-07 RX ORDER — FERROUS SULFATE 325(65) MG
325 TABLET, DELAYED RELEASE (ENTERIC COATED) ORAL DAILY
Qty: 30 TABLET | Refills: 0 | Status: SHIPPED | OUTPATIENT
Start: 2024-03-07

## 2024-03-07 RX ORDER — AMOXICILLIN AND CLAVULANATE POTASSIUM 500; 125 MG/1; MG/1
1 TABLET, FILM COATED ORAL 3 TIMES DAILY
Qty: 18 TABLET | Refills: 0 | Status: SHIPPED | OUTPATIENT
Start: 2024-03-07 | End: 2024-03-13

## 2024-03-07 RX ORDER — PANTOPRAZOLE SODIUM 40 MG/1
40 TABLET, DELAYED RELEASE ORAL 2 TIMES DAILY
Qty: 60 TABLET | Refills: 1 | Status: SHIPPED | OUTPATIENT
Start: 2024-03-07 | End: 2024-03-28 | Stop reason: SDUPTHER

## 2024-03-07 RX ORDER — LANOLIN ALCOHOL/MO/W.PET/CERES
400 CREAM (GRAM) TOPICAL DAILY
Status: DISCONTINUED | OUTPATIENT
Start: 2024-03-07 | End: 2024-03-07 | Stop reason: HOSPADM

## 2024-03-07 RX ORDER — CHOLECALCIFEROL (VITAMIN D3) 25 MCG
1000 TABLET,CHEWABLE ORAL DAILY
Qty: 30 CAPSULE | Refills: 0 | Status: SHIPPED | OUTPATIENT
Start: 2024-03-07

## 2024-03-07 RX ORDER — METOPROLOL TARTRATE 25 MG/1
25 TABLET, FILM COATED ORAL 2 TIMES DAILY
Qty: 60 TABLET | Refills: 0 | Status: SHIPPED | OUTPATIENT
Start: 2024-03-07 | End: 2024-03-28 | Stop reason: SDUPTHER

## 2024-03-07 RX ORDER — CETIRIZINE HYDROCHLORIDE 5 MG/1
5 TABLET ORAL DAILY
Qty: 30 TABLET | Refills: 0 | Status: SHIPPED | OUTPATIENT
Start: 2024-03-08 | End: 2025-03-08

## 2024-03-07 RX ORDER — LANOLIN ALCOHOL/MO/W.PET/CERES
100 CREAM (GRAM) TOPICAL DAILY
Qty: 30 TABLET | Refills: 0 | Status: SHIPPED | OUTPATIENT
Start: 2024-03-08

## 2024-03-07 RX ORDER — COLCHICINE 0.6 MG/1
0.6 TABLET ORAL 2 TIMES DAILY
Qty: 60 TABLET | Refills: 0 | Status: SHIPPED | OUTPATIENT
Start: 2024-03-07 | End: 2025-03-07

## 2024-03-07 RX ORDER — ALLOPURINOL 300 MG/1
300 TABLET ORAL DAILY
Qty: 30 TABLET | Refills: 0 | Status: SHIPPED | OUTPATIENT
Start: 2024-03-07

## 2024-03-07 RX ORDER — BENZONATATE 100 MG/1
100 CAPSULE ORAL 3 TIMES DAILY PRN
Qty: 30 CAPSULE | Refills: 0 | Status: SHIPPED | OUTPATIENT
Start: 2024-03-07 | End: 2024-03-17

## 2024-03-07 RX ADMIN — PANTOPRAZOLE SODIUM 40 MG: 40 TABLET, DELAYED RELEASE ORAL at 09:03

## 2024-03-07 RX ADMIN — LEVETIRACETAM 500 MG: 500 TABLET, FILM COATED ORAL at 09:03

## 2024-03-07 RX ADMIN — Medication 100 MG: at 09:03

## 2024-03-07 RX ADMIN — FOLIC ACID 1 MG: 1 TABLET ORAL at 09:03

## 2024-03-07 RX ADMIN — AMOXICILLIN AND CLAVULANATE POTASSIUM 500 MG: 500; 125 TABLET, FILM COATED ORAL at 09:03

## 2024-03-07 RX ADMIN — GABAPENTIN 400 MG: 400 CAPSULE ORAL at 09:03

## 2024-03-07 RX ADMIN — COLCHICINE 0.6 MG: 0.6 TABLET, FILM COATED ORAL at 09:03

## 2024-03-07 RX ADMIN — FERROUS SULFATE TAB 325 MG (65 MG ELEMENTAL FE) 1 EACH: 325 (65 FE) TAB at 09:03

## 2024-03-07 RX ADMIN — ALLOPURINOL 100 MG: 100 TABLET ORAL at 09:03

## 2024-03-07 RX ADMIN — GUAIFENESIN 1200 MG: 600 TABLET, EXTENDED RELEASE ORAL at 09:03

## 2024-03-07 RX ADMIN — THERA TABS 1 TABLET: TAB at 09:03

## 2024-03-07 RX ADMIN — AMMONIUM LACTATE: 120 LOTION TOPICAL at 10:03

## 2024-03-07 RX ADMIN — APIXABAN 5 MG: 2.5 TABLET, FILM COATED ORAL at 09:03

## 2024-03-07 RX ADMIN — CETIRIZINE HYDROCHLORIDE 5 MG: 5 TABLET, FILM COATED ORAL at 09:03

## 2024-03-07 RX ADMIN — METOPROLOL TARTRATE 25 MG: 50 TABLET, FILM COATED ORAL at 09:03

## 2024-03-07 NOTE — PLAN OF CARE
Problem: Adult Inpatient Plan of Care  Goal: Plan of Care Review  Outcome: Ongoing, Progressing  Goal: Patient-Specific Goal (Individualized)  Outcome: Ongoing, Progressing  Goal: Absence of Hospital-Acquired Illness or Injury  Outcome: Ongoing, Progressing  Goal: Optimal Comfort and Wellbeing  Outcome: Ongoing, Progressing  Goal: Readiness for Transition of Care  Outcome: Ongoing, Progressing     Problem: Skin Injury Risk Increased  Goal: Skin Health and Integrity  Outcome: Ongoing, Progressing     Problem: Impaired Wound Healing  Goal: Optimal Wound Healing  Outcome: Ongoing, Progressing     Problem: Pain Acute  Goal: Acceptable Pain Control and Functional Ability  Outcome: Ongoing, Progressing     Problem: Alcohol Withdrawal  Goal: Alcohol Withdrawal Symptom Control  Outcome: Ongoing, Progressing     Problem: Acute Neurologic Deterioration (Alcohol Withdrawal)  Goal: Optimal Neurologic Function  Outcome: Ongoing, Progressing     Problem: Substance Misuse (Alcohol Withdrawal)  Goal: Readiness for Change Identified  Outcome: Ongoing, Progressing     Problem: Eating/Swallowing Impairment  Goal: Optimal Eating/Swallowing without Aspir  Outcome: Ongoing, Progressing     Problem: Gas Exchange Impaired  Goal: Optimal Gas Exchange  Outcome: Ongoing, Progressing     Problem: Fall Injury Risk  Goal: Absence of Fall and Fall-Related Injury  Outcome: Ongoing, Progressing

## 2024-03-07 NOTE — PLAN OF CARE
LMSW met with patient via bedside. Patient will be discharging with [facility] as HH. Patient refused recommended DME. Sw provided patient with information for insurance transportation. Patient's preferred pharmacy is bedside. Sw scheduled patient ambulance home for 3 PM.    [facility] - [facility] Surg ([address])  Discharge Final Note    Primary Care Provider: Amelia Lai MD    Expected Discharge Date: 3/7/2024    Final Discharge Note (most recent)       Final Note - 03/07/24 1340          Final Note    Assessment Type Final Discharge Note (P)      Anticipated Discharge Disposition Home-Health Care Svc (P)      Hospital Resources/Appts/Education Provided Provided patient/caregiver with written discharge plan information;Appointments scheduled and added to AVS (P)         Post-Acute Status    Post-Acute Authorization Home Health (P)      Home Health Status Set-up Complete/Auth obtained (P)      Discharge Delays PFC Arranged Transportation (P)                      Important Message from Medicare  Important Message from Medicare regarding Discharge Appeal Rights: Given to patient/caregiver, Explained to patient/caregiver, Signed/date by patient/caregiver     Date IMM was signed: 03/06/24  Time IMM was signed: 0926    Contact Info       [facility]   Specialty: Home Health Services, Home Therapy Services, Home Living Aide Services    [address]  [place] [address]   Phone: [phone]       Next Steps: Follow up on 3/11/2024    Instructions: They will contact you for home healthcare appointments.    [employer]    [phone]       Next Steps: Follow up    Instructions: Call [phone], Monday - Friday, from 7 a.m. - 7 p.m.    If you have to cancel your ride, please do so at least 48 hours in advance.    Call [phone], Monday - Friday, from 7 a.m. - 7 p.m. to ask wheres my ride, if your ride doesnt show up, or to arrange urgent transportation.

## 2024-03-07 NOTE — PLAN OF CARE
[facility] ([address])      HOME HEALTH ORDERS  FACE TO FACE ENCOUNTER    Patient Name: Michael Johnson Jr.  YOB: 1960    PCP: Amelia Lai MD   PCP Address: [address] [place] [address] / [facility] [address]  PCP Phone Number: [phone]  PCP Fax: [phone]    Encounter Date: 2/24/24    Admit to Home Health    Diagnoses:  Active Hospital Problems    Diagnosis  POA    *Alcohol abuse with withdrawal [F10.139]  Yes     Priority: 2     Acute gout of multiple sites [M10.9]  Yes     Priority: 3     Benign esophageal stricture [K22.2]  Yes     Priority: 4     Dysphagia [R13.10]  Yes     Priority: 4     Fever [R50.9]  Yes     Priority: 4     Falls frequently [R29.6]  Not Applicable     Priority: 7     Paroxysmal atrial fibrillation [I48.0]  Yes     Priority: 7      The patient has atrial fibrillation he is not really sure where these and chronically are not.  He is takes metoprolol but he does not always remember to take it his family says he forgets that a lot he says he never forgets.  He says he has been under stress lately and he suddenly had onset of fibrillation or flutter with a rate of 150 he was placed on amiodarone by the cardiologist and now his rate is 98 but looks like it is still flutter      Essential hypertension [I10]  Yes     Priority: 9     Electrolyte disturbance [E87.8]  Yes     Priority: 10     Atelectasis [J98.11]  Yes     Priority: 20     Iron deficiency anemia [D50.9]  Yes    Food impaction of esophagus [T18.128A, W44.F3XA]  Yes    Severe malnutrition [E43]  Yes    Pharyngitis [J02.9]  Yes    Macrocytic anemia [D53.9]  Yes    Seizure [R56.9]  Yes    Hypokalemia [E87.6]  Yes    GERD (gastroesophageal reflux disease) [K21.9]  Yes      Resolved Hospital Problems   No resolved problems to display.       Follow Up Appointments:  Future Appointments   Date Time Provider Department Center   3/11/2024 11:30 AM Amelia Lai MD [facility]   6/10/2024  8:00  AM Silvia Culp NP [id number] [place]   7/8/2024 10:20 AM Chinyere Shin MD [facility]       Allergies:  Review of patient's allergies indicates:   Allergen Reactions    Lisinopril Swelling     Pt admitted with angioedema 2wks after taking lisinopril.        Medications: Review discharge medications with patient and family and provide education.    Current Facility-Administered Medications   Medication Dose Route Frequency Provider Last Rate Last Admin    acetaminophen tablet 650 mg  650 mg Oral Q6H PRN Carlos Calloway MD   650 mg at 03/02/24 2206    albuterol-ipratropium 2.5 mg-0.5 mg/3 mL nebulizer solution 3 mL  3 mL Nebulization Q8H PRN Oxana Ibanez DNP        allopurinoL tablet 100 mg  100 mg Oral Daily Oxana Ibanez DNP   100 mg at 03/06/24 0938    ammonium lactate 12 % lotion   Topical (Top) BID Carlos Calloway MD   Given at 03/06/24 2124    amoxicillin-clavulanate 500-125mg per tablet 500 mg  1 tablet Oral TID Je Guerin MD   500 mg at 03/06/24 2123    apixaban tablet 5 mg  5 mg Oral BID Batsheva Sales PA-C   5 mg at 03/06/24 2123    benzonatate capsule 100 mg  100 mg Oral TID PRN Carlos Calloway MD   100 mg at 03/06/24 0938    cetirizine tablet 5 mg  5 mg Oral Daily Carlos Calloway MD   5 mg at 03/06/24 0939    colchicine capsule/tablet 0.6 mg  0.6 mg Oral BID Oxana Ibanez DNP   0.6 mg at 03/06/24 2123    cyanocobalamin injection 1,000 mcg  1,000 mcg Intramuscular Every other day Oxana Ibanez DNP   1,000 mcg at 03/06/24 1243    ferrous sulfate tablet 1 each  1 tablet Oral Daily Oxana Ibanez DNP   1 each at 03/06/24 0938    fluticasone propionate 50 mcg/actuation nasal spray 100 mcg  2 spray Each Nostril Daily Carlos Calloway MD   100 mcg at 03/06/24 0940    folic acid tablet 1 mg  1 mg Oral Daily Carlos Calloway MD   1 mg at 03/06/24 0939    gabapentin capsule 400 mg  400 mg Oral BID Carlos Calloway MD   400  mg at 03/06/24 2123    guaiFENesin 12 hr tablet 1,200 mg  1,200 mg Oral BID Carlos Calloway MD   1,200 mg at 03/06/24 2124    levETIRAcetam tablet 500 mg  500 mg Oral BID Carlos Calloway MD   500 mg at 03/06/24 2123    melatonin tablet 6 mg  6 mg Oral Nightly PRN Carlos Calloway MD   6 mg at 03/02/24 2206    metoprolol tartrate (LOPRESSOR) tablet 25 mg  25 mg Oral BID Oxana Ibanez DNP   25 mg at 03/06/24 2124    multivitamin tablet  1 tablet Oral Daily Carlos Calloway MD   1 tablet at 03/06/24 0938    ondansetron injection 4 mg  4 mg Intravenous Q8H PRN Carlos Calloway MD        pantoprazole EC tablet 40 mg  40 mg Oral BID Carlos Calloway MD   40 mg at 03/06/24 2123    polyethylene glycol packet 17 g  17 g Oral Daily Carols Calloway MD   17 g at 03/04/24 0915    promethazine-codeine 6.25-10 mg/5 ml syrup 5 mL  5 mL Oral Q6H PRN Carlos Calloway MD   5 mL at 03/06/24 0505    thiamine tablet 100 mg  100 mg Oral Daily Carlos Calloway MD   100 mg at 03/06/24 0938     Current Discharge Medication List        CONTINUE these medications which have NOT CHANGED    Details   albuterol sulfate (PROAIR RESPICLICK) 90 mcg/actuation inhaler Inhale 2 puffs into the lungs every 6 (six) hours as needed for Wheezing (wheezing). Rescue  Qty: 1 each, Refills: 3    Comments: Please dispense with spacer  Associated Diagnoses: COPD with exacerbation      allopurinoL (ZYLOPRIM) 300 MG tablet TAKE 1 TABLET EVERY DAY  Qty: 90 tablet, Refills: 3    Associated Diagnoses: Gouty arthritis      aluminum-magnesium hydroxide-simethicone (MAALOX) 200-200-20 mg/5 mL Susp Take 30 mLs by mouth before meals and at bedtime as needed (acid reflux).    Associated Diagnoses: Seizure      amiodarone (PACERONE) 200 MG Tab Take 1 tablet (200 mg total) by mouth 2 (two) times daily for 14 days, THEN 1 tablet (200 mg total) once daily.  Qty: 60 tablet, Refills: 2      ammonium lactate (LAC-HYDRIN) 12 % lotion Apply 1 g  topically as needed for Dry Skin.  Qty: 30 each, Refills: 3      apixaban (ELIQUIS) 5 mg Tab Take 1 tablet (5 mg total) by mouth 2 (two) times daily.  Qty: 60 tablet, Refills: 2    Associated Diagnoses: Paroxysmal atrial fibrillation      ceramides 1,3,6-II (CERAVE DAILY MOISTURIZING) Lotn Applying topically to dry skin twice daily.  Qty: 355 mL, Refills: 5    Associated Diagnoses: Dry skin      diclofenac sodium (VOLTAREN) 1 % Gel Apply 2 g topically 4 (four) times daily as needed.  Qty: 100 g, Refills: 0    Associated Diagnoses: Chronic pain of left ankle      fluticasone propionate (FLONASE) 50 mcg/actuation nasal spray SHAKE LIQUID AND USE 1 SPRAY(50 MCG) IN EACH NOSTRIL EVERY DAY  Qty: 16 g, Refills: 1      folic acid (FOLVITE) 1 MG tablet Take 1 tablet (1 mg total) by mouth once daily.  Qty: 30 tablet, Refills: 2      gabapentin (NEURONTIN) 400 MG capsule TAKE 1 CAPSULE TWICE DAILY  Qty: 180 capsule, Refills: 3    Associated Diagnoses: Spastic hemiplegia, unspecified etiology, unspecified laterality; Polyneuropathy      levETIRAcetam (KEPPRA) 500 MG Tab TAKE 1 TABLET(500 MG) BY MOUTH TWICE DAILY  Qty: 60 tablet, Refills: 2      magnesium oxide 400 mg magnesium Tab Take 400 mg by mouth once daily.  Qty: 30 tablet      methocarbamoL (ROBAXIN) 500 MG Tab Take 1 tablet (500 mg total) by mouth daily as needed.  Qty: 30 tablet, Refills: 3    Associated Diagnoses: Muscle spasm; Seizure      metoprolol tartrate (LOPRESSOR) 50 MG tablet TAKE 1 TABLET TWICE DAILY  Qty: 180 tablet, Refills: 3    Comments: .  Associated Diagnoses: Essential hypertension      OPTICHAMBER GHAZALA [facility] USE AS DIRECTED.  Qty: 1 each, Refills: 3      pantoprazole (PROTONIX) 40 MG tablet Take 1 tablet (40 mg total) by mouth once daily.  Qty: 30 tablet, Refills: 11               I have seen and examined this patient within the last 30 days. My clinical findings that support the need for the home health skilled services and home bound status are  the following:no   Weakness/numbness causing balance and gait disturbance due to Weakness/Debility making it taxing to leave home.  Requiring assistive device to leave home due to unsteady gait caused by  Weakness/Debility.  Medical restrictions requiring assistance of another human to leave home due to  Unstable ambulation, Frequent Falls, and Decreased range of motions in extremities.     Diet:   cardiac diet    Labs:  CBC without differential and a CMP  weekly for two weeks starting Monday March 11th. Send results to primary care physician.     Referrals/ Consults  Physical Therapy to evaluate and treat. Evaluate for home safety and equipment needs; Establish/upgrade home exercise program. Perform / instruct on therapeutic exercises, gait training, transfer training, and Range of Motion.  Occupational Therapy to evaluate and treat. Evaluate home environment for safety and equipment needs. Perform/Instruct on transfers, ADL training, ROM, and therapeutic exercises.  Speech Therapy  to evaluate and treat for  Swallowing.  Aide to provide assistance with personal care, ADLs, and vital signs.    Activities:   activity as tolerated    Nursing:   Agency to admit patient within 24 hours of hospital discharge unless specified on physician order or at patient request    SN to complete comprehensive assessment including routine vital signs. Instruct on disease process and s/s of complications to report to MD. Review/verify medication list sent home with the patient at time of discharge  and instruct patient/caregiver as needed. Frequency may be adjusted depending on start of care date.     Skilled nurse to perform up to 3 visits PRN for symptoms related to diagnosis    Notify MD if SBP > 160 or < 90; DBP > 90 or < 50; HR > 120 or < 50; Temp > 101; O2 < 88%    Ok to schedule additional visits based on staff availability and patient request on consecutive days within the home health episode.    When multiple disciplines  ordered:    Start of Care occurs on Sunday - Wednesday schedule remaining discipline evaluations as ordered on separate consecutive days following the start of care.    Thursday SOC -schedule subsequent evaluations Friday and Monday the following week.     Friday - Saturday SOC - schedule subsequent discipline evaluations on consecutive days starting Monday of the following week.    For all post-discharge communication and subsequent orders please contact patient's primary care physician. If unable to reach primary care physician or do not receive response within 30 minutes, please contact [facility] for clinical staff order clarification    Miscellaneous   Routine Skin for Bedridden Patients: Instruct patient/caregiver to apply moisture barrier cream to all skin folds and wet areas in perineal area daily and after baths and all bowel movements.    Home Health Aide:  Physical Therapy Three times weekly, Occupational Therapy Three times weekly, Speech Language Pathology Three times weekly, and Home Health Aide Three times weekly    Wound Care Orders  Daily and PRN: R Hand 3rd finger. Right knee.   Cleanse with vashe Pat dry apply Triad - wound bed only     Every 5 days.  After cleaning, replace inter-dry  Between fingers on left and right hands     Twice a day: apply Lac-Hydrin to bilateral elbows, & Feet up to knees   Do NOT apply between toes.     I certify that this patient is confined to his home and needs physical therapy, speech therapy, and occupational therapy.

## 2024-03-07 NOTE — TELEPHONE ENCOUNTER
That would have to be determined by the hospitalist team. If it's not related to his hospital admission then insurance may not cover the procedure. Please let patient's wife know, thank you!

## 2024-03-07 NOTE — PLAN OF CARE
Recommendation  1) Continue texture modified diet per SLP/MD as tolerated - aspiration precautions in place   2) Encourage intake of current diet order + nutrition supplements    3) If patient is unable to tolerate PO d/t aspiration, nutrition support may be warranted at this time -  rec initiation of EN   4) MVM supplementations    5) Monitor labs and weights    6) RD to monitor and follow up to make rec's accordingly.    Goals:   Patient to consume >75% of estimated energy needs prior to RD follow up.  Nutrition Goal Status: goal not met, progressing towards goal  Communication of RD Recs:  (POC)

## 2024-03-07 NOTE — NURSING
Pt. Being discharged home per MD orders. VSS, pt afebrile and no c/o pain at this time. Reviewed discharge instruction, follow-up instructions, and med with pt/wife. Removed PIV access. Dry gauze/ tape placed to site, CDI. Ride to be by [facility] upon discharge and will transport per stretcher.

## 2024-03-07 NOTE — PROGRESS NOTES
[facility] ([address])  Adult Nutrition  Progress Note    SUMMARY       Recommendation  1) Continue texture modified diet per SLP/MD as tolerated - aspiration precautions in place   2) Encourage intake of current diet order + nutrition supplements    3) If patient is unable to tolerate PO d/t aspiration, nutrition support may be warranted at this time -  rec initiation of EN   4) MVM supplementations    5) Monitor labs and weights    6) RD to monitor and follow up to make rec's accordingly.    Goals:   Patient to consume >75% of estimated energy needs prior to RD follow up.  Nutrition Goal Status: goal not met, progressing towards goal  Communication of RD Recs:  (POC)    Assessment and Plan    Endocrine  Severe malnutrition  Malnutrition Type:  Context: chronic illness  Level: severe    Related to (etiology):   Difficulty swallowing    Signs and Symptoms (as evidenced by):   Aspiration  No appetite 0-25% intake recorded since admit LOS x 6 days and pta per pt/wife  Coughing, N/V  Altered skin integrity  14% weight loss in 6 months with moderate to severe depletion of fat loss and muscle mass  Excessive ETOH use with electrolyte abnormalities   Debility, bed bound     Malnutrition Characteristic Summary:  Weight Loss (Malnutrition): greater than 10% in 6 months (14%)  Energy Intake (Malnutrition): less than 75% for greater than or equal to 1 month  Subcutaneous Fat (Malnutrition): moderate depletion  Muscle Mass (Malnutrition): severe depletion      Interventions (treatment strategy):  Commercial beverage  Texture modified diet  MVM supplementation   Collaboration with other providers    Nutrition Diagnosis Status:   Continues          Malnutrition Assessment  Malnutrition Context: chronic illness  Malnutrition Level: severe  Skin (Micronutrient): dry, scaly, cracked (Dieudonne 14)  Teeth (Micronutrient): broken dentition  Musculoskeletal/Lower Extremities: muscle wasting   Micronutrient Evaluation Summary:  suspected deficiency   Weight Loss (Malnutrition): greater than 10% in 6 months (14%)  Energy Intake (Malnutrition): less than 75% for greater than or equal to 1 month  Subcutaneous Fat (Malnutrition): moderate depletion  Muscle Mass (Malnutrition): severe depletion   Orbital Region (Subcutaneous Fat Loss): moderate depletion  Upper Arm Region (Subcutaneous Fat Loss): moderate depletion   Colbert Region (Muscle Loss): mild depletion  Clavicle Bone Region (Muscle Loss): mild depletion  Clavicle and Acromion Bone Region (Muscle Loss): moderate depletion  Dorsal Hand (Muscle Loss): moderate depletion  Patellar Region (Muscle Loss): severe depletion  Anterior Thigh Region (Muscle Loss): severe depletion  Posterior Calf Region (Muscle Loss): severe depletion                 Reason for Assessment    Reason For Assessment: RD follow-up  Diagnosis:  (alcohol abuse with withdrawal)  Relevant Medical History:   Patient Active Problem List   Diagnosis    Chronic pain of left ankle    Acute gout of multiple sites    Mixed hyperlipidemia    Hypokalemia    Seizure    GERD (gastroesophageal reflux disease)    Macrocytic anemia    Essential hypertension    Alcohol abuse with withdrawal    Fever    Debility    Paroxysmal atrial fibrillation    High anion gap metabolic acidosis    Vitamin D deficiency    Peripheral polyneuropathy    Spastic hemiplegia, unspecified etiology, unspecified laterality    Depression    Dysphagia    Lichenification    Aortic atherosclerosis    Electrolyte disturbance    Pharyngitis    Falls frequently    Atelectasis    Severe malnutrition    Food impaction of esophagus    Benign esophageal stricture    Iron deficiency anemia     Interdisciplinary Rounds: did not attend  General Information Comments: Diet adv to IDDSI 5 minced & moist, tolerating diet with fair intake - reports no appetite. SLP following along for dysphagia and aspiration concerns. Pt s/p MBSS done 3/1 and EGD on Monday which showed benign  appearing esophageal stenosis. B12 supplementation started for macrocytic anemia. [facility] protocol d/t ETOH use. PIV. Dieudonne 17 - R/L knee, R hand, buttock. Pt meets criteria for severe malnutrition r/t inadequate oral intake aeb reported poor appetite, weight loss of >10% in 6 months, electrolytes abnormalities, debility, excessive ETOH use, altered skin integrity and moderate to severe depletion of fat loss and muscle mass.  Nutrition Discharge Planning: Patient to continue on recommended oral diet post discharge    Nutrition Risk Screen    Nutrition Risk Screen: difficulty chewing/swallowing, reduced oral intake over the last month, large or nonhealing wound, burn or pressure injury    Nutrition/Diet History    Spiritual, Cultural Beliefs, Religion Practices, Values that Affect Care: no  Food Allergies: NKFA  Factors Affecting Nutritional Intake: impaired cognitive status/motor control, chewing difficulties/inability to chew food, difficulty/impaired swallowing, decreased appetite    Anthropometrics    Temp: 98.4 °F (36.9 °C)  Height Method: Stated  Height: 6' (182.9 cm)  Height (inches): 72 in  Weight Method: Bed Scale  Weight: 85 kg (187 lb 6.3 oz)  Weight (lb): 187.39 lb  Ideal Body Weight (IBW), Male: 178 lb  % Ideal Body Weight, Male (lb): 101.56 %  BMI (Calculated): 25.4  BMI Grade: 18.5-24.9 - normal  Weight Loss: unintentional  Usual Body Weight (UBW), k.5 kg (2023)  % Usual Body Weight: 86.04  % Weight Change From Usual Weight: -14.14 %  Additional Documentation:  (spastic left side hemiplegia)    Lab/Procedures/Meds    Pertinent Labs Reviewed: reviewed  CBC:  Recent Labs   Lab 24  0455   WBC 8.62   HGB 8.3*   HCT 26.1*   *     CMP:  Recent Labs   Lab 24  0455   CALCIUM 9.1   ALBUMIN 2.6*   PROT 8.5*   *   K 4.9   CO2 22*      BUN 31*   CREATININE 1.4   ALKPHOS 126   ALT 47*   AST 75*   BILITOT 0.4     BMP:   Recent Labs   Lab 24  0455   GLU 92   *   K 4.9       CO2 22*   BUN 31*   CREATININE 1.4   CALCIUM 9.1   MG 1.5*       Pertinent Medications Reviewed: reviewed  Scheduled Meds:   allopurinoL  100 mg Oral Daily    ammonium lactate   Topical (Top) BID    amoxicillin-clavulanate 500-125mg  1 tablet Oral TID    apixaban  5 mg Oral BID    cetirizine  5 mg Oral Daily    colchicine  0.6 mg Oral BID    cyanocobalamin  1,000 mcg Intramuscular Every other day    ferrous sulfate  1 tablet Oral Daily    fluticasone propionate  2 spray Each Nostril Daily    folic acid  1 mg Oral Daily    gabapentin  400 mg Oral BID    guaiFENesin  1,200 mg Oral BID    levETIRAcetam  500 mg Oral BID    magnesium oxide  400 mg Oral Daily    metoprolol tartrate  25 mg Oral BID    multivitamin  1 tablet Oral Daily    pantoprazole  40 mg Oral BID    polyethylene glycol  17 g Oral Daily    thiamine  100 mg Oral Daily     Continuous Infusions:  PRN Meds:.acetaminophen, albuterol-ipratropium, benzonatate, melatonin, ondansetron, promethazine-codeine 6.25-10 mg/5 ml    Estimated/Assessed Needs    Weight Used For Calorie Calculations: 82 kg (180 lb 12.4 oz)  Energy Calorie Requirements (kcal): 25-30kcals/kg (2050-2460kcals/day)  Energy Need Method: Kcal/kg  Protein Requirements: 1.2-1.5g/kg (98-123g/day)  Weight Used For Protein Calculations: 82 kg (180 lb 12.4 oz)  Fluid Requirements (mL): 1ml/kcal  Estimated Fluid Requirement Method: RDA Method  RDA Method (mL): 25  CHO Requirement: 250      Nutrition Prescription Ordered    Current Diet Order: IDDSI 5 - minced and moist  Oral Nutrition Supplement: Jesús BID; Commercial beverages TID    Evaluation of Received Nutrient/Fluid Intake    % Kcal Needs: 25%  % Protein Needs: 25%  I/O: - 9 L since admit  Energy Calories Required: not meeting needs  Protein Required: not meeting needs  Fluid Required: not meeting needs  Comments: LBM:3/7/24  Tolerance: not tolerating  % Intake of Estimated Energy Needs: 25 - 50 %  % Meal Intake: 25 - 50  %    Intake/Output - Last 3 Shifts         03/05 0700  03/06 0659 03/06 0700  03/07 0659 03/07 0700 03/08 0659    P.O.  1020     I.V. (mL/kg) 49.2 (0.6)      IV Piggyback 587.2      Total Intake(mL/kg) 636.4 (7.5) 1020 (12)     Urine (mL/kg/hr) 2150 (1.1) 1100 (0.5)     Stool 0 0     Total Output 2150 1100     Net -1513.6 -80            Urine Occurrence  925 x     Stool Occurrence 1 x 4 x             Nutrition Risk    Level of Risk/Frequency of Follow-up: moderate - high (follow up: 2x weekly)     Monitor and Evaluation    Food and Nutrient Intake: energy intake, food and beverage intake  Food and Nutrient Adminstration: diet order  Knowledge/Beliefs/Attitudes: beliefs and attitudes  Physical Activity and Function: nutrition-related ADLs and IADLs, factors affecting access to physical activity  Anthropometric Measurements: height/length, weight, weight change, body mass index  Biochemical Data, Medical Tests and Procedures: electrolyte and renal panel, gastrointestinal profile, glucose/endocrine profile, inflammatory profile, lipid profile  Nutrition-Focused Physical Findings: skin, overall appearance     Nutrition Follow-Up    RD Follow-up?: Yes    Farhana Sykes RDN, EDIN

## 2024-03-07 NOTE — PT/OT/SLP PROGRESS
Speech Language Pathology Treatment    Patient Name:  Michael Johnson Jr.   MRN:  [id number]  Admitting Diagnosis: Alcohol abuse with withdrawal    Recommendations:     General Recommendations: SLP to continue to follow 3-5x/week during this admit for diet tolerance and to ensure comprehension of safe swallow strategies/ aspiration precautions.    Follow-up with GI and PCP   Diet Recommendations:  Minced & Moist Diet - IDDSI Level 5, Liquid Diet Level: Thin liquids - IDDSI Level 0   Aspiration Precautions: requires assistance with meals; however, please encourage self-feeding when able   Feed only when awake/alert   HOB to 90 degrees for all PO intake; remain upright 30 minutes post meal   1 bite/sip at a time and small bites/sips  Eliminate distractions; avoid talking while eatin  General Precautions: Standard, aspiration  Communication Strategies: pt reports wears eye glasses but none present at bedside or in room this date-- provide repetition of information and orientation as needed.        Assessment:     Michael Johnson Jr. is a 63 y.o. male with an SLP diagnosis of dysphagia 2/2 throat swelling and excessive vomiting. SLP following for diet tolerance and to ensure comprehension of safe swallow strategies/ aspiration precautions. MBSS completed on 3/1/24-- instance of aspiration after the swallow witnessed with retrograde flow through the UES. SLP recommended referral to inpatient GI for further evaluation of esophageal function, GI then proceeded with EGD-- findings outlined below:   EGD procedure completed 3/4/24  Findings: Food was found at the gastroesophageal junction. Removal was accomplished with gentle foward traction with passage of the bolus into the stomach. One benign-appearing, intrinsic severe stenosis was found at the gastroesophageal junction. The stenosis was traversed after auto-dilation with the gastroscope while passing food obstruction into the stomach. The entire examined stomach was normal.  The examined duodenum was normal.   Impression: Food at the gastroesophageal junction. Removal was successful.  - Benign-appearing esophageal stenosis.  - Normal stomach.  - Normal examined duodenum.   Recommendation: [facility].                          - Chopped diet.                          - Use Protonix (pantoprazole) 40 mg PO BID for 8                          weeks.   Subjective     RN present in room administering oral medications-- RN crushing oral meds to give with pudding, reporting pt had trouble swallowing pills earlier this date. Pt spouse at bedside, reporting she has observed pt take pills whole, without difficulty-- SLP discussed giving pt oral meds whole, 1 at a time followed by strawsips of thin liquids and ok to give crushed in puree if pt requesting.     RN left room after administering oral meds; pt spouse remained present at bedside for entirety of session this date.     Pain/Comfort: no pain/discomfort reported this date.     Respiratory Status: Room air    Objective:     Ongoing Bedside Swallow Exam:  Consistencies Assessed:   Thin Liquids: ~5 sips of apple juice via consecutive strawsips   Puree: ~6 bites of vanilla pudding, finishing entire 3.25 ounce serving (pt self-fed)   Oral medications crushed into puddin tsp      Oral Phase:   Able to siphon liquids via straw following bite of oral meds crushed into pudding; no anterior loss observed   Adequate lip seal to spoon, demonstrating complete bolus stripping of purees without anterior loss  Lip seal, a-p transport, and ability to form cohesive bolus appear WNL  No oral residuals noted after swallow of puree or oral meds crushed into pudding observed this date     Pharyngeal Phase:   Trigger of pharyngeal swallow appears to be WNL  No coughing, throat clearing or significant change in vocal quality observed before, during or after PO intake this date   No choking or airway threat observed with PO  intake      Other: SLP discussed with pt and spouse plan to continue with current diet consistency as pt is pleased with consistency and that per chart review, GI recommending chopped diet-- pt and spouse verbalized agreement with plan. Pt reporting he is getting his appetite back and has been interested in eating more, states he did not eat the grits served with breakfast because he does not like them here and that at home he prepares grits and mixes in pieces of meat such as ham and even mixes with scrambled eggs. SLP encouraged pt to proceed with the softer consistency and bite sized meals he prepares at home, focusing on use of slow rate of intake and small bolus volume; also discussed importance of follow up with GI and related specialities and to continue with medications rec by GI for management of GERD.      Cognitive-Linguistic Status: in today's session, pt...  Awake and alert throughout entirety of session  Oriented to person, place, and situation  Verbalized wants/needs and answered open-ended questions independently   Engaged in conversation with SLP and spouse re: oral meds, plan to leave discharge soon, PO intake of breakfast and current diet recs.   No significant concerns re: cognition this date      Education: SLP re-educated pt re and spouse: therapist's role and purpose of skilled services; reviewed safe swallow strategies/ aspiration precautions and discussed importance of use, reiterating importance of sitting in upright position for PO intake and encouraging self-feed when pt able to-- pt and spouse verbalized understanding of all discussed.     Goals:   Multidisciplinary Problems       SLP Goals          Problem: SLP    Goal Priority Disciplines Outcome   SLP Goal     SLP Ongoing, Progressing   Description: 1. Pt will participate in MBSS for further assessment of oropharyngeal swallow function.-- MET 3/1/24    2. Pt will be able to consume a full liquid diet without overt s/s of airway threat  or aspiration given min cues to monitor rate and volume of intake.-- MET 2/27/24    3. Ongoing assessment of diet upgrade as pt tolerates.-- ongoing      4. Pt will tolerate a minced and moist diet with thin liquids without any overt s/s of aspiration or airway threat given min cues from clinician.     5. Pt and caregivers will demonstrate understanding and use of safe swallow strategies/ aspiration precautions in 100% of opportunities given min cues from clinician.                      Plan:     Patient to be seen:  3 x/week, 5 x/week   Plan of Care expires:  03/10/24  Plan of Care reviewed with:  patient, spouse (RN)   SLP Follow-Up:  Yes       Discharge Recommendations:   (SLP at next level of care)   Barriers to Discharge:  Level of Skilled Assistance Needed      Time Tracking:     SLP Treatment Date:   03/07/24  Speech Start Time:  1055  Speech Stop Time:  1112     Speech Total Time (min):  17 min    Billable Minutes: Treatment Swallowing Dysfunction 10    03/07/2024

## 2024-03-08 ENCOUNTER — PATIENT OUTREACH (OUTPATIENT)
Dept: ADMINISTRATIVE | Facility: CLINIC | Age: 64
End: 2024-03-08
Payer: MEDICARE

## 2024-03-08 NOTE — DISCHARGE SUMMARY
[facility] Surg [address] Medicine  Discharge Summary      Patient Name: Michael Johnson Jr.  MRN: [id number]  GLENDA: [id number]  Patient Class: IP- Inpatient  Admission Date: 2/24/2024  Hospital Length of Stay: 11 days  Discharge Date and Time:  03/07/2024 8:21 PM  Attending Physician: Shruthi att. providers found   Discharging Provider: Oxana Ibanez DNP  Primary Care Provider: Amelia Lai MD    Primary Care Team: Networked reference to record PCT     HPI:   63 year old male with Hx of Hypertension, Seizure disorders, spastic left-sided hemiplegia, Anemia, Arthrititis, Depression, CKD stage 3, Alcohol use disorder and Gout, who presents to the ED with complaints of nausea/vomiting and abdominal pain for the past 2 days. Patient is a poor historian; most history provided by patients wife. For the past 2 days patient has not been eating. Started to feel nauseated with associated NBNB emesis and weakness. Reports that his sore throat has been hurting him since yesterday and noticed he has been shaking since yesterday. He reports that he had quit drinking for a long time; however, per patients wife he has been drinking daily for several years and in the past few weeks it has been 1/2 pint of vodka every 2 days. Patient denies any fever, chest pain, palpitaitons, dyspnea, changes in bowel or urinary habits. He does report feeling of cramping in his upper abdomen, non-radiating. Last episode of emesis was here in the ER. States he takes his medication but not everyday. Denies any illicit drug use or smoking. 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 Denies any sick contacts.     Procedure(s) (LRB):  EGD (ESOPHAGOGASTRODUODENOSCOPY) (N/A)      Hospital Course:   Mr Rodriguez admitted for ETOH withdrawal and neck swelling. He reports that he has not had an alcoholic beverage in months. He is a poor historian and has different time frames than his wife and daughter. 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 He was vomiting the past few days but none since arrival.Started on MVI, thiamine, folic acid. PeTH confirms heavy alcohol use.    On arrival nurse reported neck swelling, CT neck shows fullness of in the hypopharynx, involving posterior hypopharynx, concerned for pharyngitis. No drainable fluid collection. Started on IV CTX. Fevers began again 2/26, as high as 102.6F. Repeat CT scan ordered to evaluate for abscess formation, none identified. CXR and UA ordered, unremarkable. CT AP shows atelectasis vs possible pneumonia, will add doxy for atypical coverage, continue IS. Broaden to Vanc and Zosyn and monitor, fever curve improved. ID was consulted on Monday 3/4 for fever unknown origin: switched to Unasyn for better ENT coverage and a viral panel that was negative was ordered.  Acute gout flare also being treated. IV unasyn changed to augmentin. Patient has been afebrile and without leukocytosis for over 3 days.    SLP following along for dysphagia and aspiration concerns. MBSS done 3/1, GI consult recommended EGD on Monday which showed benign appearing esophageal stenosis, protonix PO BID x 8 weeks recommended. He was upgraded to minced and moist diet. B12 supplementation started for macrocytic anemia, that is heavily influenced by his ETOH intake.     Mr Rodriguez has multiple issues exacerbated by his chronic alcohol drinking.  Discussed the consequences of his drinking. Mrs Johnson at bedside.  Patient states that he understands and will replace the cravings with milk. He has gone over ten days without alcohol. He does not want to go to rehab or SNF.  He will be discharged home with HH to include SLP, PT, OT, and nurse aide. He has referrals to GI for followup, hematology, neurology, rheumatology, and [facility] at home. He and his wife feel prepared to go home today and continue to work on his functional bed mobility and overall health. Discussed  getting rides to clinic appointments with his insurance. Information on DC papers. All questions answered.     Goals of Care Treatment Preferences:  Code Status: Full Code    Living Will: Yes              Consults:   Consults (From admission, onward)          Status Ordering Provider     Inpatient consult to Infectious Diseases  Once        Provider:  Je Guerin MD    Completed JUAN LEON     Inpatient consult to Gastroenterology  Once        Provider:  (Not yet assigned)    Completed JUAN LEON     Inpatient consult to Registered Dietitian/Nutritionist  Once        Provider:  (Not yet assigned)    Completed ESPINOZA RODRIGUEZ            No new Assessment & Plan notes have been filed under this hospital service since the last note was generated.  Service: Hospital Medicine    Final Active Diagnoses:    Diagnosis Date Noted POA    PRINCIPAL PROBLEM:  Alcohol abuse with withdrawal [F10.139]  Yes    Acute gout of multiple sites [M10.9] 10/20/2014 Yes    Benign esophageal stricture [K22.2] 03/04/2024 Yes    Dysphagia [R13.10] 09/04/2023 Yes    Fever [R50.9] 04/03/2019 Yes    Falls frequently [R29.6] 02/25/2024 Not Applicable    Paroxysmal atrial fibrillation [I48.0] 11/29/2019 Yes    Essential hypertension [I10]  Yes    Electrolyte disturbance [E87.8] 02/24/2024 Yes    Atelectasis [J98.11] 02/29/2024 Yes    Iron deficiency anemia [D50.9] 03/05/2024 Yes    Food impaction of esophagus [T18.128A, W44.F3XA] 03/04/2024 Yes    Severe malnutrition [E43] 03/01/2024 Yes    Pharyngitis [J02.9] 02/24/2024 Yes    Macrocytic anemia [D53.9] 01/09/2018 Yes    Seizure [R56.9] 03/16/2017 Yes    Hypokalemia [E87.6] 03/16/2017 Yes    GERD (gastroesophageal reflux disease) [K21.9] 03/16/2017 Yes      Problems Resolved During this Admission:       Discharged Condition: good    Disposition: Home or Self Care    Follow Up:   Follow-up Information       [facility] Follow up on 3/11/2024.     Specialties: Home Health Services, Home Therapy Services, Home Living Aide Services  Why: They will contact you for home healthcare appointments.  Contact information:  [address]  [facility] [address]  [phone]             [facility] Follow up.    Why: Call [phone], Monday - Friday, from 7 a.m. - 7 p.m.    If you have to cancel your ride, please do so at least 48 hours in advance.    Call [phone], Monday - Friday, from 7 a.m. - 7 p.m. to ask wheres my ride, if your ride doesnt show up, or to arrange urgent transportation.  Contact information:  [phone]                         Patient Instructions:      PATIENT (JESI) LIFT FOR HOME USE     Order Specific Question Answer Comments   Height: 6' (1.829 m)    Weight: 82 kg (180 lb 12.4 oz)    Does patient have medical equipment at home? cane, straight    Does patient have medical equipment at home? wheelchair    Length of need (1-99 months): 12      Ambulatory referral/consult to Neurology   Standing Status: Future   Referral Priority: Routine Referral Type: Consultation   Referral Reason: Specialty Services Required   Requested Specialty: Neurology   Number of Visits Requested: 1     Ambulatory referral/consult to Outpatient Case Management   Referral Priority: Routine Referral Type: Consultation   Referral Reason: Specialty Services Required   Number of Visits Requested: 1     Ambulatory referral/consult to Rheumatology   Standing Status: Future   Referral Priority: Urgent Referral Type: Consultation   Referral Reason: Specialty Services Required   Requested Specialty: Rheumatology   Number of Visits Requested: 1     Ambulatory referral/consult to [facility]   Standing Status: Future   Referral Priority: Routine Referral Type: Consultation   Referral Reason: Specialty Services Required   Number of Visits Requested: 1     Ambulatory referral/consult to Hematology / Oncology   Standing  Status: Future   Referral Priority: Urgent Referral Type: Consultation   Referral Reason: Specialty Services Required   Requested Specialty: Hematology and Oncology   Number of Visits Requested: 1     Ambulatory referral/consult to Gastroenterology   Standing Status: Future   Referral Priority: Routine Referral Type: Consultation   Referral Reason: Specialty Services Required   Requested Specialty: Gastroenterology   Number of Visits Requested: 1     Diet Cardiac     Notify your health care provider if you experience any of the following:  temperature >100.4     Notify your health care provider if you experience any of the following:  severe uncontrolled pain     Notify your health care provider if you experience any of the following:  redness, tenderness, or signs of infection (pain, swelling, redness, odor or green/yellow discharge around incision site)     Change dressing (specify)   Order Comments: Daily and PRN: R Hand 3rd finger. Right knee.   Cleanse with vashe Pat dry apply Triad - wound bed only      Every 5 days.  After cleaning, replace inter-dry  Between fingers on left and right hands     Twice a day: apply Lac-Hydrin to bilateral elbows, & Feet up to knees   Do NOT apply between toes.     Activity as tolerated       Significant Diagnostic Studies: N/A    Pending Diagnostic Studies:       None           Medications:  Reconciled Home Medications:      Medication List        START taking these medications      amoxicillin-clavulanate 500-125mg 500-125 mg Tab  Commonly known as: AUGMENTIN  Take 1 tablet (500 mg total) by mouth 3 (three) times daily. for 6 days     benzonatate 100 MG capsule  Commonly known as: TESSALON  Take 1 capsule (100 mg total) by mouth 3 (three) times daily as needed for Cough.     cetirizine 5 MG tablet  Commonly known as: ZYRTEC  Take 1 tablet (5 mg total) by mouth once daily.  Start taking on: March 8, 2024     colchicine 0.6 mg tablet  Commonly known as: COLCRYS  Take 1 tablet  (0.6 mg total) by mouth 2 (two) times daily.     cyanocobalamin (vitamin B-12) 1,000 mcg Cap  Take 1 capsules (1,000 mcg) by mouth Daily.     ferrous sulfate 325 (65 FE) MG EC tablet  Take 1 tablet (325 mg total) by mouth once daily.     metoprolol tartrate 25 MG tablet  Commonly known as: LOPRESSOR  Take 1 tablet (25 mg total) by mouth 2 (two) times daily.     thiamine 100 MG tablet  Take 1 tablet (100 mg total) by mouth once daily.  Start taking on: March 8, 2024            CHANGE how you take these medications      allopurinoL 300 MG tablet  Commonly known as: ZYLOPRIM  Take 1 tablet (300 mg total) by mouth once daily.  What changed: when to take this     pantoprazole 40 MG tablet  Commonly known as: PROTONIX  Take 1 tablet (40 mg total) by mouth 2 (two) times daily. For Eight weeks  What changed:   when to take this  additional instructions            CONTINUE taking these medications      albuterol sulfate 90 mcg/actuation inhaler  Commonly known as: PROAIR RESPICLICK  Inhale 2 puffs into the lungs every 6 (six) hours as needed for Wheezing (wheezing). Rescue     aluminum-magnesium hydroxide-simethicone 200-200-20 mg/5 mL Susp  Commonly known as: MAALOX  Take 30 mLs by mouth before meals and at bedtime as needed (acid reflux).     amiodarone 200 MG Tab  Commonly known as: PACERONE  Take 1 tablet (200 mg total) by mouth 2 (two) times daily for 14 days, THEN 1 tablet (200 mg total) once daily.  Start taking on: September 5, 2023     ammonium lactate 12 % lotion  Commonly known as: LAC-HYDRIN  Apply 1 g topically as needed for Dry Skin.     apixaban 5 mg Tab  Commonly known as: ELIQUIS  Take 1 tablet (5 mg total) by mouth 2 (two) times daily.     CERAVE DAILY MOISTURIZING Lotn  Generic drug: ceramides 1,3,6-II  Applying topically to dry skin twice daily.     diclofenac sodium 1 % Gel  Commonly known as: VOLTAREN  Apply 2 g topically 4 (four) times daily as needed.     fluticasone propionate 50 mcg/actuation nasal  spray  Commonly known as: FLONASE  SHAKE LIQUID AND USE 1 SPRAY(50 MCG) IN EACH NOSTRIL EVERY DAY     folic acid 1 MG tablet  Commonly known as: FOLVITE  Take 1 tablet (1 mg total) by mouth once daily.     gabapentin 400 MG capsule  Commonly known as: NEURONTIN  TAKE 1 CAPSULE TWICE DAILY     levETIRAcetam 500 MG Tab  Commonly known as: KEPPRA  TAKE 1 TABLET(500 MG) BY MOUTH TWICE DAILY     magnesium oxide 400 mg magnesium Tab  Take 400 mg by mouth once daily.     methocarbamoL 500 MG Tab  Commonly known as: ROBAXIN  Take 1 tablet (500 mg total) by mouth daily as needed.            STOP taking these medications      [facility]  Generic drug: inhalation spacing device              Indwelling Lines/Drains at time of discharge:   Lines/Drains/Airways       Drain  Duration             Male External Urinary Catheter 02/26/24 1500 Medium 10 days                    Time spent on the discharge of patient: 65 minutes         Oxana Ibanez DNP  Department of Hospital Medicine  [facility] Med Surg ([address])

## 2024-03-08 NOTE — PROGRESS NOTES
C3 nurse spoke with Michael Johnson Jr. for a TCC post hospital discharge follow up call. The patient has a scheduled virtual HOSFU appointment with Amelia Lai MD on 03/11/24 @ [id number].

## 2024-03-13 ENCOUNTER — LAB VISIT (OUTPATIENT)
Dept: LAB | Facility: HOSPITAL | Age: 64
End: 2024-03-13
Attending: NURSE PRACTITIONER
Payer: MEDICARE

## 2024-03-13 DIAGNOSIS — F10.139: Primary | ICD-10-CM

## 2024-03-13 DIAGNOSIS — F10.159: Primary | ICD-10-CM

## 2024-03-13 DIAGNOSIS — R56.9 SEIZURE: ICD-10-CM

## 2024-03-13 DIAGNOSIS — M62.838 MUSCLE SPASM: ICD-10-CM

## 2024-03-13 LAB
ALBUMIN SERPL BCP-MCNC: 3.2 G/DL (ref 3.5–5.2)
ALP SERPL-CCNC: 146 U/L (ref 55–135)
ALT SERPL W/O P-5'-P-CCNC: 53 U/L (ref 10–44)
ANION GAP SERPL CALC-SCNC: 11 MMOL/L (ref 8–16)
AST SERPL-CCNC: 46 U/L (ref 10–40)
BASOPHILS # BLD AUTO: 0.04 K/UL (ref 0–0.2)
BASOPHILS NFR BLD: 0.6 % (ref 0–1.9)
BILIRUB SERPL-MCNC: 0.3 MG/DL (ref 0.1–1)
BUN SERPL-MCNC: 71 MG/DL (ref 8–23)
CALCIUM SERPL-MCNC: 9.6 MG/DL (ref 8.7–10.5)
CHLORIDE SERPL-SCNC: 111 MMOL/L (ref 95–110)
CO2 SERPL-SCNC: 17 MMOL/L (ref 23–29)
CREAT SERPL-MCNC: 2.3 MG/DL (ref 0.5–1.4)
DIFFERENTIAL METHOD BLD: ABNORMAL
EOSINOPHIL # BLD AUTO: 0.3 K/UL (ref 0–0.5)
EOSINOPHIL NFR BLD: 4.8 % (ref 0–8)
ERYTHROCYTE [DISTWIDTH] IN BLOOD BY AUTOMATED COUNT: 15.4 % (ref 11.5–14.5)
EST. GFR  (NO RACE VARIABLE): 32 ML/MIN/1.73 M^2
GLUCOSE SERPL-MCNC: 109 MG/DL (ref 70–110)
HCT VFR BLD AUTO: 29.5 % (ref 40–54)
HGB BLD-MCNC: 9.5 G/DL (ref 14–18)
IMM GRANULOCYTES # BLD AUTO: 0.09 K/UL (ref 0–0.04)
IMM GRANULOCYTES NFR BLD AUTO: 1.3 % (ref 0–0.5)
LYMPHOCYTES # BLD AUTO: 2.2 K/UL (ref 1–4.8)
LYMPHOCYTES NFR BLD: 30.6 % (ref 18–48)
MCH RBC QN AUTO: 37.7 PG (ref 27–31)
MCHC RBC AUTO-ENTMCNC: 32.2 G/DL (ref 32–36)
MCV RBC AUTO: 117 FL (ref 82–98)
MONOCYTES # BLD AUTO: 0.9 K/UL (ref 0.3–1)
MONOCYTES NFR BLD: 12.7 % (ref 4–15)
NEUTROPHILS # BLD AUTO: 3.6 K/UL (ref 1.8–7.7)
NEUTROPHILS NFR BLD: 50 % (ref 38–73)
NRBC BLD-RTO: 0 /100 WBC
PLATELET # BLD AUTO: 423 K/UL (ref 150–450)
PMV BLD AUTO: 11.1 FL (ref 9.2–12.9)
POTASSIUM SERPL-SCNC: 4.7 MMOL/L (ref 3.5–5.1)
PROT SERPL-MCNC: 9.4 G/DL (ref 6–8.4)
RBC # BLD AUTO: 2.52 M/UL (ref 4.6–6.2)
SODIUM SERPL-SCNC: 139 MMOL/L (ref 136–145)
WBC # BLD AUTO: 7.15 K/UL (ref 3.9–12.7)

## 2024-03-13 PROCEDURE — 80053 COMPREHEN METABOLIC PANEL: CPT | Performed by: NURSE PRACTITIONER

## 2024-03-13 PROCEDURE — 85025 COMPLETE CBC W/AUTO DIFF WBC: CPT | Performed by: NURSE PRACTITIONER

## 2024-03-13 RX ORDER — METHOCARBAMOL 500 MG/1
500 TABLET, FILM COATED ORAL
Qty: 30 TABLET | Refills: 0 | Status: SHIPPED | OUTPATIENT
Start: 2024-03-13 | End: 2024-04-08

## 2024-03-13 NOTE — TELEPHONE ENCOUNTER
Patient due for follow up appointment. Has never been seen by me for in person visit. Will need in person visit to properly establish care, please assist with scheduling. Thank you!

## 2024-03-13 NOTE — TELEPHONE ENCOUNTER
No care due was identified.  [facility] Embedded Care Due Messages. Reference number: [id number].   3/13/2024 2:40:47 AM CDT

## 2024-03-14 NOTE — TELEPHONE ENCOUNTER
Lvm informing patient he is do foe an in person appointment .Patient needs to establish care with a pcp

## 2024-03-18 ENCOUNTER — LAB VISIT (OUTPATIENT)
Dept: LAB | Facility: HOSPITAL | Age: 64
End: 2024-03-18
Attending: STUDENT IN AN ORGANIZED HEALTH CARE EDUCATION/TRAINING PROGRAM
Payer: MEDICARE

## 2024-03-18 DIAGNOSIS — E87.8 DILATED CARDIOMYOPATHY SECONDARY TO ELECTROLYTE DEFICIENCY: Primary | ICD-10-CM

## 2024-03-18 DIAGNOSIS — I43 DILATED CARDIOMYOPATHY SECONDARY TO ELECTROLYTE DEFICIENCY: Primary | ICD-10-CM

## 2024-03-18 LAB
ALBUMIN SERPL BCP-MCNC: 3.6 G/DL (ref 3.5–5.2)
ALP SERPL-CCNC: 169 U/L (ref 55–135)
ALT SERPL W/O P-5'-P-CCNC: 48 U/L (ref 10–44)
ANION GAP SERPL CALC-SCNC: 10 MMOL/L (ref 8–16)
AST SERPL-CCNC: 48 U/L (ref 10–40)
BILIRUB SERPL-MCNC: 0.3 MG/DL (ref 0.1–1)
BUN SERPL-MCNC: 61 MG/DL (ref 8–23)
CALCIUM SERPL-MCNC: 9 MG/DL (ref 8.7–10.5)
CHLORIDE SERPL-SCNC: 110 MMOL/L (ref 95–110)
CO2 SERPL-SCNC: 16 MMOL/L (ref 23–29)
CREAT SERPL-MCNC: 2 MG/DL (ref 0.5–1.4)
EST. GFR  (NO RACE VARIABLE): 37.3 ML/MIN/1.73 M^2
GLUCOSE SERPL-MCNC: 77 MG/DL (ref 70–110)
POTASSIUM SERPL-SCNC: 4.8 MMOL/L (ref 3.5–5.1)
PROT SERPL-MCNC: 9.4 G/DL (ref 6–8.4)
SODIUM SERPL-SCNC: 136 MMOL/L (ref 136–145)

## 2024-03-18 PROCEDURE — 80053 COMPREHEN METABOLIC PANEL: CPT | Mod: HCNC | Performed by: STUDENT IN AN ORGANIZED HEALTH CARE EDUCATION/TRAINING PROGRAM

## 2024-03-19 NOTE — TELEPHONE ENCOUNTER
Signed orders for DME.  Please fax with clinic notes per the previous message.    He'll need to see PM&R (rehab) for a scooter evaluation.   Surgeon:  Dr Wallace Bee.    Date:  3-.    Pre operative diagnosis:  Right hydronephrosis.    Post operative diagnosis:  Right hydronephrosis.    Surgery:  Cystoscopy, right retrograde pyelogram, and right ureteral stent exchange.    Anesthesia:  General.    Estimated blood loss:  None.    Complications:  None.    Specimens:  None.    Procedure in details:   Risks and benefits of the surgery were explained to the patient prior to the surgery and the patient expressed understanding.  All questions were answered by me to the patient's apparent understanding and to the patient's apparent satisfaction.  Surgery consent was signed by the patient prior to the surgery.  The patient was taken to the operating room and placed in the supine position initially.  Anesthesia was administered by the Anesthesia team.  The patient was then placed in the dorsal lithotomy position.  The patient was secured to the table and all pressure points were padded. The patient was prepped and drapped in the usual sterile fashion.  An IV antibiotic was given to the patient prior to the surgery.  A timeout was done prior to the surgery.  A 21 F rigid cystoscope was advanced through the urethral into her bladder.  The bladder was inspected in a systematic fashion.   Her bilateral ureteral orifices are orthotopic and patent with clear efflux of urine.  The distal curl of her right ureteral stent was seen to be in correct position within the lumen of her bladder.  No bladder tumors, lesions, or stones were seen.  No foreign body was seen other than her right ureteral stent.  I advanced a sensor wire through the lumen of her right ureteral orifice alongside her right ureteral stent.  The sensor wire was seen to be in correct position within the right renal collecting system under fluoroscopy.  The cystoscope was then backloaded off of the sensor wire which was secured to the table.  I then advanced the cystoscope through the urethra into her  bladder and a foreign body grasper was used to grab the distal curl of her right ureteral stent.  Her entire right ureteral stent was then removed intact from her body.  I advanced the cystoscope over the sensor wire through the urethra into her bladder.  I advanced a 5 F open ended ureteral catheter over the sensor wire into her right renal pelvis.  The sensor wire was then removed from her body.  I shot a gentle right retrograde pyelogram which showed moderate right hydronephrosis, no filling defect, and no extravasation was seen.  The sensor wire was then advanced through the 5 F open ended ureteral catheter into her right renal collecting system which was seen under fluoroscopy.  The 5 F ureteral catheter was then removed from her body.  The string was cut off of the ureteral stent.  I then advanced a 7 F by 26 cm ureteral stent over the sensor wire and used a pusher to push the distal curl of the right ureteral stent into her bladder.  The proximal curl of her right ureteral stent was seen in good position within her right renal collecting system under fluoroscopy.  The distal curl of her right ureteral stent was seen in good position within the lumen of her bladder.  Her bladder was then completely drained and then cystoscope was removed from her body.  Final x-ray showed good position of her right ureteral stent.  A post operative timeout was done at the end of the procedure.  The patient was taken to the recovery room in stable condition at the end of the procedure.

## 2024-03-21 ENCOUNTER — TELEPHONE (OUTPATIENT)
Dept: INTERNAL MEDICINE | Facility: CLINIC | Age: 64
End: 2024-03-21
Payer: MEDICARE

## 2024-03-21 ENCOUNTER — LAB VISIT (OUTPATIENT)
Dept: LAB | Facility: HOSPITAL | Age: 64
End: 2024-03-21
Attending: NURSE PRACTITIONER
Payer: MEDICARE

## 2024-03-21 DIAGNOSIS — F10.139: Primary | ICD-10-CM

## 2024-03-21 DIAGNOSIS — F10.159: Primary | ICD-10-CM

## 2024-03-21 LAB
ALBUMIN SERPL BCP-MCNC: 3.1 G/DL (ref 3.5–5.2)
ALP SERPL-CCNC: 132 U/L (ref 55–135)
ALT SERPL W/O P-5'-P-CCNC: 46 U/L (ref 10–44)
ANION GAP SERPL CALC-SCNC: 9 MMOL/L (ref 8–16)
AST SERPL-CCNC: 44 U/L (ref 10–40)
BASOPHILS # BLD AUTO: 0.02 K/UL (ref 0–0.2)
BASOPHILS NFR BLD: 0.5 % (ref 0–1.9)
BILIRUB SERPL-MCNC: 0.3 MG/DL (ref 0.1–1)
BUN SERPL-MCNC: 44 MG/DL (ref 8–23)
CALCIUM SERPL-MCNC: 9.2 MG/DL (ref 8.7–10.5)
CHLORIDE SERPL-SCNC: 113 MMOL/L (ref 95–110)
CO2 SERPL-SCNC: 15 MMOL/L (ref 23–29)
CREAT SERPL-MCNC: 2.1 MG/DL (ref 0.5–1.4)
DIFFERENTIAL METHOD BLD: ABNORMAL
EOSINOPHIL # BLD AUTO: 0.3 K/UL (ref 0–0.5)
EOSINOPHIL NFR BLD: 6.3 % (ref 0–8)
ERYTHROCYTE [DISTWIDTH] IN BLOOD BY AUTOMATED COUNT: 14.6 % (ref 11.5–14.5)
EST. GFR  (NO RACE VARIABLE): 35.2 ML/MIN/1.73 M^2
GLUCOSE SERPL-MCNC: 95 MG/DL (ref 70–110)
HCT VFR BLD AUTO: 32.7 % (ref 40–54)
HGB BLD-MCNC: 10.4 G/DL (ref 14–18)
IMM GRANULOCYTES # BLD AUTO: 0.01 K/UL (ref 0–0.04)
IMM GRANULOCYTES NFR BLD AUTO: 0.3 % (ref 0–0.5)
LYMPHOCYTES # BLD AUTO: 1.4 K/UL (ref 1–4.8)
LYMPHOCYTES NFR BLD: 34.3 % (ref 18–48)
MCH RBC QN AUTO: 37.1 PG (ref 27–31)
MCHC RBC AUTO-ENTMCNC: 31.8 G/DL (ref 32–36)
MCV RBC AUTO: 117 FL (ref 82–98)
MONOCYTES # BLD AUTO: 0.4 K/UL (ref 0.3–1)
MONOCYTES NFR BLD: 9.3 % (ref 4–15)
NEUTROPHILS # BLD AUTO: 2 K/UL (ref 1.8–7.7)
NEUTROPHILS NFR BLD: 49.3 % (ref 38–73)
NRBC BLD-RTO: 0 /100 WBC
PLATELET # BLD AUTO: 157 K/UL (ref 150–450)
PMV BLD AUTO: 12.9 FL (ref 9.2–12.9)
POTASSIUM SERPL-SCNC: 4.3 MMOL/L (ref 3.5–5.1)
PROT SERPL-MCNC: 8.9 G/DL (ref 6–8.4)
RBC # BLD AUTO: 2.8 M/UL (ref 4.6–6.2)
SODIUM SERPL-SCNC: 137 MMOL/L (ref 136–145)
WBC # BLD AUTO: 3.99 K/UL (ref 3.9–12.7)

## 2024-03-21 PROCEDURE — 80053 COMPREHEN METABOLIC PANEL: CPT | Mod: HCNC | Performed by: NURSE PRACTITIONER

## 2024-03-21 PROCEDURE — 85025 COMPLETE CBC W/AUTO DIFF WBC: CPT | Mod: HCNC | Performed by: NURSE PRACTITIONER

## 2024-03-21 NOTE — TELEPHONE ENCOUNTER
----- Message from Amelia Lai MD sent at 3/20/2024  7:51 PM CDT -----  Please schedule patient for appt to discuss labs in person asap and complete hospital follow up. Thank you!

## 2024-03-22 ENCOUNTER — CARE AT HOME (OUTPATIENT)
Dept: HOME HEALTH SERVICES | Facility: CLINIC | Age: 64
End: 2024-03-22
Payer: MEDICARE

## 2024-03-22 DIAGNOSIS — D53.9 MACROCYTIC ANEMIA: ICD-10-CM

## 2024-03-22 DIAGNOSIS — M10.9 ACUTE GOUT OF MULTIPLE SITES, UNSPECIFIED CAUSE: ICD-10-CM

## 2024-03-22 DIAGNOSIS — R56.9 SEIZURE: ICD-10-CM

## 2024-03-22 DIAGNOSIS — G62.9 PERIPHERAL POLYNEUROPATHY: ICD-10-CM

## 2024-03-22 DIAGNOSIS — E43 SEVERE MALNUTRITION: ICD-10-CM

## 2024-03-22 DIAGNOSIS — F10.139 ALCOHOL ABUSE WITH WITHDRAWAL: ICD-10-CM

## 2024-03-22 DIAGNOSIS — K21.9 GASTROESOPHAGEAL REFLUX DISEASE WITHOUT ESOPHAGITIS: ICD-10-CM

## 2024-03-22 DIAGNOSIS — I48.0 PAROXYSMAL ATRIAL FIBRILLATION: ICD-10-CM

## 2024-03-22 DIAGNOSIS — I10 ESSENTIAL HYPERTENSION: ICD-10-CM

## 2024-03-22 DIAGNOSIS — F32.A DEPRESSION, UNSPECIFIED DEPRESSION TYPE: ICD-10-CM

## 2024-03-22 DIAGNOSIS — R53.81 DEBILITY: ICD-10-CM

## 2024-03-22 DIAGNOSIS — G89.29 CHRONIC PAIN OF LEFT ANKLE: Primary | ICD-10-CM

## 2024-03-22 DIAGNOSIS — M25.572 CHRONIC PAIN OF LEFT ANKLE: Primary | ICD-10-CM

## 2024-03-22 PROCEDURE — 3078F DIAST BP <80 MM HG: CPT | Mod: CPTII,S$GLB,, | Performed by: NURSE PRACTITIONER

## 2024-03-22 PROCEDURE — 3074F SYST BP LT 130 MM HG: CPT | Mod: CPTII,S$GLB,, | Performed by: NURSE PRACTITIONER

## 2024-03-22 PROCEDURE — 99497 ADVNCD CARE PLAN 30 MIN: CPT | Mod: S$GLB,,, | Performed by: NURSE PRACTITIONER

## 2024-03-22 PROCEDURE — 99350 HOME/RES VST EST HIGH MDM 60: CPT | Mod: S$GLB,,, | Performed by: NURSE PRACTITIONER

## 2024-03-28 RX ORDER — METOPROLOL TARTRATE 25 MG/1
25 TABLET, FILM COATED ORAL 2 TIMES DAILY
Qty: 60 TABLET | Refills: 0 | Status: SHIPPED | OUTPATIENT
Start: 2024-03-28 | End: 2025-03-28

## 2024-03-28 RX ORDER — PANTOPRAZOLE SODIUM 40 MG/1
40 TABLET, DELAYED RELEASE ORAL 2 TIMES DAILY
Qty: 60 TABLET | Refills: 0 | Status: SHIPPED | OUTPATIENT
Start: 2024-03-28 | End: 2024-05-13

## 2024-03-28 NOTE — TELEPHONE ENCOUNTER
No care due was identified.  [facility] Embedded Care Due Messages. Reference number: [id number].   3/28/2024 1:56:20 PM CDT

## 2024-04-03 ENCOUNTER — TELEPHONE (OUTPATIENT)
Dept: INTERNAL MEDICINE | Facility: CLINIC | Age: 64
End: 2024-04-03
Payer: MEDICARE

## 2024-04-03 VITALS
TEMPERATURE: 98 F | RESPIRATION RATE: 20 BRPM | SYSTOLIC BLOOD PRESSURE: 103 MMHG | DIASTOLIC BLOOD PRESSURE: 73 MMHG | OXYGEN SATURATION: 98 % | HEART RATE: 66 BPM

## 2024-04-03 NOTE — PROGRESS NOTES
[facility]  Transitional Care Management (TCM) Home Visit    Encounter Provider: Silvia Culp   PCP: Amelia Lai MD  Consult Requested By: No ref. provider found  Admit Date: 2/24/24   IP Discharge Date: 3/7/24  [facility] On Call Contact Note: 3/8/24  Hospital Diagnosis: No admission diagnoses are documented for this encounter.     HISTORY OF PRESENT ILLNESS      Patient ID: Michael Johnson Jr. is a 63 y.o. male was recently admitted to the hospital, this is their TCM encounter.    HPI:   63 year old male with Hx of Hypertension, Seizure disorders, spastic left-sided hemiplegia, Anemia, Arthrititis, Depression, CKD stage 3, Alcohol use disorder and Gout, who presents to the ED with complaints of nausea/vomiting and abdominal pain for the past 2 days. Patient is a poor historian; most history provided by patients wife. For the past 2 days patient has not been eating. Started to feel nauseated with associated NBNB emesis and weakness. Reports that his sore throat has been hurting him since yesterday and noticed he has been shaking since yesterday. He reports that he had quit drinking for a long time; however, per patients wife he has been drinking daily for several years and in the past few weeks it has been 1/2 pint of vodka every 2 days. Patient denies any fever, chest pain, palpitaitons, dyspnea, changes in bowel or urinary habits. He does report feeling of cramping in his upper abdomen, non-radiating. Last episode of emesis was here in the ER. States he takes his medication but not everyday. Denies any illicit drug use or smoking. 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 Denies any sick contacts.      Hospital Course:   Mr Rodriguez admitted for ETOH withdrawal and neck swelling. He reports that he has not had an alcoholic beverage in months. He is a poor historian and has different time frames than his wife and daughter. 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 He was vomiting the past few days but none since arrival.Started on MVI, thiamine, folic acid. PeTH confirms heavy alcohol use.   On arrival nurse reported neck swelling, CT neck shows fullness of in the hypopharynx, involving posterior hypopharynx, concerned for pharyngitis. No drainable fluid collection. Started on IV CTX. Fevers began again 2/26, as high as 102.6F. Repeat CT scan ordered to evaluate for abscess formation, none identified. CXR and UA ordered, unremarkable. CT AP shows atelectasis vs possible pneumonia, will add doxy for atypical coverage, continue IS. Broaden to Vanc and Zosyn and monitor, fever curve improved. ID was consulted on Monday 3/4 for fever unknown origin: switched to Unasyn for better ENT coverage and a viral panel that was negative was ordered.  Acute gout flare also being treated. IV unasyn changed to augmentin. Patient has been afebrile and without leukocytosis for over 3 days.   SLP following along for dysphagia and aspiration concerns. MBSS done 3/1, GI consult recommended EGD on Monday which showed benign appearing esophageal stenosis, protonix PO BID x 8 weeks recommended. He was upgraded to minced and moist diet. B12 supplementation started for macrocytic anemia, that is heavily influenced by his ETOH intake.    Mr Rodriguez has multiple issues exacerbated by his chronic alcohol drinking.  Discussed the consequences of his drinking. Mrs Johnson at bedside.  Patient states that he understands and will replace the cravings with milk. He has gone over ten days without alcohol. He does not want to go to rehab or SNF.  He will be discharged home with HH to include SLP, PT, OT, and nurse aide. He has referrals to GI for followup, hematology, neurology, rheumatology, and [facility] at home. He and his wife feel prepared to go home today and continue to work on his functional bed mobility and overall health. Discussed getting rides to clinic appointments with his  insurance. Information on DC papers. All questions answered.     Today:  Patient reports no acute issues or concerns.  VSS.  Denies any alcohol use since recent hospitalization.  Reports eating, drinking and normal BM movements.  Reports taking meds as prescribed.  He requires assistance with ADLs.  His spouse is primary caregiver.  He is current with [facility].      DECISION MAKING TODAY       Assessment & Plan:  1. Chronic pain of left ankle  Assessment & Plan:  Chronic  Continue meds as prescribed       2. Acute gout of multiple sites, unspecified cause  Assessment & Plan:  No acute issues   Continue allopurinol and colchicine       3. Seizure  Assessment & Plan:  - Noted in history. Last seizure several years ago ~ 2019. Unclear etiology may have been related to withdrawal  - Continue with Keppra  - Follow up outpatient with Neurology    Orders:  -     Ambulatory referral/consult to Neurology; Future; Expected date: 04/10/2024    4. Gastroesophageal reflux disease without esophagitis  Assessment & Plan:  Stable  Continue PPI      5. Macrocytic anemia  Assessment & Plan:  Chronic   Continue iron, folic acid and vitamin b-12      6. Essential hypertension  Assessment & Plan:  Stable  Continue meds as prescribed       7. Alcohol abuse with withdrawal  Assessment & Plan:  Reports no alcohol use since recent hospitalization  Discussed the importance of continued cessation  Continue muti vitamin, thiamin, folic acid and vitamin B-12       8. Debility  Assessment & Plan:  Continue caregiver support  Continue home health PT/OT      9. Paroxysmal atrial fibrillation  Overview:  The patient has atrial fibrillation he is not really sure where these and chronically are not.  He is takes metoprolol but he does not always remember to take it his family says he forgets that a lot he says he never forgets.  He says he has been under stress lately and he suddenly had onset of fibrillation or flutter with a rate of 150 he was  placed on amiodarone by the cardiologist and now his rate is 98 but looks like it is still flutter    Assessment & Plan:  Rate controlled  Continue eliquis and metoprolol       10. Peripheral polyneuropathy  Assessment & Plan:  Chronic  Continue gabapentin       11. Depression, unspecified depression type  Assessment & Plan:  Stable  Denies SI/HI      12. Severe malnutrition  Assessment & Plan:  Encouraged nutritional supplements  Continue vitamin supplements            Medication List on Discharge:     Medication List            Accurate as of March 22, 2024 11:59 PM. If you have any questions, ask your nurse or doctor.                CONTINUE taking these medications      albuterol sulfate 90 mcg/actuation inhaler  Commonly known as: PROAIR RESPICLICK  Inhale 2 puffs into the lungs every 6 (six) hours as needed for Wheezing (wheezing). Rescue     allopurinoL 300 MG tablet  Commonly known as: ZYLOPRIM  Take 1 tablet (300 mg total) by mouth once daily.     aluminum-magnesium hydroxide-simethicone 200-200-20 mg/5 mL Susp  Commonly known as: MAALOX  Take 30 mLs by mouth before meals and at bedtime as needed (acid reflux).     amiodarone 200 MG Tab  Commonly known as: PACERONE  Take 1 tablet (200 mg total) by mouth 2 (two) times daily for 14 days, THEN 1 tablet (200 mg total) once daily.  Start taking on: September 5, 2023     ammonium lactate 12 % lotion  Commonly known as: LAC-HYDRIN  Apply 1 g topically as needed for Dry Skin.     apixaban 5 mg Tab  Commonly known as: ELIQUIS  Take 1 tablet (5 mg total) by mouth 2 (two) times daily.     CERAVE DAILY MOISTURIZING Lotn  Generic drug: ceramides 1,3,6-II  Applying topically to dry skin twice daily.     cetirizine 5 MG tablet  Commonly known as: ZYRTEC  Take 1 tablet (5 mg total) by mouth once daily.     colchicine 0.6 mg tablet  Commonly known as: COLCRYS  Take 1 tablet (0.6 mg total) by mouth 2 (two) times daily.     cyanocobalamin (vitamin B-12) 1,000 mcg Cap  Take 1  capsules (1,000 mcg) by mouth Daily.     diclofenac sodium 1 % Gel  Commonly known as: VOLTAREN  Apply 2 g topically 4 (four) times daily as needed.     ferrous sulfate 325 (65 FE) MG EC tablet  Take 1 tablet (325 mg total) by mouth once daily.     fluticasone propionate 50 mcg/actuation nasal spray  Commonly known as: FLONASE  SHAKE LIQUID AND USE 1 SPRAY(50 MCG) IN EACH NOSTRIL EVERY DAY     folic acid 1 MG tablet  Commonly known as: FOLVITE  Take 1 tablet (1 mg total) by mouth once daily.     gabapentin 400 MG capsule  Commonly known as: NEURONTIN  TAKE 1 CAPSULE TWICE DAILY     levETIRAcetam 500 MG Tab  Commonly known as: KEPPRA  TAKE 1 TABLET(500 MG) BY MOUTH TWICE DAILY     magnesium oxide 400 mg magnesium Tab  Take 400 mg by mouth once daily.     methocarbamoL 500 MG Tab  Commonly known as: ROBAXIN  TAKE 1 TABLET EVERY DAY AS NEEDED     metoprolol tartrate 25 MG tablet  Commonly known as: LOPRESSOR  Take 1 tablet (25 mg total) by mouth 2 (two) times daily.     pantoprazole 40 MG tablet  Commonly known as: PROTONIX  Take 1 tablet (40 mg total) by mouth 2 (two) times daily. For Eight weeks     thiamine 100 MG tablet  Take 1 tablet (100 mg total) by mouth once daily.              Medication Reconciliation:  Were medications changed on discharge? Yes  Were medications in the home? Yes  Is the patient taking the medications as directed? Yes  Does the patient understand the medications and changes? Yes  Does updated med list accurately reflects meds patient is currently taking? Yes    ENVIRONMENT OF CARE      Family and/or Caregiver present at visit?  Yes  Name of Caregiver: spouse  History provided by: patient and caregiver    Advance Care Planning   Advanced Care Planning Status:  Patient has had an ACP conversation  Living Will: Yes  Power of : No  [place]: No    Does Caregiver have HCPoA: No  Changes today: no  Is patient hospice appropriate: No  (If needed, use PPS <30 or FAST score >7)  Was referral to  hospice placed: No    Palliative Care/Goals of care: Advanced Care Directives,  [place] forms left in the home for family review, discussion and signing with instructions to return upon their next provider encounter for inclusion to the medical record. Discussed ACP for 20 minutes.          Impression upon entering the home:  Physical Dwelling: single family home   Appearance of home environment: cleaniness: clean  Functional Status: max assistance  Mobility: bedbound  Nutritional access: adequate intake and access  Home Health: Yes, HH Agency [facility]    DME/Supplies: wheelchair     Diagnostic tests reviewed/disposition: No diagnosic tests pending after this hospitalization.  Disease/illness education:  alcohol use  Establishment or re-establishment of referral orders for community resources: No other necessary community resources.   Discussion with other health care providers: No discussion with other health care providers necessary.   Does patient have a PCP at [place]? Yes   Repatriation plan with PCP? Care at Home reason: mobility   Does patient have an ostomy (ileostomy, colostomy, suprapubic catheter, nephrostomy tube, tracheostomy, PEG tube, pleurex catheter, cholecystostomy, etc)? No  Were BPAs reviewed? No    Social History     Socioeconomic History    Marital status:    Tobacco Use    Smoking status: Former    Smokeless tobacco: Never   Substance and Sexual Activity    Alcohol use: Not Currently    Drug use: No    Sexual activity: Not Currently     Social Determinants of Health     Financial Resource Strain: Low Risk  (2/8/2024)    Overall Financial Resource Strain (CARDIA)     Difficulty of Paying Living Expenses: Not hard at all   Food Insecurity: No Food Insecurity (2/8/2024)    Hunger Vital Sign     Worried About Running Out of Food in the Last Year: Never true     Ran Out of Food in the Last Year: Never true   Transportation Needs: No Transportation Needs (2/8/2024)    PRAPARE - Transportation      Lack of Transportation (Medical): No     Lack of Transportation (Non-Medical): No   Physical Activity: Inactive (2/8/2024)    Exercise Vital Sign     Days of Exercise per Week: 0 days     Minutes of Exercise per Session: 0 min   Stress: Stress Concern Present (2/8/2024)    [employer] [place] [employer] - Occupational Stress Questionnaire     Feeling of Stress : Very much   Social Connections: Socially Isolated (2/8/2024)    Social Connection and Isolation Panel [NHANES]     Frequency of Communication with Friends and Family: Once a week     Frequency of Social Gatherings with Friends and Family: Never     Attends Gnosticist Services: Never     Active Member of Clubs or Organizations: No     Attends Club or Organization Meetings: Never     Marital Status:    Housing Stability: Low Risk  (2/8/2024)    Housing Stability Vital Sign     Unable to Pay for Housing in the Last Year: No     Number of Places Lived in the Last Year: 1     Unstable Housing in the Last Year: No       OBJECTIVE:     Vital Signs:  Vitals:    03/22/24 1350   BP: 103/73   Pulse: 66   Resp: 20   Temp: 98 °F (36.7 °C)       Review of Systems   Constitutional:  Negative for chills and fever.   HENT:  Negative for congestion, postnasal drip and sinus pain.    Eyes:  Negative for visual disturbance.   Respiratory:  Negative for chest tightness and shortness of breath.    Cardiovascular:  Negative for chest pain, palpitations and leg swelling.   Gastrointestinal:  Negative for abdominal pain, diarrhea, nausea and vomiting.   Genitourinary:  Negative for difficulty urinating.   Musculoskeletal:  Positive for gait problem.   Skin:  Negative for color change and wound.   Neurological:  Negative for dizziness.   Hematological:  Does not bruise/bleed easily.   Psychiatric/Behavioral:  Negative for agitation.        Physical Exam  HENT:      Head: Normocephalic and atraumatic.      Nose: No congestion or rhinorrhea.   Cardiovascular:      Rate  and Rhythm: Normal rate.      Pulses: Normal pulses.   Pulmonary:      Effort: No respiratory distress.      Breath sounds: Normal breath sounds.   Abdominal:      General: Bowel sounds are normal.      Palpations: Abdomen is soft.   Musculoskeletal:      Cervical back: Normal range of motion and neck supple.      Right lower leg: No edema.      Left lower leg: No edema.   Skin:     General: Skin is warm and dry.   Neurological:      Mental Status: He is alert and oriented to person, place, and time. Mental status is at baseline.   Psychiatric:         Mood and Affect: Mood normal.       INSTRUCTIONS FOR PATIENT:     Scheduled Follow-up, Appts Reviewed with Modifications if Needed: Yes  Future Appointments   Date Time Provider Department Center   4/23/2024  1:00 PM Amelia Lai MD [facility]   4/23/2024  3:00 PM Deneen Lopez NP [facility] [place]   5/14/2024  2:00 PM Shira Reyna MD [facility] GA[facility]O [place]   6/6/2024  3:00 PM Yulisa Pina MD [facility] RHEUM [facility]   7/8/2024 10:20 AM Chinyere Shin MD [facility] NEURO8 [facility]       Signature: Silvia Culp NP    Transition of Care Visit:  I have reviewed and updated the history and problem list.  I have reconciled the medication list.  I have discussed the hospitalization and current medical issues, prognosis and plans with the patient/family.      Total face-to-face time was 60 min, >50% of this was spent on counseling and coordination of care. The following issues were discussed: primary and secondary diagnoses, co-morbidities, prescribed medications, treatment modalities, importance of compliance with medical advice and directives for follow-up care.

## 2024-04-03 NOTE — ASSESSMENT & PLAN NOTE
Reports no alcohol use since recent hospitalization  Discussed the importance of continued cessation  Continue muti vitamin, thiamin, folic acid and vitamin B-12

## 2024-04-03 NOTE — TELEPHONE ENCOUNTER
----- Message from Krunal Dial sent at 4/3/2024  1:03 PM CDT -----   Name of Who is Calling:     What is the request in detail: pharmacy request call back in reference to status of refill request for medications   metoprolol tartrate (LOPRESSOR) 25 MG tablet  and  pantoprazole (PROTONIX) 40 MG tablet   Please contact to further discuss and advise      Can the clinic reply by [facility]:     What Number to Call Back if not in [facility]:  harley / [facility] pharmacy / [phone]

## 2024-04-03 NOTE — ASSESSMENT & PLAN NOTE
1. Continue flomax, 1 tablet every day  2. toradol is for intermediate or medium level pain  3. Oxycodone for severe pain.  4. If you have pain that is significant despite taking 1 pain medication you can take both the toradol and oxycodone together.    Encouraged nutritional supplements  Continue vitamin supplements

## 2024-04-03 NOTE — ASSESSMENT & PLAN NOTE
- Noted in history. Last seizure several years ago ~ 2019. Unclear etiology may have been related to withdrawal  - Continue with Keppra  - Follow up outpatient with Neurology

## 2024-04-04 ENCOUNTER — TELEPHONE (OUTPATIENT)
Dept: NEUROLOGY | Facility: CLINIC | Age: 64
End: 2024-04-04
Payer: MEDICARE

## 2024-04-04 NOTE — TELEPHONE ENCOUNTER
Called pt to schedule a np apt. Pt was not available to schedule at this time. I told family member to have him call back as soon aS POSSIBLE.

## 2024-04-06 DIAGNOSIS — M62.838 MUSCLE SPASM: ICD-10-CM

## 2024-04-06 DIAGNOSIS — R56.9 SEIZURE: ICD-10-CM

## 2024-04-06 NOTE — TELEPHONE ENCOUNTER
No care due was identified.  [facility] Embedded Care Due Messages. Reference number: [id number].   4/06/2024 12:45:05 AM CDT

## 2024-04-08 RX ORDER — METHOCARBAMOL 500 MG/1
500 TABLET, FILM COATED ORAL
Qty: 30 TABLET | Refills: 0 | Status: SHIPPED | OUTPATIENT
Start: 2024-04-08 | End: 2024-04-30

## 2024-04-17 ENCOUNTER — TELEPHONE (OUTPATIENT)
Dept: INTERNAL MEDICINE | Facility: CLINIC | Age: 64
End: 2024-04-17

## 2024-04-17 NOTE — TELEPHONE ENCOUNTER
----- Message from Estephania Zarate sent at 4/17/2024  2:36 PM CDT -----  Regarding: missed call  Name of caller: wife( Leeanne)       What is the requesting detail: returning a call. Please advise       Can the clinic reply by [facility]:       What number to call back: [phone]

## 2024-04-22 ENCOUNTER — TELEPHONE (OUTPATIENT)
Dept: ADMINISTRATIVE | Facility: CLINIC | Age: 64
End: 2024-04-22
Payer: MEDICARE

## 2024-04-22 ENCOUNTER — PATIENT MESSAGE (OUTPATIENT)
Dept: ADMINISTRATIVE | Facility: CLINIC | Age: 64
End: 2024-04-22
Payer: MEDICARE

## 2024-04-23 ENCOUNTER — TELEPHONE (OUTPATIENT)
Dept: ADMINISTRATIVE | Facility: CLINIC | Age: 64
End: 2024-04-23
Payer: MEDICARE

## 2024-04-23 ENCOUNTER — OFFICE VISIT (OUTPATIENT)
Dept: INTERNAL MEDICINE | Facility: CLINIC | Age: 64
End: 2024-04-23
Payer: MEDICARE

## 2024-04-23 VITALS
WEIGHT: 187.38 LBS | HEART RATE: 66 BPM | BODY MASS INDEX: 25.41 KG/M2 | SYSTOLIC BLOOD PRESSURE: 103 MMHG | DIASTOLIC BLOOD PRESSURE: 73 MMHG

## 2024-04-23 DIAGNOSIS — N18.32 STAGE 3B CHRONIC KIDNEY DISEASE: ICD-10-CM

## 2024-04-23 DIAGNOSIS — D64.9 ANEMIA, UNSPECIFIED TYPE: ICD-10-CM

## 2024-04-23 DIAGNOSIS — Z12.11 SCREENING FOR COLORECTAL CANCER: ICD-10-CM

## 2024-04-23 DIAGNOSIS — M10.9 GOUTY ARTHRITIS: ICD-10-CM

## 2024-04-23 DIAGNOSIS — Z12.12 SCREENING FOR COLORECTAL CANCER: ICD-10-CM

## 2024-04-23 DIAGNOSIS — F10.20 ALCOHOL USE DISORDER, SEVERE, DEPENDENCE: Primary | ICD-10-CM

## 2024-04-23 DIAGNOSIS — Z74.8 ASSISTANCE NEEDED WITH TRANSPORTATION: ICD-10-CM

## 2024-04-23 DIAGNOSIS — R13.10 DYSPHAGIA, UNSPECIFIED TYPE: ICD-10-CM

## 2024-04-23 DIAGNOSIS — G81.10 SPASTIC HEMIPLEGIA, UNSPECIFIED ETIOLOGY, UNSPECIFIED LATERALITY: ICD-10-CM

## 2024-04-23 DIAGNOSIS — I10 ESSENTIAL HYPERTENSION: ICD-10-CM

## 2024-04-23 DIAGNOSIS — I48.0 PAROXYSMAL ATRIAL FIBRILLATION: ICD-10-CM

## 2024-04-23 PROCEDURE — 3074F SYST BP LT 130 MM HG: CPT | Mod: HCNC,CPTII,95, | Performed by: STUDENT IN AN ORGANIZED HEALTH CARE EDUCATION/TRAINING PROGRAM

## 2024-04-23 PROCEDURE — 3078F DIAST BP <80 MM HG: CPT | Mod: HCNC,CPTII,95, | Performed by: STUDENT IN AN ORGANIZED HEALTH CARE EDUCATION/TRAINING PROGRAM

## 2024-04-23 PROCEDURE — 3008F BODY MASS INDEX DOCD: CPT | Mod: HCNC,CPTII,95, | Performed by: STUDENT IN AN ORGANIZED HEALTH CARE EDUCATION/TRAINING PROGRAM

## 2024-04-23 PROCEDURE — 1159F MED LIST DOCD IN RCRD: CPT | Mod: HCNC,CPTII,95, | Performed by: STUDENT IN AN ORGANIZED HEALTH CARE EDUCATION/TRAINING PROGRAM

## 2024-04-23 PROCEDURE — 99214 OFFICE O/P EST MOD 30 MIN: CPT | Mod: 95,HCNC,, | Performed by: STUDENT IN AN ORGANIZED HEALTH CARE EDUCATION/TRAINING PROGRAM

## 2024-04-23 NOTE — PROGRESS NOTES
The patient's location is:  [place]  The chief complaint leading to consultation is:  Alcohol use disorder, severe, dependence [F10.20]    Visit type: audiovisual    Time spent in discussion with the patient: 18 minutes  32 minutes of total time spent on the encounter. This includes time spent in discussion with the patient and time preparing for the patient appointment: review of tests, obtaining and/or reviewing separately obtained history, documenting clinical information in the electronic or other health record, independently interpreting results (not separately reported), and communicating results to the patient/family/caregiver or care coordination (not separately reported).     Each patient to whom he or she provides medical services by telemedicine is: (1) informed of the relationship between the physician and patient and the respective role of any other health care provider with respect to management of the patient; and (2) notified that he or she may decline to receive medical services by telemedicine and may withdraw from such care at any time.      Subjective:   Michael Johnson Jr. is a 63 y.o. male who presents for Alcohol use disorder, severe, dependence [F10.20].       Patient is established with me, here today for the following:    HTN, CKD3, gout, seizures, spastic left sided hemiplegia, anemia, arthritis, depression, ED, hypomagnesemia, peripheral neuropathy, vitamin D deficiency, alcohol use disorder     Went to hospital [id number] due to nausea/vomiting and pain  CT showed concern for pharyngitis   Admitted for IV antibiotics and monitoring for alcohol withdrawal   ID consulted and given Unasyn with improvement  Changed to PO Augmentin  GI and speech consulted for dysphagia  Given Protonix BID for 8 weeks  Recommended decreasing alcohol use  Discharged [id number]    Endorses care at home nurse coming once every 1-2 months  Endorses does not have PT coming routinely  States has only had speech  "therapy once  Per patient, wife told the home health not to come  Per wife patient was discharged from [facility] 08APR    Endorses has drank alcohol on "two or more days" since discharged from the hospital  Uncertain frequency of EtOH use  Previously reported about 1/2 pint of liquor every 2 days  History of alcohol withdrawal seizures    Has not been seen in person in clinic since FEB2020  Endorses difficulty with transport due to spastic hemiplegia  Has not yet contacted insurance company regarding options for transport  Appears to have significant confusion/difficulty organizing healthcare  Wife assisting in care     Health maintenance -   Colonoscopy performed MAY2014. Told to repeat in 8 years.  Lab Results       Component                Value               Date                       PSA                      0.27                02/05/2013            UTD on COVID primary vaccinations.  Due for Tdap, COVID, PCV20, shingles, RSV vaccinations.  Previously smoked pipe tobacco. Endorses quit a long time ago.   Completed HIV and hepatitis C screening.  UTD on lipid screening.  Lab Results       Component                Value               Date                       LDLCALC                  84.2                10/10/2023            The 10-year ASCVD risk score (Pan VALDERRAMA, et al., 2019) is: 8.8%  UTD on diabetes screening.  Lab Results       Component                Value               Date                       HGBA1C                   4.4                 10/10/2023              HTN -   Currently prescribed metoprolol.  Endorses taking every other day or every few days  Denies side effects or concerns while taking medication.  Patient endorses checking blood pressure at home, doesn't recall the results.  Due for micro albumin creatinine ratio.   BP Readings from Last 5 Encounters:  03/22/24 : 103/73  03/07/24 : 109/68  02/08/24 : 128/86  10/09/23 : 134/81  09/05/23 : 112/74     Endorses taking metoprolol every other " day or every few days  Not taking Eliquis, believes has been out of medication for a few months   Endorses palpitations occurring intermittently  Has not followed with cardiologist in recent years     Taking allopurinol for gout   Had a gout flare in the hospital and was treated while inpatient   Lab Results       Component                Value               Date                       URICACID                 9.5 (H)             02/25/2024               CKD -  Due for follow up with nephrologist  Lab Results       Component                Value               Date                       CREATININE               2.1 (H)             03/21/2024                 CREATININE               2.0 (H)             03/18/2024                 CREATININE               2.3 (H)             03/13/2024            Lab Results       Component                Value               Date                       NA                       137                 03/21/2024                 K                        4.3                 03/21/2024                 CO2                      15 (L)              03/21/2024            Prot/Creat Ratio, Urine       Date                     Value               Ref Range           Status                09/18/2020                                   0.00 - 0.20         Final             Unable to calculate  ----------     Anemia -  Lab Results       Component                Value               Date                       HGB                      10.4 (L)            03/21/2024                 HCT                      32.7 (L)            03/21/2024                 MCV                      117 (H)             03/21/2024                 RDW                      14.6 (H)            03/21/2024            Lab Results       Component                Value               Date                       UIBC                     244                 08/28/2009                 IRON                     11 (L)              02/28/2024                  TRANSFERRIN              129 (L)             02/28/2024                 TIBC                     191 (L)             02/28/2024                 FESATURATED              6 (L)               02/28/2024             Lab Results       Component                Value               Date                       FERRITIN                 1,492 (H)           02/28/2024            Lab Results       Component                Value               Date                       [id number]               939                 03/06/2024            Lab Results       Component                Value               Date                       FOLATE                   5.9                 02/28/2024                              Review of Systems   Constitutional:  Positive for unexpected weight change. Negative for activity change.   HENT:  Negative for hearing loss, rhinorrhea and trouble swallowing.    Eyes:  Negative for discharge and visual disturbance.   Respiratory:  Negative for chest tightness and wheezing.    Cardiovascular:  Negative for chest pain and palpitations.   Gastrointestinal:  Negative for blood in stool, constipation, diarrhea and vomiting.   Endocrine: Negative for polydipsia and polyuria.   Genitourinary:  Negative for difficulty urinating, hematuria and urgency.   Musculoskeletal:  Negative for arthralgias, joint swelling and neck pain.   Neurological:  Negative for weakness and headaches.   Psychiatric/Behavioral:  Positive for dysphoric mood. Negative for confusion.          Current Outpatient Medications   Medication Instructions    albuterol sulfate (PROAIR RESPICLICK) 90 mcg/actuation inhaler 2 puffs, Inhalation, Every 6 hours PRN, Rescue    allopurinoL (ZYLOPRIM) 300 mg, Oral, Daily    aluminum-magnesium hydroxide-simethicone (MAALOX) 200-200-20 mg/5 mL Susp 30 mLs, Oral, Before meals & nightly PRN    amiodarone (PACERONE) 200 MG Tab Take 1 tablet (200 mg total) by mouth 2 (two) times daily for 14 days, THEN 1  tablet (200 mg total) once daily.    ammonium lactate (LAC-HYDRIN) 1 g, Topical (Top), As needed (PRN)    apixaban (ELIQUIS) 5 mg, Oral, 2 times daily    ceramides 1,3,6-II (CERAVE DAILY MOISTURIZING) Lotn Applying topically to dry skin twice daily.    cetirizine (ZYRTEC) 5 mg, Oral, Daily    colchicine (COLCRYS) 0.6 mg, Oral, 2 times daily    cyanocobalamin, vitamin B-12, 1,000 mcg Cap Take 1 capsules (1,000 mcg) by mouth Daily.    diclofenac sodium (VOLTAREN) 2 g, Topical (Top), 4 times daily PRN    ferrous sulfate 325 mg, Oral, Daily    fluticasone propionate (FLONASE) 50 mcg/actuation nasal spray SHAKE LIQUID AND USE 1 SPRAY(50 MCG) IN EACH NOSTRIL EVERY DAY    folic acid (FOLVITE) 1 mg, Oral, Daily    gabapentin (NEURONTIN) 400 MG capsule TAKE 1 CAPSULE TWICE DAILY    levETIRAcetam (KEPPRA) 500 mg, Oral, 2 times daily    magnesium oxide 400 mg, Oral, Daily    methocarbamoL (ROBAXIN) 500 mg, Oral, As needed (PRN)    metoprolol tartrate (LOPRESSOR) 25 mg, Oral, 2 times daily    pantoprazole (PROTONIX) 40 mg, Oral, 2 times daily, For Eight weeks    thiamine 100 mg, Oral, Daily       Objective:     Vitals:    04/23/24 1303   BP: 103/73   Pulse: 66        Physical Exam  Constitutional:       General: He is not in acute distress.     Appearance: Normal appearance. He is not ill-appearing or diaphoretic.   Neurological:      Mental Status: He is alert.   Psychiatric:         Attention and Perception: Attention normal.         Mood and Affect: Affect is labile.         Speech: Speech is tangential.         Behavior: Behavior is aggressive. Behavior is cooperative.         Cognition and Memory: Cognition is impaired. Memory is impaired.           Assessment/Plan:       Alcohol use disorder, severe, dependence  -     Ambulatory referral/consult to Social Work; Future    Dysphagia, unspecified type  Improved    Paroxysmal atrial fibrillation  Recommend taking metoprolol consistently   Restart Eliquis   Follow with  cardiologist ASAP  RTC in 3-4 weeks for follow up IN PERSON.  -     Ambulatory referral/consult to Social Work; Future  -     apixaban (ELIQUIS) 5 mg Tab; Take 1 tablet (5 mg total) by mouth 2 (two) times daily.  -     Ambulatory referral/consult to Cardiology; Future    Spastic hemiplegia, unspecified etiology, unspecified laterality  Assistance needed with transportation  Needs in person evaluation ASAP  Consult social work to assist with transportation needs  -     Ambulatory referral/consult to Social Work; Future    Screening for colorectal cancer  -     Ambulatory referral/consult to Endo Procedure ; Future    Essential hypertension  Recommend taking metoprolol consistently   RTC in 3-4 weeks for follow up IN PERSON.    Gouty arthritis  Continue current medications.  RTC in 3-4 weeks for follow up IN PERSON.    Stage 3b chronic kidney disease  -     Ambulatory referral/consult to Nephrology; Future    Anemia, unspecified type  RTC in 3-4 weeks for follow up IN PERSON.      Amelia Lai MD  04/23/2024

## 2024-04-30 DIAGNOSIS — M62.838 MUSCLE SPASM: ICD-10-CM

## 2024-04-30 DIAGNOSIS — R56.9 SEIZURE: ICD-10-CM

## 2024-04-30 RX ORDER — METHOCARBAMOL 500 MG/1
500 TABLET, FILM COATED ORAL
Qty: 30 TABLET | Refills: 2 | Status: SHIPPED | OUTPATIENT
Start: 2024-04-30

## 2024-04-30 NOTE — TELEPHONE ENCOUNTER
No care due was identified.  [facility] Embedded Care Due Messages. Reference number: [id number].   4/30/2024 3:31:08 AM CDT

## 2024-05-08 NOTE — TELEPHONE ENCOUNTER
----- Message from Gaby Mishra sent at 5/7/2024  4:51 PM CDT -----  Contact: Leeanne  Type:  Patient Call          Who Called: Patient wife - Leeanne         Does the patient know what this is regarding?: requesting a call back about Rx benzonatate (TESSALON) 100 MG capsule ;pt need a refill he's joaquin without it for 1 month ;pt wife is unawre isf he's to continue taking this medication ;  please advise           Would the patient rather a call back or a response via [facility]?call           Best Call Back Number:[phone]             Additional Information:

## 2024-05-08 NOTE — TELEPHONE ENCOUNTER
----- Message from Gaby Mishra sent at 5/7/2024  4:51 PM CDT -----  Contact: Leeanne  Type:  Patient Call          Who Called: Patient wife - Leeanne         Does the patient know what this is regarding?: requesting a call back about Rx benzonatate (TESSALON) 100 MG capsule ;pt need a refill he's joaquin without it for 1 month ;pt wife is unawre isf he's to continue taking this medication ;  please advise           Would the patient rather a call back or a response via [facility]?call           Best Call Back Number:[phone]             Additional Information:

## 2024-05-10 DIAGNOSIS — R13.10 DYSPHAGIA, UNSPECIFIED TYPE: Primary | ICD-10-CM

## 2024-05-10 NOTE — TELEPHONE ENCOUNTER
No care due was identified.  [facility] Embedded Care Due Messages. Reference number: [id number].   5/10/2024 4:21:18 AM CDT

## 2024-05-11 NOTE — TELEPHONE ENCOUNTER
Refill Routing Note   Medication(s) are not appropriate for processing by [facility] for the following reason(s):        Outside of protocol    [facility] action(s):  Defer      Medication Therapy Plan: dose is out of protocol      Appointments  past 12m or future 3m with PCP    Date Provider   Last Visit   4/23/2024 Amelia Lai MD   Next Visit   Visit date not found Amelia Lai MD   ED visits in past 90 days: 0        Note composed:8:15 PM 05/10/2024

## 2024-05-13 ENCOUNTER — TELEPHONE (OUTPATIENT)
Dept: GASTROENTEROLOGY | Facility: CLINIC | Age: 64
End: 2024-05-13
Payer: MEDICARE

## 2024-05-13 RX ORDER — PANTOPRAZOLE SODIUM 40 MG/1
40 TABLET, DELAYED RELEASE ORAL DAILY
Qty: 90 TABLET | Refills: 0 | Status: SHIPPED | OUTPATIENT
Start: 2024-05-13

## 2024-05-15 ENCOUNTER — EXTERNAL HOME HEALTH (OUTPATIENT)
Dept: HOME HEALTH SERVICES | Facility: HOSPITAL | Age: 64
End: 2024-05-15
Payer: MEDICARE

## 2024-06-18 ENCOUNTER — TELEPHONE (OUTPATIENT)
Dept: ADMINISTRATIVE | Facility: OTHER | Age: 64
End: 2024-06-18
Payer: MEDICARE

## 2024-06-18 NOTE — TELEPHONE ENCOUNTER
Spoke to wif who stated she will call back for rescheduling of Cardiology appointment when she gets home.Contact number provided. My Chart message sent.

## 2024-07-03 ENCOUNTER — TELEPHONE (OUTPATIENT)
Dept: NEUROLOGY | Facility: CLINIC | Age: 64
End: 2024-07-03
Payer: MEDICARE

## 2024-07-08 ENCOUNTER — TELEPHONE (OUTPATIENT)
Dept: NEUROLOGY | Facility: CLINIC | Age: 64
End: 2024-07-08

## 2024-07-08 NOTE — TELEPHONE ENCOUNTER
Looks like you've been trying to contact this patient but he looks like a general neuro patient rather than movement.     Called pt today @ 8:20am , no answer.

## 2024-08-05 ENCOUNTER — TELEPHONE (OUTPATIENT)
Dept: NEPHROLOGY | Facility: CLINIC | Age: 64
End: 2024-08-05
Payer: MEDICARE

## 2024-08-05 DIAGNOSIS — N18.2 CHRONIC KIDNEY DISEASE, STAGE II (MILD): Primary | ICD-10-CM

## 2024-09-20 DIAGNOSIS — M10.9 GOUTY ARTHRITIS: ICD-10-CM

## 2024-09-20 RX ORDER — ALLOPURINOL 300 MG/1
300 TABLET ORAL
Qty: 90 TABLET | Refills: 1 | Status: SHIPPED | OUTPATIENT
Start: 2024-09-20

## 2024-09-20 NOTE — TELEPHONE ENCOUNTER
No care due was identified.  [facility] Embedded Care Due Messages. Reference number: [id number].   9/20/2024 2:28:31 PM CDT

## 2024-09-20 NOTE — TELEPHONE ENCOUNTER
Refill Routing Note   Medication(s) are not appropriate for processing by [facility] for the following reason(s):        Required labs abnormal    [facility] action(s):  Defer           Pharmacist review requested: Yes     Appointments  past 12m or future 3m with PCP    Date Provider   Last Visit   4/23/2024 Amelia Lai MD   Next Visit   Visit date not found Amelia Lai MD   ED visits in past 90 days: 0        Note composed:2:29 PM 09/20/2024

## 2024-09-20 NOTE — TELEPHONE ENCOUNTER
Refill Decision Note   Michael Johnson  is requesting a refill authorization.  Brief Assessment and Rationale for Refill:  Approve     Medication Therapy Plan:  eGFR reviewed. Current dosage within recommendatons. Renal dose adjustment not required at this time.      Pharmacist review requested: Yes   Comments:     Note composed:4:27 PM 09/20/2024

## 2024-10-09 DIAGNOSIS — M10.9 GOUTY ARTHRITIS: Primary | ICD-10-CM

## 2024-10-09 DIAGNOSIS — G62.9 PERIPHERAL POLYNEUROPATHY: ICD-10-CM

## 2024-10-09 DIAGNOSIS — I10 ESSENTIAL HYPERTENSION: ICD-10-CM

## 2024-10-09 DIAGNOSIS — I48.0 PAROXYSMAL ATRIAL FIBRILLATION: ICD-10-CM

## 2024-10-09 DIAGNOSIS — G40.909 SEIZURE DISORDER: ICD-10-CM

## 2024-10-09 NOTE — TELEPHONE ENCOUNTER
"----- Message from Mariann sent at 10/9/2024 12:26 PM CDT -----  Regarding: Medication  "Type:  Patient Call Back    Who Called:Leeanneignacia Johnson(Spouse)    What is the reqeust in detail:Requesting call back in regards to pt medications. Please advise    Can the clinic reply by [facility]?no     Best Call Back Number:[phone]      Additional Information:Pt spouse states pt hasn't been receiving his medication. Pt only received his     metoprolol tartrate (LOPRESSOR) 25 MG tablet.  "

## 2024-10-09 NOTE — TELEPHONE ENCOUNTER
No care due was identified.  [facility] Embedded Care Due Messages. Reference number: [id number].   10/09/2024 2:24:12 PM CDT

## 2024-10-09 NOTE — TELEPHONE ENCOUNTER
Phoned pt pended medication refills changed pharmacy at pt request. LOV 4/23/24 Allergies confirmed.

## 2024-10-11 RX ORDER — COLCHICINE 0.6 MG/1
0.6 TABLET ORAL 2 TIMES DAILY
Qty: 60 TABLET | Refills: 0 | OUTPATIENT
Start: 2024-10-11 | End: 2025-10-11

## 2024-10-11 RX ORDER — METOPROLOL TARTRATE 25 MG/1
25 TABLET, FILM COATED ORAL 2 TIMES DAILY
Qty: 60 TABLET | Refills: 2 | Status: SHIPPED | OUTPATIENT
Start: 2024-10-11 | End: 2025-10-11

## 2024-10-11 RX ORDER — LEVETIRACETAM 500 MG/1
500 TABLET ORAL 2 TIMES DAILY
Qty: 60 TABLET | Refills: 2 | Status: SHIPPED | OUTPATIENT
Start: 2024-10-11

## 2024-11-11 DIAGNOSIS — G40.909 SEIZURE DISORDER: ICD-10-CM

## 2024-11-12 RX ORDER — LEVETIRACETAM 500 MG/1
500 TABLET ORAL 2 TIMES DAILY
Qty: 60 TABLET | Refills: 2 | Status: SHIPPED | OUTPATIENT
Start: 2024-11-12

## 2025-01-10 ENCOUNTER — OFFICE VISIT (OUTPATIENT)
Dept: INTERNAL MEDICINE | Facility: CLINIC | Age: 65
End: 2025-01-10
Payer: MEDICARE

## 2025-01-10 ENCOUNTER — NURSE TRIAGE (OUTPATIENT)
Dept: ADMINISTRATIVE | Facility: CLINIC | Age: 65
End: 2025-01-10
Payer: MEDICARE

## 2025-01-10 DIAGNOSIS — M17.11 PRIMARY OSTEOARTHRITIS OF RIGHT KNEE: ICD-10-CM

## 2025-01-10 DIAGNOSIS — N18.32 STAGE 3B CHRONIC KIDNEY DISEASE: ICD-10-CM

## 2025-01-10 DIAGNOSIS — M62.838 MUSCLE SPASM OF RIGHT LEG: Primary | ICD-10-CM

## 2025-01-10 DIAGNOSIS — M79.18 MYALGIA, LOWER LEG: ICD-10-CM

## 2025-01-10 RX ORDER — CYCLOBENZAPRINE HYDROCHLORIDE 7.5 MG/1
7.5 TABLET, FILM COATED ORAL 3 TIMES DAILY PRN
Qty: 30 TABLET | Refills: 0 | Status: SHIPPED | OUTPATIENT
Start: 2025-01-10 | End: 2025-01-20

## 2025-01-10 NOTE — TELEPHONE ENCOUNTER
Pt calling with c/o muscle spasm to the left knee. Pt said that he is handicap and the pain is getting worse. Pt denies any signs of dehydration and he has tried drinking Gatorade and has taken tylenol and ibuprofen but nothing has helped. Pt triaged and care advice to be seen in office today but said that he can't get out and will do virtual appt and call back if any other questions or concerns or worsening                Reason for Disposition   SEVERE pain (e.g., excruciating, unable to do any normal activities) and not improved 2 hours after pain medicine    Additional Information   Negative: Shock suspected (e.g., cold/pale/clammy skin, too weak to stand, low BP, rapid pulse)   Negative: Difficult to awaken or acting confused (e.g., disoriented, slurred speech)   Negative: Sounds like a life-threatening emergency to the triager   Negative: Dark (cola or tea-colored) or red-colored urine   Negative: Drinking very little and dehydration suspected (e.g., no urine > 12 hours, very dry mouth, very lightheaded)   Negative: Patient sounds very sick or weak to the triager    Protocols used: Muscle Aches and Body Pain-A-[place]

## 2025-01-10 NOTE — PROGRESS NOTES
Subjective:      Patient ID: Michael Johnson Jr. is a 64 y.o. male.    The patient location is: [place]  The chief complaint leading to consultation is: knee muscle spam and pain    Visit type: audiovisual    Face to Face time with patient: yes  20 minutes of total time spent on the encounter, which includes face to face time and non-face to face time preparing to see the patient (eg, review of tests), Obtaining and/or reviewing separately obtained history, Documenting clinical information in the electronic or other health record, Independently interpreting results (not separately reported) and communicating results to the patient/family/caregiver, or Care coordination (not separately reported).     Each patient to whom he or she provides medical services by telemedicine is:  (1) informed of the relationship between the physician and patient and the respective role of any other health care provider with respect to management of the patient; and (2) notified that he or she may decline to receive medical services by telemedicine and may withdraw from such care at any time.    Chief Complaint: No chief complaint on file.      History of Present Illness    CHIEF COMPLAINT:  Patient presents today with severe spasms in the left knee.    LEFT KNEE PAIN:  He reports experiencing spasms in the left knee that started three days ago. The spasms are rated 9.5-10/10 in severity, last approximately 30 seconds, and occur during waking hours.      ROS:  General: -fever, -chills, -fatigue, -weight gain, -weight loss  Eyes: -vision changes, -redness, -discharge  ENT: -ear pain, -nasal congestion, -sore throat  Cardiovascular: -chest pain, -palpitations, -lower extremity edema  Respiratory: -cough, -shortness of breath  Gastrointestinal: -abdominal pain, -nausea, -vomiting, -diarrhea, -constipation, -blood in stool  Genitourinary: -dysuria, -hematuria, -frequency  Musculoskeletal: +joint pain, -muscle pain, +muscle spasms  Skin:  -rash, -lesion  Neurological: -headache, -dizziness, -numbness, -tingling  Psychiatric: -anxiety, -depression, -sleep difficulty          Active Problem List with Overview Notes    Diagnosis Date Noted    Iron deficiency anemia 03/05/2024    Food impaction of esophagus 03/04/2024    Benign esophageal stricture 03/04/2024    Severe malnutrition 03/01/2024    Atelectasis 02/29/2024    Falls frequently 02/25/2024    Electrolyte disturbance 02/24/2024    Pharyngitis 02/24/2024    Aortic atherosclerosis 02/08/2024    Dysphagia 09/04/2023    Depression 03/08/2022    Spastic hemiplegia, unspecified etiology, unspecified laterality 02/23/2022    Peripheral polyneuropathy 07/07/2020    Vitamin D deficiency 02/04/2020    Paroxysmal atrial fibrillation 11/29/2019     The patient has atrial fibrillation he is not really sure where these and chronically are not.  He is takes metoprolol but he does not always remember to take it his family says he forgets that a lot he says he never forgets.  He says he has been under stress lately and he suddenly had onset of fibrillation or flutter with a rate of 150 he was placed on amiodarone by the cardiologist and now his rate is 98 but looks like it is still flutter      High anion gap metabolic acidosis 11/29/2019    Debility 04/04/2019    Fever 04/03/2019    Lichenification 04/02/2019    Alcohol abuse with withdrawal     Essential hypertension     Macrocytic anemia 01/09/2018    Hypokalemia 03/16/2017    Seizure 03/16/2017    GERD (gastroesophageal reflux disease) 03/16/2017    Acute gout of multiple sites 10/20/2014    Mixed hyperlipidemia 10/20/2014    Chronic pain of left ankle 05/19/2014          Current Outpatient Medications:     albuterol sulfate (PROAIR RESPICLICK) 90 mcg/actuation inhaler, Inhale 2 puffs into the lungs every 6 (six) hours as needed for Wheezing (wheezing). Rescue, Disp: 1 each, Rfl: 3    allopurinoL (ZYLOPRIM) 300 MG tablet, TAKE 1 TABLET EVERY DAY, Disp: 90  tablet, Rfl: 1    aluminum-magnesium hydroxide-simethicone (MAALOX) 200-200-20 mg/5 mL Susp, Take 30 mLs by mouth before meals and at bedtime as needed (acid reflux)., Disp: , Rfl:     amiodarone (PACERONE) 200 MG Tab, Take 1 tablet (200 mg total) by mouth 2 (two) times daily for 14 days, THEN 1 tablet (200 mg total) once daily., Disp: 60 tablet, Rfl: 2    ammonium lactate (LAC-HYDRIN) 12 % lotion, Apply 1 g topically as needed for Dry Skin., Disp: 30 each, Rfl: 3    apixaban (ELIQUIS) 5 mg Tab, Take 1 tablet (5 mg total) by mouth 2 (two) times daily., Disp: 180 tablet, Rfl: 1    ceramides 1,3,6-II (CERAVE DAILY MOISTURIZING) Lotn, Applying topically to dry skin twice daily., Disp: 355 mL, Rfl: 5    cetirizine (ZYRTEC) 5 MG tablet, Take 1 tablet (5 mg total) by mouth once daily., Disp: 30 tablet, Rfl: 0    colchicine (COLCRYS) 0.6 mg tablet, Take 1 tablet (0.6 mg total) by mouth 2 (two) times daily., Disp: 60 tablet, Rfl: 0    cyanocobalamin, vitamin B-12, 1,000 mcg Cap, Take 1 capsules (1,000 mcg) by mouth Daily., Disp: 30 capsule, Rfl: 0    cyclobenzaprine (FEXMID) 7.5 MG Tab, Take 1 tablet (7.5 mg total) by mouth 3 (three) times daily as needed., Disp: 30 tablet, Rfl: 0    diclofenac sodium (VOLTAREN) 1 % Gel, Apply 2 g topically 4 (four) times daily as needed., Disp: 100 g, Rfl: 0    ferrous sulfate 325 (65 FE) MG EC tablet, Take 1 tablet (325 mg total) by mouth once daily., Disp: 30 tablet, Rfl: 0    fluticasone propionate (FLONASE) 50 mcg/actuation nasal spray, SHAKE LIQUID AND USE 1 SPRAY(50 MCG) IN EACH NOSTRIL EVERY DAY, Disp: 16 g, Rfl: 1    folic acid (FOLVITE) 1 MG tablet, Take 1 tablet (1 mg total) by mouth once daily., Disp: 30 tablet, Rfl: 2    gabapentin (NEURONTIN) 400 MG capsule, TAKE 1 CAPSULE TWICE DAILY, Disp: 180 capsule, Rfl: 3    levETIRAcetam (KEPPRA) 500 MG Tab, Take 1 tablet (500 mg total) by mouth 2 (two) times daily., Disp: 60 tablet, Rfl: 2    magnesium oxide 400 mg magnesium Tab, Take  400 mg by mouth once daily., Disp: 30 tablet, Rfl:     metoprolol tartrate (LOPRESSOR) 25 MG tablet, TAKE 1 TABLET TWICE DAILY, Disp: 180 tablet, Rfl: 1    pantoprazole (PROTONIX) 40 MG tablet, Take 1 tablet (40 mg total) by mouth once daily., Disp: 90 tablet, Rfl: 0    thiamine 100 MG tablet, Take 1 tablet (100 mg total) by mouth once daily., Disp: 30 tablet, Rfl: 0  Review of patient's allergies indicates:   Allergen Reactions    Lisinopril Swelling     Pt admitted with angioedema 2wks after taking lisinopril.         Past Medical History:   Diagnosis Date    Afib     Alcohol abuse with other alcohol-induced disorder 04/02/2019    Anemia 04/02/2019    unspecified deficiency    Ankle fracture, left     Aortic atherosclerosis 2/8/2024    Arthritis     Asthma     Cholecystitis 1/15/2018    Chronic kidney disease (CKD), stage III (moderate) 04/02/2019    Depression     Erectile dysfunction 04/02/2019    Gout, arthropathy 04/02/2019    Gout, unspecified     Hypertension     Hypertensive chronic kidney disease 04/02/2019    Hypomagnesemia 04/02/2019    Noncompliance 04/02/2019    Peripheral neuropathy 04/02/2019    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    Secondary hyperparathyroidism, renal 04/02/2019    Seizure 2016    Spastic hemiplegia affecting left nondominant side 04/02/2019     Past Surgical History:   Procedure Laterality Date    ANKLE SURGERY      x6    CHOLECYSTECTOMY      ESOPHAGOGASTRODUODENOSCOPY N/A 3/4/2024    Procedure: EGD (ESOPHAGOGASTRODUODENOSCOPY);  Surgeon: Carlos Calloway MD;  Location: [facility];  Service: Endoscopy;  Laterality: N/A;     Social History     Social History Narrative    Not on file     Family History   Problem Relation Name Age of Onset    Hypertension Father none     Stroke Maternal Grandmother none     Cataracts Maternal Grandfather none     Stroke Maternal Grandfather none     Cancer Mother none         malignant polyp       There were no vitals filed for this visit.    Review of Systems  "    Lab Results   Component Value Date    HGBA1C 4.4 10/10/2023    HGBA1C 4.2 01/09/2018    HGBA1C 5.4 05/10/2014     No results found for: "MICALBCREAT"  Lab Results   Component Value Date    LDLCALC 84.2 10/10/2023    LDLCALC 63.6 01/10/2018    CHOL 192 10/10/2023    HDL 90 (H) 10/10/2023    TRIG 89 10/10/2023       Lab Results   Component Value Date     03/21/2024    K 4.3 03/21/2024     (H) 03/21/2024    CO2 15 (L) 03/21/2024    GLU 95 03/21/2024    BUN 44 (H) 03/21/2024    CREATININE 2.1 (H) 03/21/2024    CALCIUM 9.2 03/21/2024    PROT 8.9 (H) 03/21/2024    ALBUMIN 3.1 (L) 03/21/2024    BILITOT 0.3 03/21/2024    ALKPHOS 132 03/21/2024    AST 44 (H) 03/21/2024    ALT 46 (H) 03/21/2024    ANIONGAP 9 03/21/2024    ESTGFRAFRICA 44 (A) 01/02/2021    EGFRNONAA 38 (A) 01/02/2021    WBC 3.99 03/21/2024    HGB 10.4 (L) 03/21/2024    HGB 9.5 (L) 03/13/2024    HCT 32.7 (L) 03/21/2024     (H) 03/21/2024     03/21/2024    TSH 3.103 02/24/2024    PSA 0.27 02/05/2013    PSA 0.23 09/04/2009    PSADIAG 0.38 05/23/2014    HEPCAB Negative 11/29/2019       Lab Results   Component Value Date    OSAWMJQS48UJ 32 02/04/2020    AURXXHGH47 939 03/06/2024    FERRITIN 1,492 (H) 02/28/2024    IRON 11 (L) 02/28/2024    TRANSFERRIN 129 (L) 02/28/2024    TIBC 191 (L) 02/28/2024    FESATURATED 6 (L) 02/28/2024       Objective:   Vitals: unable to obtain due to virtual telemed visit         Assessment:     1. Muscle spasm of right leg    2. Myalgia, lower leg    3. Stage 3b chronic kidney disease    4. Primary osteoarthritis of right knee      Plan:   Assessment & Plan   Muscle spasm of right leg  -     cyclobenzaprine (FEXMID) 7.5 MG Tab; Take 1 tablet (7.5 mg total) by mouth 3 (three) times daily as needed.  Dispense: 30 tablet; Refill: 0    Myalgia, lower leg  -     cyclobenzaprine (FEXMID) 7.5 MG Tab; Take 1 tablet (7.5 mg total) by mouth 3 (three) times daily as needed.  Dispense: 30 tablet; Refill: 0    Stage 3b " chronic kidney disease    Primary osteoarthritis of right knee       Assessment & Plan    IMPRESSION:  - Assessed patient's report of severe left knee spasms occurring for 3 days  - Determined muscle relaxant medication appropriate for managing symptoms  - Considered patient's history of arthritis and lack of recent primary care visits in treatment plan    SPASTIC HEMIPLEGIA, UNSPECIFIED ETIOLOGY, UNSPECIFIED LATERALITY:  - Evaluated patient's condition, noting severe muscle spasms in the left knee area lasting approximately 30 seconds and occurring throughout the day when awake.  - Inquired about history of arthritis in the knee and previous use of muscle relaxants.  - Prescribed muscle relaxant medication to be sent to patient's [facility] pharmacy for pickup.    MUSCLE SPASMS:  - Instructed the patient to take the medication as directed for relief of muscle spasms and to contact the office after 3 days if not effective.  - Inquired about allergies to medications.    LEFT KNEE PAIN:  - Noted that the patient reports pain in the left knee, not the right knee as indicated by the ICD-10 code.  - Evaluated the severity of pain, with the patient rating it as 9.5 to 10 out of 10.          Orders Placed This Encounter    cyclobenzaprine (FEXMID) 7.5 MG Tab       No follow-ups on file.     There are no Patient Instructions on file for this visit.  [unfilled]   Tests to Keep You Healthy    Colon Cancer Screening: DUE  Last Blood Pressure <= 139/89 (4/23/2024): Yes      Visit Checklist (as applicable):  1. Status of new and prior symptoms discussed? yes  2. Imaging reviewed/ ordered as appropriate? yes  3. Lab study reviewed/ ordered as appropriate? yes  4. Plan for work-up and treatment discussed with patient? yes  5. Potential medication side-effects and monitoring plan discussed? yes  6. Review of outside medical records was performed and pertinent details are summarized in the HPI above? yes     Time spent on this  encounter: 20 minutes. This includes face to face time and non-face to face time preparing to see the patient (eg, review of tests), obtaining and/or reviewing separately obtained history, documenting clinical information in the electronic or other health record, independently interpreting results (not separately reported) and communicating results to the patient/family/caregiver, or care coordination (not separately reported). Also patient education regarding chronic and acute medical conditions reviewed. Patient handouts given if appropriate.     Each patient to whom he or she provides medical services by telemedicine is:  (1) informed of the relationship between the physician and patient and the respective role of any other health care provider with respect to management of the patient; and (2) notified that he or she may decline to receive medical services by telemedicine and may withdraw from such care at any time.     This note was generated with the assistance of ambient listening technology. Verbal consent was obtained by the patient and accompanying visitor(s) for the recording of patient appointment to facilitate this note. I attest to having reviewed and edited the generated note for accuracy, though some syntax or spelling errors may persist. Please contact the author of this note for any clarification.       Stanley Burton MD  [facility]

## 2025-01-15 ENCOUNTER — TELEPHONE (OUTPATIENT)
Dept: INTERNAL MEDICINE | Facility: CLINIC | Age: 65
End: 2025-01-15
Payer: MEDICARE

## 2025-01-15 DIAGNOSIS — M62.831 MUSCLE SPASM OF LEFT CALF: Primary | ICD-10-CM

## 2025-01-15 RX ORDER — CYCLOBENZAPRINE HCL 5 MG
5 TABLET ORAL 3 TIMES DAILY PRN
Qty: 30 TABLET | Refills: 0 | Status: SHIPPED | OUTPATIENT
Start: 2025-01-15 | End: 2025-01-25

## 2025-01-15 NOTE — PROGRESS NOTES
Change cyclobenzaprine from 7.5 to 5 mg  oral tid as needed for the muscle spam treatment because the [employer] does not cover the 7.5 mg dose of the medication.

## 2025-01-15 NOTE — TELEPHONE ENCOUNTER
Received a denial letter from [facility] via fax today. Medication Cyclobenzaprine 7.5mg was denied. The letter gave alternative meds. Dr. Burton sent in Cyclobenzaprine 5mg instead. Denial letter sent for scanning

## 2025-02-19 DIAGNOSIS — M10.9 GOUTY ARTHRITIS: ICD-10-CM

## 2025-02-19 RX ORDER — ALLOPURINOL 300 MG/1
300 TABLET ORAL
Qty: 90 TABLET | Refills: 1 | Status: SHIPPED | OUTPATIENT
Start: 2025-02-19

## 2025-02-19 NOTE — TELEPHONE ENCOUNTER
Refill Routing Note   Medication(s) are not appropriate for processing by [facility] for the following reason(s):        Required labs abnormal    [facility] action(s):  Defer   Requires appointment : Yes           Pharmacist review requested: Yes     Appointments  past 12m or future 3m with PCP    Date Provider   Last Visit   4/23/2024 Amelia Lai MD   Next Visit   Visit date not found Amelia Lai MD   ED visits in past 90 days: 0        Note composed:8:14 AM 02/19/2025

## 2025-02-19 NOTE — TELEPHONE ENCOUNTER
Care Due:                  Date            Visit Type   Department     Provider  --------------------------------------------------------------------------------                                ESTABLISHED                              PATIENT -    [facility] INTERNAL  Last Visit: 04-      [facility]       Amelia Lai  Next Visit: None Scheduled  None         None Found                                                            Last  Test          Frequency    Reason                     Performed    Due Date  --------------------------------------------------------------------------------    Office Visit  12 months..  aluminum-magnesium,        04- 04-                             diclofenac...............    [employer] Embedded Care Due Messages. Reference number: [id number].   2/19/2025 1:58:34 AM CST

## 2025-03-02 DIAGNOSIS — G40.909 SEIZURE DISORDER: ICD-10-CM

## 2025-03-03 RX ORDER — LEVETIRACETAM 500 MG/1
500 TABLET ORAL 2 TIMES DAILY
Qty: 180 TABLET | Refills: 3 | Status: SHIPPED | OUTPATIENT
Start: 2025-03-03

## 2025-03-03 NOTE — TELEPHONE ENCOUNTER
Attempted to contact pt to informed him he needs to schedule follow-up with neurology. No answer lvm

## 2025-03-14 DIAGNOSIS — I48.0 PAROXYSMAL ATRIAL FIBRILLATION: ICD-10-CM

## 2025-03-14 RX ORDER — APIXABAN 5 MG/1
5 TABLET, FILM COATED ORAL 2 TIMES DAILY
Qty: 180 TABLET | Refills: 0 | Status: SHIPPED | OUTPATIENT
Start: 2025-03-14

## 2025-05-11 DIAGNOSIS — M10.9 GOUTY ARTHRITIS: ICD-10-CM

## 2025-05-12 RX ORDER — ALLOPURINOL 300 MG/1
300 TABLET ORAL
Qty: 90 TABLET | Refills: 0 | Status: SHIPPED | OUTPATIENT
Start: 2025-05-12

## 2025-05-12 NOTE — TELEPHONE ENCOUNTER
Care Due:                  Date            Visit Type   Department     Provider  --------------------------------------------------------------------------------                                ESTABLISHED                              PATIENT -    [facility] INTERNAL  Last Visit: 04-      [facility]       Amelia Lai  Next Visit: None Scheduled  None         None Found                                                            Last  Test          Frequency    Reason                     Performed    Due Date  --------------------------------------------------------------------------------    Office Visit  12 months..  albuterol, allopurinoL,    04- 04-                             aluminum-magnesium,                             apixaban, diclofenac,                             metoprolol, pantoprazole.    CBC.........  12 months..  allopurinoL, apixaban,     03- 03-                             diclofenac...............    CMP.........  12 months..  allopurinoL, apixaban,     03- 03-                             diclofenac...............    Uric Acid...  12 months..  allopurinoL..............  02- 02-    [facility] Embedded Care Due Messages. Reference number: [id number].   5/11/2025 11:42:15 PM CDT

## 2025-05-12 NOTE — TELEPHONE ENCOUNTER
Refill Routing Note   Medication(s) are not appropriate for processing by [facility] for the following reason(s):        Required labs outdated    [facility] action(s):  Defer   Requires appointment : Yes     Requires labs : Yes             Appointments  past 12m or future 3m with PCP    Date Provider   Last Visit   4/23/2024 Amelia Lai MD   Next Visit   Visit date not found Amelia Lai MD   ED visits in past 90 days: 0        Note composed:1:15 PM 05/12/2025

## 2025-05-21 DIAGNOSIS — I10 ESSENTIAL HYPERTENSION: ICD-10-CM

## 2025-05-21 NOTE — TELEPHONE ENCOUNTER
No care due was identified.  [facility] Embedded Care Due Messages. Reference number: [id number].   5/21/2025 3:20:09 AM CDT

## 2025-05-21 NOTE — TELEPHONE ENCOUNTER
Refill Routing Note   Medication(s) are not appropriate for processing by [facility] for the following reason(s):        Required vitals outdated    [facility] action(s):  Defer             Appointments  past 12m or future 3m with PCP    Date Provider   Last Visit   4/23/2024 Amelia Lai MD   Next Visit   Visit date not found Amelia Lai MD   ED visits in past 90 days: 0        Note composed:6:31 AM 05/21/2025

## 2025-05-26 RX ORDER — METOPROLOL TARTRATE 25 MG/1
25 TABLET, FILM COATED ORAL 2 TIMES DAILY
Qty: 180 TABLET | Refills: 0 | Status: SHIPPED | OUTPATIENT
Start: 2025-05-26

## 2025-05-26 NOTE — TELEPHONE ENCOUNTER
Patient needs to come for in person appointment ASAP, he has been listed as my patient for two years and I still have never seen him in person. Please assist with scheduling an in person exam, thank you!

## 2025-06-09 ENCOUNTER — PATIENT MESSAGE (OUTPATIENT)
Dept: ADMINISTRATIVE | Facility: HOSPITAL | Age: 65
End: 2025-06-09
Payer: MEDICARE

## 2025-06-23 ENCOUNTER — PATIENT OUTREACH (OUTPATIENT)
Dept: ADMINISTRATIVE | Facility: HOSPITAL | Age: 65
End: 2025-06-23
Payer: MEDICARE

## 2025-06-23 ENCOUNTER — TELEPHONE (OUTPATIENT)
Dept: INTERNAL MEDICINE | Facility: CLINIC | Age: 65
End: 2025-06-23
Payer: MEDICARE

## 2025-06-23 DIAGNOSIS — Z12.11 ENCOUNTER FOR SCREENING COLONOSCOPY: Primary | ICD-10-CM

## 2025-06-23 DIAGNOSIS — R35.0 FREQUENCY OF URINATION: ICD-10-CM

## 2025-06-23 NOTE — TELEPHONE ENCOUNTER
Copied from CRM #[id number]. Topic: General Inquiry - Patient Advice  >> Jun 23, 2025  3:31 PM Olga wrote:  Type: Patient Call Back    Who called: pt's wife     What is the request in detail: pt's wife is calling to verify the preferred pharmacy for pt's catheter request. Pt cannot use his hands and is in need. Please send to pharmacy below     [facility] - [place] - [address] [place] [place] [address]  Phone: [phone] Fax: [phone]        Can the clinic reply by [facility]?    Would the patient rather a call back or a response via [facility]? Call back     Best call back number: [phone]    Additional Information:

## 2025-06-23 NOTE — TELEPHONE ENCOUNTER
Copied from CRM #[id number]. Topic: General Inquiry - Patient Advice  >> Jun 20, 2025  3:17 PM Cristhian wrote:  Who called:wife      What is the request in detail:wife is calling in regards of her  she would like for you to order him a urine collet bag because her  is urine in the bed a lot      Can the clinic reply by [facility]?     Would the patient rather a call back or a response via [facility]?callback      Best call back number:.[phone]       Additional Information:

## 2025-06-23 NOTE — PROGRESS NOTES
Health Maintenance reviewed, updated and links triggered. Colorectal screening and Urine due (fford)   6/23/25  [facility]

## 2025-06-24 NOTE — TELEPHONE ENCOUNTER
Copied from CRM #[id number]. Topic: General Inquiry - Patient Advice  >> Jun 24, 2025 11:17 AM Escobar wrote:  .Name of Who is Calling:Michael Wife is the name she gave              What is the request in detail: Calling because she is asking for a call back, to discuss something about pt being bed bound and trying to follow up about request for catheter as well. States she been calling since last week.Please call back to further assist.                 Can the clinic reply by [facility]: No                [facility] to [facility] Back [facility]: [phone]

## 2025-06-25 ENCOUNTER — TELEPHONE (OUTPATIENT)
Dept: INTERNAL MEDICINE | Facility: CLINIC | Age: 65
End: 2025-06-25
Payer: MEDICARE

## 2025-06-25 NOTE — TELEPHONE ENCOUNTER
Order placed, may need to be faxed to the DME supplier though, I don't believe [facility] fills that sort of thing. Thank you!

## 2025-06-25 NOTE — TELEPHONE ENCOUNTER
Copied from CRM #[id number]. Topic: General Inquiry - Status Check  >> Jun 25, 2025  1:59 PM Summer wrote:  Type: Patient Call Back    Who called:pt wife    What is the request in detail:Giana is requesting a call back in regards to an requested, giana requested a  catheter for her  and still hasn't received a response yet. Please assist.     Can the clinic reply by [facility]? no    Would the patient rather a call back or a response via [facility]? call    Best call back number:[phone]    Additional Information:

## 2025-06-26 NOTE — TELEPHONE ENCOUNTER
Order was faxed to [facility] [phone] Patient wife was informed

## 2025-06-27 ENCOUNTER — TELEPHONE (OUTPATIENT)
Dept: INTERNAL MEDICINE | Facility: CLINIC | Age: 65
End: 2025-06-27
Payer: MEDICARE

## 2025-06-27 NOTE — TELEPHONE ENCOUNTER
Copied from CRM #[id number]. Topic: Appointments - Amb Referral  >> Jun 27, 2025 11:41 AM Sheryl wrote:  Type: Patient Call Back    Who called: Patient     What is the request in detail: Pt wife is calling in to schedule colonoscopy from referral she would like for the pt to have it done at the [facility] location. Pt is also requesting a call back from the providers office in regards to the catheter for the pt. Please Advise     Can the clinic reply by [facility]?    Would the patient rather a call back or a response via [facility]? Call     Best call back number: [phone]    Additional Information:

## 2025-07-01 ENCOUNTER — TELEPHONE (OUTPATIENT)
Dept: ENDOSCOPY | Facility: HOSPITAL | Age: 65
End: 2025-07-01

## 2025-07-01 ENCOUNTER — CLINICAL SUPPORT (OUTPATIENT)
Dept: ENDOSCOPY | Facility: HOSPITAL | Age: 65
End: 2025-07-01
Attending: STUDENT IN AN ORGANIZED HEALTH CARE EDUCATION/TRAINING PROGRAM
Payer: MEDICARE

## 2025-07-01 VITALS — WEIGHT: 187 LBS | HEIGHT: 72 IN | BODY MASS INDEX: 25.33 KG/M2

## 2025-07-01 DIAGNOSIS — Z12.11 SPECIAL SCREENING FOR MALIGNANT NEOPLASMS, COLON: Primary | ICD-10-CM

## 2025-07-01 DIAGNOSIS — Z12.11 ENCOUNTER FOR SCREENING COLONOSCOPY: ICD-10-CM

## 2025-07-01 NOTE — PATIENT INSTRUCTIONS
Colonoscopy Procedure Prep Instructions    Date of procedure: 08/05/2025 Arrive at: 08:40 AM    Location of Department:   [facility] [address] [place] [address]  Take the Elevators to 2nd Floor Endoscopy Procedural Area    As soon as possible:   your prep from pharmacy and over the counter DULCOLAX LAXATIVE TABLETS            On the day before your procedure   What You CAN do:   You may have clear liquids ONLY-see below for list.     Liquids That Are OK to Drink:   Water  Sports drinks (Gatorade, Power-Aid)  Coffee or tea (no cream or nondairy creamer)  Clear juices without pulp (apple, white grape)  Gelatin desserts (no fruit or toppings)  Clear soda (sprite, coke, ginger ale)  Chicken broth (until 12 midnight the night before procedure)    What You CANNOT do:   Do not EAT solid food, drink milk or anything   colored red.  Do not drink alcohol.  Do not take oral medications within 1 hour of starting   each dose of prep.  No gum chewing or candy morning of procedure                       Note:   (Please disregard the insert instructions from pharmacy).  PEG Bowel Prep is indicated for cleansing of the colon as a preparation for colonoscopy in adults.   Be sure to tell your doctor about all the medicines you take, including prescription and non-prescription medicines, vitamins, and herbal supplements. PEG Bowel Prep may affect how other medicines work.  Medication taken by mouth may not be absorbed properly when taken within 1 hour before the start of each dose of PEG Bowel Prep.    It is not uncommon to experience some abdominal cramping, nausea and/or vomiting when taking the prep. If you have nausea and/or vomiting while taking the prep, stop drinking for 20 to 30 minutes then continue.      How to take prep:    PEG Bowel Prep is a (2-day) prep.    One (1) bottle of prep are required for a complete preparation for colonoscopy. Dilute the solution concentrate as  directed prior to use. You must drink water with each dose of prep, and additional water after each dose.    DOSE 1--Day Before Colonoscopy 08/04/2025     Drink at least 6 to 8 glasses of clear liquids from time you wake up until you begin your prep and then continue until bedtime to avoid dehydration.     12:00 pm (NOON) Mix your entire container of prep with lukewarm water and refrigerate. Take four (4) Dulcolax (Bisacodyl) tablets with at least 8 ounces or more of clear liquids.       6:00 pm:    You must complete Steps 1 and 2 below before going to bed:    Step 1-Drink half the liquid in the container within one (1) hour.   Step 2-Refrigerate the remaining half of the liquid for dose 2. See below when to begin this step.                       IMPORTANT: If you experience preparation-related symptoms (for example, nausea, bloating, or cramping), stop, or slow the rate of drinking the additional water until your symptoms decrease.    DOSE 2--Day of the Colonoscopy 08/05/2025 at 2-3 AM.    For this dose, repeat Step 1 shown above using the remaining half of the liquid prep.   You may continue drinking water/clear liquids until   4 hours before your colonoscopy or as directed by the scheduling nurse  05:40 AM.      For information about your procedure, two (2) things to view prior to colonoscopy:  Please watch this informational video. It is important to watch this animated consent video prior to your arrival. If you haven't watched the video prior to arriving, you are required to watch it during admission which can causes delays.    Options for viewing:   Using a keyboard:  press and hold the control tab (Ctrl) and left mouse click to follow links.           Colonoscopy Instructional Video                                                                                   OR    Type link address into your web browser's address bar:  [email or web address]      Educational Booklet with  pictures:      Colonoscopy Prep - Liquid      Comments:        IMPORTANT INFORMATION TO KNOW BEFORE YOUR PROCEDURE    [facility] 2nd Floor     If your procedure requires the administration of anesthesia, it is necessary for a responsible adult to drive you home. The designated adult is strongly encouraged to remain in the endoscopy area until the patient is discharged. (Medical Transportation, Uber, Lyft, Taxi, etc. may ONLY be used if a responsible adult is present to accompany you home. The responsible adult CANNOT be the  of the service.)     person must be available to return to pick you up within 15 minutes of being notified of discharge.     Please bring a picture ID, insurance card, and copayment.     Take Medications as directed below:      1) Stop taking Eliquis (apixaban) 2 days (prior to the procedure) on:  08/03/2025      If you begin taking any blood thinning medications, injectable weight loss/diabetes medications (other than insulin) , Adipex (Phentermine) , please contact the endoscopy scheduling department listed below as soon as possible.    If you are diabetic see the attached instruction sheet regarding your medication.     If you take HEART, BLOOD PRESSURE, SEIZURE, PAIN, LUNG (including inhalers/nebulizers), ANTI-REJECTION (transplant patients), or PSYCHIATRIC medications, please take at your regular times with a sip of water or as directed by the scheduling nurse.     Important contact information:    Endoscopy [phone] Hours of operation Monday-Friday 8:00-4:30pm.    Questions about insurance or financial obligations call [phone] or [phone].    If you have questions regarding the prep or need to reschedule, please call [phone]. After hours questions requiring immediate assistance, contact [facility] nurse line at [phone] or [phone].   NOTE:     On occasion, unforeseen circumstances may cause a delay in  your procedure start time. We respect your time and appreciate your patience during these circumstances.      Comments:

## 2025-07-01 NOTE — PLAN OF CARE
Patient is scheduled for a Colonoscopy on 08/05/2025 with Dr. JERICHO Tay  Referral for procedure from PAT appointment

## 2025-07-01 NOTE — TELEPHONE ENCOUNTER
Spoke to patient to scheduled him for his colonoscopy. Patient is scheduled for colonoscopy on 8/5. A clearance was sent to hold Eliquis for 2 days, patient states he is not taking Eliquis as he should. Please contact patient for further education.

## 2025-07-16 ENCOUNTER — CARE AT HOME (OUTPATIENT)
Dept: HOME HEALTH SERVICES | Facility: CLINIC | Age: 65
End: 2025-07-16
Payer: MEDICARE

## 2025-07-16 DIAGNOSIS — R53.81 DEBILITY: ICD-10-CM

## 2025-07-16 DIAGNOSIS — R56.9 SEIZURE: ICD-10-CM

## 2025-07-16 DIAGNOSIS — I48.0 PAROXYSMAL ATRIAL FIBRILLATION: ICD-10-CM

## 2025-07-16 DIAGNOSIS — G40.909 SEIZURE DISORDER: ICD-10-CM

## 2025-07-16 DIAGNOSIS — M10.9 GOUTY ARTHRITIS: Primary | ICD-10-CM

## 2025-07-16 DIAGNOSIS — M10.9 ACUTE GOUT OF MULTIPLE SITES, UNSPECIFIED CAUSE: ICD-10-CM

## 2025-07-16 DIAGNOSIS — F10.139 ALCOHOL ABUSE WITH WITHDRAWAL: ICD-10-CM

## 2025-07-16 DIAGNOSIS — R63.0 DECREASED APPETITE: ICD-10-CM

## 2025-07-16 DIAGNOSIS — I10 ESSENTIAL HYPERTENSION: ICD-10-CM

## 2025-07-16 DIAGNOSIS — R13.10 DYSPHAGIA, UNSPECIFIED TYPE: ICD-10-CM

## 2025-07-16 PROCEDURE — 1159F MED LIST DOCD IN RCRD: CPT | Mod: CPTII,S$GLB,, | Performed by: NURSE PRACTITIONER

## 2025-07-16 PROCEDURE — 3079F DIAST BP 80-89 MM HG: CPT | Mod: CPTII,S$GLB,, | Performed by: NURSE PRACTITIONER

## 2025-07-16 PROCEDURE — 3074F SYST BP LT 130 MM HG: CPT | Mod: CPTII,S$GLB,, | Performed by: NURSE PRACTITIONER

## 2025-07-16 PROCEDURE — 99350 HOME/RES VST EST HIGH MDM 60: CPT | Mod: S$GLB,,, | Performed by: NURSE PRACTITIONER

## 2025-07-16 PROCEDURE — 1160F RVW MEDS BY RX/DR IN RCRD: CPT | Mod: CPTII,S$GLB,, | Performed by: NURSE PRACTITIONER

## 2025-07-16 RX ORDER — ALLOPURINOL 300 MG/1
300 TABLET ORAL DAILY
Qty: 90 TABLET | Refills: 1 | Status: SHIPPED | OUTPATIENT
Start: 2025-07-16

## 2025-07-16 RX ORDER — LANOLIN ALCOHOL/MO/W.PET/CERES
100 CREAM (GRAM) TOPICAL DAILY
Qty: 30 TABLET | Refills: 3 | Status: SHIPPED | OUTPATIENT
Start: 2025-07-16

## 2025-07-16 RX ORDER — CALCIUM CARBONATE 300MG(750)
400 TABLET,CHEWABLE ORAL DAILY
Qty: 30 TABLET | Refills: 3 | Status: SHIPPED | OUTPATIENT
Start: 2025-07-16

## 2025-07-16 RX ORDER — AMIODARONE HYDROCHLORIDE 200 MG/1
200 TABLET ORAL DAILY
Qty: 90 TABLET | Refills: 1 | Status: SHIPPED | OUTPATIENT
Start: 2025-07-16

## 2025-07-16 RX ORDER — METOPROLOL TARTRATE 25 MG/1
25 TABLET, FILM COATED ORAL 2 TIMES DAILY
Qty: 180 TABLET | Refills: 1 | Status: SHIPPED | OUTPATIENT
Start: 2025-07-16

## 2025-07-16 RX ORDER — FERROUS SULFATE 325(65) MG
325 TABLET, DELAYED RELEASE (ENTERIC COATED) ORAL DAILY
Qty: 90 TABLET | Refills: 1 | Status: SHIPPED | OUTPATIENT
Start: 2025-07-16

## 2025-07-16 RX ORDER — FOLIC ACID 1 MG/1
1 TABLET ORAL DAILY
Qty: 30 TABLET | Refills: 2 | Status: SHIPPED | OUTPATIENT
Start: 2025-07-16

## 2025-07-16 RX ORDER — PANTOPRAZOLE SODIUM 40 MG/1
40 TABLET, DELAYED RELEASE ORAL DAILY
Qty: 90 TABLET | Refills: 1 | Status: SHIPPED | OUTPATIENT
Start: 2025-07-16

## 2025-07-16 RX ORDER — LEVETIRACETAM 500 MG/1
500 TABLET ORAL 2 TIMES DAILY
Qty: 180 TABLET | Refills: 3 | Status: SHIPPED | OUTPATIENT
Start: 2025-07-16

## 2025-07-16 RX ORDER — AMMONIUM LACTATE 12 G/100G
1 LOTION TOPICAL
Qty: 30 EACH | Refills: 3 | Status: SHIPPED | OUTPATIENT
Start: 2025-07-16

## 2025-07-20 VITALS
TEMPERATURE: 98 F | HEART RATE: 91 BPM | SYSTOLIC BLOOD PRESSURE: 127 MMHG | RESPIRATION RATE: 20 BRPM | DIASTOLIC BLOOD PRESSURE: 89 MMHG | OXYGEN SATURATION: 99 %

## 2025-07-20 PROBLEM — R63.0 DECREASED APPETITE: Status: ACTIVE | Noted: 2025-07-20

## 2025-07-21 ENCOUNTER — TELEPHONE (OUTPATIENT)
Dept: HOME HEALTH SERVICES | Facility: CLINIC | Age: 65
End: 2025-07-21
Payer: MEDICARE

## 2025-07-21 DIAGNOSIS — E46 MALNUTRITION, UNSPECIFIED TYPE: ICD-10-CM

## 2025-07-21 DIAGNOSIS — E43 SEVERE MALNUTRITION: ICD-10-CM

## 2025-07-21 DIAGNOSIS — I70.0 AORTIC ATHEROSCLEROSIS: ICD-10-CM

## 2025-07-21 DIAGNOSIS — F10.159: ICD-10-CM

## 2025-07-21 DIAGNOSIS — I10 ESSENTIAL HYPERTENSION: Primary | ICD-10-CM

## 2025-07-21 DIAGNOSIS — E78.2 MIXED HYPERLIPIDEMIA: ICD-10-CM

## 2025-07-21 DIAGNOSIS — N18.30 STAGE 3 CHRONIC KIDNEY DISEASE, UNSPECIFIED WHETHER STAGE 3A OR 3B CKD: ICD-10-CM

## 2025-07-21 DIAGNOSIS — R79.9 ABNORMAL FINDING OF BLOOD CHEMISTRY, UNSPECIFIED: ICD-10-CM

## 2025-07-21 DIAGNOSIS — M10.9 GOUTY ARTHRITIS: ICD-10-CM

## 2025-07-21 DIAGNOSIS — N18.2 CHRONIC KIDNEY DISEASE, STAGE II (MILD): ICD-10-CM

## 2025-07-21 DIAGNOSIS — F10.139: ICD-10-CM

## 2025-07-21 DIAGNOSIS — I48.0 PAROXYSMAL ATRIAL FIBRILLATION: ICD-10-CM

## 2025-07-21 DIAGNOSIS — Z13.1 ENCOUNTER FOR SCREENING FOR DIABETES MELLITUS: ICD-10-CM

## 2025-07-21 NOTE — TELEPHONE ENCOUNTER
Orders placed per NP request for labs to be drawn (CBC, CMP, Mag, Phos, Lipid, Hemoglobin A1C, TSH).  Demographics, notes, and orders faxed to [facility].  Fax confirmation received.

## 2025-07-21 NOTE — PROGRESS NOTES
"[facility]  Medical Chronic Care Home Visit    Encounter Provider: Silvia Culp   PCP: Amelia Lai MD  Consult Requested By: No ref. provider found    HISTORY OF PRESENT ILLNESS      Patient ID: Michael Johnson Jr. is a 64 y.o. male is being seen in the home due to physical debility that presents a taxing effort to leave the home, to mitigate high risk of hospital readmission and/or due to the limited availability of reliable or safe options for transportation to the point of access to the provider. Prior to treatment on this visit the chart was reviewed and patient verbal consent was obtained.    HPI:   64 year old male with Hx of Hypertension, Seizure disorders, spastic left-sided hemiplegia, Anemia, Arthrititis, Depression, CKD stage 3, Alcohol use disorder and Gout.  He is bedbound and spouse provides 24 hr caregiver support.  Follows with PCP Ming.     Today:  VSS.  He is found lying in bed, no distress.  Spouse is present during appointment.  Reports no alcohol in "a couple of months."  Reports poor appetite, eats about 1-2 small meals per day and drinks a lot of milk which he reports replaces alcohol craving.  Inconsistent with taking medications.  Reports good bm pattern, sleep.  He needs routine labs, will order.  He has an upcoming colonoscopy and plans on attending.  Need routine labs, will order.   DECISION MAKING TODAY       Assessment & Plan:  1. Gouty arthritis  Comments:  Continue allopurinol. Recheck uric acid level. Will need to refer again to Nephrology  Orders:  -     allopurinoL (ZYLOPRIM) 300 MG tablet; Take 1 tablet (300 mg total) by mouth once daily.  Dispense: 90 tablet; Refill: 1    2. Paroxysmal atrial fibrillation  Overview:  The patient has atrial fibrillation he is not really sure where these and chronically are not.  He is takes metoprolol but he does not always remember to take it his family says he forgets that a lot he says he never forgets.  He says he has been " under stress lately and he suddenly had onset of fibrillation or flutter with a rate of 150 he was placed on amiodarone by the cardiologist and now his rate is 98 but looks like it is still flutter    Assessment & Plan:  Normal heart rate, asymptomatic  Continue meds as prescribed     Orders:  -     apixaban (ELIQUIS) 5 mg Tab; Take 1 tablet (5 mg total) by mouth 2 (two) times daily.  Dispense: 180 tablet; Refill: 1    3. Seizure disorder  -     levETIRAcetam (KEPPRA) 500 MG Tab; Take 1 tablet (500 mg total) by mouth 2 (two) times daily.  Dispense: 180 tablet; Refill: 3    4. Essential hypertension  Assessment & Plan:  Chronic  Take meds as prescribed     Orders:  -     metoprolol tartrate (LOPRESSOR) 25 MG tablet; Take 1 tablet (25 mg total) by mouth 2 (two) times daily.  Dispense: 180 tablet; Refill: 1    5. Dysphagia, unspecified type  -     pantoprazole (PROTONIX) 40 MG tablet; Take 1 tablet (40 mg total) by mouth once daily.  Dispense: 90 tablet; Refill: 1    6. Acute gout of multiple sites, unspecified cause  Assessment & Plan:  chronic   Continue allopurinol and colchicine       7. Alcohol abuse with withdrawal  Assessment & Plan:  Reports no recent alcohol use   Discussed the importance of continued cessation  Continue muti vitamin, thiamine, magnesium, folic acid and vitamin B-12       8. Debility  Assessment & Plan:  Bedbound  Continue caregiver support       9. Seizure  Assessment & Plan:  Reports no recent seizure activity  Take meds as prescribed  Seizure precautions      10. Decreased appetite  Assessment & Plan:  Discussed small frequent meals  Nutritional supplements  Take thiamine, magnesium, vitamin b12, folic acid acid as prescribed       Other orders  -     amiodarone (PACERONE) 200 MG Tab; Take 1 tablet (200 mg total) by mouth once daily.  Dispense: 90 tablet; Refill: 1  -     ferrous sulfate 325 (65 FE) MG EC tablet; Take 1 tablet (325 mg total) by mouth once daily.  Dispense: 90 tablet;  Refill: 1  -     folic acid (FOLVITE) 1 MG tablet; Take 1 tablet (1 mg total) by mouth once daily.  Dispense: 30 tablet; Refill: 2  -     magnesium oxide 400 mg magnesium Tab; Take 400 mg by mouth once daily.  Dispense: 30 tablet; Refill: 3  -     thiamine 100 MG tablet; Take 1 tablet (100 mg total) by mouth once daily.  Dispense: 30 tablet; Refill: 3  -     ammonium lactate (LAC-HYDRIN) 12 % lotion; Apply 1 g topically as needed for Dry Skin.  Dispense: 30 each; Refill: 3           ENVIRONMENT OF CARE      Family and/or Caregiver present at visit?  Yes  Name of Caregiver: spouse  History provided by: patient and caregiver    4Ms for Medical Decision-Making in Older Adults    Last Completed EAWV: 2024    MEDICATIONS:  High Risk Medications:  Total Active Medications: 2  gabapentin - 400 MG  levETIRAcetam Tab - 500 MG    MOBILITY:  Activities of Daily Livin/8/2024    10:57 AM   Activities of Daily Living   Ambulation Assistance Required   Ambulation Assistance Bedbound   Dressing Assistance Required   Dressing Assistance Total Care   Transfers Assistance Required   Transfers Assistance Total Care   Toileting Incontinent of bladder;Incontinent of bowel   Toileting Assistance Wears Briefs   Feeding Independent   Cleaning home/Chores Assistance Required   Telephone use Independent   Shopping Assistance Required   Paying bills Assistance Required   Taking meds Assistance Required     Fall Risk:      2025     8:00 AM 2024     1:00 PM 3/22/2024     8:00 AM   Fall Risk Assessment - Outpatient   Mobility Status Stretcher Ambulatory Wheelchair Bound   Number of falls 0 0 0   Identified as fall risk True False True     Disability Status:      2024    10:58 AM   Disability Status   Are you deaf or do you have serious difficulty hearing? N   Are you blind or do you have serious difficulty seeing, even when wearing glasses? N   Because of a physical, mental, or emotional condition, do you have serious  difficulty concentrating, remembering, or making decisions? N   Do you have serious difficulty walking or climbing stairs? Y   Do you have difficulty dressing or bathing? Y   Because of a physical, mental, or emotional condition, do you have difficulty doing errands alone such as visiting a doctor's office or shopping? Y     Nutrition Screenin/8/2024    10:57 AM   Nutrition Screening   Has food intake declined over the past three months due to loss of appetite, digestive problems, chewing or swallowing difficulties? Moderate decrease in food intake   Involuntary weight loss during the last 3 months? No weight loss   Mobility? Bed or chair bound   Has the patient suffered psychological stress or acute disease in the past three months? No   Neuropsychological problems? No psychological problems   Body Mass Index (BMI)?  BMI 23 or greater   Screening Score 11   Interpretation At risk of malnutrition    Screening Score: 0-7 Malnourished, 8-11 At Risk, 12-14 Normal  Get Up and Go:       No data to display              Whisper Test:      2024    10:58 AM   Whisper Test   Whisper Test Normal         MENTATION:   Has Dementia Dx: No  Has Anxiety Dx: No    Depression Patient Health Questionnaire:      2024    10:58 AM   Depression Patient Health Questionnaire   Over the last two weeks how often have you been bothered by little interest or pleasure in doing things Not at all   Over the last two weeks how often have you been bothered by feeling down, depressed or hopeless Several days   PHQ-2 Total Score 1     Cognitive Function Screenin/8/2024    10:59 AM   Cognitive Function Screening   Clock Drawing Test 0    Mini-Cog 3 Minute Recall 0   Cognitive Function Screening 0       Data saved with a previous flowsheet row definition         WHAT MATTERS MOST:  Advance Care Planning   ACP Status:   Patient has had an ACP conversation  Living Will: Yes  Power of : No  POLST: No          Impression  upon entering the home:  Physical Dwelling: single family home   Appearance of home environment: cleaniness: clean  Functional Status: max assistance  Mobility: bedbound  Nutritional access: available food but inadequate intake  Home Health: No, and does not need it at this time     Diagnostic tests reviewed/disposition: No diagnosic tests pending after this hospitalization.  Disease/illness education: signs and symptoms to report   Establishment or re-establishment of referral orders for community resources: No other necessary community resources.   Discussion with other health care providers: No discussion with other health care providers necessary.   Does patient have a PCP at [facility]? Yes   Repatriation plan with PCP? Care at Home reason: mobility   Does patient have an ostomy (ileostomy, colostomy, suprapubic catheter, nephrostomy tube, tracheostomy, PEG tube, pleurex catheter, cholecystostomy, etc)? No  Were BPAs reviewed? No    Social History     Socioeconomic History    Marital status:    Tobacco Use    Smoking status: Former    Smokeless tobacco: Never   Substance and Sexual Activity    Alcohol use: Not Currently    Drug use: No    Sexual activity: Not Currently     Social Drivers of Health     Financial Resource Strain: Low Risk  (4/22/2024)    Overall Financial Resource Strain (CARDIA)     Difficulty of Paying Living Expenses: Not very hard   Food Insecurity: No Food Insecurity (4/22/2024)    Hunger Vital Sign     Worried About Running Out of Food in the Last Year: Never true     Ran Out of Food in the Last Year: Never true   Transportation Needs: Unmet Transportation Needs (4/22/2024)    PRAPARE - Transportation     Lack of Transportation (Medical): Yes     Lack of Transportation (Non-Medical): Yes   Physical Activity: Unknown (4/22/2024)    Exercise Vital Sign     Days of Exercise per Week: 2 days   Recent Concern: Physical Activity - Inactive (4/22/2024)    Exercise Vital Sign     Days of Exercise  per Week: 2 days     Minutes of Exercise per Session: 0 min   Stress: Stress Concern Present (4/22/2024)    [employer] - Occupational Stress Questionnaire     Feeling of Stress : Very much   Housing Stability: Low Risk  (2/8/2024)    Housing Stability Vital Sign     Unable to Pay for Housing in the Last Year: No     Number of Places Lived in the Last Year: 1     Unstable Housing in the Last Year: No         OBJECTIVE:     Vital Signs:  Vitals:    07/16/25 1700   BP: 127/89   Pulse: 91   Resp: 20   Temp: 98 °F (36.7 °C)       Review of Systems   Constitutional:  Positive for appetite change. Negative for chills and fever.   HENT:  Negative for congestion, postnasal drip and rhinorrhea.    Eyes:  Negative for visual disturbance.   Respiratory:  Negative for chest tightness and shortness of breath.    Cardiovascular:  Negative for chest pain and palpitations.   Gastrointestinal:  Negative for abdominal pain, blood in stool, constipation, diarrhea, nausea and vomiting.   Genitourinary:  Negative for difficulty urinating.   Musculoskeletal:  Negative for arthralgias and myalgias.   Skin:  Negative for color change.   Neurological:  Negative for weakness and light-headedness.   Hematological:  Does not bruise/bleed easily.   Psychiatric/Behavioral:  Negative for agitation.        Physical Exam  HENT:      Head: Normocephalic and atraumatic.      Nose: No congestion or rhinorrhea.      Mouth/Throat:      Mouth: Mucous membranes are moist.   Cardiovascular:      Rate and Rhythm: Normal rate.      Pulses: Normal pulses.   Pulmonary:      Effort: No respiratory distress.      Breath sounds: Normal breath sounds. No wheezing.   Abdominal:      General: Bowel sounds are normal.      Palpations: Abdomen is soft.   Musculoskeletal:         General: Deformity (contracted fingers) present.      Cervical back: Normal range of motion and neck supple.      Right lower leg: No edema.      Left lower leg:  No edema.   Skin:     General: Skin is warm and dry.   Neurological:      Mental Status: He is alert and oriented to person, place, and time. Mental status is at baseline.   Psychiatric:         Mood and Affect: Mood normal.       INSTRUCTIONS FOR PATIENT:     Scheduled Follow-up, Appts Reviewed with Modifications if Needed: Yes  No future appointments.    Current Medications[1]    Medication Reconciliation:  Were medications changed during this appointment? No  Were medications in the home? Yes  Is the patient taking the medications as directed? No  Does the patient/caregiver understand the medications and changes? Yes  Does updated med list accurately reflects meds patient is currently taking? Yes    Signature: Silvia Culp NP     Total face-to-face time was 60 min, >50% of this was spent on counseling and coordination of care. The following issues were discussed: primary and secondary diagnoses, co-morbidities, prescribed medications, treatment modalities, importance of compliance with medical advice and directives for follow-up care.           [1]   Current Outpatient Medications:     albuterol sulfate (PROAIR RESPICLICK) 90 mcg/actuation inhaler, Inhale 2 puffs into the lungs every 6 (six) hours as needed for Wheezing (wheezing). Rescue, Disp: 1 each, Rfl: 3    allopurinoL (ZYLOPRIM) 300 MG tablet, Take 1 tablet (300 mg total) by mouth once daily., Disp: 90 tablet, Rfl: 1    aluminum-magnesium hydroxide-simethicone (MAALOX) 200-200-20 mg/5 mL Susp, Take 30 mLs by mouth before meals and at bedtime as needed (acid reflux)., Disp: , Rfl:     amiodarone (PACERONE) 200 MG Tab, Take 1 tablet (200 mg total) by mouth once daily., Disp: 90 tablet, Rfl: 1    ammonium lactate (LAC-HYDRIN) 12 % lotion, Apply 1 g topically as needed for Dry Skin., Disp: 30 each, Rfl: 3    apixaban (ELIQUIS) 5 mg Tab, Take 1 tablet (5 mg total) by mouth 2 (two) times daily., Disp: 180 tablet, Rfl: 1    ceramides 1,3,6-II (CERAVE DAILY  MOISTURIZING) Lotn, Applying topically to dry skin twice daily., Disp: 355 mL, Rfl: 5    cetirizine (ZYRTEC) 5 MG tablet, Take 1 tablet (5 mg total) by mouth once daily., Disp: 30 tablet, Rfl: 0    colchicine (COLCRYS) 0.6 mg tablet, Take 1 tablet (0.6 mg total) by mouth 2 (two) times daily., Disp: 60 tablet, Rfl: 0    cyanocobalamin, vitamin B-12, 1,000 mcg Cap, Take 1 capsules (1,000 mcg) by mouth Daily., Disp: 30 capsule, Rfl: 0    diclofenac sodium (VOLTAREN) 1 % Gel, Apply 2 g topically 4 (four) times daily as needed., Disp: 100 g, Rfl: 0    ferrous sulfate 325 (65 FE) MG EC tablet, Take 1 tablet (325 mg total) by mouth once daily., Disp: 90 tablet, Rfl: 1    fluticasone propionate (FLONASE) 50 mcg/actuation nasal spray, SHAKE LIQUID AND USE 1 SPRAY(50 MCG) IN EACH NOSTRIL EVERY DAY, Disp: 16 g, Rfl: 1    folic acid (FOLVITE) 1 MG tablet, Take 1 tablet (1 mg total) by mouth once daily., Disp: 30 tablet, Rfl: 2    gabapentin (NEURONTIN) 400 MG capsule, TAKE 1 CAPSULE TWICE DAILY, Disp: 180 capsule, Rfl: 3    levETIRAcetam (KEPPRA) 500 MG Tab, Take 1 tablet (500 mg total) by mouth 2 (two) times daily., Disp: 180 tablet, Rfl: 3    magnesium oxide 400 mg magnesium Tab, Take 400 mg by mouth once daily., Disp: 30 tablet, Rfl: 3    metoprolol tartrate (LOPRESSOR) 25 MG tablet, Take 1 tablet (25 mg total) by mouth 2 (two) times daily., Disp: 180 tablet, Rfl: 1    pantoprazole (PROTONIX) 40 MG tablet, Take 1 tablet (40 mg total) by mouth once daily., Disp: 90 tablet, Rfl: 1    thiamine 100 MG tablet, Take 1 tablet (100 mg total) by mouth once daily., Disp: 30 tablet, Rfl: 3

## 2025-07-21 NOTE — ASSESSMENT & PLAN NOTE
Discussed small frequent meals  Nutritional supplements  Take thiamine, magnesium, vitamin b12, folic acid acid as prescribed

## 2025-07-21 NOTE — ASSESSMENT & PLAN NOTE
Reports no recent alcohol use   Discussed the importance of continued cessation  Continue muti vitamin, thiamine, magnesium, folic acid and vitamin B-12    22.5

## 2025-07-29 DIAGNOSIS — Z12.11 ENCOUNTER FOR SCREENING COLONOSCOPY: Primary | ICD-10-CM

## 2025-07-30 ENCOUNTER — TELEPHONE (OUTPATIENT)
Dept: ENDOSCOPY | Facility: HOSPITAL | Age: 65
End: 2025-07-30
Payer: MEDICARE

## 2025-07-30 DIAGNOSIS — Z12.11 ENCOUNTER FOR SCREENING COLONOSCOPY: Primary | ICD-10-CM

## 2025-07-30 NOTE — TELEPHONE ENCOUNTER
Pt to be rescheduled at main campus per anesthesia. Pt removed from [place] [facility]. Message sent to SADE Rodriguez for rescheduling per K. Jaysonot

## 2025-07-30 NOTE — TELEPHONE ENCOUNTER
Wife called to inquire about colonoscopy. Wife slightly confused. She stated pt was scheduled at [facility] but then wanted to schedule at main campus.pt is bed bound and will need 2nd floor. No available dates at this time. Shanti raymond created

## 2025-08-01 ENCOUNTER — TELEPHONE (OUTPATIENT)
Dept: ENDOSCOPY | Facility: HOSPITAL | Age: 65
End: 2025-08-01
Payer: MEDICARE

## 2025-08-01 VITALS — BODY MASS INDEX: 25.33 KG/M2 | HEIGHT: 72 IN | WEIGHT: 187 LBS

## 2025-08-01 NOTE — TELEPHONE ENCOUNTER
Patient is rescheduled for a Colonoscopy on 10/14/25 with CLARISSA Chapman. Instructions mailed.      [facility] Colonoscopy Prep and Procedure Instructions  Polyethylene Glycol (PEG) Solution    Date of procedure: 10/14/25 - Arrive at 9:00 AM  Location of Department:   [facility] [address] [place] [address]  Take the [facility] Elevators to 2nd Floor Outpatient Surgery    Getting ready for your procedure:    If your procedure requires the administration of anesthesia, it is necessary for a responsible adult to drive you home. The designated adult is strongly encouraged to remain in the endoscopy area until the patient is discharged. (Medical Transportation, Uber, Lyft, Taxi, etc. may ONLY be used if a responsible adult is present to accompany you home. The responsible adult CANNOT be the  of the service.)   person must be available to return to pick you up within 15 minutes of being notified of discharge.  Please bring a picture ID, insurance card, & co-pay.  Leave all jewelry and valuables at home on the day of the procedure.  Do not follow the instructions that come in the prep box - use these instructions instead.    Medications:    If you begin taking any new blood thinning, weight loss, or diabetic medications please call Endoscopy Scheduling as soon as possible at [phone].  If you take heart, blood pressure, seizure, pain, (including inhalers/nebulizers), anti-rejection (transplant patients) or mental health medications, please take at your regular times with a sip of water.  If you are taking diabetic or weight loss medications, see the attached instructions.    COLONOSCOPY PREP TIMELINE    ONE WEEK PRIOR TO PROCEDURE   the prep kit and Dulcolax laxative tablets (bisacodyl 5mg - available over the counter) from your local pharmacy.  Purchase clear liquids for use during the prep referencing the chart below.    CLEAR LIQUIDS NOT CLEAR LIQUIDS (DO NOT DRINK)    Water X Milk   Clear broth (chicken, beef, or vegetable) X Smoothies   Apple Juice (no pulp) X Hot Chocolate   White grape juice X Juices with pulp (orange juice, prune juice)   Sports drinks (AVOID red, purple, or blue) X Coffee WITH cream or milk   Clear soda X Beverages with particles (shakes, milkshakes)   Tea or coffee (black, NO MILK)    Clear gelatin (Jell-o, AVOID red, purple, or blue)      THE DAY BEFORE YOUR PROCEDURE: 10/13/25    Consume ONLY CLEAR LIQUIDS for the entire day.  Stay hydrated.  NO SOLID FOOD    2:00 PM  Mix the PEG solution as directed on container and place in the refrigerator.  Take four (4) Dulcolax laxative (bisacodyl 5mg) tablets with at least one full glass of clear liquids.      6:00 - 7:00 PM  Drink half the liquid in the container within one (1) hour.  Drink an 8-ounce glass of the mixture every 10 to 20 minutes until half is gone.  Refrigerate the remaining half of the liquid for dose 2.   If you experience nausea, bloating, or cramping, slow down drinking until your symptoms decrease.    THE DAY OF YOUR PROCEDURE: 10/14/25    2:00 - 3:00 AM  Drink remaining half of the liquid in the container within one (1) hour.  Drink an 8-ounce glass of the mixture every 10 to 20 minutes.    ** May continue to drink clear liquids until 4 hours prior to arrival time for procedure **    Who to call if you have questions:    To reschedule / cancel, or for prep questions: [phone] / Hours of operation Monday-Friday 8:00 AM-4:30 PM  Insurance or financial information: [phone] or [phone]  After hours concerns, call [facility] at [phone] or [phone]    Please watch this informational video:  [email or web address]      Please contact your Diabetes Care Provider if you have questions or are preparing for more than one day before your procedure. For any scheduling questions, contact the Endoscopy Schedulers at [phone]. Please  disregard this guide if you do not take any of these medications.     Weight loss and Diabetes Medication Holding Instructions:     Oral Medicine Day before procedure Day of Procedure   Glyburide Do NOT take Do NOT take morning of procedure. If you  take twice daily, take with dinner.   Glucotrol (Glipizide)     Amaryl (Glimepiride)        Glucophage Take as  prescribed Do NOT take morning of procedure. Take  when you start eating again.   Glumetza     Fortamet (Metformin)        Januvia (Sitaglipitin) Take as  prescribed Do NOT take morning of procedure. Take  when you start eating again.   Nesina (Aloglipitin)     Onglyza (Saxaglipitin)     Tradjenta (Linaglipitin)        Invokana (canagliflozin) see prep   instructions  for date of  last dose Do NOT take morning of procedure. Take  when you start eating again.   Farxiga (dapagliflozin)     Jardiance (empagliflozin)     Steglatro (ertugliflozin)     Brenzavvy (bexagliflozin)     Inpefa (sotagliflozin)     Invokamet/Invokamet XR (Invokana + metformin)     Xigduo (Farxiga + metformin)     Synjardy  XR (Jardiance + metformin)     Segluromet (Steglatro + metformin)     Qtern (Farxiga + saxagliptin)     Glyxambi (Jardiance + linagliptin)     Trijardy XR (empagliflozin + linagliptin + metformin)     Steglujan (Steglatro + sitagliptin)        Actos (Pioglitazone) Take as  prescribed. Do NOT take morning of procedure. Take  when you start eating again.            Rybelsus (semaglutide) Take as   prescribed. Do NOT take morning of procedure. Take   when you start eating again.             Adipex/Lomaria (phentermine) see prep  instructions  for date of  last dose Do NOT take morning of procedure. Take  when you start eating again.   Qsymia (phentermine  + topiramate)              Tenuate/Tepanil (diethylpropion) see prep  instructions  for date of last dose Do NOT take morning of procedure. Take  when you start eating again.                  Xenical (orilstat) see prep    instructions  for date of last dose Do NOT take morning of procedure. Take  when you start eating again.                  Injectable & Combination  Medicine (taken daily) Day before Procedure Day of Procedure   Victoza/Saxenda (liragluide) Take as prescribed Do NOT take morning of procedure. Take  when you start eating again.   Byetta (exenatide)     Gattex (teduglutide)     Adlyxin (lixisenatide) *     Soliqua (lixisenatide + insulin glargine)     Xultophy (liraglutide + insulin degludec)        Injectable  Medicine  (taken weekly) Day before Procedure Day of Procedure   Bydureon bcise (exenatide ER) see prep  instructions  for date of  last dose Do NOT take morning of procedure. Take   when you start eating again.   Mounjaro/Zepbound (tirzepatide)     Ozempic/Wegovy (semaglutide)     Tanzeum (albiglutide) *     Trulicity (dulaglutide)     It is important to monitor your blood sugar while doing the bowel preparation. On the day of your bowel prep, when you are on a clear liquid diet, you may drink beverages with sugar as your source of glucose. Be sure to mix the prep with water or sugar free liquid only. Below are instructions on how to adjust your diabetic medications prior to your scheduled procedure. Call the healthcare provider who manages your diabetes if you have questions.   Insulin for Type 1 Diabetes Mellitus Day before Procedure                                         Day of procedure    Basaglar                   If you use in the morning, take as prescribed.  If you use in the evening, inject 70% of dose. If you take in the morning,   inject 80% of dose. If you take  in the evenings, inject usual   dose.    Lantus            Levemsuzi Hernandez                         Count carbs and adjust dose accordingly. If not carb counting, take 25% of usual meal dose. May use correction dose every 4 hours. Do NOT use once sugar-free liquid prep is started.      Do NOT take  morining of procedure.        Take when you start eating again.    Apidra                           Humalog 100                           Humalog 200                            Novolog                                                Insulin Pump                     SEE INSULIN PUMP INSTRUCTIONS      Insulin for Type 2 Diabetes Mellitus Day before Procedure                                         Day of procedure    Basaglar                         If you use in the morning, take as prescribed.   If you use in the evening, inject 70% of dose. If you take in the morning,   inject 80% of dose. If you take  in the evenings, inject usual   dose.    Lantus                             Levemir     Tonina De Leónba        Afrebozenaa                         Inject 50 % of dose with clear liquid diet.   Do NOT use once sugar-free clear liquid prep is started. If you are using a correction scale, take dose every 4 hours as needed. Do NOT take morning of   procedure. Take when you   start eating meals again.    Apidra                            Fiasp                    Humalog 100                        Humalog 200     Novolog        NPH                          Inject 50% of breakfast and dinner doses with clear liquid diet.  Do NOT take morning of  procedure. Take usual dose  when you eat dinner.                                        Regular       Inject 50% of breakfast dose and do NOT takedinner dose once sugar-free liquid prep has started. If using correction scale, may take dose every 6 hours as needed.  Do NOT take morning of   procedure. Take usual dose   when you eat dinner.                                                           Novolog Mix 70/30                       Inject 50% of breakfast dose. Inject 25% of dinner dose. Do NOT take breakfast dose.   Take usual dose when you eat   dinner.    Humolog Mix 75/25                            Humolog Mix 50/50        U500                        Take 50% of AM or breakfast  "unit kaden dose.Take 25% of lunch or dinner or PM unit kaden dose.  Do NOT take mornining of   procedure. Take when you   start eating again.                                           V-Go                        Continue to wear your V-Go device. Do NOT click once sugar-free clear liquid prep is started.  Continue to wear your V-Go  device. Resume clicks with   meals.                                              INSULIN PUMP GUIDANCE Day before Procedure Day of Procedure   Pump and CGM do not communicate      Count carbs and bolus for carbs and correction as needed. May use temp basal rate of 70% once sugar-free clear prep is started. Continue until after procedure the following day Continue use of 70% temp basal rate until procedure complete and you are eating normally        Pump and CGM do communicate     Tandem Tslim and Mobi Count carbs and bolus for carbs and correction as needed. Start "Exercise" mode once sugar-free clear prep is started. Continue until after procedure the following day Continue use of "Exercise" mode until procedure complete and you are eating normally        Omnipod 5 Count carbs and bolus for carbs and correction as needed. Start "Activity" mode once sugar-free clear prep is started. Continue until after procedure the following day Continue use of "Activity" mode until procedure complete and you are eating normally        Medtronic 670g/770g/780g Count carbs and bolus for carbs and correction as needed. Start "Temp target" mode once sugar-free clear prep is started. Continue until after procedure the following day Continue use of temp target until procedure complete and you are eating normally        Beta Bionic ilet Count carbs and bolus for carbs and correction as needed. Let pump run as you usually would Let pump run as you usually would       "

## 2025-08-06 ENCOUNTER — PATIENT OUTREACH (OUTPATIENT)
Dept: ADMINISTRATIVE | Facility: HOSPITAL | Age: 65
End: 2025-08-06
Payer: MEDICARE

## 2025-08-06 NOTE — PROGRESS NOTES
Health Maintenance reviewed, updated and links triggered. Colorectal screening  appt 10/14/25  ([place]ord) 8/6/25

## 2025-08-07 ENCOUNTER — PATIENT OUTREACH (OUTPATIENT)
Dept: ADMINISTRATIVE | Facility: HOSPITAL | Age: 65
End: 2025-08-07
Payer: MEDICARE

## 2025-08-11 ENCOUNTER — PATIENT OUTREACH (OUTPATIENT)
Dept: ADMINISTRATIVE | Facility: HOSPITAL | Age: 65
End: 2025-08-11
Payer: MEDICARE

## 2025-08-13 ENCOUNTER — TELEPHONE (OUTPATIENT)
Dept: HOME HEALTH SERVICES | Facility: CLINIC | Age: 65
End: 2025-08-13
Payer: MEDICARE

## 2025-08-13 DIAGNOSIS — I48.0 PAROXYSMAL ATRIAL FIBRILLATION: Primary | ICD-10-CM

## (undated) DEVICE — SOL CLEARIFY VISUALIZATION LAP

## (undated) DEVICE — TROCAR ENDOPATH XCEL 5X100MM

## (undated) DEVICE — SUT MCRYL PLUS 4-0 PS2 27IN

## (undated) DEVICE — APPLIER CLIP ENDO LIGAMAX 5MM

## (undated) DEVICE — ELECTRODE REM PLYHSV RETURN 9

## (undated) DEVICE — NDL HYPO REG 25G X 1 1/2

## (undated) DEVICE — SUT VICRYL 0 27 CT-2

## (undated) DEVICE — KIT WING PAD POSITIONING

## (undated) DEVICE — SYR B-D DISP CONTROL 10CC100/C

## (undated) DEVICE — ADHESIVE DERMABOND ADVANCED

## (undated) DEVICE — SEE MEDLINE ITEM 156925

## (undated) DEVICE — NDL INSUF ULTRA VERESS 120MM

## (undated) DEVICE — CANNULA ENDOPATH XCEL 5X100MM

## (undated) DEVICE — DRAIN WND 15FRX3/16X4.7MM TRCR

## (undated) DEVICE — PENCIL ELECTROSURG HOLST W/BLD

## (undated) DEVICE — SOL 9P NACL IRR PIC IL

## (undated) DEVICE — SOL NS 1000CC

## (undated) DEVICE — IRRIGATOR ENDOSCOPY DISP.

## (undated) DEVICE — BAG TISSUE RETRIEVAL 225ML

## (undated) DEVICE — TROCAR ENDOPATH XCEL 11MM 10CM

## (undated) DEVICE — EVACUATOR WOUND BULB 100CC